# Patient Record
Sex: FEMALE | Race: WHITE | NOT HISPANIC OR LATINO | Employment: OTHER | URBAN - METROPOLITAN AREA
[De-identification: names, ages, dates, MRNs, and addresses within clinical notes are randomized per-mention and may not be internally consistent; named-entity substitution may affect disease eponyms.]

---

## 2017-07-11 ENCOUNTER — ALLSCRIPTS OFFICE VISIT (OUTPATIENT)
Dept: OTHER | Facility: OTHER | Age: 62
End: 2017-07-11

## 2018-01-10 NOTE — PROGRESS NOTES
Assessment    1  Acute left ankle pain (109 47) (M25 572)   2  Closed nondisplaced fracture of lateral malleolus of left fibula, initial encounter (824 2)   (S82 65XA)    Plan  Acute left ankle pain, Closed nondisplaced fracture of lateral malleolus of left fibula,  initial encounter    · [place], CAM Style; Status:Complete;   Done: 09OQD9505 12:11PM   · Follow-up Visit in 4 Weeks Evaluation and Treatment  Follow-up  Status: Hold For -  Scheduling  Requested for: 57KLK9419    Discussion/Summary    Patient is  62-year-old female presents today for evaluation of her left ankle pain consistent with a closed, nondisplaced fracture of the distal fibula  At this time, the patient will be placed in cam boot walker for ankle immobilization and will be made nonweightbearing  She may continue with Tylenol and anti-inflammatories as needed for her pain and swelling  She will return to the office follow-up in 4 weeks at which time follow-up x-rays will be obtained to assess for callus formation and healing  Possible side effects of new medications were reviewed with the patient/guardian today  The treatment plan was reviewed with the patient/guardian  The patient/guardian understands and agrees with the treatment plan   The patient was counseled regarding diagnostic results, instructions for management, risk factor reductions, prognosis, patient and family education, impressions, risks and benefits of treatment options, importance of compliance with treatment  Chief Complaint  left ankle pain      History of Present Illness  HPI: Patient is a  62-year-old female presents today for evaluation of her left ankle pain  She reports on March 20, 2016, demonstrates a left ankle while walking in her garden  She reports immediate onset of pain and swelling over the lateral aspect of the left ankle  Her pain is described as a throbbing, achy pain with radiation of symptoms proximally into her left calf area   She has been using Tylenol and naproxen which has provided no relief  Symptoms are relieved with rest and elevation  She denies any crepitus, warmth, numbness or tingling  Review of Systems    Constitutional: No fever, no chills, feels well, no tiredness, no recent weight gain or loss  Eyes: No complaints of eyesight problems, no red eyes  ENT: no loss of hearing, no nosebleeds, no sore throat  Cardiovascular: No complaints of chest pain, no palpitations, no leg claudication or lower extremity edema  Respiratory: no compliants of shortness of breath, no wheezing, no cough  Gastrointestinal: no complaints of abdominal pain, no constipation, no nausea or diarrhea, no vomiting, no bloody stools  Genitourinary: no complaints of dysuria, no incontinence  Musculoskeletal: joint swelling, limb pain and limb swelling, but as noted in HPI  Integumentary: no complaints of skin rash or lesion, no itching or dry skin, no skin wounds  Neurological: no complaints of headache, no confusion, no numbness or tingling, no dizziness  Endocrine: No complaints of muscle weakness, no feelings of weakness, no frequent urination, no excessive thirst    Psychiatric: No suicidal thoughts, no anxiety, no feelings of depression  ROS reviewed  Active Problems    1  Arthralgia of lumbar spine (724 2) (M54 5)   2  DDD (degenerative disc disease), lumbar (722 52) (M51 36)   3  Degenerative lumbar spinal stenosis (724 02) (M48 06)   4  Open Wound Of The Finger (883 0)   5  Spinal stenosis of lumbar region (724 02) (M48 06)    Past Medical History    The active problems and past medical history were reviewed and updated today  Surgical History    The surgical history was reviewed and updated today  Family History    The family history was reviewed and updated today  Social History  The social history was reviewed and updated today  The social history was reviewed and is unchanged  Current Meds   1   Gabapentin 300 MG Oral Capsule; TAKE 1 CAPSULE TWICE DAILY; Therapy: 87XHF9161 to (Evaluate:16Mar2016)  Requested for: 12WQH9327; Last   Rx:01Mar2016 Ordered   2  Metaxalone 800 MG Oral Tablet; TAKE 1 TABLET 3 TIMES DAILY; Therapy: 05UOF9077 to (Evaluate:11Mar2016)  Requested for: 92BOU9785; Last   Rx:01Mar2016 Ordered   3  Sertraline HCl - 25 MG Oral Tablet; Take one tablet every day; Therapy: 91Fgl8940 to  Requested for: 38PSB4528 Recorded    The medication list was reviewed and updated today  Allergies    1  No Known Drug Allergies    Physical Exam    Left Ankle: Appearance: swelling  Tenderness: ATFL, CFL, lateral malleolus  and PTFL, but not the syndesmosis  Palpatory findings include no crepitus and no warmth  ROM: Deferred  Motor: Deferred  Special Tests: Deferred  Constitutional - General appearance: Normal    Musculoskeletal - Gait and station: Normal  Digits and nails: Normal  Muscle strength/tone: Normal    Cardiovascular - Pulses: Normal  Examination of extremities for edema and/or varicosities: Normal    Skin - Skin and subcutaneous tissue: Normal    Neurologic - Sensation: Normal    Psychiatric - Orientation to person, place, and time: Normal  Mood and affect: Normal    Eyes   Conjunctiva and lids: Normal     Pupils and irises: Normal        Results/Data  I personally reviewed the films/images/results in the office today  My interpretation follows  X-ray Review Avulsion fracture of the distal fibula  Future Appointments    Date/Time Provider Specialty Site   03/31/2016 03:15 PM SAYDA Ring   Orthopedic Surgery [address]   Electronically signed by : Federico Howe DO; Mar 24 2016 12:14PM EST                       (Author)

## 2018-01-10 NOTE — PROGRESS NOTES
Assessment    1  DDD (degenerative disc disease), lumbar (722 52) (M51 36)   2  Degenerative lumbar spinal stenosis (724 02) (M48 06)     Lumbar spinal stenosis  Lumbar spondylolisthesis  Lumbar radiculopathy  Patient would like to proceed with surgical intervention  She feels her most recent fall, resulting in an ankle fracture was related to her spinal stenosis and weakness in her legs  Plan  Degenerative lumbar spinal stenosis    · Gabapentin 300 MG Oral Capsule; TAKE 1 CAPSULE TWICE DAILY   · Metaxalone 800 MG Oral Tablet; TAKE 1 TABLET 3 TIMES DAILY AS NEEDED FOR  MUSCLE SPASM   · Follow-up After MRI Evaluation and Treatment  Follow-up  Status: Hold For - Scheduling   Requested for: 27MEK6006   · Follow-up visit in 1 week Evaluation and Treatment  Follow-up  Status: Hold For -  Scheduling  Requested for: 55OEW5413   · * MRI LUMBAR SPINE WO CONTRAST; Status:Need Information - Financial  Authorization; Requested for:31Mar2016;      Patient will need an updated MRI of the lumbar spine  The planned procedure would include lumbar laminectomy, decompression, L2-S1, with instrumented posterolateral fusion, L2-S1, possible interbody fusion at L4-5 with allograft and autograft  She understands the risks include bleeding, infection, spinal fluid leak, hardware complications, persistent pain, possible need for reoperation, nonunion  Discussion/Summary   Degenerative lumbar spinal stenosis        Chief Complaint  Follow up lumbar spine stenosis      History of Present Illness  HPI: Patient presents for follow up regarding lumbar spine stenosis  SHe reports persistent and worsening symptoms including low back pain, bilateral leg pain and numbness with associated tingling  Her legsnsider definitve intervention  Review of Systems    Constitutional: No fever, no chills, feels well, no tiredness, no recent weight gain or loss  Eyes: No complaints of eyesight problems, no red eyes     ENT: no loss of hearing, no nosebleeds, no sore throat  Cardiovascular: No complaints of chest pain, no palpitations, no leg claudication or lower extremity edema  Respiratory: no compliants of shortness of breath, no wheezing, no cough  Gastrointestinal: no complaints of abdominal pain, no constipation, no nausea or diarrhea, no vomiting, no bloody stools  Genitourinary: no complaints of dysuria, no incontinence  Musculoskeletal: arthralgias, limb pain and myalgias, but as noted in HPI  Neurological: numbness and tingling, but as noted in HPI  Endocrine: as noted in HPI  feelings of weakness      Active Problems    1  Acute left ankle pain (719 47) (M25 572)   2  Arthralgia of lumbar spine (724 2) (M54 5)   3  Closed nondisplaced fracture of lateral malleolus of left fibula, initial encounter (824 2)   (S82 65XA)   4  DDD (degenerative disc disease), lumbar (722 52) (M51 36)   5  Degenerative lumbar spinal stenosis (724 02) (M48 06)   6  Open Wound Of The Finger (883 0)   7  Spinal stenosis of lumbar region (724 02) (M48 06)    Current Meds   1  Gabapentin 300 MG Oral Capsule; TAKE 1 CAPSULE TWICE DAILY; Therapy: 74VTA1554 to (Evaluate:16Mar2016)  Requested for: 90FTG6617; Last   Rx:01Mar2016 Ordered   2  Metaxalone 800 MG Oral Tablet; TAKE 1 TABLET 3 TIMES DAILY; Therapy: 23VDB5116 to (Evaluate:11Mar2016)  Requested for: 03OEG5292; Last   Rx:01Mar2016 Ordered   3  Sertraline HCl - 25 MG Oral Tablet; Take one tablet every day; Therapy: 30Vuv6031 to  Requested for: 06JCR3649 Recorded    Allergies    1  No Known Drug Allergies    Vitals   Recorded: 12TDZ9831 03:25PM   Heart Rate 87   Pulse Quality Normal   Systolic 853, RUE, Sitting   Diastolic 84, RUE, Sitting   Height 5 ft 5 in   Weight 237 lb    BMI Calculated 39 44   BSA Calculated 2 13     Physical Exam   Patient is obese  She ambulates with a cane  She is using a CAM boot on the left   She is groslly neuro stable L2-S1        Future Appointments    Date/Time Provider Specialty Site   04/25/2016 03:30 PM Natalia Mckenzie DO Sports Medicine [facility] ORTHOPA[facility]   04/07/2016 03:15 PM SAYDA Santo   Orthopedic Surgery [place] [address]     Signatures   Electronically signed by : SAYDA Naqvi ; Mar 31 2016 10:34PM EST                       (Author)

## 2018-01-10 NOTE — CONSULTS
Signatures   Electronically signed by : SAYDA Bains ; Mar  1 2016 12:46PM EST                       (Author)

## 2018-01-11 NOTE — PROGRESS NOTES
Assessment    1  Arthralgia of lumbar spine (724 2) (M54 5)   2  DDD (degenerative disc disease), lumbar (722 52) (M51 36)   3  Degenerative lumbar spinal stenosis (724 02) (M48 06)     Multilevel lumbar spinal stenosis  Spondylolisthesis, L4-5  Lumbar radiculopathy  Plan  Degenerative lumbar spinal stenosis    · Follow-up After Procedure Evaluation and Treatment  Follow-up  Status: Hold For -  Scheduling  Requested for: 01TUH1978   · Follow-up Visit in 4 Weeks Evaluation and Treatment  Follow-up  Status: Hold For -  Scheduling  Requested for: 45VMQ1634   · *1 - [facility]  Consult  Status: Hold For -  Scheduling  Requested for: 37RYQ3464  Care Summary provided  : Yes   · 1 Feliciano Pereyra MD, Radha Santacruz (Pain Management) Physician Referral  Consult  Status: Active   Requested for: 94IRJ7348  Care Summary provided  : Yes     Pain management for lumbar epidural steroid injection  Start physical therapy  Gabapentin  Skelaxin  Follow up in 4 weeks for reevaluation  If no, improvement, we'll consider surgical intervention in the way of lower lumbar laminectomy, decompression, and fusion  Discussion/Summary   Lumbar spinal stenosis and degenerative spondylolisthesis  Chief Complaint  Low back and leg pains      History of Present Illness  HPI: Patient presents for evaluation of chronic and progressively worsening low back and bilateral leg pain  She reports pain that radiates down to her knees  She has difficulty walking more than a block  5 years ago  She could walk several blocks  She denies any unique incontinence  She does report subjective weakness  Pain is localized low back, hips and thighs  She's had injections in the past, but nothing recently  Has not done any formal physical therapy  She struggles with obesity and is status post gastric bypass surgery    She does have a history of depression      Review of Systems    Constitutional: No fever, no chills, feels well, no tiredness, no recent weight gain or loss  Eyes: No complaints of eyesight problems, no red eyes  ENT: no loss of hearing, no nosebleeds, no sore throat  Cardiovascular: No complaints of chest pain, no palpitations, no leg claudication or lower extremity edema  Respiratory: no compliants of shortness of breath, no wheezing, no cough  Gastrointestinal: no complaints of abdominal pain, no constipation, no nausea or diarrhea, no vomiting, no bloody stools  Genitourinary: no complaints of dysuria, no incontinence  Musculoskeletal: arthralgias, limb pain and myalgias, but as noted in HPI  Integumentary: no complaints of skin rash or lesion, no itching or dry skin, no skin wounds  Neurological: no complaints of headache, no confusion, no numbness or tingling, no dizziness  Endocrine: as noted in HPI  feelings of weakness   Psychiatric: depression, but as noted in HPI  Active Problems    1  Open Wound Of The Finger (883 0)    Past Medical History   Obesity, depression, lumbar spinal stenosis  Surgical History   Gastric bypass surgery        Family History   Noncontributory        Social History   Employed, nonsmoker        Current Meds   1  Sertraline HCl - 25 MG Oral Tablet; Take one tablet every day; Therapy: 21Vtc5210 to  Requested for: 91WYT3638 Recorded    Allergies    1  No Known Drug Allergies     NKDA     Vitals  Signs [Data Includes: Current Encounter]    Height: 5 ft 5 in  Weight: 238 lb 8 oz  BMI Calculated: 39 69  BSA Calculated: 2 13    Physical Exam   Patient is obese  She does ambulate independently  Lumbar mobility is limited in all planes  She demonstrates minimal tenderness to palpation of the lumbosacral spine  She does have good strength in the L2-S1 distributions  bilaterally  5 out of 5  Negative straight leg raise bilaterally  Negative Pedro's test bilaterally  Reflexes are present, and symmetric at L4, and S1        Results/Data  I personally reviewed the films/images/results in the office today  My interpretation follows  MRI Review Outside facility 2013 MRI lumbar spine demonstrates multilevel, lumbar degenerative disc disease with areas of moderate to severe spinal stenosis from L2-S1  Most pronounced stenosis is severe in nature  At L4-5  Grade 1 spondylolisthesis, L4-5        Signatures   Electronically signed by : SAYDA Patterson ; Mar  1 2016 12:46PM EST                       (Author)

## 2018-01-13 VITALS
RESPIRATION RATE: 18 BRPM | HEIGHT: 63 IN | TEMPERATURE: 98.3 F | HEART RATE: 110 BPM | BODY MASS INDEX: 44.3 KG/M2 | DIASTOLIC BLOOD PRESSURE: 80 MMHG | SYSTOLIC BLOOD PRESSURE: 124 MMHG | WEIGHT: 250 LBS

## 2018-01-13 NOTE — PROGRESS NOTES
Assessment  Assessed    1  Closed nondisplaced fracture of lateral malleolus of left fibula with routine healing,   subsequent encounter (V54 19) (S82 65XD)   2  Acute left ankle pain (769 47) (A60 992)    Plan  Acute left ankle pain, Closed nondisplaced fracture of lateral malleolus of left fibula,  initial encounter    · * XR ANKLE 3+ VIEW LEFT; Status:Complete - Retrospective By Protocol Authorization;    Done: 65HXK1121  Degenerative lumbar spinal stenosis    · * MRI LUMBAR SPINE WO CONTRAST; Status:Active; Requested for:31Mar2016;     Discussion/Summary    Patient is 65-year-old female presents today for evaluation of her left ankle pain consistent with a closed, nondisplaced fracture of the distal fibula  X-ray imaging at this time demonstrates ongoing healing with appropriate callus formation  On physical exam she does continue with point tenderness over the lateral malleolus  The patient this time will be continue in her cam boot walker for an additional 3 weeks  She'll return to the office for follow-up in 3 weeks at which time follow-up x-rays will be obtained to evaluate for ongoing callus formation  The treatment plan was reviewed with the patient/guardian  The patient/guardian understands and agrees with the treatment plan   The patient was counseled regarding diagnostic results, instructions for management, risk factor reductions, prognosis, patient and family education, impressions, risks and benefits of treatment options, importance of compliance with treatment  Chief Complaint  Fracture care follow-up of left fibula      History of Present Illness  HPI: Patient is a 65-year-old female presents today for follow-up of her left lateral malleolus fracture  Today she reports greater than 50% improvement in her pain and swelling  She has been maintained in a cam boot walker for 2 weeks and is tolerating it well  She has return to work with limited duties and is tolerating it well   She denies any motor weakness or sensory deficits  Review of Systems    Constitutional: No fever, no chills, feels well, no tiredness, no recent weight gain or loss  Eyes: No complaints of eyesight problems, no red eyes  ENT: no loss of hearing, no nosebleeds, no sore throat  Cardiovascular: No complaints of chest pain, no palpitations, no leg claudication or lower extremity edema  Respiratory: no compliants of shortness of breath, no wheezing, no cough  Gastrointestinal: no complaints of abdominal pain, no constipation, no nausea or diarrhea, no vomiting, no bloody stools  Genitourinary: no complaints of dysuria, no incontinence  Musculoskeletal: limb pain, but as noted in HPI, no joint swelling and no limb swelling  Integumentary: no complaints of skin rash or lesion, no itching or dry skin, no skin wounds  Neurological: no complaints of headache, no confusion, no numbness or tingling, no dizziness  Endocrine: No complaints of muscle weakness, no feelings of weakness, no frequent urination, no excessive thirst    Psychiatric: No suicidal thoughts, no anxiety, no feelings of depression  ROS reviewed  Active Problems  Problems    1  Acute left ankle pain (719 47) (M25 572)   2  Arthralgia of lumbar spine (724 2) (M54 5)   3  Closed nondisplaced fracture of lateral malleolus of left fibula, initial encounter (824 2)   (S82 65XA)   4  DDD (degenerative disc disease), lumbar (722 52) (M51 36)   5  Degenerative lumbar spinal stenosis (724 02) (M48 06)   6  Open Wound Of The Finger (883 0)   7  Spinal stenosis of lumbar region (724 02) (M48 06)    Past Medical History    The active problems and past medical history were reviewed and updated today  Surgical History    The surgical history was reviewed and updated today  Family History    The family history was reviewed and updated today  Social History  The social history was reviewed and updated today   The social history was reviewed and is unchanged  Current Meds   1  Gabapentin 300 MG Oral Capsule; TAKE 1 CAPSULE TWICE DAILY; Therapy: 49MHC0382 to (Evaluate:16Mar2016)  Requested for: 68UUM9332; Last   Rx:01Mar2016 Ordered   2  Gabapentin 300 MG Oral Capsule; TAKE 1 CAPSULE TWICE DAILY; Therapy: 11RNW6349 to (Evaluate:15Apr2016)  Requested for: 86ROU3969; Last   Rx:31Mar2016 Ordered   3  Metaxalone 800 MG Oral Tablet; TAKE 1 TABLET 3 TIMES DAILY AS NEEDED FOR   MUSCLE SPASM; Therapy: 39QKN6591 to (Evaluate:17Apr2016)  Requested for: 78KQE5212; Last   Rx:31Mar2016 Ordered   4  Metaxalone 800 MG Oral Tablet; TAKE 1 TABLET 3 TIMES DAILY; Therapy: 42NLR2102 to (Evaluate:11Mar2016)  Requested for: 94AWU3553; Last   Rx:01Mar2016 Ordered   5  Sertraline HCl - 25 MG Oral Tablet; Take one tablet every day; Therapy: 64Taj0737 to  Requested for: 72RQB8026 Recorded    The medication list was reviewed and updated today  Allergies  Medication    1  No Known Drug Allergies    Physical Exam    Left Ankle: Appearance: swelling  Tenderness: ATFL, CFL, lateral malleolus  and PTFL, but not the syndesmosis  Palpatory findings include no crepitus and no warmth  ROM: Deferred  Motor: Deferred  Special Tests: Deferred  Constitutional - General appearance: Normal    Musculoskeletal - Gait and station: Normal  Digits and nails: Normal  Muscle strength/tone: Normal    Cardiovascular - Pulses: Normal  Examination of extremities for edema and/or varicosities: Normal    Skin - Skin and subcutaneous tissue: Normal    Neurologic - Sensation: Normal    Psychiatric - Orientation to person, place, and time: Normal  Mood and affect: Normal    Eyes   Conjunctiva and lids: Normal     Pupils and irises: Normal        Results/Data    Views: of the left ankle  Findings: the soft tissues were normal    Lateral Malleolus Fracture: The fracture is transverse  Comparison with prior films: ongoing healing        Future Appointments    Date/Time Provider Specialty Site   04/25/2016 03:30 PM Yo Arce DO Sports Medicine [facility] ORTHOPAEDICS   04/07/2016 03:15 PM SAYDA Knapp  Orthopedic Surgery [facility]     Signatures   Electronically signed by : Kamar Cruz DO;  Apr 5 2016 11:00AM EST                       (Author)

## 2018-01-15 NOTE — MISCELLANEOUS
Message  Return to work or school:   Leobardo Maynard is under my professional care  She was seen in my office on 4/5/16                  GILBERT Bay  Signatures   Electronically signed by : Gaurav De Los Santos DO;  Apr 6 2016  6:56PM EST                       (Author)

## 2018-01-18 NOTE — MISCELLANEOUS
Message  Return to work or school:   Neville Steven is under my professional care  She was seen in my office on 3/24/2016   She is able to return to work on  4/4/2016   Block Bending may work as tolerated and limit work duties that can exacerbate her symptoms  Dr Omega Willard  Signatures   Electronically signed by : Osei Mcgraw DO; Apr 6 2016  7:02PM EST                       (Author)    Electronically signed by : Osei Mcgraw DO;  Apr 6 2016  7:02PM EST                       (Author)

## 2018-02-06 ENCOUNTER — OFFICE VISIT (OUTPATIENT)
Dept: GASTROENTEROLOGY | Facility: CLINIC | Age: 63
End: 2018-02-06
Payer: COMMERCIAL

## 2018-02-06 VITALS
RESPIRATION RATE: 16 BRPM | BODY MASS INDEX: 41.32 KG/M2 | HEART RATE: 89 BPM | WEIGHT: 248 LBS | SYSTOLIC BLOOD PRESSURE: 132 MMHG | DIASTOLIC BLOOD PRESSURE: 90 MMHG | TEMPERATURE: 99.1 F | HEIGHT: 65 IN

## 2018-02-06 DIAGNOSIS — R19.7 DIARRHEA, UNSPECIFIED TYPE: ICD-10-CM

## 2018-02-06 DIAGNOSIS — R10.9 RIGHT SIDED ABDOMINAL PAIN: Primary | ICD-10-CM

## 2018-02-06 DIAGNOSIS — K21.9 GERD WITHOUT ESOPHAGITIS: ICD-10-CM

## 2018-02-06 PROCEDURE — 99204 OFFICE O/P NEW MOD 45 MIN: CPT | Performed by: INTERNAL MEDICINE

## 2018-02-06 RX ORDER — PANTOPRAZOLE SODIUM 40 MG/1
1 TABLET, DELAYED RELEASE ORAL 2 TIMES DAILY
Status: ON HOLD | COMMUNITY
End: 2018-07-09 | Stop reason: CLARIF

## 2018-02-06 RX ORDER — ACETAMINOPHEN 325 MG/1
325 TABLET ORAL AS NEEDED
COMMUNITY
End: 2018-06-30 | Stop reason: HOSPADM

## 2018-02-06 RX ORDER — SUCRALFATE ORAL 1 G/10ML
1 SUSPENSION ORAL 4 TIMES DAILY
Qty: 420 ML | Refills: 0 | Status: ON HOLD | OUTPATIENT
Start: 2018-02-06 | End: 2018-07-09 | Stop reason: CLARIF

## 2018-02-06 RX ORDER — TRAZODONE HYDROCHLORIDE 50 MG/1
50 TABLET ORAL
Status: ON HOLD | COMMUNITY
End: 2018-07-09 | Stop reason: CLARIF

## 2018-02-06 NOTE — PROGRESS NOTES
Consultation - [address] [facility]  Gary Alejandre 63 y o  female MRN: [id number]  Unit/Bed#:  Encounter: [id number]        Consults    ASSESSMENT/PLAN:   1  Right-sided abdominal pain:  Differential includes biliary colic versus referred back pain as patient has history of spinal injury versus adhesions versus anastomotic ulcer, has history of gastric bypass and multiple bowel resections and hernia repairs  -will obtain blood work including CBC, CMP, TSH, celiac serologies  -will obtain right upper quadrant ultrasound for further evaluation  -will plan for EGD to assess for anastomotic ulcer, patient has symptoms of reflux despite being on pantoprazole 40 mg, will start on Carafate 1 g q i d , 2 hours separate from other medications  2   GERD:  Has had symptoms for long time, was previously on omeprazole without symptomatic relief, has recently been switched over to pantoprazole, states EGD was 2-3 years ago at [facility]  Wall her symptoms of acid reflux have improved with pantoprazole 40 mg daily, they have now resolved, I suspect there may be a component of bile reflux as she is status post cholecystectomy  Will start on Carafate 1 g q i d , 2 hours separate from other medications  Avoid NSAIDs meanwhile  3   Patient was explained about the lifestyle and dietary modifications  Advised to avoid fatty foods, chocolates, caffeine, alcohol and any other triggering foods  Avoid eating for at least 3 hours before going to bed  4   Longstanding symptoms loose bowel movements:  Differential includes post cholecystectomy syndrome versus IBS versus less likely microscopic colitis as patient has undergone a colonoscopy 1 year ago in [facility]   -discussed low FODMAP diet  -will obtain results of the colonoscopy done last year   -she does have family history of colon cancer but in her cousins, no first-degree relatives    Will discuss the timing of repeat colonoscopy pending the results of prior  ______________________________________________________________________    Reason for Consult / Principal Problem: [unfilled]    HPI: Susannah Reynoso is a 63y o  year old female with history of hypertension, gastric bypass in 2005, acid reflux, spinal injury, colon polyps, who comes in for evaluation of longstanding symptoms of acid reflux and recent onset of right upper quadrant abdominal pain which radiates to the back  Patient is concerned that she may have liver problems  She endorses drinking 1 beer on a nightly basis  She denies knowledge of hepatitis  She is a , thinks that she has been tested for hepatitis and has been negative  She denies IV drug use  She was previously seen by gastroenterologist and treatment [address] [place] [address], underwent a colonoscopy 1 year ago and an EGD several years ago  She does endorse history of peptic ulcer disease and multiple surgeries for hernia repairs  She states that she also had partial bowel resection, I do not have the results of any of the abdominal surgeries  Her family history is pertinent for cousin with colon cancer at age of 71 and uncle with liver cancer at the age of 61  She endorses personal history of colon polyps, last colonoscopy was 1 year ago  Patient is a nonsmoker and drinks daily  Review of Systems: The remainder of the review of systems was negative except for the pertinent positives noted in HPI       Historical Information   Past Medical History:   Diagnosis Date    Back problem     Gastric bypass status for obesity     Hypertension      Past Surgical History:   Procedure Laterality Date    APPENDECTOMY      BARIATRIC SURGERY      CARPAL TUNNEL RELEASE      CHOLECYSTECTOMY      COLONOSCOPY W/ BIOPSIES AND POLYPECTOMY      FLEXIBLE BRONCHOSCOPY W/ UPPER ENDOSCOPY      HERNIA REPAIR      TONSILECTOMY, ADENOIDECTOMY, BILATERAL MYRINGOTOMY AND TUBES       Social History   History   Alcohol Use    3 0 oz/week    5 Cans of beer per week     History   Drug Use No     History   Smoking Status    Never Smoker   Smokeless Tobacco    Never Used     No family history on file  Meds/Allergies       (Not in a hospital admission)  No current facility-administered medications for this visit  No Known Allergies    Objective     Blood pressure 132/90, pulse 89, temperature 99 1 °F (37 3 °C), temperature source Tympanic, resp  rate 16, height 5' 5" (1 651 m), weight 112 kg (248 lb)  [unfilled]    PHYSICAL EXAM     GEN: well nourished, well developed, no acute distress  HEENT: anicteric, MMM, no cervical or supraclavicular lymphadenopathy  CV: RRR, no m/r/g  CHEST: CTA b/l, no WRR  ABD: +BS, soft, NT/ND, no hepatosplenomegaly  EXT: no c/c/e  SKIN: no rashes,  NEURO: aaox3    Lab Results:   No visits with results within 1 Day(s) from this visit  Latest known visit with results is:   No results found for any previous visit       Imaging Studies: I have personally reviewed pertinent films in PACS

## 2018-02-12 ENCOUNTER — HOSPITAL ENCOUNTER (OUTPATIENT)
Dept: RADIOLOGY | Facility: HOSPITAL | Age: 63
Discharge: HOME | End: 2018-02-12
Attending: INTERNAL MEDICINE
Payer: COMMERCIAL

## 2018-02-12 ENCOUNTER — TELEPHONE (OUTPATIENT)
Dept: GASTROENTEROLOGY | Facility: CLINIC | Age: 63
End: 2018-02-12

## 2018-02-12 DIAGNOSIS — R10.9 RIGHT SIDED ABDOMINAL PAIN: ICD-10-CM

## 2018-02-12 PROCEDURE — 76705 ECHO EXAM OF ABDOMEN: CPT

## 2018-02-12 NOTE — TELEPHONE ENCOUNTER
Called PT about her US to the RUQ  She wanted me to know that she was in the check in line to get one as we spoke 02/12/2018 @9670   RDBjr      By Amadeo Coronado MA

## 2018-02-14 NOTE — TELEPHONE ENCOUNTER
Please inform the patient that liver appears normal, she does not have a gallbladder and there is no dilation of the ducts that connect the gallbladder and liver

## 2018-02-15 ENCOUNTER — TELEPHONE (OUTPATIENT)
Dept: GASTROENTEROLOGY | Facility: CLINIC | Age: 63
End: 2018-02-15

## 2018-02-15 NOTE — TELEPHONE ENCOUNTER
Please inform the patient that liver appears normal, she does not have a gallbladder and there is no dilation of the ducts that connect the gallbladder and liver  02/15/18 1045 Pt was called and left message on her voice mail, to call our office  RDBjr    02/15/18 1100 PT returned call and related results as per Dr Patti Garcia of her Liver test  Pt scheduled a follow-up apt on March 06, 2018 at 1620       By Prieto Jose

## 2018-02-16 NOTE — PRE-PROCEDURE INSTRUCTIONS
Pre-Surgery Instructions:   Medication Instructions    acetaminophen (TYLENOL) 325 mg tablet Patient was instructed by Physician and understands   LISINOPRIL PO Patient was instructed by Physician and understands   pantoprazole (PROTONIX) 40 mg tablet Patient was instructed by Physician and understands   sucralfate (CARAFATE) 1 g/10 mL suspension Patient was instructed by Physician and understands   traZODone (DESYREL) 50 mg tablet Patient was instructed by Physician and understands

## 2018-02-18 ENCOUNTER — ANESTHESIA EVENT (OUTPATIENT)
Dept: GASTROENTEROLOGY | Facility: AMBULARY SURGERY CENTER | Age: 63
End: 2018-02-18
Payer: COMMERCIAL

## 2018-02-19 ENCOUNTER — ANESTHESIA (OUTPATIENT)
Dept: GASTROENTEROLOGY | Facility: AMBULARY SURGERY CENTER | Age: 63
End: 2018-02-19
Payer: COMMERCIAL

## 2018-02-19 ENCOUNTER — HOSPITAL ENCOUNTER (OUTPATIENT)
Facility: AMBULARY SURGERY CENTER | Age: 63
Setting detail: OUTPATIENT SURGERY
Discharge: HOME | End: 2018-02-19
Attending: INTERNAL MEDICINE | Admitting: INTERNAL MEDICINE
Payer: COMMERCIAL

## 2018-02-19 ENCOUNTER — HOSPITAL ENCOUNTER (OUTPATIENT)
Dept: RADIOLOGY | Facility: HOSPITAL | Age: 63
Discharge: HOME | End: 2018-02-19
Payer: COMMERCIAL

## 2018-02-19 VITALS
HEART RATE: 81 BPM | OXYGEN SATURATION: 96 % | SYSTOLIC BLOOD PRESSURE: 113 MMHG | TEMPERATURE: 98.9 F | RESPIRATION RATE: 18 BRPM | DIASTOLIC BLOOD PRESSURE: 70 MMHG

## 2018-02-19 DIAGNOSIS — R10.9 RIGHT SIDED ABDOMINAL PAIN: ICD-10-CM

## 2018-02-19 DIAGNOSIS — K21.9 GERD WITHOUT ESOPHAGITIS: ICD-10-CM

## 2018-02-19 DIAGNOSIS — K21.9 GERD WITHOUT ESOPHAGITIS: Primary | ICD-10-CM

## 2018-02-19 PROCEDURE — 43235 EGD DIAGNOSTIC BRUSH WASH: CPT | Performed by: INTERNAL MEDICINE

## 2018-02-19 PROCEDURE — 74240 X-RAY XM UPR GI TRC 1CNTRST: CPT

## 2018-02-19 RX ORDER — FENTANYL CITRATE 50 UG/ML
INJECTION, SOLUTION INTRAMUSCULAR; INTRAVENOUS AS NEEDED
Status: DISCONTINUED | OUTPATIENT
Start: 2018-02-19 | End: 2018-02-19 | Stop reason: SURG

## 2018-02-19 RX ORDER — SODIUM CHLORIDE 9 MG/ML
75 INJECTION, SOLUTION INTRAVENOUS CONTINUOUS
Status: DISCONTINUED | OUTPATIENT
Start: 2018-02-19 | End: 2018-02-19 | Stop reason: HOSPADM

## 2018-02-19 RX ORDER — PROPOFOL 10 MG/ML
INJECTION, EMULSION INTRAVENOUS AS NEEDED
Status: DISCONTINUED | OUTPATIENT
Start: 2018-02-19 | End: 2018-02-19 | Stop reason: SURG

## 2018-02-19 RX ADMIN — FENTANYL CITRATE 50 MCG: 50 INJECTION, SOLUTION INTRAMUSCULAR; INTRAVENOUS at 08:41

## 2018-02-19 RX ADMIN — PROPOFOL 60 MG: 10 INJECTION, EMULSION INTRAVENOUS at 08:49

## 2018-02-19 RX ADMIN — SODIUM CHLORIDE 75 ML/HR: 0.9 INJECTION, SOLUTION INTRAVENOUS at 08:28

## 2018-02-19 RX ADMIN — LIDOCAINE HYDROCHLORIDE 100 MG: 20 INJECTION, SOLUTION INTRAVENOUS at 08:41

## 2018-02-19 RX ADMIN — FENTANYL CITRATE 50 MCG: 50 INJECTION, SOLUTION INTRAMUSCULAR; INTRAVENOUS at 08:45

## 2018-02-19 RX ADMIN — PROPOFOL 50 MG: 10 INJECTION, EMULSION INTRAVENOUS at 08:46

## 2018-02-19 RX ADMIN — PROPOFOL 100 MG: 10 INJECTION, EMULSION INTRAVENOUS at 08:41

## 2018-02-19 NOTE — DISCHARGE INSTRUCTIONS
Discharge home  Soft diet, small frequent meals, do not eat late at night  Recommend checking an upper GI series  Follow up in the office  Suspect that her a postprandial fullness, reflux is due to her anatomy  Can consider EGD with dilation however do this significant scarring and tethering, would use a balloon dilator would need to discuss increased risk with the patient  Call with any further abdominal pain, bleeding, fevers

## 2018-02-19 NOTE — ANESTHESIA PREPROCEDURE EVALUATION
Review of Systems/Medical History  Patient summary reviewed  Chart reviewed  No history of anesthetic complications     Cardiovascular  Hypertension ,    Pulmonary       GI/Hepatic    GERD ,   Comment: S/p gastric bypass; s/p appy, s/p margot          Endo/Other    Obesity  morbid obesity   GYN       Hematology   Musculoskeletal       Neurology   Psychology       Comment: 5 cans of beer per weeks         Physical Exam    Airway    Mallampati score: II  TM Distance: >3 FB  Neck ROM: full     Dental       Cardiovascular  Rhythm: regular, Rate: normal,     Pulmonary  Breath sounds clear to auscultation,     Other Findings        Anesthesia Plan  ASA Score- 2     Anesthesia Type- IV sedation with anesthesia with ASA Monitors  Additional Monitors:   Airway Plan:         Plan Factors-    Induction- intravenous  Postoperative Plan-     Informed Consent- Anesthetic plan and risks discussed with patient  I personally reviewed this patient with the CRNA  Discussed and agreed on the Anesthesia Plan with the CRNA  Sarah Garcia

## 2018-02-19 NOTE — H&P
History and Physical - [facility]  Gary Alejandre 63 y o  female MRN: [id number]    HPI: Gary Alejandre is a 63y o  year old female who presents with [address]  Dysphagia, RUQ pain  Review of Systems    Historical Information   Past Medical History:   Diagnosis Date    Back problem     Gastric bypass status for obesity     GERD (gastroesophageal reflux disease)     Hypertension      Past Surgical History:   Procedure Laterality Date    APPENDECTOMY      BARIATRIC SURGERY      CARPAL TUNNEL RELEASE      CHOLECYSTECTOMY      COLONOSCOPY W/ BIOPSIES AND POLYPECTOMY      FLEXIBLE BRONCHOSCOPY W/ UPPER ENDOSCOPY      HERNIA REPAIR      TONSILECTOMY, ADENOIDECTOMY, BILATERAL MYRINGOTOMY AND TUBES       Social History   History   Alcohol Use    3 0 oz/week    5 Cans of beer per week     History   Drug Use No     History   Smoking Status    Never Smoker   Smokeless Tobacco    Never Used     Family History   Problem Relation Age of Onset    Diabetes Mother     Aortic aneurysm Father     Cancer Father      prostate    Heart disease Sister      open heart    Cancer Sister      breast    Diabetes Brother     No Known Problems Daughter     Asperger's syndrome Son     Diabetes Maternal Grandfather     No Known Problems Brother        Meds/Allergies     Prescriptions Prior to Admission   Medication    acetaminophen (TYLENOL) 325 mg tablet    LISINOPRIL PO    pantoprazole (PROTONIX) 40 mg tablet    sucralfate (CARAFATE) 1 g/10 mL suspension    traZODone (DESYREL) 50 mg tablet       No Known Allergies    Objective     Blood pressure 160/82, pulse 93, temperature 98 9 °F (37 2 °C), temperature source Tympanic, resp  rate 18, SpO2 96 %  PHYSICAL EXAM    Gen: NAD  CV: RRR  CHEST: Clear  ABD: soft, NT/ND  EXT: no edema  Neuro: AAO      ASSESSMENT/PLAN:  This is a 63y o  year old female here for GErd  Dysphagia, RUQ pain       PLAN:   Procedure: Fiorella Hines

## 2018-02-19 NOTE — OP NOTE
ESOPHAGOGASTRODUODENOSCOPY    PROCEDURE: EGD    INDICATIONS: Abdominal pain, Epigastric, Dysphagia and GERD    POST-OP DIAGNOSIS: See the impression below    SEDATION: Monitored anesthesia care, check anesthesia records    PHYSICAL EXAM:    Vitals:    02/19/18 0821   BP: 160/82   Pulse: 93   Resp: 18   Temp: 98 9 °F (37 2 °C)   SpO2: 96%    There is no height or weight on file to calculate BMI  General: NAD  Heart: S1 & S2 normal, RRR  Lungs: CTA, No rales or rhonchi  Abdomen: Soft, nontender, nondistended, good bowel sounds    CONSENT:  Informed consent was obtained for the procedure, including sedation after explaining the risks and benefits of the procedure  Risks including but not limited to bleeding, perforation, infection, aspiration were discussed in detail  Also explained about less than 100% sensitivity with the exam and other alternatives  PREPARATION:   EKG tracing, pulse oximetry, blood pressure were monitored throughout the procedure  Patient was identified by myself both verbally and by visual inspection of ID band  DESCRIPTION:   Patient was placed in the left lateral decubitus position and was sedated with the above medication  The gastroscope was introduced in to the oropharynx and the esophagus was intubated under direct visualization  Scope was passed down the esophagus up to 2nd part of the duodenum  A careful inspection was made as the gastroscope was withdrawn, including a retroflexed view of the stomach; findings and interventions are described below  FINDINGS:    #1  Esophagus and GEJ- esophagus jejunal anastomosis with visible staple line, anastomosis was approximately 13 mm the scope was advanced easily through this  There is significant tethering around the anastomosis, there was also proximal to the some scar tissue within the distal esophagus with what appeared to be an esophageal diverticulum in the distal esophagus  Dilation was not performed    #2   Stomach- surgically absent    #3  Duodenum- patient appears to have an esophagus jejunal anastomosis, there did not appear to be any residual stomach or at best very small gastric pouch  The anastomosis was widely patent, the jejunal mucosa  Normal, there is no ulceration or erythematous changes         IMPRESSIONS:      Esophageal jejunal anastomosis, 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, visible staple line and some proximal scar tissue and tethering within the distal esophagus with esophageal diverticulum  Healthy-appearing jejunum, did not appear to have a gastric pouch or possibly very small small gastric pouch    RECOMMENDATIONS:     Discharge home  Soft diet, small frequent meals, do not eat late at night  Recommend checking an upper GI series  Follow up in the office  Suspect that her a postprandial fullness, reflux is due to her anatomy  Can consider EGD with dilation however do this significant scarring and tethering, would use a balloon dilator would need to discuss increased risk with the patient  Call with any further abdominal pain, bleeding, fevers    COMPLICATIONS:  None; patient tolerated the procedure well            DISPOSITION: PACU           CONDITION: Stable

## 2018-02-21 LAB
ALBUMIN SERPL-MCNC: 4.2 G/DL (ref 3.6–4.8)
ALBUMIN/GLOB SERPL: 1.5 {RATIO} (ref 1.2–2.2)
ALP SERPL-CCNC: 90 IU/L (ref 39–117)
ALT SERPL-CCNC: 29 IU/L (ref 0–32)
AMBIG ABBREV DEFAULT: NORMAL
AST SERPL-CCNC: 31 IU/L (ref 0–40)
BASOPHILS # BLD AUTO: 0 X10E3/UL (ref 0–0.2)
BASOPHILS NFR BLD AUTO: 0 %
BILIRUB SERPL-MCNC: 0.5 MG/DL (ref 0–1.2)
BUN SERPL-MCNC: 13 MG/DL (ref 8–27)
BUN/CREAT SERPL: 17 (ref 12–28)
CALCIUM SERPL-MCNC: 9.1 MG/DL (ref 8.7–10.3)
CHLORIDE SERPL-SCNC: 104 MMOL/L (ref 96–106)
CO2 SERPL-SCNC: 22 MMOL/L (ref 18–29)
CREAT SERPL-MCNC: 0.78 MG/DL (ref 0.57–1)
ENDOMYSIUM IGA SER QL: NEGATIVE
EOSINOPHIL # BLD AUTO: 0.3 X10E3/UL (ref 0–0.4)
EOSINOPHIL NFR BLD AUTO: 3 %
ERYTHROCYTE [DISTWIDTH] IN BLOOD BY AUTOMATED COUNT: 13.2 % (ref 12.3–15.4)
GLOBULIN SER-MCNC: 2.8 G/DL (ref 1.5–4.5)
GLUCOSE SERPL-MCNC: 96 MG/DL (ref 65–99)
HAV IGM SERPL QL IA: NEGATIVE
HBV CORE IGM SERPL QL IA: NEGATIVE
HBV SURFACE AG SERPL QL IA: NEGATIVE
HCT VFR BLD AUTO: 40.7 % (ref 34–46.6)
HCV AB S/CO SERPL IA: <0.1 S/CO RATIO (ref 0–0.9)
HGB BLD-MCNC: 13.8 G/DL (ref 11.1–15.9)
IGA SERPL-MCNC: 222 MG/DL (ref 87–352)
IMM GRANULOCYTES # BLD: 0 X10E3/UL (ref 0–0.1)
IMM GRANULOCYTES NFR BLD: 0 %
LYMPHOCYTES # BLD AUTO: 2.8 X10E3/UL (ref 0.7–3.1)
LYMPHOCYTES NFR BLD AUTO: 26 %
MCH RBC QN AUTO: 32.5 PG (ref 26.6–33)
MCHC RBC AUTO-ENTMCNC: 33.9 G/DL (ref 31.5–35.7)
MCV RBC AUTO: 96 FL (ref 79–97)
MONOCYTES # BLD AUTO: 0.7 X10E3/UL (ref 0.1–0.9)
MONOCYTES NFR BLD AUTO: 7 %
NEUTROPHILS # BLD AUTO: 6.8 X10E3/UL (ref 1.4–7)
NEUTROPHILS NFR BLD AUTO: 64 %
PLATELET # BLD AUTO: 306 X10E3/UL (ref 150–379)
POTASSIUM SERPL-SCNC: 4.3 MMOL/L (ref 3.5–5.2)
PROT SERPL-MCNC: 7 G/DL (ref 6–8.5)
RBC # BLD AUTO: 4.25 X10E6/UL (ref 3.77–5.28)
SL AMB EGFR AFRICAN AMERICAN: 96
SL AMB EGFR NON AFRICAN AMERICAN: 83
SODIUM SERPL-SCNC: 142 MMOL/L (ref 134–144)
TSH SERPL DL<=0.005 MIU/L-ACNC: 1.19 UIU/ML (ref 0.45–4.5)
TTG IGA SER-ACNC: <2 U/ML (ref 0–3)
WBC # BLD AUTO: 10.7 X10E3/UL (ref 3.4–10.8)

## 2018-02-27 ENCOUNTER — OFFICE VISIT (OUTPATIENT)
Dept: GASTROENTEROLOGY | Facility: CLINIC | Age: 63
End: 2018-02-27
Payer: COMMERCIAL

## 2018-02-27 VITALS
DIASTOLIC BLOOD PRESSURE: 72 MMHG | BODY MASS INDEX: 41.65 KG/M2 | HEIGHT: 65 IN | HEART RATE: 84 BPM | TEMPERATURE: 99.3 F | SYSTOLIC BLOOD PRESSURE: 136 MMHG | WEIGHT: 250 LBS

## 2018-02-27 DIAGNOSIS — K58.0 IRRITABLE BOWEL SYNDROME WITH DIARRHEA: ICD-10-CM

## 2018-02-27 DIAGNOSIS — R10.9 RIGHT SIDED ABDOMINAL PAIN: Primary | ICD-10-CM

## 2018-02-27 DIAGNOSIS — R19.7 DIARRHEA, UNSPECIFIED TYPE: ICD-10-CM

## 2018-02-27 PROCEDURE — 99213 OFFICE O/P EST LOW 20 MIN: CPT | Performed by: INTERNAL MEDICINE

## 2018-02-27 NOTE — LETTER
February 27, 2018     Flaquita Arnold MD  [address] [place] [address]    Patient: Stas Judd   YOB: 1955   Date of Visit: 2/27/2018       Dear Dr Mike Wagner: Thank you for referring Stas Judd to me for evaluation  Below are my notes for this consultation  If you have questions, please do not hesitate to call me  I look forward to following your patient along with you           Sincerely,        Heena De MD        CC: No Recipients

## 2018-02-28 NOTE — PROGRESS NOTES
[address] [facility]  Lizzette Bean 63 y o  female MRN: [id number]            Assessment & Plan:    Pleasant 82-year-old female with multiple abdominal surgeries, which had resulted in from correction of her original gastric bypass, she has an esophagojejunostomy with a anastomotic stricture  She has had persistent right upper quadrant abdominal pain with diarrhea  1   Right upper quadrant abdominal pain and diarrhea:  Differential includes adhesive disease, I am actually more concerned that she may have a component of irritable bowel syndrome with diarrhea or small intestinal bacterial overgrowth  -we will check a CT scan of her abdomen pelvis given the persistent right upper quadrant abdominal pain  -will give her prescription for rifaximin to treat her for small intestinal bacterial overgrowth and IBS-diarrhea  -if her symptoms persist despite these treatments we will consider amitriptyline for neuropathic pain, she may have a component of postsurgical pain or neuropathic pain radiating from her spine  -she will follow-up in 4 to 6 weeks        _____________________________________________________________        CC: Follow-up of abdominal pain    HPI:  Lizzette Bean is a 63 y  o female who is here for follow-up of right upper quadrant abdominal pain  As you know this is a 82-year-old female with a history of gastric bypass which was complicated by which she describes as poor motility, she had an additional bypass surgery which ended up in and esophago jejunostomy, she recently had an upper endoscopy which demonstrated stricture at her esophago jejunal anastomosis  There was a small diverticulum noted  The patient had an upper GI series which demonstrated significant reflux but otherwise normal motility in the 1st portion of the small intestine      She continues to have persistent right upper quadrant abdominal pain with gas and bloating jaja mejia and she notes that after she gets worsening pain she will have loose watery bowel movement  The symptoms have been ongoing for many years since her corrective surgery  She has significant urgency, mom associated with right upper quadrant abdominal pain  She has recently been treated for urinary tract infection  Most her symptoms are worsened by eating the some of the pain is constant as well  She works as a   ROS:  The remainder of the ROS was negative except for the pertinent positives mentioned in HPI  Allergies: Patient has no known allergies  Medications:   Current Outpatient Prescriptions:     acetaminophen (TYLENOL) 325 mg tablet, Take 1 tablet by mouth as needed, Disp: , Rfl:     LISINOPRIL PO, Take by mouth every morning  , Disp: , Rfl:     pantoprazole (PROTONIX) 40 mg tablet, Take 1 tablet by mouth 2 (two) times a day  , Disp: , Rfl:     sucralfate (CARAFATE) 1 g/10 mL suspension, Take 10 mL (1 g total) by mouth 4 (four) times a day, Disp: 420 mL, Rfl: 0    traZODone (DESYREL) 50 mg tablet, Take 1 tablet by mouth daily at bedtime, Disp: , Rfl:     rifaximin (XIFAXAN) 550 mg tablet, Take 1 tablet (550 mg total) by mouth every 8 (eight) hours for 14 days, Disp: 52 tablet, Rfl: 0    Past Medical History:   Diagnosis Date    Back problem     Gastric bypass status for obesity     GERD (gastroesophageal reflux disease)     Hypertension        Past Surgical History:   Procedure Laterality Date    APPENDECTOMY      BARIATRIC SURGERY      CARPAL TUNNEL RELEASE      CHOLECYSTECTOMY      COLONOSCOPY W/ BIOPSIES AND POLYPECTOMY      FLEXIBLE BRONCHOSCOPY W/ UPPER ENDOSCOPY      HERNIA REPAIR      AK ESOPHAGOGASTRODUODENOSCOPY TRANSORAL DIAGNOSTIC N/A 2/19/2018    Procedure: ESOPHAGOGASTRODUODENOSCOPY (EGD); Surgeon: Belle Garcia MD;  Location: [facility] GI [facility];   Service: Gastroenterology    TONSILECTOMY, ADENOIDECTOMY, BILATERAL MYRINGOTOMY AND TUBES         Family History Problem Relation Age of Onset    Diabetes Mother     Aortic aneurysm Father     Cancer Father      prostate    Heart disease Sister      open heart    Cancer Sister      breast    Diabetes Brother     No Known Problems Daughter     Asperger's syndrome Son     Diabetes Maternal Grandfather     No Known Problems Brother         reports that she has never smoked  She has never used smokeless tobacco  She reports that she drinks about 3 0 oz of alcohol per week   She reports that she does not use drugs        Physical Exam:    /72 (BP Location: Left arm, Patient Position: Sitting, Cuff Size: Standard)   Pulse 84   Temp 99 3 °F (37 4 °C)   Ht 5' 5" (1 651 m)   Wt 113 kg (250 lb)   BMI 41 60 kg/m²     Gen: wn/wd, NAD  HEENT: anicteric, MMM, no cervical LAD  CVS: RRR, no m/r/g  CHEST: CTA b/l  ABD: +BS, soft, right upper quadrant tenderness, no rebound, no guarding, no appreciable masses  EXT: no c/c/e  NEURO: aaox3  SKIN: NO rashes

## 2018-03-05 ENCOUNTER — HOSPITAL ENCOUNTER (OUTPATIENT)
Dept: RADIOLOGY | Facility: HOSPITAL | Age: 63
Discharge: HOME | End: 2018-03-05
Attending: INTERNAL MEDICINE
Payer: COMMERCIAL

## 2018-03-05 DIAGNOSIS — R10.9 RIGHT SIDED ABDOMINAL PAIN: ICD-10-CM

## 2018-03-05 PROCEDURE — 74177 CT ABD & PELVIS W/CONTRAST: CPT

## 2018-03-05 RX ADMIN — IOHEXOL 100 ML: 350 INJECTION, SOLUTION INTRAVENOUS at 17:04

## 2018-03-13 ENCOUNTER — TELEPHONE (OUTPATIENT)
Dept: GASTROENTEROLOGY | Facility: CLINIC | Age: 63
End: 2018-03-13

## 2018-03-13 NOTE — TELEPHONE ENCOUNTER
Dr Sandy Perkins pt     Pt is returning a call for results  Please call her back when possible  Please leave detailed message if she does not

## 2018-03-13 NOTE — PROGRESS NOTES
Grayson [address] Dr results: - PT has a follow up appt in April with [facility]

## 2018-04-06 ENCOUNTER — TELEPHONE (OUTPATIENT)
Dept: PAIN MEDICINE | Facility: CLINIC | Age: 63
End: 2018-04-06

## 2018-04-06 NOTE — TELEPHONE ENCOUNTER
Intake started at [facility] office waiting on physician order to schedule consult with Dr Jose Cabello

## 2018-04-16 ENCOUNTER — APPOINTMENT (OUTPATIENT)
Dept: RADIOLOGY | Facility: CLINIC | Age: 63
End: 2018-04-16
Payer: COMMERCIAL

## 2018-04-16 ENCOUNTER — CONSULT (OUTPATIENT)
Dept: PAIN MEDICINE | Facility: CLINIC | Age: 63
End: 2018-04-16
Payer: COMMERCIAL

## 2018-04-16 VITALS
HEART RATE: 94 BPM | WEIGHT: 248.6 LBS | HEIGHT: 65 IN | SYSTOLIC BLOOD PRESSURE: 132 MMHG | DIASTOLIC BLOOD PRESSURE: 94 MMHG | RESPIRATION RATE: 18 BRPM | BODY MASS INDEX: 41.42 KG/M2 | TEMPERATURE: 98.9 F

## 2018-04-16 DIAGNOSIS — G89.4 CHRONIC PAIN SYNDROME: ICD-10-CM

## 2018-04-16 DIAGNOSIS — M47.816 LUMBAR SPONDYLOSIS: ICD-10-CM

## 2018-04-16 DIAGNOSIS — M54.50 CHRONIC BILATERAL LOW BACK PAIN WITHOUT SCIATICA: ICD-10-CM

## 2018-04-16 DIAGNOSIS — M43.16 SPONDYLOLISTHESIS OF LUMBAR REGION: ICD-10-CM

## 2018-04-16 DIAGNOSIS — M53.3 COCCYDYNIA: ICD-10-CM

## 2018-04-16 DIAGNOSIS — G89.29 CHRONIC BILATERAL LOW BACK PAIN WITHOUT SCIATICA: ICD-10-CM

## 2018-04-16 DIAGNOSIS — G89.4 CHRONIC PAIN SYNDROME: Primary | ICD-10-CM

## 2018-04-16 PROCEDURE — 99244 OFF/OP CNSLTJ NEW/EST MOD 40: CPT | Performed by: ANESTHESIOLOGY

## 2018-04-16 PROCEDURE — 72114 X-RAY EXAM L-S SPINE BENDING: CPT

## 2018-04-16 PROCEDURE — 72220 X-RAY EXAM SACRUM TAILBONE: CPT

## 2018-04-16 RX ORDER — DICLOFENAC SODIUM 75 MG/1
75 TABLET, DELAYED RELEASE ORAL 2 TIMES DAILY
Qty: 60 TABLET | Refills: 0 | Status: ON HOLD | OUTPATIENT
Start: 2018-04-16 | End: 2018-07-09 | Stop reason: CLARIF

## 2018-04-16 NOTE — PROGRESS NOTES
Assessment:  1  Chronic pain syndrome    2  Chronic bilateral low back pain without sciatica    3  Lumbar spondylosis    4  Spondylolisthesis of lumbar region    5  Coccydynia        Plan:  Orders Placed This Encounter   Procedures    XR sacrum and coccyx     Standing Status:   Future     Standing Expiration Date:   4/16/2022     Scheduling Instructions:      Bring along any outside films relating to this procedure  Order Specific Question:   Reason for Exam:     Answer:   coccydynia     Order Specific Question:   Is the patient pregnant? Answer:   Unknown    XR spine lumbar complete w bending minimum 6 views     Standing Status:   Future     Standing Expiration Date:   4/16/2022     Scheduling Instructions:      Bring along any outside films relating to this procedure  Order Specific Question:   Reason for Exam:     Answer:   low back pain     Order Specific Question:   Is the patient pregnant?      Answer:   Unknown    Ambulatory referral to Physical Therapy     Standing Status:   Future     Standing Expiration Date:   10/16/2018     Referral Priority:   Routine     Referral Type:   Physical Therapy     Referral Reason:   Specialty Services Required     Requested Specialty:   Physical Therapy     Number of Visits Requested:   1     Expiration Date:   4/16/2019    Ambulatory referral to Neurosurgery     Standing Status:   Future     Standing Expiration Date:   10/16/2018     Referral Priority:   Routine     Referral Type:   Consult - AMB     Referral Reason:   Specialty Services Required     Requested Specialty:   Neurosurgery     Number of Visits Requested:   1     Expiration Date:   4/16/2019       New Medications Ordered This Visit   Medications    diclofenac (VOLTAREN) 75 mg EC tablet     Sig: Take 1 tablet (75 mg total) by mouth 2 (two) times a day     Dispense:  60 tablet     Refill:  0     My impressions and treatment recommendations were discussed in detail with the patient, who verbalized understanding and had no further questions  The patient reports that her most exquisite pain at today's visit involves her coccyx  She states that she fell down and hurt her tailbone few weeks ago while shoveling snow  As such, I feel reasonable to obtain an x-ray of the sacrum and coccyx as well as an x-ray of the lumbar spine to evaluate for any pathology that may be contributing to her pain complaints  She may be a candidate for the coccygeal injection, but I will discuss this further with her once I obtain the results of the x-ray of the sacrum and coccyx as well as the x-ray of the lumbar spine  The patient also reports that meloxicam is giving her some clinical benefit, but not completely alleviating her pain  As such, I suggested discontinuing meloxicam and trialing diclofenac 75 milligrams every 12 hours as needed for pain  I did ask the patient to take this with food due to her gastric bypass surgery  Side effects were reviewed with the patient  The patient also reports that she has met with a spinal surgeon who suggested that she undergo a lumbar fusion surgery  The patient is interested in meeting with another surgeon to determine her candidacy for lumbar spine surgery  She would like a second opinion  As such, I have referred her to see Dr Aris Gonsales for a second opinion regarding lumbar spine surgery  I also feel reasonable to have the patient undergo a course of physical therapy 2-3 times per week for 4-6 weeks  Follow-up is planned in 4 weeks time or sooner as warranted  Discharge instructions were provided  I personally saw and examined the patient and I agree with the above discussed plan of care  History of Present Illness:    Nhung Petersen is a 63 y o  female who presents to [facility] for initial evaluation of the above stated pain complaints   The patient has a past medical and chronic pain history as outlined in the assessment section  She was referred by Dr Aponte Six  The patient reports a several year history of low back pain  She also admits to 3 weeks worth of tailbone pain that started after she slipped and fell while shoveling snow  She states that her pain is severe and 8 to 9/10 on the verbal numerical pain rating scale  Describes her pain is nearly constant in nature without any typical pattern  The patient does report that her pain is burning, shooting, numbness, sharp, dull/aching, pins and needles, and pressure like    She reports weakness in her upper and lower extremities  The patient states that lying down decreases pain while standing, bending, sitting, walking, exercise, coughing/sneezing, and bowel movements increases pain  The patient reports that she has not yet trialed physical therapy for her pain  She also reports that chiropractic manipulation did not provide her any pain relief  Heat/ice treatment provides her moderate pain relief  The patient does report that she has been seeing a spinal surgeon at [facility] [place]  She was recommended to undergo a lumbar fusion surgery, but would like a second opinion regarding this  She also can't is using gabapentin 300 milligrams 3 times daily  She is currently employed full-time  Review of Systems:    Review of Systems   Constitutional: Positive for unexpected weight change (weight gain)  Negative for fever  HENT: Negative for trouble swallowing  Eyes: Positive for visual disturbance  Respiratory: Positive for shortness of breath  Negative for wheezing  Cardiovascular: Negative for chest pain and palpitations  Gastrointestinal: Positive for diarrhea and nausea  Negative for constipation and vomiting  Endocrine: Negative for cold intolerance, heat intolerance and polydipsia  Genitourinary: Positive for difficulty urinating  Negative for frequency     Musculoskeletal: Positive for gait problem (difficulty walking/ decreased range of motion) and joint swelling (joint stiffness )  Negative for arthralgias and myalgias  Skin: Negative for rash  Neurological: Positive for dizziness and weakness (muscle)  Negative for seizures, syncope and headaches  Hematological: Does not bruise/bleed easily  Psychiatric/Behavioral: Negative for dysphoric mood  All other systems reviewed and are negative  Patient Active Problem List   Diagnosis    GERD without esophagitis    Right sided abdominal pain    Diarrhea    Chronic pain syndrome    Chronic bilateral low back pain without sciatica    Lumbar spondylosis    Spondylolisthesis of lumbar region    Coccydynia       Past Medical History:   Diagnosis Date    Back problem     Gastric bypass status for obesity     GERD (gastroesophageal reflux disease)     Hypertension        Past Surgical History:   Procedure Laterality Date    APPENDECTOMY      BARIATRIC SURGERY      CARPAL TUNNEL RELEASE      CHOLECYSTECTOMY      COLONOSCOPY W/ BIOPSIES AND POLYPECTOMY      FLEXIBLE BRONCHOSCOPY W/ UPPER ENDOSCOPY      HERNIA REPAIR      VA ESOPHAGOGASTRODUODENOSCOPY TRANSORAL DIAGNOSTIC N/A 2/19/2018    Procedure: ESOPHAGOGASTRODUODENOSCOPY (EGD); Surgeon: Flores Kaiser MD;  Location: [facility]; Service: Gastroenterology    TONSILECTOMY, ADENOIDECTOMY, BILATERAL MYRINGOTOMY AND TUBES         Family History   Problem Relation Age of Onset    Diabetes Mother     Aortic aneurysm Father     Cancer Father      prostate    Heart disease Sister      open heart    Cancer Sister      breast    Diabetes Brother     No Known Problems Daughter     Asperger's syndrome Son     Diabetes Maternal Grandfather     No Known Problems Brother        Social History     Occupational History    Not on file       Social History Main Topics    Smoking status: Never Smoker    Smokeless tobacco: Never Used    Alcohol use 3 0 oz/week     5 Cans of beer per week    Drug use: No    Sexual activity: Yes Current Outpatient Prescriptions:     acetaminophen (TYLENOL) 325 mg tablet, Take 1 tablet by mouth as needed, Disp: , Rfl:     LISINOPRIL PO, Take by mouth every morning  , Disp: , Rfl:     pantoprazole (PROTONIX) 40 mg tablet, Take 1 tablet by mouth 2 (two) times a day  , Disp: , Rfl:     sucralfate (CARAFATE) 1 g/10 mL suspension, Take 10 mL (1 g total) by mouth 4 (four) times a day, Disp: 420 mL, Rfl: 0    traZODone (DESYREL) 50 mg tablet, Take 1 tablet by mouth daily at bedtime, Disp: , Rfl:     diclofenac (VOLTAREN) 75 mg EC tablet, Take 1 tablet (75 mg total) by mouth 2 (two) times a day, Disp: 60 tablet, Rfl: 0    No Known Allergies    Physical Exam:    /94 (BP Location: Left arm, Patient Position: Sitting, Cuff Size: Standard)   Pulse 94   Temp 98 9 °F (37 2 °C) (Oral)   Resp 18   Ht 5' 5" (1 651 m)   Wt 113 kg (248 lb 9 6 oz)   BMI 41 37 kg/m²     Constitutional: obese  Eyes: anicteric  HEENT: grossly intact  Neck: supple, symmetric, trachea midline and no masses   Pulmonary:even and unlabored  Cardiovascular:No edema or pitting edema present  Skin:Normal without rashes or lesions and well hydrated  Psychiatric:Mood and affect appropriate  Neurologic:Cranial Nerves II-XII grossly intact  Musculoskeletal:normal     Lumbar Spine Exam    Appearance:  Normal lordosis  Palpation/Tenderness:  Exquisite tenderness noted over the coccygeal region  Sensory:  no sensory deficits noted  Range of Motion:  Flexion:   Moderately limited  with pain  Extension:  Moderately limited  with pain  Lateral Flexion - Left:  Moderately limited  with pain  Lateral Flexion - Right:  Moderately limited  with pain  Rotation - Left:  No limitation  without pain  Rotation - Right:  No limitation  without pain  Motor Strength:  Left hip flexion:  5/5  Left hip extension:  5/5  Right hip flexion:  5/5  Right hip extension:  5/5  Left knee flexion:  5/5  Left knee extension:  5/5  Right knee flexion:  5/5  Right knee extension:  5/5  Left foot dorsiflexion:  5/5  Left foot plantar flexion:  5/5  Right foot dorsiflexion:  5/5  Right foot plantar flexion:  5/5  Reflexes:  Left Patellar:  2+   Right Patellar:  2+   Left Achilles:  2+   Right Achilles:  2+   Special Tests:  Left Straight Leg Test:  negative  Right Straight Leg Test:  negative  Left Pedro's Maneuver:  negative  Right Pedro's Maneuver:  negative    Imaging  XR sacrum and coccyx    (Results Pending)   XR spine lumbar complete w bending minimum 6 views    (Results Pending)   4/5/16  MRI LUMBAR SPINE WITHOUT CONTRAST     INDICATION:  68-year-old female, chronic low back pain, bilateral leg weakness  COMPARISON:  9/14/2013 [place]  MRI     TECHNIQUE:  Sagittal T1, sagittal T2, sagittal inversion recovery, axial T1 and axial T2, coronal T2        IMAGE QUALITY:  Diagnostic     FINDINGS:     ALIGNMENT:    Mild smooth levoscoliosis, apex approximately L2-3, unchanged  Minimal chronic superior endplate compression deformities T11 and L1 which were acute on the prior study   No acute compression fracture  Grade 1 degenerative retrolisthesis L2-3, unchanged  Grade 1 degenerative anterolisthesis L4-5, stable     MARROW SIGNAL:  Persistent multilevel degenerative marrow     DISTAL CORD AND CONUS:  Normal size and signal within the distal cord and conus  The conus ends at the L1 level      PARASPINAL SOFT TISSUES:  Paraspinal soft tissues are unremarkable      SACRUM:  Normal signal within the sacrum   No evidence of insufficiency or stress fracture      LOWER THORACIC DISC SPACES:       Mild degenerative disc and facet disease, diminished small noncompressive central disc protrusion T12-L1     LUMBAR DISC SPACES:          L1-L2:  Mild degenerative disc and facet disease, no stenosis     L2-L3:  Mild degenerative disc disease, moderate degenerative facet hypertrophy, grade 1 retrolisthesis, mild bilateral foraminal stenosis, no overt neural element impingement, stable     L3-L4:  Mild degenerative disc disease, moderate degenerative facet hypertrophy,  mild bilateral foraminal stenosis, no overt neural element impingement      L4-L5:  Moderate degenerative disc disease, severe degenerative facet hypertrophy, grade 1 anterolisthesis, mild central canal and bilateral foraminal stenosis, no overt neural element impingement, stable      L5-S1: Mild degenerative disc disease, moderate degenerative facet hypertrophy, no stenosis     IMPRESSION:  Mild chronic superior endplate compression deformities T11 and L1 which were acute on the prior exam     Persistent multilevel degenerative spondylosis, mild levoscoliosis     Persisting grade 1 retrolisthesis, mild bilateral foraminal stenosis L2-3     Persistent mild bilateral foraminal stenosis L3-4     Persisting grade 1 anterolisthesis, mild central canal and bilateral foraminal stenosis L4-5        Workstation performed: [id number]      Imaging     MRI lumbar spine wo contrast (Order #[id number]) on 4/5/2016 - Imaging Information   Result History     MRI lumbar spine wo contrast (Order #[id number]) on 4/6/2016 - Order Result History Report         Orders Placed This Encounter   Procedures    XR sacrum and coccyx    XR spine lumbar complete w bending minimum 6 views    Ambulatory referral to Physical Therapy    Ambulatory referral to Neurosurgery

## 2018-04-18 ENCOUNTER — TELEPHONE (OUTPATIENT)
Dept: PAIN MEDICINE | Facility: CLINIC | Age: 63
End: 2018-04-18

## 2018-04-24 ENCOUNTER — TELEPHONE (OUTPATIENT)
Dept: PAIN MEDICINE | Facility: CLINIC | Age: 63
End: 2018-04-24

## 2018-04-24 PROBLEM — M53.3 COCCYGODYNIA: Status: ACTIVE | Noted: 2018-04-24

## 2018-04-24 NOTE — TELEPHONE ENCOUNTER
Scheduled pt for Coccygeal inj & ganglion impar block on 5/2/18 ([id number])     Went over pre preocedure instructions below:  Nothing to eat or drink 1 hr prior to procedure  Need to arrange transportation  Proper clothing for procedure  If ill or on antibiotics please call to reschedule

## 2018-04-24 NOTE — TELEPHONE ENCOUNTER
S/W pt  Advised pt of the same  Pt stated she would like to get the injection  Pt stated she started PT yesterday and will be going 2 times a week and her appt w/ Dr William Marvin is on May 8th  Advised her will get this message to AS and that the [facility]  will be calling her to schedule the injection  Pt verbalized understanding  AS-- Please forward the injection to the  so she can schedule pt then

## 2018-04-24 NOTE — TELEPHONE ENCOUNTER
----- Message from Zakiya Gilmore MD sent at 4/23/2018  9:18 AM EDT -----  Regarding: X-ray results  X-ray of the sacrum and coccyx shows some arthritic changes of the right sacroiliac joint  If the patient is amenable to undergoing a coccygeal injection, please let me know  I think that would give her some clinical benefit

## 2018-05-02 ENCOUNTER — HOSPITAL ENCOUNTER (OUTPATIENT)
Dept: RADIOLOGY | Facility: HOSPITAL | Age: 63
Setting detail: OUTPATIENT SURGERY
Discharge: HOME | End: 2018-05-02
Payer: COMMERCIAL

## 2018-05-02 ENCOUNTER — HOSPITAL ENCOUNTER (OUTPATIENT)
Facility: AMBULARY SURGERY CENTER | Age: 63
Setting detail: OUTPATIENT SURGERY
Discharge: HOME | End: 2018-05-02
Attending: ANESTHESIOLOGY | Admitting: ANESTHESIOLOGY
Payer: COMMERCIAL

## 2018-05-02 VITALS
SYSTOLIC BLOOD PRESSURE: 131 MMHG | OXYGEN SATURATION: 96 % | RESPIRATION RATE: 18 BRPM | TEMPERATURE: 99.6 F | HEART RATE: 97 BPM | DIASTOLIC BLOOD PRESSURE: 72 MMHG

## 2018-05-02 PROCEDURE — 64520 N BLOCK LUMBAR/THORACIC: CPT | Performed by: ANESTHESIOLOGY

## 2018-05-02 PROCEDURE — 77003 FLUOROGUIDE FOR SPINE INJECT: CPT | Performed by: ANESTHESIOLOGY

## 2018-05-02 PROCEDURE — 64450 NJX AA&/STRD OTHER PN/BRANCH: CPT | Performed by: ANESTHESIOLOGY

## 2018-05-02 PROCEDURE — 72220 X-RAY EXAM SACRUM TAILBONE: CPT

## 2018-05-02 RX ORDER — METHYLPREDNISOLONE ACETATE 80 MG/ML
INJECTION, SUSPENSION INTRA-ARTICULAR; INTRALESIONAL; INTRAMUSCULAR; SOFT TISSUE AS NEEDED
Status: DISCONTINUED | OUTPATIENT
Start: 2018-05-02 | End: 2018-05-02 | Stop reason: HOSPADM

## 2018-05-02 RX ORDER — ROPIVACAINE HYDROCHLORIDE 2 MG/ML
INJECTION, SOLUTION EPIDURAL; INFILTRATION; PERINEURAL AS NEEDED
Status: DISCONTINUED | OUTPATIENT
Start: 2018-05-02 | End: 2018-05-02 | Stop reason: HOSPADM

## 2018-05-02 RX ORDER — LIDOCAINE WITH 8.4% SOD BICARB 0.9%(10ML)
SYRINGE (ML) INJECTION AS NEEDED
Status: DISCONTINUED | OUTPATIENT
Start: 2018-05-02 | End: 2018-05-02 | Stop reason: HOSPADM

## 2018-05-02 NOTE — OP NOTE
ATTENDING PHYSICIAN:  Arely Cazares MD       PROCEDURE: Coccyx injection and ganglion impar block under fluoroscopic guidance with local anesthetic  PRE-PROCEDURE DIAGNOSIS:  Coccygodynia  POST-PROCEDURE DIAGNOSIS:  Coccygodynia  ANESTHESIA: Local      ESTIMATED BLOOD LOSS: None  COMPLICATIONS: None  LOCATION:  [address]  CONSENT: Today's procedure, its risks and benefits, were described in detail to the patient  Risks of the procedure include, but are not limited to, bleeding, infection, hematoma formation, abscess formation, tissue damage, reactions  to the medications, failure for the pain to improve, or potential worsening of the pain  The patient verbalized understanding and who wished to proceed with the procedure  Written informed consent was thereby obtained  DESCRIPTION OF THE PROCEDURE: After written informed consent was obtained, the patient was taken to the fluoroscopy suite and placed in the prone position  The landmarks were palpated to find the sacrococcygeal junction in the midline  and confirmed with fluoroscopy in the AP and lateral view  The skin was prepped and draped in the usual sterile fashion with antiseptic applied x 3  The skin and subcutaneous tissues at the needle entry site were infiltrated with 1% preservative-free  lidocaine mixed with sodium bicarbonate using a 25-gauge 1-1/2-inch needle  Subsequently, a 3-1/2-inch 22 gauge needle was advanced with the aid of fluoroscopy through the sacrococcygeal junction  After negative aspiration, contrast was injected and  shown to confirm the ganglion impar after adjustment of the needle, demonstrating the classic comma sign  This was also confirmed with fluoroscopic guidance in the AP and lateral views    At that time, an injection consisting of  5 ml of a solution consisting of 10 mL 0 2% Ropivacaine mixed with 1 mL of Depo-Medrol 80 mg/mL was injected slowly without complication  Subsequently, the 22-gauge spinal needle was then incrementally advanced under fluoroscopy until os was contacted at the coccyx  Proper placement was confirmed with fluoroscopy in the lateral view  A 5 mL of the above mentioned injectate was slowly injected at the level  of the coccyx and surrounding the coccyx in a fan-like distribution  All needles were removed with the tips intact  Hemostasis was maintained  The area was cleaned and a band-aid was applied as appropriate  The patient tolerated the procedure well, and no complications were noted  The patient was subsequently  discharged home with instructions to telephone the office for a pain relief report  I was present for all key and critical portions of this procedure      SIGNATURE: Chase Giang MD  DATE: May 2, 2018  TIME: 2:03 PM

## 2018-05-02 NOTE — H&P (VIEW-ONLY)
Assessment:  1  Chronic pain syndrome    2  Chronic bilateral low back pain without sciatica    3  Lumbar spondylosis    4  Spondylolisthesis of lumbar region    5  Coccydynia        Plan:  Orders Placed This Encounter   Procedures    XR sacrum and coccyx     Standing Status:   Future     Standing Expiration Date:   4/16/2022     Scheduling Instructions:      Bring along any outside films relating to this procedure  Order Specific Question:   Reason for Exam:     Answer:   coccydynia     Order Specific Question:   Is the patient pregnant? Answer:   Unknown    XR spine lumbar complete w bending minimum 6 views     Standing Status:   Future     Standing Expiration Date:   4/16/2022     Scheduling Instructions:      Bring along any outside films relating to this procedure  Order Specific Question:   Reason for Exam:     Answer:   low back pain     Order Specific Question:   Is the patient pregnant?      Answer:   Unknown    Ambulatory referral to Physical Therapy     Standing Status:   Future     Standing Expiration Date:   10/16/2018     Referral Priority:   Routine     Referral Type:   Physical Therapy     Referral Reason:   Specialty Services Required     Requested Specialty:   Physical Therapy     Number of Visits Requested:   1     Expiration Date:   4/16/2019    Ambulatory referral to Neurosurgery     Standing Status:   Future     Standing Expiration Date:   10/16/2018     Referral Priority:   Routine     Referral Type:   Consult - AMB     Referral Reason:   Specialty Services Required     Requested Specialty:   Neurosurgery     Number of Visits Requested:   1     Expiration Date:   4/16/2019       New Medications Ordered This Visit   Medications    diclofenac (VOLTAREN) 75 mg EC tablet     Sig: Take 1 tablet (75 mg total) by mouth 2 (two) times a day     Dispense:  60 tablet     Refill:  0     My impressions and treatment recommendations were discussed in detail with the patient, who verbalized understanding and had no further questions  The patient reports that her most exquisite pain at today's visit involves her coccyx  She states that she fell down and hurt her tailbone few weeks ago while shoveling snow  As such, I feel reasonable to obtain an x-ray of the sacrum and coccyx as well as an x-ray of the lumbar spine to evaluate for any pathology that may be contributing to her pain complaints  She may be a candidate for the coccygeal injection, but I will discuss this further with her once I obtain the results of the x-ray of the sacrum and coccyx as well as the x-ray of the lumbar spine  The patient also reports that meloxicam is giving her some clinical benefit, but not completely alleviating her pain  As such, I suggested discontinuing meloxicam and trialing diclofenac 75 milligrams every 12 hours as needed for pain  I did ask the patient to take this with food due to her gastric bypass surgery  Side effects were reviewed with the patient  The patient also reports that she has met with a spinal surgeon who suggested that she undergo a lumbar fusion surgery  The patient is interested in meeting with another surgeon to determine her candidacy for lumbar spine surgery  She would like a second opinion  As such, I have referred her to see Dr Claris Ormond for a second opinion regarding lumbar spine surgery  I also feel reasonable to have the patient undergo a course of physical therapy 2-3 times per week for 4-6 weeks  Follow-up is planned in 4 weeks time or sooner as warranted  Discharge instructions were provided  I personally saw and examined the patient and I agree with the above discussed plan of care  History of Present Illness:    Alix Galan is a 63 y o  female who presents to [facility] for initial evaluation of the above stated pain complaints   The patient has a past medical and chronic pain history as outlined in the assessment section  She was referred by Dr Valente Gamez  The patient reports a several year history of low back pain  She also admits to 3 weeks worth of tailbone pain that started after she slipped and fell while shoveling snow  She states that her pain is severe and 8 to 9/10 on the verbal numerical pain rating scale  Describes her pain is nearly constant in nature without any typical pattern  The patient does report that her pain is burning, shooting, numbness, sharp, dull/aching, pins and needles, and pressure like    She reports weakness in her upper and lower extremities  The patient states that lying down decreases pain while standing, bending, sitting, walking, exercise, coughing/sneezing, and bowel movements increases pain  The patient reports that she has not yet trialed physical therapy for her pain  She also reports that chiropractic manipulation did not provide her any pain relief  Heat/ice treatment provides her moderate pain relief  The patient does report that she has been seeing a spinal surgeon at [place] [address]  She was recommended to undergo a lumbar fusion surgery, but would like a second opinion regarding this  She also can't is using gabapentin 300 milligrams 3 times daily  She is currently employed full-time  Review of Systems:    Review of Systems   Constitutional: Positive for unexpected weight change (weight gain)  Negative for fever  HENT: Negative for trouble swallowing  Eyes: Positive for visual disturbance  Respiratory: Positive for shortness of breath  Negative for wheezing  Cardiovascular: Negative for chest pain and palpitations  Gastrointestinal: Positive for diarrhea and nausea  Negative for constipation and vomiting  Endocrine: Negative for cold intolerance, heat intolerance and polydipsia  Genitourinary: Positive for difficulty urinating  Negative for frequency     Musculoskeletal: Positive for gait problem (difficulty walking/ decreased range of motion) and joint swelling (joint stiffness )  Negative for arthralgias and myalgias  Skin: Negative for rash  Neurological: Positive for dizziness and weakness (muscle)  Negative for seizures, syncope and headaches  Hematological: Does not bruise/bleed easily  Psychiatric/Behavioral: Negative for dysphoric mood  All other systems reviewed and are negative  Patient Active Problem List   Diagnosis    GERD without esophagitis    Right sided abdominal pain    Diarrhea    Chronic pain syndrome    Chronic bilateral low back pain without sciatica    Lumbar spondylosis    Spondylolisthesis of lumbar region    Coccydynia       Past Medical History:   Diagnosis Date    Back problem     Gastric bypass status for obesity     GERD (gastroesophageal reflux disease)     Hypertension        Past Surgical History:   Procedure Laterality Date    APPENDECTOMY      BARIATRIC SURGERY      CARPAL TUNNEL RELEASE      CHOLECYSTECTOMY      COLONOSCOPY W/ BIOPSIES AND POLYPECTOMY      FLEXIBLE BRONCHOSCOPY W/ UPPER ENDOSCOPY      HERNIA REPAIR      TX ESOPHAGOGASTRODUODENOSCOPY TRANSORAL DIAGNOSTIC N/A 2/19/2018    Procedure: ESOPHAGOGASTRODUODENOSCOPY (EGD); Surgeon: Jennifer Herr MD;  Location[facility]; Service: Gastroenterology    TONSILECTOMY, ADENOIDECTOMY, BILATERAL MYRINGOTOMY AND TUBES         Family History   Problem Relation Age of Onset    Diabetes Mother     Aortic aneurysm Father     Cancer Father      prostate    Heart disease Sister      open heart    Cancer Sister      breast    Diabetes Brother     No Known Problems Daughter     Asperger's syndrome Son     Diabetes Maternal Grandfather     No Known Problems Brother        Social History     Occupational History    Not on file       Social History Main Topics    Smoking status: Never Smoker    Smokeless tobacco: Never Used    Alcohol use 3 0 oz/week     5 Cans of beer per week    Drug use: No    Sexual activity: Yes Current Outpatient Prescriptions:     acetaminophen (TYLENOL) 325 mg tablet, Take 1 tablet by mouth as needed, Disp: , Rfl:     LISINOPRIL PO, Take by mouth every morning  , Disp: , Rfl:     pantoprazole (PROTONIX) 40 mg tablet, Take 1 tablet by mouth 2 (two) times a day  , Disp: , Rfl:     sucralfate (CARAFATE) 1 g/10 mL suspension, Take 10 mL (1 g total) by mouth 4 (four) times a day, Disp: 420 mL, Rfl: 0    traZODone (DESYREL) 50 mg tablet, Take 1 tablet by mouth daily at bedtime, Disp: , Rfl:     diclofenac (VOLTAREN) 75 mg EC tablet, Take 1 tablet (75 mg total) by mouth 2 (two) times a day, Disp: 60 tablet, Rfl: 0    No Known Allergies    Physical Exam:    /94 (BP Location: Left arm, Patient Position: Sitting, Cuff Size: Standard)   Pulse 94   Temp 98 9 °F (37 2 °C) (Oral)   Resp 18   Ht 5' 5" (1 651 m)   Wt 113 kg (248 lb 9 6 oz)   BMI 41 37 kg/m²     Constitutional: obese  Eyes: anicteric  HEENT: grossly intact  Neck: supple, symmetric, trachea midline and no masses   Pulmonary:even and unlabored  Cardiovascular:No edema or pitting edema present  Skin:Normal without rashes or lesions and well hydrated  Psychiatric:Mood and affect appropriate  Neurologic:Cranial Nerves II-XII grossly intact  Musculoskeletal:normal     Lumbar Spine Exam    Appearance:  Normal lordosis  Palpation/Tenderness:  Exquisite tenderness noted over the coccygeal region  Sensory:  no sensory deficits noted  Range of Motion:  Flexion:   Moderately limited  with pain  Extension:  Moderately limited  with pain  Lateral Flexion - Left:  Moderately limited  with pain  Lateral Flexion - Right:  Moderately limited  with pain  Rotation - Left:  No limitation  without pain  Rotation - Right:  No limitation  without pain  Motor Strength:  Left hip flexion:  5/5  Left hip extension:  5/5  Right hip flexion:  5/5  Right hip extension:  5/5  Left knee flexion:  5/5  Left knee extension:  5/5  Right knee flexion:  5/5  Right knee extension:  5/5  Left foot dorsiflexion:  5/5  Left foot plantar flexion:  5/5  Right foot dorsiflexion:  5/5  Right foot plantar flexion:  5/5  Reflexes:  Left Patellar:  2+   Right Patellar:  2+   Left Achilles:  2+   Right Achilles:  2+   Special Tests:  Left Straight Leg Test:  negative  Right Straight Leg Test:  negative  Left Pedro's Maneuver:  negative  Right Pedro's Maneuver:  negative    Imaging  XR sacrum and coccyx    (Results Pending)   XR spine lumbar complete w bending minimum 6 views    (Results Pending)   4/5/16  MRI LUMBAR SPINE WITHOUT CONTRAST     INDICATION:  68-year-old female, chronic low back pain, bilateral leg weakness  COMPARISON:  9/14/2013 [facility]  MRI     TECHNIQUE:  Sagittal T1, sagittal T2, sagittal inversion recovery, axial T1 and axial T2, coronal T2        IMAGE QUALITY:  Diagnostic     FINDINGS:     ALIGNMENT:    Mild smooth levoscoliosis, apex approximately L2-3, unchanged  Minimal chronic superior endplate compression deformities T11 and L1 which were acute on the prior study   No acute compression fracture  Grade 1 degenerative retrolisthesis L2-3, unchanged  Grade 1 degenerative anterolisthesis L4-5, stable     MARROW SIGNAL:  Persistent multilevel degenerative marrow     DISTAL CORD AND CONUS:  Normal size and signal within the distal cord and conus  The conus ends at the L1 level      PARASPINAL SOFT TISSUES:  Paraspinal soft tissues are unremarkable      SACRUM:  Normal signal within the sacrum   No evidence of insufficiency or stress fracture      LOWER THORACIC DISC SPACES:       Mild degenerative disc and facet disease, diminished small noncompressive central disc protrusion T12-L1     LUMBAR DISC SPACES:          L1-L2:  Mild degenerative disc and facet disease, no stenosis     L2-L3:  Mild degenerative disc disease, moderate degenerative facet hypertrophy, grade 1 retrolisthesis, mild bilateral foraminal stenosis, no overt neural element impingement, stable     L3-L4:  Mild degenerative disc disease, moderate degenerative facet hypertrophy,  mild bilateral foraminal stenosis, no overt neural element impingement      L4-L5:  Moderate degenerative disc disease, severe degenerative facet hypertrophy, grade 1 anterolisthesis, mild central canal and bilateral foraminal stenosis, no overt neural element impingement, stable      L5-S1: Mild degenerative disc disease, moderate degenerative facet hypertrophy, no stenosis     IMPRESSION:  Mild chronic superior endplate compression deformities T11 and L1 which were acute on the prior exam     Persistent multilevel degenerative spondylosis, mild levoscoliosis     Persisting grade 1 retrolisthesis, mild bilateral foraminal stenosis L2-3     Persistent mild bilateral foraminal stenosis L3-4     Persisting grade 1 anterolisthesis, mild central canal and bilateral foraminal stenosis L4-5        Workstation performed: [id number]      Imaging     MRI lumbar spine wo contrast (Order #[id number]) on 4/5/2016 - Imaging Information   Result History     MRI lumbar spine wo contrast (Order #[id number]) on 4/6/2016 - Order Result History Report         Orders Placed This Encounter   Procedures    XR sacrum and coccyx    XR spine lumbar complete w bending minimum 6 views    Ambulatory referral to Physical Therapy    Ambulatory referral to Neurosurgery

## 2018-05-02 NOTE — DISCHARGE INSTRUCTIONS
1  Do not apply heat to any area that is numb  If you have discomfort or soreness at the injection site, you may apply ice today, 20 minutes on and 20 minutes off  Tomorrow you may use ice or warm, moist heat  Do not apply ice or heat directly to the skin  2  If you experience severe shortness of breath, go to the Emergency Room  3  You may have numbness for several hours from the local anesthetic  Please use caution and common sense, especially with weight-bearing activities  4  You may have an increase or change in the discomfort for 36-48 hours after your treatment  Apply ice and continue with any pain medicine you have been prescribed  5  Do not do anything strenuous today  You may shower, but no tub baths or hot tubs today  You may resume your normal activities tomorrow, but do not overdo it  Resume normal activities slowly when you are feeling better  6  If you experience redness, drainage or swelling at the injection site, or if you develop a fever above 100 degrees, please call [facility] at [phone] or go to the Emergency Room  7  Continue to take all routine medicines prescribed by your primary care physician unless otherwise instructed by our staff  Most blood thinners should be started again according to your regularly scheduled dosing  If you have any questions, please give our office a call  If you have a problem specifically related to your procedure, please call our office at [phone]  Problems not related to your procedure should be directed to your primary care physician

## 2018-05-08 ENCOUNTER — OFFICE VISIT (OUTPATIENT)
Dept: NEUROSURGERY | Facility: CLINIC | Age: 63
End: 2018-05-08
Payer: COMMERCIAL

## 2018-05-08 VITALS
DIASTOLIC BLOOD PRESSURE: 77 MMHG | RESPIRATION RATE: 20 BRPM | BODY MASS INDEX: 41.19 KG/M2 | HEIGHT: 65 IN | TEMPERATURE: 98.7 F | SYSTOLIC BLOOD PRESSURE: 147 MMHG | HEART RATE: 103 BPM | WEIGHT: 247.2 LBS

## 2018-05-08 DIAGNOSIS — M48.062 LUMBAR STENOSIS WITH NEUROGENIC CLAUDICATION: Primary | ICD-10-CM

## 2018-05-08 DIAGNOSIS — M43.16 SPONDYLOLISTHESIS OF LUMBAR REGION: ICD-10-CM

## 2018-05-08 PROCEDURE — 99243 OFF/OP CNSLTJ NEW/EST LOW 30: CPT | Performed by: NEUROLOGICAL SURGERY

## 2018-05-08 RX ORDER — GABAPENTIN 300 MG/1
600 CAPSULE ORAL 3 TIMES DAILY
Status: ON HOLD | COMMUNITY
End: 2018-07-09 | Stop reason: CLARIF

## 2018-05-08 RX ORDER — LISINOPRIL AND HYDROCHLOROTHIAZIDE 20; 12.5 MG/1; MG/1
1 TABLET ORAL DAILY
Status: ON HOLD | COMMUNITY
End: 2018-07-09 | Stop reason: CLARIF

## 2018-05-08 NOTE — PROGRESS NOTES
Office Note - Neurosurgery   Wil León 63 y o  female MRN: [id number]      Assessment:    Patient is gradually worsening  80-year-old woman with lumbar neurogenic claudication secondary to spondylolisthesis and stenosis at L4-5  She has tried a number of nonsurgical pain management strategies with limited improvement in her symptoms  I explained that the natural history for lumbar neurogenic claudication is progressive decline in gait  On occasion this can progress to functional paraplegia and incontinence  We discussed an L4-5 posterior decompression with instrumented fixation fusion and passed full transverse lumbar interbody fusion  The patient understands that her history of numerous abdominal procedures, obesity and malabsorption places her at higher risk of postoperative ileus and wound healing issues  The goal of surgery is to relieve pressure neural structures and hopefully improve radicular pain and symptoms of neurogenic claudication  Weakness, numbness and back pain are less likely to improve  The risks of surgery were described in detail  1  Risk of general anesthetic with possible cardiac and respiratory complication  Risk of infection and bleeding  2  Risk of neurological injury with new pain, weakness or numbness in the legs or difficulties with bowel and bladder function  Risk of CSF leak was described  3  Possible need for additional lumbar spine surgery in the future  Expected postoperative course, including activity restrictions, expected pain and postoperative medication were reviewed  Patient is previously seen an orthopedic surgeon who offered a lateral fusion/decompression  She is planning on following up with him to discuss her options further  I would be pleased to see her again should she have additional questions  Otherwise she will follow up on a p r n  basis      History, physical examination and diagnostic tests were reviewed and questions answered  Diagnosis, care plan and treatment options were discussed  The patient understand instructions and will follow up as directed  Plan:    Follow-up: prn    Problem List Items Addressed This Visit        Musculoskeletal and Integument    Spondylolisthesis of lumbar region       Other    Lumbar stenosis with neurogenic claudication - Primary          Subjective/Objective     Chief Complaint    Lower back and bilateral leg pain, worse on the left  HPI    29-year-old  with a 1-2 year history of progressive lower back and leg pain  This is forced her to stop riding horses and she is older motorcycle  She currently describes a dull pressure sensation across her lower back which increases with walking which she rates as 0 to 8/10 pain  She will developed pain radiating down the back of her leg and into her foot with numbness and tingling  She has similar symptoms on the right but not as severe  She rates her leg pain as 0 to 8/10  It seems to be worse with driving  She can walk for approximately 150 feet before she must lean forward or sit down to relieve her symptoms and then she can resume walking  She denies any urinary or fecal incontinence or changing perineal sensation  Of note, in 2006 she underwent gastric bypass but has had numerous abdominal incisions and reconstructions since  Physical therapy in yoga has been helpful  Epidural steroid injections provided some improvement  Recent coccygeal injection was not helpful  Gabapentin is quite helpful  Oxycodone is were helpful but she stop taking these secondary to concerns over addiction  She presents today with an MRI of the lumbar spine and to discuss her surgical options  ROS    Review of Systems   HENT: Negative  Eyes: Negative  Respiratory: Positive for shortness of breath (on exertion)  Cardiovascular: Negative  Gastrointestinal: Positive for constipation (IBS) and diarrhea (IBS)  Endocrine: Negative  Genitourinary: Negative  Leakage on exertion   Musculoskeletal: Positive for back pain (across lower back radiates into bilateral buttocks, hips, and legs)  Skin: Negative  Allergic/Immunologic: Negative  Neurological: Positive for dizziness, syncope, weakness (bilateral legs) and numbness (bilateral legs, back of bilateral knees, more left then right, and bilateral feet )  Negative for seizures and headaches  Hematological: Negative  Psychiatric/Behavioral: Positive for confusion and sleep disturbance (due to pain )  Family History    Family History   Problem Relation Age of Onset    Diabetes Mother     Aortic aneurysm Father     Cancer Father      prostate    Heart disease Sister      open heart    Cancer Sister      breast    Diabetes Brother     No Known Problems Daughter     Asperger's syndrome Son     Diabetes Maternal Grandfather     No Known Problems Brother        Social History    Social History     Social History    Marital status: /Civil Union     Spouse name: N/A    Number of children: N/A    Years of education: N/A     Occupational History    Not on file       Social History Main Topics    Smoking status: Never Smoker    Smokeless tobacco: Never Used    Alcohol use 3 0 oz/week     5 Cans of beer per week    Drug use: No    Sexual activity: Yes     Other Topics Concern    Not on file     Social History Narrative    No narrative on file       Past Medical History    Past Medical History:   Diagnosis Date    Back problem     Gastric bypass status for obesity     GERD (gastroesophageal reflux disease)     Hypertension        Surgical History    Past Surgical History:   Procedure Laterality Date    APPENDECTOMY      BARIATRIC SURGERY      CARPAL TUNNEL RELEASE      CHOLECYSTECTOMY      COLONOSCOPY W/ BIOPSIES AND POLYPECTOMY      FLEXIBLE BRONCHOSCOPY W/ UPPER ENDOSCOPY      HERNIA REPAIR      DE ARTHROCENTESIS ASPIR&/INJ SMALL JT/BURSA W/O US N/A 5/2/2018    Procedure: Coccygeal Injection & Ganglion Impar Block;  Surgeon: Deidre An MD;  Location[facility] MAIN OR;  Service: Pain Management     NH ESOPHAGOGASTRODUODENOSCOPY TRANSORAL DIAGNOSTIC N/A 2/19/2018    Procedure: ESOPHAGOGASTRODUODENOSCOPY (EGD); Surgeon: Tiff Kirkpatrick MD;  Location: [facility]; Service: Gastroenterology    TONSILECTOMY, ADENOIDECTOMY, BILATERAL MYRINGOTOMY AND TUBES         Medications      Current Outpatient Prescriptions:     acetaminophen (TYLENOL) 325 mg tablet, Take 1 tablet by mouth as needed, Disp: , Rfl:     diclofenac (VOLTAREN) 75 mg EC tablet, Take 1 tablet (75 mg total) by mouth 2 (two) times a day, Disp: 60 tablet, Rfl: 0    gabapentin (NEURONTIN) 300 mg capsule, Take 300 mg by mouth 3 (three) times a day, Disp: , Rfl:     lisinopril-hydrochlorothiazide (PRINZIDE,ZESTORETIC) 20-12 5 MG per tablet, Take by mouth daily, Disp: , Rfl:     pantoprazole (PROTONIX) 40 mg tablet, Take 1 tablet by mouth 2 (two) times a day  , Disp: , Rfl:     sucralfate (CARAFATE) 1 g/10 mL suspension, Take 10 mL (1 g total) by mouth 4 (four) times a day, Disp: 420 mL, Rfl: 0    traZODone (DESYREL) 50 mg tablet, Take 1 tablet by mouth daily at bedtime, Disp: , Rfl:     LISINOPRIL PO, Take by mouth every morning  , Disp: , Rfl:     Allergies    No Known Allergies    The following portions of the patient's history were reviewed and updated as appropriate: allergies, current medications, past family history, past medical history, past social history, past surgical history and problem list     Investigations    I personally reviewed the MRI results with the patient:    MRI of the lumbar spine without contrast dated August 24, 2017  Degenerative anterolisthesis of L4 and L5  This results in moderate central and severe bilateral lateral recess stenosis with left greater than right foraminal stenosis  Mild stenosis at L3-4    No other areas of significant neural compression  Intrathecal contents unremarkable  Physical Exam    Vitals:  Blood pressure 147/77, pulse 103, temperature 98 7 °F (37 1 °C), resp  rate 20, height 5' 5" (1 651 m), weight 112 kg (247 lb 3 2 oz)  ,Body mass index is 41 14 kg/m²  Physical Exam   Constitutional: She is oriented to person, place, and time  She appears well-nourished  Obese  Musculoskeletal:        Lumbar back: Normal    Neurological: She is oriented to person, place, and time  Gait normal    Skin: Skin is warm and dry  Psychiatric: She has a normal mood and affect  Her behavior is normal  Judgment and thought content normal      Neurologic Exam     Mental Status   Oriented to person, place, and time  Motor Exam   Muscle bulk: normal  Overall muscle tone: normal5/5 power in lower extremities  Sensory Exam   Diminished pinprick sensation over left anterior thigh  Otherwise normal light touch and pinprick sensation lower extremities  Gait, Coordination, and Reflexes     Gait  Gait: normal    Reflexes   Right ankle clonus: absent  Left ankle clonus: absentWalks with a mildly stooped forward posture

## 2018-05-08 NOTE — LETTER
May 8, 2018     Patito Manjarrez MD  [address]    Patient: Obed Madden   YOB: 1955   Date of Visit: 5/8/2018       Dear Dr Esther Cole:    Thank you for referring Obed Madden to me for evaluation  Below are my notes for this consultation  If you have questions, please do not hesitate to call me  I look forward to following your patient along with you  Sincerely,        Corry Gregory MD        CC: MD Corry Frankel MD  5/8/2018  2:09 PM  Sign at close encounter  Office Note - Neurosurgery   Obed Madden 63 y o  female MRN: [id number]      Assessment:    Patient is gradually worsening  72-year-old woman with lumbar neurogenic claudication secondary to spondylolisthesis and stenosis at L4-5  She has tried a number of nonsurgical pain management strategies with limited improvement in her symptoms  I explained that the natural history for lumbar neurogenic claudication is progressive decline in gait  On occasion this can progress to functional paraplegia and incontinence  We discussed an L4-5 posterior decompression with instrumented fixation fusion and passed full transverse lumbar interbody fusion  The patient understands that her history of numerous abdominal procedures, obesity and malabsorption places her at higher risk of postoperative ileus and wound healing issues  The goal of surgery is to relieve pressure neural structures and hopefully improve radicular pain and symptoms of neurogenic claudication  Weakness, numbness and back pain are less likely to improve  The risks of surgery were described in detail  1  Risk of general anesthetic with possible cardiac and respiratory complication  Risk of infection and bleeding  2  Risk of neurological injury with new pain, weakness or numbness in the legs or difficulties with bowel and bladder function  Risk of CSF leak was described    3  Possible need for additional lumbar spine surgery in the future  Expected postoperative course, including activity restrictions, expected pain and postoperative medication were reviewed  Patient is previously seen an orthopedic surgeon who offered a lateral fusion/decompression  She is planning on following up with him to discuss her options further  I would be pleased to see her again should she have additional questions  Otherwise she will follow up on a p r n  basis  History, physical examination and diagnostic tests were reviewed and questions answered  Diagnosis, care plan and treatment options were discussed  The patient understand instructions and will follow up as directed  Plan:    Follow-up: prn    Problem List Items Addressed This Visit        Musculoskeletal and Integument    Spondylolisthesis of lumbar region       Other    Lumbar stenosis with neurogenic claudication - Primary          Subjective/Objective     Chief Complaint    Lower back and bilateral leg pain, worse on the left  HPI    42-year-old  with a 1-2 year history of progressive lower back and leg pain  This is forced her to stop riding horses and she is older motorcycle  She currently describes a dull pressure sensation across her lower back which increases with walking which she rates as 0 to 8/10 pain  She will developed pain radiating down the back of her leg and into her foot with numbness and tingling  She has similar symptoms on the right but not as severe  She rates her leg pain as 0 to 8/10  It seems to be worse with driving  She can walk for approximately 150 feet before she must lean forward or sit down to relieve her symptoms and then she can resume walking  She denies any urinary or fecal incontinence or changing perineal sensation  Of note, in 2006 she underwent gastric bypass but has had numerous abdominal incisions and reconstructions since      Physical therapy in [facility] has been helpful  Epidural steroid injections provided some improvement  Recent coccygeal injection was not helpful  Gabapentin is quite helpful  Oxycodone is were helpful but she stop taking these secondary to concerns over addiction  She presents today with an MRI of the lumbar spine and to discuss her surgical options  ROS    Review of Systems   HENT: Negative  Eyes: Negative  Respiratory: Positive for shortness of breath (on exertion)  Cardiovascular: Negative  Gastrointestinal: Positive for constipation (IBS) and diarrhea (IBS)  Endocrine: Negative  Genitourinary: Negative  Leakage on exertion   Musculoskeletal: Positive for back pain (across lower back radiates into bilateral buttocks, hips, and legs)  Skin: Negative  Allergic/Immunologic: Negative  Neurological: Positive for dizziness, syncope, weakness (bilateral legs) and numbness (bilateral legs, back of bilateral knees, more left then right, and bilateral feet )  Negative for seizures and headaches  Hematological: Negative  Psychiatric/Behavioral: Positive for confusion and sleep disturbance (due to pain )  Family History    Family History   Problem Relation Age of Onset    Diabetes Mother     Aortic aneurysm Father     Cancer Father      prostate    Heart disease Sister      open heart    Cancer Sister      breast    Diabetes Brother     No Known Problems Daughter     Asperger's syndrome Son     Diabetes Maternal Grandfather     No Known Problems Brother        Social History    Social History     Social History    Marital status: /Civil Union     Spouse name: N/A    Number of children: N/A    Years of education: N/A     Occupational History    Not on file       Social History Main Topics    Smoking status: Never Smoker    Smokeless tobacco: Never Used    Alcohol use 3 0 oz/week     5 Cans of beer per week    Drug use: No    Sexual activity: Yes     Other Topics Concern    Not on file     Social History Narrative    No narrative on file       Past Medical History    Past Medical History:   Diagnosis Date    Back problem     Gastric bypass status for obesity     GERD (gastroesophageal reflux disease)     Hypertension        Surgical History    Past Surgical History:   Procedure Laterality Date    APPENDECTOMY      BARIATRIC SURGERY      CARPAL TUNNEL RELEASE      CHOLECYSTECTOMY      COLONOSCOPY W/ BIOPSIES AND POLYPECTOMY      FLEXIBLE BRONCHOSCOPY W/ UPPER ENDOSCOPY      HERNIA REPAIR      NE ARTHROCENTESIS ASPIR&/INJ SMALL JT/BURSA W/O US N/A 5/2/2018    Procedure: Coccygeal Injection & Ganglion Impar Block;  Surgeon: Katelynn Troncoso MD;  Location: [place] [address] MAIN OR;  Service: Pain Management     NE ESOPHAGOGASTRODUODENOSCOPY TRANSORAL DIAGNOSTIC N/A 2/19/2018    Procedure: ESOPHAGOGASTRODUODENOSCOPY (EGD); Surgeon: Paty Valadez MD;  [facility]: [facility] GI LAB;   Service: Gastroenterology    TONSILECTOMY, ADENOIDECTOMY, BILATERAL MYRINGOTOMY AND TUBES         Medications      Current Outpatient Prescriptions:     acetaminophen (TYLENOL) 325 mg tablet, Take 1 tablet by mouth as needed, Disp: , Rfl:     diclofenac (VOLTAREN) 75 mg EC tablet, Take 1 tablet (75 mg total) by mouth 2 (two) times a day, Disp: 60 tablet, Rfl: 0    gabapentin (NEURONTIN) 300 mg capsule, Take 300 mg by mouth 3 (three) times a day, Disp: , Rfl:     lisinopril-hydrochlorothiazide (PRINZIDE,ZESTORETIC) 20-12 5 MG per tablet, Take by mouth daily, Disp: , Rfl:     pantoprazole (PROTONIX) 40 mg tablet, Take 1 tablet by mouth 2 (two) times a day  , Disp: , Rfl:     sucralfate (CARAFATE) 1 g/10 mL suspension, Take 10 mL (1 g total) by mouth 4 (four) times a day, Disp: 420 mL, Rfl: 0    traZODone (DESYREL) 50 mg tablet, Take 1 tablet by mouth daily at bedtime, Disp: , Rfl:     LISINOPRIL PO, Take by mouth every morning  , Disp: , Rfl:     Allergies    No Known Allergies    The following portions of the patient's history were reviewed and updated as appropriate: allergies, current medications, past family history, past medical history, past social history, past surgical history and problem list     Investigations    I personally reviewed the MRI results with the patient:    MRI of the lumbar spine without contrast dated August 24, 2017  Degenerative anterolisthesis of L4 and L5  This results in moderate central and severe bilateral lateral recess stenosis with left greater than right foraminal stenosis  Mild stenosis at L3-4  No other areas of significant neural compression  Intrathecal contents unremarkable  Physical Exam    Vitals:  Blood pressure 147/77, pulse 103, temperature 98 7 °F (37 1 °C), resp  rate 20, height 5' 5" (1 651 m), weight 112 kg (247 lb 3 2 oz)  ,Body mass index is 41 14 kg/m²  Physical Exam   Constitutional: She is oriented to person, place, and time  She appears well-nourished  Obese  Musculoskeletal:        Lumbar back: Normal    Neurological: She is oriented to person, place, and time  Gait normal    Skin: Skin is warm and dry  Psychiatric: She has a normal mood and affect  Her behavior is normal  Judgment and thought content normal      Neurologic Exam     Mental Status   Oriented to person, place, and time  Motor Exam   Muscle bulk: normal  Overall muscle tone: normal5/5 power in lower extremities  Sensory Exam   Diminished pinprick sensation over left anterior thigh  Otherwise normal light touch and pinprick sensation lower extremities  Gait, Coordination, and Reflexes     Gait  Gait: normal    Reflexes   Right ankle clonus: absent  Left ankle clonus: absentWalks with a mildly stooped forward posture

## 2018-05-10 ENCOUNTER — TELEPHONE (OUTPATIENT)
Dept: PAIN MEDICINE | Facility: CLINIC | Age: 63
End: 2018-05-10

## 2018-05-10 NOTE — TELEPHONE ENCOUNTER
S/p Coccygeal Injection & Ganglion Impar Block on 5/2/18 by Dr Enriqueta Lane at [address] procedure #  Follow up on 5/15/18 @ 2:45pm w Enriqueta Lane  Spoke with pt and she states that her lumbar bothers her and and PT helps she receive 80% relief  Rates her pain 1/10   She doing ok  She says that aft PT or when doing exercises at home she feels pain up by her cervical area like theres a pinched nerve  The pain came out of no where it was like a surprise to her

## 2018-05-15 ENCOUNTER — OFFICE VISIT (OUTPATIENT)
Dept: PAIN MEDICINE | Facility: CLINIC | Age: 63
End: 2018-05-15
Payer: COMMERCIAL

## 2018-05-15 VITALS
SYSTOLIC BLOOD PRESSURE: 112 MMHG | HEIGHT: 65 IN | DIASTOLIC BLOOD PRESSURE: 66 MMHG | TEMPERATURE: 98.7 F | RESPIRATION RATE: 20 BRPM | WEIGHT: 246 LBS | BODY MASS INDEX: 40.98 KG/M2 | HEART RATE: 101 BPM

## 2018-05-15 DIAGNOSIS — G89.29 CHRONIC BILATERAL LOW BACK PAIN WITHOUT SCIATICA: ICD-10-CM

## 2018-05-15 DIAGNOSIS — M54.50 CHRONIC BILATERAL LOW BACK PAIN WITHOUT SCIATICA: ICD-10-CM

## 2018-05-15 DIAGNOSIS — G89.4 CHRONIC PAIN SYNDROME: Primary | ICD-10-CM

## 2018-05-15 DIAGNOSIS — M46.1 SACROILIITIS (HCC): ICD-10-CM

## 2018-05-15 PROCEDURE — 99214 OFFICE O/P EST MOD 30 MIN: CPT | Performed by: ANESTHESIOLOGY

## 2018-05-15 NOTE — LETTER
May 15, 2018     Edwin Hays MD  [address] [facility]    Patient: Alix Galan   YOB: 1955   Date of Visit: 5/15/2018       Dear Dr Charisse Weiner: Thank you for referring Alix Gaaln to me for evaluation  Below are my notes for this consultation  If you have questions, please do not hesitate to call me  I look forward to following your patient along with you  Sincerely,        Lauren Anderson MD        CC: No Recipients  Lauren Anderson MD  5/15/2018  4:55 PM  Sign at close encounter  Pain Medicine Follow-Up Note    Assessment:  1  Chronic pain syndrome    2  Chronic bilateral low back pain without sciatica    3  Sacroiliitis ([address] )        Plan:  My impressions and treatment recommendations were discussed in detail with the patient who verbalized understanding and had no further questions  The patient reports % relief following the coccygeal injection and ganglion impar block on May 2, 2018  She is very satisfied with her level relief in her tailbone region  She does state that her right sacroiliac joint is currently bothering her and would like something done regarding it  Given that the patient has signs and symptoms consistent with right sacroiliitis, discussed the rationale of undergoing a right sacroiliac joint injection since this be potentially therapeutic  The procedures, its risks, and benefits were explained in detail to the patient  Risks include but are not limited to bleeding, infection, hematoma formation, abscess formation, weakness, headache, failure the pain to improve, nerve irritation or damage, and potential worsening of the pain  The patient verbalized understanding and wished to proceed with the procedure  The patient has also been complaining of paresthesias in her thoracic spine as well as her lumbar spine region  As such, I did ask her to speak to the physical therapist about possible TENS unit treatment    I also felt a reasonable to prescribe her a compound cream to be applied to the affected area 2-4 times daily as needed for pain relief  Side effects were reviewed with the patient  Follow-up is planned in 4 weeks time or sooner as warranted  Discharge instructions were provided  I personally saw and examined the patient and I agree with the above discussed plan of care  History of Present Illness:    Rosemary Mcguire is a 63 y o  female who presents to [facility] for interval re-evaluation of the above stated pain complaints  The patient has a past medical and chronic pain history as outlined in the assessment section  She was last seen on May 2, 2018 at which time she underwent a coccygeal injection and ganglion impar block  The patient reports pain in her low back region at today's visit  She describes her pain as constant in nature and reports the quality of her pain as in burning and sharp    He she does report that her current pain score in her tailbone region is 2/10 on the verbal numerical pain rating scale, but 5/10 in the right sacroiliac joint region  She would like to undergo a right sacroiliac joint injection at today's visit  She has cough also complaining of paresthesias in her thoracic spine as well as her lumbar spine region  Other than as stated above, the patient denies any interval changes in medications, medical condition, mental condition, symptoms, or allergies since the last office visit  Review of Systems:    Review of Systems   Respiratory: Negative for shortness of breath  Cardiovascular: Negative for chest pain  Gastrointestinal: Negative for constipation, diarrhea, nausea and vomiting  Musculoskeletal: Positive for gait problem (difficulty walking/ decreased range of motion) and joint swelling (joint stiffness)  Negative for arthralgias and myalgias  Skin: Negative for rash     Neurological: Positive for weakness (muscle weakness)  Negative for dizziness and seizures  All other systems reviewed and are negative  Patient Active Problem List   Diagnosis    GERD without esophagitis    Right sided abdominal pain    Diarrhea    Chronic pain syndrome    Chronic bilateral low back pain without sciatica    Lumbar spondylosis    Spondylolisthesis of lumbar region    Coccydynia    Coccygodynia    Lumbar stenosis with neurogenic claudication    Sacroiliitis (HCC)       Past Medical History:   Diagnosis Date    Back problem     Gastric bypass status for obesity     GERD (gastroesophageal reflux disease)     Hypertension        Past Surgical History:   Procedure Laterality Date    APPENDECTOMY      BARIATRIC SURGERY      CARPAL TUNNEL RELEASE      CHOLECYSTECTOMY      COLONOSCOPY W/ BIOPSIES AND POLYPECTOMY      FLEXIBLE BRONCHOSCOPY W/ UPPER ENDOSCOPY      HERNIA REPAIR      TN ARTHROCENTESIS ASPIR&/INJ SMALL JT/BURSA W/O US N/A 5/2/2018    Procedure: Coccygeal Injection & Ganglion Impar Block;  Surgeon: Cecilia Friend MD;  Location: [facility] MAIN OR;  Service: Pain Management     TN ESOPHAGOGASTRODUODENOSCOPY TRANSORAL DIAGNOSTIC N/A 2/19/2018    Procedure: ESOPHAGOGASTRODUODENOSCOPY (EGD); Surgeon: Dick Chavira MD;  Location: [facility] GI LAB; Service: Gastroenterology    TONSILECTOMY, ADENOIDECTOMY, BILATERAL MYRINGOTOMY AND TUBES         Family History   Problem Relation Age of Onset    Diabetes Mother     Aortic aneurysm Father     Cancer Father      prostate    Heart disease Sister      open heart    Cancer Sister      breast    Diabetes Brother     No Known Problems Daughter     Asperger's syndrome Son     Diabetes Maternal Grandfather     No Known Problems Brother        Social History     Occupational History    Not on file  Social History Main Topics    Smoking status: Never Smoker    Smokeless tobacco: Never Used    Alcohol use 3 0 oz/week     5 Cans of beer per week    Drug use:  No  Sexual activity: Yes         Current Outpatient Prescriptions:     acetaminophen (TYLENOL) 325 mg tablet, Take 1 tablet by mouth as needed, Disp: , Rfl:     diclofenac (VOLTAREN) 75 mg EC tablet, Take 1 tablet (75 mg total) by mouth 2 (two) times a day, Disp: 60 tablet, Rfl: 0    gabapentin (NEURONTIN) 300 mg capsule, Take 300 mg by mouth 3 (three) times a day, Disp: , Rfl:     LISINOPRIL PO, Take by mouth every morning  , Disp: , Rfl:     lisinopril-hydrochlorothiazide (PRINZIDE,ZESTORETIC) 20-12 5 MG per tablet, Take by mouth daily, Disp: , Rfl:     pantoprazole (PROTONIX) 40 mg tablet, Take 1 tablet by mouth 2 (two) times a day  , Disp: , Rfl:     sucralfate (CARAFATE) 1 g/10 mL suspension, Take 10 mL (1 g total) by mouth 4 (four) times a day, Disp: 420 mL, Rfl: 0    traZODone (DESYREL) 50 mg tablet, Take 1 tablet by mouth daily at bedtime, Disp: , Rfl:     Allergies   Allergen Reactions    Other        Physical Exam:    /66 (BP Location: Right arm, Patient Position: Sitting, Cuff Size: Standard)   Pulse 101   Temp 98 7 °F (37 1 °C) (Oral)   Resp 20   Ht 5' 5" (1 651 m)   Wt 112 kg (246 lb)   BMI 40 94 kg/m²      Constitutional:normal, well developed, well nourished, alert, in no distress and non-toxic and no overt pain behavior  Eyes:anicteric  HEENT:grossly intact  Neck:supple, symmetric, trachea midline and no masses   Pulmonary:even and unlabored  Cardiovascular:No edema or pitting edema present  Skin:Normal without rashes or lesions and well hydrated  Psychiatric:Mood and affect appropriate  Neurologic:Cranial Nerves II-XII grossly intact  Musculoskeletal:normal, tenderness noted over the right sacroiliac joint  KAROLINA testing is positive on the right and negative on the left

## 2018-05-15 NOTE — PROGRESS NOTES
Pain Medicine Follow-Up Note    Assessment:  1  Chronic pain syndrome    2  Chronic bilateral low back pain without sciatica    3  Sacroiliitis ([address] )        Plan:  My impressions and treatment recommendations were discussed in detail with the patient who verbalized understanding and had no further questions  The patient reports % relief following the coccygeal injection and ganglion impar block on May 2, 2018  She is very satisfied with her level relief in her tailbone region  She does state that her right sacroiliac joint is currently bothering her and would like something done regarding it  Given that the patient has signs and symptoms consistent with right sacroiliitis, discussed the rationale of undergoing a right sacroiliac joint injection since this be potentially therapeutic  The procedures, its risks, and benefits were explained in detail to the patient  Risks include but are not limited to bleeding, infection, hematoma formation, abscess formation, weakness, headache, failure the pain to improve, nerve irritation or damage, and potential worsening of the pain  The patient verbalized understanding and wished to proceed with the procedure  The patient has also been complaining of paresthesias in her thoracic spine as well as her lumbar spine region  As such, I did ask her to speak to the physical therapist about possible TENS unit treatment  I also felt a reasonable to prescribe her a compound cream to be applied to the affected area 2-4 times daily as needed for pain relief  Side effects were reviewed with the patient  Follow-up is planned in 4 weeks time or sooner as warranted  Discharge instructions were provided  I personally saw and examined the patient and I agree with the above discussed plan of care      History of Present Illness:    Edwige Brown is a 63 y o  female who presents to [facility] for interval re-evaluation of the above stated pain complaints  The patient has a past medical and chronic pain history as outlined in the assessment section  She was last seen on May 2, 2018 at which time she underwent a coccygeal injection and ganglion impar block  The patient reports pain in her low back region at today's visit  She describes her pain as constant in nature and reports the quality of her pain as in burning and sharp    He she does report that her current pain score in her tailbone region is 2/10 on the verbal numerical pain rating scale, but 5/10 in the right sacroiliac joint region  She would like to undergo a right sacroiliac joint injection at today's visit  She has cough also complaining of paresthesias in her thoracic spine as well as her lumbar spine region  Other than as stated above, the patient denies any interval changes in medications, medical condition, mental condition, symptoms, or allergies since the last office visit  Review of Systems:    Review of Systems   Respiratory: Negative for shortness of breath  Cardiovascular: Negative for chest pain  Gastrointestinal: Negative for constipation, diarrhea, nausea and vomiting  Musculoskeletal: Positive for gait problem (difficulty walking/ decreased range of motion) and joint swelling (joint stiffness)  Negative for arthralgias and myalgias  Skin: Negative for rash  Neurological: Positive for weakness (muscle weakness)  Negative for dizziness and seizures  All other systems reviewed and are negative          Patient Active Problem List   Diagnosis    GERD without esophagitis    Right sided abdominal pain    Diarrhea    Chronic pain syndrome    Chronic bilateral low back pain without sciatica    Lumbar spondylosis    Spondylolisthesis of lumbar region    Coccydynia    Coccygodynia    Lumbar stenosis with neurogenic claudication    Sacroiliitis [facility])       Past Medical History:   Diagnosis Date    Back problem     Gastric bypass status for obesity     GERD (gastroesophageal reflux disease)     Hypertension        Past Surgical History:   Procedure Laterality Date    APPENDECTOMY      BARIATRIC SURGERY      CARPAL TUNNEL RELEASE      CHOLECYSTECTOMY      COLONOSCOPY W/ BIOPSIES AND POLYPECTOMY      FLEXIBLE BRONCHOSCOPY W/ UPPER ENDOSCOPY      HERNIA REPAIR      MD ARTHROCENTESIS ASPIR&/INJ SMALL JT/BURSA W/O US N/A 5/2/2018    Procedure: Coccygeal Injection & Ganglion Impar Block;  Surgeon: Deidre An MD;  Location[facility] MAIN OR;  Service: Pain Management     MD ESOPHAGOGASTRODUODENOSCOPY TRANSORAL DIAGNOSTIC N/A 2/19/2018    Procedure: ESOPHAGOGASTRODUODENOSCOPY (EGD); Surgeon: Tiff Kirkpatrick MD;  Location: [facility] GI [facility]; Service: Gastroenterology    TONSILECTOMY, ADENOIDECTOMY, BILATERAL MYRINGOTOMY AND TUBES         Family History   Problem Relation Age of Onset    Diabetes Mother     Aortic aneurysm Father     Cancer Father      prostate    Heart disease Sister      open heart    Cancer Sister      breast    Diabetes Brother     No Known Problems Daughter     Asperger's syndrome Son     Diabetes Maternal Grandfather     No Known Problems Brother        Social History     Occupational History    Not on file       Social History Main Topics    Smoking status: Never Smoker    Smokeless tobacco: Never Used    Alcohol use 3 0 oz/week     5 Cans of beer per week    Drug use: No    Sexual activity: Yes         Current Outpatient Prescriptions:     acetaminophen (TYLENOL) 325 mg tablet, Take 1 tablet by mouth as needed, Disp: , Rfl:     diclofenac (VOLTAREN) 75 mg EC tablet, Take 1 tablet (75 mg total) by mouth 2 (two) times a day, Disp: 60 tablet, Rfl: 0    gabapentin (NEURONTIN) 300 mg capsule, Take 300 mg by mouth 3 (three) times a day, Disp: , Rfl:     LISINOPRIL PO, Take by mouth every morning  , Disp: , Rfl:     lisinopril-hydrochlorothiazide (PRINZIDE,ZESTORETIC) 20-12 5 MG per tablet, Take by mouth daily, Disp: , Rfl:     pantoprazole (PROTONIX) 40 mg tablet, Take 1 tablet by mouth 2 (two) times a day  , Disp: , Rfl:     sucralfate (CARAFATE) 1 g/10 mL suspension, Take 10 mL (1 g total) by mouth 4 (four) times a day, Disp: 420 mL, Rfl: 0    traZODone (DESYREL) 50 mg tablet, Take 1 tablet by mouth daily at bedtime, Disp: , Rfl:     Allergies   Allergen Reactions    Other        Physical Exam:    /66 (BP Location: Right arm, Patient Position: Sitting, Cuff Size: Standard)   Pulse 101   Temp 98 7 °F (37 1 °C) (Oral)   Resp 20   Ht 5' 5" (1 651 m)   Wt 112 kg (246 lb)   BMI 40 94 kg/m²     Constitutional:normal, well developed, well nourished, alert, in no distress and non-toxic and no overt pain behavior  Eyes:anicteric  HEENT:grossly intact  Neck:supple, symmetric, trachea midline and no masses   Pulmonary:even and unlabored  Cardiovascular:No edema or pitting edema present  Skin:Normal without rashes or lesions and well hydrated  Psychiatric:Mood and affect appropriate  Neurologic:Cranial Nerves II-XII grossly intact  Musculoskeletal:normal, tenderness noted over the right sacroiliac joint  KAROLINA testing is positive on the right and negative on the left

## 2018-05-16 PROBLEM — M46.1 SACROILIITIS, NOT ELSEWHERE CLASSIFIED (HCC): Status: ACTIVE | Noted: 2018-05-16

## 2018-05-16 PROBLEM — M54.50 LOW BACK PAIN: Status: ACTIVE | Noted: 2018-05-16

## 2018-05-25 ENCOUNTER — HOSPITAL ENCOUNTER (OUTPATIENT)
Facility: AMBULARY SURGERY CENTER | Age: 63
Setting detail: OUTPATIENT SURGERY
Discharge: HOME | End: 2018-05-25
Attending: ANESTHESIOLOGY | Admitting: ANESTHESIOLOGY
Payer: COMMERCIAL

## 2018-05-25 ENCOUNTER — HOSPITAL ENCOUNTER (OUTPATIENT)
Dept: RADIOLOGY | Facility: HOSPITAL | Age: 63
Setting detail: OUTPATIENT SURGERY
Discharge: HOME | End: 2018-05-25
Payer: COMMERCIAL

## 2018-05-25 VITALS
HEART RATE: 74 BPM | TEMPERATURE: 98.6 F | DIASTOLIC BLOOD PRESSURE: 95 MMHG | OXYGEN SATURATION: 97 % | RESPIRATION RATE: 18 BRPM | SYSTOLIC BLOOD PRESSURE: 149 MMHG

## 2018-05-25 PROCEDURE — 27096 INJECT SACROILIAC JOINT: CPT | Performed by: ANESTHESIOLOGY

## 2018-05-25 PROCEDURE — 72200 X-RAY EXAM SI JOINTS: CPT

## 2018-05-25 RX ORDER — LIDOCAINE WITH 8.4% SOD BICARB 0.9%(10ML)
SYRINGE (ML) INJECTION AS NEEDED
Status: DISCONTINUED | OUTPATIENT
Start: 2018-05-25 | End: 2018-05-25 | Stop reason: HOSPADM

## 2018-05-25 RX ORDER — METHYLPREDNISOLONE ACETATE 80 MG/ML
INJECTION, SUSPENSION INTRA-ARTICULAR; INTRALESIONAL; INTRAMUSCULAR; SOFT TISSUE AS NEEDED
Status: DISCONTINUED | OUTPATIENT
Start: 2018-05-25 | End: 2018-05-25 | Stop reason: HOSPADM

## 2018-05-25 RX ORDER — BUPIVACAINE HYDROCHLORIDE 2.5 MG/ML
INJECTION, SOLUTION EPIDURAL; INFILTRATION; INTRACAUDAL AS NEEDED
Status: DISCONTINUED | OUTPATIENT
Start: 2018-05-25 | End: 2018-05-25 | Stop reason: HOSPADM

## 2018-05-25 NOTE — H&P (VIEW-ONLY)
Pain Medicine Follow-Up Note    Assessment:  1  Chronic pain syndrome    2  Chronic bilateral low back pain without sciatica    3  Sacroiliitis ([address] )        Plan:  My impressions and treatment recommendations were discussed in detail with the patient who verbalized understanding and had no further questions  The patient reports % relief following the coccygeal injection and ganglion impar block on May 2, 2018  She is very satisfied with her level relief in her tailbone region  She does state that her right sacroiliac joint is currently bothering her and would like something done regarding it  Given that the patient has signs and symptoms consistent with right sacroiliitis, discussed the rationale of undergoing a right sacroiliac joint injection since this be potentially therapeutic  The procedures, its risks, and benefits were explained in detail to the patient  Risks include but are not limited to bleeding, infection, hematoma formation, abscess formation, weakness, headache, failure the pain to improve, nerve irritation or damage, and potential worsening of the pain  The patient verbalized understanding and wished to proceed with the procedure  The patient has also been complaining of paresthesias in her thoracic spine as well as her lumbar spine region  As such, I did ask her to speak to the physical therapist about possible TENS unit treatment  I also felt a reasonable to prescribe her a compound cream to be applied to the affected area 2-4 times daily as needed for pain relief  Side effects were reviewed with the patient  Follow-up is planned in 4 weeks time or sooner as warranted  Discharge instructions were provided  I personally saw and examined the patient and I agree with the above discussed plan of care      History of Present Illness:    Nhung Petersen is a 63 y o  female who presents to [facility] for interval re-evaluation of the above stated pain complaints  The patient has a past medical and chronic pain history as outlined in the assessment section  She was last seen on May 2, 2018 at which time she underwent a coccygeal injection and ganglion impar block  The patient reports pain in her low back region at today's visit  She describes her pain as constant in nature and reports the quality of her pain as in burning and sharp    He she does report that her current pain score in her tailbone region is 2/10 on the verbal numerical pain rating scale, but 5/10 in the right sacroiliac joint region  She would like to undergo a right sacroiliac joint injection at today's visit  She has cough also complaining of paresthesias in her thoracic spine as well as her lumbar spine region  Other than as stated above, the patient denies any interval changes in medications, medical condition, mental condition, symptoms, or allergies since the last office visit  Review of Systems:    Review of Systems   Respiratory: Negative for shortness of breath  Cardiovascular: Negative for chest pain  Gastrointestinal: Negative for constipation, diarrhea, nausea and vomiting  Musculoskeletal: Positive for gait problem (difficulty walking/ decreased range of motion) and joint swelling (joint stiffness)  Negative for arthralgias and myalgias  Skin: Negative for rash  Neurological: Positive for weakness (muscle weakness)  Negative for dizziness and seizures  All other systems reviewed and are negative          Patient Active Problem List   Diagnosis    GERD without esophagitis    Right sided abdominal pain    Diarrhea    Chronic pain syndrome    Chronic bilateral low back pain without sciatica    Lumbar spondylosis    Spondylolisthesis of lumbar region    Coccydynia    Coccygodynia    Lumbar stenosis with neurogenic claudication    Sacroiliitis [facility])       Past Medical History:   Diagnosis Date    Back problem     Gastric bypass status for obesity     GERD (gastroesophageal reflux disease)     Hypertension        Past Surgical History:   Procedure Laterality Date    APPENDECTOMY      BARIATRIC SURGERY      CARPAL TUNNEL RELEASE      CHOLECYSTECTOMY      COLONOSCOPY W/ BIOPSIES AND POLYPECTOMY      FLEXIBLE BRONCHOSCOPY W/ UPPER ENDOSCOPY      HERNIA REPAIR      OK ARTHROCENTESIS ASPIR&/INJ SMALL JT/BURSA W/O US N/A 5/2/2018    Procedure: Coccygeal Injection & Ganglion Impar Block;  Surgeon: Antonietta Aguila MD;  Location: [facility] MAIN OR;  Service: Pain Management     OK ESOPHAGOGASTRODUODENOSCOPY TRANSORAL DIAGNOSTIC N/A 2/19/2018    Procedure: ESOPHAGOGASTRODUODENOSCOPY (EGD); Surgeon: Mikayla Munoz MD;  Location: [facility] GI LAB; Service: Gastroenterology    TONSILECTOMY, ADENOIDECTOMY, BILATERAL MYRINGOTOMY AND TUBES         Family History   Problem Relation Age of Onset    Diabetes Mother     Aortic aneurysm Father     Cancer Father      prostate    Heart disease Sister      open heart    Cancer Sister      breast    Diabetes Brother     No Known Problems Daughter     Asperger's syndrome Son     Diabetes Maternal Grandfather     No Known Problems Brother        Social History     Occupational History    Not on file       Social History Main Topics    Smoking status: Never Smoker    Smokeless tobacco: Never Used    Alcohol use 3 0 oz/week     5 Cans of beer per week    Drug use: No    Sexual activity: Yes         Current Outpatient Prescriptions:     acetaminophen (TYLENOL) 325 mg tablet, Take 1 tablet by mouth as needed, Disp: , Rfl:     diclofenac (VOLTAREN) 75 mg EC tablet, Take 1 tablet (75 mg total) by mouth 2 (two) times a day, Disp: 60 tablet, Rfl: 0    gabapentin (NEURONTIN) 300 mg capsule, Take 300 mg by mouth 3 (three) times a day, Disp: , Rfl:     LISINOPRIL PO, Take by mouth every morning  , Disp: , Rfl:     lisinopril-hydrochlorothiazide (PRINZIDE,ZESTORETIC) 20-12 5 MG per tablet, Take by mouth daily, Disp: , Rfl:     pantoprazole (PROTONIX) 40 mg tablet, Take 1 tablet by mouth 2 (two) times a day  , Disp: , Rfl:     sucralfate (CARAFATE) 1 g/10 mL suspension, Take 10 mL (1 g total) by mouth 4 (four) times a day, Disp: 420 mL, Rfl: 0    traZODone (DESYREL) 50 mg tablet, Take 1 tablet by mouth daily at bedtime, Disp: , Rfl:     Allergies   Allergen Reactions    Other        Physical Exam:    /66 (BP Location: Right arm, Patient Position: Sitting, Cuff Size: Standard)   Pulse 101   Temp 98 7 °F (37 1 °C) (Oral)   Resp 20   Ht 5' 5" (1 651 m)   Wt 112 kg (246 lb)   BMI 40 94 kg/m²     Constitutional:normal, well developed, well nourished, alert, in no distress and non-toxic and no overt pain behavior  Eyes:anicteric  HEENT:grossly intact  Neck:supple, symmetric, trachea midline and no masses   Pulmonary:even and unlabored  Cardiovascular:No edema or pitting edema present  Skin:Normal without rashes or lesions and well hydrated  Psychiatric:Mood and affect appropriate  Neurologic:Cranial Nerves II-XII grossly intact  Musculoskeletal:normal, tenderness noted over the right sacroiliac joint  KAROLINA testing is positive on the right and negative on the left

## 2018-05-25 NOTE — DISCHARGE INSTRUCTIONS
Steroid Joint Injection   WHAT YOU NEED TO KNOW:   A steroid joint injection is a procedure to inject steroid medicine into a joint  Steroid medicine decreases pain and inflammation  The injection may also contain an anesthetic (numbing medicine) to decrease pain  It may be done to treat conditions such as arthritis, gout, or carpal tunnel syndrome  The injections may be given in your knee, ankle, shoulder, elbow, wrist, ankle or sacroiliac joint  1  Do not apply heat to any area that is numb  If you have discomfort or soreness at the injection site, you may apply ice today, 20 minutes on and 20 minutes off  Tomorrow you may use ice or warm, moist heat  Do not apply ice or heat directly to the skin  2  You may have an increase or change in the discomfort for 36-48 hours after your treatment  Apply ice and continue with any pain medicine you have been prescribed  3  Do not do anything strenuous today  You may shower, but no tub baths or hot tubs today  You may resume your normal activities tomorrow, but do not overdo it  Resume normal activities slowly when you are feeling better  4  If you experience redness, drainage or swelling at the injection site, or if you develop a fever above 100 degrees, please call [facility] at [phone] or go to the Emergency Room  5  Continue to take all routine medicines prescribed by your primary care physician unless otherwise instructed by our staff  Most blood thinners should be started again according to your regularly scheduled dosing  If you have any questions, please give our office a call  If you have a problem specifically related to your procedure, please call our office at [phone]  Problems not related to your procedure should be directed to your primary care physician

## 2018-05-25 NOTE — OP NOTE
ATTENDING PHYSICIAN:  Mathew Thornton MD     PROCEDURE: Right sacroiliac joint injection with steroid and local anesthetic under fluoroscopic guidance  PREPROCEDURE DIAGNOSIS:  Sacroiliitis  POSTPROCEDURE DIAGNOSIS: Sacroiliitis  ANESTHESIA:  Local     ESTIMATED BLOOD LOSS:  Minimal     COMPLICATIONS:  None  LOCATION:  [address]  CONSENT:  Today's procedure, its potential benefits as well as its risks and potential side effects were reviewed  Discussed risks of the procedure including bleeding, infection, nerve irritation or damage, reactions to the medications, headache, failure of the pain to improve, and potential worsening of the pain were explained to the patient who verbalized understanding and who wished to proceed  Written informed consent was thereby obtained  DESCRIPTION OF THE PROCEDURE:  After written informed consent was obtained, the patient was taken to the fluoroscopy suite and placed in the prone position  Anatomical landmarks were identified by way of fluoroscopy in multiple views  The skin overlying the sacroiliac region was prepped using antiseptic and draped in the usual sterile fashion  Strict aseptic technique was utilized  The skin and subcutaneous tissues at the needle entry site were infiltrated with 5 mL of 1% preservative-free lidocaine using a 25-gauge 1-1/2-inch needle  A 22-gauge 3-1/2-inch needle was then incrementally advanced using multiple fluoroscopic views into the inferior posterior pole of the sacroiliac joint  Proper needle placement was confirmed with the aid of fluoroscopy in the AP and lateral views  After negative aspiration, contrast was injected which delineated the sacroiliac joint under fluoroscopy in the AP view  After negative aspiration was reconfirmed, a 3 mL mixture consisting of 2 mL of preservative-free 0 25% bupivacaine mixed with 1 mL of 80 mg/mL of Depo-Medrol was slowly injected      The patient tolerated the procedure well and all needles were removed with the tips intact  Hemostasis was maintained  There were no apparent paresthesias or complications  The skin was wiped clean and a Band-Aid was placed as appropriate  The patient was monitored for an appropriate period of time following the procedure and remained hemodynamically stable and neurovascularly intact following the procedure  The patient was ultimately discharged to home with supervision in good condition and instructed to call the office in a few days for an update or sooner as warranted  I was present and participated in all key and critical portions of this procedure      Albert Hanna MD  5/25/2018  9:16 AM

## 2018-05-29 ENCOUNTER — TELEPHONE (OUTPATIENT)
Dept: PAIN MEDICINE | Facility: CLINIC | Age: 63
End: 2018-05-29

## 2018-05-29 NOTE — TELEPHONE ENCOUNTER
Patient left message at  [facility] office stating how to proceed with her cervical pain  Please cb at    TY

## 2018-05-30 ENCOUNTER — TELEPHONE (OUTPATIENT)
Dept: PAIN MEDICINE | Facility: CLINIC | Age: 63
End: 2018-05-30

## 2018-05-30 NOTE — TELEPHONE ENCOUNTER
S/W patient, states that she need an additional  physical therapy script for her shoulder blade area  Pt states that Danica Rice the PT can be reached at [phone]  Patient states that her and [facility] discussed this the day of her procedure  Advised will make AS aware and will c/b with recommendations

## 2018-05-30 NOTE — TELEPHONE ENCOUNTER
S/W patient, advised previous recommendations from [facility]  Pt states, that she misunderstood AS and will talk with her physical therapists and give us a call back

## 2018-05-30 NOTE — TELEPHONE ENCOUNTER
Pt called for an additional script for [facility] for her shoulder blade area  Please call [phone] for [facility] and her name is Angus Heller

## 2018-05-30 NOTE — TELEPHONE ENCOUNTER
I never actually assessed any of her shoulder blade pain for me to give her a prescription for it  I had asked her to speak to her PT in regards to this since she reported that physical therapy for her back brought on the shoulder blade pain; therefore, I asked her to speak to the therapist as to what the cause could be

## 2018-05-31 ENCOUNTER — TELEPHONE (OUTPATIENT)
Dept: PAIN MEDICINE | Facility: CLINIC | Age: 63
End: 2018-05-31

## 2018-05-31 NOTE — TELEPHONE ENCOUNTER
Aware  Thanks  Schedule follow up appointment and I will address all of this with her when I see her

## 2018-05-31 NOTE — TELEPHONE ENCOUNTER
S/p Rt Sacroiliac Joint Injection on 5/25/18 by Dr Blanco Slider at [address] procedure # (none) follow up on 6/25/18 @ 9 am     Spoke with pt and she stated that she received 70% relief and rates her pain 6-7/10  Pt also mentioned that she is having cervical and lumbar pain and would like to see about getting a referral for PT, pt would also like to come in so that you can exam her  She wants to know if you can prescribe her a topical cream for the pain  Pt asked if she can come in on the 6th of June at 3:45pm told pt will check with  rep , told pt I would get back to her once I get all the info   Pt understands

## 2018-06-06 ENCOUNTER — OFFICE VISIT (OUTPATIENT)
Dept: PAIN MEDICINE | Facility: CLINIC | Age: 63
End: 2018-06-06
Payer: COMMERCIAL

## 2018-06-06 VITALS
RESPIRATION RATE: 18 BRPM | HEIGHT: 65 IN | WEIGHT: 244.4 LBS | HEART RATE: 90 BPM | DIASTOLIC BLOOD PRESSURE: 64 MMHG | BODY MASS INDEX: 40.72 KG/M2 | SYSTOLIC BLOOD PRESSURE: 100 MMHG

## 2018-06-06 DIAGNOSIS — M54.6 THORACIC SPINE PAIN: ICD-10-CM

## 2018-06-06 DIAGNOSIS — M79.18 MYOFASCIAL PAIN SYNDROME: ICD-10-CM

## 2018-06-06 DIAGNOSIS — M54.2 NECK PAIN: ICD-10-CM

## 2018-06-06 DIAGNOSIS — M43.16 SPONDYLOLISTHESIS OF LUMBAR REGION: ICD-10-CM

## 2018-06-06 DIAGNOSIS — M46.1 SACROILIITIS (HCC): ICD-10-CM

## 2018-06-06 DIAGNOSIS — G89.4 CHRONIC PAIN SYNDROME: Primary | ICD-10-CM

## 2018-06-06 DIAGNOSIS — M53.3 COCCYDYNIA: ICD-10-CM

## 2018-06-06 PROCEDURE — 99214 OFFICE O/P EST MOD 30 MIN: CPT | Performed by: ANESTHESIOLOGY

## 2018-06-06 NOTE — LETTER
June 6, 2018     Mark Vazquez MD  [address] [place] [address]    Patient: Jackson Stewart   YOB: 1955   Date of Visit: 6/6/2018       Dear Dr Ponce Levels: Thank you for referring Jackson Stewart to me for evaluation  Below are my notes for this consultation  If you have questions, please do not hesitate to call me  I look forward to following your patient along with you  Sincerely,        Kia Singletary MD        CC: No Recipients  Kia Singletary MD  6/6/2018  9:10 AM  Sign at close encounter  Pain Medicine Follow-Up Note    Assessment:  1  Chronic pain syndrome    2  Neck pain    3  Thoracic spine pain    4  Myofascial pain syndrome    5  Spondylolisthesis of lumbar region    6  Sacroiliitis ([address] )    7  Coccydynia        Plan:  Orders Placed This Encounter   Procedures    Ambulatory referral to Physical Therapy     Standing Status:   Future     Standing Expiration Date:   12/6/2018     Referral Priority:   Routine     Referral Type:   Physical Therapy     Referral Reason:   Specialty Services Required     Requested Specialty:   Physical Therapy     Number of Visits Requested:   1     Expiration Date:   6/6/2019     My impressions and treatment recommendations were discussed in detail with the patient who verbalized understanding and had no further questions  The patient appears to have developed a muscle spasm in her cervical paraspinal, upper trapezius, and lower trapezius musculature  She has distinct trigger points palpable on examination today  As such, I did offer the patient trigger point injections to this area since that could be potentially therapeutic  The procedures, its risks, and benefits were explained in detail to the patient  Risks include but are not limited to bleeding, infection, hematoma formation, abscess formation, weakness, headache, failure the pain to improve, nerve irritation or damage, and potential worsening of the pain   The patient verbalized understanding and wished to proceed with the procedure  I also felt it reasonable to prescribe the patient physical therapy 2-3 times per week for 4-6 weeks  The patient also reports excellent pain relief following the right sacroiliac joint injection on May 25, 2018  She is currently pain free on her right buttocks region  The patient also reports that she just recently received the compound cream and has not yet tried it out  I encouraged the patient to try out the cream to see if it works for her  Follow-up is planned in 6 weeks time or sooner as warranted  Discharge instructions were provided  I personally saw and examined the patient and I agree with the above discussed plan of care  History of Present Illness:    Nohemi Christiansen is a 63 y o  female who presents to [facility] for interval re-evaluation of the above stated pain complaints  The patient has a past medical and chronic pain history as outlined in the assessment section  She was last seen on May 25, 2018 at which time she underwent a right sacroiliac joint injection  At today's office visit, the patient's pain score is 7/10 on the verbal numerical pain rating scale  The patient states that her pain is primarily in her neck and upper back region  She describes her pain as intermittent in nature and reports the quality of her pain as sharp, throbbing, pressure-like, shooting, and pins and needles    She reports that when I move in certain ways, I get a crunching in my neck and a buzzing tickle in my shoulder blade "  She does report excellent pain relief following the right sacroiliac joint injection on May 25, 2018  She would like something done regarding her neck and upper back pain at today's visit  Other than as stated above, the patient denies any interval changes in medications, medical condition, mental condition, symptoms, or allergies since the last office visit  Review of Systems:    Review of Systems   Respiratory: Negative for shortness of breath  Cardiovascular: Negative for chest pain  Gastrointestinal: Negative for constipation, diarrhea, nausea and vomiting  Musculoskeletal: Positive for gait problem (decreased range of motion) and joint swelling (joint stiffness)  Negative for arthralgias and myalgias  Skin: Negative for rash  Neurological: Negative for dizziness, seizures and weakness  All other systems reviewed and are negative  Patient Active Problem List   Diagnosis    GERD without esophagitis    Right sided abdominal pain    Diarrhea    Chronic pain syndrome    Chronic bilateral low back pain without sciatica    Lumbar spondylosis    Spondylolisthesis of lumbar region    Coccydynia    Coccygodynia    Lumbar stenosis with neurogenic claudication    Sacroiliitis (HCC)    Sacroiliitis, not elsewhere classified ([facility] [address]    Low back pain    Myofascial pain syndrome    Thoracic spine pain    Neck pain       Past Medical History:   Diagnosis Date    Back problem     Gastric bypass status for obesity     GERD (gastroesophageal reflux disease)     Hypertension        Past Surgical History:   Procedure Laterality Date    APPENDECTOMY      BARIATRIC SURGERY      CARPAL TUNNEL RELEASE      CHOLECYSTECTOMY      COLONOSCOPY W/ BIOPSIES AND POLYPECTOMY      FLEXIBLE BRONCHOSCOPY W/ UPPER ENDOSCOPY      HERNIA REPAIR      MT ARTHROCENTESIS ASPIR&/INJ SMALL JT/BURSA W/O US N/A 5/2/2018    Procedure: Coccygeal Injection & Ganglion Impar Block;  Surgeon: Dilcia Jeffers MD;  Location: [place] [address] MAIN OR;  Service: Pain Management     MT ESOPHAGOGASTRODUODENOSCOPY TRANSORAL DIAGNOSTIC N/A 2/19/2018    Procedure: ESOPHAGOGASTRODUODENOSCOPY (EGD); Surgeon: Nicole Edwards MD;  Location: [facility] GI LAB;   Service: Gastroenterology   [facility] JOINT Right 5/25/2018    Procedure: Rt Sacroiliac Joint Injection;  Surgeon: Carolyn Luna Santo Jimenez MD;  Location: [facility] MAIN OR;  Service: Pain Management     TONSILECTOMY, ADENOIDECTOMY, BILATERAL MYRINGOTOMY AND TUBES         Family History   Problem Relation Age of Onset    Diabetes Mother     Aortic aneurysm Father     Cancer Father      prostate    Heart disease Sister      open heart    Cancer Sister      breast    Diabetes Brother     No Known Problems Daughter     Asperger's syndrome Son     Diabetes Maternal Grandfather     No Known Problems Brother        Social History     Occupational History    Not on file       Social History Main Topics    Smoking status: Never Smoker    Smokeless tobacco: Never Used    Alcohol use 3 0 oz/week     5 Cans of beer per week    Drug use: No    Sexual activity: Yes         Current Outpatient Prescriptions:     acetaminophen (TYLENOL) 325 mg tablet, Take 1 tablet by mouth as needed, Disp: , Rfl:     diclofenac (VOLTAREN) 75 mg EC tablet, Take 1 tablet (75 mg total) by mouth 2 (two) times a day, Disp: 60 tablet, Rfl: 0    gabapentin (NEURONTIN) 300 mg capsule, Take 300 mg by mouth 3 (three) times a day, Disp: , Rfl:     lisinopril-hydrochlorothiazide (PRINZIDE,ZESTORETIC) 20-12 5 MG per tablet, Take by mouth daily, Disp: , Rfl:     pantoprazole (PROTONIX) 40 mg tablet, Take 1 tablet by mouth 2 (two) times a day  , Disp: , Rfl:     sucralfate (CARAFATE) 1 g/10 mL suspension, Take 10 mL (1 g total) by mouth 4 (four) times a day, Disp: 420 mL, Rfl: 0    traZODone (DESYREL) 50 mg tablet, Take 1 tablet by mouth daily at bedtime, Disp: , Rfl:     Allergies   Allergen Reactions    Other        Physical Exam:    /64 (BP Location: Left arm, Patient Position: Sitting, Cuff Size: Standard)   Pulse 90   Resp 18   Ht 5' 5" (1 651 m)   Wt 111 kg (244 lb 6 4 oz)   BMI 40 67 kg/m²      Constitutional:obese  Eyes:anicteric  HEENT:grossly intact  Neck:supple, symmetric, trachea midline and no masses   Pulmonary:even and unlabored  Cardiovascular:No edema or pitting edema present  Skin:Normal without rashes or lesions and well hydrated  Psychiatric:Mood and affect appropriate  Neurologic:Cranial Nerves II-XII grossly intact  Musculoskeletal:normal, multiple trigger points palpable in the cervical paraspinal, upper trapezius, and lower trapezius musculature      Orders Placed This Encounter   Procedures    Ambulatory referral to Physical Therapy

## 2018-06-08 ENCOUNTER — TRANSCRIBE ORDERS (OUTPATIENT)
Dept: ADMINISTRATIVE | Facility: HOSPITAL | Age: 63
End: 2018-06-08

## 2018-06-08 ENCOUNTER — OFFICE VISIT (OUTPATIENT)
Dept: LAB | Facility: HOSPITAL | Age: 63
End: 2018-06-08
Payer: COMMERCIAL

## 2018-06-08 DIAGNOSIS — Z01.818 PREOP EXAMINATION: ICD-10-CM

## 2018-06-08 DIAGNOSIS — Z01.818 PREOP EXAMINATION: Primary | ICD-10-CM

## 2018-06-08 PROCEDURE — 93005 ELECTROCARDIOGRAM TRACING: CPT

## 2018-06-12 LAB
ATRIAL RATE: 73 BPM
P AXIS: 0 DEGREES
PR INTERVAL: 146 MS
QRS AXIS: -15 DEGREES
QRSD INTERVAL: 142 MS
QT INTERVAL: 418 MS
QTC INTERVAL: 460 MS
T WAVE AXIS: 10 DEGREES
VENTRICULAR RATE: 73 BPM

## 2018-06-12 PROCEDURE — 93010 ELECTROCARDIOGRAM REPORT: CPT | Performed by: INTERNAL MEDICINE

## 2018-06-13 ENCOUNTER — PROCEDURE VISIT (OUTPATIENT)
Dept: PAIN MEDICINE | Facility: CLINIC | Age: 63
End: 2018-06-13
Payer: COMMERCIAL

## 2018-06-13 VITALS
RESPIRATION RATE: 18 BRPM | BODY MASS INDEX: 40.35 KG/M2 | DIASTOLIC BLOOD PRESSURE: 80 MMHG | HEART RATE: 100 BPM | HEIGHT: 65 IN | SYSTOLIC BLOOD PRESSURE: 120 MMHG | WEIGHT: 242.2 LBS

## 2018-06-13 DIAGNOSIS — M54.2 NECK PAIN: ICD-10-CM

## 2018-06-13 DIAGNOSIS — G89.4 CHRONIC PAIN SYNDROME: Primary | ICD-10-CM

## 2018-06-13 DIAGNOSIS — M79.18 MYOFASCIAL PAIN SYNDROME: ICD-10-CM

## 2018-06-13 PROCEDURE — 20553 NJX 1/MLT TRIGGER POINTS 3/>: CPT

## 2018-06-13 RX ORDER — METHYLPREDNISOLONE ACETATE 40 MG/ML
40 INJECTION, SUSPENSION INTRA-ARTICULAR; INTRALESIONAL; INTRAMUSCULAR; SOFT TISSUE ONCE
Status: COMPLETED | OUTPATIENT
Start: 2018-06-13 | End: 2018-06-13

## 2018-06-13 RX ORDER — LIDOCAINE HYDROCHLORIDE 10 MG/ML
5 INJECTION, SOLUTION INFILTRATION; PERINEURAL ONCE
Status: COMPLETED | OUTPATIENT
Start: 2018-06-13 | End: 2018-06-13

## 2018-06-13 RX ADMIN — METHYLPREDNISOLONE ACETATE 40 MG: 40 INJECTION, SUSPENSION INTRA-ARTICULAR; INTRALESIONAL; INTRAMUSCULAR; SOFT TISSUE at 08:21

## 2018-06-13 RX ADMIN — LIDOCAINE HYDROCHLORIDE 5 ML: 10 INJECTION, SOLUTION INFILTRATION; PERINEURAL at 08:25

## 2018-06-13 NOTE — PROGRESS NOTES
Inj Trigger Point Single/Multiple     Date/Time 6/13/2018 7:21 AM     Performed by  Amarilis Swift by Sidney Albert       Consent: Verbal consent obtained  Written consent obtained  Risks and benefits: risks, benefits and alternatives were discussed  Consent given by: patient  Patient understanding: patient states understanding of the procedure being performed  Patient consent: the patient's understanding of the procedure matches consent given  Procedure consent: procedure consent matches procedure scheduled  Relevant documents: relevant documents present and verified  Test results: test results available and properly labeled  Site marked: the operative site was marked  Imaging studies: imaging studies available  Patient identity confirmed: verbally with patient  Time out: Immediately prior to procedure a "time out" was called to verify the correct patient, procedure, equipment, support staff and site/side marked as required  Preparation: Patient was prepped and draped in the usual sterile fashion  Site preparation: Betadine    Local anesthesia used: yes      Anesthesia: local infiltration     Anesthesia   Local anesthesia used: yes  Local Anesthetic: lidocaine 1% without epinephrine  Anesthetic total: 3 mL     Sedation   Patient sedated: no      Specimen: no    Culture: no   Procedure Details   Procedure Notes: ATTENDING PHYSICIAN:  Soraya Wright MD     PROCEDURE:  Trigger point injections x1 to the right cervical paraspinal, x2 to the right upper trapezius, and x1 to the right rhomboid musculature with local anesthetic and steroid  PRE-PROCEDURE DIAGNOSIS:  Myofascial pain syndrome  POST-PROCEDURE DIAGNOSIS:  Myofascial pain syndrome  ESTIMATED BLOOD LOSS:  Minimal     ANESTHESIA:  None  COMPLICATIONS:  None  CONSENT:  Today's procedure, its potential benefits as well as its risks and side effects were reviewed   Discussed risks of the procedure including bleeding, infection, reactions to the medications, failure the pain to improve, and potential worsening of the pain as well as pneumothorax were explained in detail to the patient, who verbalized understanding and wished to proceed  Written informed consent was thereby obtained  DESCRIPTION OF THE PROCEDURE:  After written informed consent was obtained, the patient was placed in the sitting position on the examination table  Anatomical landmarks were identified via palpation  The trigger points were identified and marked after palpation  The skin overlying these 4 marked trigger points was prepared using Betadine swabs x3 in the usual sterile fashion  Strict aseptic technique was utilized throughout the procedure  A 4 mL injectate consisting of 3 mL of 1% lidocaine mixed with 1 mL of Depo-Medrol 40 mg/mL was drawn up sterilely  Using a 25-gauge 1 25 inch needle, 1 mL of the above injectate was administered to each of the marked trigger points following negative aspiration  The patient tolerated the procedure well and all needles were removed with the tips intact  Hemostasis was maintained  There were no apparent complications  The skin was wiped clean and Band-Aids were placed as appropriate  The patient was monitored for an appropriate period of time following the procedure and remained hemodynamically stable and neurovascularly intact following the procedure as she was prior to the procedure  The patient was ultimately discharged to home with supervision in good condition  I was present for and participated in all key and critical portions of this procedure    Patient tolerance: Patient tolerated the procedure well with no immediate complications

## 2018-06-21 RX ORDER — TRAMADOL HYDROCHLORIDE 50 MG/1
50 TABLET ORAL EVERY 6 HOURS PRN
COMMUNITY
End: 2018-06-30 | Stop reason: HOSPADM

## 2018-06-21 NOTE — PRE-PROCEDURE INSTRUCTIONS
Pre-Surgery Instructions:   Medication Instructions    acetaminophen (TYLENOL) 325 mg tablet Instructed patient per Anesthesia Guidelines   diclofenac (VOLTAREN) 75 mg EC tablet Instructed patient per Anesthesia Guidelines   gabapentin (NEURONTIN) 300 mg capsule Instructed patient per Anesthesia Guidelines   lisinopril-hydrochlorothiazide (PRINZIDE,ZESTORETIC) 20-12 5 MG per tablet Instructed patient per Anesthesia Guidelines   pantoprazole (PROTONIX) 40 mg tablet Instructed patient per Anesthesia Guidelines   sucralfate (CARAFATE) 1 g/10 mL suspension Instructed patient per Anesthesia Guidelines   traMADol (ULTRAM) 50 mg tablet Instructed patient per Anesthesia Guidelines   traZODone (DESYREL) 50 mg tablet Instructed patient per Anesthesia Guidelines  Spoke to patient via telephone  Medications reviewed and patient was instructed she should take gabapentin and pantoprazole am of surgery with a sip of water  Patient was instructed to avoid NSAID, Aspirin, Vitamins, and supplements 7 days prior to surgery  [facility] pre-op instructions reviewed  Pre-op bathing reviewed with patient

## 2018-06-26 ENCOUNTER — ANESTHESIA EVENT (OUTPATIENT)
Dept: PERIOP | Facility: HOSPITAL | Age: 63
DRG: 460 | End: 2018-06-26
Payer: COMMERCIAL

## 2018-06-27 ENCOUNTER — APPOINTMENT (OUTPATIENT)
Dept: RADIOLOGY | Facility: HOSPITAL | Age: 63
DRG: 460 | End: 2018-06-27
Payer: COMMERCIAL

## 2018-06-27 ENCOUNTER — HOSPITAL ENCOUNTER (INPATIENT)
Facility: HOSPITAL | Age: 63
LOS: 3 days | DRG: 460 | End: 2018-06-30
Attending: ORTHOPAEDIC SURGERY | Admitting: ORTHOPAEDIC SURGERY
Payer: COMMERCIAL

## 2018-06-27 ENCOUNTER — ANESTHESIA (OUTPATIENT)
Dept: PERIOP | Facility: HOSPITAL | Age: 63
DRG: 460 | End: 2018-06-27
Payer: COMMERCIAL

## 2018-06-27 DIAGNOSIS — M48.062 SPINAL STENOSIS OF LUMBAR REGION WITH NEUROGENIC CLAUDICATION: ICD-10-CM

## 2018-06-27 LAB
ABO GROUP BLD: NORMAL
BLD GP AB SCN SERPL QL: NEGATIVE
GLUCOSE SERPL-MCNC: 129 MG/DL (ref 65–140)
RH BLD: NEGATIVE
SPECIMEN EXPIRATION DATE: NORMAL

## 2018-06-27 PROCEDURE — C1713 ANCHOR/SCREW BN/BN,TIS/BN: HCPCS | Performed by: ORTHOPAEDIC SURGERY

## 2018-06-27 PROCEDURE — 86901 BLOOD TYPING SEROLOGIC RH(D): CPT | Performed by: ORTHOPAEDIC SURGERY

## 2018-06-27 PROCEDURE — 01NB0ZZ RELEASE LUMBAR NERVE, OPEN APPROACH: ICD-10-PCS | Performed by: ORTHOPAEDIC SURGERY

## 2018-06-27 PROCEDURE — 72100 X-RAY EXAM L-S SPINE 2/3 VWS: CPT

## 2018-06-27 PROCEDURE — 82948 REAGENT STRIP/BLOOD GLUCOSE: CPT

## 2018-06-27 PROCEDURE — 86850 RBC ANTIBODY SCREEN: CPT | Performed by: ORTHOPAEDIC SURGERY

## 2018-06-27 PROCEDURE — 0SG00AJ FUSION OF LUMBAR VERTEBRAL JOINT WITH INTERBODY FUSION DEVICE, POSTERIOR APPROACH, ANTERIOR COLUMN, OPEN APPROACH: ICD-10-PCS | Performed by: ORTHOPAEDIC SURGERY

## 2018-06-27 PROCEDURE — 86900 BLOOD TYPING SEROLOGIC ABO: CPT | Performed by: ORTHOPAEDIC SURGERY

## 2018-06-27 PROCEDURE — 0SB20ZZ EXCISION OF LUMBAR VERTEBRAL DISC, OPEN APPROACH: ICD-10-PCS | Performed by: ORTHOPAEDIC SURGERY

## 2018-06-27 DEVICE — IMPLANTABLE DEVICE: Type: IMPLANTABLE DEVICE | Site: SPINE LUMBAR | Status: FUNCTIONAL

## 2018-06-27 DEVICE — IMPLANTABLE DEVICE: Type: IMPLANTABLE DEVICE | Status: FUNCTIONAL

## 2018-06-27 RX ORDER — ROCURONIUM BROMIDE 10 MG/ML
INJECTION, SOLUTION INTRAVENOUS AS NEEDED
Status: DISCONTINUED | OUTPATIENT
Start: 2018-06-27 | End: 2018-06-27 | Stop reason: SURG

## 2018-06-27 RX ORDER — PANTOPRAZOLE SODIUM 40 MG/1
40 TABLET, DELAYED RELEASE ORAL
Status: DISCONTINUED | OUTPATIENT
Start: 2018-06-27 | End: 2018-06-30 | Stop reason: HOSPADM

## 2018-06-27 RX ORDER — DEXAMETHASONE SODIUM PHOSPHATE 4 MG/ML
INJECTION, SOLUTION INTRA-ARTICULAR; INTRALESIONAL; INTRAMUSCULAR; INTRAVENOUS; SOFT TISSUE AS NEEDED
Status: DISCONTINUED | OUTPATIENT
Start: 2018-06-27 | End: 2018-06-27 | Stop reason: SURG

## 2018-06-27 RX ORDER — SUCRALFATE ORAL 1 G/10ML
1000 SUSPENSION ORAL
Status: DISCONTINUED | OUTPATIENT
Start: 2018-06-27 | End: 2018-06-30 | Stop reason: HOSPADM

## 2018-06-27 RX ORDER — MEPERIDINE HYDROCHLORIDE 50 MG/ML
12.5 INJECTION INTRAMUSCULAR; INTRAVENOUS; SUBCUTANEOUS ONCE AS NEEDED
Status: DISCONTINUED | OUTPATIENT
Start: 2018-06-27 | End: 2018-06-27 | Stop reason: HOSPADM

## 2018-06-27 RX ORDER — ONDANSETRON 2 MG/ML
4 INJECTION INTRAMUSCULAR; INTRAVENOUS EVERY 6 HOURS PRN
Status: DISCONTINUED | OUTPATIENT
Start: 2018-06-27 | End: 2018-06-30 | Stop reason: HOSPADM

## 2018-06-27 RX ORDER — MULTIVITAMIN
1 TABLET ORAL DAILY
Status: ON HOLD | COMMUNITY
End: 2018-07-09 | Stop reason: CLARIF

## 2018-06-27 RX ORDER — LIDOCAINE HYDROCHLORIDE 10 MG/ML
INJECTION, SOLUTION INFILTRATION; PERINEURAL AS NEEDED
Status: DISCONTINUED | OUTPATIENT
Start: 2018-06-27 | End: 2018-06-27 | Stop reason: SURG

## 2018-06-27 RX ORDER — TRAZODONE HYDROCHLORIDE 50 MG/1
50 TABLET ORAL
Status: DISCONTINUED | OUTPATIENT
Start: 2018-06-27 | End: 2018-06-30 | Stop reason: HOSPADM

## 2018-06-27 RX ORDER — OXYCODONE HYDROCHLORIDE AND ACETAMINOPHEN 5; 325 MG/1; MG/1
1 TABLET ORAL EVERY 4 HOURS PRN
Status: DISCONTINUED | OUTPATIENT
Start: 2018-06-27 | End: 2018-06-28

## 2018-06-27 RX ORDER — TRAMADOL HYDROCHLORIDE 50 MG/1
50 TABLET ORAL EVERY 6 HOURS PRN
Status: DISCONTINUED | OUTPATIENT
Start: 2018-06-27 | End: 2018-06-28

## 2018-06-27 RX ORDER — ORPHENADRINE CITRATE 30 MG/ML
60 INJECTION INTRAMUSCULAR; INTRAVENOUS ONCE
Status: COMPLETED | OUTPATIENT
Start: 2018-06-27 | End: 2018-06-27

## 2018-06-27 RX ORDER — BUPIVACAINE HYDROCHLORIDE AND EPINEPHRINE 5; 5 MG/ML; UG/ML
INJECTION, SOLUTION EPIDURAL; INTRACAUDAL; PERINEURAL AS NEEDED
Status: DISCONTINUED | OUTPATIENT
Start: 2018-06-27 | End: 2018-06-27 | Stop reason: HOSPADM

## 2018-06-27 RX ORDER — GLYCOPYRROLATE 0.2 MG/ML
INJECTION INTRAMUSCULAR; INTRAVENOUS AS NEEDED
Status: DISCONTINUED | OUTPATIENT
Start: 2018-06-27 | End: 2018-06-27 | Stop reason: SURG

## 2018-06-27 RX ORDER — METHOCARBAMOL 750 MG/1
750 TABLET, FILM COATED ORAL 4 TIMES DAILY PRN
Status: DISCONTINUED | OUTPATIENT
Start: 2018-06-27 | End: 2018-06-28

## 2018-06-27 RX ORDER — ONDANSETRON 2 MG/ML
INJECTION INTRAMUSCULAR; INTRAVENOUS AS NEEDED
Status: DISCONTINUED | OUTPATIENT
Start: 2018-06-27 | End: 2018-06-27 | Stop reason: SURG

## 2018-06-27 RX ORDER — SODIUM CHLORIDE 9 MG/ML
125 INJECTION, SOLUTION INTRAVENOUS CONTINUOUS
Status: DISCONTINUED | OUTPATIENT
Start: 2018-06-27 | End: 2018-06-27

## 2018-06-27 RX ORDER — ALBUTEROL SULFATE 90 UG/1
AEROSOL, METERED RESPIRATORY (INHALATION) AS NEEDED
Status: DISCONTINUED | OUTPATIENT
Start: 2018-06-27 | End: 2018-06-27 | Stop reason: SURG

## 2018-06-27 RX ORDER — SODIUM CHLORIDE 9 MG/ML
INJECTION, SOLUTION INTRAVENOUS AS NEEDED
Status: DISCONTINUED | OUTPATIENT
Start: 2018-06-27 | End: 2018-06-27 | Stop reason: HOSPADM

## 2018-06-27 RX ORDER — HYDROMORPHONE HYDROCHLORIDE 2 MG/ML
INJECTION, SOLUTION INTRAMUSCULAR; INTRAVENOUS; SUBCUTANEOUS AS NEEDED
Status: DISCONTINUED | OUTPATIENT
Start: 2018-06-27 | End: 2018-06-27 | Stop reason: SURG

## 2018-06-27 RX ORDER — LISINOPRIL 20 MG/1
20 TABLET ORAL DAILY
Status: DISCONTINUED | OUTPATIENT
Start: 2018-06-28 | End: 2018-06-30 | Stop reason: HOSPADM

## 2018-06-27 RX ORDER — SODIUM CHLORIDE, SODIUM LACTATE, POTASSIUM CHLORIDE, CALCIUM CHLORIDE 600; 310; 30; 20 MG/100ML; MG/100ML; MG/100ML; MG/100ML
INJECTION, SOLUTION INTRAVENOUS CONTINUOUS PRN
Status: DISCONTINUED | OUTPATIENT
Start: 2018-06-27 | End: 2018-06-27 | Stop reason: SURG

## 2018-06-27 RX ORDER — FENTANYL CITRATE/PF 50 MCG/ML
50 SYRINGE (ML) INJECTION
Status: COMPLETED | OUTPATIENT
Start: 2018-06-27 | End: 2018-06-27

## 2018-06-27 RX ORDER — SENNOSIDES 8.6 MG
1 TABLET ORAL DAILY
Status: DISCONTINUED | OUTPATIENT
Start: 2018-06-27 | End: 2018-06-30 | Stop reason: HOSPADM

## 2018-06-27 RX ORDER — GABAPENTIN 300 MG/1
600 CAPSULE ORAL 3 TIMES DAILY
Status: DISCONTINUED | OUTPATIENT
Start: 2018-06-27 | End: 2018-06-30 | Stop reason: HOSPADM

## 2018-06-27 RX ORDER — PROPOFOL 10 MG/ML
INJECTION, EMULSION INTRAVENOUS AS NEEDED
Status: DISCONTINUED | OUTPATIENT
Start: 2018-06-27 | End: 2018-06-27 | Stop reason: SURG

## 2018-06-27 RX ORDER — MIDAZOLAM HYDROCHLORIDE 1 MG/ML
INJECTION INTRAMUSCULAR; INTRAVENOUS AS NEEDED
Status: DISCONTINUED | OUTPATIENT
Start: 2018-06-27 | End: 2018-06-27 | Stop reason: SURG

## 2018-06-27 RX ORDER — OXYCODONE HYDROCHLORIDE AND ACETAMINOPHEN 5; 325 MG/1; MG/1
2 TABLET ORAL EVERY 4 HOURS PRN
Status: DISCONTINUED | OUTPATIENT
Start: 2018-06-27 | End: 2018-06-28

## 2018-06-27 RX ORDER — FENTANYL CITRATE 50 UG/ML
INJECTION, SOLUTION INTRAMUSCULAR; INTRAVENOUS AS NEEDED
Status: DISCONTINUED | OUTPATIENT
Start: 2018-06-27 | End: 2018-06-27 | Stop reason: SURG

## 2018-06-27 RX ORDER — SODIUM CHLORIDE 9 MG/ML
75 INJECTION, SOLUTION INTRAVENOUS CONTINUOUS
Status: DISCONTINUED | OUTPATIENT
Start: 2018-06-27 | End: 2018-06-30 | Stop reason: HOSPADM

## 2018-06-27 RX ORDER — HYDROCHLOROTHIAZIDE 12.5 MG/1
12.5 TABLET ORAL DAILY
Status: DISCONTINUED | OUTPATIENT
Start: 2018-06-28 | End: 2018-06-30 | Stop reason: HOSPADM

## 2018-06-27 RX ORDER — ONDANSETRON 2 MG/ML
4 INJECTION INTRAMUSCULAR; INTRAVENOUS ONCE AS NEEDED
Status: DISCONTINUED | OUTPATIENT
Start: 2018-06-27 | End: 2018-06-27 | Stop reason: HOSPADM

## 2018-06-27 RX ADMIN — ROCURONIUM BROMIDE 50 MG: 10 INJECTION INTRAVENOUS at 07:37

## 2018-06-27 RX ADMIN — FENTANYL CITRATE 50 MCG: 50 INJECTION, SOLUTION INTRAMUSCULAR; INTRAVENOUS at 12:56

## 2018-06-27 RX ADMIN — OXYCODONE HYDROCHLORIDE AND ACETAMINOPHEN 2 TABLET: 5; 325 TABLET ORAL at 14:51

## 2018-06-27 RX ADMIN — FENTANYL CITRATE 50 MCG: 50 INJECTION, SOLUTION INTRAMUSCULAR; INTRAVENOUS at 12:35

## 2018-06-27 RX ADMIN — FENTANYL CITRATE 100 MCG: 50 INJECTION, SOLUTION INTRAMUSCULAR; INTRAVENOUS at 07:37

## 2018-06-27 RX ADMIN — SUCRALFATE 1000 MG: 1 SUSPENSION ORAL at 21:01

## 2018-06-27 RX ADMIN — PANTOPRAZOLE SODIUM 40 MG: 40 TABLET, DELAYED RELEASE ORAL at 17:58

## 2018-06-27 RX ADMIN — FENTANYL CITRATE 50 MCG: 50 INJECTION, SOLUTION INTRAMUSCULAR; INTRAVENOUS at 12:51

## 2018-06-27 RX ADMIN — ALBUTEROL SULFATE 6 PUFF: 90 AEROSOL, METERED RESPIRATORY (INHALATION) at 08:07

## 2018-06-27 RX ADMIN — METHOCARBAMOL 750 MG: 750 TABLET ORAL at 14:50

## 2018-06-27 RX ADMIN — HYDROMORPHONE HYDROCHLORIDE 0.5 MG: 2 INJECTION, SOLUTION INTRAMUSCULAR; INTRAVENOUS; SUBCUTANEOUS at 11:35

## 2018-06-27 RX ADMIN — ONDANSETRON HYDROCHLORIDE 4 MG: 2 INJECTION, SOLUTION INTRAVENOUS at 11:17

## 2018-06-27 RX ADMIN — MIDAZOLAM 2 MG: 1 INJECTION INTRAMUSCULAR; INTRAVENOUS at 07:32

## 2018-06-27 RX ADMIN — NEOSTIGMINE METHYLSULFATE 4 MG: 1 INJECTION, SOLUTION INTRAMUSCULAR; INTRAVENOUS; SUBCUTANEOUS at 11:25

## 2018-06-27 RX ADMIN — FENTANYL CITRATE 50 MCG: 50 INJECTION, SOLUTION INTRAMUSCULAR; INTRAVENOUS at 12:21

## 2018-06-27 RX ADMIN — DEXAMETHASONE SODIUM PHOSPHATE 4 MG: 4 INJECTION, SOLUTION INTRAMUSCULAR; INTRAVENOUS at 08:26

## 2018-06-27 RX ADMIN — ORPHENADRINE CITRATE 60 MG: 30 INJECTION INTRAMUSCULAR; INTRAVENOUS at 12:11

## 2018-06-27 RX ADMIN — SODIUM CHLORIDE, SODIUM LACTATE, POTASSIUM CHLORIDE, AND CALCIUM CHLORIDE: .6; .31; .03; .02 INJECTION, SOLUTION INTRAVENOUS at 10:20

## 2018-06-27 RX ADMIN — SODIUM CHLORIDE 75 ML/HR: 0.9 INJECTION, SOLUTION INTRAVENOUS at 23:54

## 2018-06-27 RX ADMIN — PROPOFOL 200 MG: 10 INJECTION, EMULSION INTRAVENOUS at 07:37

## 2018-06-27 RX ADMIN — SODIUM CHLORIDE 125 ML/HR: 0.9 INJECTION, SOLUTION INTRAVENOUS at 06:43

## 2018-06-27 RX ADMIN — OXYCODONE HYDROCHLORIDE AND ACETAMINOPHEN 1 TABLET: 5; 325 TABLET ORAL at 21:01

## 2018-06-27 RX ADMIN — HYDROMORPHONE HYDROCHLORIDE 0.5 MG: 2 INJECTION, SOLUTION INTRAMUSCULAR; INTRAVENOUS; SUBCUTANEOUS at 09:32

## 2018-06-27 RX ADMIN — SODIUM CHLORIDE: 0.9 INJECTION, SOLUTION INTRAVENOUS at 07:32

## 2018-06-27 RX ADMIN — SODIUM CHLORIDE 75 ML/HR: 0.9 INJECTION, SOLUTION INTRAVENOUS at 13:18

## 2018-06-27 RX ADMIN — GABAPENTIN 600 MG: 300 CAPSULE ORAL at 21:01

## 2018-06-27 RX ADMIN — FENTANYL CITRATE 50 MCG: 50 INJECTION, SOLUTION INTRAMUSCULAR; INTRAVENOUS at 10:31

## 2018-06-27 RX ADMIN — HYDROMORPHONE HYDROCHLORIDE 0.5 MG: 1 INJECTION, SOLUTION INTRAMUSCULAR; INTRAVENOUS; SUBCUTANEOUS at 19:46

## 2018-06-27 RX ADMIN — HYDROMORPHONE HYDROCHLORIDE 0.5 MG: 1 INJECTION, SOLUTION INTRAMUSCULAR; INTRAVENOUS; SUBCUTANEOUS at 13:25

## 2018-06-27 RX ADMIN — HYDROMORPHONE HYDROCHLORIDE 0.5 MG: 1 INJECTION, SOLUTION INTRAMUSCULAR; INTRAVENOUS; SUBCUTANEOUS at 16:27

## 2018-06-27 RX ADMIN — GLYCOPYRROLATE 0.7 MG: 0.2 INJECTION, SOLUTION INTRAMUSCULAR; INTRAVENOUS at 11:25

## 2018-06-27 RX ADMIN — ROCURONIUM BROMIDE 10 MG: 10 INJECTION INTRAVENOUS at 09:36

## 2018-06-27 RX ADMIN — GABAPENTIN 600 MG: 300 CAPSULE ORAL at 17:11

## 2018-06-27 RX ADMIN — HYDROMORPHONE HYDROCHLORIDE 0.5 MG: 2 INJECTION, SOLUTION INTRAMUSCULAR; INTRAVENOUS; SUBCUTANEOUS at 11:32

## 2018-06-27 RX ADMIN — ROCURONIUM BROMIDE 10 MG: 10 INJECTION INTRAVENOUS at 10:18

## 2018-06-27 RX ADMIN — FENTANYL CITRATE 50 MCG: 50 INJECTION, SOLUTION INTRAMUSCULAR; INTRAVENOUS at 10:46

## 2018-06-27 RX ADMIN — OXYCODONE HYDROCHLORIDE AND ACETAMINOPHEN 1 TABLET: 5; 325 TABLET ORAL at 21:07

## 2018-06-27 RX ADMIN — CEFAZOLIN SODIUM 1000 MG: 1 SOLUTION INTRAVENOUS at 19:47

## 2018-06-27 RX ADMIN — FENTANYL CITRATE 50 MCG: 50 INJECTION, SOLUTION INTRAMUSCULAR; INTRAVENOUS at 13:05

## 2018-06-27 RX ADMIN — HYDROMORPHONE HYDROCHLORIDE 0.5 MG: 1 INJECTION, SOLUTION INTRAMUSCULAR; INTRAVENOUS; SUBCUTANEOUS at 13:20

## 2018-06-27 RX ADMIN — SENNOSIDES 8.6 MG: 8.6 TABLET, FILM COATED ORAL at 14:52

## 2018-06-27 RX ADMIN — LIDOCAINE HYDROCHLORIDE 100 MG: 10 INJECTION, SOLUTION INFILTRATION; PERINEURAL at 07:37

## 2018-06-27 RX ADMIN — HYDROMORPHONE HYDROCHLORIDE 0.5 MG: 2 INJECTION, SOLUTION INTRAMUSCULAR; INTRAVENOUS; SUBCUTANEOUS at 08:43

## 2018-06-27 RX ADMIN — FENTANYL CITRATE 50 MCG: 50 INJECTION, SOLUTION INTRAMUSCULAR; INTRAVENOUS at 13:13

## 2018-06-27 RX ADMIN — HYDROMORPHONE HYDROCHLORIDE 0.5 MG: 1 INJECTION, SOLUTION INTRAMUSCULAR; INTRAVENOUS; SUBCUTANEOUS at 13:40

## 2018-06-27 RX ADMIN — HYDROMORPHONE HYDROCHLORIDE 0.5 MG: 1 INJECTION, SOLUTION INTRAMUSCULAR; INTRAVENOUS; SUBCUTANEOUS at 13:33

## 2018-06-27 RX ADMIN — ONDANSETRON 4 MG: 2 INJECTION INTRAMUSCULAR; INTRAVENOUS at 19:46

## 2018-06-27 RX ADMIN — ROCURONIUM BROMIDE 20 MG: 10 INJECTION INTRAVENOUS at 08:41

## 2018-06-27 NOTE — ANESTHESIA PREPROCEDURE EVALUATION
Review of Systems/Medical History  Patient summary reviewed  Chart reviewed      Cardiovascular  Hypertension , DVT   Pulmonary  Asthma ,        GI/Hepatic    GERD ,  Hiatal hernia, Bariatric surgery,        Kidney stones,        Endo/Other    Obesity  morbid obesity   GYN  Negative gynecology ROS          Hematology  Negative hematology ROS      Musculoskeletal  Back pain ,   Arthritis     Neurology  Negative neurology ROS      Psychology   Negative psychology ROS              Physical Exam    Airway    Mallampati score: II  TM Distance: >3 FB  Neck ROM: full     Dental   Comment: Molar caps,     Cardiovascular  Rhythm: regular, Rate: normal, Cardiovascular exam normal    Pulmonary  Breath sounds clear to auscultation,     Other Findings        Anesthesia Plan  ASA Score- 2     Anesthesia Type- general with ASA Monitors  Additional Monitors:   Airway Plan: ETT  Comment: No ketorolac (s/p bariatric surgery)  Plan Factors-    Induction- intravenous  Postoperative Plan- Plan for postoperative opioid use  Informed Consent- Anesthetic plan and risks discussed with patient  I personally reviewed this patient with the CRNA  Discussed and agreed on the Anesthesia Plan with the CRNA  Jayant Andres

## 2018-06-27 NOTE — CASE MANAGEMENT
Initial Clinical Review    Age/Sex: 63 y o  female    Surgery Date:     6/27/2018    Procedure: Preop Diagnosis:  Spinal stenosis of lumbar region with neurogenic claudication L4-5 [M48 062]  Degenerative spondylolisthesis L4-5 []     Post-Op Diagnosis Codes:     * Spinal stenosis of lumbar region with neurogenic claudication L4-5 [M48 062]  Degenerative spondylolisthesis L4-5 []     Procedure(s) (LRB):  L4-5 DECOMPRESSION INTERBODY INSTRUMENTED FUSION (N/A), placement of DIAGON interbody spacer and posterior non-segmental pedicle screw instrumentation       Anesthesia:   General    Admission Orders: Date/Time/Statement: 6/27/18 @ 26     Orders Placed This Encounter   Procedures    Inpatient Admission     Standing Status:   Standing     Number of Occurrences:   1     Order Specific Question:   Admitting Physician     Answer:   William Mendez [[id number]]     Order Specific Question:   Level of Care     Answer:   Med Surg [16]     Order Specific Question:   Estimated length of stay     Answer:   One Midnight       Vital Signs: /74 (BP Location: Right arm)   Pulse 99   Temp 98 2 °F (36 8 °C) (Temporal)   Resp 16   Ht 5' 5" (1 651 m)   Wt 110 kg (242 lb)   SpO2 97%   BMI 40 27 kg/m²     Diet:        Diet Orders            Start     Ordered    06/27/18 1413  Diet Regular; Regular House  Diet effective now     Question Answer Comment   Diet Type Regular    Regular Regular House    RD to adjust diet per protocol?  Yes        06/27/18 1412          Mobility:   As ronnie    DVT Prophylaxis:    SCD'S    Pain Control:   Pain Medications             diclofenac (VOLTAREN) 75 mg EC tablet Take 1 tablet (75 mg total) by mouth 2 (two) times a day    gabapentin (NEURONTIN) 300 mg capsule Take 600 mg by mouth 3 (three) times a day      traMADol (ULTRAM) 50 mg tablet Take 50 mg by mouth every 6 (six) hours as needed for moderate pain    traZODone (DESYREL) 50 mg tablet Take 50 mg by mouth daily at bedtime as needed acetaminophen (TYLENOL) 325 mg tablet Take 325 mg by mouth as needed        neuro  Checks  Q 4 hrs    PT/OT  Cons  IM    Thank you,  [address] [place] [address] in the [facility] by Baljinder Hagan for 2017  Network Utilization Review Department  Phone: [phone]; Fax [phone]  ATTENTION: The Network Utilization Review Department is now centralized for our 7 Facilities  Make a note that we have a new phone and fax numbers for our Department  Please call with any questions or concerns to [phone] and carefully follow the prompts so that you are directed to the right person  All voicemails are confidential  Fax any determinations, approvals, denials, and requests for initial or continue stay review clinical to [phone]  Due to HIGH CALL volume, it would be easier if you could please send faxed requests to expedite your requests and in part, help us provide discharge notifications faster

## 2018-06-27 NOTE — ANESTHESIA POSTPROCEDURE EVALUATION
Post-Op Assessment Note      CV Status:  Stable    Mental Status:  Alert and awake    Hydration Status:  Euvolemic    PONV Controlled:  Controlled    Airway Patency:  Patent  Airway: intubated    Post Op Vitals Reviewed: Yes          Staff: Anesthesiologist           /68 (06/27/18 1445)    Temp 98 4 °F (36 9 °C) (06/27/18 1445)    Pulse 89 (06/27/18 1445)   Resp 16 (06/27/18 1445)    SpO2 97 % (06/27/18 1445)

## 2018-06-27 NOTE — CONSULTS
Consultation - Internal Medicine  Raisa Fuchs 63 y o  female MRN: [id number]  Unit/Bed#: E2 -01 Encounter: [id number]      Impression :-    L4-L5 decompression interbody instrumentation  Spinal stenosis lumbar spine with neurogenic claudication  Degenerative spondylolisthesis  Hiatal hernia/gastroesophageal reflux disease  History of bariatric surgery/ subsequent multiple surgeries  Morbid obesity with BMI of 40 27  Chronic intrinsic asthma currently stable  Hypertension  History of DVT provoked during 1 of her previous surgeries  Right bundle branch block     Recommendation: -    Continue postoperative care as recommended by orthopedic service of Dr Annia Peterson  Patient understands to use numeric pain scale for her pain level  I have reviewed patients medications as initiated on post operative orders by the primary team  Recommend  monitoring closely for any hypoxemia / respiratory insufficieny related to narcotics and to reduce doses and frequency of narcotics if necessary  Resume her trazodone 50 mg daily from tomorrow, sucralfate, Protonix, lisinopril with HCTZ and her Neurontin which she takes 3 times a day  I will be cautious regarding any postoperative hypotension  Utilized postoperative orthopedic hypotension protocol  Due to her underlying obesity she may be at high risk of having sleep apnea and she should get it checked once she is stabilized from current surgery  In the interim if she has any desaturations would recommend anesthesia colleagues to monitor her closely and may use temporary BiPAP  Maintain Intravenous Fluids for next 24 -48 hours, till patient able to reliably keep meals and meds in  Suggest  Zofran ODT 4 mg sublingually PRN for control of post operative nausea  Patient encouraged to  use Incentive spirometry q 15 minutes while awake to minimize risk of postoperative atelectasis and pneumonia  Patient verbalized to understand and fully comprehend      Recommend DVT prophylaxis with use of Venodyne compression boots  If any factor X A inhibitor,  low molecule weight heparin or Coumadin is planned by the primary team, we will be happy to dose that  drug based on patient's hemostasis  Patient does carry underlying history of asthma but currently clinically she seems to be stable  It is very important that she do incentive spirometry due to her risk of having atelectasis  If she has any wheezing or rhonchi then I will start her on bronchodilator therapy  Discontinue patient's Allison catheter within next 24-48 hours in order  to minimize risk of catheter associated UTI  Please check patient's hemoglobin and hematocrit within next 24 hours to ensure that there is no acute blood loss anemia which would need any blood transfusion  We will follow this pleasant patient with your service closely and recommend necessary changes based on  further hospital course and diagnostics  Thank you, Dr Annia Peterson , for your kind consultation  HISTORY OF PRESENT ILLNESS:    Reason for Consult:  Post operative management following elective lumbar surgery by Dr Annia Peterson    HPI: Raisa Fuchs is a 63 y o  female who has undergone elective back surgery earlier today  Patient had chronic back pain for more than 20 years  Her lower back pain has been radiating into her both legs more so for last 3-4 months  Her pain was coming with simple activities like twisting bending, riding horses walking and urine flexing and extending her niece  Pain was radiating into her bilateral lower extremity left side was more than right  She had numbness in her bilateral heels that sometimes and also on her small toe on the right side  She add to [facility] by because she was unable to [facility] right it  Patient underwent various conservative treatments which included physical therapy, chiropractic evaluation and treatment    She has been followed by pain management and epidural injections  She was advised by Dr Jeannie Cerda proceed with fusion of her lumbar spine  She was evaluated by Dr Maty Scott and underwent various imaging studies and was determined that she may benefit with surgical procedure  Patient has been well established with [facility] She has been followed by pain management on a regular basis, he was recently seen on 06/06/2018 and 06/13/2018  Prior to that she had right sacroiliac joint injection on May 25, 2018  Patient was unable to continue with the current symptoms that it was affecting her activities of daily living  Outpatient evaluation confirmed her severity of her condition and she wanted to proceed with surgery sooner than later  Patient's x-ray done at [facility] demonstrated grade 1 listhesis of L4 on L5 approximately 2-3 mm on flexion and reduced somewhat on extension  Degenerative spondylitic changes were noted in the distal thoracic spine  At present patient was lying in bed her daughter who is a  technician was in the room  She provided additional information  Patient indicates that she had previous bariatric surgery in [place] she had multiple complications and required 4-5 more surgeries thereafter  She had 1 episode of DVT during 1 of the surgeries but never had a unprovoked DVT or P E  She has never been diagnosed of hypercoagulable disorder  Patient is not keen on any anti coagulants at this time  Review of Systems   Constitutional:   No recent  appetite change, denies chills, diaphoresis, fatigue or fever  HEENT:  Negative for congestion, sore throat and tinnitus  No visual complaints  Respiratory:  Negative cough, chest tightness, shortness of breath and wheezing  She has underlying history of asthma but does not take any inhalers  Cardiovascular:  Negative for chest pain and palpitations  Gastrointestinal: Negative for abdominal distention or pain, constipation, diarrhea and nausea  Previous history of bypass surgery  Thereafter she had complications and required revisions  Endocrine: Negative for cold intolerance, heat intolerance, polydipsia and polyuria  Genitourinary:                Negative for  dysuria, flank pain  Tolerating Allison without difficulty  Skin:   Negative for color change, pallor and rash  Neurological:   Negative for dizziness, tremors, seizures, syncope, facial asymmetry, speech difficulty, weakness, light-headedness, numbness and headaches  Hematological:  Musculoskeletal:  Psychiatric:  No h/o spontaneous bruising/bleeding  Joint pains as above  Negative for agitation, behavioral problems, confusion, decreased concentration, dysphoric mood and sleep disturbance  A complete system-based review of systems as discussed with patient is otherwise negative      PAST MEDICAL HISTORY:  Past Medical History:   Diagnosis Date    Arthritis     Asthma     Back pain     DVT (deep venous thrombosis) ([facility] [address] [facility]     Gastric bypass status for obesity     GERD (gastroesophageal reflux disease)     Hiatal hernia     History of transfusion 2006    after gastric bypass surgery, no reactions    Hypertension     Irritable bowel syndrome     Kidney stone     Muscle weakness     bilateral legs    Numbness and tingling     bilateral feet    Pneumonia     Spinal stenosis     Tinnitus     occassionally    Wears glasses      Past Surgical History:   Procedure Laterality Date    APPENDECTOMY      BARIATRIC SURGERY      CARPAL TUNNEL RELEASE Right      SECTION      x1    CHOLECYSTECTOMY      COLONOSCOPY      COLONOSCOPY W/ BIOPSIES AND POLYPECTOMY      FLEXIBLE BRONCHOSCOPY W/ UPPER ENDOSCOPY      HERNIA REPAIR      inguinal hernia    AL ARTHROCENTESIS ASPIR&/INJ SMALL JT/BURSA W/O US N/A 2018    Procedure: Coccygeal Injection & Ganglion Impar Block;  Surgeon: Charisse Rolle MD;  Location: [place] [address] MAIN OR;  Service: Pain Management     AL ESOPHAGOGASTRODUODENOSCOPY TRANSORAL DIAGNOSTIC N/A 2/19/2018    Procedure: ESOPHAGOGASTRODUODENOSCOPY (EGD); Surgeon: Elena Mullins MD;  Location: [facility] GI LAB; Service: Gastroenterology   Chun Kolton JOINT Right 5/25/2018    Procedure: Rt Sacroiliac Joint Injection;  Surgeon: Reginaldo Sanchez MD;  Location: [facility] MAIN OR;  Service: Pain Management     TONSILECTOMY, ADENOIDECTOMY, BILATERAL MYRINGOTOMY AND TUBES         FAMILY HISTORY:   Diabetes Mother      Aortic aneurysm Father      Cancer Father         prostate    Heart disease Sister         open heart    Cancer Sister         breast    Diabetes Brother      No Known Problems Daughter      Asperger's syndrome Son      Diabetes Maternal Grandfather      No Known Problems Brother          SOCIAL HISTORY:  History   Alcohol Use    4 2 oz/week    5 Cans of beer, 2 Shots of liquor per week     History   Drug Use No     History   Smoking Status    Never Smoker   Smokeless Tobacco    Never Used       ALLERGIES:  No Known Allergies    MEDICATIONS:  All current active medications have been reviewed    Current Facility-Administered Medications   Medication Dose Route Frequency Provider Last Rate Last Dose    ceFAZolin (ANCEF) IVPB (premix) 1,000 mg  1,000 mg Intravenous Q8H Anna Sammi, PA-C        gabapentin (NEURONTIN) capsule 600 mg  600 mg Oral TID Anna Sammi, PA-C   600 mg at 06/27/18 1711    HYDROmorphone (DILAUDID) injection 0 5 mg  0 5 mg Intravenous Q3H PRN Anna Sammi, PA-C   0 5 mg at 06/27/18 1627    methocarbamol (ROBAXIN) tablet 750 mg  750 mg Oral 4x Daily PRN Anna Sammi, PA-C   750 mg at 06/27/18 1450    ondansetron (ZOFRAN) injection 4 mg  4 mg Intravenous Q6H PRN Anna Sammi, PA-C        oxyCODONE-acetaminophen (PERCOCET) 5-325 mg per tablet 1 tablet  1 tablet Oral Q4H PRN Anna Sammi, PA-C        oxyCODONE-acetaminophen (PERCOCET) 5-325 mg per tablet 2 tablet  2 tablet Oral Q4H PRN Anna Sammi, PA-C   2 tablet at 18 1451    senna (SENOKOT) tablet 8 6 mg  1 tablet Oral Daily Cecilai Livingston PA-C   8 6 mg at 18 1452    sodium chloride 0 9 % infusion  75 mL/hr Intravenous Continuous Cecilia Livingston PA-C 75 mL/hr at 18 1318 75 mL/hr at 18 1318    traMADol (ULTRAM) tablet 50 mg  50 mg Oral Q6H PRN Cecilia Livingston PA-C           Prescriptions Prior to Admission   Medication    Calcium Carb-Cholecalciferol (CALCIUM 500+D PO)    Coenzyme Q10 (CO Q 10 PO)    diclofenac (VOLTAREN) 75 mg EC tablet    gabapentin (NEURONTIN) 300 mg capsule    lisinopril-hydrochlorothiazide (PRINZIDE,ZESTORETIC) 20-12 5 MG per tablet    Multiple Vitamin (MULTIVITAMIN) tablet    mupirocin (BACTROBAN) 2 % ointment    pantoprazole (PROTONIX) 40 mg tablet    sucralfate (CARAFATE) 1 g/10 mL suspension    traMADol (ULTRAM) 50 mg tablet    traZODone (DESYREL) 50 mg tablet    acetaminophen (TYLENOL) 325 mg tablet           PHYSICAL EXAM:    Vitals:  HR:  [] 99  Resp:  [14-21] 16  BP: (121-146)/(58-74) 129/74  SpO2:  [92 %-97 %] 97 %  Temp (24hrs), Av 4 °F (36 9 °C), Min:97 5 °F (36 4 °C), Max:99 6 °F (37 6 °C)  Current: Temperature: 98 2 °F (36 8 °C)  Body mass index is 40 27 kg/m²  General Appearance:    Awake, alert, cooperative, no distress, appears of stated age  Head:    Normocephalic, without obvious abnormality, atraumatic   Eyes:    Pupils equal in size,conjunctiva/corneas clear, EOM's intact  Nose:   No evidence of epistaxis/ discharge from nares  Throat:   Lips, mucosa, and tongue normal    Neck:   Supple, symmetrical, trachea midline, no JVP  Back:     Bulky dressing noted padded, no discharge no drainage  Lungs:     Bilateral air entry is broncho-alveolar and equal        No crepitation or rales  No pleural rub  Heart:    Regular rate and rhythm, S1S2 normal, no murmur, rub  Abdomen:     Multiple surgical scar marks anteriorly well-healed      Soft, non-tender, no masses, no organomegaly Genitalia:   Indwelling Allison catheter  Clear urine +, No hematuria  Musculoskeletal:   Extremities appear normal, atraumatic, no cyanosis or edema  Vascular:   2+ in Posterior tibialis / dorsalis pedis  Skin:   Skin color, texture, turgor normal, no rashes or lesions   Neurologic:   Oriented/ Awake/ facial symmetry maintained  Speech is intact  Patient is tired to participate in detail evaluation but she was able to move her both hands and both feet and indicated good sensation  She stated that she did have some tingling prior to the surgery but that does not appear to be bothering at this time  LABS, IMAGING, & OTHER STUDIES:  Lab Results:  I have personally reviewed pertinent labs  Lab Results   Component Value Date    BUN 13 02/19/2018    CREATININE 0 78 02/19/2018     Lab Results   Component Value Date    HGB 13 8 02/19/2018     02/19/2018         Imaging Studies:   I have personally reviewed pertinent imaging study reports  Imaging:     Xr Spine Lumbar 2 Or 3 Views Injury    Result Date: 6/27/2018  Narrative: C-ARM - INDICATION: [id number]: Spinal stenosis, lumbar region with neurogenic claudication  Procedure guidance  COMPARISON:  None TECHNIQUE: FLUOROSCOPY TIME:   2 minutes 40 seconds 4 FLUOROSCOPIC IMAGES FINDINGS: Metallic instrument was noted to be overlying L4-5  Findings were communicated to the operating room  Fluoroscopic guidance provided for surgical procedure  Osseous and soft tissue detail limited by technique  Impression: Fluoroscopic guidance provided for surgical procedure  Please refer to the separate procedure notes for additional details   Workstation performed: [id number]       MRI LUMBAR SPINE WITHOUT CONTRAST     INDICATION:  77-year-old female, chronic low back pain, bilateral leg weakness  COMPARISON:  9/14/2013 [facility]  MRI     TECHNIQUE:  Sagittal T1, sagittal T2, sagittal inversion recovery, axial T1 and axial T2, coronal T2        IMAGE QUALITY: Diagnostic     FINDINGS:     ALIGNMENT:    Mild smooth levoscoliosis, apex approximately L2-3, unchanged  Minimal chronic superior endplate compression deformities T11 and L1 which were acute on the prior study   No acute compression fracture  Grade 1 degenerative retrolisthesis L2-3, unchanged  Grade 1 degenerative anterolisthesis L4-5, stable     MARROW SIGNAL:  Persistent multilevel degenerative marrow     DISTAL CORD AND CONUS:  Normal size and signal within the distal cord and conus  The conus ends at the L1 level      PARASPINAL SOFT TISSUES:  Paraspinal soft tissues are unremarkable      SACRUM:  Normal signal within the sacrum   No evidence of insufficiency or stress fracture      LOWER THORACIC DISC SPACES:       Mild degenerative disc and facet disease, diminished small noncompressive central disc protrusion T12-L1     LUMBAR DISC SPACES:          L1-L2:  Mild degenerative disc and facet disease, no stenosis     L2-L3:  Mild degenerative disc disease, moderate degenerative facet hypertrophy, grade 1 retrolisthesis, mild bilateral foraminal stenosis, no overt neural element impingement, stable     L3-L4:  Mild degenerative disc disease, moderate degenerative facet hypertrophy,  mild bilateral foraminal stenosis, no overt neural element impingement      L4-L5:  Moderate degenerative disc disease, severe degenerative facet hypertrophy, grade 1 anterolisthesis, mild central canal and bilateral foraminal stenosis, no overt neural element impingement, stable      L5-S1: Mild degenerative disc disease, moderate degenerative facet hypertrophy, no stenosis     IMPRESSION:  Mild chronic superior endplate compression deformities T11 and L1 which were acute on the prior exam     Persistent multilevel degenerative spondylosis, mild levoscoliosis     Persisting grade 1 retrolisthesis, mild bilateral foraminal stenosis L2-3     Persistent mild bilateral foraminal stenosis L3-4     Persisting grade 1 anterolisthesis, mild central canal and bilateral foraminal stenosis L4-5         EKG, Pathology, and Other Studies:   I have personally reviewed pertinent reports  Ref Range & Units 6/8/18 0641   Ventricular Rate BPM 73    Atrial Rate BPM 73    ND Interval ms 146    QRSD Interval ms 142    QT Interval ms 418    QTC Interval ms 460    P Axis degrees 0    QRS Axis degrees -15    T Wave Axis degrees 10    Narrative     Normal sinus rhythm  Right bundle branch block  No previous tracing available for comparison              Portions of the record may have been created with voice recognition software  Occasional wrong word or "sound a like" substitutions may have occurred due to the inherent limitations of voice recognition software  Read the chart carefully and recognize, using context, where substitutions have occurred

## 2018-06-27 NOTE — PROGRESS NOTES
Patient is resting in bed  Complains of back pain, medication given  Patient was eating and swallow a big piece of chicken and had 2 episodes of emesis  Zofran given per order  Allison is draining clear urine  No other signs of distress noted  Call bell is within reach, will continue to monitor

## 2018-06-28 PROCEDURE — G8988 SELF CARE GOAL STATUS: HCPCS

## 2018-06-28 PROCEDURE — G8978 MOBILITY CURRENT STATUS: HCPCS

## 2018-06-28 PROCEDURE — G8979 MOBILITY GOAL STATUS: HCPCS

## 2018-06-28 PROCEDURE — 97166 OT EVAL MOD COMPLEX 45 MIN: CPT

## 2018-06-28 PROCEDURE — 97163 PT EVAL HIGH COMPLEX 45 MIN: CPT

## 2018-06-28 PROCEDURE — G8987 SELF CARE CURRENT STATUS: HCPCS

## 2018-06-28 RX ORDER — METHOCARBAMOL 750 MG/1
1500 TABLET, FILM COATED ORAL EVERY 6 HOURS SCHEDULED
Status: DISCONTINUED | OUTPATIENT
Start: 2018-06-28 | End: 2018-06-30 | Stop reason: HOSPADM

## 2018-06-28 RX ORDER — OXYCODONE HYDROCHLORIDE 10 MG/1
10 TABLET ORAL EVERY 4 HOURS PRN
Status: DISCONTINUED | OUTPATIENT
Start: 2018-06-28 | End: 2018-06-30 | Stop reason: HOSPADM

## 2018-06-28 RX ORDER — ACETAMINOPHEN 325 MG/1
975 TABLET ORAL EVERY 6 HOURS SCHEDULED
Status: DISCONTINUED | OUTPATIENT
Start: 2018-06-28 | End: 2018-06-30 | Stop reason: HOSPADM

## 2018-06-28 RX ORDER — HYDROMORPHONE HCL 110MG/55ML
2 PATIENT CONTROLLED ANALGESIA SYRINGE INTRAVENOUS ONCE
Status: COMPLETED | OUTPATIENT
Start: 2018-06-28 | End: 2018-06-28

## 2018-06-28 RX ORDER — METHOCARBAMOL 750 MG/1
1500 TABLET, FILM COATED ORAL ONCE
Status: COMPLETED | OUTPATIENT
Start: 2018-06-28 | End: 2018-06-28

## 2018-06-28 RX ORDER — OXYCODONE HCL 10 MG/1
10 TABLET, FILM COATED, EXTENDED RELEASE ORAL EVERY 12 HOURS SCHEDULED
Status: DISCONTINUED | OUTPATIENT
Start: 2018-06-28 | End: 2018-06-30 | Stop reason: HOSPADM

## 2018-06-28 RX ADMIN — HYDROMORPHONE HYDROCHLORIDE 0.5 MG: 1 INJECTION, SOLUTION INTRAMUSCULAR; INTRAVENOUS; SUBCUTANEOUS at 06:01

## 2018-06-28 RX ADMIN — PANTOPRAZOLE SODIUM 40 MG: 40 TABLET, DELAYED RELEASE ORAL at 06:01

## 2018-06-28 RX ADMIN — OXYCODONE HYDROCHLORIDE 10 MG: 10 TABLET ORAL at 23:25

## 2018-06-28 RX ADMIN — HYDROMORPHONE HYDROCHLORIDE 1 MG: 1 INJECTION, SOLUTION INTRAMUSCULAR; INTRAVENOUS; SUBCUTANEOUS at 18:29

## 2018-06-28 RX ADMIN — CEFAZOLIN SODIUM 1000 MG: 1 SOLUTION INTRAVENOUS at 02:48

## 2018-06-28 RX ADMIN — OXYCODONE HYDROCHLORIDE 10 MG: 10 TABLET, FILM COATED, EXTENDED RELEASE ORAL at 20:16

## 2018-06-28 RX ADMIN — SUCRALFATE 1000 MG: 1 SUSPENSION ORAL at 16:13

## 2018-06-28 RX ADMIN — OXYCODONE HYDROCHLORIDE 10 MG: 10 TABLET, FILM COATED, EXTENDED RELEASE ORAL at 11:38

## 2018-06-28 RX ADMIN — OXYCODONE HYDROCHLORIDE AND ACETAMINOPHEN 2 TABLET: 5; 325 TABLET ORAL at 01:09

## 2018-06-28 RX ADMIN — SENNOSIDES 8.6 MG: 8.6 TABLET, FILM COATED ORAL at 08:14

## 2018-06-28 RX ADMIN — TRAZODONE HYDROCHLORIDE 50 MG: 50 TABLET ORAL at 23:25

## 2018-06-28 RX ADMIN — OXYCODONE HYDROCHLORIDE AND ACETAMINOPHEN 2 TABLET: 5; 325 TABLET ORAL at 08:14

## 2018-06-28 RX ADMIN — ACETAMINOPHEN 975 MG: 325 TABLET, FILM COATED ORAL at 18:29

## 2018-06-28 RX ADMIN — GABAPENTIN 600 MG: 300 CAPSULE ORAL at 08:14

## 2018-06-28 RX ADMIN — OXYCODONE HYDROCHLORIDE 10 MG: 10 TABLET ORAL at 16:13

## 2018-06-28 RX ADMIN — METHOCARBAMOL 1500 MG: 750 TABLET ORAL at 20:15

## 2018-06-28 RX ADMIN — PANTOPRAZOLE SODIUM 40 MG: 40 TABLET, DELAYED RELEASE ORAL at 16:13

## 2018-06-28 RX ADMIN — SUCRALFATE 1000 MG: 1 SUSPENSION ORAL at 23:25

## 2018-06-28 RX ADMIN — METHOCARBAMOL 1500 MG: 750 TABLET ORAL at 14:09

## 2018-06-28 RX ADMIN — METHOCARBAMOL 1500 MG: 750 TABLET ORAL at 23:26

## 2018-06-28 RX ADMIN — HYDROMORPHONE HYDROCHLORIDE 2 MG: 2 INJECTION INTRAMUSCULAR; INTRAVENOUS; SUBCUTANEOUS at 11:37

## 2018-06-28 RX ADMIN — METHOCARBAMOL 1500 MG: 750 TABLET ORAL at 08:13

## 2018-06-28 RX ADMIN — GABAPENTIN 600 MG: 300 CAPSULE ORAL at 20:16

## 2018-06-28 RX ADMIN — SUCRALFATE 1000 MG: 1 SUSPENSION ORAL at 06:01

## 2018-06-28 RX ADMIN — HYDROMORPHONE HYDROCHLORIDE 0.5 MG: 1 INJECTION, SOLUTION INTRAMUSCULAR; INTRAVENOUS; SUBCUTANEOUS at 02:53

## 2018-06-28 RX ADMIN — METHOCARBAMOL 750 MG: 750 TABLET ORAL at 01:09

## 2018-06-28 RX ADMIN — GABAPENTIN 600 MG: 300 CAPSULE ORAL at 16:13

## 2018-06-28 RX ADMIN — HYDROMORPHONE HYDROCHLORIDE 0.5 MG: 1 INJECTION, SOLUTION INTRAMUSCULAR; INTRAVENOUS; SUBCUTANEOUS at 08:14

## 2018-06-28 RX ADMIN — SUCRALFATE 1000 MG: 1 SUSPENSION ORAL at 11:38

## 2018-06-28 RX ADMIN — ACETAMINOPHEN 650 MG: 325 TABLET, FILM COATED ORAL at 23:26

## 2018-06-28 RX ADMIN — ACETAMINOPHEN 975 MG: 325 TABLET, FILM COATED ORAL at 11:37

## 2018-06-28 NOTE — PLAN OF CARE
Problem: PHYSICAL THERAPY ADULT  Goal: Performs mobility at highest level of function for planned discharge setting  See evaluation for individualized goals  Treatment/Interventions: Functional transfer training, LE strengthening/ROM, Elevations, Therapeutic exercise, Endurance training, Patient/family training, Equipment eval/education, Bed mobility, Gait training, Compensatory technique education, OT, Spoke to nursing (5-6x/wk)  Equipment Recommended:  [place])       See flowsheet documentation for full assessment, interventions and recommendations  Outcome: Progressing  Prognosis: Good  Problem List: Decreased strength, Decreased range of motion, Decreased endurance, Impaired balance, Decreased mobility, Decreased coordination, Obesity, Decreased skin integrity, Orthopedic restrictions, Pain (Surgical incision)  Assessment: Pt is a 63 y o female admitted to [address] on 6/27/18 for elective L4/5 decompression with fusion surgery 2" to primary diagnosis of lumbar stenosis with neurogenic claudication  PT consulted with orders reviewed as activity as tolerated, spinal protocol and no orders for spinal brace  She has had chronic greater than 20 years of low back pain and has tried outpatient PT and chiropractic care in the past  PTA patient was (I) with ADLs and IADLs, was +, and working full time as a   She lives in a multilevel home but has a main floor setup which she has been using for the past four years due to difficulty negotiating stairs  She lives with her son and daughter who she reports are gone all day as they both work and she will not be able to rely on them for assistance  The main floor of her home has 3 level seperated by one step and she has one small step to enter home   She had been using cane in and outside of home for mobility but notes that it did not seem to improve her gait as she continued to have falls noting 3 in the past 6 months 2" to poor foot clearance of R LE which she attributes to radiating pain experienced down L LE  Patient presents with pain, decreased functional mobility, ability to perform functional transfers, gait dysfunction, decreased balance, and coordination and would benefit from skilled therapy services to address these deficits to optimize her outcome and assist with attaining plof  Patient seated at end of evaluation in standard chair BP assessed at 121/58, call bell and phone within reach and no further questions for therapy at this time  Anticipate patient could need rehab following hospital stay secondary to her functional deficits present at evaluation and being home alone for majority of day  Discussed with patient who is agreeable to rehab stay but would also consider home with PT services dependent on her progression  Barriers to Discharge: Decreased caregiver support  Barriers to Discharge Comments: Home alone most of the day  Recommendation: Short-term skilled PT, Home PT (Depending on progression)     PT - OK to Discharge: No    See flowsheet documentation for full assessment

## 2018-06-28 NOTE — PHYSICAL THERAPY NOTE
PHYSICAL THERAPY EVALUATION    Time in: 8873  Time out: 1115  Total time: 30 minutes    PT EVALUATION    63 y o     [phone]    Spinal stenosis of lumbar region with neurogenic claudication [M48 [id number]]    Past Medical History:   Diagnosis Date    Arthritis     Asthma     Back pain     DVT (deep venous thrombosis) ([facility] [address] )     Gastric bypass status for obesity     GERD (gastroesophageal reflux disease)     Hiatal hernia     History of transfusion 2006    after gastric bypass surgery, no reactions    Hypertension     Irritable bowel syndrome     Kidney stone     Muscle weakness     bilateral legs    Numbness and tingling     bilateral feet    Pneumonia     Spinal stenosis     Tinnitus     occassionally    Wears glasses          Past Surgical History:   Procedure Laterality Date    APPENDECTOMY      BARIATRIC SURGERY      CARPAL TUNNEL RELEASE Right      SECTION      x1    CHOLECYSTECTOMY      COLONOSCOPY      COLONOSCOPY W/ BIOPSIES AND POLYPECTOMY      FLEXIBLE BRONCHOSCOPY W/ UPPER ENDOSCOPY      HERNIA REPAIR      inguinal hernia    MI ARTHROCENTESIS ASPIR&/INJ SMALL JT/BURSA W/O US N/A 2018    Procedure: Coccygeal Injection & Ganglion Impar Block;  Surgeon: Lauren Anderson MD;  Location: [facility] MAIN OR;  Service: Pain Management     MI ARTHRODESIS POSTERIOR INTERBODY LUMBAR N/A 2018    Procedure: L4-5 DECOMPRESSION INTERBODY INSTRUMENTED FUSION;  Surgeon: Hoda Pham MD;  Location: [facility] Main OR;  Service: Orthopedics    MI ESOPHAGOGASTRODUODENOSCOPY TRANSORAL DIAGNOSTIC N/A 2018    Procedure: ESOPHAGOGASTRODUODENOSCOPY (EGD); Surgeon: Regla Patterson MD;  Location: [facility] GI LAB;   Service: Gastroenterology   Esta Hams JOINT Right 2018    Procedure: Rt Sacroiliac Joint Injection;  Surgeon: Lauren Anderson MD;  Location: [facility] MAIN OR;  Service: Pain Management  TONSILECTOMY, ADENOIDECTOMY, BILATERAL MYRINGOTOMY AND TUBES        06/28/18 1115   Note Type   Note type Eval only   Pain Assessment   Pain Assessment 0-10   Pain Score 6   Pain Type Surgical pain;Acute pain   Pain Location Back   Pain Orientation Lower   Pain Radiating Towards Down R LE  (intermittently with weight bearing)   Home Living   Type of [address] [place] [address] Two level;1/2 bath on main level; Able to live on main level with bedroom/bathroom  (1 small step to enter home)   Bathroom Shower/Tub Walk-in shower   [facility]   Additional Comments Was using a cane for ambulating in and out of home but notes it did not seem to help  1 small step to enter home then 1 step to access different levels  Prior Function   Level of [place] Independent with ADLs and functional mobility   [facility]  (Reports that son and daughter are gone "all day")   ADL Assistance Independent   IADLs Independent   Falls in the last 6 months 1 to 4  (3 falls secondary to poor R foot clearance)   Vocational Full time employment   Comments Works as    Restrictions/Precautions   Other Precautions Spinal precautions; Fall Risk;Multiple lines   General   Additional Pertinent History Pt is a 63 y o female admitted to [address] on 6/27/18 for elective L4/5 decompression with fusion surgery 2" to primary diagnosis of lumbar stenosis with neurogenic claudication  PTA patient was (I) with ADLs and IADLs, was +, and working full time as a   She lives in a multilevel home but has a main floor setup which she has been using for the past four years due to difficulty negotiating stairs  The main floor of her home has 3 level seperated by one step and she has one small step to enter home   She had been using cane in and outside of home for mobility but notes that it did not seem to improve her gait as she continued to have falls noting 3 in the past 6 months 2" to poor foot clearance of R LE which she attributes to radiating pain experienced down L LE  Family/Caregiver Present No   Cognition   Overall Cognitive Status WFL   Arousal/Participation Alert   Orientation Level Oriented X4   Memory Within functional limits   Following Commands Follows multistep commands without difficulty   Comments Demonstrates understanding of spinal precautions   RUE Assessment   RUE Assessment WFL   LUE Assessment   LUE Assessment WFL   RLE Assessment   RLE Assessment X   Strength RLE   R Hip Flexion 3-/5   R Knee Extension 3-/5   R Ankle Dorsiflexion 4-/5   R Ankle Plantar Flexion 4-/5   LLE Assessment   LLE Assessment X   Strength LLE   L Hip Flexion 3-/5   L Knee Extension 3-/5   L Ankle Dorsiflexion 4-/5   L Ankle Plantar Flexion 4-/5   Coordination   Movements are Fluid and Coordinated 0   Coordination and Movement Description Poor gross motor coordination of b/l LE   Sensation WFL   Light Touch   RLE Light Touch Grossly intact   LLE Light Touch Grossly intact   Bed Mobility   Rolling L 2  Maximal assistance   Additional items Assist x 2; Increased time required; Bedrails;Verbal cues  (Instructed in log roll technique)   Supine to Sit 2  Maximal assistance   Additional items Assist x 2; Increased time required;LE management;Verbal cues   Additional Comments Good understanding of log roll technique purpose  Mild increase in pain with mobility  Transfers   Sit to Stand 3  Moderate assistance   Additional items Assist x 2; Increased time required;Verbal cues   Stand to Sit 3  Moderate assistance   Additional items Assist x 2; Increased time required;Verbal cues;Armrests   Additional Comments Verbal cueing for proper hand placement and sequencing  Ambulation/Elevation   Gait pattern Improper Weight shift;Decreased foot clearance; Step to; Inconsistent ana; Excessively slow  (Heavy UE support on RW)   Gait Assistance 4  Minimal assist   Additional items Assist x 1;Verbal cues; Tactile cues   Assistive Device Rolling walker   Distance Patient ambulated 1 x 20ft with RW at min A x 1 with complaints of radiating pain sensation down R LE  Stair Management Assistance Not tested   Balance   Static Sitting Fair   Dynamic Sitting Fair -   Static Standing Fair -   Dynamic Standing Poor +   Ambulatory Fair -   Endurance Deficit   Endurance Deficit Yes   Endurance Deficit Description Pain and fatigue   Activity Tolerance   Activity Tolerance Patient limited by fatigue;Patient limited by pain; Patient tolerated treatment well  (Mild complaints of dizziness when seated saw, BP: 115/63)   Nurse Amisha Gilliland RN ok to see patient  Assessment   Prognosis Good   Problem List Decreased strength;Decreased range of motion;Decreased endurance; Impaired balance;Decreased mobility; Decreased coordination;Obesity; Decreased skin integrity;Orthopedic restrictions;Pain  (Surgical incision)   Assessment Pt is a 63 y o female admitted to [facility] on 6/27/18 for elective L4/5 decompression with fusion surgery 2" to primary diagnosis of lumbar stenosis with neurogenic claudication  PT consulted with orders reviewed as activity as tolerated, spinal protocol and no orders noted for spinal brace  She has had chronic greater than 20 years of low back pain and has tried outpatient PT and chiropractic care in the past  PTA patient was (I) with ADLs and IADLs, was +, and working full time as a   She lives in a multilevel home but has a main floor setup which she has been using for the past four years due to difficulty negotiating stairs  She lives with her son and daughter who she reports are gone all day as they both work and she will not be able to rely on them for assistance  The main floor of her home has 3 level seperated by one step and she has one small step to enter home   She had been using cane in and outside of home for mobility but notes that it did not seem to improve her gait as she continued to have falls noting 3 in the past 6 months 2" to poor foot clearance of R LE which she attributes to radiating pain experienced down L LE  Patient presents with pain, decreased functional mobility, ability to perform functional transfers, gait dysfunction, decreased balance, and coordination and would benefit from skilled therapy services to address these deficits to optimize her outcome and assist with attaining plof  Patient seated at end of evaluation in standard chair BP assessed at 121/58, call bell and phone within reach and no further questions for therapy at this time  Anticipate patient could need rehab following hospital stay secondary to her functional deficits present at evaluation and being home alone for majority of day  Discussed with patient who is agreeable to rehab stay but would also consider home with PT services dependent on her progression  Barriers to Discharge Decreased caregiver support   Barriers to Discharge Comments Home alone most of the day   Goals   Patient Goals to get stronger   STG Expiration Date 07/08/18   Short Term Goal #1 10 days: Patient will demonstrate bed mobility at (mod I) level while maintaining spinal precautions so she is able to perform in her home safely without assistance  Patient will perform functional transfers at (mod I) level while maintaining spinal precautions so she is able to transfer in her home safely  Patient will ambulate 150ft with LRD at mod I level in order to be able to ambulate in her home safely  Patient will negotate curb step with LRD at (mod I) level so she is able to perform at home safely and without assistance  Treatment Day 0   Plan   Treatment/Interventions Functional transfer training;LE strengthening/ROM; Elevations; Therapeutic exercise; Endurance training;Patient/family training;Equipment eval/education; Bed mobility;Gait training; Compensatory technique education;OT;Spoke to nursing  (5-6x/wk)   PT Frequency (5-6x/wk)   Recommendation   Recommendation Short-term skilled PT; Home PT  (Depending on progression)   Equipment Recommended (Rolling Walker)   PT - OK to Discharge No   Additional Comments No to home until medically cleared and IPPT goals have been met  Yes to short term rehab     Modified Gratz Scale   Modified Gratz Scale 4   Barthel Index   Feeding 10   Bathing 0   Grooming Score 5   Dressing Score 5   Bladder Score 0   Bowels Score 10   Toilet Use Score 5   Transfers (Bed/Chair) Score 5   Mobility (Level Surface) Score 0   Stairs Score 0   Barthel Index Score 40     History: co - morbidities, fall risk, use of assistive device, assist for adl's, multiple lines  Exam: impairments in locomotion, musculoskeletal, balance,posture, joint integrity, skin integrity-surgical incision,   Clinical: unstable/unpredictable (fall risk/history, need for more restrictive AD, spinal precautions, current need for ngo catheter, Barthel 40)  Complexity:high      Callie Gilford, SPT          Patient Name: Obed Madden  NÉSTOR'S Date: 6/28/2018

## 2018-06-28 NOTE — PROGRESS NOTES
Progress Note - Internal Medicine   Mikayla Moran 63 y o  female MRN: [id number]  Unit/Bed#: E2 -01 Encounter: [id number]      Impression :    L4-L5 decompression interbody instrumentation  POD #1  Spinal stenosis lumbar spine with neurogenic claudication  Degenerative spondylolisthesis  Hiatal hernia/gastroesophageal reflux disease  History of bariatric surgery/ subsequent multiple surgeries  Morbid obesity with BMI of 40 27  Previous h/o ARPIT  ( " gave away her CPAP to someone who needed it ")  Chronic intrinsic asthma currently stable  Hypertension  History of DVT provoked during 1 of her previous surgeries  Right bundle branch block       Recommendation:    Continue postoperative care as recommended by orthopedic service  Be cautious regarding narcotic medications due to her risk for disposition with possibility of underlying untreated sleep apnea  Utilize nonsteroidal anti-inflammatory medication if she can tolerate  Aggressive incentive spirometry early mobilization as much she can tolerate  Bilateral Venodyne compression boots and full anticoagulation for DVT prophylaxis recommended based on her hemostasis on her surgical site  This will be deferred to the orthopedic service to weigh risk and benefit  Recommend checking her CBC and CMP tomorrow morning  Continue mechanical compression boots  Currently maintained on lisinopril 20 mg daily, reduce the dose if her blood pressure is less than 067 systolic  Continue gentle hydration  Patient is maintained on a Protonix sucralfate and Neurontin 600 mg t i d  which she has been taking previously  Discussed with patient's RN Karyn Ng  Subjective:     Patient seen and examined today  EMR and overnight events reviewed  Patient was sleeping easily aroused  Noted to have mild snoring during that episode  No apneic spells were noted   She denies any new complaints but stated that she was having severe pain and she got only 3 hr of sleep the whole night  Her pain was severe/eventually became moderate after pain medications  She denies any nausea vomiting  Denies any chest pain or palpitations  Appears to be somewhat tired  Denies any paresthesias or tingling  She indicates of having previous sleep apnea when she had bariatric surgery done  She was given a CPAP at that time but states that she had decided to give it a way to someone who needed more than her  Patient denies any leg swelling no chest pain no dysuria frequency  All other systems reviewed and are negative  OBJECTIVE:     Vitals:     HR:  [] 95  Resp:  [18-20] 18  BP: ()/(43-74) 106/66  SpO2:  [94 %-98 %] 96 %  Temp (24hrs), Av 4 °F (37 4 °C), Min:98 2 °F (36 8 °C), Max:101 5 °F (38 6 °C)  Current: Temperature: 98 7 °F (37 1 °C)    Intake/Output Summary (Last 24 hours) at 18 1730  Last data filed at 18 1446   Gross per 24 hour   Intake           1277 5 ml   Output             3251 ml   Net          -1973 5 ml     Body mass index is 40 27 kg/m²  Physical Exam    Patient was sleeping but easily aroused  Pallor JVP cyanosis negative  No peripheral edema  Bilateral Venodyne compression boots were noted  Lungs clear on deep inspiration her lung fields expands better  S1-S2 distant no murmurs or gallop  Abdomen distended from central obesity/multiple old scars from previous surgeries  No tenderness, bowel sounds present  Lower back clean dressing without any discharge or drainage  No evidence of any dehiscence  Lower extremities not swollen Homans sign is negative  Good hand grasp and plantar flexion  Gait could not be tested as she was sleepy  No skin rashes        Labs, Imaging, & Other studies:    All pertinent labs and imaging studies were personally reviewed    Current Meds:    Home Meds:  Prescriptions Prior to Admission   Medication    Calcium Carb-Cholecalciferol (CALCIUM 500+D PO)    Coenzyme Q10 (CO Q 10 PO)    diclofenac (VOLTAREN) 75 mg EC tablet    gabapentin (NEURONTIN) 300 mg capsule    lisinopril-hydrochlorothiazide (PRINZIDE,ZESTORETIC) 20-12 5 MG per tablet    Multiple Vitamin (MULTIVITAMIN) tablet    mupirocin (BACTROBAN) 2 % ointment    pantoprazole (PROTONIX) 40 mg tablet    sucralfate (CARAFATE) 1 g/10 mL suspension    traMADol (ULTRAM) 50 mg tablet    traZODone (DESYREL) 50 mg tablet    acetaminophen (TYLENOL) 325 mg tablet         VTE Pharmacologic Prophylaxis:  as per Primary Ortho Team   VTE Mechanical Prophylaxis: sequential compression device    Portions of the record may have been created with voice recognition software  Occasional wrong word or "sound a like" substitutions may have occurred due to the inherent limitations of voice recognition software  Read the chart carefully and recognize, using context, where substitutions have occurred

## 2018-06-28 NOTE — PLAN OF CARE
Problem: OCCUPATIONAL THERAPY ADULT  Goal: Performs self-care activities at highest level of function for planned discharge setting  See evaluation for individualized goals  Treatment Interventions: ADL retraining, Functional transfer training, Endurance training, Patient/family training, Equipment evaluation/education, Compensatory technique education          See flowsheet documentation for full assessment, interventions and recommendations  Limitation: Decreased ADL status, Decreased endurance, Decreased high-level ADLs  Prognosis: Good  Assessment: Pt is a 57y/o female admitted to the hospital for an elected s/p L4-5 TLIF(6/27) 2* hx spinal stenosis  PTA pt states independence with all aspects of her ADLs, transfers, ambulation--with SPC; +falls=3, +home alone, +  During initial eval, pt demonstrated deficits with her functional balance, functional mobility, ADL status, activity tolerance(currently fair=15-20mins), and transfers safety  Pt provide with  packet  Pt would benefit from continued OT tx for the above deficits; 3-5xwk/1-2wks        OT Discharge Recommendation: Short Term Rehab

## 2018-06-28 NOTE — PLAN OF CARE

## 2018-06-28 NOTE — PROGRESS NOTES
POST-OP SPINE PROGRESS NOTE    Patient Name: Jackson Stewart  Patient MRN:  [id number]  Date of Service:  06/28/18 8:16 AM  Service: Ortho Spine      Subjective      In a lot of pain  She is unable to get in a comfortable position  No complaints of leg pain, numbness or tingling  Pain is all incisional   She had 1 dose of 0 5 mg Dilaudid IV at 0 600  Has not had her Robaxin  Objective      Physical Exam  Temp:  [98 2 °F (36 8 °C)-101 5 °F (38 6 °C)] 101 5 °F (38 6 °C)  HR:  [] 104  Resp:  [14-21] 18  BP: (104-146)/(53-74) 104/53    Incision: Dressing clean, dry and intact    Sensation intact    Motor function intact 5/5 without deficits BLE    ION Output NA      Laboratory Studies  No results found for this or any previous visit (from the past 24 hour(s))      Assessment / Plan:     POD #1 s/p TLIF L4-5  Mobilize with PT/OT  Will change pain meds  DVT Prophylaxis SCDs  Discharge planning      Marbella Freeman MD

## 2018-06-28 NOTE — SOCIAL WORK
CM met with patient at bedside to address discharge needs  Patient reports living in a two story house with son and daughter; patient has main floor living with bedroom/bathroom located on main level  Patient is independent with ADLs and functional mobility  Patient reports using SPC PTA  Patient is employed as a   Patient drives; children are available at discharge to transport  At this time the recommendation is Home PT vs STR pending progress, patient made CM aware that she is interested in going to [facility] if rehab is needed; referral submitted  Patient is aware of [address]  No POA identified, patient declined information at this time  PCP identified as Dr Anil [place]  Help/support reported  Patient and  made aware of CM's name, number and role  No other needs at present  Cm to follow as needed

## 2018-06-28 NOTE — OCCUPATIONAL THERAPY NOTE
OccupationalTherapy Evaluation(time=4889-2548)     Patient Name: Jailene Apo  IYVSD'N Date: 2018  Problem List  Patient Active Problem List   Diagnosis    GERD without esophagitis    Right sided abdominal pain    Diarrhea    Chronic pain syndrome    Chronic bilateral low back pain without sciatica    Lumbar spondylosis    Spondylolisthesis of lumbar region    Coccydynia    Coccygodynia    Lumbar stenosis with neurogenic claudication    Sacroiliitis (HCC)    Sacroiliitis, not elsewhere classified ([email or web address] )    Low back pain    Myofascial pain syndrome    Thoracic spine pain    Neck pain     Past Medical History  Past Medical History:   Diagnosis Date    Arthritis     Asthma     Back pain     DVT (deep venous thrombosis) ([email or web address] )     Gastric bypass status for obesity     GERD (gastroesophageal reflux disease)     Hiatal hernia     History of transfusion 2006    after gastric bypass surgery, no reactions    Hypertension     Irritable bowel syndrome     Kidney stone     Muscle weakness     bilateral legs    Numbness and tingling     bilateral feet    Pneumonia     Spinal stenosis     Tinnitus     occassionally    Wears glasses      Past Surgical History  Past Surgical History:   Procedure Laterality Date    APPENDECTOMY      BARIATRIC SURGERY      CARPAL TUNNEL RELEASE Right      SECTION      x1    CHOLECYSTECTOMY      COLONOSCOPY      COLONOSCOPY W/ BIOPSIES AND POLYPECTOMY      FLEXIBLE BRONCHOSCOPY W/ UPPER ENDOSCOPY      HERNIA REPAIR      inguinal hernia    SC ARTHROCENTESIS ASPIR&/INJ SMALL JT/BURSA W/O US N/A 2018    Procedure: Coccygeal Injection & Ganglion Impar Block;  Surgeon: Simon Sutton MD;  Location: [facility] MAIN OR;  Service: Pain Management     SC ESOPHAGOGASTRODUODENOSCOPY TRANSORAL DIAGNOSTIC N/A 2018    Procedure: ESOPHAGOGASTRODUODENOSCOPY (EGD); Surgeon: Yahir Orozco MD;  Location: [facility] GI LAB;   Service: Gastroenterology    [facility] JOINT Right 5/25/2018    Procedure: Rt Sacroiliac Joint Injection;  Surgeon: Soraya Wright MD;  Location: [facility] MAIN OR;  Service: Pain Management     TONSILECTOMY, ADENOIDECTOMY, BILATERAL MYRINGOTOMY AND TUBES             06/28/18 1120   Note Type   Note type Eval only   Restrictions/Precautions   Other Precautions Spinal precautions;Pain; Fall Risk   Pain Assessment   Pain Assessment 0-10   Pain Score 6   Pain Type Acute pain;Surgical pain   Pain Location Back   Pain Orientation Lower   Home Living   Type of [address] [facility] Two level; Able to live on main level with bedroom/bathroom  (2 steps in the house)   [address] [facility]   Prior Function   Lives With Daughter; Son   ADL Assistance Independent   Falls in the last 6 months 1 to 4   Lifestyle   Autonomy PTA pt states independence with all aspects of her ADLs, transfers, ambulation--with SPC; +falls=3, +home alone, +   Reciprocal Relationships 2 children   Service to Others works as a    [employer] horses, motorcycles    Psychosocial   Psychosocial (WDL) WDL   Subjective   Subjective "I had a lot of pain earlier this morning "   ADL   Where Assessed Edge of bed   Eating Assistance 6  Modified independent   Grooming Assistance 5  Supervision/Setup   UB Bathing Assistance 4  Minimal Assistance   LB Bathing Assistance 3  Moderate Assistance    [address] 4  Minimal Assistance   LB Dressing Assistance 2  Maximal Assistance   Bed Mobility   Rolling L 2  Maximal assistance   Supine to Sit 2  Maximal assistance   Additional items Assist x 2; Increased time required;LE management;Verbal cues   Transfers   Sit to Stand 3  Moderate assistance   Additional items Assist x 2; Increased time required;Verbal cues   Stand to Sit 3  Moderate assistance   Additional items Assist x 2; Increased time required;Verbal cues   Functional Mobility   Functional Mobility 4  Minimal assistance   Additional Comments x2   Additional items Rolling walker   Balance   Static Sitting Good   Dynamic Sitting Fair +   Static Standing Fair -   Dynamic Standing Poor +   Activity Tolerance   Activity Tolerance Patient limited by fatigue;Patient limited by pain   Medical Staff Made Aware nsg, P T     RUE Assessment   RUE Assessment WFL   RUE Strength   RUE Overall Strength (3+/5 throughout--limited 2* back precautions)   LUE Assessment   LUE Assessment WFL   LUE Strength   LUE Overall Strength (3+/5 throughout--limited 2* back precautions)   Hand Function   Gross Motor Coordination Functional   Fine Motor Coordination Functional   Sensation   Light Touch No apparent deficits   Proprioception   Proprioception No apparent deficits   Vision-Basic Assessment   Current Vision Wears glasses all the time   Vision - Complex Assessment   Acuity Able to read clock/calendar on wall without difficulty   Perception   Inattention/Neglect Appears intact   Cognition   Overall Cognitive Status WFL   Arousal/Participation Alert   Attention Within functional limits   Orientation Level Oriented X4   Memory Within functional limits   Following Commands Follows all commands and directions without difficulty   Assessment   Limitation Decreased ADL status; Decreased endurance;Decreased high-level ADLs   Prognosis Good   Assessment Pt is a 59y/o female admitted to the hospital for an elected s/p L4-5 TLIF(6/27) 2* hx spinal stenosis  PTA pt states independence with all aspects of her ADLs, transfers, ambulation--with SPC; +falls=3, +home alone, +  During initial eval, pt demonstrated deficits with her functional balance, functional mobility, ADL status, activity tolerance(currently fair=15-20mins), and transfers safety  Pt provide with  packet  Pt would benefit from continued OT tx for the above deficits; 3-5xwk/1-2wks      Goals   Patient Goals "to be able to do things for myself "   STG Time Frame 3-5   Short Term Goal #1 Pt will independently demonstrate knowledge and application of proper body mechanics/back safety 100% of the time  Short Term Goal #2 Pt will demonstrate mod I with their bed mobility to facilitate EOB ADLs  Short Term Goal  Pt will demonstrate mod I with their sit-stand transfers to assist with completion of their LE dressing  LTG Time Frame (5-10days)   Long Term Goal #1 Pt will demonstrate proper walker/transfer safety 100% of the time  Long Term Goal #2 Pt will demonstrate g/g- balance with all functional activities  Long Term Goal Pt will demonstrate mod I with their UE and LE bathing/dresssing  Plan   Treatment Interventions ADL retraining;Functional transfer training; Endurance training;Patient/family training;Equipment evaluation/education; Compensatory technique education   Goal Expiration Date 07/09/18   Treatment Day 0   OT Frequency 3-5x/wk   Recommendation   OT Discharge Recommendation Short Term Rehab   Barthel Index   Feeding 10   Bathing 0   Grooming Score 0   Dressing Score 5   Bladder Score 0   Bowels Score 10   Toilet Use Score 5   Transfers (Bed/Chair) Score 5   Mobility (Level Surface) Score 0   Stairs Score 0   Barthel Index Score 35   Modified [place] Scale   Modified [place] Scale 4   Jena Mendez, OT

## 2018-06-29 ENCOUNTER — APPOINTMENT (INPATIENT)
Dept: RADIOLOGY | Facility: HOSPITAL | Age: 63
DRG: 460 | End: 2018-06-29
Payer: COMMERCIAL

## 2018-06-29 LAB
ALBUMIN SERPL BCP-MCNC: 2.4 G/DL (ref 3.5–5)
ALP SERPL-CCNC: 87 U/L (ref 46–116)
ALT SERPL W P-5'-P-CCNC: 36 U/L (ref 12–78)
ANION GAP SERPL CALCULATED.3IONS-SCNC: 5 MMOL/L (ref 4–13)
AST SERPL W P-5'-P-CCNC: 26 U/L (ref 5–45)
BACTERIA UR QL AUTO: ABNORMAL /HPF
BASOPHILS # BLD AUTO: 0.03 THOUSANDS/ΜL (ref 0–0.1)
BASOPHILS NFR BLD AUTO: 0 % (ref 0–1)
BILIRUB SERPL-MCNC: 0.48 MG/DL (ref 0.2–1)
BILIRUB UR QL STRIP: NEGATIVE
BUN SERPL-MCNC: 15 MG/DL (ref 5–25)
CALCIUM SERPL-MCNC: 8.1 MG/DL (ref 8.3–10.1)
CHLORIDE SERPL-SCNC: 103 MMOL/L (ref 100–108)
CLARITY UR: ABNORMAL
CO2 SERPL-SCNC: 26 MMOL/L (ref 21–32)
COLOR UR: YELLOW
CREAT SERPL-MCNC: 1.16 MG/DL (ref 0.6–1.3)
EOSINOPHIL # BLD AUTO: 0.04 THOUSAND/ΜL (ref 0–0.61)
EOSINOPHIL NFR BLD AUTO: 0 % (ref 0–6)
ERYTHROCYTE [DISTWIDTH] IN BLOOD BY AUTOMATED COUNT: 12.8 % (ref 11.6–15.1)
GFR SERPL CREATININE-BSD FRML MDRD: 51 ML/MIN/1.73SQ M
GLUCOSE SERPL-MCNC: 157 MG/DL (ref 65–140)
GLUCOSE UR STRIP-MCNC: ABNORMAL MG/DL
HCT VFR BLD AUTO: 33.2 % (ref 34.8–46.1)
HGB BLD-MCNC: 10.3 G/DL (ref 11.5–15.4)
HGB UR QL STRIP.AUTO: NEGATIVE
KETONES UR STRIP-MCNC: ABNORMAL MG/DL
LEUKOCYTE ESTERASE UR QL STRIP: NEGATIVE
LYMPHOCYTES # BLD AUTO: 1.2 THOUSANDS/ΜL (ref 0.6–4.47)
LYMPHOCYTES NFR BLD AUTO: 8 % (ref 14–44)
MCH RBC QN AUTO: 32.4 PG (ref 26.8–34.3)
MCHC RBC AUTO-ENTMCNC: 31 G/DL (ref 31.4–37.4)
MCV RBC AUTO: 104 FL (ref 82–98)
MONOCYTES # BLD AUTO: 1.46 THOUSAND/ΜL (ref 0.17–1.22)
MONOCYTES NFR BLD AUTO: 10 % (ref 4–12)
NEUTROPHILS # BLD AUTO: 11.88 THOUSANDS/ΜL (ref 1.85–7.62)
NEUTS SEG NFR BLD AUTO: 81 % (ref 43–75)
NITRITE UR QL STRIP: NEGATIVE
NON-SQ EPI CELLS URNS QL MICRO: ABNORMAL /HPF
NRBC BLD AUTO-RTO: 0 /100 WBCS
PH UR STRIP.AUTO: 5.5 [PH] (ref 4.5–8)
PLATELET # BLD AUTO: 179 THOUSANDS/UL (ref 149–390)
PMV BLD AUTO: 9.2 FL (ref 8.9–12.7)
POTASSIUM SERPL-SCNC: 3.8 MMOL/L (ref 3.5–5.3)
PROT SERPL-MCNC: 5.9 G/DL (ref 6.4–8.2)
PROT UR STRIP-MCNC: ABNORMAL MG/DL
RBC # BLD AUTO: 3.18 MILLION/UL (ref 3.81–5.12)
RBC #/AREA URNS AUTO: ABNORMAL /HPF
SODIUM SERPL-SCNC: 134 MMOL/L (ref 136–145)
SP GR UR STRIP.AUTO: 1.02 (ref 1–1.03)
UROBILINOGEN UR QL STRIP.AUTO: 0.2 E.U./DL
WBC # BLD AUTO: 14.61 THOUSAND/UL (ref 4.31–10.16)
WBC #/AREA URNS AUTO: ABNORMAL /HPF

## 2018-06-29 PROCEDURE — 80053 COMPREHEN METABOLIC PANEL: CPT | Performed by: INTERNAL MEDICINE

## 2018-06-29 PROCEDURE — 85025 COMPLETE CBC W/AUTO DIFF WBC: CPT | Performed by: INTERNAL MEDICINE

## 2018-06-29 PROCEDURE — 97530 THERAPEUTIC ACTIVITIES: CPT

## 2018-06-29 PROCEDURE — 97116 GAIT TRAINING THERAPY: CPT

## 2018-06-29 PROCEDURE — 97535 SELF CARE MNGMENT TRAINING: CPT

## 2018-06-29 PROCEDURE — 81001 URINALYSIS AUTO W/SCOPE: CPT | Performed by: INTERNAL MEDICINE

## 2018-06-29 PROCEDURE — 71045 X-RAY EXAM CHEST 1 VIEW: CPT

## 2018-06-29 RX ORDER — OXYCODONE HYDROCHLORIDE 10 MG/1
10 TABLET ORAL EVERY 4 HOURS PRN
Qty: 60 TABLET | Refills: 0 | Status: ON HOLD | OUTPATIENT
Start: 2018-06-29 | End: 2018-07-09 | Stop reason: CLARIF

## 2018-06-29 RX ORDER — OXYCODONE HCL 10 MG/1
10 TABLET, FILM COATED, EXTENDED RELEASE ORAL EVERY 12 HOURS SCHEDULED
Qty: 14 TABLET | Refills: 0 | Status: SHIPPED | OUTPATIENT
Start: 2018-06-29 | End: 2018-07-09 | Stop reason: HOSPADM

## 2018-06-29 RX ORDER — METHOCARBAMOL 750 MG/1
750 TABLET, FILM COATED ORAL EVERY 6 HOURS SCHEDULED
Qty: 45 TABLET | Refills: 0 | Status: ON HOLD | OUTPATIENT
Start: 2018-06-29 | End: 2018-07-09 | Stop reason: CLARIF

## 2018-06-29 RX ADMIN — OXYCODONE HYDROCHLORIDE 10 MG: 10 TABLET ORAL at 16:59

## 2018-06-29 RX ADMIN — SENNOSIDES 8.6 MG: 8.6 TABLET, FILM COATED ORAL at 08:13

## 2018-06-29 RX ADMIN — HYDROMORPHONE HYDROCHLORIDE 1 MG: 1 INJECTION, SOLUTION INTRAMUSCULAR; INTRAVENOUS; SUBCUTANEOUS at 09:38

## 2018-06-29 RX ADMIN — GABAPENTIN 600 MG: 300 CAPSULE ORAL at 16:59

## 2018-06-29 RX ADMIN — METHOCARBAMOL 1500 MG: 750 TABLET ORAL at 23:15

## 2018-06-29 RX ADMIN — GABAPENTIN 600 MG: 300 CAPSULE ORAL at 08:13

## 2018-06-29 RX ADMIN — OXYCODONE HYDROCHLORIDE 10 MG: 10 TABLET ORAL at 21:15

## 2018-06-29 RX ADMIN — PANTOPRAZOLE SODIUM 40 MG: 40 TABLET, DELAYED RELEASE ORAL at 16:59

## 2018-06-29 RX ADMIN — GABAPENTIN 600 MG: 300 CAPSULE ORAL at 21:09

## 2018-06-29 RX ADMIN — HYDROMORPHONE HYDROCHLORIDE 1 MG: 1 INJECTION, SOLUTION INTRAMUSCULAR; INTRAVENOUS; SUBCUTANEOUS at 04:24

## 2018-06-29 RX ADMIN — SUCRALFATE 1000 MG: 1 SUSPENSION ORAL at 16:59

## 2018-06-29 RX ADMIN — SUCRALFATE 1000 MG: 1 SUSPENSION ORAL at 21:09

## 2018-06-29 RX ADMIN — OXYCODONE HYDROCHLORIDE 10 MG: 10 TABLET, FILM COATED, EXTENDED RELEASE ORAL at 08:14

## 2018-06-29 RX ADMIN — SUCRALFATE 1000 MG: 1 SUSPENSION ORAL at 06:08

## 2018-06-29 RX ADMIN — ACETAMINOPHEN 975 MG: 325 TABLET, FILM COATED ORAL at 23:14

## 2018-06-29 RX ADMIN — METHOCARBAMOL 1500 MG: 750 TABLET ORAL at 18:55

## 2018-06-29 RX ADMIN — METHOCARBAMOL 1500 MG: 750 TABLET ORAL at 12:35

## 2018-06-29 RX ADMIN — OXYCODONE HYDROCHLORIDE 10 MG: 10 TABLET, FILM COATED, EXTENDED RELEASE ORAL at 21:09

## 2018-06-29 RX ADMIN — ACETAMINOPHEN 650 MG: 325 TABLET, FILM COATED ORAL at 06:09

## 2018-06-29 RX ADMIN — SUCRALFATE 1000 MG: 1 SUSPENSION ORAL at 12:35

## 2018-06-29 RX ADMIN — OXYCODONE HYDROCHLORIDE 10 MG: 10 TABLET ORAL at 04:05

## 2018-06-29 RX ADMIN — ACETAMINOPHEN 975 MG: 325 TABLET, FILM COATED ORAL at 12:35

## 2018-06-29 RX ADMIN — PANTOPRAZOLE SODIUM 40 MG: 40 TABLET, DELAYED RELEASE ORAL at 06:08

## 2018-06-29 RX ADMIN — ACETAMINOPHEN 975 MG: 325 TABLET, FILM COATED ORAL at 18:55

## 2018-06-29 RX ADMIN — METHOCARBAMOL 1500 MG: 750 TABLET ORAL at 06:08

## 2018-06-29 RX ADMIN — OXYCODONE HYDROCHLORIDE 10 MG: 10 TABLET ORAL at 12:45

## 2018-06-29 NOTE — PLAN OF CARE
DISCHARGE PLANNING     Discharge to home or other facility with appropriate resources Progressing        Knowledge Deficit     Patient/family/caregiver demonstrates understanding of disease process, treatment plan, medications, and discharge instructions Progressing        PAIN - ADULT     Verbalizes/displays adequate comfort level or baseline comfort level Progressing        Potential for Falls     Patient will remain free of falls Progressing        SAFETY ADULT     Maintain or return to baseline ADL function Progressing     Maintain or return mobility status to optimal level Progressing          INFECTION - ADULT     Absence or prevention of progression during hospitalization Not Progressing        Patient white count 14 and HR in low 100s  Dr Moises Linares notified and orders received

## 2018-06-29 NOTE — PROGRESS NOTES
PHYSICAL MEDICINE AND REHABILITATION   PREADMISSION ASSESSMENT     Projected [facility] Diagnoses:  Impairment of mobility, safety and Activities of Daily Living (ADLs) due to Orthopedic Disorders:  08 9  Other Orthopedic     Etiology: Lumbar Spinal Stenosis with Neurogenic Claudication   Date of Onset:6/27/18   Date of surgery: 6/27/18: L4-5 DECOMPRESSION INTERBODY INSTRUMENTED FUSION (N/A),         placement of DIAGON interbody spacer and posterior           non-segmental pedicle screw instrumentation  PATIENT INFORMATION  Name: Wil León Phone #: [phone] (home)   Address: [facility] [address]  YOB: 1955 Age: 63 y o  SS#   Marital Status: /Civil Union  Ethnicity:   Employment Status: currently employed  Extended Emergency Contact Information  Primary Emergency Contact: [address] [facility] Josef Richard Phone: [phone]  Relation: Daughter  Advance Directive: Not on file     INSURANCE/COVERAGE:     Primary Payor: BLUE CROSS / Plan: [employer] PLAN 280 / Product Type: Blue Fee for Service /   Secondary Payer: Private Pay    Payer Contact: Ling Payer Contact:   Contact Phone: [phone]  Contact Fax: [phone] Contact Phone:   Authorization #: [id number]  Coverage Dates:6/30-7/6  LCD: 7/6 with update due that day  Medicare Days:  Medical Record #: [id number]    REFERRAL SOURCE:   Referring provider: Anthony Hawthorne MD  Referring facility: [address] [place] [address]  Room: [facility] /E2 -*  PCP: Maycol May MD PCP phone number: [phone]    MEDICAL INFORMATION  HPI: Patient is a 63year old female, with PMH of ARPIT, GERD, HTN, RBBB and lumbar stenosis with neurogenic claudication present x-ray completed as an out patient  Patient failed conservative measures and was with pain that affected her quality of life   She presented to [facility] on 18 for scheduled L4-L5 decompression interbody instrumentation  Surgery was completed without complication  Post operatively patient was with increased pain that was managed with PO pain medications as well as IV dilaudid with improvement  Patient was also febrile intermittently with elevated leukocytes  Chest x-ray was completed on  that was negative  UA was also negative  Elevated WBCs and temperatures felt to be reactive  She was with hyponatremia and increased PO fluids was recommended  She was evaluated by PT and OT who are recommending inpatient rehab as well as attending physician  Past Medical History:   Past Surgical History:    Allergies:     Past Medical History:   Diagnosis Date    Arthritis     Asthma     Back pain     DVT [facility]deep venous thrombosis) ([facility]     Gastric bypass status for obesity     GERD (gastroesophageal reflux disease)     Hiatal hernia     History of transfusion     after gastric bypass surgery, no reactions    Hypertension     Irritable bowel syndrome     Kidney stone     Muscle weakness     bilateral legs    Numbness and tingling     bilateral feet    Pneumonia     Spinal stenosis     Tinnitus     occassionally    Wears glasses     Past Surgical History:   Procedure Laterality Date    APPENDECTOMY      BARIATRIC SURGERY      CARPAL TUNNEL RELEASE Right      SECTION      x1    CHOLECYSTECTOMY      COLONOSCOPY      COLONOSCOPY W/ BIOPSIES AND POLYPECTOMY      FLEXIBLE BRONCHOSCOPY W/ UPPER ENDOSCOPY      HERNIA REPAIR      inguinal hernia    LA ARTHROCENTESIS ASPIR&/INJ SMALL JT/BURSA W/O US N/A 2018    Procedure: Coccygeal Injection & Ganglion Impar Block;  Surgeon: Anastasia Nayak MD;  Location[facility] MAIN OR;  Service: Pain Management     LA ARTHRODESIS POSTERIOR INTERBODY LUMBAR N/A 2018    Procedure: L4-5 DECOMPRESSION INTERBODY INSTRUMENTED FUSION;  Surgeon: Cinthia Dave MD;  Location: [facility] Main OR; Service: Orthopedics    ID ESOPHAGOGASTRODUODENOSCOPY TRANSORAL DIAGNOSTIC N/A 2/19/2018    Procedure: ESOPHAGOGASTRODUODENOSCOPY (EGD); Surgeon: Rocío Davidson MD;  Location: [facility] GI LAB; Service: Gastroenterology   Derral Shirts JOINT Right 5/25/2018    Procedure: Rt Sacroiliac Joint Injection;  Surgeon: Soraya Wright MD;  Location[facility] MA[facility];  Service: Pain Management     TONSILECTOMY, ADENOIDECTOMY, BILATERAL MYRINGOTOMY AND TUBES       No Known Allergies      Comorbidities: GERD, history of ARPIT, Morbid Obesity, HTN, Asthma, History of DVT, Right [place] Block, Leukocytosis, and Hyponatremia  CURRENT VITAL SIGNS:   Temp:  [97 2 °F (36 2 °C)-98 4 °F (36 9 °C)] 98 4 °F (36 9 °C)  HR:  [] 82  Resp:  [18] 18  BP: (100-122)/(57-78) 108/64   Intake/Output Summary (Last 24 hours) at 06/30/18 0848  Last data filed at 06/29/18 1845   Gross per 24 hour   Intake             1200 ml   Output             1150 ml   Net               50 ml        LABORATORY RESULTS:      Lab Results   Component Value Date    HGB 10 9 (L) 06/30/2018    HCT 34 3 (L) 06/30/2018    WBC 13 31 (H) 06/30/2018     Lab Results   Component Value Date    BUN 13 06/30/2018    BUN 13 02/19/2018     06/30/2018    K 3 4 (L) 06/30/2018     06/30/2018    GLUCOSE 118 06/30/2018    CREATININE 1 01 06/30/2018     No results found for: PROTIME, INR     DIAGNOSTIC STUDIES:  Xr Sacroiliac Joints < 3 Views    Result Date: 5/26/2018  Impression: Fluoroscopic guidance provided for a pain management procedure  Please refer to the separate procedure notes for additional details   Workstation performed: [id number]       PRECAUTIONS/SPECIAL NEEDS:  Edema Management, Safety Concerns, Pain Management, Spinal Precautions, and Fall Precautions    MEDICATIONS:     Current Facility-Administered Medications:     acetaminophen (TYLENOL) tablet 975 mg, 975 mg, Oral, Q6H [address], Elida Rajput MD, 975 mg at 06/30/18 0525   gabapentin (NEURONTIN) capsule 600 mg, 600 mg, Oral, TID, Eliel Sorensen PA-C, 600 mg at 06/29/18 2109    lisinopril (ZESTRIL) tablet 20 mg, 20 mg, Oral, Daily **AND** hydrochlorothiazide (HYDRODIURIL) tablet 12 5 mg, 12 5 mg, Oral, Daily, Jazmín Aquino MD    HYDROmorphone (DILAUDID) injection 1 mg, 1 mg, Intravenous, Q1H PRN, Rich Vega MD, 1 mg at 06/29/18 0318    methocarbamol (ROBAXIN) tablet 1,500 mg, 1,500 mg, Oral, Q6H [facility], Rich Vega MD, 1,500 mg at 06/30/18 0513    ondansetron (ZOFRAN) injection 4 mg, 4 mg, Intravenous, Q6H PRN, Eliel Sorensen PA-C, 4 mg at 06/27/18 1946    oxyCODONE (OxyCONTIN) 12 hr tablet 10 mg, 10 mg, Oral, Q12H [facility], Rich Vega MD, 10 mg at 06/29/18 2109    oxyCODONE (ROXICODONE) immediate release tablet 10 mg, 10 mg, Oral, Q4H PRN, Rich Vega MD, 10 mg at 06/30/18 0600    pantoprazole (PROTONIX) EC tablet 40 mg, 40 mg, Oral, BID AC, Jazmín Aquino MD, 40 mg at 06/30/18 0600    senna (SENOKOT) tablet 8 6 mg, 1 tablet, Oral, Daily, Eliel Sorensen PA-C, 8 6 mg at 06/29/18 0813    sodium chloride 0 9 % infusion, 75 mL/hr, Intravenous, Continuous, Eliel Sorensen PA-C, Last Rate: 75 mL/hr at 06/28/18 0318, 75 mL/hr at 06/28/18 0318    sucralfate (CARAFATE) oral suspension 1,000 mg, 1,000 mg, Oral, 4x Daily (AC & HS), Jazmín Aquino MD, 1,000 mg at 06/30/18 0600    traZODone (DESYREL) tablet 50 mg, 50 mg, Oral, HS PRN, Jazmín Aquino MD, 50 mg at 06/28/18 5199    SKIN INTEGRITY:   Back incision with sutures, steri strips and special tape C/D/I     PRIOR LEVEL OF FUNCTION:  She lives in a(n) single family home  Patria Isbell is  and lives with their family  Self Care: Independent, Indoor Mobility: Independent, Stairs (in/outdoor): Independent and Cognition: Independent    HOME ENVIRONMENT:  The living area: can live on one level  There are 2 steps to enter the home      The patient will not have 24 hour supervision/physical assistance available upon discharge  PREVIOUS DME:  Equipment in home (previous DME): Single Point Cane    FUNCTIONAL STATUS:  Physical Therapy Occupational Therapy Speech Therapy   As per PT:          06/29/18 0838   Pain Assessment   Pain Assessment 0-10   Pain Score 6   Pain Type Surgical pain   Pain Location Back   Pain Orientation Lower   Precautions   Spinal Precautions Patient able to recall 3/3 spinal precautions when prompted  Restrictions/Precautions   Weight Bearing Precautions Per Order No   Other Precautions Spinal precautions;Pain; Fall Risk   General   Chart Reviewed Yes   Additional Pertinent History Patient supine in bed upon initial encounter requesting to use restroom  Per nursing her BP had been running low  BP assessed when seated eob at 110/77  Patient denies dizziness throughout treatment  Response to Previous Treatment Patient with no complaints from previous session  Family/Caregiver Present No   Cognition   Overall Cognitive Status WFL   Arousal/Participation Alert; Cooperative   Attention Within functional limits   Orientation Level Oriented X4   Memory Within functional limits   Following Commands Follows multistep commands without difficulty   Comments Demonstrates good understanding of spinal precautions   Subjective   Subjective Pt agreeable to therapy this morning  Bed Mobility   Rolling L 3  Moderate assistance   Additional items Assist x 2;Bedrails; Increased time required;LE management  (Log roll technique)   Supine to Sit 2  Maximal assistance   Additional items Assist x 2;Bedrails; Increased time required;LE management   Additional Comments Increased pain with mobility limiting her participation   Transfers   Sit to Stand 4  Minimal assistance   Additional items Assist x 1; Armrests; Increased time required   Stand to Sit 4  Minimal assistance   Additional items Assist x 1; Armrests; Increased time required;Verbal cues   Toilet transfer 4  Minimal assistance   Additional items Assist x 2; Increased time required;Commode   Additional Comments Verbal cueing for hand placement and postural control to maintain spinal precautions  Ambulation/Elevation   Gait pattern Step to;Short stride; Inconsistent ana; Excessively slow; Improper Weight shift;Decreased foot clearance  (Progressed to step through today)   Gait Assistance 4  Minimal assist  ((S) level ambulating in elaine way, min A in room)   Additional items Assist x 1;Verbal cues; Tactile cues   Assistive Device Rolling walker   Distance Patient ambulated 1 x 15ft in room at min A to commode with gait deviations above and step to pattern, then 1 x 160ft with RW with 2-3x standing rest breaks  at (S) level in hallway progressing to step through gait pattern  Stair Management Assistance Not tested   Balance   Static Sitting Good   Dynamic Sitting Fair -  (Guarded with use UE)   Static Standing Fair -   Dynamic Standing Poor +   Ambulatory Fair -   Endurance Deficit   Endurance Deficit Yes   Endurance Deficit Description Pain and fatigue   Activity Tolerance   Activity Tolerance Patient limited by fatigue;Patient limited by pain  (Improved pain symptoms with ambulation  )   Nurse Galilea Landin RN ok to see   Assessment   Prognosis Good   Problem List Decreased strength;Decreased range of motion;Decreased endurance; Impaired balance;Decreased mobility; Decreased coordination;Decreased skin integrity;Pain;Orthopedic restrictions  (Surgical incision, spinal precautions)   Assessment Patient tolerating treatment fairly well today with improved pain symptoms with gait and able to progress to step through gait pattern  Pain appears to be centralizing as she reports that she no longer feels it radiating down her LE  Patient able to recall her spinal precautions when prompted and is able to maintain them with physical assistance and verbal/tactile cueing   Educated patient on strategies to use when transferring and ambulating to maintain her spinal precautions  Patient required min A for stability when ambulating in tight spaces in her room but was able to progress to step through gait pattern and (S) level when ambulating in hallway  Patient continues to require considerable assistance for bed mobility and functional transfers  Due to these continued deficits therapy is recommending rehab upon d/c so she is able to optimize her outcome and to assist with restoring her to plof so she is safe at home where she is alone most of the day  As per DE LUNA:         06/29/18 0844   Restrictions/Precautions   Weight Bearing Precautions Per Order No   Other Precautions Fall Risk;Pain;Spinal precautions   Pain Assessment   Pain Assessment 0-10   Pain Score 6   Pain Type Surgical pain   Pain Location Back   Pain Orientation Lower   ADL   Where Assessed Standing at sink   Grooming Assistance 4  Minimal Assistance   Grooming Deficit Steadying;Setup;Verbal cueing;Supervision/safety; Increased time to complete;Wash/dry hands; Wash/dry face   Grooming Comments cues to maintain safe positioning inside RW perimeter  LB Bathing Assistance 3  Moderate Assistance   LB Bathing Deficit Setup;Steadying;Verbal cueing;Supervision/safety; Increased time to complete; Buttocks; Perineal area   UB Dressing Assistance 4  Minimal Assistance   UB Dressing Deficit Setup   LB Dressing Assistance 3  Moderate Assistance   LB Dressing Deficit Setup;Steadying; Requires assistive device for steadying;Verbal cueing;Supervision/safety; Increased time to complete; Don/doff R sock; Don/doff L sock; Thread LLE into underwear; Thread RLE into underwear;Pull up over hips   LB Dressing Comments Pt uses personal reach with functional reach  Toileting Assistance  4  Minimal Assistance   Toileting Deficit Setup;Steadying;Verbal cueing;Supervison/safety; Increased time to complete;Clothing management down;Perineal hygiene;Clothing management up   Toileting Comments cues for safety required with functional tasks  Functional Standing Tolerance   Time 5 mins   Activity dynamic stand balance activity  Comments No LOB noted with activities instance  Bed Mobility   Supine to Sit 3  Moderate assistance   Additional items Assist x 2;Bedrails; Increased time required;Verbal cues;LE management   Transfers   Sit to Stand 4  Minimal assistance   Additional items Assist x 1; Armrests; Bedrails; Increased time required;Verbal cues   Stand to Sit 4  Minimal assistance   Additional items Assist x 1; Armrests; Increased time required;Verbal cues   Stand pivot 4  Minimal assistance   Additional items Assist x 1; Increased time required;Verbal cues;Armrests   Toilet transfer 4  Minimal assistance   Additional items Assist x 1; Increased time required;Verbal cues;Raised toilet seat   Additional Comments Improvement noted with use of bedside commode over toilet  Functional Mobility   Functional Mobility 4  Minimal assistance   Additional Comments x1   Additional items Rolling walker   Toilet Transfers   Toilet Transfer From Bed   Toilet Transfer Type To and from   Toilet Transfer to Standard bedside commode   Toilet Transfer Technique Stand pivot; Ambulating   Toilet Transfers Verbal cues; Minimal assistance   Toilet Transfers Comments cues for safe hand placement and footing required  Cognition   Overall Cognitive Status WFL   Arousal/Participation Alert; Cooperative   Attention Within functional limits   Orientation Level Oriented X4   Memory Within functional limits   Following Commands Follows multistep commands with increased time or repetition   Comments cues to achieve carry over of appropriate techs required  Additional Activities   Additional Activities (reviewed current plan of care  )   Additional Activities Comments Pt reports having good understanding of back safety and log roll tech  Pt will benefit from further review to encoruage optimal independence      Activity Tolerance   Activity Tolerance Patient limited by fatigue;Patient limited by pain   Medical Staff Made Aware Reported all findings to nursing staff  Plan   Treatment Interventions ADL retraining;Functional transfer training; Endurance training;Cognitive reorientation   Goal Expiration Date 07/09/18   Treatment Day 1   OT Frequency 3-5x/wk   Recommendation   OT Discharge Recommendation Short Term Rehab   Barthel Index   Feeding 10   Bathing 0   Grooming Score 0   Dressing Score 5   Bladder Score 0   Bowels Score 10   Toilet Use Score 5   Transfers (Bed/Chair) Score 5   Mobility (Level Surface) Score 0   Stairs Score 0   Barthel Index Score 35   Modified Belcher Scale   Modified Trent Scale 4      Pt was seen for skilled OT with focus on completion of self care tasks, functional mobility, review of fall prevention and review of current plan of care  Pt required moderate A to complete supine to sit EOB  Min A overall for sit to stand at RW to complete self toileting  Increased A required for clothing management  Educated Pt on need to maintain back safety with functional reach  No LOB noted with functional tasks instance  Educated Pt on use of AE for LE dressing if needed  Pt is familiar with usage from previous use at home  See above levels of A required for all functional tasks  Pt may benefit from further rehab with focus on achieving optimal performance levels with all functional tasks  Continue to recommend Inpatient rehab   N/A     CURRENT GAP IN FUNCTION     Prior to Admission:     Functional Status: Patient was independent with mobility/ambulation, transfers, ADL's, IADL's  Estimated length of stay: 7 to 10 days    Anticipated Post-Discharge Disposition/Treatment  Disposition: Return to previous home/apartment    Outpatient Services: Physical Therapy (PT) and Occupational Therapy (OT)    BARRIERS TO DISCHARGE  Weakness, Pain, Leukocytosis (elevated white blood count), Balance Difficulty, Fatigue, Home Accessibility, Caregiver Accessibility, Financial Resources, Equipment Needs and Resource Availability    INTERVENTIONS FOR DISCHARGE  Adaptive equipment, Patient/Family/Caregiver Education, Bariatric Equipment, Financial Assistance, Arrange DME needs, Medication Changes as per MD and Therapy exercises    REQUIRED THERAPY:  Patient will require PT and OT 90 minutes each per day, five days per week to achieve rehab goals  REQUIRED FUNCTIONAL AND MEDICAL MANAGEMENT FOR INPATIENT REHABILITATION:  Pain Management: Overall pain is moderately controlled, Deep Vein Thrombosis (DVT) Prophylaxis:  SCD's while in bed, nursing management, internal medicine, PM&R, PT/OT intervention, and any additional consults as needed  RECOMMENDED LEVEL OF CARE:  [facility] to [facility]  on 6/[facility]/18 for scheduled L4-L5 decompression interbody instrumentation due to lumbar stenosis with neurogenic claudication confirmed on out patient x-ray   Surgery was completed without complication  Patient was also febrile intermittently with elevated leukocytes  Chest x-ray was completed on 6/29 that was negative  Elevated temps and WBCs felt to be reactive Prior to admission patient was modified independent with transfers and ambulation with single point cane and independent with ADLs and IADLs including driving and working full time  At this time she is min assist with transfers and ambulation with rolling walker as well as min for upper body ADLs and mod for lower body ADLs  At this time nursing management is being recommended for education on medication changes, internal medicine to monitor and manage medical conditions, PM&R to maximize function as well as provide medical oversight, and inpatient rehab to maximize self care and mobility upon discharge to home at a Mod I level with the support of her family

## 2018-06-29 NOTE — PLAN OF CARE
Problem: PHYSICAL THERAPY ADULT  Goal: Performs mobility at highest level of function for planned discharge setting  See evaluation for individualized goals  Treatment/Interventions: Functional transfer training, LE strengthening/ROM, Elevations, Therapeutic exercise, Endurance training, Patient/family training, Equipment eval/education, Bed mobility, Gait training, Compensatory technique education, OT, Spoke to nursing (5-6x/wk)  Equipment Recommended:  Issac Kasper)       See flowsheet documentation for full assessment, interventions and recommendations  Outcome: Progressing  Prognosis: Good  Problem List: Decreased strength, Decreased range of motion, Decreased endurance, Impaired balance, Decreased mobility, Decreased coordination, Decreased skin integrity, Pain, Orthopedic restrictions (Surgical incision, spinal precautions)  Assessment: Patient tolerating treatment fairly well today with improved pain symptoms with gait and able to progress to step through gait pattern  Pain appears to be centralizing as she reports that she no longer feels it radiating down her LE  Patient able to recall her spinal precautions when prompted and is able to maintain them with physical assistance and verbal/tactile cueing  Educated patient on strategies to use when transferring and ambulating to maintain her spinal precautions  Patient required min A for stability when ambulating in tight spaces in her room but was able to progress to step through gait pattern and (S) level when ambulating in hallway  Patient continues to require considerable assistance for bed mobility and functional transfers  Due to these continued deficits therapy is recommending rehab upon d/c so she is able to optimize her outcome and to assist with restoring her to plof so she is safe at home where she is alone most of the day    Barriers to Discharge: Decreased caregiver support  Barriers to Discharge Comments: Home alone most of the day  Recommendation: Short-term skilled PT     PT - OK to Discharge: Yes    See flowsheet documentation for full assessment

## 2018-06-29 NOTE — PHYSICAL THERAPY NOTE
PHYSICAL THERAPY NOTE    Time in: 0812  Time out: 0838  Total time: 26 minutes       06/29/18 0838   Pain Assessment   Pain Assessment 0-10   Pain Score 6   Pain Type Surgical pain   Pain Location Back   Pain Orientation Lower   Precautions   Spinal Precautions Patient able to recall 3/3 spinal precautions when prompted  Restrictions/Precautions   Weight Bearing Precautions Per Order No   Other Precautions Spinal precautions;Pain; Fall Risk   General   Chart Reviewed Yes   Additional Pertinent History Patient supine in bed upon initial encounter requesting to use restroom  Per nursing her BP had been running low  BP assessed when seated eob at 110/77  Patient denies dizziness throughout treatment  Response to Previous Treatment Patient with no complaints from previous session  Family/Caregiver Present No   Cognition   Overall Cognitive Status WFL   Arousal/Participation Alert; Cooperative   Attention Within functional limits   Orientation Level Oriented X4   Memory Within functional limits   Following Commands Follows multistep commands without difficulty   Comments Demonstrates good understanding of spinal precautions   Subjective   Subjective Pt agreeable to therapy this morning  Bed Mobility   Rolling L 3  Moderate assistance   Additional items Assist x 2;Bedrails; Increased time required;LE management  (Log roll technique)   Supine to Sit 2  Maximal assistance   Additional items Assist x 2;Bedrails; Increased time required;LE management   Additional Comments Increased pain with mobility limiting her participation   Transfers   Sit to Stand 4  Minimal assistance   Additional items Assist x 1; Armrests; Increased time required   Stand to Sit 4  Minimal assistance   Additional items Assist x 1; Armrests; Increased time required;Verbal cues   Toilet transfer 4  Minimal assistance   Additional items Assist x 2; Increased time required;Commode   Additional Comments Verbal cueing for hand placement and postural control to maintain spinal precautions  Ambulation/Elevation   Gait pattern Step to;Short stride; Inconsistent ana; Excessively slow; Improper Weight shift;Decreased foot clearance  (Progressed to step through today)   Gait Assistance 4  Minimal assist  ((S) level ambulating in elaine way, min A in room)   Additional items Assist x 1;Verbal cues; Tactile cues   Assistive Device Rolling walker   Distance Patient ambulated 1 x 15ft in room at min A to commode with gait deviations above and step to pattern, then 1 x 160ft with RW with 2-3x standing rest breaks  at (S) level in hallway progressing to step through gait pattern  Stair Management Assistance Not tested   Balance   Static Sitting Good   Dynamic Sitting Fair -  (Guarded with use UE)   Static Standing Fair -   Dynamic Standing Poor +   Ambulatory Fair -   Endurance Deficit   Endurance Deficit Yes   Endurance Deficit Description Pain and fatigue   Activity Tolerance   Activity Tolerance Patient limited by fatigue;Patient limited by pain  (Improved pain symptoms with ambulation  )   Nurse Galilea Holliday RN ok to see   Assessment   Prognosis Good   Problem List Decreased strength;Decreased range of motion;Decreased endurance; Impaired balance;Decreased mobility; Decreased coordination;Decreased skin integrity;Pain;Orthopedic restrictions  (Surgical incision, spinal precautions)   Assessment Patient tolerating treatment fairly well today with improved pain symptoms with gait and able to progress to step through gait pattern  Pain appears to be centralizing as she reports that she no longer feels it radiating down her LE  Patient able to recall her spinal precautions when prompted and is able to maintain them with physical assistance and verbal/tactile cueing  Educated patient on strategies to use when transferring and ambulating to maintain her spinal precautions   Patient required min A for stability when ambulating in tight spaces in her room but was able to progress to step through gait pattern and (S) level when ambulating in hallway  Patient continues to require considerable assistance for bed mobility and functional transfers  Due to these continued deficits therapy is recommending rehab upon d/c so she is able to optimize her outcome and to assist with restoring her to plof so she is safe at home where she is alone most of the day  Barriers to Discharge Decreased caregiver support   Barriers to Discharge Comments Home alone most of the day   Goals   Patient Goals to get stronger   STG Expiration Date 07/08/18   Treatment Day 1   Plan   Treatment/Interventions Functional transfer training;LE strengthening/ROM; Elevations; Therapeutic exercise; Endurance training;Patient/family training;Equipment eval/education; Bed mobility;Gait training; Compensatory technique education;Spoke to nursing;OT  Jeanne Cornell OT)   Progress Progressing toward goals   PT Frequency (5-6x/wk)   Recommendation   Recommendation Short-term skilled PT   Equipment Recommended (RW)   PT - OK to Discharge Yes   Additional Comments Yes to rehab when medically cleared, no to home until IPPT goals have been met       Louisa Nayak, SPT      Patient Name: Carlos Luna  [id number] Date: 6/29/2018

## 2018-06-29 NOTE — PROGRESS NOTES
POST-OP SPINE PROGRESS NOTE    Patient Name: Mikayla Moran  Patient MRN:  [id number]  Date of Service:  06/29/18 7:29 AM  Service: ortho spine      Subjective      Pt seen this AM, doing better  Pain much better controlled with change to meds  Pre op radicular leg pain has resolved  Main complaint is that of lower back pain  Slow progress with PT  Reports some difficulties with getting in/out of bed on own  Tolerating diet     Findings:doing well postop  preop pain resolved  postop pain well controlled with meds    Objective      Physical Exam  Temp:  [97 °F (36 1 °C)-98 7 °F (37 1 °C)] 97 9 °F (36 6 °C)  HR:  [77-99] 77  Resp:  [18-20] 20  BP: ()/(43-66) 101/64    Incision: Dressing clean, dry and intact    Sensation grossly intact    Motor function intact 5/5 without deficits BLE    ION Output NA      Laboratory Studies  Recent Results (from the past 24 hour(s))   CBC and differential    Collection Time: 06/29/18  4:46 AM   Result Value Ref Range    WBC 14 61 (H) 4 31 - 10 16 Thousand/uL    RBC 3 18 (L) 3 81 - 5 12 Million/uL    Hemoglobin 10 3 (L) 11 5 - 15 4 g/dL    Hematocrit 33 2 (L) 34 8 - 46 1 %     (H) 82 - 98 fL    MCH 32 4 26 8 - 34 3 pg    MCHC 31 0 (L) 31 4 - 37 4 g/dL    RDW 12 8 11 6 - 15 1 %    MPV 9 2 8 9 - 12 7 fL    Platelets 909 731 - 310 Thousands/uL    nRBC 0 /100 WBCs    Neutrophils Relative 81 (H) 43 - 75 %    Lymphocytes Relative 8 (L) 14 - 44 %    Monocytes Relative 10 4 - 12 %    Eosinophils Relative 0 0 - 6 %    Basophils Relative 0 0 - 1 %    Neutrophils Absolute 11 88 (H) 1 85 - 7 62 Thousands/µL    Lymphocytes Absolute 1 20 0 60 - 4 47 Thousands/µL    Monocytes Absolute 1 46 (H) 0 17 - 1 22 Thousand/µL    Eosinophils Absolute 0 04 0 00 - 0 61 Thousand/µL    Basophils Absolute 0 03 0 00 - 0 10 Thousands/µL   Comprehensive metabolic panel    Collection Time: 06/29/18  4:46 AM   Result Value Ref Range    Sodium 134 (L) 136 - 145 mmol/L    Potassium 3 8 3 5 - 5 3 mmol/L Chloride 103 100 - 108 mmol/L    CO2 26 21 - 32 mmol/L    Anion Gap 5 4 - 13 mmol/L    BUN 15 5 - 25 mg/dL    Creatinine 1 16 0 60 - 1 30 mg/dL    Glucose 157 (H) 65 - 140 mg/dL    Calcium 8 1 (L) 8 3 - 10 1 mg/dL    AST 26 5 - 45 U/L    ALT 36 12 - 78 U/L    Alkaline Phosphatase 87 46 - 116 U/L    Total Protein 5 9 (L) 6 4 - 8 2 g/dL    Albumin 2 4 (L) 3 5 - 5 0 g/dL    Total Bilirubin 0 48 0 20 - 1 00 mg/dL    eGFR 51 ml/min/1 73sq m       Assessment / Plan:     POD #2 s/p TLIF L4-5 with posterior stabilization  Continue to Mobilize with PT/OT -   Activity as tolerated   DVT Prophylaxis SCDs  Discharge planning - ok to d/c to rehab if bed available     F/u outpatient in 2-3 weeks as scheduled         Clarice Burton PA-C

## 2018-06-29 NOTE — DISCHARGE INSTRUCTIONS
No lifting >10 lbs  No repetitive bending/twisting  No driving while taking narcotics  No tub bath  Shower ok  Leave steri strips 7-10 days or until they fall off

## 2018-06-29 NOTE — SOCIAL WORK
Patient accepted at [facility] for Saturday 6/30/18  Per [facility] liaison, facility has obtained authorization  CM informed patient of same, CM and patient discussed transportation to [facility]; patient informed CM that both her children work tomorrow and will not be able to get a ride  CM arranged a [address] transport with [facility] from [place] [id number], [employer] made patient aware of cost associated with transport, patient is agreeable  No other needs at present  CM following as needed

## 2018-06-29 NOTE — OCCUPATIONAL THERAPY NOTE
Occupational Therapy Treatment Note:       06/29/18 0844   Restrictions/Precautions   Weight Bearing Precautions Per Order No   Other Precautions Fall Risk;Pain;Spinal precautions   Pain Assessment   Pain Assessment 0-10   Pain Score 6   Pain Type Surgical pain   Pain Location Back   Pain Orientation Lower   ADL   Where Assessed Standing at sink   Grooming Assistance 4  Minimal Assistance   Grooming Deficit Steadying;Setup;Verbal cueing;Supervision/safety; Increased time to complete;Wash/dry hands; Wash/dry face   Grooming Comments cues to maintain safe positioning inside RW perimeter  LB Bathing Assistance 3  Moderate Assistance   LB Bathing Deficit Setup;Steadying;Verbal cueing;Supervision/safety; Increased time to complete; Buttocks; Perineal area   UB Dressing Assistance 4  Minimal Assistance   UB Dressing Deficit Setup   LB Dressing Assistance 3  Moderate Assistance   LB Dressing Deficit Setup;Steadying; Requires assistive device for steadying;Verbal cueing;Supervision/safety; Increased time to complete; Don/doff R sock; Don/doff L sock; Thread LLE into underwear; Thread RLE into underwear;Pull up over hips   LB Dressing Comments Pt uses personal reach with functional reach  Toileting Assistance  4  Minimal Assistance   Toileting Deficit Setup;Steadying;Verbal cueing;Supervison/safety; Increased time to complete;Clothing management down;Perineal hygiene;Clothing management up   Toileting Comments cues for safety required with functional tasks  Functional Standing Tolerance   Time 5 mins   Activity dynamic stand balance activity  Comments No LOB noted with activities instance  Bed Mobility   Supine to Sit 3  Moderate assistance   Additional items Assist x 2;Bedrails; Increased time required;Verbal cues;LE management   Transfers   Sit to Stand 4  Minimal assistance   Additional items Assist x 1; Armrests; Bedrails; Increased time required;Verbal cues   Stand to Sit 4  Minimal assistance   Additional items Assist x 1;Armrests; Increased time required;Verbal cues   Stand pivot 4  Minimal assistance   Additional items Assist x 1; Increased time required;Verbal cues;Armrests   Toilet transfer 4  Minimal assistance   Additional items Assist x 1; Increased time required;Verbal cues;Raised toilet seat   Additional Comments Improvement noted with use of bedside commode over toilet  Functional Mobility   Functional Mobility 4  Minimal assistance   Additional Comments x1   Additional items Rolling walker   Toilet Transfers   Toilet Transfer From Bed   Toilet Transfer Type To and from   Toilet Transfer to Standard bedside commode   Toilet Transfer Technique Stand pivot; Ambulating   Toilet Transfers Verbal cues; Minimal assistance   Toilet Transfers Comments cues for safe hand placement and footing required  Cognition   Overall Cognitive Status WFL   Arousal/Participation Alert; Cooperative   Attention Within functional limits   Orientation Level Oriented X4   Memory Within functional limits   Following Commands Follows multistep commands with increased time or repetition   Comments cues to achieve carry over of appropriate techs required  Additional Activities   Additional Activities (reviewed current plan of care  )   Additional Activities Comments Pt reports having good understanding of back safety and log roll tech  Pt will benefit from further review to encoruage optimal independence  Activity Tolerance   Activity Tolerance Patient limited by fatigue;Patient limited by pain   Medical Staff Made Aware Reported all findings to nursing staff  Plan   Treatment Interventions ADL retraining;Functional transfer training; Endurance training;Cognitive reorientation   Goal Expiration Date 07/09/18   Treatment Day 1   OT Frequency 3-5x/wk   Recommendation   OT Discharge Recommendation Short Term Rehab   Barthel Index   Feeding 10   Bathing 0   Grooming Score 0   Dressing Score 5   Bladder Score 0   Bowels Score 10   Toilet Use Score 5 Transfers (Bed/Chair) Score 5   Mobility (Level Surface) Score 0   Stairs Score 0   Barthel Index Score 35   Modified [place] Scale   Modified [place] Scale 4     Pt was seen for skilled OT with focus on completion of self care tasks, functional mobility, review of fall prevention and review of current plan of care  Pt required moderate A to complete supine to sit EOB  Min A overall for sit to stand at RW to complete self toileting  Increased A required for clothing management  Educated Pt on need to maintain back safety with functional reach  No LOB noted with functional tasks instance  Educated Pt on use of AE for LE dressing if needed  Pt is familiar with usage from previous use at home  See above levels of A required for all functional tasks  Pt may benefit from further rehab with focus on achieving optimal performance levels with all functional tasks   Continue to recommend Inpatient rehab

## 2018-06-30 ENCOUNTER — HOSPITAL ENCOUNTER (INPATIENT)
Facility: HOSPITAL | Age: 63
LOS: 9 days | Discharge: HOME | DRG: 560 | End: 2018-07-09
Attending: PHYSICAL MEDICINE & REHABILITATION | Admitting: PHYSICAL MEDICINE & REHABILITATION
Payer: COMMERCIAL

## 2018-06-30 VITALS
OXYGEN SATURATION: 94 % | DIASTOLIC BLOOD PRESSURE: 64 MMHG | BODY MASS INDEX: 40.32 KG/M2 | SYSTOLIC BLOOD PRESSURE: 108 MMHG | WEIGHT: 242 LBS | HEIGHT: 65 IN | HEART RATE: 82 BPM | RESPIRATION RATE: 18 BRPM | TEMPERATURE: 98.4 F

## 2018-06-30 DIAGNOSIS — E87.6 HYPOKALEMIA: ICD-10-CM

## 2018-06-30 DIAGNOSIS — I10 HTN (HYPERTENSION): ICD-10-CM

## 2018-06-30 DIAGNOSIS — D64.9 ANEMIA: ICD-10-CM

## 2018-06-30 DIAGNOSIS — K21.9 GERD WITHOUT ESOPHAGITIS: Primary | ICD-10-CM

## 2018-06-30 DIAGNOSIS — M48.062 LUMBAR STENOSIS WITH NEUROGENIC CLAUDICATION: ICD-10-CM

## 2018-06-30 DIAGNOSIS — K58.9 IBS (IRRITABLE BOWEL SYNDROME): ICD-10-CM

## 2018-06-30 PROBLEM — R50.9 FEVER: Status: ACTIVE | Noted: 2018-06-30

## 2018-06-30 PROBLEM — Z51.89 ENCOUNTER FOR REHABILITATION: Status: ACTIVE | Noted: 2018-06-30

## 2018-06-30 PROBLEM — D72.829 LEUKOCYTOSIS: Status: ACTIVE | Noted: 2018-06-30

## 2018-06-30 LAB
ALBUMIN SERPL BCP-MCNC: 2.7 G/DL (ref 3.5–5)
ALP SERPL-CCNC: 91 U/L (ref 46–116)
ALT SERPL W P-5'-P-CCNC: 29 U/L (ref 12–78)
ANION GAP SERPL CALCULATED.3IONS-SCNC: 10 MMOL/L (ref 4–13)
AST SERPL W P-5'-P-CCNC: 18 U/L (ref 5–45)
BASOPHILS # BLD AUTO: 0.03 THOUSANDS/ΜL (ref 0–0.1)
BASOPHILS NFR BLD AUTO: 0 % (ref 0–1)
BILIRUB SERPL-MCNC: 0.67 MG/DL (ref 0.2–1)
BUN SERPL-MCNC: 13 MG/DL (ref 5–25)
CALCIUM SERPL-MCNC: 8.6 MG/DL (ref 8.3–10.1)
CHLORIDE SERPL-SCNC: 103 MMOL/L (ref 100–108)
CO2 SERPL-SCNC: 27 MMOL/L (ref 21–32)
CREAT SERPL-MCNC: 1.01 MG/DL (ref 0.6–1.3)
EOSINOPHIL # BLD AUTO: 0.21 THOUSAND/ΜL (ref 0–0.61)
EOSINOPHIL NFR BLD AUTO: 2 % (ref 0–6)
ERYTHROCYTE [DISTWIDTH] IN BLOOD BY AUTOMATED COUNT: 12.8 % (ref 11.6–15.1)
EST. AVERAGE GLUCOSE BLD GHB EST-MCNC: 105 MG/DL
GFR SERPL CREATININE-BSD FRML MDRD: 61 ML/MIN/1.73SQ M
GLUCOSE SERPL-MCNC: 118 MG/DL (ref 65–140)
GLUCOSE SERPL-MCNC: 128 MG/DL (ref 65–140)
HBA1C MFR BLD: 5.3 % (ref 4.2–6.3)
HCT VFR BLD AUTO: 34.3 % (ref 34.8–46.1)
HGB BLD-MCNC: 10.9 G/DL (ref 11.5–15.4)
LYMPHOCYTES # BLD AUTO: 1.65 THOUSANDS/ΜL (ref 0.6–4.47)
LYMPHOCYTES NFR BLD AUTO: 12 % (ref 14–44)
MCH RBC QN AUTO: 32.4 PG (ref 26.8–34.3)
MCHC RBC AUTO-ENTMCNC: 31.8 G/DL (ref 31.4–37.4)
MCV RBC AUTO: 102 FL (ref 82–98)
MONOCYTES # BLD AUTO: 1.11 THOUSAND/ΜL (ref 0.17–1.22)
MONOCYTES NFR BLD AUTO: 8 % (ref 4–12)
NEUTROPHILS # BLD AUTO: 10.31 THOUSANDS/ΜL (ref 1.85–7.62)
NEUTS SEG NFR BLD AUTO: 78 % (ref 43–75)
NRBC BLD AUTO-RTO: 0 /100 WBCS
PLATELET # BLD AUTO: 211 THOUSANDS/UL (ref 149–390)
PMV BLD AUTO: 9.3 FL (ref 8.9–12.7)
POTASSIUM SERPL-SCNC: 3.4 MMOL/L (ref 3.5–5.3)
PROT SERPL-MCNC: 6.6 G/DL (ref 6.4–8.2)
RBC # BLD AUTO: 3.36 MILLION/UL (ref 3.81–5.12)
SODIUM SERPL-SCNC: 140 MMOL/L (ref 136–145)
WBC # BLD AUTO: 13.31 THOUSAND/UL (ref 4.31–10.16)

## 2018-06-30 PROCEDURE — 99223 1ST HOSP IP/OBS HIGH 75: CPT | Performed by: PHYSICAL MEDICINE & REHABILITATION

## 2018-06-30 PROCEDURE — 97116 GAIT TRAINING THERAPY: CPT

## 2018-06-30 PROCEDURE — 82948 REAGENT STRIP/BLOOD GLUCOSE: CPT

## 2018-06-30 PROCEDURE — 87040 BLOOD CULTURE FOR BACTERIA: CPT | Performed by: PHYSICAL MEDICINE & REHABILITATION

## 2018-06-30 PROCEDURE — 97110 THERAPEUTIC EXERCISES: CPT

## 2018-06-30 PROCEDURE — 80053 COMPREHEN METABOLIC PANEL: CPT | Performed by: INTERNAL MEDICINE

## 2018-06-30 PROCEDURE — 83036 HEMOGLOBIN GLYCOSYLATED A1C: CPT | Performed by: INTERNAL MEDICINE

## 2018-06-30 PROCEDURE — 85025 COMPLETE CBC W/AUTO DIFF WBC: CPT | Performed by: INTERNAL MEDICINE

## 2018-06-30 RX ORDER — SUCRALFATE ORAL 1 G/10ML
1000 SUSPENSION ORAL
Status: DISCONTINUED | OUTPATIENT
Start: 2018-06-30 | End: 2018-07-09 | Stop reason: HOSPADM

## 2018-06-30 RX ORDER — ACETAMINOPHEN 325 MG/1
650 TABLET ORAL EVERY 6 HOURS PRN
Status: DISCONTINUED | OUTPATIENT
Start: 2018-06-30 | End: 2018-07-09

## 2018-06-30 RX ORDER — ACETAMINOPHEN 325 MG/1
650 TABLET ORAL EVERY 6 HOURS SCHEDULED
Status: DISCONTINUED | OUTPATIENT
Start: 2018-06-30 | End: 2018-06-30

## 2018-06-30 RX ORDER — POTASSIUM CHLORIDE 20 MEQ/1
40 TABLET, EXTENDED RELEASE ORAL ONCE
Status: COMPLETED | OUTPATIENT
Start: 2018-06-30 | End: 2018-06-30

## 2018-06-30 RX ORDER — ACETAMINOPHEN 325 MG/1
650 TABLET ORAL EVERY 6 HOURS PRN
Status: DISCONTINUED | OUTPATIENT
Start: 2018-06-30 | End: 2018-06-30

## 2018-06-30 RX ORDER — LISINOPRIL 20 MG/1
20 TABLET ORAL DAILY
Status: DISCONTINUED | OUTPATIENT
Start: 2018-07-01 | End: 2018-07-02

## 2018-06-30 RX ORDER — SENNOSIDES 8.6 MG
1 TABLET ORAL DAILY
Status: DISCONTINUED | OUTPATIENT
Start: 2018-07-01 | End: 2018-07-09

## 2018-06-30 RX ORDER — HYDROCHLOROTHIAZIDE 12.5 MG/1
12.5 TABLET ORAL DAILY
Status: DISCONTINUED | OUTPATIENT
Start: 2018-07-01 | End: 2018-07-02

## 2018-06-30 RX ORDER — TRAZODONE HYDROCHLORIDE 50 MG/1
50 TABLET ORAL
Status: DISCONTINUED | OUTPATIENT
Start: 2018-06-30 | End: 2018-07-09

## 2018-06-30 RX ORDER — LIDOCAINE 50 MG/G
2 PATCH TOPICAL DAILY
Status: DISCONTINUED | OUTPATIENT
Start: 2018-06-30 | End: 2018-07-09

## 2018-06-30 RX ORDER — HEPARIN SODIUM 5000 [USP'U]/ML
5000 INJECTION, SOLUTION INTRAVENOUS; SUBCUTANEOUS EVERY 8 HOURS SCHEDULED
Status: DISCONTINUED | OUTPATIENT
Start: 2018-06-30 | End: 2018-06-30

## 2018-06-30 RX ORDER — OXYCODONE HYDROCHLORIDE 5 MG/1
5 TABLET ORAL EVERY 4 HOURS PRN
Status: DISCONTINUED | OUTPATIENT
Start: 2018-06-30 | End: 2018-07-09 | Stop reason: HOSPADM

## 2018-06-30 RX ORDER — ACETAMINOPHEN 325 MG/1
325 TABLET ORAL EVERY 6 HOURS PRN
Status: DISCONTINUED | OUTPATIENT
Start: 2018-06-30 | End: 2018-06-30

## 2018-06-30 RX ORDER — GABAPENTIN 300 MG/1
600 CAPSULE ORAL 3 TIMES DAILY
Status: DISCONTINUED | OUTPATIENT
Start: 2018-06-30 | End: 2018-07-02

## 2018-06-30 RX ORDER — ONDANSETRON 4 MG/1
4 TABLET, ORALLY DISINTEGRATING ORAL EVERY 6 HOURS PRN
Status: DISCONTINUED | OUTPATIENT
Start: 2018-06-30 | End: 2018-07-09

## 2018-06-30 RX ORDER — OXYCODONE HYDROCHLORIDE 10 MG/1
10 TABLET ORAL EVERY 4 HOURS PRN
Status: DISCONTINUED | OUTPATIENT
Start: 2018-06-30 | End: 2018-06-30

## 2018-06-30 RX ORDER — OXYCODONE HYDROCHLORIDE 10 MG/1
10 TABLET ORAL EVERY 4 HOURS PRN
Status: DISCONTINUED | OUTPATIENT
Start: 2018-06-30 | End: 2018-07-09 | Stop reason: HOSPADM

## 2018-06-30 RX ORDER — PANTOPRAZOLE SODIUM 40 MG/1
40 TABLET, DELAYED RELEASE ORAL
Status: DISCONTINUED | OUTPATIENT
Start: 2018-06-30 | End: 2018-07-09 | Stop reason: HOSPADM

## 2018-06-30 RX ORDER — METHOCARBAMOL 750 MG/1
750 TABLET, FILM COATED ORAL EVERY 6 HOURS PRN
Status: DISCONTINUED | OUTPATIENT
Start: 2018-06-30 | End: 2018-07-09

## 2018-06-30 RX ORDER — OXYCODONE HCL 10 MG/1
10 TABLET, FILM COATED, EXTENDED RELEASE ORAL EVERY 12 HOURS SCHEDULED
Status: DISCONTINUED | OUTPATIENT
Start: 2018-06-30 | End: 2018-06-30

## 2018-06-30 RX ORDER — OXYCODONE HCL 10 MG/1
10 TABLET, FILM COATED, EXTENDED RELEASE ORAL EVERY 12 HOURS SCHEDULED
Status: COMPLETED | OUTPATIENT
Start: 2018-06-30 | End: 2018-07-04

## 2018-06-30 RX ADMIN — GABAPENTIN 600 MG: 300 CAPSULE ORAL at 09:54

## 2018-06-30 RX ADMIN — SUCRALFATE 1000 MG: 1 SUSPENSION ORAL at 15:30

## 2018-06-30 RX ADMIN — ACETAMINOPHEN 650 MG: 325 TABLET ORAL at 16:28

## 2018-06-30 RX ADMIN — METHOCARBAMOL 1500 MG: 750 TABLET ORAL at 05:13

## 2018-06-30 RX ADMIN — OXYCODONE HYDROCHLORIDE 10 MG: 10 TABLET, FILM COATED, EXTENDED RELEASE ORAL at 09:54

## 2018-06-30 RX ADMIN — OXYCODONE HYDROCHLORIDE 10 MG: 10 TABLET, FILM COATED, EXTENDED RELEASE ORAL at 21:45

## 2018-06-30 RX ADMIN — LIDOCAINE 2 PATCH: 50 PATCH CUTANEOUS at 16:30

## 2018-06-30 RX ADMIN — ACETAMINOPHEN 975 MG: 325 TABLET, FILM COATED ORAL at 05:13

## 2018-06-30 RX ADMIN — POTASSIUM CHLORIDE 40 MEQ: 1500 TABLET, EXTENDED RELEASE ORAL at 15:29

## 2018-06-30 RX ADMIN — METHOCARBAMOL 1500 MG: 750 TABLET ORAL at 12:29

## 2018-06-30 RX ADMIN — OXYCODONE HYDROCHLORIDE 10 MG: 10 TABLET ORAL at 06:00

## 2018-06-30 RX ADMIN — SUCRALFATE 1000 MG: 1 SUSPENSION ORAL at 06:00

## 2018-06-30 RX ADMIN — OXYCODONE HYDROCHLORIDE 10 MG: 10 TABLET ORAL at 15:29

## 2018-06-30 RX ADMIN — GABAPENTIN 600 MG: 300 CAPSULE ORAL at 15:30

## 2018-06-30 RX ADMIN — SENNOSIDES 8.6 MG: 8.6 TABLET, FILM COATED ORAL at 09:54

## 2018-06-30 RX ADMIN — PANTOPRAZOLE SODIUM 40 MG: 40 TABLET, DELAYED RELEASE ORAL at 06:00

## 2018-06-30 RX ADMIN — PANTOPRAZOLE SODIUM 40 MG: 40 TABLET, DELAYED RELEASE ORAL at 15:30

## 2018-06-30 RX ADMIN — OXYCODONE HYDROCHLORIDE 10 MG: 10 TABLET ORAL at 20:01

## 2018-06-30 RX ADMIN — SUCRALFATE 1000 MG: 1 SUSPENSION ORAL at 12:29

## 2018-06-30 RX ADMIN — SUCRALFATE 1000 MG: 1 SUSPENSION ORAL at 21:45

## 2018-06-30 RX ADMIN — GABAPENTIN 600 MG: 300 CAPSULE ORAL at 21:45

## 2018-06-30 NOTE — ASSESSMENT & PLAN NOTE
-has been present post-operatively with associated mild leukocytosis  -CXR, UA negative  -patient appears atoxic, incision C/D/I  -likely post-operative related fever  -if continues plan to obtain Blood cultures, venous dopplers BLE

## 2018-06-30 NOTE — DISCHARGE SUMMARY
DISCHARGE SUMMARY  ? Patient Name: Constance Rossi  Patient MRN: [id number]  Admitting Provider: Sweetie Vega MD  Discharging Provider: Sweetie Vega MD  Primary Care Physician at Discharge: Mady Snyder [phone]   Admission Date: 6/27/2018   Discharge Date: 6/30/18  Admission Diagnosis   Spinal stenosis of lumbar region with neurogenic claudication [M48 [id number]]  Discharge Diagnoses  [facility] Course  PAtient tolerated procedure without difficulty  Patient progressed following surgery  Patient discharged to rehab  Medications  See after visit summary for reconciled discharge medications provided to patient and family  Allergies  No Known Allergies  Outpatient Follow-Up  Future Appointments  Date Time Provider Irma Loza   7/11/2018 7:00 AM Katiana Lloyd MD [facility]     Discharge Disposition  Condition: Good  Discharged to:  Rehab  Operative Procedures Performed  Procedure(s):  L4-5 DECOMPRESSION INTERBODY INSTRUMENTED FUSION          ?   Joi Doshi PA-C

## 2018-06-30 NOTE — PHYSICAL THERAPY NOTE
Physical Therapy Progress Note     06/30/18 1045   Pain Assessment   Pain Assessment 0-10   Pain Score 5   Pain Type Surgical pain   Pain Location Back   Pain Orientation Lower   Pain Frequency Constant/continuous   Pain Onset Ongoing  (pt reports pain floats between 4-8)   Clinical Progression Not changed   Hospital Pain Intervention(s) Ambulation/increased activity; Rest;Repositioned; Emotional support   Response to Interventions Tolerated   Precautions   Spinal Precautions Pt unable to recall spinal precautions   Restrictions/Precautions   Weight Bearing Precautions Per Order No   Other Precautions Fall Risk;Pain;Spinal precautions   General   Chart Reviewed Yes   Additional Pertinent History previous abdominal surgeries    Response to Previous Treatment Patient with no complaints from previous session  Family/Caregiver Present No   Cognition   Overall Cognitive Status WFL   Arousal/Participation Alert; Responsive; Cooperative   Attention Within functional limits   Orientation Level Oriented X4   Memory Within functional limits   Following Commands Follows all commands and directions without difficulty   Comments Unable to recall spinal precautions   Subjective   Subjective Pt willing to participate in therapy  Bed Mobility   Sit to Supine 3  Moderate assistance   Additional items Assist x 2; Increased time required;Verbal cues;LE management   Additional Comments Verbal cues on proper performance of log roll    Transfers   Sit to Stand 4  Minimal assistance   Additional items Armrests; Increased time required;Verbal cues; Assist x 2;Assist x 1  (Assist of 2 at first, progressing to assist of 1)   Stand to Sit 4  Minimal assistance   Additional items Assist x 1; Increased time required;Verbal cues   Toilet transfer 4  Minimal assistance   Additional items Assist x 1; Armrests; Increased time required;Verbal cues; Commode   Additional Comments PT performed handwashing and draying at sink with standby A for safety  Ambulation/Elevation   Gait pattern Inconsistent ana; Short stride; Excessively slow; Improper Weight shift;Decreased foot clearance   Gait Assistance 4  Minimal assist   Additional items Assist x 1;Verbal cues   Assistive Device Rolling walker   Distance 10' x1, 104' x1, 85' x1   Balance   Static Sitting Good   Dynamic Sitting Fair   Static Standing Fair   Dynamic Standing Fair -   Ambulatory Poor +   Endurance Deficit   Endurance Deficit Yes   Endurance Deficit Description pain and fatigue   Activity Tolerance   Activity Tolerance Patient tolerated treatment well;Patient limited by fatigue;Patient limited by pain   Exercises   Quad Sets Sitting;10 reps;AROM; Bilateral   Glute Sets Sitting;10 reps;AROM; Bilateral   Knee AROM Short Arc Quad Sitting;10 reps;AROM; Bilateral   Ankle Pumps Sitting;10 reps;AROM; Bilateral   Assessment   Prognosis Good   Problem List Decreased strength;Decreased range of motion;Decreased endurance; Impaired balance;Decreased mobility; Decreased safety awareness; Obesity;Orthopedic restrictions;Pain   Assessment Pt found seated in bedside chair prior to arrival and was willing to participate in therapy  She c/o pain in low back and reports she is having difficulty sleeping throughout the night  Pt was unable to recall spinal precautions this session and was re-educated on abiding by precautions when performing bed mobility, transfers, functional mobility, and ADL's  Pt demonstrates improved ability to perform transfers and bed mobility, of which she required Min A x2-1 and Mod A x2, respectively  Pt was  able to progress with ambulation distances  10' x1 , 104' x1 and 80' x1 with min A x1  Pt required 1 standing rest break secondary to pain and fatigue  Pt demonstrated ability to perform HEP seated in bedside chair and stated she routinely performs HEP when lying in bed  Pt was educated on proper techniques and safety with car transfers to prepare for d/c   Pt was repositioned in supine at end of session with SCD's and call bell in reach  Pt also required education on performance of log roll for technique, safety, and maintenance of spinal precautions  Pt was able to perform log roll with good carry over, but visible pain was noted during bed mobility  Pt will continue to benefit from short term PT due to decreased strength, ambulation, functional mobility, transfer performance, decreased awareness of spinal precautions, and decreased caregiver support at home and increased risk for falls  Barriers to Discharge Decreased caregiver support   Barriers to Discharge Comments Home alone most of the day   Goals   Patient Goals to go home   STG Expiration Date 07/08/18   Treatment Day 2   Plan   Treatment/Interventions Functional transfer training;LE strengthening/ROM; Therapeutic exercise; Endurance training;Patient/family training;Equipment eval/education; Bed mobility;Gait training   Progress Progressing toward goals   PT Frequency (5-6x/wk)   Recommendation   Recommendation Short-term skilled PT   Equipment Recommended Walker   PT - OK to Discharge Yes  (to rehab)   Additional Comments to rehab     Cindy Belts, PTA Student

## 2018-06-30 NOTE — ASSESSMENT & PLAN NOTE
-Post-operative pain  -nociceptive pain: managed on Oxycontin 10mg [id number] scheduled x 4 days then discontinue as part of wean and Oxycodone 5-10mg [id number] PRN for breakthrough    -neuropathic pain: managed on Neurontin 600mg TID and topical lidoderm patches  -muscle spams: managed on Robaxin

## 2018-06-30 NOTE — ASSESSMENT & PLAN NOTE
-Rehabilitation of lumbar stenosis post lumbar surgery: PT/OT therapies to maximize function  Rehabilitation Problems and Goals:  · Mobility dysfunction: Modified Independent  · ADL dysfunction: Modified Independent  Estimated length of stay: 7-10 days   Anticipated discharge setting: home with family   Functional Prognosis: good   -Acute inpatient rehabilitation is necessary due to the medical impact on function as a result of impaired functional mobility, ambulation, bed mobility, transfers, self-care/ADL's, strength, endurance, range of motion/flexibility, coordination and impaired gait/balance  Treatment plan will include a comprehensive rehabilitation program with intense therapies for 3 hours/day, 5-7 days/week

## 2018-06-30 NOTE — NURSING NOTE
Paperwork and prescription given to daughter to transport to [facility], report called in to [facility] at the [facility]; faxed AVS

## 2018-06-30 NOTE — SOCIAL WORK
CM informed by RN that pt's family prefers to transport pt to the [facility] and will transport pt around 1 PM   Merna from [facility] aware   CM canceled transport via [id number]

## 2018-06-30 NOTE — ASSESSMENT & PLAN NOTE
-WBC 13 8, will follow, trending down  -likely post-operative fever  -CXR/UA negative ordered during acute

## 2018-06-30 NOTE — PROGRESS NOTES
Progress Note - Internal Medicine   Ana Rosa Flores 63 y o  female MRN: [id number]  Unit/Bed#: E2 -01 Encounter: [id number]      Impression :    L4-L5 decompression interbody instrumentation  POD # 2  Spinal stenosis lumbar spine with neurogenic claudication  Degenerative spondylolisthesis  Hiatal hernia/gastroesophageal reflux disease  History of bariatric surgery/ subsequent multiple surgeries  Morbid obesity with BMI of 40 27  Previous h/o [facility]  ( " gave away her CPAP to someone who needed it ")  Chronic intrinsic asthma currently stable  Hypertension  History of DVT provoked during 1 of her previous surgeries  Right bundle branch block  Mild hyponatremia due to poor intake, sodium 134  Leukocytosis, mild reactive  Hyperglycemia/glucosuria, hemoglobin A1c 5 3%  Mild anemia, 10 3    Recommendation:    Patient noted to have glucosuria and hyperglycemia, recommend checking hemoglobin A1c and Accu-Chek, patient encouraged to use incentive spirometry and based on her low-grade fever she is at high risk of having atelectasis  Checked her chest x-ray and reviewed with her  Increase fluid intake based on her mild hyponatremia due to poor intake  Subjective:     Patient seen and examined today  EMR and overnight events reviewed  Patient resting comfortably denies any new complaints  Her pain is much better today  She is able to turn and move in her bed  Denies any paresthesias or tingling  Patient participated with therapy team and is hoping to be discharged possibly tomorrow to acute short-term rehabilitation  She states that she has been accepted to go to [place] [facility]  Review of Systems     Constitutional: Denies appetite change  No chills, diaphoresis, fatigue or fever  HEENT: No congestion, sore throat  No visual complaints  Respiratory: No cough, chest tightness, shortness of breath and wheezing  Cardiovascular: No chest pain and palpitations   No Syncope or grey out    GI: Negative for abdominal distention, pain, constipation, diarrhea or nausea  Endocrine: Negative for cold or heat intolerance, polydipsia and polyuria  Genitourinary: Negative for decreased urine volume, dysuria, flank pain and urgency  Skin: Negative for rash  Neurological: Negative for dizziness, weakness, numbness and headaches  Hematological: Negative for spontaneous bruising or bleeding  Psychiatric: Negative for confusion, dysphoric mood, hallucinations  All other systems reviewed and are negative  OBJECTIVE:     Vitals:     HR:  [] 96  Resp:  [18-20] 18  BP: ()/(55-78) 122/78  SpO2:  [93 %-98 %] 93 %  Temp (24hrs), Av 1 °F (36 7 °C), Min:97 2 °F (36 2 °C), Max:99 6 °F (37 6 °C)  Current: Temperature: 97 8 °F (36 6 °C)    Intake/Output Summary (Last 24 hours) at 18 2338  Last data filed at 18 1845   Gross per 24 hour   Intake             1200 ml   Output             1600 ml   Net             -400 ml     Body mass index is 40 27 kg/m²  Physical Exam      General Appearance:  Awake,alert, cooperative  Not in distress  Pallor / Icterus/ Cyanosis negative  Oropharynx no thrush  Tongue protrudes in midline  Head:  Normocephalic, atraumatic  No titubations  Eyes:  No eye redness or discharge bilaterally  Neck: Supple, no raised JVP  Back:   Symmetric, no kypho-scoliosis  Surgical site has clean dressing  No discharge or drainage  Lungs:   B/L Clear to auscultation, no wheezing or rhonchi  Normal broncho-alveolar air entry  No pleural rubs  Heart:   Regular S1S2, A2P2 normal, no murmur or gallop  Abdomen:   Soft, abdominal distention secondary to central obesity, non-tender,no rebound, bowel sounds+  Musculoskeletal: Extremities no cyanosis or edema  Psych: Normal Affect / No hallucinations or delusions  Neurologic:        Skin:  Endocrine:  Vascular: Awake and alert  Mentation intact  Speech is intact   Facial symmetry is maintained  Muscle strength is 5/5 in all muscle groups  Light touch and temperature sensation is maintained  No rashes /purpura  No open wounds  No thyromegaly / no lid lag  Homans sign is negative  B/L Calf are supple and non tender  Labs, Imaging, & Other studies:    All pertinent labs and imaging studies were personally reviewed    Results from last 7 days  Lab Units 06/29/18  0446   WBC Thousand/uL 14 61*   HEMOGLOBIN g/dL 10 3*   PLATELETS Thousands/uL 179       Results from last 7 days  Lab Units 06/29/18  0446   SODIUM mmol/L 134*   POTASSIUM mmol/L 3 8   CHLORIDE mmol/L 103   CO2 mmol/L 26   ANION GAP mmol/L 5   BUN mg/dL 15   CREATININE mg/dL 1 16   EGFR ml/min/1 73sq m 51   GLUCOSE RANDOM mg/dL 157*   CALCIUM mg/dL 8 1*   AST U/L 26   ALT U/L 36   ALK PHOS U/L 87   TOTAL PROTEIN g/dL 5 9*   BILIRUBIN TOTAL mg/dL 0 48     CHEST XR     INDICATION:   fever, dyspnea      COMPARISON:  None     EXAM PERFORMED/VIEWS:  XR CHEST PORTABLE     FINDINGS:     Cardiomediastinal silhouette appears unremarkable    Left midlung zone scarring noted  Lungs otherwise clear  Osseous structures appear within normal limits for patient age      IMPRESSION:  No acute cardiopulmonary disease        Current Meds:  Current Facility-Administered Medications   Medication Dose Route Frequency Provider Last Rate Last Dose    acetaminophen (TYLENOL) tablet 975 mg  975 mg Oral Q6H [address] Elida Rajput MD   975 mg at 06/29/18 2314    gabapentin (NEURONTIN) capsule 600 mg  600 mg Oral TID Evelina Rice PA-C   600 mg at 06/29/18 2109    lisinopril (ZESTRIL) tablet 20 mg  20 mg Oral Daily Vladimir Villar MD        And    hydrochlorothiazide (HYDRODIURIL) tablet 12 5 mg  12 5 mg Oral Daily Vladimir Villar MD        HYDROmorphone (DILAUDID) injection 1 mg  1 mg Intravenous Q1H PRN Elida Rajput MD   1 mg at 06/29/18 4008    methocarbamol (ROBAXIN) tablet 1,500 mg  1,500 mg Oral Q6H [address] Elida Rajput MD   1,500 mg at 06/29/18 2315    ondansetron (ZOFRAN) injection 4 mg  4 mg Intravenous Q6H PRN Michaela Brito PA-C   4 mg at 06/27/18 1946    oxyCODONE (OxyCONTIN) 12 hr tablet 10 mg  10 mg Oral Q12H [facility] Jennifer Mistry MD   10 mg at 06/29/18 2109    oxyCODONE (ROXICODONE) immediate release tablet 10 mg  10 mg Oral Q4H PRN Jennifer Mistry MD   10 mg at 06/29/18 2115    pantoprazole (PROTONIX) EC tablet 40 mg  40 mg Oral BID AC Sergo Jeronimo MD   40 mg at 06/29/18 1659    senna (SENOKOT) tablet 8 6 mg  1 tablet Oral Daily Michaela Brito PA-C   8 6 mg at 06/29/18 0813    sodium chloride 0 9 % infusion  75 mL/hr Intravenous Continuous Michaela Brito PA-C 75 mL/hr at 06/28/18 0318 75 mL/hr at 06/28/18 0318    sucralfate (CARAFATE) oral suspension 1,000 mg  1,000 mg Oral 4x Daily (AC & HS) Sergo Jeronimo MD   1,000 mg at 06/29/18 2109    traZODone (DESYREL) tablet 50 mg  50 mg Oral HS PRN Sergo Jeronimo MD   50 mg at 06/28/18 2325     Home Meds:  Prescriptions Prior to Admission   Medication    Calcium Carb-Cholecalciferol (CALCIUM 500+D PO)    Coenzyme Q10 (CO Q 10 PO)    diclofenac (VOLTAREN) 75 mg EC tablet    gabapentin (NEURONTIN) 300 mg capsule    lisinopril-hydrochlorothiazide (PRINZIDE,ZESTORETIC) 20-12 5 MG per tablet    Multiple Vitamin (MULTIVITAMIN) tablet    mupirocin (BACTROBAN) 2 % ointment    pantoprazole (PROTONIX) 40 mg tablet    sucralfate (CARAFATE) 1 g/10 mL suspension    traMADol (ULTRAM) 50 mg tablet    traZODone (DESYREL) 50 mg tablet    acetaminophen (TYLENOL) 325 mg tablet         VTE Pharmacologic Prophylaxis:  as per Primary Ortho Team   VTE Mechanical Prophylaxis: sequential compression device    Portions of the record may have been created with voice recognition software  Occasional wrong word or "sound a like" substitutions may have occurred due to the inherent limitations of voice recognition software   Read the chart carefully and recognize, using context, where substitutions have occurred

## 2018-06-30 NOTE — PLAN OF CARE
Problem: PHYSICAL THERAPY ADULT  Goal: Performs mobility at highest level of function for planned discharge setting  See evaluation for individualized goals  Treatment/Interventions: Functional transfer training, LE strengthening/ROM, Elevations, Therapeutic exercise, Endurance training, Patient/family training, Equipment eval/education, Bed mobility, Gait training, Compensatory technique education, OT, Spoke to nursing (5-6x/wk)  Equipment Recommended:  Estella Parker)       See flowsheet documentation for full assessment, interventions and recommendations  Outcome: Progressing  Prognosis: Good  Problem List: Decreased strength, Decreased range of motion, Decreased endurance, Impaired balance, Decreased mobility, Decreased safety awareness, Obesity, Orthopedic restrictions, Pain  Assessment: Pt found seated in bedside chair prior to arrival and was willing to participate in therapy  She c/o pain in low back and reports she is having difficulty sleeping throughout the night  Pt was unable to recall spinal precautions this session and was re-educated on abiding by precautions when performing bed mobility, transfers, functional mobility, and ADL's  Pt demonstrates improved ability to perform transfers and bed mobility, of which she required Min A x2-1 and Mod A x2, respectively  Pt was  able to progress with ambulation distances  10' x1 , 104' x1 and 80' x1 with min A x1  Pt required 1 standing rest break secondary to pain and fatigue  Pt demonstrated ability to perform HEP seated in bedside chair and stated she routinely performs HEP when lying in bed  Pt was educated on proper techniques and safety with car transfers to prepare for d/c  Pt was repositioned in supine at end of session with SCD's and call bell in reach  Pt also required education on performance of log roll for technique, safety, and maintenance of spinal precautions   Pt was able to perform log roll with good carry over, but visible pain was noted during bed mobility  Pt will continue to benefit from short term PT due to decreased strength, ambulation, functional mobility, transfer performance, decreased awareness of spinal precautions, and decreased caregiver support at home and increased risk for falls  Barriers to Discharge: Decreased caregiver support  Barriers to Discharge Comments: Home alone most of the day  Recommendation: Short-term skilled PT     PT - OK to Discharge: Yes (to rehab)    See flowsheet documentation for full assessment

## 2018-06-30 NOTE — PLAN OF CARE
DISCHARGE PLANNING     Discharge to home or other facility with appropriate resources Adequate for Discharge        INFECTION - ADULT     Absence or prevention of progression during hospitalization Adequate for Discharge                      PAIN - ADULT     Verbalizes/displays adequate comfort level or baseline comfort level Adequate for Discharge        Potential for Falls     Patient will remain free of falls Adequate for Discharge        SAFETY ADULT     Maintain or return to baseline ADL function Adequate for Discharge     Maintain or return mobility status to optimal level Adequate for Discharge        Pt sleeping at this time

## 2018-06-30 NOTE — ASSESSMENT & PLAN NOTE
-s/p L4/5 L4-L5 decompression and fusion with placement of interbody spacer by Dr Teri Livingston ([facility]) 6/27/18  -patient with subjective improvement in pain symptoms in lower extremities  -discussed with Joe Yun PA-C - patient does NOT need an LSO (this was an error in his note)  -DVT ppx discussion:  also discussed with Joe Yun PA-C - SCDs only for DVT ppx per Dr Teri Livingston  However, upon further chart review and discussion with patient, patient has had previous DVT after one of her gastric bypass revision surgeries in 2006 (clot was located in her leg)    We have discussed risks/benefits and patient would prefer to start chemical prophylaxis, we have discussed Lovenox 40mg SQ daily injection with her consent   -Lumbar precautions   -Continue PT and OT

## 2018-06-30 NOTE — PROGRESS NOTES
POST-OP SPINE PROGRESS NOTE    Patient Name: Nelsy Perea  Patient MRN:  [id number]  Date of Service:  06/30/18 7:18 AM  Service: Spine      Subjective      Patient comfortable in bed    Findings:doing well postop  preop pain resolved  postop pain well controlled with meds    Objective      Physical Exam  Temp:  [97 2 °F (36 2 °C)-99 6 °F (37 6 °C)] 97 8 °F (36 6 °C)  HR:  [] 96  Resp:  [18] 18  BP: ()/(55-78) 122/78    Incision: clean, dry and intact, no erythema and no significant drainage    Sensation grossly intact    Motor function intact 5/5 without deficits BLE          Laboratory Studies  Recent Results (from the past 24 hour(s))   UA w Reflex to Microscopic w Reflex to Culture    Collection Time: 06/29/18  2:34 PM   Result Value Ref Range    Color, UA Yellow     Clarity, UA Slightly Cloudy     Specific [place], UA 1 025 1 003 - 1 030    pH, UA 5 5 4 5 - 8 0    Leukocytes, UA Negative Negative    Nitrite, UA Negative Negative    Protein, UA Trace (A) Negative mg/dl    Glucose,  (1/10%) (A) Negative mg/dl    Ketones, UA Trace (A) Negative mg/dl    Urobilinogen, UA 0 2 0 2, 1 0 E U /dl E U /dl    Bilirubin, UA Negative Negative    Blood, UA Negative >=2000 (4+), Trace-Intact, Negative   Urine Microscopic    Collection Time: 06/29/18  2:34 PM   Result Value Ref Range    RBC, UA 0-1 (A) None Seen, 0-5 /hpf    WBC, UA 1-2 (A) None Seen, 0-5, 5-55, 5-65 /hpf    Epithelial Cells Occasional None Seen, Occasional /hpf    Bacteria, UA Occasional None Seen, Occasional /hpf   CBC and differential    Collection Time: 06/30/18  5:53 AM   Result Value Ref Range    WBC 13 31 (H) 4 31 - 10 16 Thousand/uL    RBC 3 36 (L) 3 81 - 5 12 Million/uL    Hemoglobin 10 9 (L) 11 5 - 15 4 g/dL    Hematocrit 34 3 (L) 34 8 - 46 1 %     (H) 82 - 98 fL    MCH 32 4 26 8 - 34 3 pg    MCHC 31 8 31 4 - 37 4 g/dL    RDW 12 8 11 6 - 15 1 %    MPV 9 3 8 9 - 12 7 fL    Platelets 437 339 - 572 Thousands/uL    nRBC 0 /100 WBCs    Neutrophils Relative 78 (H) 43 - 75 %    Lymphocytes Relative 12 (L) 14 - 44 %    Monocytes Relative 8 4 - 12 %    Eosinophils Relative 2 0 - 6 %    Basophils Relative 0 0 - 1 %    Neutrophils Absolute 10 31 (H) 1 85 - 7 62 Thousands/µL    Lymphocytes Absolute 1 65 0 60 - 4 47 Thousands/µL    Monocytes Absolute 1 11 0 17 - 1 22 Thousand/µL    Eosinophils Absolute 0 21 0 00 - 0 61 Thousand/µL    Basophils Absolute 0 03 0 00 - 0 10 Thousands/µL   Comprehensive metabolic panel    Collection Time: 06/30/18  5:53 AM   Result Value Ref Range    Sodium 140 136 - 145 mmol/L    Potassium 3 4 (L) 3 5 - 5 3 mmol/L    Chloride 103 100 - 108 mmol/L    CO2 27 21 - 32 mmol/L    Anion Gap 10 4 - 13 mmol/L    BUN 13 5 - 25 mg/dL    Creatinine 1 01 0 60 - 1 30 mg/dL    Glucose 118 65 - 140 mg/dL    Calcium 8 6 8 3 - 10 1 mg/dL    AST 18 5 - 45 U/L    ALT 29 12 - 78 U/L    Alkaline Phosphatase 91 46 - 116 U/L    Total Protein 6 6 6 4 - 8 2 g/dL    Albumin 2 7 (L) 3 5 - 5 0 g/dL    Total Bilirubin 0 67 0 20 - 1 00 mg/dL    eGFR 61 ml/min/1 73sq m       Assessment / Plan:     POD #3 s/p TLIF L 4-5  Mobilize with PT/OT  Activity as tolerated in LSO brace    Discharge planning  Patient to rehab today      Jimmie Vidal PA-C

## 2018-07-01 LAB
ANION GAP SERPL CALCULATED.3IONS-SCNC: 7 MMOL/L (ref 4–13)
BASOPHILS # BLD AUTO: 0.03 THOUSANDS/ΜL (ref 0–0.1)
BASOPHILS NFR BLD AUTO: 0 % (ref 0–1)
BUN SERPL-MCNC: 11 MG/DL (ref 5–25)
CALCIUM SERPL-MCNC: 8.3 MG/DL (ref 8.3–10.1)
CHLORIDE SERPL-SCNC: 106 MMOL/L (ref 100–108)
CO2 SERPL-SCNC: 27 MMOL/L (ref 21–32)
CREAT SERPL-MCNC: 0.83 MG/DL (ref 0.6–1.3)
EOSINOPHIL # BLD AUTO: 0.21 THOUSAND/ΜL (ref 0–0.61)
EOSINOPHIL NFR BLD AUTO: 2 % (ref 0–6)
ERYTHROCYTE [DISTWIDTH] IN BLOOD BY AUTOMATED COUNT: 12.3 % (ref 11.6–15.1)
GFR SERPL CREATININE-BSD FRML MDRD: 77 ML/MIN/1.73SQ M
GLUCOSE SERPL-MCNC: 113 MG/DL (ref 65–140)
HCT VFR BLD AUTO: 30 % (ref 34.8–46.1)
HGB BLD-MCNC: 9.6 G/DL (ref 11.5–15.4)
IMM GRANULOCYTES # BLD AUTO: 0.04 THOUSAND/UL (ref 0–0.2)
IMM GRANULOCYTES NFR BLD AUTO: 0 % (ref 0–2)
LYMPHOCYTES # BLD AUTO: 1.81 THOUSANDS/ΜL (ref 0.6–4.47)
LYMPHOCYTES NFR BLD AUTO: 18 % (ref 14–44)
MCH RBC QN AUTO: 32.9 PG (ref 26.8–34.3)
MCHC RBC AUTO-ENTMCNC: 32 G/DL (ref 31.4–37.4)
MCV RBC AUTO: 103 FL (ref 82–98)
MONOCYTES # BLD AUTO: 0.84 THOUSAND/ΜL (ref 0.17–1.22)
MONOCYTES NFR BLD AUTO: 8 % (ref 4–12)
NEUTROPHILS # BLD AUTO: 7.2 THOUSANDS/ΜL (ref 1.85–7.62)
NEUTS SEG NFR BLD AUTO: 72 % (ref 43–75)
NRBC BLD AUTO-RTO: 0 /100 WBCS
PLATELET # BLD AUTO: 262 THOUSANDS/UL (ref 149–390)
PMV BLD AUTO: 8.7 FL (ref 8.9–12.7)
POTASSIUM SERPL-SCNC: 3.2 MMOL/L (ref 3.5–5.3)
RBC # BLD AUTO: 2.92 MILLION/UL (ref 3.81–5.12)
SODIUM SERPL-SCNC: 140 MMOL/L (ref 136–145)
WBC # BLD AUTO: 10.13 THOUSAND/UL (ref 4.31–10.16)

## 2018-07-01 PROCEDURE — 97116 GAIT TRAINING THERAPY: CPT

## 2018-07-01 PROCEDURE — 97166 OT EVAL MOD COMPLEX 45 MIN: CPT

## 2018-07-01 PROCEDURE — 80048 BASIC METABOLIC PNL TOTAL CA: CPT | Performed by: PHYSICAL MEDICINE & REHABILITATION

## 2018-07-01 PROCEDURE — 97162 PT EVAL MOD COMPLEX 30 MIN: CPT

## 2018-07-01 PROCEDURE — 97535 SELF CARE MNGMENT TRAINING: CPT

## 2018-07-01 PROCEDURE — 97530 THERAPEUTIC ACTIVITIES: CPT

## 2018-07-01 PROCEDURE — 85025 COMPLETE CBC W/AUTO DIFF WBC: CPT | Performed by: PHYSICAL MEDICINE & REHABILITATION

## 2018-07-01 RX ORDER — SIMETHICONE 80 MG
80 TABLET,CHEWABLE ORAL EVERY 6 HOURS PRN
Status: DISCONTINUED | OUTPATIENT
Start: 2018-07-01 | End: 2018-07-09

## 2018-07-01 RX ORDER — POTASSIUM CHLORIDE 20 MEQ/1
40 TABLET, EXTENDED RELEASE ORAL ONCE
Status: COMPLETED | OUTPATIENT
Start: 2018-07-01 | End: 2018-07-01

## 2018-07-01 RX ADMIN — SUCRALFATE 1000 MG: 1 SUSPENSION ORAL at 17:00

## 2018-07-01 RX ADMIN — GABAPENTIN 600 MG: 300 CAPSULE ORAL at 21:16

## 2018-07-01 RX ADMIN — SUCRALFATE 1000 MG: 1 SUSPENSION ORAL at 10:45

## 2018-07-01 RX ADMIN — METHOCARBAMOL 750 MG: 750 TABLET, FILM COATED ORAL at 14:28

## 2018-07-01 RX ADMIN — SENNOSIDES 8.6 MG: 8.6 TABLET, FILM COATED ORAL at 08:23

## 2018-07-01 RX ADMIN — ACETAMINOPHEN 650 MG: 325 TABLET ORAL at 05:23

## 2018-07-01 RX ADMIN — SIMETHICONE CHEW TAB 80 MG 80 MG: 80 TABLET ORAL at 19:10

## 2018-07-01 RX ADMIN — ACETAMINOPHEN 650 MG: 325 TABLET ORAL at 14:28

## 2018-07-01 RX ADMIN — HYDROCHLOROTHIAZIDE 12.5 MG: 12.5 TABLET ORAL at 08:31

## 2018-07-01 RX ADMIN — OXYCODONE HYDROCHLORIDE 10 MG: 10 TABLET ORAL at 10:46

## 2018-07-01 RX ADMIN — PANTOPRAZOLE SODIUM 40 MG: 40 TABLET, DELAYED RELEASE ORAL at 17:00

## 2018-07-01 RX ADMIN — GABAPENTIN 600 MG: 300 CAPSULE ORAL at 17:00

## 2018-07-01 RX ADMIN — TRAZODONE HYDROCHLORIDE 50 MG: 50 TABLET ORAL at 22:14

## 2018-07-01 RX ADMIN — ENOXAPARIN SODIUM 40 MG: 100 INJECTION SUBCUTANEOUS at 08:23

## 2018-07-01 RX ADMIN — OXYCODONE HYDROCHLORIDE 5 MG: 5 TABLET ORAL at 19:35

## 2018-07-01 RX ADMIN — LIDOCAINE 2 PATCH: 50 PATCH CUTANEOUS at 17:39

## 2018-07-01 RX ADMIN — PANTOPRAZOLE SODIUM 40 MG: 40 TABLET, DELAYED RELEASE ORAL at 08:23

## 2018-07-01 RX ADMIN — OXYCODONE HYDROCHLORIDE 10 MG: 10 TABLET, FILM COATED, EXTENDED RELEASE ORAL at 08:22

## 2018-07-01 RX ADMIN — OXYCODONE HYDROCHLORIDE 10 MG: 10 TABLET, FILM COATED, EXTENDED RELEASE ORAL at 21:15

## 2018-07-01 RX ADMIN — LISINOPRIL 20 MG: 20 TABLET ORAL at 08:31

## 2018-07-01 RX ADMIN — SUCRALFATE 1000 MG: 1 SUSPENSION ORAL at 21:16

## 2018-07-01 RX ADMIN — OXYCODONE HYDROCHLORIDE 10 MG: 10 TABLET ORAL at 01:24

## 2018-07-01 RX ADMIN — POTASSIUM CHLORIDE 40 MEQ: 1500 TABLET, EXTENDED RELEASE ORAL at 10:45

## 2018-07-01 RX ADMIN — METHOCARBAMOL 750 MG: 750 TABLET, FILM COATED ORAL at 01:24

## 2018-07-01 RX ADMIN — GABAPENTIN 600 MG: 300 CAPSULE ORAL at 08:22

## 2018-07-01 NOTE — PROGRESS NOTES
Pt c/o abdominal tenderness mostly to the RUQ  She also stated that she is bloated and passing gas  Pt denied nausea or vomiting  Pt stated she had two bowel movements today,however, she feels as if whenever she attempts to go to the bathroom she becomes sweaty which makes her doesn't want to eat  Called and spoke with Dr Tiffanie Flores regarding pt's concerns  He stated that he will address it on rounding since it doesn't appear emergent  Will continue to monitor pt

## 2018-07-01 NOTE — PROGRESS NOTES
OT LTG       07/01/18 0830   Rehab Team Goals   ADL Team Goal Patient will be independent with ADLs with least restrictive device upon completion of rehab program   Cognitive Team Goal Patient will return to premorbid level of cognitive activity upon completion of rehab program   Rehab Team Interventions   OT Interventions Self Care;Home Management; Therapeutic Exercise;Community Reintegration; Energy Conservation;Patient/Family Education   Eating Goal   QI: Eating Goal 06  Independent - Patient completes the activity by him/herself with no assistance from a helper  FIM Eating Goal Independant   Status Target goal - one week; Target goal - two weeks   Grooming Goal   QI: Oral Hygiene Goal 06  Independent - Patient completes the activity by him/herself with no assistance from a helper  FIM Grooming Goal Moderate [place]   Status Target goal - one week; Target goal - two weeks   Bathing Goal   QI: Shower/bathe self Goal 06  Independent - Patient completes the activity by him/herself with no assistance from a helper  FIM Bathing Goal Moderate [place]   Status Target goal - two weeks; Target goal - one week   Upper Body Dressing Goal   QI: Upper body dressing Goal 06  Independent - Patient completes the activity by him/herself with no assistance from a helper  FIM Upper Body Dressing Goal Moderate [place]   Status Target goal - one week; Target goal - two weeks   Lower Body Dressing Goal   QI: Lower body dressing Goal 06  Independent - Patient completes the activity by him/herself with no assistance from a helper  QI: Putting on/taking off footwear Goal 06  Independent - Patient completes the activity by him/herself with no assistance from a helper  FIM Lower Body Dressing Goal Moderate [place]   Status Target goal - one week; Target goal - two weeks   Toileting Goal   QI: Toileting hygiene Goal 06  Independent - Patient completes the activity by him/herself with no assistance from a helper  FIM Toileting Goal Moderate [place]   Status Target goal - one week; Target goal - two weeks   Toileting Transfer Goal   QI: Toilet transfer Goal 06  Independent - Patient completes the activity by him/herself with no assistance from a helper  FIM Toilet Transfer Goal Moderate [place]   Status Target goal - one week; Target goal - two weeks   Tub/Shower Transfer Goal   FIM Tub/ Shower Transfer Goal Modified [place]   Status Target goal - one week; Target goal - two weeks   Comprehension Goal   Comprehension Assist Level Moderate [place]   Status Target goal - one week; Target goal - two weeks   Expression Goal   Expression Assist Level Independant   Status Target goal - one week; Target goal - two weeks   Social Interaction Goal   Social Interaction Assist Level Independant   Status Target goal - one week; Target goal - two weeks   Problem Solving Goal   Problem Solving Assist Level Moderate [place]   Status Target goal - one week; Target goal - two weeks   Memory Goal   Memory Assist Level Moderate [place]   Status Target goal - one week; Target goal - two weeks   Object Retrieval Goal   QI: Picking up object Goal 06  Independent - Patient completes the activity by him/herself with no assistance from a helper  Assistive Device  Reacher   Home Management Goal   Goal Pt  will be able to manage 75% or more of daily pet care for 4 dogs modified I    Status Target - one week; Target - two weeks

## 2018-07-01 NOTE — PLAN OF CARE
Problem: Potential for Falls  Goal: Patient will remain free of falls  INTERVENTIONS:  - Assess patient frequently for physical needs  -  Identify cognitive and physical deficits and behaviors that affect risk of falls  -  [place] fall precautions as indicated by assessment   - Educate patient/family on patient safety including physical limitations  - Instruct patient to call for assistance with activity based on assessment  - Modify environment to reduce risk of injury  - Consider OT/PT consult to assist with strengthening/mobility   Outcome: Progressing      Problem: Nutrition/Hydration-ADULT  Goal: Nutrient/Hydration intake appropriate for improving, restoring or maintaining nutritional needs  Monitor and assess patient's nutrition/hydration status for malnutrition (ex- brittle hair, bruises, dry skin, pale skin and conjunctiva, muscle wasting, smooth red tongue, and disorientation)  Collaborate with interdisciplinary team and initiate plan and interventions as ordered  Monitor patient's weight and dietary intake as ordered or per policy  Utilize nutrition screening tool and intervene per policy  Determine patient's food preferences and provide high-protein, high-caloric foods as appropriate       INTERVENTIONS:  - Monitor oral intake, urinary output, labs, and treatment plans  - Assess nutrition and hydration status and recommend course of action  - Evaluate amount of meals eaten  - Assist patient with eating if necessary   - Allow adequate time for meals  - Recommend/ encourage appropriate diets, oral nutritional supplements, and vitamin/mineral supplements  - Order, calculate, and assess calorie counts as needed  - Recommend, monitor, and adjust tube feedings and TPN/PPN based on assessed needs  - Assess need for intravenous fluids  - Provide specific nutrition/hydration education as appropriate  - Include patient/family/caregiver in decisions related to nutrition   Outcome: Progressing      Problem: PAIN - ADULT  Goal: Verbalizes/displays adequate comfort level or baseline comfort level  Interventions:  - Encourage patient to monitor pain and request assistance  - Assess pain using appropriate pain scale  - Administer analgesics based on type and severity of pain and evaluate response  - Implement non-pharmacological measures as appropriate and evaluate response  - Consider cultural and social influences on pain and pain management  - Notify physician/advanced practitioner if interventions unsuccessful or patient reports new pain   Outcome: Progressing      Problem: INFECTION - ADULT  Goal: Absence or prevention of progression during hospitalization  INTERVENTIONS:  - Assess and monitor for signs and symptoms of infection  - Monitor lab/diagnostic results  - Monitor all insertion sites, i e  indwelling lines, tubes, and drains  - Monitor endotracheal (as able) and nasal secretions for changes in amount and color  - Home appropriate cooling/warming therapies per order  - Administer medications as ordered  - Instruct and encourage patient and family to use good hand hygiene technique  - Identify and instruct in appropriate isolation precautions for identified infection/condition   Outcome: Progressing    Goal: Absence of fever/infection during neutropenic period  INTERVENTIONS:  - Monitor WBC  - Implement neutropenic guidelines   Outcome: Progressing      Problem: SAFETY ADULT  Goal: Maintain or return to baseline ADL function  INTERVENTIONS:  -  Assess patient's ability to carry out ADLs; assess patient's baseline for ADL function and identify physical deficits which impact ability to perform ADLs (bathing, care of mouth/teeth, toileting, grooming, dressing, etc )  - Assess/evaluate cause of self-care deficits   - Assess range of motion  - Assess patient's mobility; develop plan if impaired  - Assess patient's need for assistive devices and provide as appropriate  - Encourage maximum independence but intervene and supervise when necessary  ¯ Involve family in performance of ADLs  ¯ Assess for home care needs following discharge   ¯ Request OT consult to assist with ADL evaluation and planning for discharge  ¯ Provide patient education as appropriate   Outcome: Progressing    Goal: Maintain or return mobility status to optimal level  INTERVENTIONS:  - Assess patient's baseline mobility status (ambulation, transfers, stairs, etc )    - Identify cognitive and physical deficits and behaviors that affect mobility  - Identify mobility aids required to assist with transfers and/or ambulation (gait belt, sit-to-stand, lift, walker, cane, etc )  - [place] fall precautions as indicated by assessment  - Record patient progress and toleration of activity level on Mobility SBAR; progress patient to next Phase/Stage  - Instruct patient to call for assistance with activity based on assessment  - Request Rehabilitation consult to assist with strengthening/weightbearing, etc    Outcome: Progressing      Problem: DISCHARGE PLANNING  Goal: Discharge to home or other facility with appropriate resources  INTERVENTIONS:  - Identify barriers to discharge w/patient and caregiver  - Arrange for needed discharge resources and transportation as appropriate  - Identify discharge learning needs (meds, wound care, etc )  - Arrange for interpretive services to assist at discharge as needed  - Refer to Case Management Department for coordinating discharge planning if the patient needs post-hospital services based on physician/advanced practitioner order or complex needs related to functional status, cognitive ability, or social support system   Outcome: Progressing

## 2018-07-01 NOTE — PROGRESS NOTES
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)   Prior IADL Participation   Money Management Identify Money;Estimate Costs;Estimate Change;Combine Bills;Manage Checkbook   Meal Preparation Full Participation   Laundry Full Participation   Home Cleaning Full Participation   Prior Level of Function   Self-Care 3  Independent - Patient completed the activities by him/herself, with or without an assistive device, with no assistance from a helper  Indoor-Mobility (Ambulation) 3  Independent - Patient completed the activities by him/herself, with or without an assistive device, with no assistance from a helper  Stairs 3  Independent - Patient completed the activities by him/herself, with or without an assistive device, with no assistance from a helper  Functional Cognition 3  Independent - Patient completed the activities by him/herself, with or without an assistive device, with no assistance from a helper  Prior Device Used (none)   Psychosocial   Psychosocial (WDL) WDL   Pain Assessment   Pain Assessment 0-10   Pain Score 8   Pain Type Surgical pain   Pain Location Back   Pain Descriptors Aching   Pain Frequency Constant/continuous   QI: Eating   Assistance Needed Independent   Eating CARE Score 6   Eating Assessment   Eating (FIM) 7 - Patient completely independent   QI: Oral Hygiene   Assistance Needed Set-up / clean-up   Oral Hygiene CARE Score 5   Grooming   Able To Initiate Tasks; Acquire Items;Brush/Clean Teeth;Wash/Dry Hands   Grooming (FIM) 5 - Patient requires supervision/monitoring   QI: Shower/Bathe Self Assistance Needed Physical assistance   Assistance Provided by [place] 50%-74%   Shower/Bathe Self CARE Score 2   Bathing   Assessed Bath Style Shower   Anticipated D/C Bath Style Shower   Able to [place] Flaco No   Able to [employer] Temperature No   Able to Wash/Rinse/Dry (body part) Left Arm;Right Arm;L Upper Leg;R Upper Leg;Chest;Abdomen;Perineal Area   Limitations Noted in Balance; Endurance   Positioning Seated;Standing   Findings  pt  required A to bathe buttock 2* decreased balance/endurance, decreased ROM BUE to adhere to back precautions  will require toilet aide   Bathing (FIM) 3 - Patient completes 5/10  6/10 or 7/10 parts   QI: Upper Body Dressing   Assistance Needed Physical assistance   Assistance Provided by [place] 25%-49%   Upper Body Dressing CARE Score 3   QI: Lower Body Dressing   Assistance Needed Physical assistance   Assistance Provided by [place] 75% or more   Lower Body Dressing CARE Score 2   QI: Putting On/Taking Off Footwear   Assistance Needed Physical assistance   Assistance Provided by [place] Total assistance   Putting On/Taking Off Footwear CARE Score 1   QI: [address] Provided by [place] Total assistance   Picking Up Object CARE Score 1   Dressing/Undressing [place]; Undergarment;Socks; Shoes   Limitations Noted In Balance; Endurance   Positioning Supported Sit;Standing   Findings Pt  limited by decreased strength/endurance and increased sx pain   UB Dressing (FIM) 4 - Patient completes 75% of all tasks   LB Dressing (FIM) 2 - Patient completes 25-49% of all tasks   QI: [address] Needed Physical assistance   Assistance Provided by [place] Total assistance   Toileting Hygiene CARE Score 1   Toileting   Able to [address] down no, up no     Limitations Noted In Balance;ROM;Safety   Findings pt  able to self initate management of clothing but required A   Toileting (FIM) 1 - Patient completes less than 25% of all tasks   Transfer Bed/Chair/Wheelchair   Limitations Noted In Balance; Endurance   Adaptive Equipment Roller Walker   Stand Pivot Minimal Assist   Sit to Stand Moderate Assist   Stand to Sit Minimal   Bed, Chair, Wheelchair Transfer (FIM) 3 - [place] needs to lift, boost or assist to stand OR sit   QI: Toilet Transfer   Assistance Needed Physical assistance   Assistance Provided by [place] 50%-74%   Toilet Transfer CARE Score 2   Toilet Transfer   Transfer Technique Standard   Limitations Noted In Balance; Endurance   Toilet Transfer (FIM) 3 - [place] needs to lift, boost or assist to stand OR sit   Tub/Shower Transfer   Limitations Noted In Balance; Endurance   Assessed Shower   Shower Transfer (FIM) 2 - [place] needs to lift or boost to rise AND assist to sit   Comprehension   QI: Comprehension 4  Undestands: Clear comprehension without cues or repetitions   Comprehension (FIM) 6 - Understands complex/abstract but requires hearing aid for auditory comprehension   Expression   QI: Expression 4  Express complex messages without difficulty and with speech that is clear and easy to [place]   Expression (FIM) 7 - Expresses complex/abstract ideas in a reasonable time w/o devices or helper  Social Interaction   Social Interaction (FIM) 7 - Interacts appropriately without assistive device, medication or helper   Problem Solving   Problem solving (FIM) 6 - Solves complex problems BUT requires extra time   Memory   Memory (FIM) 6 - Recognizes with extra time   RUE Assessment   RUE Assessment WFL  (3/5 grossly assessed 2* back precautions)   LUE Assessment   LUE Assessment WFL  (3/5 grossly assessed 2* back precautions)   Coordination   Movements are Fluid and Coordinated 1   Cognition   Overall Cognitive Status WFL   Arousal/Participation Alert; Cooperative   Attention Within functional limits   Orientation Level Oriented X4   Memory Within functional limits   Following Commands Follows all commands and directions without difficulty   Objective Measure   OT Findings Pt  did repor dizziness while ambulating to shower  once seated subside with rest  Pt  /92  recommend f/u orthostatics next tx session  Pt  has h/o gastic bypass and mutliple adominal sxs  Pt  reports scar tissue build up  Pt  would benefit from isometric abdominal strengthening  Pt  reports she recently started Paleo diet  nsg notified to consult nutritional services to A with adhering to diet  Discharge Information   Vocational Plan Return to work   Patient's Discharge Plan home with famly support   Patient's Rehab Expectations increase independence with mobility and daily routine activities  Impressions 63 y o  Female admitted with lumbar spinal stenosis with neurogneic cluadication  Pt  Underwent elective L4-L5 decompression and fusion with placement interbody spacer  Pt  Does not require back brace, but does have spianl precautions  Pt  Was I PTA  Pt  Was working 2 part times jobs approx  84 hrs/wk  Pt  Mother recently passed away  Pt  Son is currently moving out  Pt  dtr lives with her, but is not home up to 15hrs/day  Pt  Has 4 dogs varying in size and age  Pt  Will need to be mod I for safe d/c home  Pt  Has had 3-4 falls within the last 6 months  Pt  Reports her sensation has been better to BLE since sx  Pt  Will mostly likely need toileting aide  To initiate education use of LHAE to adhere to back precautions  Pt  Pain is a limiting factor with independence with ADL tasks   ELOS 7-10 days mod I    OT Therapy Minutes   OT Time In 0830   OT Time Out 1000   OT Total Time (minutes) 90   OT Mode of treatment - Individual (minutes) 90   OT Mode of treatment - Concurrent (minutes) 0   OT Mode of treatment - Group (minutes) 0   OT Mode of treatment - Co-treat (minutes) 0   OT Mode of Teatment - Total time(minutes) 90 minutes

## 2018-07-01 NOTE — CONSULTS
Consultation - Hank Simpson 63 y o  female MRN: [id number]    Unit/Bed#: -47 Encounter: [id number]        History of Present Illness     HPI: Hank Simpson is a 63 y o  female with PMH significant for HTN, GERD, previous gastric bypass surgery and repair in 2009, lumbar stenosis previous DVT who presented to the [address] [place] [address] on 6/27/18 for elective lumbar surgery as she failed conservative treatment  Patient was having progressive radicular symptoms into her left > right leg and progressive weakness  Patient was taken to the OR by Dr Shadi Lloyd ([facility]) on 6/27/18 for L4-L5 decompression and fusion with placement of interbody spacer  Post-operatively, placed on lumbar precautions  Patient was placed on SCDs only for DVT ppx as per Ortho  Patient subjectively feels improvement of her leg symptoms  Medically, patient had increased pain complaints and had her pain regimen adjusted  Patient was followed by internal medicine furthermore without any major complications  Patient with acute functional decline and after medical clearance to participate in an aggressive inpatient rehabilitation program, patient was [facility] to [facility] on 6/30/18  ROS:    Constitutional: Negative  HENT: Negative  Respiratory: Negative  Cardiovascular: Negative  Gastrointestinal: Negative  Musculoskeletal: Negative  Neurological: Negative  Psychiatric/Behavioral: Negative          Historical Information   Past Medical History:   Diagnosis Date    Arthritis     Asthma     Back pain     DVT (deep venous thrombosis) ([facility] [address] ) 2006    Gastric bypass status for obesity     GERD (gastroesophageal reflux disease)     Hiatal hernia     History of transfusion 2006    after gastric bypass surgery, no reactions    Hypertension     Irritable bowel syndrome     Kidney stone     Muscle weakness     bilateral legs    Numbness and tingling     bilateral feet  Pneumonia     Spinal stenosis     Tinnitus     occassionally    Wears glasses      Past Surgical History:   Procedure Laterality Date    APPENDECTOMY      BARIATRIC SURGERY      CARPAL TUNNEL RELEASE Right      SECTION      x1    CHOLECYSTECTOMY      COLONOSCOPY      COLONOSCOPY W/ BIOPSIES AND POLYPECTOMY      FLEXIBLE BRONCHOSCOPY W/ UPPER ENDOSCOPY      HERNIA REPAIR      inguinal hernia    [place] ARTHROCENTESIS ASPIR&/INJ SMALL JT/BURSA W/O US N/A 2018    Procedure: Coccygeal Injection & Ganglion Impar Block;  Surgeon: Tr Perez MD;  Location: [place] [address] MAIN OR;  Service: Pain Management     [place] ARTHRODESIS POSTERIOR INTERBODY LUMBAR N/A 2018    Procedure: L4-5 DECOMPRESSION INTERBODY INSTRUMENTED FUSION;  Surgeon: Tom Cabot, MD;  Location: [place] Main OR;  Service: Orthopedics    AL ESOPHAGOGASTRODUODENOSCOPY TRANSORAL DIAGNOSTIC N/A 2018    Procedure: ESOPHAGOGASTRODUODENOSCOPY (EGD); Surgeon: Felix Cabrera MD;  Location: [facility] GI LAB;   Service: Gastroenterology    AL [address] Right 2018    Procedure: Rt Sacroiliac Joint Injection;  Surgeon: Tr Perez MD;  Location: [place] [address] MAIN OR;  Service: Pain Management     TONSILECTOMY, ADENOIDECTOMY, BILATERAL MYRINGOTOMY AND TUBES       Social History   History   Alcohol Use    4 2 oz/week    5 Cans of beer, 2 Shots of liquor per week     History   Drug Use No     History   Smoking Status    Never Smoker   Smokeless Tobacco    Never Used     Family History   Problem Relation Age of Onset    Diabetes Mother     Aortic aneurysm Father     Cancer Father         prostate    Heart disease Sister         open heart    Cancer Sister         breast    Diabetes Brother     No Known Problems Daughter     Asperger's syndrome Son     Diabetes Maternal Grandfather     No Known Problems Brother        Meds/Allergies   current meds:  Current Facility-Administered Medications   Medication Dose Route Frequency    acetaminophen (TYLENOL) tablet 650 mg  650 mg Oral Q6H PRN    enoxaparin (LOVENOX) subcutaneous injection 40 mg  40 mg Subcutaneous Q24H KYLE    gabapentin (NEURONTIN) capsule 600 mg  600 mg Oral TID    lisinopril (ZESTRIL) tablet 20 mg  20 mg Oral Daily    And    hydrochlorothiazide (HYDRODIURIL) tablet 12 5 mg  12 5 mg Oral Daily    lidocaine (LIDODERM) 5 % patch 2 patch  2 patch Transdermal Daily    methocarbamol (ROBAXIN) tablet 750 mg  750 mg Oral Q6H PRN    ondansetron (ZOFRAN-ODT) dispersible tablet 4 mg  4 mg Oral Q6H PRN    oxyCODONE (OxyCONTIN) 12 hr tablet 10 mg  10 mg Oral Q12H KYLE    oxyCODONE (ROXICODONE) immediate release tablet 10 mg  10 mg Oral Q4H PRN    oxyCODONE (ROXICODONE) IR tablet 5 mg  5 mg Oral Q4H PRN    pantoprazole (PROTONIX) EC tablet 40 mg  40 mg Oral BID AC    senna (SENOKOT) tablet 8 6 mg  1 tablet Oral Daily    sucralfate (CARAFATE) oral suspension 1,000 mg  1,000 mg Oral 4x Daily (AC & HS)    traZODone (DESYREL) tablet 50 mg  50 mg Oral HS PRN       PTA meds:   Prescriptions Prior to Admission   Medication    Calcium Carb-Cholecalciferol (CALCIUM 500+D PO)    Coenzyme Q10 (CO Q 10 PO)    diclofenac (VOLTAREN) 75 mg EC tablet    gabapentin (NEURONTIN) 300 mg capsule    lisinopril-hydrochlorothiazide (PRINZIDE,ZESTORETIC) 20-12 5 MG per tablet    methocarbamol (ROBAXIN) 750 mg tablet    Multiple Vitamin (MULTIVITAMIN) tablet    mupirocin (BACTROBAN) 2 % ointment    oxyCODONE (OxyCONTIN) 10 mg 12 hr tablet    oxyCODONE (ROXICODONE) 10 MG TABS    pantoprazole (PROTONIX) 40 mg tablet    sucralfate (CARAFATE) 1 g/10 mL suspension    traZODone (DESYREL) 50 mg tablet     No Known Allergies    Objective   Vitals: Blood pressure 122/70, pulse 85, temperature 98 6 °F (37 °C), temperature source Oral, resp  rate 19, height 5' 5" (1 651 m), weight 110 kg (242 lb), SpO2 96 %  Physical Exam   Constitutional: She is oriented to person, place, and time  She appears well-developed and well-nourished  HENT:   Head: Normocephalic and atraumatic  Eyes: EOM are normal  Pupils are equal, round, and reactive to light  Cardiovascular: Normal rate and regular rhythm  Pulmonary/Chest: Breath sounds normal  She has no wheezes  She has no rales  Abdominal: Soft  Bowel sounds are normal  She exhibits no distension  There is no tenderness  Musculoskeletal:   Limited lumbar ROM due to pain   Neurological: She is alert and oriented to person, place, and time  Psychiatric: normal mood and effect    Lab Results:   Results from last 7 days  Lab Units 07/01/18  0549 06/30/18  0553   WBC Thousand/uL 10 13 13 31*   HEMOGLOBIN g/dL 9 6* 10 9*   HEMATOCRIT % 30 0* 34 3*   PLATELETS Thousands/uL 262 211       Results from last 7 days  Lab Units 07/01/18  0549 06/30/18  0553   SODIUM mmol/L 140 140   POTASSIUM mmol/L 3 2* 3 4*   CHLORIDE mmol/L 106 103   CO2 mmol/L 27 27   BUN mg/dL 11 13   CREATININE mg/dL 0 83 1 01   GLUCOSE RANDOM mg/dL 113 118   CALCIUM mg/dL 8 3 8 6       Results from last 7 days  Lab Units 06/30/18  0553   HEMOGLOBIN A1C % 5 3           Glucose (mg/dL)   Date Value   07/01/2018 113   06/30/2018 118   06/29/2018 157 (H)       Labs reviewed    Imaging: reviewed  EKG, Pathology, and Other Studies: I have personally reviewed pertinent reports  VTE Prophylaxis: Enoxaparin (Lovenox)    Code Status: Level 1 - Full Code   Advance Directive and Living Will:      Power of :    POLST:      Assessment/Plan     lumbar stenosis post lumbar surgery: PT/OT therapies to maximize function    Estimated length of stay: 7-10 days        Low back pain -Post-operative pain  -nociceptive pain: managed on Oxycontin 10mg [id number] scheduled x 4 days then discontinue as part of wean and Oxycodone 5-10mg [id number] PRN for breakthrough    -neuropathic pain: managed on Neurontin 600mg TID and topical lidoderm patches  -muscle spams: managed on Robaxin         Fever  Assessment & Plan    -has been present post-operatively with associated mild leukocytosis  -CXR, UA negative  -patient appears atoxic, incision C/D/I  -likely post-operative related fever  -if continues plan to obtain Blood cultures, venous dopplers BLE        Leukocytosis  Assessment & Plan    -WBC 13 8, will follow, trending down  -likely post-operative fever  -CXR/UA negative ordered during acute         HTN (hypertension)  Assessment & Plan  -managed on lisinopril/HCTZ       GERD without esophagitis  -managed on Protonix 40mg BID and Carafa        Hypokalemia- K-dur 40meq now          Counseling / Coordination of Care  Total floor / unit time spent today 45 minutes  Greater than 50% of total time was spent with the patient and / or family counseling and / or coordination of care        Eduardo Batista

## 2018-07-01 NOTE — PROGRESS NOTES
PT INITIAL EVALUATION      07/01/18 1230   Patient Data   Rehab Impairment Other Orthopaedic   Etiologic Diagnosis Lumbar stenosis with neurogenic claudication s/p L4-5 decompression and fusion   Date of Onset 06/27/18   Support System   Able to provide 24 hour supervision No   Home Setup   Type of Home Single Level   Method of Entry Stairs   Number of Stairs 2  (Two single steps to negotiate)   First Floor Bathroom Shower; Full   Home Modifications Necessary? 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)   Transportation    Prior Level of Function   Self-Care 3  Independent - Patient completed the activities by him/herself, with or without an assistive device, with no assistance from a helper  Indoor-Mobility (Ambulation) 3  Independent - Patient completed the activities by him/herself, with or without an assistive device, with no assistance from a helper  Stairs 3  Independent - Patient completed the activities by him/herself, with or without an assistive device, with no assistance from a helper  Functional Cognition 3  Independent - Patient completed the activities by him/herself, with or without an assistive device, with no assistance from a helper  Prior Device Used (None reported)   Psychosocial   Psychosocial (WDL) WDL   Restrictions/Precautions   Precautions Fall Risk;Spinal precautions;Pain   Pain Assessment   Pain Assessment 0-10   Pain Score 6   Pain Type Surgical pain   Pain Location Back   Pain Orientation Bilateral;Posterior; Lower   Pain Descriptors Aching;Spasm   Pain Frequency Constant/continuous   Hospital Pain Intervention(s) Cold applied;Medication (See MAR); Repositioned   QI: Roll Left and Right   Assistance Needed Physical assistance;Verbal cues   Assistance Provided by [place] 50%-74%   Comment Review of log roll technique   Roll Left and Right CARE Score 2   Bed Mobility   Able to Roll Left to Right;Right to Left   Adaptive Equipment Side Rails   QI: Sit to Lying   Assistance Needed Physical assistance   Assistance Provided by [place] 50%-74%   Sit to Lying CARE Score 2   QI: Lying to Sitting on Side of Bed   Assistance Needed Physical assistance   Assistance Provided by [place] 50%-74%   Lying to Sitting on Side of Bed CARE Score 2   QI: Sit to Stand   Assistance Needed Physical assistance   Assistance Provided by [place] 50%-74%   Sit to Stand CARE Score 2   Transfer Bed/Chair/Wheelchair   Limitations Noted In Balance;Pain Management;LE Strength;UE Strength;Confidence   Adaptive Equipment Roller Walker   Sit to Stand Moderate Assist   Stand to Sit Minimal   Supine to Sit Moderate Assist   Sit to Supine Moderate Assist   Bed, Chair, Wheelchair Transfer (FIM) 2 - Patient completes 25 - 49% of all tasks   QI: Walk 10 Feet   Assistance Needed Supervision; Adaptive equipment   Assistance Provided by [place] No physical assistance   Walk 10 Feet CARE Score 4   QI: Walk 50 Feet with Two Turns   Assistance Needed Supervision; Adaptive equipment   Assistance Provided by [place] No physical assistance   Walk 50 Feet with Two Turns CARE Score 4   QI: Walk 150 Feet   Assistance Needed Supervision; Adaptive equipment   Assistance Provided by [place] No physical assistance   Walk 150 Feet CARE Score 4   QI: Walking 10 Feet on Uneven Surfaces   Assistance Needed Supervision; Adaptive equipment   Assistance Provided by [place] No physical assistance   Walking 10 Feet on Uneven Surfaces CARE Score 4   Ambulation   Does the patient walk? 2   Yes   Primary Discharge Mode of Locomotion Walk   Walk Assist Level Supervision;Close Supervision   Gait Pattern Inconsistant Meghna;Decreased foot clearance;L foot drag;R foot drag;Trendelenburg   Assist Device Roller Walker   Distance Walked (feet) 150 ft   Limitations Noted In Balance; Heel Strike; Endurance;Speed;Strength;Swing   Walking (FIM) 5 - Patient requires supervision/monitoring AND distance 150 feet or more, no rest   Wheelchair mobility   QI: Does the patient use a wheelchair? 0  No   QI: 1 Step (Curb)   Assistance Needed Physical assistance; Adaptive equipment   Assistance Provided by [place] Less than 25%   Comment Curb step with RW   1 Step (Curb) CARE Score 3   QI: 4 Steps   Assistance Needed Verbal cues; Supervision   Assistance Provided by [place] No physical assistance   Comment Non reciprocal negotiation of 4 stairs with bilateral HR   4 Steps CARE Score 4   QI: 12 Steps   Reason if not Attempted Safety concerns   12 Steps CARE Score 88   Stairs   Type  Steps   # of Steps 6  (3 x 2)   Weight Bearing Precautions Fall Risk   Assist Devices Bilateral Rail   Findings Non reciprocal pattern with B HR   Stairs (FIM) 2 - Patient goes up and down 4 - 11 stairs regardless of assist/device/setup   Strength RLE   R Hip Flexion 3-/5   R Knee Extension 4-/5   R Ankle Dorsiflexion 5/5   R Ankle Plantar Flexion 5/5   Strength LLE   L Hip Flexion 3-/5   L Knee Extension 4-/5   L Ankle Dorsiflexion 4/5   L Ankle Plantar Flexion 5/5   RUE Assessment   RUE Assessment WFL   LUE Assessment   LUE Assessment WFL   Coordination   Movements are Fluid and Coordinated 1   Sensation   Light Touch No apparent deficits   Sharp/Dull No apparent deficits   Propioception No apparent deficits   Additional Comments localization brisk and accurate bilaterally   Cognition   Overall Cognitive Status WFL   Arousal/Participation Alert; Cooperative   Attention Within functional limits   Orientation Level Oriented X4   Memory Within functional limits   Following Commands Follows all commands and directions without difficulty   Discharge Information   Vocational Plan Return to work   Barriers to Return to [employer]; Other  (Significant positional changes required)   Patient's Discharge Plan Home with family support   Patient's Rehab Expectations Modified independence to return home with assist from daughter   Impressions Pt is a 64 yo female s/p L4-5 decompression and fusion with interbody spacer performed on 6/27/18  Pt with independent recall of spinal precautions  PMH significant for HTN, GERD, and gastric bypass surgery with complication and residual abdominal adhesions resulting in intolerance to true supine positioning, sleeping on a wedge in her home  She presents with lower extremity strength deficits, left > right, neural tension with passive motion left LE > right, impaired static and dynamic balance, deterioration in endurance, and gait deviations placing her at an increased risk for falls, most notably diminished foot clearance and shortened step length  Pt resides in single level home with two single steps to negotiate, no HRs, living with her daughter, 4 dogs, and 3 cats  Independent in all aspects of functional mobility and self care PTA, SPC only DME available  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, requiring significant repetitive position changes  Social history significant for several falls reportedly secondary to poor foot clearance  Pt demonstrates good rehab potential to meet established goals with estimated LOS of 7-10 days  Treatment today consisted of patient education on bed mobility techniques for better pain control, gait training with RW emphasizing normalized pattern and reduction in falls risk, and negotiation of elevations with RW and HR assistance  Pt will benefit from skilled PT intervention to address above impairements and maximize modified independence and safety with all fx mobility     PT Therapy Minutes   PT Time In 1230   PT Time Out 1400   PT Total Time (minutes) 90   PT Mode of treatment - Individual (minutes) 90   PT Mode of treatment - Concurrent (minutes) 0 PT Mode of treatment - Group (minutes) 0   PT Mode of treatment - Co-treat (minutes) 0   PT Mode of Teatment - Total time(minutes) 90 minutes

## 2018-07-02 LAB
ANION GAP SERPL CALCULATED.3IONS-SCNC: 7 MMOL/L (ref 4–13)
BASOPHILS # BLD AUTO: 0.04 THOUSANDS/ΜL (ref 0–0.1)
BASOPHILS NFR BLD AUTO: 1 % (ref 0–1)
BUN SERPL-MCNC: 13 MG/DL (ref 5–25)
CALCIUM SERPL-MCNC: 8 MG/DL (ref 8.3–10.1)
CHLORIDE SERPL-SCNC: 108 MMOL/L (ref 100–108)
CO2 SERPL-SCNC: 26 MMOL/L (ref 21–32)
CREAT SERPL-MCNC: 0.77 MG/DL (ref 0.6–1.3)
EOSINOPHIL # BLD AUTO: 0.19 THOUSAND/ΜL (ref 0–0.61)
EOSINOPHIL NFR BLD AUTO: 2 % (ref 0–6)
ERYTHROCYTE [DISTWIDTH] IN BLOOD BY AUTOMATED COUNT: 12.6 % (ref 11.6–15.1)
GFR SERPL CREATININE-BSD FRML MDRD: 84 ML/MIN/1.73SQ M
GLUCOSE SERPL-MCNC: 116 MG/DL (ref 65–140)
HCT VFR BLD AUTO: 30.1 % (ref 34.8–46.1)
HGB BLD-MCNC: 9.4 G/DL (ref 11.5–15.4)
IMM GRANULOCYTES # BLD AUTO: 0.04 THOUSAND/UL (ref 0–0.2)
IMM GRANULOCYTES NFR BLD AUTO: 1 % (ref 0–2)
LYMPHOCYTES # BLD AUTO: 1.28 THOUSANDS/ΜL (ref 0.6–4.47)
LYMPHOCYTES NFR BLD AUTO: 15 % (ref 14–44)
MCH RBC QN AUTO: 31.9 PG (ref 26.8–34.3)
MCHC RBC AUTO-ENTMCNC: 31.2 G/DL (ref 31.4–37.4)
MCV RBC AUTO: 102 FL (ref 82–98)
MONOCYTES # BLD AUTO: 0.78 THOUSAND/ΜL (ref 0.17–1.22)
MONOCYTES NFR BLD AUTO: 9 % (ref 4–12)
NEUTROPHILS # BLD AUTO: 6.24 THOUSANDS/ΜL (ref 1.85–7.62)
NEUTS SEG NFR BLD AUTO: 72 % (ref 43–75)
NRBC BLD AUTO-RTO: 0 /100 WBCS
PLATELET # BLD AUTO: 300 THOUSANDS/UL (ref 149–390)
PMV BLD AUTO: 8.7 FL (ref 8.9–12.7)
POTASSIUM SERPL-SCNC: 3.1 MMOL/L (ref 3.5–5.3)
RBC # BLD AUTO: 2.95 MILLION/UL (ref 3.81–5.12)
SODIUM SERPL-SCNC: 141 MMOL/L (ref 136–145)
WBC # BLD AUTO: 8.57 THOUSAND/UL (ref 4.31–10.16)

## 2018-07-02 PROCEDURE — 97530 THERAPEUTIC ACTIVITIES: CPT | Performed by: STUDENT IN AN ORGANIZED HEALTH CARE EDUCATION/TRAINING PROGRAM

## 2018-07-02 PROCEDURE — 97110 THERAPEUTIC EXERCISES: CPT | Performed by: PHYSICAL THERAPIST

## 2018-07-02 PROCEDURE — 80048 BASIC METABOLIC PNL TOTAL CA: CPT | Performed by: NURSE PRACTITIONER

## 2018-07-02 PROCEDURE — 99232 SBSQ HOSP IP/OBS MODERATE 35: CPT | Performed by: PHYSICAL MEDICINE & REHABILITATION

## 2018-07-02 PROCEDURE — 85025 COMPLETE CBC W/AUTO DIFF WBC: CPT | Performed by: NURSE PRACTITIONER

## 2018-07-02 PROCEDURE — 97530 THERAPEUTIC ACTIVITIES: CPT | Performed by: PHYSICAL THERAPIST

## 2018-07-02 RX ORDER — POTASSIUM CHLORIDE 20 MEQ/1
40 TABLET, EXTENDED RELEASE ORAL 2 TIMES DAILY
Status: COMPLETED | OUTPATIENT
Start: 2018-07-02 | End: 2018-07-02

## 2018-07-02 RX ORDER — LISINOPRIL 20 MG/1
20 TABLET ORAL DAILY
Status: DISCONTINUED | OUTPATIENT
Start: 2018-07-03 | End: 2018-07-09 | Stop reason: HOSPADM

## 2018-07-02 RX ORDER — GABAPENTIN 300 MG/1
300 CAPSULE ORAL 3 TIMES DAILY
Status: DISCONTINUED | OUTPATIENT
Start: 2018-07-02 | End: 2018-07-09 | Stop reason: HOSPADM

## 2018-07-02 RX ADMIN — GABAPENTIN 600 MG: 300 CAPSULE ORAL at 09:36

## 2018-07-02 RX ADMIN — PANTOPRAZOLE SODIUM 40 MG: 40 TABLET, DELAYED RELEASE ORAL at 05:39

## 2018-07-02 RX ADMIN — POTASSIUM CHLORIDE 40 MEQ: 1500 TABLET, EXTENDED RELEASE ORAL at 18:14

## 2018-07-02 RX ADMIN — SIMETHICONE CHEW TAB 80 MG 80 MG: 80 TABLET ORAL at 22:47

## 2018-07-02 RX ADMIN — SUCRALFATE 1000 MG: 1 SUSPENSION ORAL at 21:35

## 2018-07-02 RX ADMIN — OXYCODONE HYDROCHLORIDE 10 MG: 10 TABLET, FILM COATED, EXTENDED RELEASE ORAL at 09:38

## 2018-07-02 RX ADMIN — TRAZODONE HYDROCHLORIDE 50 MG: 50 TABLET ORAL at 21:35

## 2018-07-02 RX ADMIN — LISINOPRIL 20 MG: 20 TABLET ORAL at 09:36

## 2018-07-02 RX ADMIN — SUCRALFATE 1000 MG: 1 SUSPENSION ORAL at 15:18

## 2018-07-02 RX ADMIN — GABAPENTIN 300 MG: 300 CAPSULE ORAL at 15:18

## 2018-07-02 RX ADMIN — OXYCODONE HYDROCHLORIDE 10 MG: 10 TABLET, FILM COATED, EXTENDED RELEASE ORAL at 21:35

## 2018-07-02 RX ADMIN — HYDROCHLOROTHIAZIDE 12.5 MG: 12.5 TABLET ORAL at 09:36

## 2018-07-02 RX ADMIN — PANTOPRAZOLE SODIUM 40 MG: 40 TABLET, DELAYED RELEASE ORAL at 15:18

## 2018-07-02 RX ADMIN — OXYCODONE HYDROCHLORIDE 10 MG: 10 TABLET ORAL at 03:49

## 2018-07-02 RX ADMIN — METHOCARBAMOL 750 MG: 750 TABLET, FILM COATED ORAL at 09:38

## 2018-07-02 RX ADMIN — GABAPENTIN 300 MG: 300 CAPSULE ORAL at 21:35

## 2018-07-02 RX ADMIN — POTASSIUM CHLORIDE 40 MEQ: 1500 TABLET, EXTENDED RELEASE ORAL at 11:32

## 2018-07-02 RX ADMIN — OXYCODONE HYDROCHLORIDE 10 MG: 10 TABLET ORAL at 15:18

## 2018-07-02 RX ADMIN — SUCRALFATE 1000 MG: 1 SUSPENSION ORAL at 11:32

## 2018-07-02 RX ADMIN — SUCRALFATE 1000 MG: 1 SUSPENSION ORAL at 05:39

## 2018-07-02 RX ADMIN — OXYCODONE HYDROCHLORIDE 10 MG: 10 TABLET ORAL at 09:48

## 2018-07-02 RX ADMIN — LIDOCAINE 2 PATCH: 50 PATCH CUTANEOUS at 18:15

## 2018-07-02 RX ADMIN — ENOXAPARIN SODIUM 40 MG: 100 INJECTION SUBCUTANEOUS at 09:38

## 2018-07-02 RX ADMIN — METHOCARBAMOL 750 MG: 750 TABLET, FILM COATED ORAL at 03:57

## 2018-07-02 NOTE — PROGRESS NOTES
Progress Note - Gwendolyn Seay 63 y o  female MRN: [id number]    Unit/Bed#: [facility] 395-51 Encounter: [id number]            Subjective:   D/W patient strategies for effective use and timing of pain medication to control pain more effectively     Objective:     ROS  Gen: denies recent wt loss   Psych: denies mood change        Physical Exam:     Gen:        NAD   Neck:   trachea midline  Lungs:  respirations unlabored   Heart:    + S1 and S2   Abdomen:    Soft, non-tender  Extremities: no significant LE edema (soft tissue/adipose)   Psych: mood/affect appropriate  Neurologic: CN grossly intact, lt touch grossly intact, finger to nose without gross dysmetria b/l  5/5 UE B/L, 4/5 RLE, 3 to 4-/5 LLE (LE weakness may be pain limited)     Functional :  Mobility: sup  Tx: min-mod   ADLs: mod-max         Current Facility-Administered Medications:  acetaminophen 650 mg Oral Q6H PRN Barsaumyaa Life, MD   enoxaparin 40 mg Subcutaneous Q24H [address] Valdemarnettdane Life, MD   gabapentin 600 mg Oral TID Valdemarnetta Life, MD   lidocaine 2 patch Transdermal Daily Daniel Valladares, MD   [START ON 7/3/2018] lisinopril 20 mg Oral Daily JACINTO Barragan   methocarbamol 750 mg Oral Q6H PRN Barnetta Life, MD   ondansetron 4 mg Oral Q6H PRN Barnetta Life, MD   oxyCODONE 10 mg Oral Q12H [address] Barnetta Life, MD   oxyCODONE 10 mg Oral Q4H PRN Barnetta Life, MD   oxyCODONE 5 mg Oral Q4H PRN Barnetta Life, MD   pantoprazole 40 mg Oral BID AC Barnetta Life, MD   potassium chloride 40 mEq Oral BID [facility]xy [facility]   senna 1 tablet Oral Daily Barnetta Life, MD   simethicone 80 mg Oral Q6H PRN Barnetta Life, MD   sucralfate 1,000 mg Oral 4x Daily (AC & HS) Barnetta Life, MD   traZODone 50 mg Oral HS PRN Barnetta Life, MD         acetaminophen    methocarbamol    ondansetron    oxyCODONE    oxyCODONE    simethicone    traZODone      Assessment:  Patient is 64 yo female with hx of lumbar stenosis and having failed conservative measures underwent L4-5 decompression fusion surgery by Dr Bri Beaver on 6/27    Plan:    Rehabilitation: cont PT/OT for ambulatory/ADL dysfxn    Lumbar stenosis: s/p L4-5 decompression fusion surgery by Dr Bri Beaver on 6/27; lumbar spine precautions, per Dr Jose Grullon H&P she spoke with Rosalind Guzman PA-C and confirmed patient does not need LSO brace    Chronic pain: follows with Dr Netta Walsh and on home neurontin 300 TID, and diclofenac (NSAIDs on hold due to recent lumbar spine surgery)    GERD: at home on protonix 40 mg BID and carafate 1 g QID    HTN: at home on lisinopril-HCTZ 20-12 5 mg qd, currently on lisinopril 20 mg qd due to hypokalemia    ARPIT/insomnia: at home takes trazadone 50 mg HS prn; d/w patient that sedatives are not recommended for patients with ARPIT however pt refusing to give up the trazadone as per patient does not feel she has it any longer after bariatric surgery and subsequent wt loss, however has not had sleep study to confirm this and patient states she gave away her CPAP machine but will consider stopping trazadone and getting a new sleep study to confirm resolution or get a new CPAP machine if present    Abdominal pain/bloating/diarrhea 2/2 IBS vs small intestinal bacterial overgrowth: s/p rifaximin course which helped diarrhea component, follows with Dr Ruth Spear    Depression: patient was on sertraline 25 mg qd in the past but has not been on this medication in years per patient and mood has been stable without it     Post-op fevers: UA/Chest XR unimpressive for infxn, LE dopplers pending, blood cx NGTD    Hypokalemia: HCTZ stopped and repletion ordered by IM    Anemia: ABLA, Hg currently stable at 9 4    h/o DVT: provoked after bariatric surgery in 2006, was on SCDs only per surgical team in acute care however after Dr Jael Owusu weighed risks/benefits of pharmacologic DVT ppx and d/w patient given h/o provoked DVT after surgery Dr Jael Owusu and the patient have elected to have patient on pharmacologic DVT ppx in addition to SCDs    Incidental findings on CT A/P of 3/5/18    1) diverticulosis w/o diverticulitis: OP FU with PCP     2) small hiatal hernia: on PPI and carafate for GERD, OP FU with PCP    3) small fat containing supraumbilical hernia: OP FU with PCP with surgical referral at PCP's discretion     Other incidental findings:    4) wide QRS/RBBB: OP FU with PCP with further testing and/or cards referral at PCP's discretion

## 2018-07-02 NOTE — SOCIAL WORK
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

## 2018-07-02 NOTE — PROGRESS NOTES
07/02/18 1230   Pain Assessment   Pain Assessment 0-10   Pain Score 6   Restrictions/Precautions   Precautions Fall Risk;Pain;Spinal precautions   Weight Bearing Restrictions No   QI: Sit to Stand   Assistance Needed Supervision   Assistance Provided by [place] No physical assistance   Sit to Stand CARE Score 4   QI: Chair/Bed-to-Chair Transfer   Assistance Needed Supervision   Assistance Provided by [place] No physical assistance   Chair/Bed-to-Chair Transfer CARE Score 4   Transfer Bed/Chair/Wheelchair   Limitations Noted In Balance; Endurance   Adaptive Equipment Roller Walker   Sit to TXU Inge   Stand to W  R  Melonie, Chair, Wheelchair Transfer (FIM) 5 - Patient requires supervision/monitoring   Functional Standing Tolerance   Time 5 min 30 x2   Activity static standing with RW   Cognition   Overall Cognitive Status WFL   Arousal/Participation Alert   Attention Within functional limits   Orientation Level Oriented X4   Memory Within functional limits   Following Commands Follows all commands and directions without difficulty   Activity Tolerance   Activity Tolerance Patient tolerated treatment well   Assessment   Treatment Assessment Pt participates in OT session with focus on LB dressing, activity tolerance, standing tolerance, and transfers to increase I for d/c  Pt supervision sit to stand with RW and supervision functional mobility with RW with chair follow  Pt engaged in LB dressing activity with LHAE while seated in w/c  Pt engaged in bean bag pickup activity with LHAE while seated in w/c  Pt engaged in static standing activity with RW for pegboard activity for 2 different patterns and stood for 5 min 30 sec duration each time  Pt requires rest breaks t/o activity 2* decreased endurance  Pt will continue to benefit from activity tolerance, adls, and transfers  Pt will benefit from bed mobility activities next session     Prognosis Good   Problem List Decreased strength;Decreased range of motion;Decreased endurance; Impaired balance;Decreased mobility;Orthopedic restrictions;Pain   Plan   Treatment/Interventions Functional transfer training;LE strengthening/ROM; Endurance training   Progress Progressing toward goals   OT Therapy Minutes   OT Time In 1230   OT Time Out 1400   OT Total Time (minutes) 90   OT Mode of treatment - Individual (minutes) 90   OT Mode of treatment - Concurrent (minutes) 0   OT Mode of treatment - Group (minutes) 0   OT Mode of treatment - Co-treat (minutes) 0   OT Mode of Teatment - Total time(minutes) 90 minutes   Therapy Time missed   Time missed?  No

## 2018-07-02 NOTE — PROGRESS NOTES
Internal Medicine Progress Note  Patient: Ana Rosa Flores  Age/sex: 63 y o  female  Medical Record #: [id number]      ASSESSMENT/PLAN:  Ana Rosa Flores is seen and examined and management for following issues:       Lumbar stenosis; L4-5 decompression/fusion 6/27/18 Nayana Cochran): monitor incision; continue LSO brace         Fever:  Seems to have resolved currently but had been present post-operatively with associated mild leukocytosis; CXR, UA were negative; BCs are NG so far; incision C/D/I; likely post-operative related fever     Leukocytosis:  resolved     HTN :  Stable; on lisinoprilHCT 20/12 5 but stopping HCTZ since makes K+ level too low     GERD without esophagitis: continue Protonix 40mg BID and Carafate     Hypokalemia: given K-dur 40meq on 6/30 and 7/1; today, K+is 3 1 = will give 40 meq x 2 and stop the HCTZ since makes K+ too low    Abdominal discomfort with hx gastrectomy/numerous surgeries: had bloating/discomfort RUQ yesterday during day = now has resolved; she has no stomach 2/2 gastric bypass and six total surgeries for complications  Says has many abdominal adhesions which create issues of pain  Saw Dr Kush Kim in the office 2/27/18 for RUQ pain =  CT scan abdomen/pelvis (no acute changes); started rifaximin to treat her for small intestinal bacterial overgrowth and IBS-diarrhea; he said if her symptoms persist would  consider amitriptyline for neuropathic pain  Her EGD in 2/2018 showed small diverticulum at the level of the esophagojejunostomy anastomosis staple line    Subjective: Patient seen and examined   RUQ pain is better today    ROS:   GI: denies abdominal pain, change bowel habits or reflux symptoms  Neuro: No new neurologic changes  Respiratory: No Cough, SOB  Cardiovascular: No CP, palpitations     Scheduled Meds:    Current Facility-Administered Medications:  acetaminophen 650 mg Oral Q6H PRN Mike Lewis MD   enoxaparin 40 mg Subcutaneous Q24H Lizzie Pinon MD gabapentin 600 mg Oral TID Keeley Diaz MD   lisinopril 20 mg Oral Daily Keeley Diaz MD   And       hydrochlorothiazide 12 5 mg Oral Daily Keeley Diaz MD   lidocaine 2 patch Transdermal Daily Keeley Diaz MD   methocarbamol 750 mg Oral Q6H PRN Keeley Diaz MD   ondansetron 4 mg Oral Q6H PRN Keeley Diaz MD   oxyCODONE 10 mg Oral Q12H [facility] Keeley Diaz MD   oxyCODONE 10 mg Oral Q4H PRN Keeley Diaz MD   oxyCODONE 5 mg Oral Q4H PRN Keeley Diaz MD   pantoprazole 40 mg Oral BID AC Keeley Diaz MD   senna 1 tablet Oral Daily Keeley Diaz MD   simethicone 80 mg Oral Q6H PRN Keeley Diaz MD   sucralfate 1,000 mg Oral 4x Daily (AC & HS) Keeley Diaz MD   traZODone 50 mg Oral HS PRN Keeley Diaz MD       Labs:       Results from last 7 days  Lab Units 07/02/18  0549 07/01/18  0549   WBC Thousand/uL 8 57 10 13   HEMOGLOBIN g/dL 9 4* 9 6*   HEMATOCRIT % 30 1* 30 0*   PLATELETS Thousands/uL 300 262       Results from last 7 days  Lab Units 07/02/18  0549 07/01/18  0549   SODIUM mmol/L 141 140   POTASSIUM mmol/L 3 1* 3 2*   CHLORIDE mmol/L 108 106   CO2 mmol/L 26 27   BUN mg/dL 13 11   CREATININE mg/dL 0 77 0 83   GLUCOSE RANDOM mg/dL 116 113   CALCIUM mg/dL 8 0* 8 3       Results from last 7 days  Lab Units 06/30/18  0553   HEMOGLOBIN A1C % 5 3            Glucose (mg/dL)   Date Value   07/02/2018 116   07/01/2018 113   06/30/2018 118   06/29/2018 157 (H)       Labs reviewed    Physical Examination:  Vitals:   Vitals:    07/01/18 1842 07/01/18 2018 07/02/18 0535 07/02/18 0935   BP:  134/83 127/65 157/95   BP Location:  Left arm Right arm Left arm   Pulse:  87 84 95   Resp:  16 16    Temp: 98 6 °F (37 °C) 99 1 °F (37 3 °C) 98 8 °F (37 1 °C)    TempSrc:  Oral Oral    SpO2:  96% 94%    Weight:       Height:           GEN: NAD  HEENT: NC/AT  RESP: BBS w/o crackles/wheeze/rhonci; resp unlabored  CV: +S1 S2, regular rate, no rubs/murmurs  ABD: soft, NT, ND, normal BS   EXT: no edema  Neuro: AAO      [x ] Total time spent: 30 Mins and greater than 50% of this time was spent counseling/coordinating care  ** Please Note: Dragon 360 Dictation voice to text software may have been used in the creation of this document   **

## 2018-07-02 NOTE — PROGRESS NOTES
07/02/18 1000   Pain Assessment   Pain Assessment 0-10   Pain Score 8   Pain Type Neuropathic pain   Pain Location Back; Hip   Pain Orientation Bilateral   Pain Descriptors Shooting  (comes on at random times; decrease within a few sec)   Pain Frequency Intermittent   Hospital Pain Intervention(s) Rest;Ambulation/increased activity   Response to Interventions tolerated treatment w/ appropriate rest breaks   Restrictions/Precautions   Precautions Fall Risk;Pain;Spinal precautions   Weight Bearing Restrictions No   ROM Restrictions (spinal precautions)   Cognition   Overall Cognitive Status WFL   Arousal/Participation Alert; Cooperative   Following Commands Follows all commands and directions without difficulty   Comments able to recall spinal precautions   Subjective   Subjective Pt stated she took her pain meds prior to treatment    QI: Roll Left and Right   Assistance Needed Supervision;Verbal cues   Assistance Provided by Maria Stein No physical assistance   Comment review of log roll technique   Roll Left and Right CARE Score 4   QI: Sit to Lying   Assistance Needed Physical assistance   Assistance Provided by Maria Stein 50%-74%   Comment required assist with B/L LEs    Sit to Lying CARE Score 2   QI: Lying to Sitting on Side of Bed   Assistance Needed Incidental touching;Verbal cues   Assistance Provided by Maria Stein Less than 25%   Lying to Sitting on Side of Bed CARE Score 3   QI: Sit to [address] Provided by Maria Stein No physical assistance   Comment some cues for hand placement   Sit to Stand CARE Score 4   Transfer Bed/Chair/Wheelchair   Limitations Noted In Balance;Pain Management;LE Strength   Adaptive Equipment Walker   Sit to Stand Supervision   Stand to Sit Supervision   Supine to Sit Contact Guard   Sit to Supine Moderate Assist   Bed, Chair, Wheelchair Transfer (FIM) 3 - Maria Stein lifts two limbs during transfer   QI: [address] [facility] Provided by [place] No physical assistance   Walk 10 Feet CARE Score 4   QI: Walk 50 Feet with Two [address] Provided by [place] No physical assistance   Walk 50 Feet with Two Turns CARE Score 4   Ambulation   Does the patient walk? 2  Yes   Primary Discharge Mode of Locomotion Walk   Walk Assist Level Supervision   Gait Pattern Trendelenburg; Improper weight shift; Slow Meghna; Inconsistant Meghna   Assist Device Roller Walker   Distance Walked (feet) 80 ft   Limitations Noted In Balance;Strength;Speed; Endurance; Heel Strike   Findings limited due to increase in pain requriing rest break   Walking (FIM) 2 - Patient ambulates between 50 - 149 feet regardless of assist/device/set up   Wheelchair mobility   QI: Does the patient use a wheelchair? 0  No   QI: 4 Steps   Assistance Needed Incidental touching;Verbal cues   Assistance Provided by [place] Less than 25%   Comment non reciprocal pattern; use of B/L hand rails   4 Steps CARE Score 3   Stairs   Type Stairs   # of Steps 4   Assist Devices Bilateral Rail   Findings non reciprocal   Stairs (FIM) 2 - Patient goes up and down 4 - 11 stairs regardless of assist/device/setup   QI: Toilet Transfer   Assistance Needed Supervision   Assistance Provided by [place] No physical assistance   Toilet Transfer CARE Score 4   Toilet Transfer   Surface Assessed Standard Toilet   Limitations Noted In Balance;LE Strength; Safety   Adaptive Equipment Grab Bar   Toilet Transfer (FIM) 5 - Patient requires supervision/monitoring   Therapeutic Interventions   Strengthening B/L LAQ; B/L SAQ 5#; hooklying abduction w yellow band; hooklying adduction; attempted bridging with increased pain; standing hamstring curl;   Flexibility unable to tolerate B/L hamstring stretch due to increased pain; B/L gastroc stretch tolerable   Equipment Use   Parallel Bars B/L side step; attempted mini squats-- increased pain; attempted standing hip abduction    Assessment Treatment Assessment Pt participated in skilled PT with focus on LE strengthening and functional mobility including ambulation and transfers  Pt was able to recall her precautions however needed some reminders t/o session amanda with twisting  Pt was limited with ambulation due to increase in pain requiring rest breaks as needed  Attempted mat exercises for general LE strengthening to assist with ambulation, transfers, and bed mobility, however some exercises caused a shooting pain that subsided with rest and position change  Pt will continue to benefit from skilled PT with focus on improving LE strength, working on transfers especially with bed mobility while maintaining spinal precautions  Will focus on curb step next session  Problem List Decreased strength;Decreased range of motion;Decreased endurance; Impaired balance;Decreased mobility;Orthopedic restrictions;Pain;Obesity   Barriers to Discharge Inaccessible home environment;Decreased caregiver support   PT Barriers   Functional Limitation Car transfers; [facility] negotiation   Plan   Treatment/Interventions Gait training;Bed mobility; Equipment eval/education;Patient/family training; Endurance training; Therapeutic exercise;Elevations;LE strengthening/ROM; Functional transfer training   Progress Progressing toward goals   Recommendation   Recommendation Outpatient PT   Equipment Recommended Walker   PT Therapy Minutes   PT Time In 1000   PT Time Out 1130   PT Total Time (minutes) 90   PT Mode of treatment - Individual (minutes) 90   PT Mode of treatment - Concurrent (minutes) 0   PT Mode of treatment - Group (minutes) 0   PT Mode of treatment - Co-treat (minutes) 0   PT Mode of Teatment - Total time(minutes) 90 minutes   Therapy Time missed   Time missed?  No

## 2018-07-02 NOTE — TREATMENT PLAN
Individualized Plan of [place] 63 y o  female MRN: [id number]  Unit/Bed#: -95 Encounter: [id number]     PATIENT INFORMATION  ADMISSION DATE: 6/30/2018  2:02 PM JACOB CATEGORY: lumbar stenosis (NTSCI)    ADMISSION DIAGNOSIS: Lumbar stenosis [M48 061]  EXPECTED LOS: 1-2 wks     MEDICAL/FUNCTIONAL PROGNOSIS  Based on my assessment of the patient's medical conditions and current functional status, the prognosis for attaining medical and functional goals or the IRF stay is:  Fair     Medical Goals: pain control     ANTICIPATED DISCHARGE DISPOSITION AND SERVICES  COMMUNITY SETTING: home       ANTICIPATED FOLLOW-UP SERVICE:   Outpatient Therapy Services: PT/OT    DISCIPLINE SPECIFIC PLANS:  Required Disciplines & Services: PT/OT    REQUIRED THERAPY:  Therapy Hours per Day Days per Week Total Days   Physical Therapy 1 5 5 10   Occupational Therapy 1 5 5 10   Speech/Language Therapy 0 0 0   NOTE: Additional therapy time(s) may be added as appropriate to meet patient needs and to achieve functional goals          ANTICIPATED FUNCTIONAL OUTCOMES:  ADL: Patient will be independent with ADLs with least restrictive device upon completion of rehab program   Bladder/Bowel: Patient will return to premorbid level for bladder/bowel management upon completion of rehab program   Transfers: Patient will be independent with transfers with least restrictive device upon completion of rehab program   Locomotion: Patient will be independent with locomotion with least restrictive device upon completion of rehab program   Cognitive: Patient will return to premorbid level of cognitive activity upon completion of rehab program     DISCHARGE PLANNING NEEDS  Equipment needs: TBD       REHAB ANTICIPATED PARTICIPATION RESTRICTIONS:  None

## 2018-07-03 LAB
ANION GAP SERPL CALCULATED.3IONS-SCNC: 9 MMOL/L (ref 4–13)
BUN SERPL-MCNC: 12 MG/DL (ref 5–25)
CALCIUM SERPL-MCNC: 8.5 MG/DL (ref 8.3–10.1)
CHLORIDE SERPL-SCNC: 105 MMOL/L (ref 100–108)
CO2 SERPL-SCNC: 27 MMOL/L (ref 21–32)
CREAT SERPL-MCNC: 0.84 MG/DL (ref 0.6–1.3)
GFR SERPL CREATININE-BSD FRML MDRD: 76 ML/MIN/1.73SQ M
GLUCOSE P FAST SERPL-MCNC: 102 MG/DL (ref 65–99)
GLUCOSE SERPL-MCNC: 102 MG/DL (ref 65–140)
POTASSIUM SERPL-SCNC: 3.4 MMOL/L (ref 3.5–5.3)
SODIUM SERPL-SCNC: 141 MMOL/L (ref 136–145)

## 2018-07-03 PROCEDURE — 97530 THERAPEUTIC ACTIVITIES: CPT | Performed by: STUDENT IN AN ORGANIZED HEALTH CARE EDUCATION/TRAINING PROGRAM

## 2018-07-03 PROCEDURE — 97530 THERAPEUTIC ACTIVITIES: CPT

## 2018-07-03 PROCEDURE — 97110 THERAPEUTIC EXERCISES: CPT

## 2018-07-03 PROCEDURE — 80048 BASIC METABOLIC PNL TOTAL CA: CPT | Performed by: NURSE PRACTITIONER

## 2018-07-03 PROCEDURE — 97535 SELF CARE MNGMENT TRAINING: CPT | Performed by: STUDENT IN AN ORGANIZED HEALTH CARE EDUCATION/TRAINING PROGRAM

## 2018-07-03 PROCEDURE — 97116 GAIT TRAINING THERAPY: CPT

## 2018-07-03 PROCEDURE — 99231 SBSQ HOSP IP/OBS SF/LOW 25: CPT | Performed by: PHYSICAL MEDICINE & REHABILITATION

## 2018-07-03 RX ORDER — POTASSIUM CHLORIDE 20 MEQ/1
40 TABLET, EXTENDED RELEASE ORAL 2 TIMES DAILY
Status: COMPLETED | OUTPATIENT
Start: 2018-07-03 | End: 2018-07-03

## 2018-07-03 RX ORDER — DICYCLOMINE HYDROCHLORIDE 10 MG/1
10 CAPSULE ORAL
Status: DISCONTINUED | OUTPATIENT
Start: 2018-07-03 | End: 2018-07-09 | Stop reason: HOSPADM

## 2018-07-03 RX ADMIN — METHOCARBAMOL 750 MG: 750 TABLET, FILM COATED ORAL at 07:41

## 2018-07-03 RX ADMIN — PANTOPRAZOLE SODIUM 40 MG: 40 TABLET, DELAYED RELEASE ORAL at 05:53

## 2018-07-03 RX ADMIN — PANTOPRAZOLE SODIUM 40 MG: 40 TABLET, DELAYED RELEASE ORAL at 15:28

## 2018-07-03 RX ADMIN — DICYCLOMINE HYDROCHLORIDE 10 MG: 10 CAPSULE ORAL at 13:51

## 2018-07-03 RX ADMIN — TRAZODONE HYDROCHLORIDE 50 MG: 50 TABLET ORAL at 23:47

## 2018-07-03 RX ADMIN — GABAPENTIN 300 MG: 300 CAPSULE ORAL at 21:19

## 2018-07-03 RX ADMIN — SUCRALFATE 1000 MG: 1 SUSPENSION ORAL at 15:28

## 2018-07-03 RX ADMIN — METHOCARBAMOL 750 MG: 750 TABLET, FILM COATED ORAL at 13:51

## 2018-07-03 RX ADMIN — POTASSIUM CHLORIDE 40 MEQ: 1500 TABLET, EXTENDED RELEASE ORAL at 17:15

## 2018-07-03 RX ADMIN — SUCRALFATE 1000 MG: 1 SUSPENSION ORAL at 05:53

## 2018-07-03 RX ADMIN — LIDOCAINE 2 PATCH: 50 PATCH CUTANEOUS at 17:15

## 2018-07-03 RX ADMIN — LISINOPRIL 20 MG: 20 TABLET ORAL at 13:51

## 2018-07-03 RX ADMIN — SIMETHICONE CHEW TAB 80 MG 80 MG: 80 TABLET ORAL at 07:42

## 2018-07-03 RX ADMIN — SUCRALFATE 1000 MG: 1 SUSPENSION ORAL at 21:20

## 2018-07-03 RX ADMIN — ENOXAPARIN SODIUM 40 MG: 100 INJECTION SUBCUTANEOUS at 07:42

## 2018-07-03 RX ADMIN — OXYCODONE HYDROCHLORIDE 10 MG: 10 TABLET ORAL at 12:32

## 2018-07-03 RX ADMIN — GABAPENTIN 300 MG: 300 CAPSULE ORAL at 15:28

## 2018-07-03 RX ADMIN — SUCRALFATE 1000 MG: 1 SUSPENSION ORAL at 11:06

## 2018-07-03 RX ADMIN — OXYCODONE HYDROCHLORIDE 10 MG: 10 TABLET ORAL at 07:41

## 2018-07-03 RX ADMIN — DICYCLOMINE HYDROCHLORIDE 10 MG: 10 CAPSULE ORAL at 21:19

## 2018-07-03 RX ADMIN — GABAPENTIN 300 MG: 300 CAPSULE ORAL at 07:43

## 2018-07-03 RX ADMIN — SIMETHICONE CHEW TAB 80 MG 80 MG: 80 TABLET ORAL at 18:56

## 2018-07-03 RX ADMIN — SENNOSIDES 8.6 MG: 8.6 TABLET, FILM COATED ORAL at 07:42

## 2018-07-03 RX ADMIN — OXYCODONE HYDROCHLORIDE 10 MG: 10 TABLET, FILM COATED, EXTENDED RELEASE ORAL at 21:19

## 2018-07-03 RX ADMIN — OXYCODONE HYDROCHLORIDE 10 MG: 10 TABLET, FILM COATED, EXTENDED RELEASE ORAL at 07:43

## 2018-07-03 RX ADMIN — POTASSIUM CHLORIDE 40 MEQ: 1500 TABLET, EXTENDED RELEASE ORAL at 11:06

## 2018-07-03 RX ADMIN — OXYCODONE HYDROCHLORIDE 10 MG: 10 TABLET ORAL at 16:33

## 2018-07-03 NOTE — PROGRESS NOTES
Pt c/o pain at this time  Oxycodone 10mg given  Will continue to monitor patient's pain throughout therapy

## 2018-07-03 NOTE — PROGRESS NOTES
07/03/18 1430   Pain Assessment   Pain Assessment 0-10   Pain Score Worst Possible Pain   [place] FACES Pain Rating 6   Pain Type Acute pain;Surgical pain   Pain Location Back   Pain Descriptors Shooting; Sharp   Pain Frequency Intermittent   Pain Onset Ongoing   Patient's Stated Pain Goal No pain   Hospital Pain Intervention(s) Rest   Response to Interventions tolerated tx session   Restrictions/Precautions   Precautions Fall Risk;Spinal precautions;Pain   Weight Bearing Restrictions No   ROM Restrictions Yes  (spinal precautions)   Cognition   Overall Cognitive Status WFL   Arousal/Participation Alert; Cooperative   Attention Within functional limits   Subjective   Subjective pt reported 8/10 pain most of the time on the back which becomes 10/10 at times usually when ambulating  no reported increase in pain while using nu step  QI: Sit to Stand   Assistance Needed Incidental touching   Comment CG-CS   Sit to Stand CARE Score 4   QI: Chair/Bed-to-Chair Transfer   Assistance Needed Incidental touching   Comment CG-CS with RW   Chair/Bed-to-Chair Transfer CARE Score 4   Transfer Bed/Chair/Wheelchair   Limitations Noted In LE Strength;UE Strength;Confidence;Pain Management; Endurance   Adaptive Equipment Roller Walker   Stand Pivot Contact Guard;Supervision   Sit to [employer]   Stand to [employer], Chair, Wheelchair Transfer (FIM) 4 - Patient requires steadying assist or light touching   QI: Walk 10 Feet   Assistance Needed Supervision   Walk 10 Feet CARE Score 4   QI: Walk 50 Feet with Two Turns   Assistance Needed Supervision   Walk 50 Feet with Two Turns CARE Score 4   QI: Walk 150 Feet   Assistance Needed Supervision   Walk 150 Feet CARE Score 4   Ambulation   Does the patient walk? 2  Yes   Primary Discharge Mode of Locomotion Walk   Walk Assist Level Close Supervision   Gait Pattern Slow Meghna; Improper weight shift   Assist Device [employer] Walked (feet) 150 ft  (x2 )   Limitations Noted In Strength;Speed; Endurance   Findings PT did CF for safety    Walking (FIM) 5 - Patient requires supervision/monitoring AND distance 150 feet or more, no rest   QI: Wheel 50 Feet with Two Turns   Reason if not Attempted Activity not applicable   Wheel 50 Feet with Two Turns CARE Score 9   QI: Wheel 150 Feet   Reason if not Attempted Activity not applicable   Wheel 797 Feet CARE Score 9   Wheelchair mobility   QI: Does the patient use a wheelchair? 0  No   Wheelchair (FIM) 0 - Activity does not occur   QI: Toilet Transfer   Assistance Needed Supervision   Toilet Transfer CARE Score 4   Toilet Transfer   Surface Assessed Standard Toilet   Limitations Noted In Endurance;UE Strength;LE Strength   Adaptive Equipment Grab Bar   Findings relies on grab bar    Toilet Transfer (FIM) 5 - Patient requires supervision/monitoring   Equipment Use   NuStep L1 x 15 mins at seat distance 8 using bilat UE/LE   Assessment   Treatment Assessment Pt participated in chair/toilet transfer training, gait training and strengthening exercises  Pt able to complete chair/toilet transfers at -CG level however required cueing not to let go of the walker then used furnitures while moving around her room  also required cueing to keep walker infront prior to sitting down  although pt was steady with no LOB noted during those instances where she let go of the walker  Noted sudden onset of sharp back pain during amb only which stops pt in her tracks but then still able to resume activity after pain subsided   PT will continue to work on strengthening, improving safety and indep with mobilities especially with bed transfers   Family/Caregiver Present no   Barriers to Discharge Inaccessible home environment;Decreased caregiver support   PT Barriers   Physical Impairment Decreased strength;Decreased endurance;Decreased mobility;Pain;Orthopedic restrictions   Functional Limitation Car transfers; [facility] negotiation   Plan   Treatment/Interventions Functional transfer training;LE strengthening/ROM; Therapeutic exercise; Endurance training;Gait training;Spoke to nursing;OT   Progress Progressing toward goals   Recommendation   Recommendation Outpatient PT   Equipment Recommended Walker   PT Equipment ordered walker   Date ordered 07/03/18  (OT to submit form to CM )   PT - OK to Discharge No   PT Therapy Minutes   PT Time In 1430   PT Time Out 1500   PT Total Time (minutes) 30   PT Mode of treatment - Individual (minutes) 30   PT Mode of treatment - Concurrent (minutes) 0   PT Mode of treatment - Group (minutes) 0   PT Mode of treatment - Co-treat (minutes) 0   PT Mode of Teatment - Total time(minutes) 30 minutes   Therapy Time missed   Time missed?  No

## 2018-07-03 NOTE — PROGRESS NOTES
Progress Note - Jonathon Cifuentes 63 y o  female MRN: [id number]    Unit/Bed#: -01 Encounter: [id number]            Subjective:   Extensive d/w patient regarding GI history     Objective:     ROS  Gen: denies recent wt loss   Psych: denies mood change    Vitals:    07/03/18 0600   BP: 141/72   Pulse: 82   Resp: 20   Temp: 98 7 °F (37 1 °C)   SpO2: 97%         Physical Exam:     Gen:        NAD   Neck:   trachea midline  Lungs:  respirations unlabored   Heart:    + S1 and S2   Abdomen:    Soft, non-tender  Extremities: no significant LE edema (soft tissue/adipose)   Psych: mood/affect appropriate  Neurologic: CN grossly intact, lt touch grossly intact, finger to nose without gross dysmetria b/l  5/5 UE B/L, 4/5 RLE, 3 to 4-/5 LLE (LE weakness may be pain limited)     Functional :  Mobility: sup  Tx: min-mod   ADLs: mod-max         Current Facility-Administered Medications:  acetaminophen 650 mg Oral Q6H PRN Dawna Haney MD   enoxaparin 40 mg Subcutaneous Q24H [address] Dawna Haney MD   gabapentin 300 mg Oral TID Nora Alexander MD   lidocaine 2 patch Transdermal Daily Dawna Haney MD   lisinopril 20 mg Oral Daily JACINTO Barragan   methocarbamol 750 mg Oral Q6H PRN Dawna Haney MD   ondansetron 4 mg Oral Q6H PRN Dawna Haney MD   oxyCODONE 10 mg Oral Q12H [address] Dawna Haney MD   oxyCODONE 10 mg Oral Q4H PRN Dawna Haney MD   oxyCODONE 5 mg Oral Q4H PRN Dawna Haney MD   pantoprazole 40 mg Oral BID AC Dawna Haney MD   potassium chloride 40 mEq Oral BID JACINTO Kolb   senna 1 tablet Oral Daily Dawna Haney MD   simethicone 80 mg Oral Q6H PRN Dawna Haney MD   sucralfate 1,000 mg Oral 4x Daily (AC & HS) Dawna Haney MD   traZODone 50 mg Oral HS PRN Dawna Haney MD         acetaminophen    methocarbamol    ondansetron    oxyCODONE    oxyCODONE    simethicone    traZODone      Assessment:  Patient is 64 yo female with hx of lumbar stenosis and having failed conservative measures underwent L4-5 decompression fusion surgery by Dr Sierra Johnson on 6/27    Plan:    Rehabilitation: cont PT/OT for ambulatory/ADL dysfxn    Lumbar stenosis: s/p L4-5 decompression fusion surgery by Dr Sierra Johnson on 6/27; lumbar spine precautions, per Dr Ralph Edwards H&P she spoke with Terry Limon PA-C and confirmed patient does not need LSO brace    Chronic pain: follows with Dr Fanta Gold and on home neurontin 300 TID, and diclofenac (NSAIDs on hold due to recent lumbar spine surgery)    GERD: at home on protonix 40 mg BID and carafate 1 g QID    HTN: at home on lisinopril-HCTZ 20-12 5 mg qd, currently on lisinopril 20 mg qd only due to hypokalemia    ARPIT/insomnia: at home takes trazadone 50 mg HS prn; d/w patient that sedatives are not recommended for patients with ARPIT however pt refusing to give up the trazadone as per patient does not feel she has it any longer after bariatric surgery and subsequent wt loss, however has not had sleep study to confirm this and patient states she gave away her CPAP machine but will consider stopping trazadone and getting a new sleep study to confirm resolution or get a new CPAP machine if present    Abdominal pain/bloating/diarrhea/N/V: began in 7865 after complications of gastric bypass surgery, patient never had resolution of N/V/diarrhea/abd pain & bloating, had subsequent abdominal revision surgeries at [facility] in 2006 which did not provide relief and essentially learned to live with her symptoms; per patient has had worsening of symptoms 5 years ago and has been following with GI since without relief, recently switched GI doctors to Dr Candy Colmenares who suspects IBS vs small intestinal bacterial overgrowth as the etiology and prescribed course of rifaximin which helped diarrhea component but not other symptoms; OP FU with Dr Candy Colmenares    Depression: patient was on sertraline 25 mg qd in the past but has not been on this medication in years per patient and mood has been stable without it     Post-op fevers: UA/Chest XR unimpressive for infxn, blood cx NGTD, afebrile since 6/30     Hypokalemia: HCTZ stopped and repletion ordered and improved to 3 4 (LLN 3 5), further repletion ordered by IM     Anemia: ABLA, Hg currently stable at 9 4    h/o DVT: provoked after bariatric surgery in 2006, was on SCDs only per surgical team in acute care however after Dr Shanta Juárez weighed risks/benefits of pharmacologic DVT ppx and d/w patient given h/o provoked DVT after surgery Dr Shanta Juárez and the patient have elected to have patient on pharmacologic DVT ppx in addition to SCDs    Incidental findings on CT A/P of 3/5/18    1) diverticulosis w/o diverticulitis: OP FU with PCP     2) small hiatal hernia: on PPI and carafate for GERD, OP FU with PCP    3) small fat containing supraumbilical hernia: OP FU with PCP with surgical referral at PCP's discretion     Other incidental findings:    4) wide QRS/RBBB: OP FU with PCP with further testing and/or cards referral at PCP's discretion

## 2018-07-03 NOTE — PROGRESS NOTES
Internal Medicine Progress Note  Patient: Ana Rosa Flores  Age/sex: 63 y o  female  Medical Record #: [id number]      ASSESSMENT/PLAN:  Ana Rosa Flores is seen and examined and management for following issues:       Lumbar stenosis; L4-5 decompression/fusion 6/27/18 Nayana Cochran): monitor incision; continue LSO brace         Fever:  Seems to have resolved currently but had been present post-operatively with associated mild leukocytosis; CXR, UA were negative; BCs are NG so far; incision C/D/I; likely post-operative related fever     Leukocytosis:  resolved     HTN :  Stable; on lisinopril HCT 20/12 5 but stopping HCTZ since makes K+ level too low even despite replacement     GERD without esophagitis: continue Protonix 40mg BID and Carafate     Hypokalemia: given K-dur 40meq on 6/30 and 7/1; yesterday, K+was 3 1 = gave 40 meq x 2 and stopped the HCTZ since making K+ too low  Today K+ is 3 4 so will give 40 meq x 2 again and repeat BMP Thursday  Abdominal discomfort with hx gastrectomy/numerous surgeries: had bloating/discomfortRUQ Sunday during day =  resolved; hx gastric bypass with eventual gastrectomy/esophagojejunostomy and six total surgeries for complications  Says has many abdominal adhesions which create issues of pain  Saw Dr Kush Kim in the office 2/27/18 for RUQ pain =  CT scan abdomen/pelvis (no acute changes); started rifaximin to treat her for small intestinal bacterial overgrowth and IBS-diarrhea; he said if her symptoms persist would consider amitriptyline for neuropathic pain  Her EGD in 2/2018 showed small diverticulum at the level of the esophagojejunostomy anastomosis staple line  Today, having more RUQ burning and feels distended = wants to see GI ---> d/w primary service and they will handle this issue  Subjective: Patient seen and examined       ROS:   GI: denies abdominal pain, change bowel habits or reflux symptoms  Neuro: No new neurologic changes  Respiratory: No Cough, SOB  Cardiovascular: No CP, palpitations     Scheduled Meds:    Current Facility-Administered Medications:  acetaminophen 650 mg Oral Q6H PRN Calin Ovalle MD   enoxaparin 40 mg Subcutaneous Q24H [facility] Calin Ovalle MD   gabapentin 300 mg Oral TID Tina Galvan MD   lidocaine 2 patch Transdermal Daily Calin Ovalle MD   lisinopril 20 mg Oral Daily JACINTO Narayanan   methocarbamol 750 mg Oral Q6H PRN Calin Ovalle MD   ondansetron 4 mg Oral Q6H PRN Calin Ovalle MD   oxyCODONE 10 mg Oral Q12H [facility] Calin Ovalle MD   oxyCODONE 10 mg Oral Q4H PRN Calin Ovalle MD   oxyCODONE 5 mg Oral Q4H PRN Calin Ovalle MD   pantoprazole 40 mg Oral BID AC Calin Ovalle MD   senna 1 tablet Oral Daily Calin Ovalle MD   simethicone 80 mg Oral Q6H PRN Calin Ovalle MD   sucralfate 1,000 mg Oral 4x Daily (AC & HS) Calin Ovalle MD   traZODone 50 mg Oral HS PRN Calin Ovalle MD       Labs:       Results from last 7 days  Lab Units 07/02/18  0549 07/01/18  0549   WBC Thousand/uL 8 57 10 13   HEMOGLOBIN g/dL 9 4* 9 6*   HEMATOCRIT % 30 1* 30 0*   PLATELETS Thousands/uL 300 262       Results from last 7 days  Lab Units 07/03/18  0715 07/02/18  0549   SODIUM mmol/L 141 141   POTASSIUM mmol/L 3 4* 3 1*   CHLORIDE mmol/L 105 108   CO2 mmol/L 27 26   BUN mg/dL 12 13   CREATININE mg/dL 0 84 0 77   GLUCOSE RANDOM mg/dL 102 116   CALCIUM mg/dL 8 5 8 0*       Results from last 7 days  Lab Units 06/30/18  0553   HEMOGLOBIN A1C % 5 3              Glucose (mg/dL)   Date Value   07/03/2018 102   07/02/2018 116   07/01/2018 113   06/30/2018 118       Labs reviewed    Physical Examination:  Vitals:   Vitals:    07/02/18 0535 07/02/18 0935 07/02/18 2039 07/03/18 0600   BP: 127/65 157/95 130/58 141/72   BP Location: Right arm Left arm Left arm    Pulse: 84 95 94 82   Resp: 16  18 20   Temp: 98 8 °F (37 1 °C)  98 6 °F (37 °C) 98 7 °F (37 1 °C)   TempSrc: Oral  Oral Oral   SpO2: 94%  96% 97%   Weight:       Height:           GEN: NAD  HEENT: NC/AT  RESP: BBS w/o crackles/wheeze/rhonci; resp unlabored  CV: +S1 S2, regular rate, no rubs/murmurs  ABD: soft, NT, ND, normal BS   EXT: no edema  Neuro: AAO      [x ] Total time spent: 30 Mins and greater than 50% of this time was spent counseling/coordinating care  ** Please Note: Dragon 360 Dictation voice to text software may have been used in the creation of this document   **

## 2018-07-03 NOTE — PLAN OF CARE
Problem: Potential for Falls  Goal: Patient will remain free of falls  INTERVENTIONS:  - Assess patient frequently for physical needs  -  Identify cognitive and physical deficits and behaviors that affect risk of falls  -  [place] fall precautions as indicated by assessment   - Educate patient/family on patient safety including physical limitations  - Instruct patient to call for assistance with activity based on assessment  - Modify environment to reduce risk of injury  - Consider OT/PT consult to assist with strengthening/mobility   Outcome: Progressing      Problem: Nutrition/Hydration-ADULT  Goal: Nutrient/Hydration intake appropriate for improving, restoring or maintaining nutritional needs  Monitor and assess patient's nutrition/hydration status for malnutrition (ex- brittle hair, bruises, dry skin, pale skin and conjunctiva, muscle wasting, smooth red tongue, and disorientation)  Collaborate with interdisciplinary team and initiate plan and interventions as ordered  Monitor patient's weight and dietary intake as ordered or per policy  Utilize nutrition screening tool and intervene per policy  Determine patient's food preferences and provide high-protein, high-caloric foods as appropriate       INTERVENTIONS:  - Monitor oral intake, urinary output, labs, and treatment plans  - Assess nutrition and hydration status and recommend course of action  - Evaluate amount of meals eaten  - Assist patient with eating if necessary   - Allow adequate time for meals  - Recommend/ encourage appropriate diets, oral nutritional supplements, and vitamin/mineral supplements  - Order, calculate, and assess calorie counts as needed  - Recommend, monitor, and adjust tube feedings and TPN/PPN based on assessed needs  - Assess need for intravenous fluids  - Provide specific nutrition/hydration education as appropriate  - Include patient/family/caregiver in decisions related to nutrition   Outcome: Progressing      Problem: PAIN - ADULT  Goal: Verbalizes/displays adequate comfort level or baseline comfort level  Interventions:  - Encourage patient to monitor pain and request assistance  - Assess pain using appropriate pain scale  - Administer analgesics based on type and severity of pain and evaluate response  - Implement non-pharmacological measures as appropriate and evaluate response  - Consider cultural and social influences on pain and pain management  - Notify physician/advanced practitioner if interventions unsuccessful or patient reports new pain   Outcome: Progressing      Problem: INFECTION - ADULT  Goal: Absence or prevention of progression during hospitalization  INTERVENTIONS:  - Assess and monitor for signs and symptoms of infection  - Monitor lab/diagnostic results  - Monitor all insertion sites, i e  indwelling lines, tubes, and drains  - Monitor endotracheal (as able) and nasal secretions for changes in amount and color  - [place] appropriate cooling/warming therapies per order  - Administer medications as ordered  - Instruct and encourage patient and family to use good hand hygiene technique  - Identify and instruct in appropriate isolation precautions for identified infection/condition   Outcome: Progressing    Goal: Absence of fever/infection during neutropenic period  INTERVENTIONS:  - Monitor WBC  - Implement neutropenic guidelines   Outcome: Progressing      Problem: SAFETY ADULT  Goal: Maintain or return to baseline ADL function  INTERVENTIONS:  -  Assess patient's ability to carry out ADLs; assess patient's baseline for ADL function and identify physical deficits which impact ability to perform ADLs (bathing, care of mouth/teeth, toileting, grooming, dressing, etc )  - Assess/evaluate cause of self-care deficits   - Assess range of motion  - Assess patient's mobility; develop plan if impaired  - Assess patient's need for assistive devices and provide as appropriate  - Encourage maximum independence but intervene and supervise when necessary  ¯ Involve family in performance of ADLs  ¯ Assess for home care needs following discharge   ¯ Request OT consult to assist with ADL evaluation and planning for discharge  ¯ Provide patient education as appropriate   Outcome: Progressing    Goal: Maintain or return mobility status to optimal level  INTERVENTIONS:  - Assess patient's baseline mobility status (ambulation, transfers, stairs, etc )    - Identify cognitive and physical deficits and behaviors that affect mobility  - Identify mobility aids required to assist with transfers and/or ambulation (gait belt, sit-to-stand, lift, walker, cane, etc )  - [place] fall precautions as indicated by assessment  - Record patient progress and toleration of activity level on Mobility SBAR; progress patient to next Phase/Stage  - Instruct patient to call for assistance with activity based on assessment  - Request Rehabilitation consult to assist with strengthening/weightbearing, etc    Outcome: Progressing      Problem: DISCHARGE PLANNING  Goal: Discharge to home or other facility with appropriate resources  INTERVENTIONS:  - Identify barriers to discharge w/patient and caregiver  - Arrange for needed discharge resources and transportation as appropriate  - Identify discharge learning needs (meds, wound care, etc )  - Arrange for interpretive services to assist at discharge as needed  - Refer to Case Management Department for coordinating discharge planning if the patient needs post-hospital services based on physician/advanced practitioner order or complex needs related to functional status, cognitive ability, or social support system   Outcome: Progressing

## 2018-07-03 NOTE — PROGRESS NOTES
07/03/18 1001   Pain Assessment   Pain Assessment 0-10   Pain Score 8   Restrictions/Precautions   Precautions Spinal precautions;Pain; Fall Risk   Weight Bearing Restrictions No   ROM Restrictions Yes  (spinal precautions)   QI: Oral Hygiene   Assistance Needed Set-up / [address] [place] Provided by [place] No physical assistance   Oral Hygiene CARE Score 5   Grooming   Able To Initiate Tasks;Comb/Brush Hair;Brush/Clean Teeth   Grooming (FIM) 5 - [place] sets up supplies or applies device   QI: Shower/Bathe Self   Assistance Needed Physical assistance   Assistance Provided by [place] Less than 25%   Shower/Bathe Self CARE Score 3   Bathing   Assessed Bath Style Shower   Anticipated D/C Bath Style Shower   Able to [place] Flaco No   Able to [employer] Temperature Yes   Able to Wash/Rinse/Dry (body part) Left Arm;Right Arm;L Upper Leg;R Upper Leg;L Lower Leg/Foot;R Lower Leg/Foot; Abdomen; Chest;Perineal Area   Limitations Noted in Balance; Endurance;ROM;Safety;Strength   Positioning Seated;Standing   Bathing (FIM) 4 - Patient completes 8/10 or 9/10 parts   Tub/Shower Transfer   Limitations Noted In Balance; Endurance; Safety   Assessed Shower   Shower Transfer (FIM) 4 - Patient requires steadying assist or light touching   QI: Upper Body Dressing   Assistance Needed Set-up / [address] [place] Provided by [place] No physical assistance   Upper Body Dressing CARE Score 5   QI: Lower Body Dressing   Assistance Needed Physical assistance   Assistance Provided by [place] Less than 25%   Comment LHAE   Lower Body Dressing CARE Score 3   Dressing/Undressing Clothing   Remove UB Clothes Other  (dress)   Remove LB Clothes Shoes; Undergarment  (slip on sandals)   Don UB Clothes Other  (dress)   Don LB Clothes Undergarment; Shoes   Limitations Noted In Balance; Endurance;ROM   Adaptive Equipment Reacher   Positioning Supported Sit;Standing   UB Dressing (FIM) 5 - [place] sets up supplies or applies device   LB Dressing (FIM) 4 - Patient completes 75% of all tasks   QI: Sit to Stand   Assistance Needed Incidental touching   Assistance Provided by [place] No physical assistance   Sit to Stand CARE Score 4   QI: Chair/Bed-to-Chair Transfer   Assistance Needed Incidental touching   Assistance Provided by [place] No physical assistance   Chair/Bed-to-Chair Transfer CARE Score 4   Transfer Bed/Chair/Wheelchair   Limitations Noted In Balance; Endurance   Adaptive Equipment Roller Walker   Stand Pivot Minimal Assist   Sit to Stand Minimal   Stand to Sit Supervision   Supine to Sit Moderate Assist   Sit to Supine Moderate Assist   Bed, Chair, Wheelchair Transfer (FIM) 3 - [place] needs to lift, boost or assist to stand OR sit   QI: [address] Needed Incidental touching   Assistance Provided by [place] No physical assistance   Comment bladder management   Toileting Hygiene CARE Score 4   Toileting   Able to [address] down yes, up yes  Manage Hygiene Bladder   Limitations Noted In Balance;ROM;Safety   Adaptive Equipment Grab Bar   Toileting (FIM) 4 - Patient requires steadying assist or light touching   QI: Toilet Transfer   Assistance Needed Incidental touching   Assistance Provided by [place] No physical assistance   Toilet Transfer CARE Score 4   Toilet Transfer   Surface Assessed Standard Toilet   Transfer Technique Stand Pivot   Limitations Noted In Balance; Endurance   Adaptive Equipment Grab Bar   Toilet Transfer (FIM) 4 - Patient requires steadying assist or light touching   Cognition   Overall Cognitive Status WFL   Arousal/Participation Alert   Attention Within functional limits   Orientation Level Oriented X4   Memory Within functional limits   Following Commands Follows all commands and directions without difficulty   Activity Tolerance   Activity Tolerance Patient tolerated treatment well   Assessment   Treatment Assessment Pt participates in OT session with focus on bathing, dressing, grooming, transfers, toileting, and activity tolerance to increase I for d/c  Pt CGA functional mobility from w/c into hallway with RW support  Pt min A bathing with shower along with grab bar and LHAE  Pt requires A to wash buttocks 2* decreased ROM and spinal precautions  Pt setup UB dressing to don dress  Pt min A LB dressing to don underwear and slip on sandals  Pt CGA toileting for bladder management and requires steadying support during perineal hygiene and pulling up underwear  Pt mod A bed mobility sit to supine and supine to sit with log rolling technique  Pt will continue to benefit from activity tolerance, transfers, and standing tolerance  Prognosis Good   Problem List Decreased strength;Decreased range of motion;Decreased endurance; Impaired balance;Decreased mobility;Obesity; Decreased skin integrity;Orthopedic restrictions;Pain   Plan   Treatment/Interventions ADL retraining;Functional transfer training; Endurance training;Bed mobility   Progress Progressing toward goals   OT Therapy Minutes   OT Time In 1000   OT Time Out 1130   OT Total Time (minutes) 90   OT Mode of treatment - Individual (minutes) 90   OT Mode of treatment - Concurrent (minutes) 0   OT Mode of treatment - Group (minutes) 0   OT Mode of treatment - Co-treat (minutes) 0   OT Mode of Teatment - Total time(minutes) 90 minutes   Therapy Time missed   Time missed?  No

## 2018-07-04 PROCEDURE — 97110 THERAPEUTIC EXERCISES: CPT

## 2018-07-04 PROCEDURE — 97530 THERAPEUTIC ACTIVITIES: CPT

## 2018-07-04 PROCEDURE — 97112 NEUROMUSCULAR REEDUCATION: CPT

## 2018-07-04 RX ADMIN — SUCRALFATE 1000 MG: 1 SUSPENSION ORAL at 17:18

## 2018-07-04 RX ADMIN — OXYCODONE HYDROCHLORIDE 10 MG: 10 TABLET ORAL at 22:21

## 2018-07-04 RX ADMIN — OXYCODONE HYDROCHLORIDE 10 MG: 10 TABLET ORAL at 04:22

## 2018-07-04 RX ADMIN — GABAPENTIN 300 MG: 300 CAPSULE ORAL at 17:18

## 2018-07-04 RX ADMIN — PANTOPRAZOLE SODIUM 40 MG: 40 TABLET, DELAYED RELEASE ORAL at 17:17

## 2018-07-04 RX ADMIN — SENNOSIDES 8.6 MG: 8.6 TABLET, FILM COATED ORAL at 08:09

## 2018-07-04 RX ADMIN — METHOCARBAMOL 750 MG: 750 TABLET, FILM COATED ORAL at 04:22

## 2018-07-04 RX ADMIN — METHOCARBAMOL 750 MG: 750 TABLET, FILM COATED ORAL at 13:44

## 2018-07-04 RX ADMIN — SUCRALFATE 1000 MG: 1 SUSPENSION ORAL at 22:17

## 2018-07-04 RX ADMIN — SIMETHICONE CHEW TAB 80 MG 80 MG: 80 TABLET ORAL at 17:17

## 2018-07-04 RX ADMIN — OXYCODONE HYDROCHLORIDE 10 MG: 10 TABLET ORAL at 17:25

## 2018-07-04 RX ADMIN — OXYCODONE HYDROCHLORIDE 10 MG: 10 TABLET, FILM COATED, EXTENDED RELEASE ORAL at 08:09

## 2018-07-04 RX ADMIN — GABAPENTIN 300 MG: 300 CAPSULE ORAL at 22:17

## 2018-07-04 RX ADMIN — TRAZODONE HYDROCHLORIDE 50 MG: 50 TABLET ORAL at 22:21

## 2018-07-04 RX ADMIN — GABAPENTIN 300 MG: 300 CAPSULE ORAL at 08:09

## 2018-07-04 RX ADMIN — DICYCLOMINE HYDROCHLORIDE 10 MG: 10 CAPSULE ORAL at 10:58

## 2018-07-04 RX ADMIN — ENOXAPARIN SODIUM 40 MG: 100 INJECTION SUBCUTANEOUS at 08:10

## 2018-07-04 RX ADMIN — SIMETHICONE CHEW TAB 80 MG 80 MG: 80 TABLET ORAL at 04:22

## 2018-07-04 RX ADMIN — DICYCLOMINE HYDROCHLORIDE 10 MG: 10 CAPSULE ORAL at 17:18

## 2018-07-04 RX ADMIN — SUCRALFATE 1000 MG: 1 SUSPENSION ORAL at 10:58

## 2018-07-04 RX ADMIN — LIDOCAINE 2 PATCH: 50 PATCH CUTANEOUS at 17:18

## 2018-07-04 RX ADMIN — LISINOPRIL 20 MG: 20 TABLET ORAL at 08:09

## 2018-07-04 RX ADMIN — DICYCLOMINE HYDROCHLORIDE 10 MG: 10 CAPSULE ORAL at 22:17

## 2018-07-04 RX ADMIN — OXYCODONE HYDROCHLORIDE 10 MG: 10 TABLET ORAL at 13:44

## 2018-07-04 RX ADMIN — DICYCLOMINE HYDROCHLORIDE 10 MG: 10 CAPSULE ORAL at 07:36

## 2018-07-04 RX ADMIN — SUCRALFATE 1000 MG: 1 SUSPENSION ORAL at 07:35

## 2018-07-04 RX ADMIN — PANTOPRAZOLE SODIUM 40 MG: 40 TABLET, DELAYED RELEASE ORAL at 07:35

## 2018-07-04 NOTE — PROGRESS NOTES
OT     07/04/18 1230   Pain Assessment   Pain Assessment 0-10   Pain Score 7   Pain Type Acute pain   Pain Location Back   Pain Orientation Right   Pain Descriptors Aching;Cramping   Pain Frequency Intermittent   Pain Onset Ongoing   Clinical Progression Gradually improving   Effect of Pain on Daily Activities impacts pt ambulation  Restrictions/Precautions   Precautions Fall Risk;Spinal precautions;Pain   ROM Restrictions Yes  (spinal precautions)   QI: Sit to Stand   Assistance Needed Supervision   Assistance Provided by [place] No physical assistance   Comment RW   Sit to Stand CARE Score 4   QI: Chair/Bed-to-Chair Transfer   Assistance Needed Supervision   Assistance Provided by [place] No physical assistance   Comment RW   Chair/Bed-to-Chair Transfer CARE Score 4   Transfer Bed/Chair/Wheelchair   Limitations Noted In Balance; Endurance;Pain Management   Adaptive Equipment Roller Walker   Stand Pivot Supervision   Sit to Stand Supervision   Stand to Sit Supervision   Findings Pt S w/ RW and requires rest break 2* spasm/pain in R low back shooting into RLE  Bed, Chair, Wheelchair Transfer (FIM) 5 - Patient requires supervision/monitoring   Therapeutic Excerise-Strength   UE Strength Yes   Right Upper Extremity- Strength   R Position Seated   Equipment Theraband   R Weight/Reps/Sets light resistance theraband for 3x10 lateral chest pull and diagonal pull  RUE Strength Comment Pt seated edge of low mat w/ feet placed on 2 inch step to maintain postural alignment for b/l UE strengthening to maximize pt indep w/ ADLs and all fxnl transfers  Left Upper Extremity-Strength   LUE Strength Comment See RUE for detail  Cognition   Overall Cognitive Status WFL   Comments Pt recalls spinal precautions and demo G carryover to maintain      Assessment   Treatment Assessment Pt participated in skilled OT Tx session w/ foucs on endurance, UE strengthening, education on spinal precautions/body mechanics, and fxnl transfers  Pt ambulated CS w/ RW and w/c follow pt room <> OT gym  Pt required a brief seated rest break on the way back from OT gym to pt room 2* to spasm/pain in R low back  Pt reports pain subsided and pt able to cont to room without further difficulty  Pt reports bed at home is as low as mat and that pt has difficulty rising from that low surface  OT provided edu on proper body mechanics/positioning to A w/ ease of transfer from low mat  Pt receptive and demo inc ease of transfer after initial edu and demonstration  Pt demo G insight into deficits and highly motivated t/o session  Pt son present during session and supportive  Cont OT POC w/ focus on endurance, dynamic standing balance, UE strengthening, AE training, and maintaining spinal precautions during fxnl tasks to maximize pt indep w/ ADLs/IADLs and all fxnl transfers  Prognosis Good   Problem List Decreased strength;Decreased range of motion;Decreased endurance; Impaired balance;Decreased mobility;Pain;Orthopedic restrictions   Barriers to Discharge Inaccessible home environment;Decreased caregiver support   Plan   Treatment/Interventions ADL retraining;Functional transfer training;LE strengthening/ROM; Therapeutic exercise; Endurance training;Patient/family training;Equipment eval/education; Bed mobility; Compensatory technique education   Progress Progressing toward goals   Recommendation   OT Discharge Recommendation Outpatient OT   OT Therapy Minutes   OT Time In 1230   OT Time Out 1310   OT Total Time (minutes) 40   OT Mode of treatment - Individual (minutes) 40   OT Mode of treatment - Concurrent (minutes) 0   OT Mode of treatment - Group (minutes) 0   OT Mode of treatment - Co-treat (minutes) 0   OT Mode of Teatment - Total time(minutes) 40 minutes   Therapy Time missed   Time missed?  No

## 2018-07-04 NOTE — PCC OCCUPATIONAL THERAPY
Pt admitted s/p L4-5 decompression  Pt req overall CGA/CS for xfers with use of RW  Pt lives home with family however they are out of the house for most of the day  Pt cares for her 4 dogs of varying sizes and ages and will be req to be Mod I for all xfers and ADL fxn  Pt is progressing towards LTG however continues to benefit from skilled OT to address deficits in balance, endurance, act tolerance, and overall strength to inc indep with ADLs/IADLs and xfers

## 2018-07-04 NOTE — PROGRESS NOTES
07/04/18 1400   Pain Assessment   Pain Assessment 0-10   Pain Score 5   Pain Type Acute pain   Pain Location Back   Pain Orientation Lower   Pain Descriptors Shooting   Restrictions/Precautions   Precautions Fall Risk;Spinal precautions;Pain   Cognition   Overall Cognitive Status WFL   Arousal/Participation Alert; Cooperative   Attention Within functional limits   Orientation Level Oriented X4   Memory Within functional limits   Following Commands Follows all commands and directions without difficulty   Subjective   Subjective reports she is doing well and wants to see the fireworks   QI: Roll Left and Right   Assistance Needed Supervision   Comment rails   Roll Left and Right CARE Score 4   QI: Sit to Lying   Assistance Needed Supervision   Sit to Lying CARE Score 4   QI: Lying to Sitting on Side of Bed   Assistance Needed Supervision   Lying to Sitting on Side of Bed CARE Score 4   QI: Sit to Stand   Assistance Needed Supervision   Sit to Stand CARE Score 4   Bed Mobility   Adaptive Equipment Side Rails   Findings S   QI: Chair/Bed-to-Chair Transfer   Assistance Needed Supervision   Chair/Bed-to-Chair Transfer CARE Score 4   Transfer Bed/Chair/Wheelchair   Limitations Noted In UE Strength;LE Strength;Pain Management   Adaptive Equipment Roller Walker   Stand Pivot Supervision   Sit to Stand Supervision   Stand to Sit Supervision   Supine to Sit Supervision   Sit to Supine Supervision   Car Transfer Supervision   Bed, Chair, Wheelchair Transfer (FIM) 5 - Patient requires supervision/monitoring   QI: Car Transfer   Assistance Needed Supervision   Car Transfer CARE Score 4   QI: Walk 10 Feet   Assistance Needed Supervision   Walk 10 Feet CARE Score 4   QI: Walk 50 Feet with Two Turns   Assistance Needed Supervision   Walk 50 Feet with Two Turns CARE Score 4   QI: Walk 150 Feet   Assistance Needed Supervision   Walk 150 Feet CARE Score 4   Ambulation   Does the patient walk? 2   Yes   Primary Discharge Mode of Locomotion Walk   Walk Assist Level Supervision   Gait Pattern Inconsistant Meghna; Slow Meghna;Decreased foot clearance;L foot drag;Narrow DAWN;Step through; Improper weight shift   Assist Device Roller Vesta Polk Walked (feet) 350 ft  (x2)   Limitations Noted In Speed;Strength;Swing;Heel Strike   Walking (FIM) 5 - Patient requires supervision/monitoring AND distance 150 feet or more, no rest   Wheelchair mobility   QI: Does the patient use a wheelchair? 0   No   QI: 1 Step (Curb)   Assistance Needed Supervision   1 Step (Curb) CARE Score 4   QI: 4 Steps   Assistance Needed Supervision   4 Steps CARE Score 4   QI: 12 Steps   Assistance Needed Supervision   12 Steps CARE Score 4   Stairs   Type Stairs;Curb;Ramp   # of Steps 12   Weight Bearing Precautions Fall Risk;Cervical   Assist Devices Roller Walker;Bilateral Rail   Stairs (FIM) 5 - Patient requires supervision/monitoring AND goes up and down full flight (12- 14 stairs)   QI: Picking Up Object   Assistance Needed Supervision   Comment reacher   Picking Up Object CARE Score 4   QI: Toilet Transfer   Assistance Needed Supervision   Toilet Transfer CARE Score 4   Toilet Transfer   Surface Assessed Standard Toilet   Toilet Transfer (FIM) 5 - Patient requires supervision/monitoring   Therapeutic Interventions   Strengthening LAQ, standing marches 2 5# CW 15x3   Flexibility hamstring and gastroc 60"x2   Assessment   Treatment Assessment session focused on functional mobility and activity and strengthening BLE; pt is able to go up and down 12 stair with non-reciprocal pattern but was able to demonstrate reciprocal pattern for a few, uses BHR but has no HR at home; S for curb and ramp with good balance and proper sequencing; pt has good tolerance for standing but ocassionally has muscle spasms that she needs to stop and stand for 30 seconds; needs assistance with problem solving for bed mobility and she uses rails at thsi time with no rails at home; continue POC as per PT   Barriers to Discharge Inaccessible home environment;Decreased caregiver support   PT Barriers   Physical Impairment Decreased strength;Decreased mobility;Pain;Orthopedic restrictions;Decreased range of motion   Functional Limitation Transfers   Plan   Treatment/Interventions ADL retraining;LE strengthening/ROM; Elevations; Therapeutic exercise; Endurance training;Patient/family training;Bed mobility;Gait training   Progress Progressing toward goals   Recommendation   Recommendation Outpatient PT; Home with family support   Equipment Recommended Walker   PT Therapy Minutes   PT Time In 1400   PT Time Out 1500   PT Total Time (minutes) 60   PT Mode of treatment - Individual (minutes) 60   PT Mode of treatment - Concurrent (minutes) 0   PT Mode of treatment - Group (minutes) 0   PT Mode of treatment - Co-treat (minutes) 0   PT Mode of Teatment - Total time(minutes) 60 minutes   Therapy Time missed   Time missed?  No

## 2018-07-04 NOTE — PROGRESS NOTES
07/04/18 1100   Pain Assessment   Pain Assessment 0-10   Pain Score 6   Pain Type Acute pain   Pain Location Back   Pain Orientation Lower   Pain Descriptors Shooting   Restrictions/Precautions   Precautions Fall Risk;Spinal precautions;Pain   Cognition   Overall Cognitive Status WFL   Arousal/Participation Alert; Cooperative   Attention Within functional limits   Orientation Level Oriented X4   Memory Within functional limits   Following Commands Follows all commands and directions without difficulty   Subjective   Subjective reports "lightning bolts of pain"   QI: Sit to Stand   Assistance Needed Supervision   Sit to Stand CARE Score 4   QI: Chair/Bed-to-Chair Transfer   Assistance Needed Supervision   Chair/Bed-to-Chair Transfer CARE Score 4   Transfer Bed/Chair/Wheelchair   Limitations Noted In UE Strength;LE Strength;Pain Management   Adaptive Equipment Roller Walker   Stand Pivot Supervision   Sit to Stand Supervision   Stand to W  R  Melon[place], Chair, Wheelchair Transfer (FIM) 5 - Patient requires supervision/monitoring   QI: Walk 10 Feet   Assistance Needed Supervision   Walk 10 Feet CARE Score 4   QI: Walk 50 Feet with Two Turns   Assistance Needed Supervision   Walk 50 Feet with Two Turns CARE Score 4   QI: Walk 150 Feet   Assistance Needed Supervision   Walk 150 Feet CARE Score 4   Ambulation   Does the patient walk? 2  Yes   Primary Discharge Mode of Locomotion Walk   Walk Assist Level Supervision   Gait Pattern Inconsistant Meghna; Slow Meghna; Step through   Assist Device Roller Vesta Polk Walked (feet) 350 ft  (x2)   Limitations Noted In Speed;Strength;Swing   Walking (FIM) 5 - Patient requires supervision/monitoring AND distance 150 feet or more, no rest   Assessment   Treatment Assessment pt has good safety awareness for xfers and use of RW; pt has good endurance for amb and does not need rest breaks; occasionally stops for pain in her low back that is described as "lightning bolts"; pt ambs with good posture and equal steps; continue POC as per PT   Problem List Decreased strength;Decreased range of motion;Decreased endurance;Decreased mobility;Orthopedic restrictions;Pain   Barriers to Discharge Inaccessible home environment;Decreased caregiver support   PT Barriers   Functional Limitation Car transfers; Ramp negotiation;Stair negotiation; Walking   Plan   Treatment/Interventions ADL retraining;LE strengthening/ROM; Elevations; Therapeutic exercise; Endurance training;Patient/family training;Bed mobility;Gait training   Progress Progressing toward goals   Recommendation   Recommendation Outpatient PT; Home with family support   Equipment Recommended Walker   PT Therapy Minutes   PT Time In 1100   PT Time Out 1130   PT Total Time (minutes) 30   PT Mode of treatment - Individual (minutes) 30   PT Mode of treatment - Concurrent (minutes) 0   PT Mode of treatment - Group (minutes) 0   PT Mode of treatment - Co-treat (minutes) 0   PT Mode of Teatment - Total time(minutes) 30 minutes   Therapy Time missed   Time missed?  No

## 2018-07-04 NOTE — PCC PHYSICAL THERAPY
7/4/2018 JM, PTA  Pt is on ARC post spinal surgery on L4-L5; pt has decreased strength in BLE and has pain that is described as, "lightning bolts"; pt has good balance, safety awareness, and endurance; pt manages her RW well and uses a reacher in her room to grab things that are not in reach; she has a positive attitude and she is motivated to get better to go home; pt has difficulty with bed mobility (rolling, scoot up/down) uses rails and needs assistance with problem solving on how to move to reduce pain and follow precautions; continue POC as per PT

## 2018-07-04 NOTE — CONSULTS
Consultation - Neuropsychology    Tomas Mack 63 y o  female MRN: [id number]  Unit/Bed#: -88 Encounter: [id number]    Assessment/Plan     Assessment:  Pt denied history of major depression, anxiety or other mental health concerns  Psychosocial stressors include: recent spine surgery and related deficits; past bariatric surgery that had complications and pt has had current and ongoing gastrointestinal issues from this; maintaining full time employment while having medical issues; pt  is terminally ill and resides in a nursing facility; pt reported her adult son is diagnosed with autism (Aspergers disorder) and she helps care for him (he lives with pt); death of pt mother seven months ago  Despite these numerous stressors; pt has been coping with their medical issues and is adjusting to rehab needs  Pt reported having "a positive attitude" and has been resilient throughout these issues; she utilizes prayer and social interactions to help reduce stress  Family and social support includes: one son, one daughter who live with pt (ages 22 and 24); numerous friends  Pt reported motivation to participate in rehab program and work on rehab goals  Dx: F43 23 Adjustment disorder with depressed and anxious mood  Code: [id number]    Plan:   Pt will be afforded rehab psychology services while at [facility] to help pt cope with illness  History of Present Illness   Physician Requesting Consult: Laxmi Nevarez MD  Reason for Consult / Principal Problem: coping, adjustment  Patient is a 63 y o  female   Primary complaints include: anxiety, chronic pain, concern about health problems, difficulty sleeping, fearfulness, feeling depressed, stressed at work, tearfulness and trauma recollections  Psychosocial Stressors: health  Inpatient consult to Neuropsychology  Consult performed by: Andreina Nurse ordered by: Todd Koo          Psychiatric Review Of Systems:  sleep: yes, prescribed Trazodone  appetite changes: no  weight changes: no  energy/anergy: yes  interest/pleasure/anhedonia: no  somatic symptoms: yes  anxiety/panic: yes  huma: no  guilty/hopeless: no  self injurious behavior/risky behavior: no    Historical Information   Past Psychiatric History:   None    Substance Abuse History:  Use of Alcohol: moderate how often 5 cans of beer, 2 shots of liquor per week  and 0 out of 4 on CAGE    Smoking history: none noted  Family Psychiatric History:   Psychiatric Illness son Illness: autism (Asperger's disorder)    Social History  Education: college graduate  Marital history:   Living arrangement, social support: The patient lives in home with daughter, age 24 and son, age 22  Occupational History: full time ; part time     Functioning Relationships: good support system, strained with spouse or significant others, gets along well with co-workers and good relationship with children  Other Pertinent History: pt  is terminally ill and lives in a nursing facility; pt helps care for her adult son who is diagnosed with autism  Traumatic History:   Abuse: none noted  Other Traumatic Events: other traumatic events: 's illness; death of mother; ongoing medical issues       Past Medical History:   Diagnosis Date    Arthritis     Asthma     Back pain     DVT [facility]deep venous thrombosis) ([facility] [address] ) 2006    Gastric bypass status for obesity     GERD (gastroesophageal reflux disease)     Hiatal hernia     History of transfusion 2006    after gastric bypass surgery, no reactions    Hypertension     Irritable bowel syndrome     Kidney stone     Muscle weakness     bilateral legs    Numbness and tingling     bilateral feet    Pneumonia     Spinal stenosis     Tinnitus     occassionally    Wears glasses        Medical Review Of Systems:  Review of Systems    Meds/Allergies   current meds:   Current Facility-Administered Medications Medication Dose Route Frequency    acetaminophen (TYLENOL) tablet 650 mg  650 mg Oral Q6H PRN    dicyclomine (BENTYL) capsule 10 mg  10 mg Oral 4x Daily (AC & HS)    enoxaparin (LOVENOX) subcutaneous injection 40 mg  40 mg Subcutaneous Q24H KYLE    gabapentin (NEURONTIN) capsule 300 mg  300 mg Oral TID    lidocaine (LIDODERM) 5 % patch 2 patch  2 patch Transdermal Daily    lisinopril (ZESTRIL) tablet 20 mg  20 mg Oral Daily    methocarbamol (ROBAXIN) tablet 750 mg  750 mg Oral Q6H PRN    ondansetron (ZOFRAN-ODT) dispersible tablet 4 mg  4 mg Oral Q6H PRN    oxyCODONE (ROXICODONE) immediate release tablet 10 mg  10 mg Oral Q4H PRN    oxyCODONE (ROXICODONE) IR tablet 5 mg  5 mg Oral Q4H PRN    pantoprazole (PROTONIX) EC tablet 40 mg  40 mg Oral BID AC    senna (SENOKOT) tablet 8 6 mg  1 tablet Oral Daily    simethicone (MYLICON) chewable tablet 80 mg  80 mg Oral Q6H PRN    sucralfate (CARAFATE) oral suspension 1,000 mg  1,000 mg Oral 4x Daily (AC & HS)    traZODone (DESYREL) tablet 50 mg  50 mg Oral HS PRN     No Known Allergies    Objective   Vital signs in last 24 hours:  Temp:  [98 3 °F (36 8 °C)-99 7 °F (37 6 °C)] 98 4 °F (36 9 °C)  HR:  [89-99] 89  Resp:  [18-20] 18  BP: (144-145)/(65-78) 144/65      Intake/Output Summary (Last 24 hours) at 07/04/18 1021  Last data filed at 07/04/18 0900   Gross per 24 hour   Intake              240 ml   Output                0 ml   Net              240 ml       Mental Status Evaluation:  Appearance:  age appropriate, overweight and tattooed   Behavior:  normal   Speech:  normal pitch, normal volume and tangential   Mood:  euthymic   Affect:  mood-congruent   Language:  WNL   Thought Process:  flight of ideas, logical and tangential   Thought Content:  normal   Perceptual Disturbances: None   Risk Potential: none   Sensorium:  person, place, time/date and situation   Cognition:  grossly intact   Consciousness:  alert and awake    Attention: attention span and concentration were age appropriate   Intellect: within normal limits   Fund of Knowledge: vocabulary: WNL   Insight:  age appropriate   Judgment: age appropriate   Muscle Strength and Tone: see PT eval   Gait/Station: see PT eval   Motor Activity: no abnormal movements

## 2018-07-05 LAB
ANION GAP SERPL CALCULATED.3IONS-SCNC: 6 MMOL/L (ref 4–13)
BACTERIA BLD CULT: NORMAL
BACTERIA BLD CULT: NORMAL
BASOPHILS # BLD AUTO: 0.05 THOUSANDS/ΜL (ref 0–0.1)
BASOPHILS NFR BLD AUTO: 1 % (ref 0–1)
BUN SERPL-MCNC: 16 MG/DL (ref 5–25)
CALCIUM SERPL-MCNC: 8.5 MG/DL (ref 8.3–10.1)
CHLORIDE SERPL-SCNC: 107 MMOL/L (ref 100–108)
CO2 SERPL-SCNC: 25 MMOL/L (ref 21–32)
CREAT SERPL-MCNC: 0.76 MG/DL (ref 0.6–1.3)
EOSINOPHIL # BLD AUTO: 0.26 THOUSAND/ΜL (ref 0–0.61)
EOSINOPHIL NFR BLD AUTO: 3 % (ref 0–6)
ERYTHROCYTE [DISTWIDTH] IN BLOOD BY AUTOMATED COUNT: 12.5 % (ref 11.6–15.1)
GFR SERPL CREATININE-BSD FRML MDRD: 86 ML/MIN/1.73SQ M
GLUCOSE SERPL-MCNC: 101 MG/DL (ref 65–140)
HCT VFR BLD AUTO: 31 % (ref 34.8–46.1)
HGB BLD-MCNC: 9.6 G/DL (ref 11.5–15.4)
IMM GRANULOCYTES # BLD AUTO: 0.07 THOUSAND/UL (ref 0–0.2)
IMM GRANULOCYTES NFR BLD AUTO: 1 % (ref 0–2)
LYMPHOCYTES # BLD AUTO: 1.51 THOUSANDS/ΜL (ref 0.6–4.47)
LYMPHOCYTES NFR BLD AUTO: 16 % (ref 14–44)
MCH RBC QN AUTO: 32 PG (ref 26.8–34.3)
MCHC RBC AUTO-ENTMCNC: 31 G/DL (ref 31.4–37.4)
MCV RBC AUTO: 103 FL (ref 82–98)
MONOCYTES # BLD AUTO: 0.7 THOUSAND/ΜL (ref 0.17–1.22)
MONOCYTES NFR BLD AUTO: 7 % (ref 4–12)
NEUTROPHILS # BLD AUTO: 6.87 THOUSANDS/ΜL (ref 1.85–7.62)
NEUTS SEG NFR BLD AUTO: 72 % (ref 43–75)
NRBC BLD AUTO-RTO: 0 /100 WBCS
PLATELET # BLD AUTO: 368 THOUSANDS/UL (ref 149–390)
PMV BLD AUTO: 9.2 FL (ref 8.9–12.7)
POTASSIUM SERPL-SCNC: 3.5 MMOL/L (ref 3.5–5.3)
RBC # BLD AUTO: 3 MILLION/UL (ref 3.81–5.12)
SODIUM SERPL-SCNC: 138 MMOL/L (ref 136–145)
WBC # BLD AUTO: 9.46 THOUSAND/UL (ref 4.31–10.16)

## 2018-07-05 PROCEDURE — 97530 THERAPEUTIC ACTIVITIES: CPT

## 2018-07-05 PROCEDURE — 80048 BASIC METABOLIC PNL TOTAL CA: CPT | Performed by: NURSE PRACTITIONER

## 2018-07-05 PROCEDURE — 97116 GAIT TRAINING THERAPY: CPT

## 2018-07-05 PROCEDURE — 99233 SBSQ HOSP IP/OBS HIGH 50: CPT | Performed by: PHYSICAL MEDICINE & REHABILITATION

## 2018-07-05 PROCEDURE — 85025 COMPLETE CBC W/AUTO DIFF WBC: CPT | Performed by: NURSE PRACTITIONER

## 2018-07-05 PROCEDURE — 97110 THERAPEUTIC EXERCISES: CPT

## 2018-07-05 RX ADMIN — GABAPENTIN 300 MG: 300 CAPSULE ORAL at 22:08

## 2018-07-05 RX ADMIN — SUCRALFATE 1000 MG: 1 SUSPENSION ORAL at 06:26

## 2018-07-05 RX ADMIN — SUCRALFATE 1000 MG: 1 SUSPENSION ORAL at 16:57

## 2018-07-05 RX ADMIN — SIMETHICONE CHEW TAB 80 MG 80 MG: 80 TABLET ORAL at 03:46

## 2018-07-05 RX ADMIN — SUCRALFATE 1000 MG: 1 SUSPENSION ORAL at 22:09

## 2018-07-05 RX ADMIN — OXYCODONE HYDROCHLORIDE 10 MG: 10 TABLET ORAL at 17:09

## 2018-07-05 RX ADMIN — PANTOPRAZOLE SODIUM 40 MG: 40 TABLET, DELAYED RELEASE ORAL at 06:26

## 2018-07-05 RX ADMIN — OXYCODONE HYDROCHLORIDE 10 MG: 10 TABLET ORAL at 12:20

## 2018-07-05 RX ADMIN — LISINOPRIL 20 MG: 20 TABLET ORAL at 08:06

## 2018-07-05 RX ADMIN — OXYCODONE HYDROCHLORIDE 10 MG: 10 TABLET ORAL at 03:46

## 2018-07-05 RX ADMIN — OXYCODONE HYDROCHLORIDE 10 MG: 10 TABLET ORAL at 22:08

## 2018-07-05 RX ADMIN — SENNOSIDES 8.6 MG: 8.6 TABLET, FILM COATED ORAL at 08:07

## 2018-07-05 RX ADMIN — DICYCLOMINE HYDROCHLORIDE 10 MG: 10 CAPSULE ORAL at 06:26

## 2018-07-05 RX ADMIN — GABAPENTIN 300 MG: 300 CAPSULE ORAL at 08:06

## 2018-07-05 RX ADMIN — TRAZODONE HYDROCHLORIDE 50 MG: 50 TABLET ORAL at 22:21

## 2018-07-05 RX ADMIN — LIDOCAINE 2 PATCH: 50 PATCH CUTANEOUS at 16:57

## 2018-07-05 RX ADMIN — OXYCODONE HYDROCHLORIDE 10 MG: 10 TABLET ORAL at 08:00

## 2018-07-05 RX ADMIN — DICYCLOMINE HYDROCHLORIDE 10 MG: 10 CAPSULE ORAL at 22:08

## 2018-07-05 RX ADMIN — GABAPENTIN 300 MG: 300 CAPSULE ORAL at 16:57

## 2018-07-05 RX ADMIN — DICYCLOMINE HYDROCHLORIDE 10 MG: 10 CAPSULE ORAL at 10:57

## 2018-07-05 RX ADMIN — DICYCLOMINE HYDROCHLORIDE 10 MG: 10 CAPSULE ORAL at 16:57

## 2018-07-05 RX ADMIN — ENOXAPARIN SODIUM 40 MG: 100 INJECTION SUBCUTANEOUS at 08:06

## 2018-07-05 RX ADMIN — SIMETHICONE CHEW TAB 80 MG 80 MG: 80 TABLET ORAL at 14:15

## 2018-07-05 RX ADMIN — SUCRALFATE 1000 MG: 1 SUSPENSION ORAL at 11:02

## 2018-07-05 RX ADMIN — PANTOPRAZOLE SODIUM 40 MG: 40 TABLET, DELAYED RELEASE ORAL at 16:57

## 2018-07-05 NOTE — TEAM CONFERENCE
Acute RehabilitationTeam Conference Note  Date: 7/5/2018   Time: 10:48 AM       Patient Name:  Cory Wiley Record Number: [id number]   YOB: 1955  Sex: Female          Room/Bed:  [facility]-01  Payor Info:  Payor: BLUE CROSS / Plan: BC  [employer] BC PLAN 280 / Product Type: Blue Fee for Service /      Admitting Diagnosis: Lumbar stenosis [M48 061]   Admit Date/Time:  6/30/2018  2:02 PM  Admission Comments: No comment available     Primary Diagnosis:  Lumbar stenosis with neurogenic claudication  Principal Problem: Lumbar stenosis with neurogenic claudication    Patient Active Problem List    Diagnosis Date Noted    Fever 06/30/2018    Leukocytosis 06/30/2018    HTN (hypertension) 06/30/2018    Encounter for rehabilitation 06/30/2018    Hypokalemia 06/30/2018    Myofascial pain syndrome 06/06/2018    Thoracic spine pain 06/06/2018    Neck pain 06/06/2018    Sacroili[facility], not elsewhere classified ([facility] ) 05/16/2018    Low back pain 05/16/2018    Sacroiliitis ([facility] ) 05/15/2018    Lumbar stenosis with neurogenic claudication 05/08/2018    Coccygodynia 04/24/2018    Chronic pain syndrome 04/16/2018    Chronic bilateral low back pain without sciatica 04/16/2018    Lumbar spondylosis 04/16/2018    Spondylolisthesis of lumbar region 04/16/2018    Coccydynia 04/16/2018    GERD without esophagitis 02/06/2018    Right sided abdominal pain 02/06/2018    Diarrhea 02/06/2018       Physical Therapy:    Weight Bearing Status: Full Weight Bearing  Transfers: Supervision  Bed Mobility: Supervision  Amulation Distance (ft): 350 feet  Ambulation: Supervision  Assistive Device for Ambulation: Roller Walker  Number of Stairs: 12  Assistive Device for Stairs: Bilateral [employer]  Stair Assistance: Supervision  Ramp: Supervision  Assistive Device for Ramp: Roller Walker  Discharge Recommendations: Home with:  [address] with[de-identified] Family Support, Home Physical Therapy    7/4/2018 JM, PTA  Pt is on ARC post spinal surgery on L4-L5; pt has decreased strength in BLE and has pain that is described as, "lightning bolts"; pt has good balance, safety awareness, and endurance; pt manages her RW well and uses a reacher in her room to grab things that are not in reach; she has a positive attitude and she is motivated to get better to go home; pt has difficulty with bed mobility (rolling, scoot up/down) uses rails and needs assistance with problem solving on how to move to reduce pain and follow precautions; continue POC as per PT    Occupational Therapy:  Eating: Independent  Grooming: Supervision  Bathing: Contact Guard  Bathing: Contact Guard  Upper Body Dressing: Supervision  Lower Body Dressing: Minimal Assistance  Toileting: Minimal Assistance  Tub/Shower Transfer: Contact Guard  Toilet Transfer: Contact Guard  Cognition: Within Defined Limits  Orientation: Person, Place, Time, Situation  Discharge Recommendations: Home with:  [address] with[de-identified] Family Support       Pt admitted s/p L4-5 decompression  Pt req overall CGA/CS for xfers with use of RW  Pt lives home with family however they are out of the house for most of the day  Pt cares for her 4 dogs of varying sizes and ages and will be req to be Mod I for all xfers and ADL fxn  Pt is progressing towards LTG however continues to benefit from skilled OT to address deficits in balance, endurance, act tolerance, and overall strength to inc indep with ADLs/IADLs and xfers  Speech Therapy:           No notes on file    Nursing Notes:  Appetite: Good  Diet Type: Cardiac                      Diet Patient/Family Education Complete: Yes    Type of Wound (LDA):  Incision           Incision 06/27/18 Back Other (Comment) (Active)   Incision Description MALENA 7/5/2018 12:00 AM   Radha-wound Assessment MALENA 7/5/2018 12:00 AM   Closure Adhesive closure strips 7/2/2018  8:00 AM   Drainage Amount None 7/4/2018 12:00 PM   Dressing ABD 7/2/2018  8:00 AM   Dressing Changed New dressing applied 7/2/2018  8:00 AM   Patient Tolerance Tolerated well 7/2/2018  8:00 AM   Dressing Status Clean;Dry; Intact 7/5/2018 12:00 AM           Type of Wound Patient/Family Education: Yes  Bladder: 5 - Supervision        Bowel: 6 - Modified Edwards     Bowel Patient/Family Education: Yes  Pain Location: Back  Pain Orientation: Bilateral, Lower  Pain Score: 8                       Hospital Pain Intervention(s): Medication (See MAR), Rest, Distraction  Pain Patient/Family Education: Yes  Medication Management/Safety  Injectable: Lovenox  Safe Administration: Yes  Medication Patient/Family Education Complete: Yes    Pt admitted with  L4-L5 decompression and fusion with placement of interbody spacer, Incision  with steristrips and ABD dsg intact, HTN managed with Lisinopril, pain management with Lidoderm patch, oxycodone, and Tylenol  GERD managed with Protonix and Carafate, takes Trazodone at night for insomnia, pt has hx of gastric bypass surgery, N/V, bloating  Managed with Zofran and Mylicon PRN  This week we will continue to monitor vital signs and lab results  Pt will continue to work on increase balance and strength, independence with ADLS, maintain skin integrity, and safety with transfers to prevent falls  Case Management:     Discharge Planning  Living Arrangements: Children  Support Systems: Children  Assistance Needed: PT/OT  Type of Current Residence: Private residence  Current Home Care Services: No  Pt is participating with therapy and expected to return home w/family support  Pt has been educated on potential recommendations for contd care and safety as well as therapy services  Is the patient actively participating in therapies? yes  List any modifications to the treatment plan:     Barriers Interventions   pain Pain meds, repositioning   strength Therapy exercises                 Is the patient making expected progress toward goals?  yes  List any update or changes to goals:     Medical Goals: Patient will be medically stable for discharge to [facility] upon completion of rehab program and Patient will be able to manage medical conditions and comorbid conditions with medications and follow up upon completion of rehab program    Weekly Team Goals:   Rehab Team Goals  ADL Team Goal: Patient will be independent with ADLs with least restrictive device upon completion of rehab program  Bowel/Bladder Team Goal: Patient will return to premorbid level for bladder/bowel management upon completion of rehab program  Transfer Team Goal: Patient will be independent with transfers with least restrictive device upon completion of rehab program  Locomotion Team Goal: Patient will be independent with locomotion with least restrictive device upon completion of rehab program  Cognitive Team Goal: Patient will return to premorbid level of cognitive activity upon completion of rehab program    Discussion: in attendance to review pts progress is rn pt ot cm and physician  Pt is participatig and pain is better managed  Pt is guzman with adls, and today supervision for ambulation with a roller walker  Pts transfers are supervision  Pt needs to be mod I with all functional mobility and I adl's  Clinical update due tomorrow  Recommendations are for contd [facility] for rn pt and ot services      Anticipated Discharge Date:  7/9/18

## 2018-07-05 NOTE — CONSULTS
Consultation/Progress Note - Ricardo Dozier 63 y o  female MRN: [id number]    Unit/Bed#: -01 Encounter: [id number]      Assessment/Plan     Assessment:  Pt presented in euthymic mood, full range affect  Discussed pt coping skills and resiliency level; reviewed self-care and ways to balance being a caregiver with taking care of pt needs  Provided supportive counseling  Pt was engaged and cooperative; she is insightful to her needs; however, has difficulties at times utilizing self-care  Pt is motivated in rehab program and is learning to care for herself more effectively  Dx: F43 23 Adjustment disorder with depressed and anxious mood  Code: [id number]      Plan:  Continue psychology services while pt is at [facility]       Consults    Review of Systems    Historical Information   Past Medical History:   Diagnosis Date    Arthritis     Asthma     Back pain     DVT (deep [facility] thrombosis) ([facility] [address] )     Gastric bypass status for obesity     GERD (gastroesophageal reflux disease)     Hiatal hernia     History of transfusion 2006    after gastric bypass surgery, no reactions    Hypertension     Irritable bowel syndrome     Kidney stone     Muscle weakness     bilateral legs    Numbness and tingling     bilateral feet    Pneumonia     Spinal stenosis     Tinnitus     occassionally    Wears glasses      Past Surgical History:   Procedure Laterality Date    APPENDECTOMY      BARIATRIC SURGERY      CARPAL TUNNEL RELEASE Right      SECTION      x1    CHOLECYSTECTOMY      COLONOSCOPY      COLONOSCOPY W/ BIOPSIES AND POLYPECTOMY      FLEXIBLE BRONCHOSCOPY W/ UPPER ENDOSCOPY      HERNIA REPAIR      inguinal hernia    RI ARTHROCENTESIS ASPIR&/INJ SMALL JT/BURSA W/O US N/A 2018    Procedure: Coccygeal Injection & Ganglion Impar Block;  Surgeon: Anmol Rendon MD;  Location[facility] MAIN OR;  Service: Pain Management     RI ARTHRODESIS POSTERIOR INTERBODY LUMBAR N/A 2018 Procedure: L4-5 DECOMPRESSION INTERBODY INSTRUMENTED FUSION;  Surgeon: Demetris Shaver MD;  Location: [facility] Main OR;  Service: Orthopedics    WY ESOPHAGOGASTRODUODENOSCOPY TRANSORAL DIAGNOSTIC N/A 2/19/2018    Procedure: ESOPHAGOGASTRODUODENOSCOPY (EGD); Surgeon: José Miguel Chapman MD;  Location: [facility] GI LAB;   Service: Gastroenterology   Raisa Adithya JOINT Right 5/25/2018    Procedure: Rt Sacroiliac Joint Injection;  Surgeon: Clayton Us MD;  Location[facility] MAIN OR;  Service: Pain Management     TONSILECTOMY, ADENOIDECTOMY, BILATERAL MYRINGOTOMY AND TUBES       Social History   History   Alcohol Use    4 2 oz/week    5 Cans of beer, 2 Shots of liquor per week     History   Drug Use No     History   Smoking Status    Never Smoker   Smokeless Tobacco    Never Used   Meds/Allergies   current meds:   Current Facility-Administered Medications   Medication Dose Route Frequency    acetaminophen (TYLENOL) tablet 650 mg  650 mg Oral Q6H PRN    dicyclomine (BENTYL) capsule 10 mg  10 mg Oral 4x Daily (AC & HS)    enoxaparin (LOVENOX) subcutaneous injection 40 mg  40 mg Subcutaneous Q24H [facility]    gabapentin (NEURONTIN) capsule 300 mg  300 mg Oral TID    lidocaine (LIDODERM) 5 % patch 2 patch  2 patch Transdermal Daily    lisinopril (ZESTRIL) tablet 20 mg  20 mg Oral Daily    methocarbamol (ROBAXIN) tablet 750 mg  750 mg Oral Q6H PRN    ondansetron (ZOFRAN-ODT) dispersible tablet 4 mg  4 mg Oral Q6H PRN    oxyCODONE (ROXICODONE) immediate release tablet 10 mg  10 mg Oral Q4H PRN    oxyCODONE (ROXICODONE) IR tablet 5 mg  5 mg Oral Q4H PRN    pantoprazole (PROTONIX) EC tablet 40 mg  40 mg Oral BID AC    senna (SENOKOT) tablet 8 6 mg  1 tablet Oral Daily    simethicone (MYLICON) chewable tablet 80 mg  80 mg Oral Q6H PRN    sucralfate (CARAFATE) oral suspension 1,000 mg  1,000 mg Oral 4x Daily (AC & HS)    traZODone (DESYREL) tablet 50 mg  50 mg Oral HS PRN     No Known Allergies    Objective Vitals: Blood pressure 120/67, pulse 91, temperature 98 4 °F (36 9 °C), temperature source Oral, resp  rate 20, height 5' 5" (1 651 m), weight 110 kg (242 lb), SpO2 97 %      Intake/Output Summary (Last 24 hours) at 07/05/18 1331  Last data filed at 07/05/18 1200   Gross per 24 hour   Intake              300 ml   Output                0 ml   Net              300 ml     Invasive Devices          No matching active lines, drains, or airways          Physical Exam

## 2018-07-05 NOTE — PROGRESS NOTES
07/05/18 0830   Pain Assessment   Pain Assessment 0-10   Pain Score 7   Pain Type Acute pain   Pain Location Back; Hip   Pain Orientation Bilateral   Pain Descriptors Aching; Shooting  (mostly aching pain with occasional shooting pain)   Pain Frequency Constant/continuous   Pain Onset Ongoing   Clinical Progression Gradually improving   Hospital Pain Intervention(s) Repositioned;Distraction; Ambulation/increased activity   Response to Interventions pt tolerated session well   Restrictions/Precautions   Precautions Fall Risk;Pain;Spinal precautions   Weight Bearing Restrictions No   ROM Restrictions Yes  (spinal precautions)   Cognition   Overall Cognitive Status WFL   Arousal/Participation Alert; Cooperative   Subjective   Subjective pt reports her pain is getting better everyday and shooting pains happen less often   QI: Roll Left and Right   Assistance Needed Supervision   Roll Left and Right CARE Score 4   QI: Sit to Lying   Assistance Needed Supervision   Sit to Lying CARE Score 4   QI: Lying to Sitting on Side of Bed   Assistance Needed Supervision   Lying to Sitting on Side of Bed CARE Score 4   QI: Sit to Stand   Assistance Needed Supervision   Sit to Stand CARE Score 4   Bed Mobility   Able to Roll Left to Right;Right to Left;Scoot Up   Findings S   QI: Chair/Bed-to-Chair Transfer   Assistance Needed Supervision   Chair/Bed-to-Chair Transfer CARE Score 4   Transfer Bed/Chair/Wheelchair   Limitations Noted In Balance;Pain Management;UE Strength;LE Strength; Sequencing   Adaptive Equipment Malva Kaufman; Other  (rollator )   Stand Pivot Supervision   Sit to Stand Supervision   Stand to Sit Supervision   Supine to Sit Supervision   Sit to Supine Supervision   Findings pt is able to perform all bed mobility and transfers with S with rollator, distant S with RW   Bed, Chair, Wheelchair Transfer (FIM) 5 - Patient requires supervision/monitoring   QI: Walk 10 Feet   Assistance Needed Set-up / clean-up   Walk 10 Feet CARE Score 5   QI: Walk 50 Feet with Two Turns   Assistance Needed Set-up / clean-up   Walk 50 Feet with Two Turns CARE Score 5   QI: Walk 150 Feet   Assistance Needed Supervision   Walk 150 Feet CARE Score 4   Ambulation   Does the patient walk? 2  Yes   Primary Discharge Mode of Locomotion Walk   Walk Assist Level Distant Supervision;Supervision   Gait Pattern Slow Meghna;Decreased foot clearance;Narrow DAWN; Improper weight shift   Assist Device Jarvam; Other  (rollator)   Distance Walked (feet) 150 ft  (250, 300)   Limitations Noted In Balance; Endurance; Heel Strike;Posture;Strength;Speed   Findings trialed pt with rollator, was able to ambulate 300ft with rollator and S, was distant S for 50ft with rollator   Walking (FIM) 5 - Patient requires supervision/monitoring AND distance 150 feet or more, no rest   Wheelchair mobility   QI: Does the patient use a wheelchair? 0  No   QI: Toilet Transfer   Assistance Needed Supervision   Toilet Transfer CARE Score 4   Toilet Transfer   Surface Assessed Standard Toilet   Limitations Noted In [address]   Findings S with rollator   Toilet Transfer (FIM) 5 - Patient requires supervision/monitoring   Therapeutic Interventions   Strengthening supine TE: SAQ #2 3x10, bridging x10, modified bridging x10, adduction squeeze with yellow ball x30, abduction with green band x30, SLR 2x10  (cue pt on abdominal bracing during exercises)   Flexibility hamstring/gastroc stretch x2min    Equipment Use   NuStep L 4 x10min  (pt rates 11 on MAHSA )   Assessment   Treatment Assessment PT session focused on bed mobility, ambulation, strengthening and endurance  Pt made improvements in mobility in this session, was S level for all transfers and ambulation  Pt was distant S for 50ft with rollator walker  Pt requires cues for proper rollator management, though good carry over is noted   Pt requires more time with transitional movements due to pain, though pt reports pain is improving  PT will continue to focus on balance, transfers, strengthening, and ambulation  Will attempt to progress patient to distant supervision tomorrow if consistent carry over with rollator is noted  Family/Caregiver Present no   Barriers to Discharge Inaccessible home environment;Decreased caregiver support   PT Barriers   Physical Impairment Decreased strength;Decreased mobility;Pain;Orthopedic restrictions;Decreased range of motion   Functional Limitation Transfers; Walking;Stair negotiation   Plan   Treatment/Interventions Functional transfer training;LE strengthening/ROM; Therapeutic exercise; Endurance training;Bed mobility;Gait training   Progress Progressing toward goals   Recommendation   Recommendation Home with family support;Home PT   Equipment Recommended Walker   PT - OK to Discharge No   PT Therapy Minutes   PT Time In 0830   PT Time Out 1000   PT Total Time (minutes) 90   PT Mode of treatment - Individual (minutes) 60   PT Mode of treatment - Concurrent (minutes) 30   PT Mode of treatment - Group (minutes) 0   PT Mode of treatment - Co-treat (minutes) 0   PT Mode of Teatment - Total time(minutes) 90 minutes   Therapy Time missed   Time missed?  No

## 2018-07-05 NOTE — PLAN OF CARE
Problem: Potential for Falls  Goal: Patient will remain free of falls  INTERVENTIONS:  - Assess patient frequently for physical needs  -  Identify cognitive and physical deficits and behaviors that affect risk of falls  -  [place] fall precautions as indicated by assessment   - Educate patient/family on patient safety including physical limitations  - Instruct patient to call for assistance with activity based on assessment  - Modify environment to reduce risk of injury  - Consider OT/PT consult to assist with strengthening/mobility   Outcome: Progressing      Problem: Nutrition/Hydration-ADULT  Goal: Nutrient/Hydration intake appropriate for improving, restoring or maintaining nutritional needs  Monitor and assess patient's nutrition/hydration status for malnutrition (ex- brittle hair, bruises, dry skin, pale skin and conjunctiva, muscle wasting, smooth red tongue, and disorientation)  Collaborate with interdisciplinary team and initiate plan and interventions as ordered  Monitor patient's weight and dietary intake as ordered or per policy  Utilize nutrition screening tool and intervene per policy  Determine patient's food preferences and provide high-protein, high-caloric foods as appropriate       INTERVENTIONS:  - Monitor oral intake, urinary output, labs, and treatment plans  - Assess nutrition and hydration status and recommend course of action  - Evaluate amount of meals eaten  - Assist patient with eating if necessary   - Allow adequate time for meals  - Recommend/ encourage appropriate diets, oral nutritional supplements, and vitamin/mineral supplements  - Order, calculate, and assess calorie counts as needed  - Recommend, monitor, and adjust tube feedings and TPN/PPN based on assessed needs  - Assess need for intravenous fluids  - Provide specific nutrition/hydration education as appropriate  - Include patient/family/caregiver in decisions related to nutrition   Outcome: Progressing      Problem: PAIN - ADULT  Goal: Verbalizes/displays adequate comfort level or baseline comfort level  Interventions:  - Encourage patient to monitor pain and request assistance  - Assess pain using appropriate pain scale  - Administer analgesics based on type and severity of pain and evaluate response  - Implement non-pharmacological measures as appropriate and evaluate response  - Consider cultural and social influences on pain and pain management  - Notify physician/advanced practitioner if interventions unsuccessful or patient reports new pain   Outcome: Progressing      Problem: INFECTION - ADULT  Goal: Absence or prevention of progression during hospitalization  INTERVENTIONS:  - Assess and monitor for signs and symptoms of infection  - Monitor lab/diagnostic results  - Monitor all insertion sites, i e  indwelling lines, tubes, and drains  - Monitor endotracheal (as able) and nasal secretions for changes in amount and color  - [place] appropriate cooling/warming therapies per order  - Administer medications as ordered  - Instruct and encourage patient and family to use good hand hygiene technique  - Identify and instruct in appropriate isolation precautions for identified infection/condition   Outcome: Progressing    Goal: Absence of fever/infection during neutropenic period  INTERVENTIONS:  - Monitor WBC  - Implement neutropenic guidelines   Outcome: Progressing      Problem: SAFETY ADULT  Goal: Maintain or return to baseline ADL function  INTERVENTIONS:  -  Assess patient's ability to carry out ADLs; assess patient's baseline for ADL function and identify physical deficits which impact ability to perform ADLs (bathing, care of mouth/teeth, toileting, grooming, dressing, etc )  - Assess/evaluate cause of self-care deficits   - Assess range of motion  - Assess patient's mobility; develop plan if impaired  - Assess patient's need for assistive devices and provide as appropriate  - Encourage maximum independence but intervene and supervise when necessary  ¯ Involve family in performance of ADLs  ¯ Assess for home care needs following discharge   ¯ Request OT consult to assist with ADL evaluation and planning for discharge  ¯ Provide patient education as appropriate   Outcome: Progressing    Goal: Maintain or return mobility status to optimal level  INTERVENTIONS:  - Assess patient's baseline mobility status (ambulation, transfers, stairs, etc )    - Identify cognitive and physical deficits and behaviors that affect mobility  - Identify mobility aids required to assist with transfers and/or ambulation (gait belt, sit-to-stand, lift, walker, cane, etc )  - [place] fall precautions as indicated by assessment  - Record patient progress and toleration of activity level on Mobility SBAR; progress patient to next Phase/Stage  - Instruct patient to call for assistance with activity based on assessment  - Request Rehabilitation consult to assist with strengthening/weightbearing, etc    Outcome: Progressing      Problem: DISCHARGE PLANNING  Goal: Discharge to home or other facility with appropriate resources  INTERVENTIONS:  - Identify barriers to discharge w/patient and caregiver  - Arrange for needed discharge resources and transportation as appropriate  - Identify discharge learning needs (meds, wound care, etc )  - Arrange for interpretive services to assist at discharge as needed  - Refer to Case Management Department for coordinating discharge planning if the patient needs post-hospital services based on physician/advanced practitioner order or complex needs related to functional status, cognitive ability, or social support system   Outcome: Progressing

## 2018-07-05 NOTE — PROGRESS NOTES
07/05/18 1100   Pain Assessment   Pain Assessment 0-10   Pain Score 4   Pain Type Acute pain   Pain Location Back   Restrictions/Precautions   Precautions Fall Risk;Pain;Spinal precautions   Weight Bearing Restrictions No   ROM Restrictions Yes   QI: Sit to [address] Provided by [place] No physical assistance   Sit to Stand CARE Score 4   QI: Chair/Bed-to-Chair Transfer   Assistance Needed Supervision   Assistance Provided by [place] No physical assistance   Comment RW   Chair/Bed-to-Chair Transfer CARE Score 4   Transfer Bed/Chair/Wheelchair   Limitations Noted In Balance; Endurance;Pain Management   Adaptive Equipment Roller [employer], Chair, Wheelchair Transfer (FIM) 5 - Patient requires supervision/monitoring   Cognition   Overall Cognitive Status WFL   Arousal/Participation Alert; Cooperative   Attention Within functional limits   Orientation Level Oriented X4   Memory Within functional limits   Following Commands Follows all commands and directions without difficulty   Activity Tolerance   Activity Tolerance Patient tolerated treatment well   Assessment   Treatment Assessment Pt participated in skilled OT services with focus on functional transfers and DME needs  Pt seated in w/c upon arrival, agreeable to session at this time  Recommending BSC, shower seat (to assess for most appropriate seating system in PM session), and hip kit  Pt demos G insight into deficits and demos compliance with spinal precautions  Pt receptive to all education and recommendations provided  Pt will continue to benefit from skilled OT services with focus on IADL tasks  Prognosis Good   Problem List Decreased strength;Decreased range of motion;Decreased endurance; Impaired balance;Decreased mobility;Pain;Orthopedic restrictions   Plan   Treatment/Interventions ADL retraining;Functional transfer training; Endurance training;Equipment eval/education; Compensatory technique education   Progress Progressing toward goals   Recommendation   OT Equipment ordered [facility]   Date ordered 07/05/18   OT Therapy Minutes   OT Time In 1100   OT Time Out 1130   OT Total Time (minutes) 30   OT Mode of treatment - Individual (minutes) 30   OT Mode of treatment - Concurrent (minutes) 0   OT Mode of treatment - Group (minutes) 0   OT Mode of treatment - Co-treat (minutes) 0   OT Mode of Teatment - Total time(minutes) 30 minutes   Therapy Time missed   Time missed?  No

## 2018-07-05 NOTE — PROGRESS NOTES
Progress Note - Gerardo Castillo 63 y o  female MRN: [id number]    Unit/Bed#: -01 Encounter: [id number]            Subjective:   D/W patient potential dc date and patient agreeable     Objective:     ROS  Gen: denies recent wt loss   Psych: denies mood change    Vitals:    07/05/18 0700   BP: 118/56   Pulse: 76   Resp: 20   Temp: 98 1 °F (36 7 °C)   SpO2: 96%         Physical Exam:     Gen:        NAD   Neck:   trachea midline  Lungs:  respirations unlabored   Heart:    + S1 and S2   Abdomen:    Soft, non-tender  Extremities: no significant LE edema (soft tissue/adipose)   Psych: mood/affect appropriate  Neurologic: CN grossly intact, lt touch grossly intact, finger to nose without gross dysmetria b/l  5/5 UE B/L, 4/5 RLE, 3 to 4-/5 LLE (LE weakness may be pain limited)     Functional :  Mobility: sup  Tx: sup  ADLs: min-CG         Current Facility-Administered Medications:  acetaminophen 650 mg Oral Q6H PRN Eri Mccormack MD   dicyclomine 10 mg Oral 4x Daily (AC & HS) Medina Morris MD   enoxaparin 40 mg Subcutaneous Q24H [facility] Eri Mccormack MD   gabapentin 300 mg Oral TID Medina Morris MD   lidocaine 2 patch Transdermal Daily Eri Mccormack MD   lisinopril 20 mg Oral Daily YOVANA BarraganNP   methocarbamol 750 mg Oral Q6H PRN Eri Mccormack MD   ondansetron 4 mg Oral Q6H PRN Eri Mccormack MD   oxyCODONE 10 mg Oral Q4H PRN Eri Mccormack MD   oxyCODONE 5 mg Oral Q4H PRN Eri Mccormack MD   pantoprazole 40 mg Oral BID AC Eri Mccormack MD   senna 1 tablet Oral Daily Eri Mccormack MD   simethicone 80 mg Oral Q6H PRN Eri Mccormack MD   sucralfate 1,000 mg Oral 4x Daily (AC & HS) Eri Mccormack MD   traZODone 50 mg Oral HS PRN Eri Mccormack MD         acetaminophen    methocarbamol    ondansetron    oxyCODONE    oxyCODONE    simethicone    traZODone      Assessment:  Patient is 62 yo female with hx of lumbar stenosis and having failed conservative measures underwent L4-5 decompression fusion surgery by Dr Natalya Duron on 6/27    Plan:    Rehabilitation: cont PT/OT for ambulatory/ADL dysfxn    Lumbar stenosis: s/p L4-5 decompression fusion surgery by Dr Natalya Duron on 6/27; lumbar spine precautions, per Dr Reynaldo Baird H&P she spoke with Zachary Spatz PA-C and confirmed patient does not need LSO brace    Chronic pain: follows with Dr Enriqueta Lane and on home neurontin 300 TID, and diclofenac (NSAIDs on hold due to recent lumbar spine surgery)    GERD: at home on protonix 40 mg BID and carafate 1 g QID    HTN: at home on lisinopril-HCTZ 20-12 5 mg qd, currently on lisinopril 20 mg qd only due to hypokalemia    ARPIT/insomnia: at home takes trazadone 50 mg HS prn; d/w patient that sedatives are not recommended for patients with ARPIT however pt refusing to give up the trazadone as per patient does not feel she has it any longer after bariatric surgery and subsequent wt loss, however has not had sleep study to confirm this and patient states she gave away her CPAP machine but will consider stopping trazadone and getting a new sleep study to confirm resolution or get a new CPAP machine if present    Abdominal pain/bloating/diarrhea/N/V: began in 5808 after complications of gastric bypass surgery, patient never had resolution of N/V/diarrhea/abd pain & bloating, had subsequent abdominal revision surgeries [facility] [place] in 2006 which did not provide relief and essentially learned to live with her symptoms; per patient has had worsening of symptoms 5 years ago and has been following with GI since without relief, recently switched GI doctors to Dr Silvia Sosa who suspects IBS vs small intestinal bacterial overgrowth as the etiology and prescribed course of rifaximin which helped diarrhea component but not other symptoms; OP FU with Dr Silvia Sosa    Depression: patient was on sertraline 25 mg qd in the past but has not been on this medication in years per patient and mood has been stable without it     Post-op fevers: UA/Chest XR unimpressive for infxn, blood cx NGTD, afebrile since 6/30     Hypokalemia: HCTZ stopped by IM, currently WNL     Anemia: ABLA, Hg currently stable at 9 6    h/o DVT: provoked after bariatric surgery in 2006, was on SCDs only per surgical team in acute care however after Dr Jacquelene Schwab weighed risks/benefits of pharmacologic DVT ppx and d/w patient given h/o provoked DVT after surgery Dr Jacquelene Schwab and the patient have elected to have patient on pharmacologic DVT ppx in addition to SCDs    Dispo: 7/9    Incidental findings on CT A/P of 3/5/18    1) diverticulosis w/o diverticulitis: OP FU with PCP     2) small hiatal hernia: on PPI and carafate for GERD, OP FU with PCP    3) small fat containing supraumbilical hernia: OP FU with PCP with surgical referral at PCP's discretion     Other incidental findings:    4) wide QRS/RBBB: OP FU with PCP with further testing and/or cards referral at PCP's discretion     This patient was discussed by the Interdisciplinary Team in weekly case conference today  The care of the patient was extensively discussed with all care providers and an appropriate rehabilitation plan was formulated unique for this patient  Barriers were identified preventing progression of therapy and appropriate interventions were discussed with each discipline  Please see the team note for input from all disciplines regarding barriers, intervention, and discharge planning  [ x ] Total time spent: 45 Mins, and greater than 50% of this time was spent counseling/coordinating care

## 2018-07-05 NOTE — PCC NURSING
Pt admitted with  L4-L5 decompression and fusion with placement of interbody spacer, Incision  with steristrips and ABD dsg intact, HTN managed with Lisinopril, pain management with Lidoderm patch, oxycodone, and Tylenol  GERD managed with Protonix and Carafate, takes Trazodone at night for insomnia, pt has hx of gastric bypass surgery, N/V, bloating  Managed with Zofran and Mylicon PRN  This week we will continue to monitor vital signs and lab results  Pt will continue to work on increase balance and strength, independence with ADLS, maintain skin integrity, and safety with transfers to prevent falls

## 2018-07-05 NOTE — SOCIAL WORK
Met w /pt and reviewed team update and dc for Monday 7/9 as long as insurance approves contd stay  Clinical update is tomorrow  Pt in agreement  Reviewed recommendations for contd [facility] for rn pt and ot services and pt is in agreement  Pt is interested in finding a place where she can secure fresh vegetables as she relies heavily on the food bank and that is all boxed or canned items  Cm to investigate options  Referral made to community a for contd rn pt and ot services   Following for contd stay review due tomorrow and any additional dc needs

## 2018-07-05 NOTE — PROGRESS NOTES
Internal Medicine Progress Note  Patient: Tiffany Bello  Age/sex: 63 y o  female  Medical Record #: [id number]      ASSESSMENT/PLAN:  Tiffany Bello is seen and examined and management for following issues:       Lumbar stenosis; L4-5 decompression/fusion 6/27/18 Marjorie Alicae): monitor incision; continue LSO brace         Fever:  Seems to have resolved currently but had been present post-operatively with associated mild leukocytosis; CXR, UA were negative; BCs are NG so far; incision C/D/I; likely post-operative related fever     Leukocytosis:  resolved     HTN :  Stable; was on lisinopril HCT 20/12 5 but stopped HCTZ since was makes K+ level too low even despite replacement     GERD without esophagitis: continue Protonix 40mg BID and Carafate     Hypokalemia: resolved after replacement  Abdominal discomfort with hx gastrectomy/numerous surgeries: had bloating/discomfortRUQ Sunday during day =  resolved; hx gastric bypass with eventual gastrectomy/esophagojejunostomy and six total surgeries for complications  Says has many abdominal adhesions which create issues of pain  Saw Dr Candy Colmenares in the office 2/27/18 for RUQ pain =  CT scan abdomen/pelvis (no acute changes); started rifaximin to treat her for small intestinal bacterial overgrowth and IBS-diarrhea; he said if her symptoms persist would consider amitriptyline for neuropathic pain  Her EGD in 2/2018 showed small diverticulum at the level of the esophagojejunostomy anastomosis staple line  Subjective: Patient seen and examined   RUQ pain better and "nowhere near what it was" (on 7/3/18)    ROS:   GI: denies abdominal pain, change bowel habits or reflux symptoms  Neuro: No new neurologic changes  Respiratory: No Cough, SOB  Cardiovascular: No CP, palpitations     Scheduled Meds:    Current Facility-Administered Medications:  acetaminophen 650 mg Oral Q6H PRN Carlos Preciado MD   dicyclomine 10 mg Oral 4x Daily (AC & HS) Drea Hedrick MD enoxaparin 40 mg Subcutaneous Q24H [facility] Carlos Preciado MD   gabapentin 300 mg Oral TID Drea Hedrick MD   lidocaine 2 patch Transdermal Daily Carlos Preciado MD   lisinopril 20 mg Oral Daily JACINTO He   methocarbamol 750 mg Oral Q6H PRN Carlos Preciado MD   ondansetron 4 mg Oral Q6H PRN Carlos Preciado MD   oxyCODONE 10 mg Oral Q4H PRN Carlos Preciado MD   oxyCODONE 5 mg Oral Q4H PRN Carlos Preciado MD   pantoprazole 40 mg Oral BID AC Carlos Preciado MD   senna 1 tablet Oral Daily Carlos Preciado MD   simethicone 80 mg Oral Q6H PRN Carlos Preciado MD   sucralfate 1,000 mg Oral 4x Daily (AC & HS) Carlos Preciado MD   traZODone 50 mg Oral HS PRN Carlos Preciado MD       Labs:       Results from last 7 days  Lab Units 07/05/18  0532 07/02/18  0549   WBC Thousand/uL 9 46 8 57   HEMOGLOBIN g/dL 9 6* 9 4*   HEMATOCRIT % 31 0* 30 1*   PLATELETS Thousands/uL 368 300       Results from last 7 days  Lab Units 07/05/18  0532 07/03/18  0715   SODIUM mmol/L 138 141   POTASSIUM mmol/L 3 5 3 4*   CHLORIDE mmol/L 107 105   CO2 mmol/L 25 27   BUN mg/dL 16 12   CREATININE mg/dL 0 76 0 84   GLUCOSE RANDOM mg/dL 101 102   CALCIUM mg/dL 8 5 8 5       Results from last 7 days  Lab Units 06/30/18  0553   HEMOGLOBIN A1C % 5 3              Glucose (mg/dL)   Date Value   07/05/2018 101   07/03/2018 102   07/02/2018 116   07/01/2018 113       Labs reviewed    Physical Examination:  Vitals:   Vitals:    07/04/18 0535 07/04/18 1335 07/04/18 2024 07/05/18 0700   BP: 144/65 133/63 137/72 118/56   BP Location: Left arm Right arm Left arm    Pulse: 89 86 100 76   Resp: 18 18 18 20   Temp: 98 4 °F (36 9 °C) 98 5 °F (36 9 °C) 98 3 °F (36 8 °C) 98 1 °F (36 7 °C)   TempSrc: Oral Oral Oral Oral   SpO2: 95% 98% 97% 96%   Weight:       Height:           GEN: NAD  HEENT: NC/AT  RESP: BBS w/o crackles/wheeze/rhonci; resp unlabored  CV: +S1 S2, regular rate, no rubs/murmurs  ABD: soft, NT, ND, normal BS   EXT: no edema  Neuro: Lillian Priest ] Total time spent: 30 Mins and greater than 50% of this time was spent counseling/coordinating care  ** Please Note: Dragon 360 Dictation voice to text software may have been used in the creation of this document   **

## 2018-07-05 NOTE — PROGRESS NOTES
07/05/18 1400   Pain Assessment   Pain Assessment 0-10   Pain Score 4   Pain Type Acute pain   Pain Location Back   Restrictions/Precautions   Precautions Fall Risk;Pain;Spinal precautions   Weight Bearing Restrictions No   ROM Restrictions Yes   QI: Sit to [address] Provided by [place] No physical assistance   Sit to Stand CARE Score 4   QI: Chair/Bed-to-Chair Transfer   Assistance Needed Supervision   Assistance Provided by [place] No physical assistance   Chair/Bed-to-Chair Transfer CARE Score 4   Transfer Bed/Chair/Wheelchair   Limitations Noted In Balance; Endurance;Pain Management   Adaptive Equipment Rollator   Bed, Chair, Wheelchair Transfer (FIM) 5 - Patient requires supervision/monitoring   Meal Prep   Meal Prep Level (rollator)   Meal Prep Level of Assistance Close supervision;Minimal verbal cues   Meal Preparation Pt engaged in simple meal prep task of making oatmeal in the microwave  Pt is able to follow directions appropriately and sequence steps accurately  Pt lebron HERRERA safety as she asks for pot holders to handle hot dish  Education provided on safe item transport/retreival, see above for details She also demos compliance with spinal precautions and G insight into deficits  She tolerates standing in kitchen for meal prep task for ~10 minutes with G tolerance noted  Kitchen Mobility   Kitchen-Mobility Level (rollator)   Kitchen Activity Retrieve items;Transport items   Kitchen Mobility Comments Education provided on rollator safety with kitchen mobility tasks  Education also provided on compensatory techniques/AE in order to maintain spinal precautions during item retrieval tasks  Pt [place] HERRERA carryover of spinal precautions throughout session  Pt utilizes rollator safely throughout kitchen mobility task  She locks brakes appropriately and initiates rest breaks as needed  Cognition   Overall Cognitive Status WFL   Arousal/Participation Alert; Cooperative Attention Within functional limits   Orientation Level Oriented X4   Memory Within functional limits   Following Commands Follows all commands and directions without difficulty   Activity Tolerance   Activity Tolerance Patient tolerated treatment well   Assessment   Treatment Assessment Pt participated in skilled OT services with focus on kitchen mobility, hot meal prep, and shower transfer  Pt concerned that shower chair will not fit in her small shower stall  Trialed DRY shower stall transfer (simulated to her size with use of shower chair with back with pt reporting that she thinks it will work after trialing  Pt provided with handout with recommended shower chair with dimensions highlighted for pt to have her family double check for appropriate fit  Pt completes dry shower transfer at overall Supervision level with min VC's  Refer to above for meal prep task  Reviewed LHAE with pt to purchase hip kit on her own  Pt will continue to benefit from skilled OT services with focus on higher level IADL tasks, standing balance, endurance, and d/c planning  Prognosis Good   Problem List Decreased strength;Decreased range of motion;Decreased endurance; Impaired balance;Decreased mobility;Pain;Orthopedic restrictions   Plan   Treatment/Interventions ADL retraining;Functional transfer training; Therapeutic exercise; Endurance training;Equipment eval/education;Patient/family training; Compensatory technique education   Progress Progressing toward goals   OT Therapy Minutes   OT Time In 1400   OT Time Out 1500   OT Total Time (minutes) 60   OT Mode of treatment - Individual (minutes) 60   OT Mode of treatment - Concurrent (minutes) 0   OT Mode of treatment - Group (minutes) 0   OT Mode of treatment - Co-treat (minutes) 0   OT Mode of Teatment - Total time(minutes) 60 minutes   Therapy Time missed   Time missed?  No

## 2018-07-06 PROCEDURE — 97110 THERAPEUTIC EXERCISES: CPT

## 2018-07-06 PROCEDURE — 97530 THERAPEUTIC ACTIVITIES: CPT

## 2018-07-06 PROCEDURE — 97535 SELF CARE MNGMENT TRAINING: CPT

## 2018-07-06 PROCEDURE — 99231 SBSQ HOSP IP/OBS SF/LOW 25: CPT | Performed by: PHYSICAL MEDICINE & REHABILITATION

## 2018-07-06 RX ADMIN — DICYCLOMINE HYDROCHLORIDE 10 MG: 10 CAPSULE ORAL at 16:41

## 2018-07-06 RX ADMIN — GABAPENTIN 300 MG: 300 CAPSULE ORAL at 21:24

## 2018-07-06 RX ADMIN — OXYCODONE HYDROCHLORIDE 10 MG: 10 TABLET ORAL at 13:48

## 2018-07-06 RX ADMIN — OXYCODONE HYDROCHLORIDE 10 MG: 10 TABLET ORAL at 09:40

## 2018-07-06 RX ADMIN — PANTOPRAZOLE SODIUM 40 MG: 40 TABLET, DELAYED RELEASE ORAL at 16:41

## 2018-07-06 RX ADMIN — SENNOSIDES 8.6 MG: 8.6 TABLET, FILM COATED ORAL at 09:15

## 2018-07-06 RX ADMIN — DICYCLOMINE HYDROCHLORIDE 10 MG: 10 CAPSULE ORAL at 06:00

## 2018-07-06 RX ADMIN — DICYCLOMINE HYDROCHLORIDE 10 MG: 10 CAPSULE ORAL at 12:59

## 2018-07-06 RX ADMIN — SUCRALFATE 1000 MG: 1 SUSPENSION ORAL at 16:41

## 2018-07-06 RX ADMIN — OXYCODONE HYDROCHLORIDE 10 MG: 10 TABLET ORAL at 01:49

## 2018-07-06 RX ADMIN — LIDOCAINE 2 PATCH: 50 PATCH CUTANEOUS at 16:41

## 2018-07-06 RX ADMIN — ENOXAPARIN SODIUM 40 MG: 100 INJECTION SUBCUTANEOUS at 09:14

## 2018-07-06 RX ADMIN — SUCRALFATE 1000 MG: 1 SUSPENSION ORAL at 21:24

## 2018-07-06 RX ADMIN — GABAPENTIN 300 MG: 300 CAPSULE ORAL at 09:15

## 2018-07-06 RX ADMIN — OXYCODONE HYDROCHLORIDE 10 MG: 10 TABLET ORAL at 17:52

## 2018-07-06 RX ADMIN — GABAPENTIN 300 MG: 300 CAPSULE ORAL at 16:41

## 2018-07-06 RX ADMIN — SIMETHICONE CHEW TAB 80 MG 80 MG: 80 TABLET ORAL at 21:24

## 2018-07-06 RX ADMIN — OXYCODONE HYDROCHLORIDE 10 MG: 10 TABLET ORAL at 05:34

## 2018-07-06 RX ADMIN — PANTOPRAZOLE SODIUM 40 MG: 40 TABLET, DELAYED RELEASE ORAL at 05:59

## 2018-07-06 RX ADMIN — SUCRALFATE 1000 MG: 1 SUSPENSION ORAL at 12:59

## 2018-07-06 RX ADMIN — DICYCLOMINE HYDROCHLORIDE 10 MG: 10 CAPSULE ORAL at 21:24

## 2018-07-06 RX ADMIN — LISINOPRIL 20 MG: 20 TABLET ORAL at 09:17

## 2018-07-06 RX ADMIN — SUCRALFATE 1000 MG: 1 SUSPENSION ORAL at 05:59

## 2018-07-06 NOTE — PROGRESS NOTES
07/06/18 0930   Pain Assessment   Pain Assessment 0-10   Pain Score 6   Pain Type Acute pain   Pain Location Back   Pain Orientation Lower;Mid   Hospital Pain Intervention(s) Distraction; Emotional support; Shower/Bath   Response to Interventions Pt reports decreased pain post session to 4/10  Restrictions/Precautions   Precautions Fall Risk;Spinal precautions   Weight Bearing Restrictions No   ROM Restrictions Yes   QI: Oral Hygiene   Assistance Needed Set-up / [facility] Provided by [place] No physical assistance   Oral Hygiene CARE Score 5   Grooming   Able To Initiate Tasks; Acquire Items;Comb/Brush Hair;Wash/Dry Face;Brush/Clean Teeth;Wash/Dry Hands   Findings In stance at sink  Grooming (FIM) 5 - Patient requires supervision/monitoring   QI: Shower/Bathe Self   Assistance Needed Supervision; Adaptive equipment   Assistance Provided by [place] No physical assistance   Shower/Bathe Self CARE Score 4   Bathing   Assessed Bath Style Shower   Anticipated D/C Bath Style Shower   Able to [place] Flaco Yes   Able to [employer] Temperature Yes   Able to Wash/Rinse/Dry (body part) Left Arm;Right Arm;L Upper Leg;R Upper Leg;L Lower Leg/Foot;R Lower Leg/Foot;Chest;Abdomen;Perineal Area; Buttocks   Limitations Noted in ROM; Strength   Positioning Seated;Standing   Adaptive Equipment Hand Held Shower; Tub Bench; Shower Bars   Findings  Pt requires use of  reacher to bathe BLEs to simulate LH sponge  Bathing (FIM) 5 - Patient requires supervision/monitoring but completes 10/10 parts   Tub/Shower Transfer   Limitations Noted In ROM   Adaptive Equipment Grab Bars;Transfer Bench   Assessed Shower   Shower Transfer (FIM) 5 - Patient requires supervision/monitoring   QI: Upper Body Dressing   Assistance Needed Supervision   Assistance Provided by [place] No physical assistance   Upper Body Dressing CARE Score 4   QI: Lower Body Dressing   Assistance Needed Supervision; Adaptive equipment   Assistance Provided by [place] No physical assistance   Comment LHAE   Lower Body Dressing CARE Score 4   QI: Putting On/Taking Off [facility] [address] [facility] Provided by [place] No physical assistance   Putting On/Taking Off Footwear CARE Score 4   Dressing/Undressing Clothing   Able to  Obtain Clothing;Store removed clothing   Remove UB Clothes (gown)   Remove LB Clothes Shoes   Don UB Clothes (pullover dress)   Don LB Clothes Undergarment; Shoes   Limitations Noted In ROM   Adaptive Equipment Reacher   Positioning Supported Sit;Standing   Findings Pt lebron HERRERA carryover [facility] training, safety techniques, and ECTs  Compliant with spinal precautions throughout session  UB Dressing (FIM) 5 - Patient requires supervision/monitoring   LB Dressing (FIM) 5 - Patient requires supervision/monitoring   QI: Sit to Stand   Assistance Needed Supervision   Assistance Provided by [place] No physical assistance   Sit to Stand CARE Score 4   QI: Chair/Bed-to-Chair Transfer   Assistance Needed Supervision; Adaptive equipment   Assistance Provided by [place] No physical assistance   Chair/Bed-to-Chair Transfer CARE Score 4   Transfer Bed/Chair/Wheelchair   Adaptive Equipment Rollator   Bed, Chair, Wheelchair Transfer (FIM) 5 - Patient requires supervision/monitoring   QI: [address] Provided by [place] No physical assistance   Comment DS   Toileting Hygiene CARE Score 4   Toileting   Able to [address] down yes, up yes  Manage Hygiene Bladder   Limitations Noted In ROM   Adaptive Equipment Grab Bar   Findings DS   Toileting (FIM) 5 - Patient requires supervision/monitoring   QI: Toilet Transfer   Assistance Needed Supervision; Adaptive equipment   Assistance Provided by [place] No physical assistance   Comment DS   Toilet Transfer CARE Score 4   Toilet Transfer   Surface Assessed Standard Toilet   Transfer Technique Standard   Limitations Noted In [facility][1] Bar   Findings DS   Toilet Transfer (FIM) 5 - Patient requires supervision/monitoring   Exercise Tools   UE Ergometer Pt completes Scifit on level 1 for 5 minutes prograde and 5 minutes retrograde to increase overall endurance and UB strength for increased independence with ADL tasks  Cognition   Overall Cognitive Status WFL   Arousal/Participation Alert; Cooperative   Attention Within functional limits   Orientation Level Oriented X4   Memory Within functional limits   Following Commands Follows all commands and directions without difficulty   Activity Tolerance   Activity Tolerance Patient tolerated treatment well   Assessment   Treatment Assessment Pt participated in skilled OT services with focus on functional transfers, ADL retraining, and endurance  Pt completing toileting upon arrival, completes at an overall DS level  Spoke with supervising OTR and PT who are in agreement with progressing pt to DS level with all staff notified  Pt verbalizes understanding of ringing before she goes and then again when she returns  Also is insightful that she will continue to ring call bell PRN for A if pain is limiting function  Pt completes entire ADL session at an overall Supervision level  Pt demos G carryover of LHAE training and is compliant with spinal precautions throughout session  Pt also demos carryover of ECTs  Pt will continue to benefit from skilled OT services with focus on higher level IADL tasks in prep for d/c  Prognosis Good   Problem List Decreased range of motion;Decreased endurance; Impaired balance;Decreased mobility;Orthopedic restrictions   Plan   Treatment/Interventions ADL retraining;Functional transfer training; Therapeutic exercise; Endurance training;Equipment eval/education; Compensatory technique education   Progress Progressing toward goals   OT Therapy Minutes   OT Time In 0930   OT Time Out 1100   OT Total Time (minutes) 90   OT Mode of treatment - Individual (minutes) 90   OT Mode of treatment - Concurrent (minutes) 0   OT Mode of treatment - Group (minutes) 0   OT Mode of treatment - Co-treat (minutes) 0   OT Mode of Teatment - Total time(minutes) 90 minutes   Therapy Time missed   Time missed?  No

## 2018-07-06 NOTE — PROGRESS NOTES
Progress Note - Jonathon Cifuentes 63 y o  female MRN: [id number]    Unit/Bed#: [facility] 558-15 Encounter: [id number]            Subjective:   Patient without complaint currently, feels chronic abdominal pain has improved      Objective:     ROS  Gen: denies recent wt loss   Psych: denies mood change    T: 98 8  HR: 79  BP: 124/70  RR: 16 (not 20)   POx: 98%      Physical Exam:     Gen:        NAD   Neck:   trachea midline  Lungs:  respirations unlabored   Heart:    + S1 and S2   Abdomen:    Soft, non-tender  Extremities: no significant LE edema (soft tissue/adipose)   Psych: mood/affect appropriate  Neurologic: CN grossly intact, lt touch grossly intact, finger to nose without gross dysmetria b/l  5/5 UE B/L, 4/5 RLE, 3 to 4-/5 LLE (LE weakness may be pain limited)     Functional :  Mobility: sup  Tx: sup  ADLs: min-CG         Current Facility-Administered Medications:  acetaminophen 650 mg Oral Q6H PRN Dawna Haney MD   dicyclomine 10 mg Oral 4x Daily (AC & HS) Nora Alexander MD   enoxaparin 40 mg Subcutaneous Q24H [address] Dawna Haney MD   gabapentin 300 mg Oral TID Nora Alexander MD   lidocaine 2 patch Transdermal Daily Dawna Haney MD   lisinopril 20 mg Oral Daily Sherri Souza, YOVANANP   methocarbamol 750 mg Oral Q6H PRN Dawna Haney MD   ondansetron 4 mg Oral Q6H PRN Dawna Haney MD   oxyCODONE 10 mg Oral Q4H PRN Dawna Haney MD   oxyCODONE 5 mg Oral Q4H PRN Dawna Haney MD   pantoprazole 40 mg Oral BID AC Dawna Haney MD   senna 1 tablet Oral Daily Dawna Haney MD   simethicone 80 mg Oral Q6H PRN Dawna Haney MD   sucralfate 1,000 mg Oral 4x Daily (AC & HS) Dawna Haney MD   traZODone 50 mg Oral HS PRN Dawna Haney MD         acetaminophen    methocarbamol    ondansetron    oxyCODONE    oxyCODONE    simethicone    traZODone      Assessment:  Patient is 64 yo female with hx of lumbar stenosis and having failed conservative measures underwent L4-5 decompression fusion surgery by   Radha on 6/27    Plan:    Rehabilitation: cont PT/OT for ambulatory/ADL dysfxn    Lumbar stenosis: s/p L4-5 decompression fusion surgery by Dr Charity Lee on 6/27; lumbar spine precautions, per Dr Nick Wan H&P she spoke with Vandana Ruvalcaba PA-C and confirmed patient does not need LSO brace    Chronic pain: follows with Dr Dewey Coleman and on home neurontin 300 TID, and diclofenac (NSAIDs on hold due to recent lumbar spine surgery)    GERD: at home on protonix 40 mg BID and carafate 1 g QID    HTN: at home on lisinopril-HCTZ 20-12 5 mg qd, currently on lisinopril 20 mg qd only due to hypokalemia    ARPIT/insomnia: at home takes trazadone 50 mg HS prn; d/w patient that sedatives are not recommended for patients with ARPIT however pt refusing to give up the trazadone as per patient does not feel she has it any longer after bariatric surgery and subsequent wt loss, however has not had sleep study to confirm this and patient states she gave away her CPAP machine but will consider stopping trazadone and getting a new sleep study to confirm resolution or get a new CPAP machine if present    Chronic abdominal pain/bloating/diarrhea/N/V: began in 1895 after complications of gastric bypass surgery, patient never had resolution of N/V/diarrhea/abd pain & bloating, had subsequent abdominal revision surgeries at [facility] in 2006 which did not provide relief and essentially learned to live with her symptoms; per patient has had worsening of symptoms 5 years ago and has been following with GI since without relief, recently switched GI doctors to Dr Kush Kim who suspects IBS vs small intestinal bacterial overgrowth as the etiology and prescribed course of rifaximin which helped diarrhea component but not other symptoms; OP FU with Dr Kush Kim    Depression: patient was on sertraline 25 mg qd in the past but has not been on this medication in years per patient and mood has been stable without it     Post-op fevers: UA/Chest XR unimpressive for infxn, blood cx x 2 of 6/30 finalized as no growth, afebrile      Hypokalemia: HCTZ stopped by IM, currently WNL     Anemia: ABLA, Hg currently stable at 9 6    h/o DVT: provoked after bariatric surgery in 2006, was on SCDs only per surgical team in acute care however after Dr Breezy Russ weighed risks/benefits of pharmacologic DVT ppx and d/w patient given h/o provoked DVT after surgery Dr Breezy Russ and the patient have elected to have patient on pharmacologic DVT ppx in addition to SCDs    Dispo: 7/9    Incidental findings on CT A/P of 3/5/18    1) diverticulosis w/o diverticulitis: OP FU with PCP     2) small hiatal hernia: on PPI and carafate for GERD, OP FU with PCP    3) small fat containing supraumbilical hernia: OP FU with PCP with surgical referral at PCP's discretion     Other incidental findings:    4) wide QRS/RBBB: OP FU with PCP with further testing and/or cards referral at PCP's discretion

## 2018-07-06 NOTE — PROGRESS NOTES
Internal Medicine Progress Note  Patient: Rosa Chu  Age/sex: 63 y o  female  Medical Record #: [id number]      ASSESSMENT/PLAN:  Rosa Chu is seen and examined and management for following issues:       Lumbar stenosis; L4-5 decompression/fusion 6/27/18 Lakeisha Herrera): monitor incision; continue LSO brace         Fever:  Seems to have resolved currently but had been present post-operatively with associated mild leukocytosis; CXR, UA were negative; BCs were negative; incision C/D/I; likely post-operative related fever     Leukocytosis:  resolved     HTN :  Stable; was on lisinopril HCT 20/12 5 but stopped HCTZ since was makes K+ level too low even despite replacement     GERD without esophagitis: continue Protonix 40mg BID and Carafate     Hypokalemia: resolved after replacement  Abdominal discomfort with hx gastrectomy/numerous surgeries: had bloating/discomfortRUQ Sunday during day =  resolved; hx gastric bypass with eventual gastrectomy/esophagojejunostomy and six total surgeries for complications  Says has many abdominal adhesions which create issues of pain  Saw Dr Reyna Cruz in the office 2/27/18 for RUQ pain =  CT scan abdomen/pelvis (no acute changes); started rifaximin to treat her for small intestinal bacterial overgrowth and IBS-diarrhea; he said if her symptoms persist would consider amitriptyline for neuropathic pain  Her EGD in 2/2018 showed small diverticulum at the level of the esophagojejunostomy anastomosis staple line  Primary service started prn Bentyl      Subjective: Patient seen and examined   Offers no complaints except some right lumbar back spasm    ROS:   GI: denies abdominal pain, change bowel habits or reflux symptoms  Neuro: No new neurologic changes  Respiratory: No Cough, SOB  Cardiovascular: No CP, palpitations     Scheduled Meds:    Current Facility-Administered Medications:  acetaminophen 650 mg Oral Q6H PRN Juliet Louis MD   dicyclomine 10 mg Oral 4x Daily (AC & HS) Lm Paulson MD   enoxaparin 40 mg Subcutaneous Q24H [address] Di MD Umesh   gabapentin 300 mg Oral TID Lm Paulson MD   lidocaine 2 patch Transdermal Daily Di MD Umesh   lisinopril 20 mg Oral Daily JACINTO Arthur   methocarbamol 750 mg Oral Q6H PRN Di MD Umesh   ondansetron 4 mg Oral Q6H PRN Di MD Umesh   oxyCODONE 10 mg Oral Q4H PRN Di MD Umesh   oxyCODONE 5 mg Oral Q4H PRN Di MD Umesh   pantoprazole 40 mg Oral BID AC Di MD Umesh   senna 1 tablet Oral Daily Di MD Umesh   simethicone 80 mg Oral Q6H PRN Di MD Umesh   sucralfate 1,000 mg Oral 4x Daily (AC & HS) Di MD Umesh   traZODone 50 mg Oral HS PRN Di MD Umesh       Labs:       Results from last 7 days  Lab Units 07/05/18  0532 07/02/18  0549   WBC Thousand/uL 9 46 8 57   HEMOGLOBIN g/dL 9 6* 9 4*   HEMATOCRIT % 31 0* 30 1*   PLATELETS Thousands/uL 368 300       Results from last 7 days  Lab Units 07/05/18  0532 07/03/18  0715   SODIUM mmol/L 138 141   POTASSIUM mmol/L 3 5 3 4*   CHLORIDE mmol/L 107 105   CO2 mmol/L 25 27   BUN mg/dL 16 12   CREATININE mg/dL 0 76 0 84   GLUCOSE RANDOM mg/dL 101 102   CALCIUM mg/dL 8 5 8 5       Results from last 7 days  Lab Units 06/30/18  0553   HEMOGLOBIN A1C % 5 3              Glucose (mg/dL)   Date Value   07/05/2018 101   07/03/2018 102   07/02/2018 116   07/01/2018 113       Labs reviewed    Physical Examination:  Vitals:   Vitals:    07/05/18 1310 07/05/18 2028 07/06/18 0524 07/06/18 0917   BP: 120/67 148/95 127/59 124/70   BP Location: Right arm Right arm Right arm    Pulse: 91 94 79    Resp: 20 20 20    Temp: 98 4 °F (36 9 °C) 99 6 °F (37 6 °C) 98 8 °F (37 1 °C)    TempSrc: Oral Oral Oral    SpO2: 97% 98% 98%    Weight:       Height:           GEN: NAD  HEENT: NC/AT  RESP: BBS w/o crackles/wheeze/rhonci; resp unlabored  CV: +S1 S2, regular rate, no rubs/murmurs  ABD: soft, NT, ND, normal BS   EXT: no edema  Neuro: Blank Shaver ] Total time spent: 30 Mins and greater than 50% of this time was spent counseling/coordinating care  ** Please Note: Dragon 360 Dictation voice to text software may have been used in the creation of this document   **

## 2018-07-06 NOTE — PROGRESS NOTES
07/06/18 1100   Pain Assessment   Pain Score 8   Pain Type Acute pain;Surgical pain   Pain Location Back   Pain Orientation Lower   Hospital Pain Intervention(s) Ambulation/increased activity; Distraction  (pt  medicated)   Restrictions/Precautions   Precautions Fall Risk;Spinal precautions   General   Change In Medical/Functional Status Pt  progressed to DS   Cognition   Arousal/Participation Alert; Cooperative   Attention Within functional limits   Memory Within functional limits   Following Commands Follows all commands and directions without difficulty   Subjective   Subjective No new complaints reported  Readt to participate in therapy   QI: Sit to Stand   Assistance Needed Set-up / clean-up   Sit to Stand CARE Score 5   QI: Chair/Bed-to-Chair Transfer   Assistance Needed Set-up / clean-up   Chair/Bed-to-Chair Transfer CARE Score 5   Transfer Bed/Chair/Wheelchair   Adaptive Equipment Rollator   Findings DS   Bed, Chair, Wheelchair Transfer (FIM) 5 - Patient requires supervision/monitoring   QI: Car Transfer   Assistance Needed Supervision   Car Transfer CARE Score 4   QI: Walk 10 Feet   Assistance Needed Set-up / clean-up   Walk 10 Feet CARE Score 5   QI: Walk 50 Feet with Two Turns   Assistance Needed Supervision   Walk 50 Feet with Two Turns CARE Score 4   QI: Walk 150 Feet   Assistance Needed Supervision   Walk 150 Feet CARE Score 4   QI: Walking 10 Feet on Uneven Surfaces   Assistance Needed Supervision   Comment ramp   Walking 10 Feet on Uneven Surfaces CARE Score 4   Ambulation   Does the patient walk? 2   Yes   Primary Discharge Mode of Locomotion Walk   Walk Assist Level Supervision;Distant Supervision   Gait Pattern Inconsistant Meghna   Assist Device Rollator   Distance Walked (feet) 350 ft  (X1, 150 back to room )   Limitations Noted In Endurance   Walking (FIM) 5 - Patient requires supervision/monitoring AND distance 150 feet or more, no rest   Wheelchair mobility   QI: Does the patient use a wheelchair? 0  No   QI: 1 Step (Curb)   Assistance Needed Physical assistance   Assistance Provided by [place] 25%-49%   1 Step (Curb) CARE Score 3   Stairs   Type Curb   # of Steps 1   Weight Bearing Precautions Back   Assist Devices (rollator)   Findings Trailed rollator onc urb but needed min A to lift rollator to and from curb    Stairs (FIM) (curb)   Therapeutic Interventions   Flexibility B hamstring and gastrco stretching    Assessment   Treatment Assessment Pt  cont to demonstrate safety in functional mobility and is progressed to DS using rollator walker  OT in agreement and nurse made aware  Tx focused this session with increasing activity tolerance in ambulation and curb step management  Pt  trialed to use rollator on curb but had difficulty due to wt  of rollator needing min A   Suggested to pt  to use RW to manage curb   pt  reported that she has 1 GOMEZ through the door and 1 GOMEZ to go up to her kitchen  Pt  was concern about discomfort by mid/ low back "bag of sand feeling" by the para spinal muscles and was able to communicate with nurse practitioner REMBERTO  Pt  will also ask Dr Cheng Chan later about this complaint  Cont with PT POC for now in the PM     Problem List Decreased range of motion;Decreased endurance; Impaired balance;Decreased mobility;Orthopedic restrictions;Pain   Barriers to Discharge Inaccessible home environment;Decreased caregiver support   PT Barriers   Physical Impairment Decreased range of motion;Decreased endurance; Impaired balance;Decreased mobility;Orthopedic restrictions;Pain   Functional Limitation Stair negotiation;Standing;Transfers; Walking   Plan   Treatment/Interventions Functional transfer training;LE strengthening/ROM; Elevations; Therapeutic exercise; Endurance training;Patient/family training;Equipment eval/education; Bed mobility;Gait training   Recommendation   Recommendation Home PT;Outpatient PT; Home with family support   Equipment Recommended Walker  (rollator)   PT Therapy Minutes   PT Time In 1100   PT Time Out 1130   PT Total Time (minutes) 30   PT Mode of treatment - Individual (minutes) 30   PT Mode of treatment - Concurrent (minutes) 0   PT Mode of treatment - Group (minutes) 0   PT Mode of treatment - Co-treat (minutes) 0   PT Mode of Teatment - Total time(minutes) 30 minutes   Therapy Time missed   Time missed?  No

## 2018-07-06 NOTE — SOCIAL WORK
Clinical update faxed to tara vora at [facility], [place], [facility], post acute form completed for review  Requesting 2 additional days, awaiting determination  Received call from dorothy at [facility], covering for tara  Approved additional 2 days with lcd 7/8 and planned dc 7/9  In preparation for dc cm received order from therapy for a roller walker and commode  Order placed with [facility] to be delivered to pts room prior to dc

## 2018-07-06 NOTE — PLAN OF CARE
Problem: Potential for Falls  Goal: Patient will remain free of falls  INTERVENTIONS:  - Assess patient frequently for physical needs  -  Identify cognitive and physical deficits and behaviors that affect risk of falls  -  [place] fall precautions as indicated by assessment   - Educate patient/family on patient safety including physical limitations  - Instruct patient to call for assistance with activity based on assessment  - Modify environment to reduce risk of injury  - Consider OT/PT consult to assist with strengthening/mobility   Outcome: Progressing      Problem: Nutrition/Hydration-ADULT  Goal: Nutrient/Hydration intake appropriate for improving, restoring or maintaining nutritional needs  Monitor and assess patient's nutrition/hydration status for malnutrition (ex- brittle hair, bruises, dry skin, pale skin and conjunctiva, muscle wasting, smooth red tongue, and disorientation)  Collaborate with interdisciplinary team and initiate plan and interventions as ordered  Monitor patient's weight and dietary intake as ordered or per policy  Utilize nutrition screening tool and intervene per policy  Determine patient's food preferences and provide high-protein, high-caloric foods as appropriate       INTERVENTIONS:  - Monitor oral intake, urinary output, labs, and treatment plans  - Assess nutrition and hydration status and recommend course of action  - Evaluate amount of meals eaten  - Assist patient with eating if necessary   - Allow adequate time for meals  - Recommend/ encourage appropriate diets, oral nutritional supplements, and vitamin/mineral supplements  - Order, calculate, and assess calorie counts as needed  - Recommend, monitor, and adjust tube feedings and TPN/PPN based on assessed needs  - Assess need for intravenous fluids  - Provide specific nutrition/hydration education as appropriate  - Include patient/family/caregiver in decisions related to nutrition   Outcome: Progressing      Problem: PAIN - ADULT  Goal: Verbalizes/displays adequate comfort level or baseline comfort level  Interventions:  - Encourage patient to monitor pain and request assistance  - Assess pain using appropriate pain scale  - Administer analgesics based on type and severity of pain and evaluate response  - Implement non-pharmacological measures as appropriate and evaluate response  - Consider cultural and social influences on pain and pain management  - Notify physician/advanced practitioner if interventions unsuccessful or patient reports new pain   Outcome: Progressing      Problem: INFECTION - ADULT  Goal: Absence or prevention of progression during hospitalization  INTERVENTIONS:  - Assess and monitor for signs and symptoms of infection  - Monitor lab/diagnostic results  - Monitor all insertion sites, i e  indwelling lines, tubes, and drains  - Monitor endotracheal (as able) and nasal secretions for changes in amount and color  - [place] appropriate cooling/warming therapies per order  - Administer medications as ordered  - Instruct and encourage patient and family to use good hand hygiene technique  - Identify and instruct in appropriate isolation precautions for identified infection/condition   Outcome: Progressing    Goal: Absence of fever/infection during neutropenic period  INTERVENTIONS:  - Monitor WBC  - Implement neutropenic guidelines   Outcome: Completed Date Met: 07/06/18      Problem: SAFETY ADULT  Goal: Maintain or return to baseline ADL function  INTERVENTIONS:  -  Assess patient's ability to carry out ADLs; assess patient's baseline for ADL function and identify physical deficits which impact ability to perform ADLs (bathing, care of mouth/teeth, toileting, grooming, dressing, etc )  - Assess/evaluate cause of self-care deficits   - Assess range of motion  - Assess patient's mobility; develop plan if impaired  - Assess patient's need for assistive devices and provide as appropriate  - Encourage maximum independence but intervene and supervise when necessary  ¯ Involve family in performance of ADLs  ¯ Assess for home care needs following discharge   ¯ Request OT consult to assist with ADL evaluation and planning for discharge  ¯ Provide patient education as appropriate   Outcome: Progressing    Goal: Maintain or return mobility status to optimal level  INTERVENTIONS:  - Assess patient's baseline mobility status (ambulation, transfers, stairs, etc )    - Identify cognitive and physical deficits and behaviors that affect mobility  - Identify mobility aids required to assist with transfers and/or ambulation (gait belt, sit-to-stand, lift, walker, cane, etc )  - [place] fall precautions as indicated by assessment  - Record patient progress and toleration of activity level on Mobility SBAR; progress patient to next Phase/Stage  - Instruct patient to call for assistance with activity based on assessment  - Request Rehabilitation consult to assist with strengthening/weightbearing, etc    Outcome: Progressing      Problem: DISCHARGE PLANNING  Goal: Discharge to home or other facility with appropriate resources  INTERVENTIONS:  - Identify barriers to discharge w/patient and caregiver  - Arrange for needed discharge resources and transportation as appropriate  - Identify discharge learning needs (meds, wound care, etc )  - Arrange for interpretive services to assist at discharge as needed  - Refer to Case Management Department for coordinating discharge planning if the patient needs post-hospital services based on physician/advanced practitioner order or complex needs related to functional status, cognitive ability, or social support system   Outcome: Progressing

## 2018-07-06 NOTE — PROGRESS NOTES
07/06/18 1430   Pain Assessment   Pain Score 8   Pain Type Acute pain   Pain Location Back   Pain Orientation Bilateral;Lower   Hospital Pain Intervention(s) Ambulation/increased activity; Distraction  (medicated)   Restrictions/Precautions   Precautions Fall Risk;Spinal precautions   Cognition   Arousal/Participation Alert; Cooperative   Attention Within functional limits   Memory Within functional limits   Following Commands Follows all commands and directions without difficulty   Subjective   Subjective Pt  wanted to go to bathroom prior to session  No new complaints reported    QI: Roll Left and Right   Assistance Needed Set-up / clean-up   Roll Left and Right CARE Score 5   QI: Sit to [address] / clean-up   Sit to Lying CARE Score 5   QI: Lying to Sitting on Side of Bed   Assistance Needed Set-up / clean-up   Lying to Sitting on Side of Bed CARE Score 5   QI: Sit to Stand   Assistance Needed Set-up / clean-up   Sit to Stand CARE Score 5   QI: Chair/Bed-to-Chair Transfer   Assistance Needed Set-up / clean-up   Chair/Bed-to-Chair Transfer CARE Score 5   Transfer Bed/Chair/Wheelchair   Adaptive Equipment Rollator   Findings DS   Bed, Chair, Wheelchair Transfer (FIM) 5 - Patient requires supervision/monitoring   QI: Walk 10 Feet   Assistance Needed Set-up / clean-up   Walk 10 Feet CARE Score 5   QI: Walk 50 Feet with Two Turns   Assistance Needed Supervision   Walk 50 Feet with Two Turns CARE Score 4   QI: Walk 150 Feet   Assistance Needed Supervision   Walk 150 Feet CARE Score 4   Ambulation   Does the patient walk? 2   Yes   Primary Discharge Mode of Locomotion Walk   Walk Assist Level Supervision;Distant Supervision   Gait Pattern Inconsistant Meghna   Assist Device Rollator   Distance Walked (feet) 300 ft   Limitations Noted In Endurance;Strength   Walking (FIM) 5 - Patient requires supervision/monitoring AND distance 150 feet or more, no rest   Wheelchair mobility   QI: Does the patient use a wheelchair? 0  No   QI: 4 Steps   Assistance Needed Supervision   4 Steps CARE Score 4   QI: 12 Steps   Assistance Needed Supervision   12 Steps CARE Score 4   Stairs   Type Stairs   # of Steps 12  ( )   Weight Bearing Precautions Fall Risk   Assist Devices Bilateral Rail   Findings S   Stairs (FIM) 5 - Patient requires supervision/monitoring AND goes up and down full flight (12- 14 stairs)   QI: Toilet Transfer   Assistance Needed Set-up / clean-up   Toilet Transfer CARE Score 5   Toilet Transfer   Surface Assessed Standard Toilet   Toilet Transfer (FIM) 5 - Patient requires supervision/monitoring   Therapeutic Interventions   Strengthening Supine SAQ qith 2 5# wts, heelslides hip + knee flex, hip abd and add   Equipment Use   NuStep Level 4 X 12 mins    Assessment   Treatment Assessment Tx focused on increasing B LE strength through above activities  Pt  tolerated session well  Was asking about core strengthening that she can do without breaking her spinal precautions  Pt  instructed with pelvic tilting and to incorporate pelvic tilting with LE exercises  Pt  able to demonstrate understanding  Pt  able to manage 12 steps using B HR  Pt  stated that she would like to keep practiving steps because she has a basement  She does not have to go there often though  Pt  able to perform bed mobility at mat DS through log rolling without any verbal cues  Pt's leg rests were on prior to session but pt  able to manage leg rest prior to getting up  Educated pt  to ask for assistance to take off leg rest if she's not able to and to keep leg rest off as much as she can  Pt  stated that she likes to have legrets on while she has the aqua gareth pad on but would ask somebody to take it off later  Cont with PT POC   Problem List Decreased range of motion;Decreased endurance; Impaired balance;Decreased mobility;Orthopedic restrictions;Pain   Barriers to Discharge Inaccessible home environment;Decreased caregiver support PT Barriers   Physical Impairment Decreased range of motion;Decreased endurance; Impaired balance;Decreased mobility;Orthopedic restrictions;Pain   Functional Limitation Stair negotiation;Standing;Transfers; Walking   Plan   Treatment/Interventions Functional transfer training;LE strengthening/ROM; Elevations; Therapeutic exercise; Endurance training;Patient/family training;Equipment eval/education; Bed mobility;Gait training   Recommendation   Recommendation Home PT;Outpatient PT; Home with family support   Equipment Recommended Walker  (rollator)   PT Therapy Minutes   PT Time In 1430   PT Time Out 1530   PT Total Time (minutes) 60   PT Mode of treatment - Individual (minutes) 60   PT Mode of treatment - Concurrent (minutes) 0   PT Mode of treatment - Group (minutes) 0   PT Mode of treatment - Co-treat (minutes) 0   PT Mode of Teatment - Total time(minutes) 60 minutes   Therapy Time missed   Time missed?  No

## 2018-07-07 PROCEDURE — 97110 THERAPEUTIC EXERCISES: CPT | Performed by: PHYSICAL THERAPIST

## 2018-07-07 PROCEDURE — 97530 THERAPEUTIC ACTIVITIES: CPT

## 2018-07-07 PROCEDURE — 97530 THERAPEUTIC ACTIVITIES: CPT | Performed by: PHYSICAL THERAPIST

## 2018-07-07 RX ADMIN — DICYCLOMINE HYDROCHLORIDE 10 MG: 10 CAPSULE ORAL at 11:52

## 2018-07-07 RX ADMIN — SENNOSIDES 8.6 MG: 8.6 TABLET, FILM COATED ORAL at 08:41

## 2018-07-07 RX ADMIN — OXYCODONE HYDROCHLORIDE 10 MG: 10 TABLET ORAL at 05:28

## 2018-07-07 RX ADMIN — SUCRALFATE 1000 MG: 1 SUSPENSION ORAL at 16:56

## 2018-07-07 RX ADMIN — SUCRALFATE 1000 MG: 1 SUSPENSION ORAL at 11:52

## 2018-07-07 RX ADMIN — GABAPENTIN 300 MG: 300 CAPSULE ORAL at 16:56

## 2018-07-07 RX ADMIN — LISINOPRIL 20 MG: 20 TABLET ORAL at 08:41

## 2018-07-07 RX ADMIN — DICYCLOMINE HYDROCHLORIDE 10 MG: 10 CAPSULE ORAL at 16:56

## 2018-07-07 RX ADMIN — TRAZODONE HYDROCHLORIDE 50 MG: 50 TABLET ORAL at 23:38

## 2018-07-07 RX ADMIN — OXYCODONE HYDROCHLORIDE 10 MG: 10 TABLET ORAL at 09:49

## 2018-07-07 RX ADMIN — PANTOPRAZOLE SODIUM 40 MG: 40 TABLET, DELAYED RELEASE ORAL at 16:56

## 2018-07-07 RX ADMIN — GABAPENTIN 300 MG: 300 CAPSULE ORAL at 08:41

## 2018-07-07 RX ADMIN — TRAZODONE HYDROCHLORIDE 50 MG: 50 TABLET ORAL at 00:13

## 2018-07-07 RX ADMIN — DICYCLOMINE HYDROCHLORIDE 10 MG: 10 CAPSULE ORAL at 06:08

## 2018-07-07 RX ADMIN — METHOCARBAMOL 750 MG: 750 TABLET, FILM COATED ORAL at 08:41

## 2018-07-07 RX ADMIN — OXYCODONE HYDROCHLORIDE 10 MG: 10 TABLET ORAL at 19:33

## 2018-07-07 RX ADMIN — GABAPENTIN 300 MG: 300 CAPSULE ORAL at 21:56

## 2018-07-07 RX ADMIN — SUCRALFATE 1000 MG: 1 SUSPENSION ORAL at 21:56

## 2018-07-07 RX ADMIN — ACETAMINOPHEN 650 MG: 325 TABLET ORAL at 08:41

## 2018-07-07 RX ADMIN — LIDOCAINE 2 PATCH: 50 PATCH CUTANEOUS at 16:56

## 2018-07-07 RX ADMIN — SIMETHICONE CHEW TAB 80 MG 80 MG: 80 TABLET ORAL at 05:28

## 2018-07-07 RX ADMIN — DICYCLOMINE HYDROCHLORIDE 10 MG: 10 CAPSULE ORAL at 21:56

## 2018-07-07 RX ADMIN — OXYCODONE HYDROCHLORIDE 10 MG: 10 TABLET ORAL at 14:55

## 2018-07-07 RX ADMIN — ENOXAPARIN SODIUM 40 MG: 100 INJECTION SUBCUTANEOUS at 08:41

## 2018-07-07 RX ADMIN — PANTOPRAZOLE SODIUM 40 MG: 40 TABLET, DELAYED RELEASE ORAL at 06:08

## 2018-07-07 RX ADMIN — SIMETHICONE CHEW TAB 80 MG 80 MG: 80 TABLET ORAL at 21:56

## 2018-07-07 RX ADMIN — METHOCARBAMOL 750 MG: 750 TABLET, FILM COATED ORAL at 15:54

## 2018-07-07 RX ADMIN — SUCRALFATE 1000 MG: 1 SUSPENSION ORAL at 06:09

## 2018-07-07 NOTE — PROGRESS NOTES
07/07/18 1230   Pain Assessment   Pain Assessment No/denies pain   Pain Score No Pain   Restrictions/Precautions   Precautions Fall Risk;Spinal precautions   General   Change In Medical/Functional Status mod I with rollator   Cognition   Arousal/Participation Alert; Cooperative   Subjective   Subjective Pt reports the more she walks the better she feels    QI: Roll Left and Right   [address] Provided by [place] No physical assistance   Roll Left and Right CARE Score 6   QI: Sit to [address] Provided by [place] No physical assistance   Sit to Lying CARE Score 6   QI: Lying to Sitting on Side of Bed   Assistance Needed Independent   Assistance Provided by [place] No physical assistance   Lying to Sitting on Side of Bed CARE Score 6   QI: Sit to [address] Provided by [place] No physical assistance   Sit to Stand CARE Score 6   QI: Chair/Bed-to-Chair Transfer   Assistance Needed Independent; Adaptive equipment   Assistance Provided by [place] No physical assistance   Chair/Bed-to-Chair Transfer CARE Score 6   Transfer Bed/Chair/Wheelchair   Limitations Noted In LE Strength   Adaptive Equipment Rollator   Bed, Chair, Wheelchair Transfer (FIM) 6 - Patient requires assistive device/extra time/safety concerns but completes independently   QI: Car Transfer   Assistance Needed Independent; Adaptive equipment   Assistance Provided by [place] No physical assistance   Car Transfer CARE Score 6   QI: Walk 10 Feet   Assistance Needed Independent; Adaptive equipment   Assistance Provided by [place] No physical assistance   Walk 10 Feet CARE Score 6   QI: Walk 50 Feet with Two [address]; Adaptive equipment   Assistance Provided by [place] No physical assistance   Walk 50 Feet with Two Turns CARE Score 6   QI: Walk 150 Feet   Assistance Needed Independent; Adaptive equipment   Assistance Provided by [place] No physical assistance   Walk 150 Feet CARE Score 6   QI: Walking 10 Feet on Uneven Surfaces   Assistance Needed Independent; Adaptive equipment   Assistance Provided by [place] No physical assistance   Walking 10 Feet on Uneven Surfaces CARE Score 6   Ambulation   Does the patient walk? 2  Yes   Primary Discharge Mode of Locomotion Walk   Walk Assist Level Modified Independent   Gait Pattern Inconsistant Meghna   Assist Device Rollator   Distance Walked (feet) 999 ft   Limitations Noted In Strength;Speed   Walking (FIM) 6 - Patient requires assistive device/extra time/safety concerns but completes independently AND distance 150 feet or more, no rest   QI: Wheel 50 Feet with Two Turns   Reason if not Attempted Activity not applicable   Wheel 50 Feet with Two Turns CARE Score 9   QI: Wheel 150 Feet   Reason if not Attempted Activity not applicable   Wheel 735 Feet CARE Score 9   Wheelchair mobility   QI: Does the patient use a wheelchair? 0  No   QI: 1 Step (Curb)   Assistance Needed Supervision; Adaptive equipment   Assistance Provided by [place] No physical assistance   1 Step (Curb) CARE Score 4   Stairs   Type Curb   Findings up/down curb with rollator; vc's to lock brakes; able to perform S level    Therapeutic Interventions   Flexibility prone quad stretch B/L ; pronelying x 5 min for hip flexor stretch    Assessment   Treatment Assessment Pt participated in skilled PT session with focus on community mobility and progression to mod I  Pt ambulated outdoors on uneven surfaces, up/down ramps, up/down curbs, and up/down inclines with use of rollator  No LOB or buckling noted; pt did not require any rest breaks  Pt reports feeling "tightness" in her hips and back but it feels good to walk  Also practiced navigating through gift shop with use of rollator  Pt reported pronelying felt good and relieved pressure in her low back  Pt will benefit from continued curb and stair training tomorrow   Instructed pt to ambulate with rollator in hallways later tonight as part of her exercise program  Pt is on track for D/C home Monday  Problem List Decreased strength;Orthopedic restrictions;Pain   Plan   Treatment/Interventions Functional transfer training;LE strengthening/ROM; Elevations; Therapeutic exercise; Endurance training;Patient/family training;Equipment eval/education; Bed mobility;Gait training   Progress Improving as expected   Recommendation   Recommendation Home PT; Home with family support   Equipment Recommended (rollator)   PT Therapy Minutes   PT Time In 1230   PT Time Out 1400   PT Total Time (minutes) 90   PT Mode of treatment - Individual (minutes) 0   PT Mode of treatment - Concurrent (minutes) 90   PT Mode of treatment - Group (minutes) 0   PT Mode of treatment - Co-treat (minutes) 0   PT Mode of Teatment - Total time(minutes) 90 minutes   Therapy Time missed   Time missed?  No

## 2018-07-07 NOTE — PROGRESS NOTES
Internal Medicine Progress Note  Patient: Tiffany Bello  Age/sex: 63 y o  female  Medical Record #: [id number]      ASSESSMENT/PLAN:  Tiffany Bello is seen and examined and management for following issues:       Lumbar stenosis; L4-5 decompression/fusion 6/27/18 Marjorie Alicea): monitor incision; continue LSO brace         Fever:  Seems to have resolved currently but had been present post-operatively with associated mild leukocytosis; CXR, UA were negative; BCs were negative; incision C/D/I; likely post-operative related fever     Leukocytosis:  resolved     HTN :  Stable; was on lisinopril HCT 20/12 5 but stopped HCTZ since was makes K+ level too low even despite replacement     GERD without esophagitis: continue Protonix 40mg BID and Carafate     Hypokalemia: resolved after replacement  Abdominal discomfort with hx gastrectomy/numerous surgeries: had bloating/discomfortRUQ Sunday during day =  resolved; hx gastric bypass with eventual gastrectomy/esophagojejunostomy and six total surgeries for complications  Says has many abdominal adhesions which create issues of pain  Saw Dr Candy Colmenares in the office 2/27/18 for RUQ pain =  CT scan abdomen/pelvis (no acute changes); started rifaximin to treat her for small intestinal bacterial overgrowth and IBS-diarrhea; he said if her symptoms persist would consider amitriptyline for neuropathic pain  Her EGD in 2/2018 showed small diverticulum at the level of the esophagojejunostomy anastomosis staple line  Primary service started jacksonn Leticia  She vomited lunch today = will watch      Subjective: Patient seen and examined   Vomited lunch today = she thinks food got stuck and when this happens at home she has to vomit    ROS:   GI: denies abdominal pain, change bowel habits or reflux symptoms  Neuro: No new neurologic changes  Respiratory: No Cough, SOB  Cardiovascular: No CP, palpitations     Scheduled Meds:    Current Facility-Administered Medications:  acetaminophen 650 mg Oral Q6H PRN Meryle Leigh, MD   dicyclomine 10 mg Oral 4x Daily (AC & HS) Flip Mcguire MD   enoxaparin 40 mg Subcutaneous Q24H [facility] Meryle Leigh, MD   gabapentin 300 mg Oral TID Flip Mcguire MD   lidocaine 2 patch Transdermal Daily Meryle Leigh, MD   lisinopril 20 mg Oral Daily JACINTO Aguayo   methocarbamol 750 mg Oral Q6H PRN Meryle Leigh, MD   ondansetron 4 mg Oral Q6H PRN Meryle Leigh, MD   oxyCODONE 10 mg Oral Q4H PRN Meryle Leigh, MD   oxyCODONE 5 mg Oral Q4H PRN Meryle Leigh, MD   pantoprazole 40 mg Oral BID AC Meryle Leigh, MD   senna 1 tablet Oral Daily Meryle Leigh, MD   simethicone 80 mg Oral Q6H PRN Meryle Leigh, MD   sucralfate 1,000 mg Oral 4x Daily (AC & HS) Meryle Leigh, MD   traZODone 50 mg Oral HS PRN Meryle Leigh, MD       Labs:       Results from last 7 days  Lab Units 07/05/18  0532 07/02/18  0549   WBC Thousand/uL 9 46 8 57   HEMOGLOBIN g/dL 9 6* 9 4*   HEMATOCRIT % 31 0* 30 1*   PLATELETS Thousands/uL 368 300       Results from last 7 days  Lab Units 07/05/18  0532 07/03/18  0715   SODIUM mmol/L 138 141   POTASSIUM mmol/L 3 5 3 4*   CHLORIDE mmol/L 107 105   CO2 mmol/L 25 27   BUN mg/dL 16 12   CREATININE mg/dL 0 76 0 84   GLUCOSE RANDOM mg/dL 101 102   CALCIUM mg/dL 8 5 8 5                  Glucose (mg/dL)   Date Value   07/05/2018 101   07/03/2018 102   07/02/2018 116   07/01/2018 113       Labs reviewed    Physical Examination:  Vitals:   Vitals:    07/06/18 1349 07/06/18 2032 07/07/18 0504 07/07/18 0840   BP: 107/58 140/79 126/57 106/64   BP Location: Right arm Right arm Left arm Left arm   Pulse: 89 92 85 96   Resp: 20 21 21    Temp: 98 2 °F (36 8 °C) 98 3 °F (36 8 °C) 98 4 °F (36 9 °C)    TempSrc: Oral Oral Oral    SpO2: 97% 96% 98%    Weight:       Height:           GEN: NAD  HEENT: NC/AT  RESP: BBS w/o crackles/wheeze/rhonci; resp unlabored  CV: +S1 S2, regular rate, no rubs/murmurs  ABD: soft, NT, ND, normal BS   EXT: no edema  Neuro: AAO; LEs 4+/5      [x ] Total time spent: 30 Mins and greater than 50% of this time was spent counseling/coordinating care  ** Please Note: Dragon 360 Dictation voice to text software may have been used in the creation of this document   **

## 2018-07-07 NOTE — PROGRESS NOTES
[facility]  OT Daily Treatment Note           07/07/18 0930   Pain Assessment   Pain Assessment 0-10   Pain Score 8   Pain Location Back   Pain Orientation Lower;Mid   Restrictions/Precautions   ROM Restrictions (spinal precautions)   QI: [address] Provided by [place] No physical assistance   Comment reacher   Picking Up Object CARE Score 4   QI: Sit to Stand   Assistance Needed Supervision   Assistance Provided by [place] No physical assistance   Sit to Stand CARE Score 4   QI: Chair/Bed-to-Chair Transfer   Assistance Needed Supervision   Assistance Provided by [place] No physical assistance   Chair/Bed-to-Chair Transfer CARE Score 4   Transfer Bed/Chair/Wheelchair   Limitations Noted In Balance; Endurance   Adaptive Equipment Rollator   Sit to Stand Supervision  (distant)   Stand to Sit Supervision  (distant)   Bed, Chair, Wheelchair Transfer (FIM) 5 - Patient requires supervision/monitoring  (distant)   Meal Prep   Meal Prep Level Other (Comment)  (rollator)   Meal Prep Level of Assistance Distant supervision   Meal Preparation Patient participated in light meal prep activity with supervision  Kitchen Mobility   Kitchen Activity Retrieve items;Transport items   Kitchen Mobility Comments She is able to complete item transport on counter tops and with use of rollator  She is supervision to retrieve items from cabinets and refrigerator  She demonstrates good carryover of spinal precautions and limitations  She is able to problem solve through tasks to make easier for her  Functional Standing Tolerance   Activity Patient participated in item retrieval from floor with [facility] reach with supervision demosntrating good safety  Comments Patient tolerated static standing for 12 mins x1, 7 mins x1  Patient demonstrated good balance with static and dynamic tasks within spinal precautions      Activity Tolerance   Activity Tolerance Patient tolerated treatment well   Assessment   Treatment Assessment Patient is pleasant and cooperative throughout  She is very talkative requiring cues to advance to next activity throughout session  She is distant supervision with transfers and mobility with use of rollator  She demonstrates good safety with rollator with locking brakes and positioning while compeleting functional tasks  She demonstrates good overall understanding of spinal precautions and applying them to functional tasks  During session patient spoke with Lan Daly in regards to AE and DME needs for discharge, will continue to follow up  Prognosis Good   Problem List Decreased strength;Decreased range of motion;Decreased endurance;Decreased mobility   Plan   Treatment/Interventions ADL retraining;Functional transfer training; Therapeutic exercise; Endurance training   Progress Improving as expected   OT Therapy Minutes   OT Time In 0930   OT Time Out 1115   OT Total Time (minutes) 105   OT Mode of treatment - Individual (minutes) 95   OT Mode of treatment - Concurrent (minutes) 10   OT Mode of treatment - Group (minutes) 0   OT Mode of treatment - Co-treat (minutes) 0   OT Mode of Teatment - Total time(minutes) 105 minutes   Therapy Time missed   Time missed?  No

## 2018-07-08 PROCEDURE — 97110 THERAPEUTIC EXERCISES: CPT

## 2018-07-08 PROCEDURE — 97116 GAIT TRAINING THERAPY: CPT

## 2018-07-08 PROCEDURE — 97535 SELF CARE MNGMENT TRAINING: CPT

## 2018-07-08 PROCEDURE — 97530 THERAPEUTIC ACTIVITIES: CPT

## 2018-07-08 RX ADMIN — SUCRALFATE 1000 MG: 1 SUSPENSION ORAL at 06:48

## 2018-07-08 RX ADMIN — GABAPENTIN 300 MG: 300 CAPSULE ORAL at 17:50

## 2018-07-08 RX ADMIN — GABAPENTIN 300 MG: 300 CAPSULE ORAL at 22:36

## 2018-07-08 RX ADMIN — OXYCODONE HYDROCHLORIDE 10 MG: 10 TABLET ORAL at 20:44

## 2018-07-08 RX ADMIN — OXYCODONE HYDROCHLORIDE 5 MG: 5 TABLET ORAL at 02:16

## 2018-07-08 RX ADMIN — OXYCODONE HYDROCHLORIDE 10 MG: 10 TABLET ORAL at 15:44

## 2018-07-08 RX ADMIN — METHOCARBAMOL 750 MG: 750 TABLET, FILM COATED ORAL at 15:44

## 2018-07-08 RX ADMIN — DICYCLOMINE HYDROCHLORIDE 10 MG: 10 CAPSULE ORAL at 22:36

## 2018-07-08 RX ADMIN — PANTOPRAZOLE SODIUM 40 MG: 40 TABLET, DELAYED RELEASE ORAL at 17:51

## 2018-07-08 RX ADMIN — SUCRALFATE 1000 MG: 1 SUSPENSION ORAL at 12:08

## 2018-07-08 RX ADMIN — METHOCARBAMOL 750 MG: 750 TABLET, FILM COATED ORAL at 22:36

## 2018-07-08 RX ADMIN — LIDOCAINE 2 PATCH: 50 PATCH CUTANEOUS at 17:51

## 2018-07-08 RX ADMIN — GABAPENTIN 300 MG: 300 CAPSULE ORAL at 08:45

## 2018-07-08 RX ADMIN — METHOCARBAMOL 750 MG: 750 TABLET, FILM COATED ORAL at 08:44

## 2018-07-08 RX ADMIN — LISINOPRIL 20 MG: 20 TABLET ORAL at 08:44

## 2018-07-08 RX ADMIN — DICYCLOMINE HYDROCHLORIDE 10 MG: 10 CAPSULE ORAL at 17:51

## 2018-07-08 RX ADMIN — METHOCARBAMOL 750 MG: 750 TABLET, FILM COATED ORAL at 02:15

## 2018-07-08 RX ADMIN — SUCRALFATE 1000 MG: 1 SUSPENSION ORAL at 22:36

## 2018-07-08 RX ADMIN — OXYCODONE HYDROCHLORIDE 10 MG: 10 TABLET ORAL at 06:48

## 2018-07-08 RX ADMIN — SENNOSIDES 8.6 MG: 8.6 TABLET, FILM COATED ORAL at 08:45

## 2018-07-08 RX ADMIN — DICYCLOMINE HYDROCHLORIDE 10 MG: 10 CAPSULE ORAL at 12:08

## 2018-07-08 RX ADMIN — OXYCODONE HYDROCHLORIDE 10 MG: 10 TABLET ORAL at 12:08

## 2018-07-08 RX ADMIN — DICYCLOMINE HYDROCHLORIDE 10 MG: 10 CAPSULE ORAL at 06:48

## 2018-07-08 RX ADMIN — SUCRALFATE 1000 MG: 1 SUSPENSION ORAL at 17:50

## 2018-07-08 RX ADMIN — ENOXAPARIN SODIUM 40 MG: 100 INJECTION SUBCUTANEOUS at 08:44

## 2018-07-08 RX ADMIN — PANTOPRAZOLE SODIUM 40 MG: 40 TABLET, DELAYED RELEASE ORAL at 06:48

## 2018-07-08 NOTE — PROGRESS NOTES
07/08/18 1500   Pain Assessment   Pain Assessment 0-10   Pain Score 6   Pain Location Back   Pain Orientation Mid;Lower   Restrictions/Precautions   Precautions Spinal precautions   Subjective   Subjective pt ready for therapy; increase pain towards end of session   QI: Roll Left and Right   Assistance Needed Independent   Roll Left and Right CARE Score 6   QI: Sit to Lying   Assistance Needed Independent   Sit to Lying CARE Score 6   QI: Lying to Sitting on Side of Bed   Assistance Needed Independent   Lying to Sitting on Side of Bed CARE Score 6   QI: Sit to Stand   Assistance Needed Independent   Sit to Stand CARE Score 6   Bed Mobility   Findings ind   QI: Chair/Bed-to-Chair Transfer   Assistance Needed Independent; Adaptive equipment   Comment rollator   Chair/Bed-to-Chair Transfer CARE Score 6   Transfer Bed/Chair/Wheelchair   Limitations Noted In LE Strength; Endurance   All Transfer Modified Independent   Bed, Chair, Wheelchair Transfer (FIM) 6 - Patient requires assistive device/extra time/safety concerns but completes independently   QI: Car Transfer   Assistance Needed Independent   Car Transfer CARE Score 6   QI: [address]; Adaptive equipment   Walk 10 Feet CARE Score 6   QI: Walk 50 Feet with Two [address]; Adaptive equipment   Walk 50 Feet with Two Turns CARE Score 6   QI: Walk 150 Feet   Assistance Needed Independent; Adaptive equipment   Walk 150 Feet CARE Score 6   QI: Walking 10 Feet on Uneven Surfaces   Assistance Needed Independent; Adaptive equipment   Walking 10 Feet on Uneven Surfaces CARE Score 6   Ambulation   Does the patient walk? 2   Yes   Primary Discharge Mode of Locomotion Walk   Walk Assist Level Modified Independent   Gait Pattern Inconsistant Meghna   Assist Device Rollator   Distance Walked (feet) 999 ft  (outside amb)   Limitations Noted In Endurance;Strength   Findings practiced outside on varying inclines, ramp, and sidewalk  reviewed with pt how to lock and sit on rollator when fatigued for a rest break  pt with good understanding and demonstrating safely  Walking (FIM) 7 - No helper, safely, timely and completes all tasks independently AND distance 150 feet or more, no rest   QI: Wheel 50 Feet with Two Turns   Reason if not Attempted Activity not applicable   Wheel 50 Feet with Two Turns CARE Score 9   QI: Wheel 150 Feet   Reason if not Attempted Activity not applicable   Wheel 315 Feet CARE Score 9   Wheelchair mobility   QI: Does the patient use a wheelchair? 0  No   Wheelchair (FIM) 0 - Activity does not occur   QI: 1 Step (Curb)   Assistance Needed Adaptive equipment   Comment rollator   1 Step (Curb) CARE Score 6   QI: 4 Steps   Assistance Needed Supervision; Adaptive equipment   Comment on  steps   4 Steps CARE Score 4   QI: 12 Steps   Assistance Needed Supervision; Adaptive equipment   Comment on  steps   12 Steps CARE Score 4   Stairs   Type Stairs;Curb;Ramp   # of Steps 12   Weight Bearing Precautions Fall Risk   Assist Devices Bilateral Rail   Findings see above for curb/ ramp details with rollator  pt initiall declined FF but able to perform 12 on  steps with no concerns or decrease safety   Stairs (FIM) 5 - Patient requires supervision/monitoring AND goes up and down full flight (12- 14 stairs)   QI: Picking Up Object   Reason if not Attempted Activity not applicable   Picking Up Object CARE Score 9   QI: Toilet Transfer   Assistance Needed Independent   Toilet Transfer CARE Score 6   Toilet Transfer   Toilet Transfer (FIM) 6 - Patient requires assistive device/extra time/safety concerns but completes independently   Therapeutic Interventions   Strengthening reviewed exs to go home with   sidestepping 20'x2  standing hip flex and abd x20 B/L    Assessment   Treatment Assessment pt ready to go home demonstrates good safety overall with rollator    reviewed with pt exs to perform at home to improve hip stabilizers and strengthening of core  pt able to safely manage rollator on all surfaces  pt concern at home is with her dogs, but has family help   pt to cont focus on strength and endurance to progress with functional mobility and Ind  Problem List Decreased strength;Decreased endurance; Impaired balance;Decreased mobility;Orthopedic restrictions   PT Barriers   Physical Impairment Decreased strength;Decreased endurance; Impaired balance;Decreased mobility;Orthopedic restrictions   Functional Limitation Stair negotiation; Walking   Plan   Treatment/Interventions Functional transfer training;LE strengthening/ROM; Gait training   Progress Progressing toward goals   Recommendation   Recommendation Home PT;Outpatient PT; Home with family support   PT Therapy Minutes   PT Time In 1500   PT Time Out 1540   PT Total Time (minutes) 40   PT Mode of treatment - Individual (minutes) 40   PT Mode of treatment - Concurrent (minutes) 0   PT Mode of treatment - Group (minutes) 0   PT Mode of treatment - Co-treat (minutes) 0   PT Mode of Teatment - Total time(minutes) 40 minutes   Therapy Time missed   Time missed?  No

## 2018-07-08 NOTE — PROGRESS NOTES
07/08/18 0830   Pain Assessment   Pain Assessment 0-10   Pain Score 4   Restrictions/Precautions   Precautions Fall Risk;Spinal precautions   QI: Eating   Assistance Needed Independent   Eating CARE Score 6   Eating Assessment   Eating (FIM) 7 - Patient completely independent   QI: Oral Hygiene   Assistance Needed Independent   Oral Hygiene CARE Score 6   Grooming   Able To Initiate Tasks; Acquire Items;Comb/Brush Hair;Wash/Dry Hands;Brush/Clean Teeth;Wash/Dry Face   Findings Pt completed in stance at sink   Grooming (FIM) 7 - No helper, safely, timely and completes all tasks independently   QI: Shower/Bathe [address]; Adaptive equipment   Shower/Bathe Self CARE Score 6   Bathing   Assessed Bath Style Shower   Anticipated D/C Bath Style Shower   Able to [place] Flaco Yes   Able to [employer] Temperature Yes   Able to Wash/Rinse/Dry (body part) Left Arm;Right Arm;L Upper Leg;R Upper Leg;L Lower Leg/Foot;Chest;R Lower Leg/Foot; Abdomen;Buttocks; Perineal Area   Positioning Seated;Standing   Adaptive Equipment Longhand Sponge;Longhand Reacher; Shower Bars   Findings  Pt utilizing [address] [employer] reacher to assist with bathing tasks 2* spinal precautions   Bathing (FIM) 6 - Patient requires assistive device/extra time/safety concerns but completes independently   Tub/Shower Transfer   Limitations Noted In ROM   Adaptive Equipment Grab Bars;Seat with Back   Assessed Shower   Shower Transfer (FIM) 6 - Patient requires assistive device/extra time/safety concerns but completes independently   QI: Upper Body Dressing   Assistance Needed Independent   Upper Body Dressing CARE Score 6   QI: [address]; Adaptive equipment   Lower Body Dressing CARE Score 6   QI: Putting On/Taking Off [address]; Adaptive equipment   Putting On/Taking Off Footwear CARE Score 6   QI: Picking Up Object   Assistance Needed Independent; Adaptive equipment   Picking Up Object CARE Score 6   Dressing/Undressing Clothing   Able to  Obtain Clothing;Store removed clothing   Remove UB Clothes Pullover Shirt   Remove LB Clothes Pants; Undergarment   Don UB Clothes Pullover Shirt;Bra;Button [place]; Undergarment;Socks; Shoes   Limitations Noted In ROM   Adaptive Equipment Reacher;Sock Aide   Positioning Supported Sit;Standing   Findings Pt completed all item retrieval and transport with use of rollator and LH reacher  Pt completed all tasks at indep/Mod I level with G carryover of use   UB Dressing (FIM) 7 - No helper, safely, timely and completes all tasks independently   LB Dressing (FIM) 6 - Patient requires assistive device/extra time/safety concerns but completes independently   QI: Roll Left and Right   Assistance Needed Independent   Roll Left and Right CARE Score 6   QI: Sit to [address] to Lying CARE Score 6   QI: Lying to Sitting on Side of Bed   Assistance Needed Independent   Lying to Sitting on Side of Bed CARE Score 6   QI: Sit to [address]; Adaptive equipment   Sit to Stand CARE Score 6   QI: Chair/Bed-to-Chair Transfer   Assistance Needed Independent; Adaptive equipment   Chair/Bed-to-Chair Transfer CARE Score 6   Transfer Bed/Chair/Wheelchair   Adaptive Equipment Rollator   Bed, Chair, Wheelchair Transfer (FIM) 6 - Patient requires assistive device/extra time/safety concerns but completes independently   QI: [address]  Score 6   Toileting   Able to [address] down yes, up yes     Manage Hygiene Bladder   Limitations Noted In ROM   Toileting (FIM) 7 - No helper, safely, timely and completes all tasks independently   QI: Toilet Transfer   Assistance Needed Independent   Toilet Transfer CARE Score 6   Toilet Transfer   Surface Assessed Standard Toilet   Transfer Technique Standard   Limitations Noted In [address] [facility] [address]   Findings use of rollator and grab bar assist   Toilet Transfer (FIM) 6 - Patient requires assistive device/extra time/safety concerns but completes independently   Cognition   Overall Cognitive Status WFL   Assessment   Treatment Assessment Pt participated in skilled OT tx with focus on ADL fxn, fxnl xfers, DME education/recommendations, and carryover of LHAE  pt overall Mod I /indep for ADL fxn with use of LHAE, shower seat, rollator, and grab bars  Pt provided with written handouts to purchase LHAE and shower seat indep  Pt demo G fxnl progress in OT and able to acheive all LTGs set  Prognosis Excellent   Barriers to Discharge None   Plan   Progress Improving as expected   Recommendation   OT Discharge Recommendation Home with family support   OT Therapy Minutes   OT Time In 0830   OT Time Out 0930   OT Total Time (minutes) 60   OT Mode of treatment - Individual (minutes) 60   OT Mode of treatment - Concurrent (minutes) 0   OT Mode of treatment - Group (minutes) 0   OT Mode of treatment - Co-treat (minutes) 0   OT Mode of Teatment - Total time(minutes) 60 minutes   Therapy Time missed   Time missed?  No

## 2018-07-09 VITALS
BODY MASS INDEX: 40.32 KG/M2 | DIASTOLIC BLOOD PRESSURE: 59 MMHG | HEART RATE: 81 BPM | HEIGHT: 65 IN | WEIGHT: 242 LBS | TEMPERATURE: 98.4 F | RESPIRATION RATE: 18 BRPM | SYSTOLIC BLOOD PRESSURE: 109 MMHG | OXYGEN SATURATION: 96 %

## 2018-07-09 LAB
ANION GAP SERPL CALCULATED.3IONS-SCNC: 5 MMOL/L (ref 4–13)
BASOPHILS # BLD AUTO: 0.06 THOUSANDS/ΜL (ref 0–0.1)
BASOPHILS NFR BLD AUTO: 1 % (ref 0–1)
BUN SERPL-MCNC: 16 MG/DL (ref 5–25)
CALCIUM SERPL-MCNC: 8.7 MG/DL (ref 8.3–10.1)
CHLORIDE SERPL-SCNC: 108 MMOL/L (ref 100–108)
CO2 SERPL-SCNC: 29 MMOL/L (ref 21–32)
CREAT SERPL-MCNC: 0.81 MG/DL (ref 0.6–1.3)
EOSINOPHIL # BLD AUTO: 0.36 THOUSAND/ΜL (ref 0–0.61)
EOSINOPHIL NFR BLD AUTO: 3 % (ref 0–6)
ERYTHROCYTE [DISTWIDTH] IN BLOOD BY AUTOMATED COUNT: 12.5 % (ref 11.6–15.1)
GFR SERPL CREATININE-BSD FRML MDRD: 79 ML/MIN/1.73SQ M
GLUCOSE SERPL-MCNC: 89 MG/DL (ref 65–140)
HCT VFR BLD AUTO: 31.2 % (ref 34.8–46.1)
HGB BLD-MCNC: 9.5 G/DL (ref 11.5–15.4)
IMM GRANULOCYTES # BLD AUTO: 0.07 THOUSAND/UL (ref 0–0.2)
IMM GRANULOCYTES NFR BLD AUTO: 1 % (ref 0–2)
LYMPHOCYTES # BLD AUTO: 2.33 THOUSANDS/ΜL (ref 0.6–4.47)
LYMPHOCYTES NFR BLD AUTO: 22 % (ref 14–44)
MCH RBC QN AUTO: 31.3 PG (ref 26.8–34.3)
MCHC RBC AUTO-ENTMCNC: 30.4 G/DL (ref 31.4–37.4)
MCV RBC AUTO: 103 FL (ref 82–98)
MONOCYTES # BLD AUTO: 0.69 THOUSAND/ΜL (ref 0.17–1.22)
MONOCYTES NFR BLD AUTO: 7 % (ref 4–12)
NEUTROPHILS # BLD AUTO: 7.16 THOUSANDS/ΜL (ref 1.85–7.62)
NEUTS SEG NFR BLD AUTO: 66 % (ref 43–75)
NRBC BLD AUTO-RTO: 0 /100 WBCS
PLATELET # BLD AUTO: 538 THOUSANDS/UL (ref 149–390)
PMV BLD AUTO: 8.5 FL (ref 8.9–12.7)
POTASSIUM SERPL-SCNC: 4.2 MMOL/L (ref 3.5–5.3)
RBC # BLD AUTO: 3.04 MILLION/UL (ref 3.81–5.12)
SODIUM SERPL-SCNC: 142 MMOL/L (ref 136–145)
WBC # BLD AUTO: 10.67 THOUSAND/UL (ref 4.31–10.16)

## 2018-07-09 PROCEDURE — 97530 THERAPEUTIC ACTIVITIES: CPT

## 2018-07-09 PROCEDURE — 99239 HOSP IP/OBS DSCHRG MGMT >30: CPT | Performed by: PHYSICAL MEDICINE & REHABILITATION

## 2018-07-09 PROCEDURE — 97110 THERAPEUTIC EXERCISES: CPT

## 2018-07-09 PROCEDURE — 97116 GAIT TRAINING THERAPY: CPT

## 2018-07-09 PROCEDURE — 80048 BASIC METABOLIC PNL TOTAL CA: CPT | Performed by: NURSE PRACTITIONER

## 2018-07-09 PROCEDURE — 85025 COMPLETE CBC W/AUTO DIFF WBC: CPT | Performed by: NURSE PRACTITIONER

## 2018-07-09 RX ORDER — OXYCODONE HYDROCHLORIDE 5 MG/1
5 TABLET ORAL EVERY 6 HOURS PRN
Qty: 20 TABLET | Refills: 0 | Status: SHIPPED | OUTPATIENT
Start: 2018-07-09 | End: 2018-07-10

## 2018-07-09 RX ORDER — SUCRALFATE 1 G/1
1 TABLET ORAL 4 TIMES DAILY
COMMUNITY
End: 2018-12-20

## 2018-07-09 RX ORDER — DICYCLOMINE HYDROCHLORIDE 10 MG/1
10 CAPSULE ORAL
Qty: 30 CAPSULE | Refills: 0 | Status: SHIPPED | OUTPATIENT
Start: 2018-07-09 | End: 2018-07-10

## 2018-07-09 RX ORDER — PANTOPRAZOLE SODIUM 40 MG/1
40 TABLET, DELAYED RELEASE ORAL 2 TIMES DAILY
COMMUNITY
End: 2021-05-25

## 2018-07-09 RX ORDER — GABAPENTIN 300 MG/1
300 CAPSULE ORAL 3 TIMES DAILY
COMMUNITY
End: 2018-12-20

## 2018-07-09 RX ORDER — LISINOPRIL 20 MG/1
20 TABLET ORAL DAILY
Qty: 30 TABLET | Refills: 0 | Status: SHIPPED | OUTPATIENT
Start: 2018-07-10 | End: 2018-07-10

## 2018-07-09 RX ADMIN — PANTOPRAZOLE SODIUM 40 MG: 40 TABLET, DELAYED RELEASE ORAL at 16:39

## 2018-07-09 RX ADMIN — ENOXAPARIN SODIUM 40 MG: 100 INJECTION SUBCUTANEOUS at 08:22

## 2018-07-09 RX ADMIN — PANTOPRAZOLE SODIUM 40 MG: 40 TABLET, DELAYED RELEASE ORAL at 06:24

## 2018-07-09 RX ADMIN — SUCRALFATE 1000 MG: 1 SUSPENSION ORAL at 06:24

## 2018-07-09 RX ADMIN — GABAPENTIN 300 MG: 300 CAPSULE ORAL at 08:22

## 2018-07-09 RX ADMIN — DICYCLOMINE HYDROCHLORIDE 10 MG: 10 CAPSULE ORAL at 16:39

## 2018-07-09 RX ADMIN — GABAPENTIN 300 MG: 300 CAPSULE ORAL at 16:39

## 2018-07-09 RX ADMIN — SIMETHICONE CHEW TAB 80 MG 80 MG: 80 TABLET ORAL at 06:24

## 2018-07-09 RX ADMIN — DICYCLOMINE HYDROCHLORIDE 10 MG: 10 CAPSULE ORAL at 06:24

## 2018-07-09 RX ADMIN — DICYCLOMINE HYDROCHLORIDE 10 MG: 10 CAPSULE ORAL at 11:08

## 2018-07-09 RX ADMIN — OXYCODONE HYDROCHLORIDE 10 MG: 10 TABLET ORAL at 16:43

## 2018-07-09 RX ADMIN — LISINOPRIL 20 MG: 20 TABLET ORAL at 08:22

## 2018-07-09 RX ADMIN — OXYCODONE HYDROCHLORIDE 10 MG: 10 TABLET ORAL at 06:24

## 2018-07-09 RX ADMIN — SUCRALFATE 1000 MG: 1 SUSPENSION ORAL at 16:39

## 2018-07-09 RX ADMIN — SENNOSIDES 8.6 MG: 8.6 TABLET, FILM COATED ORAL at 08:22

## 2018-07-09 RX ADMIN — OXYCODONE HYDROCHLORIDE 10 MG: 10 TABLET ORAL at 11:11

## 2018-07-09 RX ADMIN — SUCRALFATE 1000 MG: 1 SUSPENSION ORAL at 11:08

## 2018-07-09 NOTE — NURSING NOTE
D/C instructions discussed with pt, all questions answered  D/C instructions and script for labwork handed to pt  Rolling walker and commode delivered to room  All belongings packed and sent with pt  Pt d/c to home at mod I level with RW

## 2018-07-09 NOTE — PHYSICAL THERAPY NOTE
[facility] PT Discharge Summary  Pt made great progress in PT  At d/c pt demonstrated mod indep with transfers and amb task using a rollator walker  However due to ongoing back pain with spinal precautions, dec strength and dec endurance continues to require S for curb negotiation using a walker and ascending/descending 12 steps with HR  Pt education and training completed with good understanding verbalized and carry over of safe practices during functional mobilities  DME for RW was placed through CM while pt to borrow friend's rollator walker  Pt to continue rehab needs with home PT services initially then progress to outpatient PT services

## 2018-07-09 NOTE — DISCHARGE INSTRUCTIONS
Should you develop fevers, chills, sweats, rigors, or any drainage from your surgical site please contact your family doctor or surgeon immediately or go to the ER immediately as these are indicators of possible infection     Please have your blood work drawn the results will be sent to your doctors      Please note you are restricted from driving/operating a motorized vehicle/operating heavy machinery/etc until you are cleared by your surgeon    Please see your doctors listed in the follow up providers section of your discharge paperwork, and take the discharge paperwork with you to your appointments    Please note changes may have been made to your medications please refer to your discharge paperwork for your current medications and take this list with you to all your doctors appointments for your doctors to review    Please do not resume a home medication unless the medication reconciliation sheet indicates to do so, please do not assume that a medication that you were given a prescription for is the same as a medication you have at home based on both medications having the same name as dosages and frequency may have changed    Please do not combine any muscle relaxants (including but not limited to zanaflex, flexaril, soma, robaxin, etc) pain medications (including but not limited to tramadol, vicodin, norco, percocet, oxycodone, oxycontin, morphine, hydropmorphone, oxymorphone, fentanyl, hydrocodone, etc)  or sedatives/sleep aids/anti-anxiety medications (including but not limited to [place], trazodone, valium, xanax, klonipin, ativan, lorazepam, restoril, temazepam etc) that you may have been prescribed prior to admission with the pain medication you are being prescribed upon discharge from the rehab unit  Please do not drive or operate heavy machinery while using these medications  Do not drink alcohol while using these medications         It is advisable to limit the use of pain medications (including but not limited to tramadol, vicodin, norco, percocet, oxycodone, oxycontin, morphine, hydropmorphone, oxymorphone, fentanyl, hydrocodone, etc) and avoid sedatives/sleep aids/anti-anxiety medications (including but not limited to [place], trazodone, valium, xanax, klonipin, ativan, lorazepam, restoril, temazepam etc), muscle relaxants (including but not limited to zanaflex, flexaril, soma, robaxin, etc) as they may become habit forming  Please do not drive or operate heavy machinery while using these medications  Do not drink alcohol while using these medications    You will be given a limited supply of pain medications on discharge; should you require additional refills/medications, your PCP and/or surgeon may prescribe at his/her discretion         Please check your blood pressure prior to taking your blood pressure medications and 1 hour after, please contact your family doctor immediately for a blood pressure below 100/60 and do not take your blood pressure medications until speaking with them, please contact your family doctor immediately for blood pressure greater than 160/100      Please note the following incidental findings were found during your recent hospitalization please discuss them with your doctors so that they may arrange any tests/referrals as they deem necessary:     1) diverticulosis: Please follow up with your family doctor or Dr Ac Palomares for further testing/treatment if any as they deem necessary      2) small hiatal hernia: Please follow up with your family doctor or Dr Ac Palomares for further testing/treatment if any as they deem necessary     3) small fat containing supraumbilical hernia: Please follow up with your family doctor for further testing/treatment if any and/or specialist referral if any as they deem necessary      4) wide QRS & Right Bundle Branch Block: Please follow up with your family doctor for further testing/treatment if any and/or specialist referral if any as they deem necessary       5) elevated white blood cell count: please have your blood work drawn to monitor your white blood cell count the results will be sent to your family doctor    6) anemia: please have your blood work drawn to monitor your hemoglobin level the results will be sent to your family doctor    7) elevated platelet count: please have your blood work drawn to monitor your platelet count the results will be sent to your family doctor      Please avoid NSAID (including but not limited to advil, aleve, diclofenac, motrin, naproxen, ibuprofen, mobic, meloxicam, etc) medications as NSAID medications may delay bone healing      Please avoid NSAID (including but not limited to advil, aleve, diclofenac, motrin, naproxen, ibuprofen, mobic, meloxicam, etc) medications, anti platelet medications (including but not limited to plavix, aspirin and aspirin containing products), and any prescription blood thinners as these medications can increase your risk of bleeding after recent surgery; you may be cleared to use these medications in the future but do not do so unless advised to do so by your doctors      Please follow your spine precautions as instructed until cleared by your spine surgeon    no pushing/pulling/lifting greater than 5 lbs until cleared by your spine surgeon      Please note a summary of your hospital stay with relevant information for your doctors has been sent to them, please confirm with your doctors at your follow up visits that they have received this summary and have them contact [address] [place] [address] if they have not received them along with any other medical records they may require

## 2018-07-09 NOTE — PHYSICAL THERAPY NOTE
Pt made great progress in PT  At d/c pt demonstrated mod indep with transfers and amb task using a rollator walker  However due to ongoing back pain with spinal precautions, dec strength and dec endurance continues to require S for curb negotiation using a walker and ascending/descending 12 steps at best with HR  Pt education and training completed with good understanding verbalized and carry over of safe practices during functional mobilities  DME for RW was placed through CM while pt to borrow friend's rollator walker  Pt to continue rehab needs with home PT services initially then progress to outpatient PT services

## 2018-07-09 NOTE — DISCHARGE SUMMARY
Discharge Summary - PMR       Admission Date:    6/30/18  Discharge Date:   7/9/18  Diagnosis:   Lumbar stenosis    Comorbidities:   See below for medical details     Hospital Course:    The patient had an unremarkable rehab course with significant functional gains made, please see below for medical details:    Patient is 62 yo female with hx of lumbar stenosis and having failed conservative measures underwent L4-5 decompression fusion surgery by Dr Danay Us on 6/27          Lumbar stenosis: s/p L4-5 decompression fusion surgery by Dr Danay Us on 6/27; lumbar spine precautions, per Dr Leo Gambino H&P she spoke with Satish Herrera PA-C and confirmed patient does not need LSO brace     Chronic pain: follows with Dr Regis Hansen and on home neurontin 300 TID, and diclofenac (NSAIDs on hold due to recent lumbar spine surgery)     GERD: at home on protonix 40 mg BID and carafate 1 g QID     HTN: at home on lisinopril-HCTZ 20-12 5 mg qd, changed to lisinopril 20 mg qd only due to hypokalemia by IM, per IM recs patient to be dc'd on lisinopril 20 mg qd only     ARPIT/insomnia: at home takes trazadone 50 mg HS prn; d/w patient that sedatives are not recommended for patients with ARPIT however pt refusing to give up the trazadone as per patient does not feel she has ARPIT any longer after bariatric surgery and subsequent wt loss, however has not had sleep study to confirm this and patient states she gave away her CPAP machine but will consider stopping trazadone and getting a new sleep study to confirm resolution or get a new CPAP machine if present     Chronic abdominal pain/bloating/diarrhea/N/V: began in 0162 after complications of gastric bypass surgery, patient never had resolution of N/V/diarrhea/abd pain & bloating, had subsequent abdominal revision surgeries at [facility] in 2006 which did not provide relief and essentially learned to live with her symptoms; per patient has had worsening of symptoms 5 years ago and has been following with GI since without relief, recently switched GI doctors to Dr Ruth Spear who suspects IBS vs small intestinal bacterial overgrowth as the etiology and completed course of rifaximin PTA which helped diarrhea component but not other symptoms; OP FU with Dr Ruth Spear     Depression: patient was on sertraline 25 mg qd in the past but has not been on this medication in years per patient and mood has been stable without it      Post-op fevers: resolved, UA/Chest XR unimpressive for infxn, blood cx x 2 of 6/30 finalized as no growth, surgical site C/D/I, afebrile     Leukocytosis: mild at 10 67 (ULN 10 16), has been ranging from mildly elevated to high normal since surgery, likely reactive (UA/Chest XR unimpressive for infxn, surgical site C/D/I, blood cx X 2 of 6/30 finalized as no growth), scrip upon dc for CBC w/diff with results to PCP     Thrombocytosis: currently 538, likely reactive, scrip upon dc for CBC w/diff with results to PCP     Hypokalemia: HCTZ stopped by IM and has resolved, per IM recs not to be resumed upon dc     Anemia: ABLA, Hg currently stable at 9 5, scrip upon dc for CBC w/diff with results to PCP     h/o DVT: provoked after bariatric surgery in 2006, was on SCDs only per surgical team in acute care however after Dr Jael Owusu weighed risks/benefits of pharmacologic DVT ppx and d/w patient given h/o provoked DVT after surgery Dr Jael Owusu and the patient have elected to have patient on pharmacologic DVT ppx in addition to SCDs     Dispo: 7/9     Incidental findings on CT A/P of 3/5/18     1) diverticulosis w/o diverticulitis: OP FU with PCP or Dr Ruth Spear      2) small hiatal hernia: on PPI and carafate for GERD, OP FU with PCP or Dr Ruth Spear     3) small fat containing supraumbilical hernia: OP FU with PCP with surgical referral at PCP's discretion      Other incidental findings:     4) wide QRS/RBBB: OP FU with PCP with further testing and/or cards referral at PCP's discretion      Note: This patient record was searched in the [place] PDMP using  and the following names Sarah Corrigan; Irma Solland; Sarah Corrigan; Aris Brown, and [id number] [address] going back 7 years and no record was located             Functional Status Upon Discharge: Mod I amb/tx/ADLs    Condition at Discharge: stable    Discharge instructions/Information to patient and family:   See after visit summary for information provided to patient and family  Provisions for Follow-Up Care:  See after visit summary for information related to follow-up care and any pertinent home health orders  Disposition: home     Planned Readmission: no        Discharge Medications:  See after visit summary for reconciled discharge medications provided to patient and family  * Greater than 30 min was spent today in preparation for patient discharge including discussion with patient, patient's nurse, therapy staff, and case management

## 2018-07-09 NOTE — INCIDENTAL FINDINGS
1) diverticulosis: Please follow up with your family doctor or Dr Melissa Cazares for further testing/treatment if any as they deem necessary      2) small hiatal hernia: Please follow up with your family doctor or Dr Melissa Cazares for further testing/treatment if any as they deem necessary     3) small fat containing supraumbilical hernia: Please follow up with your family doctor for further testing/treatment if any and/or specialist referral if any as they deem necessary      4) wide QRS & Right Bundle Branch Block: Please follow up with your family doctor for further testing/treatment if any and/or specialist referral if any as they deem necessary       5) elevated white blood cell count: please have your blood work drawn to monitor your white blood cell count the results will be sent to your family doctor    6) anemia: please have your blood work drawn to monitor your hemoglobin level the results will be sent to your family doctor    7) elevated platelet count: please have your blood work drawn to monitor your platelet count the results will be sent to your family doctor

## 2018-07-09 NOTE — PROGRESS NOTES
Physical Therapy Progress Note   07/09/18 0915   Pain Assessment   Pain Assessment No/denies pain   Pain Score No Pain   General   Change In Medical/Functional Status MI with RW/Rollator   Cognition   Overall Cognitive Status [facility]   Arousal/Participation Alert; Cooperative   Attention Within functional limits   Orientation Level Oriented X4   Memory Within functional limits   Following Commands Follows all commands and directions without difficulty   Subjective   Subjective denies pain; no c/o   QI: Roll Left and Right   Assistance Needed Independent   Assistance Provided by [place] No physical assistance   Roll Left and Right CARE Score 6   QI: Sit to [address] Provided by [place] No physical assistance   Sit to Lying CARE Score 6   QI: Lying to Sitting on Side of Bed   Assistance Needed Independent   Assistance Provided by [place] No physical assistance   Lying to Sitting on Side of Bed CARE Score 6   QI: Sit to [address] Provided by [place] No physical assistance   Sit to Stand CARE Score 6   Bed Mobility   Able to Roll Left to Right;Right to Left;Scoot Up   Findings MI   QI: Chair/Bed-to-Chair Transfer   Assistance Needed Independent   Assistance Provided by [place] No physical assistance   Chair/Bed-to-Chair Transfer CARE Score 6   Transfer Bed/Chair/Wheelchair   Limitations Noted In LE Strength   Adaptive Equipment Rollator   Stand Pivot Modified Independent   Sit to Stand Modified Independent   Stand to Sit Modified Independent   Supine to Sit Modified Independent   Sit to Supine Modified Independent   Car Transfer Modified Independent   All Transfer Modified Independent   Findings MI with RW/Rollator   Bed, Chair, Wheelchair Transfer (FIM) 6 - Patient requires assistive device/extra time/safety concerns but completes independently   QI: [address] [facility] Provided by [place] No physical assistance Car Transfer CARE Score 6   QI: [address] Provided by [place] No physical assistance   Walk 10 Feet CARE Score 6   QI: Walk 50 Feet with Two [address] Provided by [place] No physical assistance   Walk 50 Feet with Two Turns CARE Score 6   QI: Walk 150 [address] Provided by [place] No physical assistance   Walk 150 Feet CARE Score 6   QI: Walking 10 Feet on [address] Provided by [place] No physical assistance   Walking 10 Feet on Uneven Surfaces CARE Score 6   Ambulation   Does the patient walk? 2  Yes   Primary Discharge Mode of Locomotion Walk   Walk Assist Level Modified Independent   Gait Pattern Inconsistant Meghna   Assist Device Rollator   Distance Walked (feet) 999 ft  (community distances;)   Limitations Noted In Endurance;Speed;Swing;Strength   Findings MI    Walking (FIM) 6 - Patient requires assistive device/extra time/safety concerns but completes independently AND distance 150 feet or more, no rest   QI: Wheel 50 Feet with Two Turns   Reason if not Attempted Activity not applicable   Wheel 50 Feet with Two Turns CARE Score 9   QI: Wheel 150 Feet   Reason if not Attempted Activity not applicable   Wheel 614 Feet CARE Score 9   Wheelchair mobility   QI: Does the patient use a wheelchair? 0   No   Wheelchair (FIM) 0 - Activity does not occur   QI: 1 Step (Curb)   [address] Provided by [place] No physical assistance   Comment x4 with B HR   1 Step (Curb) CARE Score 4   QI: 4 Steps   Assistance Needed Supervision   Assistance Provided by [place] No physical assistance   Comment x4 with B HR   4 Steps CARE Score 4   QI: 12 Steps   Reason if not Attempted Safety concerns   12 Steps CARE Score 88   Stairs   Type Stairs;Curb;Ramp   # of Steps 4   Weight Bearing Precautions Fall Risk   Assist Devices Bilateral Rail   Findings S for curb/ramp with rollator; S for stairs x4 with B HR   Stairs (FIM) 5 - HOUSEHOLD EXCEPTION:  Negotiates at least 4 to 11 steps with or w/o a device, but completely independent   QI: [address] [facility] Provided by [place] No physical assistance   Picking Up Object CARE Score 6   QI: Toilet Transfer   Assistance Needed Independent   Assistance Provided by [place] No physical assistance   Toilet Transfer CARE Score 6   Toilet Transfer   Surface Assessed Standard Toilet   Limitations Noted In Balance;LE Strength   Adaptive Equipment Grab Bar   Findings MI   Toilet Transfer (FIM) 6 - Patient requires assistive device/extra time/safety concerns but completes independently   Therapeutic Interventions   Strengthening standing TE (hip flex/ext/abd/add, HS curls, mini squats, calf raises) reps until fatigued   Flexibility manual stretch B HS and gastroc;    Equipment Use   NuStep level 3, 15 minutes   Assessment   Treatment Assessment Pt MI with rollator; able to xfer to/from all surfaces with rollator, including bed/chair/toilet/car; ambulates community distances on level tile, uneven pavement, inclines, curbs, etc; stairs x4 with B HR to match GOMEZ with S; instructed in standing TE (as above) and NuStep x15 minutes, level 3; finished session seated in bedside recliner, all needs in reach; recommend cont PT POC:    Problem List Decreased strength;Decreased endurance; Impaired balance;Decreased mobility;Orthopedic restrictions   PT Barriers   Physical Impairment Decreased strength;Decreased endurance; Impaired balance;Decreased mobility;Orthopedic restrictions   Functional Limitation Stair negotiation; Walking   Plan   Treatment/Interventions ADL retraining;Functional transfer training;LE strengthening/ROM; Elevations; Therapeutic exercise; Endurance training;Cognitive reorientation;Patient/family training;Equipment eval/education; Bed mobility;Gait training Progress Progressing toward goals   Recommendation   Recommendation Outpatient PT; Home with family support   PT Therapy Minutes   PT Time In 0915   PT Time Out 1030   PT Total Time (minutes) 75   PT Mode of treatment - Individual (minutes) 75   PT Mode of treatment - Concurrent (minutes) 0   PT Mode of treatment - Group (minutes) 0   PT Mode of treatment - Co-treat (minutes) 0   PT Mode of Teatment - Total time(minutes) 75 minutes   Therapy Time missed   Time missed?  No     Brigette Noble, PTA

## 2018-07-09 NOTE — PROGRESS NOTES
Internal Medicine Progress Note  Patient: Hank Simpson  Age/sex: 63 y o  female  Medical Record #: [id number]      ASSESSMENT/PLAN:  Hank Simpson is seen and examined and management for following issues:       Lumbar stenosis; L4-5 decompression/fusion 6/27/18 John Ask): monitor incision; continue LSO brace         Fever:  Seems to have resolved currently but had been present post-operatively with associated mild leukocytosis; CXR, UA were negative; BCs were negative; incision C/D/I; likely post-operative related fever     Leukocytosis:  resolved     HTN :  Stable; was on lisinopril HCT 20/12 5 but stopped HCTZ since was making K+ level too low even despite replacement; BP is OK off of HCTZ so far so will not restart for home     GERD without esophagitis: continue Protonix 40mg BID and Carafate     Hypokalemia: resolved after replacement  Abdominal discomfort with hx gastrectomy/numerous surgeries: had bloating/discomfortRUQ Sunday during day =  resolved; hx gastric bypass with eventual gastrectomy/esophagojejunostomy and six total surgeries for complications  Says has many abdominal adhesions which create issues of pain  Saw Dr Maricarmen Montoya in the office 2/27/18 for RUQ pain =  CT scan abdomen/pelvis (no acute changes); started rifaximin to treat her for small intestinal bacterial overgrowth and IBS-diarrhea; he said if her symptoms persist would consider amitriptyline for neuropathic pain  Her EGD in 2/2018 showed small diverticulum at the level of the esophagojejunostomy anastomosis staple line  Primary service started QID Bentyl  She vomited lunch saturday but was ok yesterday; c/o bloating yesterday      Subjective: Patient seen and examined   Offers no complaints today except abd bloating    ROS:   GI: denies abdominal pain, change bowel habits or reflux symptoms  Neuro: No new neurologic changes  Respiratory: No Cough, SOB  Cardiovascular: No CP, palpitations     Scheduled Meds:    Current Facility-Administered Medications:  acetaminophen 650 mg Oral Q6H PRN Hayes Wilkerson MD   dicyclomine 10 mg Oral 4x Daily (AC & HS) Emmanuel Martínez MD   enoxaparin 40 mg Subcutaneous Q24H [facility] Hayes Wilkerson MD   gabapentin 300 mg Oral TID Emmanuel Martínez MD   lidocaine 2 patch Transdermal Daily Hayes Wilkerson MD   lisinopril 20 mg Oral Daily JACINTO Zuniga   methocarbamol 750 mg Oral Q6H PRN Hayes Wilkerson MD   ondansetron 4 mg Oral Q6H PRN Hayes Wilkerson MD   oxyCODONE 10 mg Oral Q4H PRN Hayes Wilkerson MD   oxyCODONE 5 mg Oral Q4H PRN Hayes Wilkerson MD   pantoprazole 40 mg Oral BID AC Hayes Wilkerson MD   senna 1 tablet Oral Daily Hayes Wilkerson MD   simethicone 80 mg Oral Q6H PRN Hayes Wilkerson MD   sucralfate 1,000 mg Oral 4x Daily (AC & HS) Hayes Wilkerson MD   traZODone 50 mg Oral HS PRN Hayes Wilkerson MD       Labs:       Results from last 7 days  Lab Units 07/09/18  0511 07/05/18  0532   WBC Thousand/uL 10 67* 9 46   HEMOGLOBIN g/dL 9 5* 9 6*   HEMATOCRIT % 31 2* 31 0*   PLATELETS Thousands/uL 538* 368       Results from last 7 days  Lab Units 07/09/18  0511 07/05/18  0532   SODIUM mmol/L 142 138   POTASSIUM mmol/L 4 2 3 5   CHLORIDE mmol/L 108 107   CO2 mmol/L 29 25   BUN mg/dL 16 16   CREATININE mg/dL 0 81 0 76   GLUCOSE RANDOM mg/dL 89 101   CALCIUM mg/dL 8 7 8 5                  Glucose (mg/dL)   Date Value   07/09/2018 89   07/05/2018 101   07/03/2018 102   07/02/2018 116       Labs reviewed    Physical Examination:  Vitals:   Vitals:    07/08/18 1330 07/08/18 2125 07/09/18 0504 07/09/18 0822   BP: 139/78 112/71 100/56 125/65   BP Location: Right arm Right arm Right arm    Pulse: 89 90 75    Resp: 18 18 18    Temp: 98 7 °F (37 1 °C) 98 8 °F (37 1 °C) 98 1 °F (36 7 °C)    TempSrc: Oral Oral Oral    SpO2: 96% 96% 97%    Weight:       Height:           GEN: NAD  HEENT: NC/AT  RESP: BBS w/o crackles/wheeze/rhonci; resp unlabored  CV: +S1 S2, regular rate, no rubs/murmurs  ABD: soft, NT, ND, normal BS   EXT: no edema  Neuro: AAO; LEs 4+/5      [x ] Total time spent: 30 Mins and greater than 50% of this time was spent counseling/coordinating care  ** Please Note: Dragon 360 Dictation voice to text software may have been used in the creation of this document   **

## 2018-07-09 NOTE — PROGRESS NOTES
Progress Note - Patria Isbell 63 y o  female MRN: [id number]    Unit/Bed#: -01 Encounter: [id number]            Subjective:   Patient without complaint currently, looking forward to NV today     Objective:     ROS  Gen: denies recent wt loss   Psych: denies mood change      T: 98 1  HR: 75  BP: 125/65  RR: 16 (not 18)   POx: 97%      Physical Exam:     Gen:        NAD   Neck:   trachea midline  Lungs:  respirations unlabored   Heart:    + S1 and S2   Abdomen:    Soft, non-tender  Extremities: no significant LE edema (soft tissue/adipose)   Psych: mood/affect appropriate  Neurologic: CN grossly intact, lt touch grossly intact, finger to nose without gross dysmetria b/l  5/5 UE B/L, 4/5 RLE, 3 to 4-/5 LLE (LE weakness may be pain limited)     Functional :  Mobility: mod I   Tx: mod I   ADLs: mod I          Current Facility-Administered Medications:  acetaminophen 650 mg Oral Q6H PRN Meryle Leigh, MD   dicyclomine 10 mg Oral 4x Daily (AC & HS) Flip Mcguire MD   enoxaparin 40 mg Subcutaneous Q24H [facility] Meryle Leigh, MD   gabapentin 300 mg Oral TID Flip Mcguire MD   lidocaine 2 patch Transdermal Daily Meryle Leigh, MD   lisinopril 20 mg Oral Daily JACINTO Barragan   methocarbamol 750 mg Oral Q6H PRN Meryle Leigh, MD   ondansetron 4 mg Oral Q6H PRN Meryle Leigh, MD   oxyCODONE 10 mg Oral Q4H PRN Meryle Leigh, MD   oxyCODONE 5 mg Oral Q4H PRN Meryle Leigh, MD   pantoprazole 40 mg Oral BID AC Meryle Leigh, MD   senna 1 tablet Oral Daily Meryle Leigh, MD   simethicone 80 mg Oral Q6H PRN Meryle Leigh, MD   sucralfate 1,000 mg Oral 4x Daily (AC & HS) Meryle Leigh, MD   traZODone 50 mg Oral HS PRN Meryle Leigh, MD         acetaminophen    methocarbamol    ondansetron    oxyCODONE    oxyCODONE    simethicone    traZODone      Assessment:  Patient is 64 yo female with hx of lumbar stenosis and having failed conservative measures underwent L4-5 decompression fusion surgery by   Radha on 6/27    Plan:    Rehabilitation: cont PT/OT for ambulatory/ADL dysfxn    Lumbar stenosis: s/p L4-5 decompression fusion surgery by Dr Sierra Johnson on 6/27; lumbar spine precautions, per Dr Ralph Edwards H&P she spoke with Terry Limon PA-C and confirmed patient does not need LSO brace    Chronic pain: follows with Dr Fanta Gold and on home neurontin 300 TID, and diclofenac (NSAIDs on hold due to recent lumbar spine surgery)    GERD: at home on protonix 40 mg BID and carafate 1 g QID    HTN: at home on lisinopril-HCTZ 20-12 5 mg qd, changed to lisinopril 20 mg qd only due to hypokalemia by IM, per IM recs patient to be dc'd on lisinopril 20 mg qd only    ARPIT/insomnia: at home takes trazadone 50 mg HS prn; d/w patient that sedatives are not recommended for patients with ARPIT however pt refusing to give up the trazadone as per patient does not feel she has ARPIT any longer after bariatric surgery and subsequent wt loss, however has not had sleep study to confirm this and patient states she gave away her CPAP machine but will consider stopping trazadone and getting a new sleep study to confirm resolution or get a new CPAP machine if present    Chronic abdominal pain/bloating/diarrhea/N/V: began in 8557 after complications of gastric bypass surgery, patient never had resolution of N/V/diarrhea/abd pain & bloating, had subsequent abdominal revision surgeries at [address] in 2006 which did not provide relief and essentially learned to live with her symptoms; per patient has had worsening of symptoms 5 years ago and has been following with GI since without relief, recently switched GI doctors to Dr Candy Colmenares who suspects IBS vs small intestinal bacterial overgrowth as the etiology and completed course of rifaximin PTA which helped diarrhea component but not other symptoms; OP FU with Dr Candy Colmenares    Depression: patient was on sertraline 25 mg qd in the past but has not been on this medication in years per patient and mood has been stable without it     Post-op fevers: resolved, UA/Chest XR unimpressive for infxn, blood cx x 2 of  finalized as no growth, surgical site C/D/I, afebrile    Leukocytosis: mild at 10 67 (ULN 10 16), has been ranging from mildly elevated to high normal since surgery, likely reactive (UA/Chest XR unimpressive for infxn, surgical site C/D/I, blood cx X 2 of  finalized as no growth), scrip upon dc for CBC w/diff with results to PCP    Thrombocytosis: currently 538, likely reactive, scrip upon dc for CBC w/diff with results to PCP    Hypokalemia: HCTZ stopped by IM and has resolved, per IM recs not to be resumed upon dc    Anemia: ABLA, Hg currently stable at 9 5, scrip upon dc for CBC w/diff with results to PCP    h/o DVT: provoked after bariatric surgery in , was on SCDs only per surgical team in acute care however after Dr Jacquelene Schwab weighed risks/benefits of pharmacologic DVT ppx and d/w patient given h/o provoked DVT after surgery Dr Jacquelene Schwab and the patient have elected to have patient on pharmacologic DVT ppx in addition to SCDs    Dispo:     Incidental findings on CT A/P of 3/5/18    1) diverticulosis w/o diverticulitis: OP FU with PCP or Dr Reyes Leiva     2) small hiatal hernia: on PPI and carafate for GERD, OP FU with PCP or Dr Reyes Leiva    3) small fat containing supraumbilical hernia: OP FU with PCP with surgical referral at PCP's discretion     Other incidental findings:    4) wide QRS/RBBB: OP FU with PCP with further testing and/or cards referral at PCP's discretion     Note: [facility]  and the following names Cele Walsh; Teodora Sol; Cele Walsh; Des Palma, and [id number] [address] [place] going back 7 years and no record was located

## 2018-07-10 ENCOUNTER — HOSPITAL ENCOUNTER (EMERGENCY)
Facility: HOSPITAL | Age: 63
Discharge: HOME | End: 2018-07-10
Attending: EMERGENCY MEDICINE
Payer: COMMERCIAL

## 2018-07-10 VITALS
RESPIRATION RATE: 20 BRPM | OXYGEN SATURATION: 97 % | SYSTOLIC BLOOD PRESSURE: 139 MMHG | HEART RATE: 93 BPM | WEIGHT: 238 LBS | TEMPERATURE: 99.9 F | BODY MASS INDEX: 39.61 KG/M2 | DIASTOLIC BLOOD PRESSURE: 78 MMHG

## 2018-07-10 DIAGNOSIS — Z76.0 MEDICATION REFILL: Primary | ICD-10-CM

## 2018-07-10 PROCEDURE — 99283 EMERGENCY DEPT VISIT LOW MDM: CPT

## 2018-07-10 RX ORDER — ONDANSETRON 4 MG/1
4 TABLET, ORALLY DISINTEGRATING ORAL ONCE
Status: COMPLETED | OUTPATIENT
Start: 2018-07-10 | End: 2018-07-10

## 2018-07-10 RX ORDER — ONDANSETRON 4 MG/1
4 TABLET, ORALLY DISINTEGRATING ORAL EVERY 6 HOURS PRN
Qty: 20 TABLET | Refills: 0 | Status: SHIPPED | OUTPATIENT
Start: 2018-07-10 | End: 2018-08-21

## 2018-07-10 RX ORDER — LISINOPRIL 20 MG/1
20 TABLET ORAL DAILY
Qty: 30 TABLET | Refills: 0 | Status: SHIPPED | OUTPATIENT
Start: 2018-07-10 | End: 2020-11-06

## 2018-07-10 RX ORDER — OXYCODONE HYDROCHLORIDE 5 MG/1
5 TABLET ORAL EVERY 6 HOURS PRN
Qty: 20 TABLET | Refills: 0 | Status: SHIPPED | OUTPATIENT
Start: 2018-07-10 | End: 2018-08-21

## 2018-07-10 RX ORDER — DICYCLOMINE HYDROCHLORIDE 10 MG/1
10 CAPSULE ORAL
Qty: 30 CAPSULE | Refills: 0 | Status: SHIPPED | OUTPATIENT
Start: 2018-07-10 | End: 2018-07-26 | Stop reason: SDUPTHER

## 2018-07-10 RX ORDER — OXYCODONE HYDROCHLORIDE 5 MG/1
5 TABLET ORAL ONCE
Status: COMPLETED | OUTPATIENT
Start: 2018-07-10 | End: 2018-07-10

## 2018-07-10 RX ORDER — OXYCODONE HYDROCHLORIDE 5 MG/1
5 TABLET ORAL EVERY 4 HOURS PRN
Qty: 6 TABLET | Refills: 0 | Status: SHIPPED | OUTPATIENT
Start: 2018-07-10 | End: 2018-07-11

## 2018-07-10 RX ADMIN — OXYCODONE HYDROCHLORIDE 5 MG: 5 TABLET ORAL at 14:57

## 2018-07-10 RX ADMIN — ONDANSETRON 4 MG: 4 TABLET, ORALLY DISINTEGRATING ORAL at 14:57

## 2018-07-10 NOTE — ED PROVIDER NOTES
History  Chief Complaint   Patient presents with    Medication Problem     States she was discharged from [facility] yesterday and scripts were to be sent electronically to pharmacy  Patient here for scripts for meds  Having severe back pain   Back fusion done 1027     29-year-old female presenting today requesting a prescription for her oxycodone  Relays that she recently had lumbar surgery and was seen at [facility] for [facility]rology and for physical therapy  Was discharged yesterday however the prescription that they sent electronically did not make it over to her pharmacy located at [address] in [address]  She then at that point called her physician Dr Dione Fisher who wrote out a prescription for her and is waiting for her at the [facility] office however she does not have a ride to that location and continues with pain ranking 8/10  Generally she states that she is feeling much better after the surgery  Has had low-grade temperatures and was recently admitted for fevers and now are controlled  Has not had any recurrence of these  Denies fevers , nausea, vomiting shortness of breath, wheezing, calf pain or swelling  Prior to Admission Medications   Prescriptions Last Dose Informant Patient Reported?  Taking?   dicyclomine (BENTYL) 10 mg capsule 7/10/2018 at 1200  No Yes   Sig: Take 1 capsule (10 mg total) by mouth 4 (four) times a day (before meals and at bedtime)   gabapentin (NEURONTIN) 300 mg capsule 7/9/2018 at Unknown time  Yes Yes   Sig: Take 300 mg by mouth 3 (three) times a day Resume at next scheduled dose upon discharge from the hosptial   lisinopril (ZESTRIL) 20 mg tablet 7/9/2018 at Unknown time  No Yes   Sig: Take 1 tablet (20 mg total) by mouth daily   oxyCODONE (ROXICODONE) 5 mg immediate release tablet 7/9/2018 at Unknown time  No Yes   Sig: Take 1 tablet (5 mg total) by mouth every 6 (six) hours as needed for severe pain Max Daily Amount: 20 mg   pantoprazole (PROTONIX) 40 mg tablet 7/10/2018 at 1200  Yes Yes   Sig: Take 40 mg by mouth 2 (two) times a day Resume with evening dose on    sucralfate (CARAFATE) 1 g tablet 7/10/2018 at 1200  Yes Yes   Sig: Take 1 g by mouth 4 (four) times a day Resume at next scheduled dose upon discharge from the hospital      Facility-Administered Medications: None       Past Medical History:   Diagnosis Date    Arthritis     Asthma     Back pain     DVT (deep venous thrombosis) ([address] )     Gastric bypass status for obesity     GERD (gastroesophageal reflux disease)     Hiatal hernia     History of transfusion 2006    after gastric bypass surgery, no reactions    Hypertension     Irritable bowel syndrome     Kidney stone     Muscle weakness     bilateral legs    Numbness and tingling     bilateral feet    Pneumonia     Spinal stenosis     Tinnitus     occassionally    Wears glasses        Past Surgical History:   Procedure Laterality Date    APPENDECTOMY      BARIATRIC SURGERY      CARPAL TUNNEL RELEASE Right      SECTION      x1    CHOLECYSTECTOMY      COLONOSCOPY      COLONOSCOPY W/ BIOPSIES AND POLYPECTOMY      FLEXIBLE BRONCHOSCOPY W/ UPPER ENDOSCOPY      HERNIA REPAIR      inguinal hernia    LA ARTHROCENTESIS ASPIR&/INJ SMALL JT/BURSA W/O US N/A 2018    Procedure: Coccygeal Injection & Ganglion Impar Block;  Surgeon: Jolanta Jasmine MD;  Location: [facility] MAIN OR;  Service: Pain Management     LA ARTHRODESIS POSTERIOR INTERBODY LUMBAR N/A 2018    Procedure: L4-5 DECOMPRESSION INTERBODY INSTRUMENTED FUSION;  Surgeon: Reginaldo Pinedo MD;  Location[facility];  Service: Orthopedics    LA ESOPHAGOGASTRODUODENOSCOPY TRANSORAL DIAGNOSTIC N/A 2018    Procedure: ESOPHAGOGASTRODUODENOSCOPY (EGD); Surgeon: Priyanka Roldan MD;  Location: [facility] GI LAB;   Service: Gastroenterology   Laith Arturo JOINT Right 2018    Procedure: Rt Sacroiliac Joint Injection;  Surgeon: Jolanta Jasmine MD;  Location: [facility] [address] MAIN OR;  Service: Pain Management     TONSILECTOMY, ADENOIDECTOMY, BILATERAL MYRINGOTOMY AND TUBES         Family History   Problem Relation Age of Onset    Diabetes Mother     Aortic aneurysm Father     Cancer Father         prostate    Heart disease Sister         open heart    Cancer Sister         breast    Diabetes Brother     No Known Problems Daughter     Asperger's syndrome Son     Diabetes Maternal Grandfather     No Known Problems Brother      I have reviewed and agree with the history as documented  Social History   Substance Use Topics    Smoking status: Never Smoker    Smokeless tobacco: Never Used    Alcohol use 4 2 oz/week     5 Cans of beer, 2 Shots of liquor per week        Review of Systems   Constitutional: Negative  Negative for chills, fever and unexpected weight change  HENT: Negative  Eyes: Negative  Respiratory: Negative  Negative for cough, chest tightness, shortness of breath and wheezing  Cardiovascular: Negative  Negative for chest pain and palpitations  Gastrointestinal: Negative  Negative for abdominal pain, constipation, diarrhea, nausea and vomiting  Genitourinary: Negative  Negative for difficulty urinating, dysuria, flank pain, frequency, hematuria and urgency  Denies numbness, tingling in the groin  Musculoskeletal: Positive for back pain  Negative for arthralgias, gait problem, joint swelling, myalgias, neck pain and neck stiffness  Skin: Negative  Negative for color change  Neurological: Negative  Negative for dizziness, tremors, weakness, light-headedness, numbness and headaches  Denies numbness  Denies shooting pain down arms/ legs  All other systems reviewed and are negative  Physical Exam  Physical Exam   Constitutional: She is oriented to person, place, and time  She appears well-developed and well-nourished  HENT:   Head: Normocephalic and atraumatic     Eyes: Conjunctivae are normal  Cardiovascular: Normal rate  Pulmonary/Chest: Effort normal    S PO2 is 97% indicating adequate oxygenation  Musculoskeletal:        Arms:  Neurological: She is alert and oriented to person, place, and time  Skin: Skin is warm and dry  Capillary refill takes less than 2 seconds  Nursing note and vitals reviewed  Vital Signs  ED Triage Vitals [07/10/18 1330]   Temperature Pulse Respirations Blood Pressure SpO2   100 1 °F (37 8 °C) 93 20 139/78 97 %      Temp Source Heart Rate Source Patient Position - Orthostatic VS BP Location FiO2 (%)   Tympanic Monitor Sitting Left arm --      Pain Score       Worst Possible Pain           Vitals:    07/10/18 1330   BP: 139/78   Pulse: 93   Patient Position - Orthostatic VS: Sitting       Visual Acuity      ED Medications  Medications   ondansetron (ZOFRAN-ODT) dispersible tablet 4 mg (4 mg Oral Given 7/10/18 1457)   oxyCODONE (ROXICODONE) IR tablet 5 mg (5 mg Oral Given 7/10/18 1457)       Diagnostic Studies  Results Reviewed     None                 No orders to display              Procedures  Procedures       Phone Contacts  ED Phone Contact    ED Course                               MDM  Number of Diagnoses or Management Options  Medication refill:   Diagnosis management comments: Spoke with Dr Rebeka Addison regarding patient's inability to get to [facility] to  the oxycodone prescription  Confirmed that patient has not picked this up as of yet  Here with daughter who is able to get the script tomorrow  I called [facility] who once again confirmed that no e-script for oxycodone was sent over  I researched [place] on [place]  aware without any results  Will give patient very short course  She is informed to follow up with her PCP for any repeat prescriptions as needed  Patient verbalizes understanding and agrees with the above assessment plan         Amount and/or Complexity of Data Reviewed  Review and summarize past medical records: yes  Independent visualization of images, tracings, or specimens: yes      CritCare Time    Disposition  Final diagnoses:   Medication refill     Time reflects when diagnosis was documented in both MDM as applicable and the Disposition within this note     Time User Action Codes Description Comment    7/10/2018  3:01 PM Suzie Abraham Add [Z76 0] Medication refill       ED Disposition     ED Disposition Condition Comment    Discharge  Jack Garcia discharge to home/self care  Condition at discharge: Good        Follow-up Information     Follow up With Specialties Details Why Contact Info Additional [address] Emergency Department Emergency Medicine Go to If symptoms worsen [address] [facility] [place] [facility]  [phone] [facility], [place], [place], Pod Marie Welch MD Family Medicine Schedule an appointment as soon as possible for a visit As needed [address] [place] [address] Rd  [phone]             Discharge Medication List as of 7/10/2018  3:03 PM      START taking these medications    Details   ondansetron (ZOFRAN-ODT) 4 mg disintegrating tablet Take 1 tablet (4 mg total) by mouth every 6 (six) hours as needed for nausea or vomiting, Starting Tue 7/10/2018, Print      !! oxyCODONE (ROXICODONE) 5 mg immediate release tablet Take 1 tablet (5 mg total) by mouth every 4 (four) hours as needed for moderate pain for up to 1 day Max Daily Amount: 30 mg, Starting Tue 7/10/2018, Until Wed 7/11/2018, Print       !! - Potential duplicate medications found  Please discuss with provider        CONTINUE these medications which have NOT CHANGED    Details   gabapentin (NEURONTIN) 300 mg capsule Take 300 mg by mouth 3 (three) times a day Resume at next scheduled dose upon discharge from the hosptial, Historical Med      pantoprazole (PROTONIX) 40 mg tablet Take 40 mg by mouth 2 (two) times a day Resume with evening dose on 7/9, Historical Med sucralfate (CARAFATE) 1 g tablet Take 1 g by mouth 4 (four) times a day Resume at next scheduled dose upon discharge from the hospital, Historical Med      dicyclomine (BENTYL) 10 mg capsule Take 1 capsule (10 mg total) by mouth 4 (four) times a day (before meals and at bedtime), Starting Tue 7/10/2018, Print      lisinopril (ZESTRIL) 20 mg tablet Take 1 tablet (20 mg total) by mouth daily, Starting Tue 7/10/2018, Print      !! oxyCODONE (ROXICODONE) 5 mg immediate release tablet Take 1 tablet (5 mg total) by mouth every 6 (six) hours as needed for severe pain Max Daily Amount: 20 mg, Starting Tue 7/10/2018, Print       !! - Potential duplicate medications found  Please discuss with provider  No discharge procedures on file      ED Provider  Electronically Signed by           Fabian Rg PA-C  07/10/18 4717

## 2018-07-10 NOTE — CASE MANAGEMENT
Team dc summary - pt made good progress and returned home w/family and contd hhc services through [facility] for rn pt and ot  Pt received a roller walker and commode through [facility] present for dc instructions and aware of pts functional ability

## 2018-07-10 NOTE — PROGRESS NOTES
Scrips for lisinopril, bentyl, and oxycodone IR sent electronically to [facility] [place], [place] on day of dc however contacted by patient the following day that pharmacy stated they did not have scrips, contacted pharmacy and they confirmed this and attempted to resend scrips however once again pharmacy stating they did not receive them (unclear why as per pharmacist they do except e-scrips from [facility]) therefore paper scrips printed out and provided to patient

## 2018-07-11 ENCOUNTER — OFFICE VISIT (OUTPATIENT)
Dept: PAIN MEDICINE | Facility: CLINIC | Age: 63
End: 2018-07-11
Payer: COMMERCIAL

## 2018-07-11 VITALS
RESPIRATION RATE: 20 BRPM | DIASTOLIC BLOOD PRESSURE: 80 MMHG | HEIGHT: 65 IN | HEART RATE: 102 BPM | TEMPERATURE: 98 F | SYSTOLIC BLOOD PRESSURE: 130 MMHG | BODY MASS INDEX: 40.62 KG/M2 | WEIGHT: 243.8 LBS

## 2018-07-11 DIAGNOSIS — G89.29 CHRONIC BILATERAL LOW BACK PAIN WITHOUT SCIATICA: ICD-10-CM

## 2018-07-11 DIAGNOSIS — Z98.1 S/P LUMBAR FUSION: ICD-10-CM

## 2018-07-11 DIAGNOSIS — M54.50 CHRONIC BILATERAL LOW BACK PAIN WITHOUT SCIATICA: ICD-10-CM

## 2018-07-11 DIAGNOSIS — M54.2 NECK PAIN: ICD-10-CM

## 2018-07-11 DIAGNOSIS — G89.4 CHRONIC PAIN SYNDROME: Primary | ICD-10-CM

## 2018-07-11 PROCEDURE — 99213 OFFICE O/P EST LOW 20 MIN: CPT | Performed by: ANESTHESIOLOGY

## 2018-07-11 RX ORDER — TRAZODONE HYDROCHLORIDE 50 MG/1
50 TABLET ORAL
COMMUNITY

## 2018-07-11 NOTE — LETTER
July 11, 2018     Vane Merida MD  [address]    Patient: Carlos Luna   YOB: 1955   Date of Visit: 7/11/2018       Dear Dr Luisito Marquez:    Thank you for referring Carlos Luna to me for evaluation  Below are my notes for this consultation  If you have questions, please do not hesitate to call me  I look forward to following your patient along with you  Sincerely,        Mathew Thornton MD        CC: No Recipients  Mathew Thornton MD  7/11/2018  7:47 AM  Sign at close encounter  Pain Medicine Follow-Up Note    Assessment:  1  Chronic pain syndrome    2  Chronic bilateral low back pain without sciatica    3  S/P lumbar fusion    4  Neck pain        Plan:  New Medications Ordered This Visit   Medications    traZODone (DESYREL) 50 mg tablet     Sig: Take 50 mg by mouth daily at bedtime     My impressions and treatment recommendations were discussed in detail with the patient who verbalized understanding and had no further questions  The patient reports that she had a lumbar fusion surgery in June 27, 2018  She is currently recovering from that and was admitted to a rehabilitation facility for short period after the surgery  I suggested that she continue her home physical therapy and that I what I had on physical therapy for her neck and right shoulder while she continues to work on her rehabilitation at home  The patient verbalized understanding  The patient is reporting some incisional site pain, so suggested speaking to her surgeon about possibly using lidocaine cream or a lidocaine patch on that area  The patient stated that she would get in contact with her surgeon later on today  Follow-up is planned in 6 weeks time or sooner as warranted  Discharge instructions were provided  I personally saw and examined the patient and I agree with the above discussed plan of care      History of Present Illness:    Carlos Luna is a 63 y o  female who presents to [facility] for interval re-evaluation of the above stated pain complaints  The patient has a past medical and chronic pain history as outlined in the assessment section  She was last seen on June 6, 2018  At today's office visit, the patient's pain score is 7/10 on the verbal numerical pain rating scale  She is complaining primarily of low back pain and neck pain as well as right shoulder pain  She reports undergoing a lumbar fusion surgery on June 27, 2018 patient states that her pain is burning, and shooting    She states that she has a "strange tingle in the right shoulder blade and crackles in my neck    She is interested in trying to continue physical therapy for her neck and right shoulder region  She also is undergoing home physical therapy for her low back  Other than as stated above, the patient denies any interval changes in medications, medical condition, mental condition, symptoms, or allergies since the last office visit  Review of Systems:    Review of Systems   Respiratory: Negative for shortness of breath  Cardiovascular: Negative for chest pain  Gastrointestinal: Positive for nausea  Negative for constipation, diarrhea and vomiting  Musculoskeletal: Positive for gait problem (decreased range of motion)  Negative for arthralgias, joint swelling and myalgias  Skin: Negative for rash  Neurological: Negative for dizziness, seizures and weakness  All other systems reviewed and are negative          Patient Active Problem List   Diagnosis    GERD without esophagitis    Right sided abdominal pain    Diarrhea    Chronic pain syndrome    Chronic bilateral low back pain without sciatica    Lumbar spondylosis    Spondylolisthesis of lumbar region    Coccydynia    Coccygodynia    Lumbar stenosis with neurogenic claudication    Sacroiliitis (HCC)    Sacroiliitis, not elsewhere classified ([facility] [address] )    Low back pain  Myofascial pain syndrome    Thoracic spine pain    Neck pain    Fever    Leukocytosis    HTN (hypertension)    Encounter for rehabilitation    Hypokalemia    S/P lumbar fusion       Past Medical History:   Diagnosis Date    Arthritis     Asthma     Back pain     DVT (deep venous thrombosis) (HCC)     Gastric bypass status for obesity     GERD (gastroesophageal reflux disease)     Hiatal hernia     History of transfusion 2006    after gastric bypass surgery, no reactions    Hypertension     Irritable bowel syndrome     Kidney stone     Muscle weakness     bilateral legs    Numbness and tingling     bilateral feet    Pneumonia     Spinal stenosis     Tinnitus     occassionally    Wears glasses        Past Surgical History:   Procedure Laterality Date    APPENDECTOMY      BARIATRIC SURGERY      CARPAL TUNNEL RELEASE Right      SECTION      x1    CHOLECYSTECTOMY      COLONOSCOPY      COLONOSCOPY W/ BIOPSIES AND POLYPECTOMY      FLEXIBLE BRONCHOSCOPY W/ UPPER ENDOSCOPY      HERNIA REPAIR      inguinal hernia    MD ARTHROCENTESIS ASPIR&/INJ SMALL JT/BURSA W/O US N/A 2018    Procedure: Coccygeal Injection & Ganglion Impar Block;  Surgeon: Juan Bruce MD;  Location: [facility] MAIN OR;  Service: Pain Management     MD ARTHRODESIS POSTERIOR INTERBODY LUMBAR N/A 2018    Procedure: L4-5 DECOMPRESSION INTERBODY INSTRUMENTED FUSION;  Surgeon: Rich Vega MD;  Location: [facility];  Service: Orthopedics    MD ESOPHAGOGASTRODUODENOSCOPY TRANSORAL DIAGNOSTIC N/A 2018    Procedure: ESOPHAGOGASTRODUODENOSCOPY (EGD); Surgeon: London Bullock MD;  Location: [facility] GI LAB;   Service: Gastroenterology   Rosa Beat JOINT Right 2018    Procedure: Rt Sacroiliac Joint Injection;  Surgeon: Juan Bruce MD;  Location: [facility] MAIN OR;  Service: Pain Management     TONSILECTOMY, ADENOIDECTOMY, BILATERAL MYRINGOTOMY AND TUBES         Family History Problem Relation Age of Onset    Diabetes Mother     Aortic aneurysm Father     Cancer Father         prostate    Heart disease Sister         open heart    Cancer Sister         breast    Diabetes Brother     No Known Problems Daughter     Asperger's syndrome Son     Diabetes Maternal Grandfather     No Known Problems Brother        Social History     Occupational History    Not on file       Social History Main Topics    Smoking status: Never Smoker    Smokeless tobacco: Never Used    Alcohol use 4 2 oz/week     5 Cans of beer, 2 Shots of liquor per week    Drug use: No    Sexual activity: Yes         Current Outpatient Prescriptions:     dicyclomine (BENTYL) 10 mg capsule, Take 1 capsule (10 mg total) by mouth 4 (four) times a day (before meals and at bedtime), Disp: 30 capsule, Rfl: 0    gabapentin (NEURONTIN) 300 mg capsule, Take 300 mg by mouth 3 (three) times a day Resume at next scheduled dose upon discharge from the hosptial, Disp: , Rfl:     lisinopril (ZESTRIL) 20 mg tablet, Take 1 tablet (20 mg total) by mouth daily, Disp: 30 tablet, Rfl: 0    ondansetron (ZOFRAN-ODT) 4 mg disintegrating tablet, Take 1 tablet (4 mg total) by mouth every 6 (six) hours as needed for nausea or vomiting, Disp: 20 tablet, Rfl: 0    oxyCODONE (ROXICODONE) 5 mg immediate release tablet, Take 1 tablet (5 mg total) by mouth every 6 (six) hours as needed for severe pain Max Daily Amount: 20 mg, Disp: 20 tablet, Rfl: 0    oxyCODONE (ROXICODONE) 5 mg immediate release tablet, Take 1 tablet (5 mg total) by mouth every 4 (four) hours as needed for moderate pain for up to 1 day Max Daily Amount: 30 mg, Disp: 6 tablet, Rfl: 0    pantoprazole (PROTONIX) 40 mg tablet, Take 40 mg by mouth 2 (two) times a day Resume with evening dose on 7/9, Disp: , Rfl:     sucralfate (CARAFATE) 1 g tablet, Take 1 g by mouth 4 (four) times a day Resume at next scheduled dose upon discharge from the hospital, Disp: , Rfl:    traZODone (DESYREL) 50 mg tablet, Take 50 mg by mouth daily at bedtime, Disp: , Rfl:   No current facility-administered medications for this visit       No Known Allergies    Physical Exam:    /80 (BP Location: Left arm, Patient Position: Sitting, Cuff Size: Standard)   Pulse 102   Temp 98 °F (36 7 °C) (Oral)   Resp 20   Ht 5' 5" (1 651 m)   Wt 111 kg (243 lb 12 8 oz)   BMI 40 57 kg/m²      Constitutional:obese  Eyes:anicteric  HEENT:grossly intact  Neck:supple, symmetric, trachea midline and no masses   Pulmonary:even and unlabored  Cardiovascular:No edema or pitting edema present  Skin:Normal without rashes or lesions and well hydrated  Psychiatric:Mood and affect appropriate  Neurologic:Cranial Nerves II-XII grossly intact  Musculoskeletal:ambulates with a wheeled walker

## 2018-07-11 NOTE — PROGRESS NOTES
Pain Medicine Follow-Up Note    Assessment:  1  Chronic pain syndrome    2  Chronic bilateral low back pain without sciatica    3  S/P lumbar fusion    4  Neck pain        Plan:  New Medications Ordered This Visit   Medications    traZODone (DESYREL) 50 mg tablet     Sig: Take 50 mg by mouth daily at bedtime     My impressions and treatment recommendations were discussed in detail with the patient who verbalized understanding and had no further questions  The patient reports that she had a lumbar fusion surgery in June 27, 2018  She is currently recovering from that and was admitted to a rehabilitation facility for short period after the surgery  I suggested that she continue her home physical therapy and that I what I had on physical therapy for her neck and right shoulder while she continues to work on her rehabilitation at home  The patient verbalized understanding  The patient is reporting some incisional site pain, so suggested speaking to her surgeon about possibly using lidocaine cream or a lidocaine patch on that area  The patient stated that she would get in contact with her surgeon later on today  Follow-up is planned in 6 weeks time or sooner as warranted  Discharge instructions were provided  I personally saw and examined the patient and I agree with the above discussed plan of care  History of Present Illness:    Misti Bales is a 63 y o  female who presents to [facility] for interval re-evaluation of the above stated pain complaints  The patient has a past medical and chronic pain history as outlined in the assessment section  She was last seen on June 6, 2018  At today's office visit, the patient's pain score is 7/10 on the verbal numerical pain rating scale  She is complaining primarily of low back pain and neck pain as well as right shoulder pain   She reports undergoing a lumbar fusion surgery on June 27, 2018 patient states that her pain is burning, and shooting    She states that she has a "strange tingle in the right shoulder blade and crackles in my neck    She is interested in trying to continue physical therapy for her neck and right shoulder region  She also is undergoing home physical therapy for her low back  Other than as stated above, the patient denies any interval changes in medications, medical condition, mental condition, symptoms, or allergies since the last office visit  Review of Systems:    Review of Systems   Respiratory: Negative for shortness of breath  Cardiovascular: Negative for chest pain  Gastrointestinal: Positive for nausea  Negative for constipation, diarrhea and vomiting  Musculoskeletal: Positive for gait problem (decreased range of motion)  Negative for arthralgias, joint swelling and myalgias  Skin: Negative for rash  Neurological: Negative for dizziness, seizures and weakness  All other systems reviewed and are negative          Patient Active Problem List   Diagnosis    GERD without esophagitis    Right sided abdominal pain    Diarrhea    Chronic pain syndrome    Chronic bilateral low back pain without sciatica    Lumbar spondylosis    Spondylolisthesis of lumbar region    Coccydynia    Coccygodynia    Lumbar stenosis with neurogenic claudication    Sacroiliitis (HCC)    Sacroiliitis, not elsewhere classified ([facility] [address]    Low back pain    Myofascial pain syndrome    Thoracic spine pain    Neck pain    Fever    Leukocytosis    HTN (hypertension)    Encounter for rehabilitation    Hypokalemia    S/P lumbar fusion       Past Medical History:   Diagnosis Date    Arthritis     Asthma     Back pain     DVT (deep venous thrombosis) ([facility] [address] ) 2006    Gastric bypass status for obesity     GERD (gastroesophageal reflux disease)     Hiatal hernia     History of transfusion 2006    after gastric bypass surgery, no reactions    Hypertension     Irritable bowel syndrome     Kidney stone     Muscle weakness     bilateral legs    Numbness and tingling     bilateral feet    Pneumonia     Spinal stenosis     Tinnitus     occassionally    Wears glasses        Past Surgical History:   Procedure Laterality Date    APPENDECTOMY      BARIATRIC SURGERY      CARPAL TUNNEL RELEASE Right      SECTION      x1    CHOLECYSTECTOMY      COLONOSCOPY      COLONOSCOPY W/ BIOPSIES AND POLYPECTOMY      FLEXIBLE BRONCHOSCOPY W/ UPPER ENDOSCOPY      HERNIA REPAIR      inguinal hernia    TN ARTHROCENTESIS ASPIR&/INJ SMALL JT/BURSA W/O US N/A 2018    Procedure: Coccygeal Injection & Ganglion Impar Block;  Surgeon: Clayton Us MD;  [facility] MAIN OR;  Service: Pain Management     [place] ARTHRODESIS POSTERIOR INTERBODY LUMBAR N/A 2018    Procedure: L4-5 DECOMPRESSION INTERBODY INSTRUMENTED FUSION;  Surgeon: Demetris Shaver MD;  Location: [facility] Main OR;  Service: Orthopedics    TN ESOPHAGOGASTRODUODENOSCOPY TRANSORAL DIAGNOSTIC N/A 2018    Procedure: ESOPHAGOGASTRODUODENOSCOPY (EGD); Surgeon: José Miguel Chapman MD;  [place] [facility] [place] [facility]; Service: Gastroenterology   Raisa Haji JOINT Right 2018    Procedure: Rt Sacroiliac Joint Injection;  Surgeon: Clayton Us MD;  [place] [facility] [place];  Service: Pain Management     TONSILECTOMY, ADENOIDECTOMY, BILATERAL MYRINGOTOMY AND TUBES         Family History   Problem Relation Age of Onset    Diabetes Mother     Aortic aneurysm Father     Cancer Father         prostate    Heart disease Sister         open heart    Cancer Sister         breast    Diabetes Brother     No Known Problems Daughter     Asperger's syndrome Son     Diabetes Maternal Grandfather     No Known Problems Brother        Social History     Occupational History    Not on file       Social History Main Topics    Smoking status: Never Smoker    Smokeless tobacco: Never Used    Alcohol use 4 2 oz/week     5 Cans of beer, 2 Shots of liquor per week    Drug use: No    Sexual activity: Yes         Current Outpatient Prescriptions:     dicyclomine (BENTYL) 10 mg capsule, Take 1 capsule (10 mg total) by mouth 4 (four) times a day (before meals and at bedtime), Disp: 30 capsule, Rfl: 0    gabapentin (NEURONTIN) 300 mg capsule, Take 300 mg by mouth 3 (three) times a day Resume at next scheduled dose upon discharge from the hosptial, Disp: , Rfl:     lisinopril (ZESTRIL) 20 mg tablet, Take 1 tablet (20 mg total) by mouth daily, Disp: 30 tablet, Rfl: 0    ondansetron (ZOFRAN-ODT) 4 mg disintegrating tablet, Take 1 tablet (4 mg total) by mouth every 6 (six) hours as needed for nausea or vomiting, Disp: 20 tablet, Rfl: 0    oxyCODONE (ROXICODONE) 5 mg immediate release tablet, Take 1 tablet (5 mg total) by mouth every 6 (six) hours as needed for severe pain Max Daily Amount: 20 mg, Disp: 20 tablet, Rfl: 0    oxyCODONE (ROXICODONE) 5 mg immediate release tablet, Take 1 tablet (5 mg total) by mouth every 4 (four) hours as needed for moderate pain for up to 1 day Max Daily Amount: 30 mg, Disp: 6 tablet, Rfl: 0    pantoprazole (PROTONIX) 40 mg tablet, Take 40 mg by mouth 2 (two) times a day Resume with evening dose on 7/9, Disp: , Rfl:     sucralfate (CARAFATE) 1 g tablet, Take 1 g by mouth 4 (four) times a day Resume at next scheduled dose upon discharge from the hospital, Disp: , Rfl:     traZODone (DESYREL) 50 mg tablet, Take 50 mg by mouth daily at bedtime, Disp: , Rfl:   No current facility-administered medications for this visit       No Known Allergies    Physical Exam:    /80 (BP Location: Left arm, Patient Position: Sitting, Cuff Size: Standard)   Pulse 102   Temp 98 °F (36 7 °C) (Oral)   Resp 20   Ht 5' 5" (1 651 m)   Wt 111 kg (243 lb 12 8 oz)   BMI 40 57 kg/m²     Constitutional:obese  Eyes:anicteric  HEENT:grossly intact  Neck:supple, symmetric, trachea midline and no masses   Pulmonary:even and unlabored  Cardiovascular:No edema or pitting edema present  Skin:Normal without rashes or lesions and well hydrated  Psychiatric:Mood and affect appropriate  Neurologic:Cranial Nerves II-XII grossly intact  Musculoskeletal:ambulates with a wheeled walker

## 2018-07-12 NOTE — OCCUPATIONAL THERAPY NOTE
Occupational Therapy Discharge Summary     Pt made good progress in Occupational Therapy  Pt met all goals and progressed to independence/MOD I for ADL's  Pt purchased recommended DME, (RW, BSC) to increase safety and independence w/ ADL's  Pt has demonstrated good safety throughout sessions  Pt does not require any further OT services at this time  Pt is safe to D/C home w/ family support and assistance as needed

## 2018-07-18 ENCOUNTER — TELEPHONE (OUTPATIENT)
Dept: GASTROENTEROLOGY | Facility: AMBULARY SURGERY CENTER | Age: 63
End: 2018-07-18

## 2018-07-18 NOTE — TELEPHONE ENCOUNTER
Agree with plan Noah Rose  She is status post cholecystectomy  She has had similar episodes of right upper quadrant pain and diarrhea in the past   Likely has underlying bacterial overgrowth or IBS  If she has fevers she should proceed to the emergency room given her recent surgery  Otherwise we can see her in the office for her abdominal pain and diarrhea which has been chronic  She may benefit from rifaximin

## 2018-07-18 NOTE — TELEPHONE ENCOUNTER
----- Message from Ines Rubio sent at 7/18/2018  9:28 AM EDT -----  Regarding: PT CALL  Contact: [phone]  PLEASE CALL PT RE: ABD PAIN  AS PER OUR TELEPHONE CONVERSATION ABOUT IT  THANK YOU!

## 2018-07-18 NOTE — TELEPHONE ENCOUNTER
Patient is s/p lumbar fusion and was recently discharged from rehab  She said she's had an intermittent fever since surgery ( which delayed her discharge) and is reporting increased bloating, "diarrhea-semi formed bm after meals" and RUQ pain, "10" at times more severe since the surgery  She has hx of gastric bypass and esophagojejunostomy anastomotic stricture and chronic RUQ pain with diarrhea  Last CT was 3/5 no acute abnormality  Upper GI series showed reflux to level of proximal esophagus 2/19, US looked OK 2/12, EGD 2/19 you documented can consider EGD with dilation due to scar tissue and tethering  She is on protonix 40 mg daily, carafate, bentyl 4x/day and senekot  Oxycodone completed  I recommended she take smaller more frequent meals  and increase protonix to twice daily  We scheduled office visit for 8/1 but due to her complex hx,  I recommended she go to the ED if she develops fevers and severe pain in the meantime  Let me know if you want to order other testing or wait for OV  Thank you

## 2018-07-19 NOTE — TELEPHONE ENCOUNTER
I called patient, reached vm  Left message that Dr Reyes Leiva is aware of her symptoms  Recommends she follow through with the plan we discussed previous and to go to the ED if symptoms worsen  Otherwise, please keep office visit as scheduled

## 2018-07-23 ENCOUNTER — APPOINTMENT (EMERGENCY)
Dept: RADIOLOGY | Facility: HOSPITAL | Age: 63
End: 2018-07-23
Payer: COMMERCIAL

## 2018-07-23 ENCOUNTER — HOSPITAL ENCOUNTER (EMERGENCY)
Facility: HOSPITAL | Age: 63
Discharge: HOME | End: 2018-07-24
Attending: EMERGENCY MEDICINE
Payer: COMMERCIAL

## 2018-07-23 DIAGNOSIS — R63.0 LOSS OF APPETITE: ICD-10-CM

## 2018-07-23 DIAGNOSIS — R10.9 ABDOMINAL PAIN: Primary | ICD-10-CM

## 2018-07-23 DIAGNOSIS — R14.0 ABDOMINAL BLOATING: ICD-10-CM

## 2018-07-23 LAB
ALBUMIN SERPL BCP-MCNC: 3.2 G/DL (ref 3.5–5)
ALP SERPL-CCNC: 76 U/L (ref 46–116)
ALT SERPL W P-5'-P-CCNC: 16 U/L (ref 12–78)
ANION GAP SERPL CALCULATED.3IONS-SCNC: 9 MMOL/L (ref 4–13)
APTT PPP: 24 SECONDS (ref 24–36)
AST SERPL W P-5'-P-CCNC: 22 U/L (ref 5–45)
BASOPHILS # BLD AUTO: 0.05 THOUSANDS/ΜL (ref 0–0.1)
BASOPHILS NFR BLD AUTO: 1 % (ref 0–1)
BILIRUB SERPL-MCNC: 0.4 MG/DL (ref 0.2–1)
BUN SERPL-MCNC: 12 MG/DL (ref 5–25)
CALCIUM SERPL-MCNC: 8.8 MG/DL (ref 8.3–10.1)
CHLORIDE SERPL-SCNC: 105 MMOL/L (ref 100–108)
CO2 SERPL-SCNC: 24 MMOL/L (ref 21–32)
CREAT SERPL-MCNC: 0.89 MG/DL (ref 0.6–1.3)
EOSINOPHIL # BLD AUTO: 0.46 THOUSAND/ΜL (ref 0–0.61)
EOSINOPHIL NFR BLD AUTO: 4 % (ref 0–6)
ERYTHROCYTE [DISTWIDTH] IN BLOOD BY AUTOMATED COUNT: 13.2 % (ref 11.6–15.1)
GFR SERPL CREATININE-BSD FRML MDRD: 71 ML/MIN/1.73SQ M
GLUCOSE SERPL-MCNC: 95 MG/DL (ref 65–140)
HCT VFR BLD AUTO: 34.6 % (ref 34.8–46.1)
HGB BLD-MCNC: 10.9 G/DL (ref 11.5–15.4)
IMM GRANULOCYTES # BLD AUTO: 0.02 THOUSAND/UL (ref 0–0.2)
IMM GRANULOCYTES NFR BLD AUTO: 0 % (ref 0–2)
INR PPP: 0.95 (ref 0.86–1.16)
LIPASE SERPL-CCNC: 154 U/L (ref 73–393)
LYMPHOCYTES # BLD AUTO: 2.87 THOUSANDS/ΜL (ref 0.6–4.47)
LYMPHOCYTES NFR BLD AUTO: 27 % (ref 14–44)
MCH RBC QN AUTO: 31.1 PG (ref 26.8–34.3)
MCHC RBC AUTO-ENTMCNC: 31.5 G/DL (ref 31.4–37.4)
MCV RBC AUTO: 99 FL (ref 82–98)
MONOCYTES # BLD AUTO: 0.59 THOUSAND/ΜL (ref 0.17–1.22)
MONOCYTES NFR BLD AUTO: 6 % (ref 4–12)
NEUTROPHILS # BLD AUTO: 6.64 THOUSANDS/ΜL (ref 1.85–7.62)
NEUTS SEG NFR BLD AUTO: 62 % (ref 43–75)
NRBC BLD AUTO-RTO: 0 /100 WBCS
PLATELET # BLD AUTO: 328 THOUSANDS/UL (ref 149–390)
PMV BLD AUTO: 8.7 FL (ref 8.9–12.7)
POTASSIUM SERPL-SCNC: 4.4 MMOL/L (ref 3.5–5.3)
PROT SERPL-MCNC: 7.1 G/DL (ref 6.4–8.2)
PROTHROMBIN TIME: 10 SECONDS (ref 9.4–11.7)
RBC # BLD AUTO: 3.5 MILLION/UL (ref 3.81–5.12)
SODIUM SERPL-SCNC: 138 MMOL/L (ref 136–145)
WBC # BLD AUTO: 10.63 THOUSAND/UL (ref 4.31–10.16)

## 2018-07-23 PROCEDURE — 85730 THROMBOPLASTIN TIME PARTIAL: CPT | Performed by: EMERGENCY MEDICINE

## 2018-07-23 PROCEDURE — 96361 HYDRATE IV INFUSION ADD-ON: CPT

## 2018-07-23 PROCEDURE — 85610 PROTHROMBIN TIME: CPT | Performed by: EMERGENCY MEDICINE

## 2018-07-23 PROCEDURE — 74177 CT ABD & PELVIS W/CONTRAST: CPT

## 2018-07-23 PROCEDURE — 80053 COMPREHEN METABOLIC PANEL: CPT | Performed by: EMERGENCY MEDICINE

## 2018-07-23 PROCEDURE — 85025 COMPLETE CBC W/AUTO DIFF WBC: CPT | Performed by: EMERGENCY MEDICINE

## 2018-07-23 PROCEDURE — 36415 COLL VENOUS BLD VENIPUNCTURE: CPT

## 2018-07-23 PROCEDURE — 96360 HYDRATION IV INFUSION INIT: CPT

## 2018-07-23 PROCEDURE — 83690 ASSAY OF LIPASE: CPT | Performed by: EMERGENCY MEDICINE

## 2018-07-23 RX ADMIN — SODIUM CHLORIDE 1000 ML: 0.9 INJECTION, SOLUTION INTRAVENOUS at 21:14

## 2018-07-23 RX ADMIN — IOHEXOL 50 ML: 240 INJECTION, SOLUTION INTRATHECAL; INTRAVASCULAR; INTRAVENOUS; ORAL at 22:19

## 2018-07-24 VITALS
OXYGEN SATURATION: 95 % | DIASTOLIC BLOOD PRESSURE: 88 MMHG | WEIGHT: 244.71 LBS | SYSTOLIC BLOOD PRESSURE: 167 MMHG | RESPIRATION RATE: 18 BRPM | HEART RATE: 76 BPM | TEMPERATURE: 98.9 F | BODY MASS INDEX: 40.72 KG/M2

## 2018-07-24 PROCEDURE — 99284 EMERGENCY DEPT VISIT MOD MDM: CPT

## 2018-07-24 PROCEDURE — 96361 HYDRATE IV INFUSION ADD-ON: CPT

## 2018-07-24 RX ADMIN — IOHEXOL 100 ML: 350 INJECTION, SOLUTION INTRAVENOUS at 00:01

## 2018-07-24 NOTE — ED PROVIDER NOTES
History  Chief Complaint   Patient presents with    Abdominal Pain     pt presents to the ed with RUQ pain and cramping for several weeks      Patient needs a 74-year-old female who presents with complaint of several weeks of increased upper abdominal cramping like pain as associated with which she thinks is bloating decreased appetite  Patient has had multiple abdominal surgeries in the past she recently had back surgery which is recovering nicely from, she has been in rehab for the back has been having some abdominal issues since then  Patient denies any fevers or chills, no aggravating or alleviating factors  Patient states she is mostly concerned if he has an obstruction from adhesions or a new hernia from her multiple surgeries  Patient is also concerned about her liver and kidney functions because or her that is were all her things get detoxified from  Patient is not asking for any pain meds here in the emergency room  Prior to Admission Medications   Prescriptions Last Dose Informant Patient Reported?  Taking?   dicyclomine (BENTYL) 10 mg capsule   No No   Sig: Take 1 capsule (10 mg total) by mouth 4 (four) times a day (before meals and at bedtime)   gabapentin (NEURONTIN) 300 mg capsule   Yes No   Sig: Take 300 mg by mouth 3 (three) times a day Resume at next scheduled dose upon discharge from the hosptial   lisinopril (ZESTRIL) 20 mg tablet   No No   Sig: Take 1 tablet (20 mg total) by mouth daily   ondansetron (ZOFRAN-ODT) 4 mg disintegrating tablet   No No   Sig: Take 1 tablet (4 mg total) by mouth every 6 (six) hours as needed for nausea or vomiting   oxyCODONE (ROXICODONE) 5 mg immediate release tablet   No No   Sig: Take 1 tablet (5 mg total) by mouth every 6 (six) hours as needed for severe pain Max Daily Amount: 20 mg   pantoprazole (PROTONIX) 40 mg tablet   Yes No   Sig: Take 40 mg by mouth 2 (two) times a day Resume with evening dose on 7/9   sucralfate (CARAFATE) 1 g tablet   Yes No   Sig: Take 1 g by mouth 4 (four) times a day Resume at next scheduled dose upon discharge from the hospital   traZODone (DESYREL) 50 mg tablet   Yes No   Sig: Take 50 mg by mouth daily at bedtime      Facility-Administered Medications: None       Past Medical History:   Diagnosis Date    Arthritis     Asthma     Back pain     DVT (deep [facility] thrombosis) ([facility] [address]     Gastric bypass status for obesity     GERD (gastroesophageal reflux disease)     Hiatal hernia     History of transfusion 2006    after gastric bypass surgery, no reactions    Hypertension     Irritable bowel syndrome     Kidney stone     Muscle weakness     bilateral legs    Numbness and tingling     bilateral feet    Pneumonia     Spinal stenosis     Tinnitus     occassionally    Wears glasses        Past Surgical History:   Procedure Laterality Date    APPENDECTOMY      BARIATRIC SURGERY      CARPAL TUNNEL RELEASE Right      SECTION      x1    CHOLECYSTECTOMY      COLONOSCOPY      COLONOSCOPY W/ BIOPSIES AND POLYPECTOMY      FLEXIBLE BRONCHOSCOPY W/ UPPER ENDOSCOPY      HERNIA REPAIR      inguinal hernia    [place] ARTHROCENTESIS ASPIR&/INJ SMALL JT/BURSA W/O US N/A 2018    Procedure: Coccygeal Injection & Ganglion Impar Block;  Surgeon: Jameson Anand MD;  Location: [facility] MAIN OR;  Service: Pain Management     [place] ARTHRODESIS POSTERIOR INTERBODY LUMBAR N/A 2018    Procedure: L4-5 DECOMPRESSION INTERBODY INSTRUMENTED FUSION;  Surgeon: Viry Phoenix MD;  Location: [facility] Main OR;  Service: Orthopedics    LA ESOPHAGOGASTRODUODENOSCOPY TRANSORAL DIAGNOSTIC N/A 2018    Procedure: ESOPHAGOGASTRODUODENOSCOPY (EGD); Surgeon: Daisha Lockett MD;  Location: [facility] GI LAB;   Service: Gastroenterology   Alvie Edu JOINT Right 2018    Procedure: Rt Sacroiliac Joint Injection;  Surgeon: Jameson Anand MD;  Location: [facility] MAIN OR;  Service: Pain Management     TONSILECTOMY, ADENOIDECTOMY, BILATERAL MYRINGOTOMY AND TUBES         Family History   Problem Relation Age of Onset    Diabetes Mother     Aortic aneurysm Father     Cancer Father         prostate    Heart disease Sister         open heart    Cancer Sister         breast    Diabetes Brother     No Known Problems Daughter     Asperger's syndrome Son     Diabetes Maternal Grandfather     No Known Problems Brother      I have reviewed and agree with the history as documented  Social History   Substance Use Topics    Smoking status: Never Smoker    Smokeless tobacco: Never Used    Alcohol use 4 2 oz/week     5 Cans of beer, 2 Shots of liquor per week        Review of Systems   Constitutional: Negative for chills and fever  HENT: Negative for facial swelling and trouble swallowing  Respiratory: Negative for chest tightness and shortness of breath  Cardiovascular: Negative for chest pain and leg swelling  Gastrointestinal: Positive for abdominal distention and nausea  Negative for vomiting  Genitourinary: Negative for dysuria and flank pain  Musculoskeletal: Negative for back pain and neck pain  Skin: Negative  Neurological: Negative for weakness and numbness  Hematological: Negative  Psychiatric/Behavioral: Negative  Physical Exam  Physical Exam   Constitutional: She is oriented to person, place, and time  She appears well-nourished  No distress  HENT:   Head: Atraumatic  Neck: Normal range of motion  Cardiovascular: Normal rate and regular rhythm  Pulmonary/Chest: Effort normal and breath sounds normal    Abdominal: Soft  Normal appearance and bowel sounds are normal  There is tenderness in the right upper quadrant, epigastric area and left upper quadrant  There is no rigidity, no rebound, no guarding and no CVA tenderness  Musculoskeletal: Normal range of motion  Neurological: She is alert and oriented to person, place, and time  Skin: Skin is warm and dry     Psychiatric: She has a normal mood and affect  Nursing note and vitals reviewed  Vital Signs  ED Triage Vitals   Temperature Pulse Respirations Blood Pressure SpO2   07/23/18 2044 07/23/18 2044 07/23/18 2044 07/23/18 2044 07/23/18 2044   98 9 °F (37 2 °C) 77 18 150/100 96 %      Temp Source Heart Rate Source Patient Position - Orthostatic VS BP Location FiO2 (%)   07/23/18 2044 07/23/18 2044 07/23/18 2319 07/23/18 2319 --   Tympanic Monitor Lying Left arm       Pain Score       07/23/18 2319       7           Vitals:    07/23/18 2044 07/23/18 2319 07/23/18 2330 07/24/18 0102   BP: 150/100 167/78 167/78 167/88   Pulse: 77 78 78 76   Patient Position - Orthostatic VS:  Lying  Lying       Visual Acuity      ED Medications  Medications   sodium chloride 0 9 % bolus 1,000 mL (0 mL Intravenous Stopped 7/24/18 0100)   iohexol (OMNIPAQUE) 240 MG/ML solution 50 mL (50 mL Oral Given 7/23/18 2219)   iohexol (OMNIPAQUE) 350 MG/ML injection (MULTI-DOSE) 100 mL (100 mL Intravenous Given 7/24/18 0001)       Diagnostic Studies  Results Reviewed     Procedure Component Value Units Date/Time    Comprehensive metabolic panel [[id number]]  (Abnormal) Collected:  07/23/18 2111    Lab Status:  Final result Specimen:  Blood from Arm, Left Updated:  07/23/18 2149     Sodium 138 mmol/L      Potassium 4 4 mmol/L      Chloride 105 mmol/L      CO2 24 mmol/L      Anion Gap 9 mmol/L      BUN 12 mg/dL      Creatinine 0 89 mg/dL      Glucose 95 mg/dL      Calcium 8 8 mg/dL      AST 22 U/L      ALT 16 U/L      Alkaline Phosphatase 76 U/L      Total Protein 7 1 g/dL      Albumin 3 2 (L) g/dL      Total Bilirubin 0 40 mg/dL      eGFR 71 ml/min/1 73sq m     Narrative:         National Kidney Disease Education Program recommendations are as follows:  GFR calculation is accurate only with a steady state creatinine  Chronic Kidney disease less than 60 ml/min/1 73 sq  meters  Kidney failure less than 15 ml/min/1 73 sq  meters      Lipase [[id number]]  (Normal) Collected: 07/23/18 2111    Lab Status:  Final result Specimen:  Blood from Arm, Left Updated:  07/23/18 2149     Lipase 154 u/L     Protime-INR [[id number]]  (Normal) Collected:  07/23/18 2111    Lab Status:  Final result Specimen:  Blood from Arm, Left Updated:  07/23/18 2136     Protime 10 0 seconds      INR 0 95    APTT [[id number]]  (Normal) Collected:  07/23/18 2111    Lab Status:  Final result Specimen:  Blood from Arm, Left Updated:  07/23/18 2136     PTT 24 seconds     CBC and differential [[id number]]  (Abnormal) Collected:  07/23/18 2111    Lab Status:  Final result Specimen:  Blood from Arm, Left Updated:  07/23/18 2119     WBC 10 63 (H) Thousand/uL      RBC 3 50 (L) Million/uL      Hemoglobin 10 9 (L) g/dL      Hematocrit 34 6 (L) %      MCV 99 (H) fL      MCH 31 1 pg      MCHC 31 5 g/dL      RDW 13 2 %      MPV 8 7 (L) fL      Platelets 564 Thousands/uL      nRBC 0 /100 WBCs      Neutrophils Relative 62 %      Immat GRANS % 0 %      Lymphocytes Relative 27 %      Monocytes Relative 6 %      Eosinophils Relative 4 %      Basophils Relative 1 %      Neutrophils Absolute 6 64 Thousands/µL      Immature Grans Absolute 0 02 Thousand/uL      Lymphocytes Absolute 2 87 Thousands/µL      Monocytes Absolute 0 59 Thousand/µL      Eosinophils Absolute 0 46 Thousand/µL      Basophils Absolute 0 05 Thousands/µL                  CT abdomen pelvis with contrast   Final Result by Mazin Kinsey MD (07/24 0011)    IMPRESSION:        No acute inflammatory process identified within the abdomen or pelvis  Workstation performed: [id number]                    Procedures  Procedures       Phone Contacts  ED Phone Contact    ED Course                               MDM  Number of Diagnoses or Management Options  Diagnosis management comments: Patient's lab work and CT scan showed no acute abnormalities    I informed the patient about the findings and that she would definitely need to follow up with her GI doctor for further evaluation of this abdominal issues  Patient states understanding is in agreement the assessment plan  Patient was also encouraged that if any symptoms worsen or she is unable to eat because of vomiting or worsening the pain she return to the emergency room  Amount and/or Complexity of Data Reviewed  Clinical lab tests: ordered and reviewed  Tests in the radiology section of CPT®: ordered      CritCare Time    Disposition  Final diagnoses:   Abdominal pain   Abdominal bloating   Loss of appetite     Time reflects when diagnosis was documented in both MDM as applicable and the Disposition within this note     Time User Action Codes Description Comment    7/24/2018 12:50 AM Thomasena Polo Add [R10 9] Abdominal pain     7/24/2018 12:50 AM Thomasena Polo Add [R14 0] Abdominal bloating     7/24/2018 12:51 AM Thomasena Polo Add [R63 0] Loss of appetite       ED Disposition     ED Disposition Condition Comment    Discharge  Fariba GUZMAN Jose discharge to home/self care      Condition at discharge: Stable        Follow-up Information     Follow up With Specialties Details Why Dinorah Kaiser MD Gastroenterology Schedule an appointment as soon as possible for a visit in 1 day  [address] [place] [address]  [phone]            Discharge Medication List as of 7/24/2018 12:51 AM      CONTINUE these medications which have NOT CHANGED    Details   dicyclomine (BENTYL) 10 mg capsule Take 1 capsule (10 mg total) by mouth 4 (four) times a day (before meals and at bedtime), Starting Tue 7/10/2018, Print      gabapentin (NEURONTIN) 300 mg capsule Take 300 mg by mouth 3 (three) times a day Resume at next scheduled dose upon discharge from the hosptial, Historical Med      lisinopril (ZESTRIL) 20 mg tablet Take 1 tablet (20 mg total) by mouth daily, Starting Tue 7/10/2018, Print      ondansetron (ZOFRAN-ODT) 4 mg disintegrating tablet Take 1 tablet (4 mg total) by mouth every 6 (six) hours as needed for nausea or vomiting, Starting Tue 7/10/2018, Print      oxyCODONE (ROXICODONE) 5 mg immediate release tablet Take 1 tablet (5 mg total) by mouth every 6 (six) hours as needed for severe pain Max Daily Amount: 20 mg, Starting Tue 7/10/2018, Print      pantoprazole (PROTONIX) 40 mg tablet Take 40 mg by mouth 2 (two) times a day Resume with evening dose on 7/9, Historical Med      sucralfate (CARAFATE) 1 g tablet Take 1 g by mouth 4 (four) times a day Resume at next scheduled dose upon discharge from the hospital, Historical Med      traZODone (DESYREL) 50 mg tablet Take 50 mg by mouth daily at bedtime, Historical Med           No discharge procedures on file      ED Provider  Electronically Signed by           Jalil Ovalle MD  07/24/18 8193       Jalil Ovalle MD  07/24/18 5501

## 2018-07-24 NOTE — DISCHARGE INSTRUCTIONS
Abdominal Pain   WHAT YOU NEED TO KNOW:   Abdominal pain can be dull, achy, or sharp  You may have pain in one area of your abdomen, or in your entire abdomen  Your pain may be caused by a condition such as constipation, food sensitivity or poisoning, infection, or a blockage  Abdominal pain can also be from a hernia, appendicitis, or an ulcer  Liver, gallbladder, or kidney conditions can also cause abdominal pain  The cause of your abdominal pain may be unknown  DISCHARGE INSTRUCTIONS:   Return to the emergency department if:   · You have new chest pain or shortness of breath  · You have pulsing pain in your upper abdomen or lower back that suddenly becomes constant  · Your pain is in the right lower abdominal area and worsens with movement  · You have a fever over 100 4°F (38°C) or shaking chills  · You are vomiting and cannot keep food or liquids down  · Your pain does not improve or gets worse over the next 8 to 12 hours  · You see blood in your vomit or bowel movements, or they look black and tarry  · Your skin or the whites of your eyes turn yellow  · You are a woman and have a large amount of vaginal bleeding that is not your monthly period  Contact your healthcare provider if:   · You have pain in your lower back  · You are a man and have pain in your testicles  · You have pain when you urinate  · You have questions or concerns about your condition or care  Follow up with your healthcare provider within 24 hours or as directed:  Write down your questions so you remember to ask them during your visits  Medicines:   · Medicines  may be given to calm your stomach and prevent vomiting or to decrease pain  Ask how to take pain medicine safely  · Take your medicine as directed  Contact your healthcare provider if you think your medicine is not helping or if you have side effects  Tell him of her if you are allergic to any medicine   Keep a list of the medicines, vitamins, and herbs you take  Include the amounts, and when and why you take them  Bring the list or the pill bottles to follow-up visits  Carry your medicine list with you in case of an emergency  © 2017 [address] [employer] Information is for End User's use only and may not be sold, redistributed or otherwise used for commercial purposes  All illustrations and images included in CareNotes® are the copyrighted property of A D A M [employer] Inc  or Baljinder Hagan  The above information is an  only  It is not intended as medical advice for individual conditions or treatments  Talk to your doctor, nurse or pharmacist before following any medical regimen to see if it is safe and effective for you

## 2018-07-26 DIAGNOSIS — K58.9 IRRITABLE BOWEL SYNDROME, UNSPECIFIED TYPE: ICD-10-CM

## 2018-07-26 RX ORDER — DICYCLOMINE HYDROCHLORIDE 10 MG/1
10 CAPSULE ORAL
Qty: 120 CAPSULE | Refills: 0 | Status: SHIPPED | OUTPATIENT
Start: 2018-07-26 | End: 2019-05-28

## 2018-08-01 ENCOUNTER — OFFICE VISIT (OUTPATIENT)
Dept: GASTROENTEROLOGY | Facility: CLINIC | Age: 63
End: 2018-08-01
Payer: COMMERCIAL

## 2018-08-01 VITALS
TEMPERATURE: 98.8 F | HEART RATE: 90 BPM | DIASTOLIC BLOOD PRESSURE: 82 MMHG | BODY MASS INDEX: 38.99 KG/M2 | WEIGHT: 234 LBS | SYSTOLIC BLOOD PRESSURE: 118 MMHG | HEIGHT: 65 IN

## 2018-08-01 DIAGNOSIS — Z86.010 HISTORY OF COLON POLYPS: ICD-10-CM

## 2018-08-01 DIAGNOSIS — R19.7 DIARRHEA, UNSPECIFIED TYPE: Primary | ICD-10-CM

## 2018-08-01 DIAGNOSIS — K63.89 SMALL INTESTINAL BACTERIAL OVERGROWTH: ICD-10-CM

## 2018-08-01 DIAGNOSIS — R19.8 IRREGULAR BOWEL HABITS: ICD-10-CM

## 2018-08-01 DIAGNOSIS — R10.11 RIGHT UPPER QUADRANT ABDOMINAL PAIN: ICD-10-CM

## 2018-08-01 PROBLEM — Z86.0100 HISTORY OF COLON POLYPS: Status: ACTIVE | Noted: 2018-08-01

## 2018-08-01 PROBLEM — K63.8219 SMALL INTESTINAL BACTERIAL OVERGROWTH: Status: ACTIVE | Noted: 2018-08-01

## 2018-08-01 PROCEDURE — 99214 OFFICE O/P EST MOD 30 MIN: CPT | Performed by: INTERNAL MEDICINE

## 2018-08-01 RX ORDER — OXYCODONE HYDROCHLORIDE 5 MG/1
CAPSULE ORAL EVERY 12 HOURS
COMMUNITY
End: 2018-08-21

## 2018-08-01 RX ORDER — GABAPENTIN 300 MG/1
300 CAPSULE ORAL 3 TIMES DAILY
COMMUNITY
End: 2018-08-21

## 2018-08-01 RX ORDER — METHOCARBAMOL 750 MG/1
TABLET, FILM COATED ORAL EVERY 8 HOURS
COMMUNITY
End: 2018-12-20

## 2018-08-01 RX ORDER — DICYCLOMINE HYDROCHLORIDE 10 MG/1
CAPSULE ORAL
COMMUNITY
End: 2018-08-21

## 2018-08-01 RX ORDER — LISINOPRIL 20 MG/1
TABLET ORAL
COMMUNITY
End: 2018-08-21

## 2018-08-01 NOTE — PROGRESS NOTES
[facility]  Ivis Ventura 63 y o  female MRN: [id number]            Assessment & Plan:    Christal 63-year-old female with multiple abdominal surgeries, had a total gastrectomy, has an esophagojejunostomy, with chronic right upper quadrant pain and loose stools  1   Chronic right upper quadrant abdominal pain and loose stools:  Differential is broad, suspect underlying adhesive disease, small intestinal bacterial overgrowth is also in the differential   She had an upper endoscopy to her hospital stay  GI pathology in the colon is also possibility though her recent CT scan was negative  -continue with probiotics  -we will prescribe her rifaximin for small intestinal bacterial overgrowth  -possibly can consider amitriptyline for chronic pain however she is already on Neurontin, if not improved after rifaximin will start her on amitriptyline  -we will proceed with colonoscopy to exclude underlying inflammatory bowel disease and malignancy given her personal family history  -I discussed with her the risks of the procedures including bleeding, surgery, perforation, missed polyp detection rate        _____________________________________________________________        CC:   loose stools Abdominal pain    HPI:  Ivis Ventura is a 63 y  o female who is here for abdominal pain and loose stools  As you know this is a pleasant 63-year-old female, known to us from recent hospital stay, she presented with significant right upper quadrant abdominal pain, has an extensive history of multiple abdominal surgeries ultimately had a complete gastric occurring and has an esophagojejunostomy  Since her surgery she has had chronic right upper quadrant abdominal pain, intermittent loose stools  She recently had back surgery and postoperatively had worsening right upper quadrant abdominal pain for which she had called our office    About a week ago she presented to the emergency room where she had normal laboratory studies and a normal CT scan  Her weight has been stable  She continues have intermittent loose stools, denies any melena or rectal bleeding  She has a fair appetite, occasionally due to the concern for pain has decreased appetite  She has been trying to take Ensure and protein supplementations  She has tried lactose enzyme supplements  She also notes that her stools are long, unformed loose at times  She has a history of colon polyps in the past and her last colonoscopy was many years ago  Recently started taking probiotics notably VSL 3  While at rehab that placed on dicyclomine as well  ROS:  The remainder of the ROS was negative except for the pertinent positives mentioned in HPI  Allergies: Patient has no known allergies      Medications:   Current Outpatient Prescriptions:     dicyclomine (BENTYL) 10 mg capsule, Take 1 capsule (10 mg total) by mouth 4 (four) times a day (before meals and at bedtime), Disp: 120 capsule, Rfl: 0    gabapentin (NEURONTIN) 300 mg capsule, Take 300 mg by mouth 3 (three) times a day Resume at next scheduled dose upon discharge from the hosptial, Disp: , Rfl:     lisinopril (ZESTRIL) 20 mg tablet, Take 1 tablet (20 mg total) by mouth daily, Disp: 30 tablet, Rfl: 0    methocarbamol (ROBAXIN) 750 mg tablet, every 8 (eight) hours, Disp: , Rfl:     ondansetron (ZOFRAN-ODT) 4 mg disintegrating tablet, Take 1 tablet (4 mg total) by mouth every 6 (six) hours as needed for nausea or vomiting, Disp: 20 tablet, Rfl: 0    oxyCODONE (ROXICODONE) 5 mg immediate release tablet, Take 1 tablet (5 mg total) by mouth every 6 (six) hours as needed for severe pain Max Daily Amount: 20 mg, Disp: 20 tablet, Rfl: 0    pantoprazole (PROTONIX) 40 mg tablet, Take 40 mg by mouth 2 (two) times a day Resume with evening dose on 7/9, Disp: , Rfl:     sucralfate (CARAFATE) 1 g tablet, Take 1 g by mouth 4 (four) times a day Resume at next scheduled dose upon discharge from the hospital, Disp: , Rfl:     traZODone (DESYREL) 50 mg tablet, Take 50 mg by mouth daily at bedtime, Disp: , Rfl:     dicyclomine (BENTYL) 10 mg capsule, dicyclomine 10 mg capsule, Disp: , Rfl:     gabapentin (NEURONTIN) 300 mg capsule, 3 (three) times a day, Disp: , Rfl:     lisinopril (ZESTRIL) 20 mg tablet, lisinopril 20 mg tablet, Disp: , Rfl:     Na Sulfate-K Sulfate-Mg Sulf (SUPREP BOWEL PREP KIT) 17 5-3 13-1 6 GM/180ML SOLN, Take 1 Bottle by mouth once for 1 dose, Disp: 1 Bottle, Rfl: 0    oxyCODONE (OXY-IR) 5 MG capsule, Every 12 hours, Disp: , Rfl:     rifaximin (XIFAXAN) 550 mg tablet, Take 1 tablet (550 mg total) by mouth every 8 (eight) hours for 14 days, Disp: 42 tablet, Rfl: 0    Past Medical History:   Diagnosis Date    Arthritis     Asthma     Back pain     [facility]     Gastric bypass status for obesity     GERD (gastroesophageal reflux disease)     Hiatal hernia     History of transfusion 2006    after gastric bypass surgery, no reactions    Hypertension     Irritable bowel syndrome     Kidney stone     Muscle weakness     bilateral legs    Numbness and tingling     bilateral feet    Pneumonia     Spinal stenosis     Tinnitus     occassionally    Wears glasses        Past Surgical History:   Procedure Laterality Date    APPENDECTOMY      BARIATRIC SURGERY      CARPAL TUNNEL RELEASE Right      SECTION      x1    CHOLECYSTECTOMY      COLONOSCOPY      COLONOSCOPY W/ BIOPSIES AND POLYPECTOMY      FLEXIBLE BRONCHOSCOPY W/ UPPER ENDOSCOPY      HERNIA REPAIR      inguinal hernia    [place] ARTHROCENTESIS ASPIR&/INJ SMALL JT/BURSA W/O US N/A 2018    Procedure: Coccygeal Injection & Ganglion Impar Block;  Surgeon: Patito Manjarrez MD;  Location: [facility] MAIN OR;  Service: Pain Management     [place] ARTHRODESIS POSTERIOR INTERBODY LUMBAR N/A 2018    Procedure: L4-5 DECOMPRESSION INTERBODY INSTRUMENTED FUSION;  Surgeon: Geoffery Nissen, MD;  Location: [facility] Main OR;  Service: Orthopedics    [place] ESOPHAGOGASTRODUODENOSCOPY TRANSORAL DIAGNOSTIC N/A 2/19/2018    Procedure: ESOPHAGOGASTRODUODENOSCOPY (EGD); Surgeon: Alexei Pop MD;  Location: [facility] GI LAB; Service: Gastroenterology   Stephanie Renner JOINT Right 5/25/2018    Procedure: Rt Sacroiliac Joint Injection;  Surgeon: Mathew Thornton MD;  Location: [facility] MAIN OR;  Service: Pain Management     TONSILECTOMY, ADENOIDECTOMY, BILATERAL MYRINGOTOMY AND TUBES         Family History   Problem Relation Age of Onset    Diabetes Mother     Aortic aneurysm Father     Cancer Father         prostate    Heart disease Sister         open heart    Cancer Sister         breast    Diabetes Brother     No Known Problems Daughter     Asperger's syndrome Son     Diabetes Maternal Grandfather     No Known Problems Brother         reports that she has never smoked  She has never used smokeless tobacco  She reports that she drinks about 4 2 oz of alcohol per week   She reports that she does not use drugs        Physical Exam:    /82 (BP Location: Right arm, Patient Position: Sitting, Cuff Size: Standard)   Pulse 90   Temp 98 8 °F (37 1 °C) (Tympanic)   Ht 5' 5" (1 651 m)   Wt 106 kg (234 lb)   BMI 38 94 kg/m²     Gen: wn/wd, NAD, overweight, ambulating with a cane  HEENT: anicteric, MMM, no cervical LAD  CVS: RRR, no m/r/g  CHEST: CTA b/l  ABD: +BS, soft, right upper quadrant tenderness, no rebound, no guarding, no masses, no hepatosplenomegaly  EXT: no c/c/e  NEURO: aaox3  SKIN: NO rashes, several tattoos

## 2018-08-01 NOTE — LETTER
August 1, 2018     Lisset Thorne MD  [facility] [address]    Patient: Macie Black   YOB: 1955   Date of Visit: 8/1/2018       Dear Dr Jg Marcum:    Thank you for referring Macie Black to me for evaluation  Below are my notes for this consultation  If you have questions, please do not hesitate to call me  I look forward to following your patient along with you  Sincerely,        Flores Kaiser MD        CC: No Recipients  Flores Kaiser MD  8/1/2018 12:38 PM  Sign at close encounter  [address] [facility]  Macie Black 63 y o  female MRN: [id number]            Assessment & Plan:    Pleasant 21-year-old female with multiple abdominal surgeries, had a total gastrectomy, has an esophagojejunostomy, with chronic right upper quadrant pain and loose stools  1   Chronic right upper quadrant abdominal pain and loose stools:  Differential is broad, suspect underlying adhesive disease, small intestinal bacterial overgrowth is also in the differential   She had an upper endoscopy to her hospital stay  GI pathology in the colon is also possibility though her recent CT scan was negative  -continue with probiotics  -we will prescribe her rifaximin for small intestinal bacterial overgrowth  -possibly can consider amitriptyline for chronic pain however she is already on Neurontin, if not improved after rifaximin will start her on amitriptyline  -we will proceed with colonoscopy to exclude underlying inflammatory bowel disease and malignancy given her personal family history  -I discussed with her the risks of the procedures including bleeding, surgery, perforation, missed polyp detection rate        _____________________________________________________________        CC:   loose stools Abdominal pain    HPI:  Macie Black is a 63 y  o female who is here for abdominal pain and loose stools    As you know this is a pleasant 21-year-old female, known to us from recent hospital stay, she presented with significant right upper quadrant abdominal pain, has an extensive history of multiple abdominal surgeries ultimately had a complete gastric occurring and has an esophagojejunostomy  Since her surgery she has had chronic right upper quadrant abdominal pain, intermittent loose stools  She recently had back surgery and postoperatively had worsening right upper quadrant abdominal pain for which she had called our office  About a week ago she presented to the emergency room where she had normal laboratory studies and a normal CT scan  Her weight has been stable  She continues have intermittent loose stools, denies any melena or rectal bleeding  She has a fair appetite, occasionally due to the concern for pain has decreased appetite  She has been trying to take Ensure and protein supplementations  She has tried lactose enzyme supplements  She also notes that her stools are long, unformed loose at times  She has a history of colon polyps in the past and her last colonoscopy was many years ago  Recently started taking probiotics notably VSL 3  While at rehab that placed on dicyclomine as well  ROS:  The remainder of the ROS was negative except for the pertinent positives mentioned in HPI  Allergies: Patient has no known allergies      Medications:   Current Outpatient Prescriptions:     dicyclomine (BENTYL) 10 mg capsule, Take 1 capsule (10 mg total) by mouth 4 (four) times a day (before meals and at bedtime), Disp: 120 capsule, Rfl: 0    gabapentin (NEURONTIN) 300 mg capsule, Take 300 mg by mouth 3 (three) times a day Resume at next scheduled dose upon discharge from the hosptial, Disp: , Rfl:     lisinopril (ZESTRIL) 20 mg tablet, Take 1 tablet (20 mg total) by mouth daily, Disp: 30 tablet, Rfl: 0    methocarbamol (ROBAXIN) 750 mg tablet, every 8 (eight) hours, Disp: , Rfl:     ondansetron (ZOFRAN-ODT) 4 mg disintegrating tablet, Take 1 tablet (4 mg total) by mouth every 6 (six) hours as needed for nausea or vomiting, Disp: 20 tablet, Rfl: 0    oxyCODONE (ROXICODONE) 5 mg immediate release tablet, Take 1 tablet (5 mg total) by mouth every 6 (six) hours as needed for severe pain Max Daily Amount: 20 mg, Disp: 20 tablet, Rfl: 0    pantoprazole (PROTONIX) 40 mg tablet, Take 40 mg by mouth 2 (two) times a day Resume with evening dose on 7/9, Disp: , Rfl:     sucralfate (CARAFATE) 1 g tablet, Take 1 g by mouth 4 (four) times a day Resume at next scheduled dose upon discharge from the hospital, Disp: , Rfl:     traZODone (DESYREL) 50 mg tablet, Take 50 mg by mouth daily at bedtime, Disp: , Rfl:     dicyclomine (BENTYL) 10 mg capsule, dicyclomine 10 mg capsule, Disp: , Rfl:     gabapentin (NEURONTIN) 300 mg capsule, 3 (three) times a day, Disp: , Rfl:     lisinopril (ZESTRIL) 20 mg tablet, lisinopril 20 mg tablet, Disp: , Rfl:     Na Sulfate-K Sulfate-Mg Sulf (SUPREP BOWEL PREP KIT) 17 5-3 13-1 6 GM/180ML SOLN, Take 1 Bottle by mouth once for 1 dose, Disp: 1 Bottle, Rfl: 0    oxyCODONE (OXY-IR) 5 MG capsule, Every 12 hours, Disp: , Rfl:     rifaximin (XIFAXAN) 550 mg tablet, Take 1 tablet (550 mg total) by mouth every 8 (eight) hours for 14 days, Disp: 42 tablet, Rfl: 0    Past Medical History:   Diagnosis Date    Arthritis     Asthma     Back pain     [facility]    Gastric bypass status for obesity     GERD (gastroesophageal reflux disease)     Hiatal hernia     History of transfusion 2006    after gastric bypass surgery, no reactions    Hypertension     Irritable bowel syndrome     Kidney stone     Muscle weakness     bilateral legs    Numbness and tingling     bilateral feet    Pneumonia     Spinal stenosis     Tinnitus     occassionally    Wears glasses        Past Surgical History:   Procedure Laterality Date    APPENDECTOMY      BARIATRIC SURGERY      CARPAL TUNNEL RELEASE Right      SECTION      x1    CHOLECYSTECTOMY      COLONOSCOPY      COLONOSCOPY W/ BIOPSIES AND POLYPECTOMY      FLEXIBLE BRONCHOSCOPY W/ UPPER ENDOSCOPY      HERNIA REPAIR      inguinal hernia    [place] ARTHROCENTESIS ASPIR&/INJ SMALL JT/BURSA W/O US N/A 2018    Procedure: Coccygeal Injection & Ganglion Impar Block;  Surgeon: Charisse Rolle MD;  Location: [place] [id number] MAIN OR;  Service: Pain Management     [place] ARTHRODESIS POSTERIOR INTERBODY LUMBAR N/A 2018    Procedure: L4-5 DECOMPRESSION INTERBODY INSTRUMENTED FUSION;  Surgeon: Meghan Shrestha MD;  Location: [facility];  Service: Orthopedics    [place] ESOPHAGOGASTRODUODENOSCOPY TRANSORAL DIAGNOSTIC N/A 2018    Procedure: ESOPHAGOGASTRODUODENOSCOPY (EGD); Surgeon: Greg Mukherjee MD;  Location: [facility] GI LAB; Service: Gastroenterology   Barby Yeison JOINT Right 2018    Procedure: Rt Sacroiliac Joint Injection;  Surgeon: Charisse Rolle MD;  Location: [facility] MAIN OR;  Service: Pain Management     TONSILECTOMY, ADENOIDECTOMY, BILATERAL MYRINGOTOMY AND TUBES         Family History   Problem Relation Age of Onset    Diabetes Mother     Aortic aneurysm Father     Cancer Father         prostate    Heart disease Sister         open heart    Cancer Sister         breast    Diabetes Brother     No Known Problems Daughter     Asperger's syndrome Son     Diabetes Maternal Grandfather     No Known Problems Brother         reports that she has never smoked  She has never used smokeless tobacco  She reports that she drinks about 4 2 oz of alcohol per week   She reports that she does not use drugs        Physical Exam:    /82 (BP Location: Right arm, Patient Position: Sitting, Cuff Size: Standard)   Pulse 90   Temp 98 8 °F (37 1 °C) (Tympanic)   Ht 5' 5" (1 651 m)   Wt 106 kg (234 lb)   BMI 38 94 kg/m²      Gen: wn/wd, NAD, overweight, ambulating with a cane  HEENT: anicteric, MMM, no cervical LAD  CVS: RRR, no m/r/g  CHEST: CTA b/l  ABD: +BS, soft, right upper quadrant tenderness, no rebound, no guarding, no masses, no hepatosplenomegaly  EXT: no c/c/e  NEURO: aaox3  SKIN: NO rashes, several tattoos

## 2018-08-21 NOTE — PRE-PROCEDURE INSTRUCTIONS
Pre-Surgery Instructions:   Medication Instructions    dicyclomine (BENTYL) 10 mg capsule Patient was instructed by Physician and understands   gabapentin (NEURONTIN) 300 mg capsule Patient was instructed by Physician and understands   lisinopril (ZESTRIL) 20 mg tablet Instructed patient per Anesthesia Guidelines   methocarbamol (ROBAXIN) 750 mg tablet Patient was instructed by Physician and understands   pantoprazole (PROTONIX) 40 mg tablet Patient was instructed by Physician and understands   sucralfate (CARAFATE) 1 g tablet Patient was instructed by Physician and understands   traZODone (DESYREL) 50 mg tablet Patient was instructed by Physician and understands

## 2018-08-23 ENCOUNTER — ANESTHESIA EVENT (OUTPATIENT)
Dept: GASTROENTEROLOGY | Facility: AMBULARY SURGERY CENTER | Age: 63
End: 2018-08-23
Payer: COMMERCIAL

## 2018-08-23 NOTE — ANESTHESIA PREPROCEDURE EVALUATION
S/P lumbar fusion   Irregular bowel habits   History of colon polyps   Small intestinal bacterial overgrowth           Review of Systems/Medical History  Patient summary reviewed  Chart reviewed      Cardiovascular  Hypertension , DVT   Pulmonary  Pneumonia, Asthma ,        GI/Hepatic    GERD ,  Hiatal hernia, Bowel prep  Comment: Gastric bypass status for obesity     Kidney stones,        Endo/Other    Obesity    GYN       Hematology   Musculoskeletal  Back pain ,   Arthritis     Neurology   Psychology     Chronic pain,            Physical Exam    Airway    Mallampati score: II  TM Distance: >3 FB  Neck ROM: full     Dental       Cardiovascular  Rhythm: regular, Rate: normal,     Pulmonary  Breath sounds clear to auscultation,     Other Findings        Anesthesia Plan  ASA Score- 3     Anesthesia Type- IV sedation with anesthesia with ASA Monitors  Additional Monitors:   Airway Plan:         Plan Factors-    Induction- intravenous  Postoperative Plan-     Informed Consent- Anesthetic plan and risks discussed with patient

## 2018-08-24 ENCOUNTER — HOSPITAL ENCOUNTER (OUTPATIENT)
Facility: AMBULARY SURGERY CENTER | Age: 63
Setting detail: OUTPATIENT SURGERY
Discharge: HOME | End: 2018-08-24
Attending: INTERNAL MEDICINE | Admitting: INTERNAL MEDICINE
Payer: COMMERCIAL

## 2018-08-24 ENCOUNTER — ANESTHESIA (OUTPATIENT)
Dept: GASTROENTEROLOGY | Facility: AMBULARY SURGERY CENTER | Age: 63
End: 2018-08-24
Payer: COMMERCIAL

## 2018-08-24 VITALS
HEART RATE: 75 BPM | OXYGEN SATURATION: 99 % | TEMPERATURE: 98.1 F | DIASTOLIC BLOOD PRESSURE: 60 MMHG | RESPIRATION RATE: 18 BRPM | SYSTOLIC BLOOD PRESSURE: 119 MMHG

## 2018-08-24 PROCEDURE — 45378 DIAGNOSTIC COLONOSCOPY: CPT | Performed by: INTERNAL MEDICINE

## 2018-08-24 RX ORDER — FENTANYL CITRATE/PF 50 MCG/ML
25 SYRINGE (ML) INJECTION
Status: ACTIVE | OUTPATIENT
Start: 2018-08-24 | End: 2018-08-24

## 2018-08-24 RX ORDER — SODIUM CHLORIDE, SODIUM LACTATE, POTASSIUM CHLORIDE, CALCIUM CHLORIDE 600; 310; 30; 20 MG/100ML; MG/100ML; MG/100ML; MG/100ML
75 INJECTION, SOLUTION INTRAVENOUS CONTINUOUS
Status: DISCONTINUED | OUTPATIENT
Start: 2018-08-24 | End: 2018-08-24 | Stop reason: HOSPADM

## 2018-08-24 RX ORDER — PROPOFOL 10 MG/ML
INJECTION, EMULSION INTRAVENOUS AS NEEDED
Status: DISCONTINUED | OUTPATIENT
Start: 2018-08-24 | End: 2018-08-24 | Stop reason: SURG

## 2018-08-24 RX ORDER — PROPOFOL 10 MG/ML
INJECTION, EMULSION INTRAVENOUS CONTINUOUS PRN
Status: DISCONTINUED | OUTPATIENT
Start: 2018-08-24 | End: 2018-08-24 | Stop reason: SURG

## 2018-08-24 RX ORDER — ONDANSETRON 2 MG/ML
4 INJECTION INTRAMUSCULAR; INTRAVENOUS ONCE AS NEEDED
Status: DISCONTINUED | OUTPATIENT
Start: 2018-08-24 | End: 2018-08-24 | Stop reason: HOSPADM

## 2018-08-24 RX ADMIN — PROPOFOL 100 MG: 10 INJECTION, EMULSION INTRAVENOUS at 09:19

## 2018-08-24 RX ADMIN — PROPOFOL 100 MCG/KG/MIN: 10 INJECTION, EMULSION INTRAVENOUS at 09:20

## 2018-08-24 RX ADMIN — PROPOFOL 30 MG: 10 INJECTION, EMULSION INTRAVENOUS at 09:25

## 2018-08-24 RX ADMIN — SODIUM CHLORIDE, SODIUM LACTATE, POTASSIUM CHLORIDE, AND CALCIUM CHLORIDE 75 ML/HR: .6; .31; .03; .02 INJECTION, SOLUTION INTRAVENOUS at 08:18

## 2018-08-24 NOTE — H&P
History and Physical - [facility]  Jackson Stewart 66 y o  female MRN: [id number]    HPI: Jackson Stewart is a 66y o  year old female who presents with hx of RUQ pain and change in bowel movements  Review of Systems    Historical Information   Past Medical History:   Diagnosis Date    Arthritis     Asthma     Back pain     D[facility]) ([facility] [address] [facility]     Gastric bypass status for obesity     GERD (gastroesophageal reflux disease)     Hiatal hernia     History of transfusion 2006    after gastric bypass surgery, no reactions    Hypertension     Irritable bowel syndrome     Kidney stone     Muscle weakness     bilateral legs    Numbness and tingling     bilateral feet    Pneumonia     Spinal stenosis     Tinnitus     occassionally    Wears glasses      Past Surgical History:   Procedure Laterality Date    APPENDECTOMY      BARIATRIC SURGERY      CARPAL TUNNEL RELEASE Right      SECTION      x1    CHOLECYSTECTOMY      COLONOSCOPY      COLONOSCOPY W/ BIOPSIES AND POLYPECTOMY      FLEXIBLE BRONCHOSCOPY W/ UPPER ENDOSCOPY      HERNIA REPAIR      inguinal hernia    [place] ARTHROCENTESIS ASPIR&/INJ SMALL JT/BURSA W/O US N/A 2018    Procedure: Coccygeal Injection & Ganglion Impar Block;  Surgeon: Kia Singletary MD;  Location: [facility] MAIN OR;  Service: Pain Management     [place] ARTHRODESIS POSTERIOR INTERBODY LUMBAR N/A 2018    Procedure: L4-5 DECOMPRESSION INTERBODY INSTRUMENTED FUSION;  Surgeon: Gil Regan MD;  Location: [facility];  Service: Orthopedics    ND ESOPHAGOGASTRODUODENOSCOPY TRANSORAL DIAGNOSTIC N/A 2018    Procedure: ESOPHAGOGASTRODUODENOSCOPY (EGD); Surgeon: Ottoniel Rico MD;  Location: [facility] GI LAB;   Service: Gastroenterology   Jamse Fusi JOINT Right 2018    Procedure: Rt Sacroiliac Joint Injection;  Surgeon: Kia Singletary MD;  Location: [facility] MAIN OR;  Service: Pain Management     TONSILECTOMY, ADENOIDECTOMY, BILATERAL MYRINGOTOMY AND TUBES       Social History   History   Alcohol Use    4 2 oz/week    5 Cans of beer, 2 Shots of liquor per week     History   Drug Use No     History   Smoking Status    Never Smoker   Smokeless Tobacco    Never Used     Family History   Problem Relation Age of Onset    Diabetes Mother     Aortic aneurysm Father     Cancer Father         prostate    Heart disease Sister         open heart    Cancer Sister         breast    Diabetes Brother     No Known Problems Daughter     Asperger's syndrome Son     Diabetes Maternal Grandfather     No Known Problems Brother        Meds/Allergies     Prescriptions Prior to Admission   Medication    dicyclomine (BENTYL) 10 mg capsule    gabapentin (NEURONTIN) 300 mg capsule    lisinopril (ZESTRIL) 20 mg tablet    methocarbamol (ROBAXIN) 750 mg tablet    pantoprazole (PROTONIX) 40 mg tablet    sucralfate (CARAFATE) 1 g tablet    traZODone (DESYREL) 50 mg tablet    Na Sulfate-K Sulfate-Mg Sulf (SUPREP BOWEL PREP KIT) 17 5-3 13-1 6 GM/180ML SOLN       No Known Allergies    Objective     There were no vitals taken for this visit  PHYSICAL EXAM    Gen: NAD  CV: RRR  CHEST: Clear  ABD: soft, NT/ND  EXT: no edema  Neuro: AAO      ASSESSMENT/PLAN:  This is a 66y o  year old female here for hx of RUQ pain and change in bowel movements       PLAN:   Procedure: colonoscopy

## 2018-08-24 NOTE — DISCHARGE INSTRUCTIONS
Discharge home  Resume regular the Nissen continue current medications  Repeat colonoscopy at a future date within 6 months with 2 day bowel prep

## 2018-08-24 NOTE — OP NOTE
Colonoscopy Procedure Note    Procedure: Colonoscopy    Sedation: Monitored anesthesia care, check anesthesia records      ASA Class: 3    INDICATIONS:  Right upper quadrant abdominal pain change in bowel movements    POST-OP DIAGNOSIS: See the impression below    Procedure Details     Prior colonoscopy: More than 10 years ago  Informed consent was obtained for the procedure, including sedation  Risks of perforation, hemorrhage, adverse drug reaction and aspiration were discussed  The patient was placed in the left lateral decubitus position  Based on the pre-procedure assessment, including review of the patient's medical history, medications, allergies, and review of systems, she had been deemed to be an appropriate candidate for conscious sedation; she was therefore sedated with the medications listed below  The patient was monitored continuously with telemetry, pulse oximetry, blood pressure monitoring, and direct observations  A rectal examination was performed  The colonoscope was inserted into the rectum and advanced under direct vision to the transverse colon  The quality of the colonic preparation was inadequate  A careful inspection was made as the colonoscope was withdrawn, including a retroflexed view of the rectum; findings and interventions are described below  Findings:    Poor prep throughout the colon with significant amount of solid stool debris, unable to advance scope beyond the transverse colon due to poor prep  Procedure was aborted due to poor prep  Complications: None; patient tolerated the procedure well  Impression:    Scope advanced to transverse colon, procedure aborted due to poor prep    Recommendations:  Repeat colonoscopy in 6 months or sooner if clinically indicated  Repeat colonoscopy is being recommended at an interval of less than 10 years, this is because of an inadequate bowel preparation      Discharge home  Resume regular the Nissen continue current medications  Repeat colonoscopy at a future date within 6 months with 2 day bowel prep

## 2018-09-04 ENCOUNTER — TELEPHONE (OUTPATIENT)
Dept: GASTROENTEROLOGY | Facility: CLINIC | Age: 63
End: 2018-09-04

## 2018-09-04 NOTE — TELEPHONE ENCOUNTER
----- Message from Radha Marroquin sent at 8/24/2018  9:39 AM EDT -----  Please schedule      ----- Message -----  From: Alfonso Martinez MD  Sent: 8/24/2018   9:32 AM  To: [facility] [place] [facility]    Pt needs repeat colonoscpy with 2 day bowel prep pelase

## 2018-12-20 ENCOUNTER — HOSPITAL ENCOUNTER (EMERGENCY)
Facility: HOSPITAL | Age: 63
Discharge: HOME | End: 2018-12-20
Attending: EMERGENCY MEDICINE | Admitting: EMERGENCY MEDICINE
Payer: OTHER MISCELLANEOUS

## 2018-12-20 ENCOUNTER — APPOINTMENT (EMERGENCY)
Dept: RADIOLOGY | Facility: HOSPITAL | Age: 63
End: 2018-12-20
Payer: OTHER MISCELLANEOUS

## 2018-12-20 VITALS
RESPIRATION RATE: 18 BRPM | WEIGHT: 227 LBS | BODY MASS INDEX: 37.77 KG/M2 | TEMPERATURE: 98.7 F | OXYGEN SATURATION: 98 % | DIASTOLIC BLOOD PRESSURE: 70 MMHG | SYSTOLIC BLOOD PRESSURE: 155 MMHG | HEART RATE: 104 BPM

## 2018-12-20 DIAGNOSIS — M54.9 BACK PAIN: Primary | ICD-10-CM

## 2018-12-20 PROCEDURE — 72100 X-RAY EXAM L-S SPINE 2/3 VWS: CPT

## 2018-12-20 PROCEDURE — 99283 EMERGENCY DEPT VISIT LOW MDM: CPT

## 2018-12-20 RX ORDER — CHLORAL HYDRATE 500 MG
1000 CAPSULE ORAL DAILY
COMMUNITY
End: 2019-02-06 | Stop reason: ALTCHOICE

## 2018-12-20 RX ORDER — UREA 10 %
1 LOTION (ML) TOPICAL DAILY
COMMUNITY

## 2018-12-20 RX ORDER — MULTIVITAMIN WITH IRON
50 TABLET ORAL DAILY
COMMUNITY

## 2018-12-20 RX ORDER — OMEGA-3S/DHA/EPA/FISH OIL/D3 300MG-1000
400 CAPSULE ORAL DAILY
COMMUNITY

## 2018-12-20 RX ORDER — NAPROXEN 500 MG/1
500 TABLET ORAL 2 TIMES DAILY WITH MEALS
Qty: 14 TABLET | Refills: 0 | Status: SHIPPED | OUTPATIENT
Start: 2018-12-20 | End: 2020-12-04 | Stop reason: SDUPTHER

## 2018-12-20 NOTE — ED PROVIDER NOTES
History  Chief Complaint   Patient presents with   Jelly Diosahil Fall     States she was walking in from outside into entryway at school and her shoes were wet and she slipped and fell ' like a split '   Occured 2:40 pm  Pain right lower back and " a zing " between my shoulder blades  Denies head injury     66year old female hx HTN presents with low back pain x 1 day  She had a mechanical fall, she slipped and fell on the wet pavement outside of school today  She fell on her tailbone and outstretched hands  She states she had a fusion surgery of her lumbar spine within this past year and wanted to make sure there were no changes to her back  She denies any saddle anesthesia or urinary/bowel incontinence  She denies any numbness or tingling  She denies any fever, chills, chest pain, shortness of breath, difficulty breathing, headache, dizziness, lightheadedness  Prior to Admission Medications   Prescriptions Last Dose Informant Patient Reported? Taking?    Omega-3 Fatty Acids (FISH OIL) 1,000 mg 12/20/2018 at Unknown time Self Yes Yes   Sig: Take 1,000 mg by mouth daily   calcium carbonate (OS-BEAU) 1250 (500 Ca) MG chewable tablet 12/20/2018 at Unknown time Self Yes Yes   Sig: Chew 1 tablet daily   cholecalciferol (VITAMIN D3) 400 units tablet 12/20/2018 at Unknown time Self Yes Yes   Sig: Take 400 Units by mouth daily   dicyclomine (BENTYL) 10 mg capsule More than a month at Unknown time  No No   Sig: Take 1 capsule (10 mg total) by mouth 4 (four) times a day (before meals and at bedtime)   lisinopril (ZESTRIL) 20 mg tablet 12/20/2018 at Unknown time  No Yes   Sig: Take 1 tablet (20 mg total) by mouth daily   pantoprazole (PROTONIX) 40 mg tablet 12/19/2018 at Unknown time  Yes Yes   Sig: Take 40 mg by mouth 2 (two) times a day Resume with evening dose on 7/9   traZODone (DESYREL) 50 mg tablet 12/19/2018 at Unknown time  Yes Yes   Sig: Take 50 mg by mouth daily at bedtime as needed     vitamin B-12 (CYANOCOBALAMIN) 50 MCG tablet 2018 at Unknown time Self Yes Yes   Sig: Take 50 mcg by mouth daily      Facility-Administered Medications: None       Past Medical History:   Diagnosis Date    Arthritis     Asthma     Back pain     DVT (deep venous thrombosis) ([facility] [place] [address] )     Gastric bypass status for obesity     GERD (gastroesophageal reflux disease)     Hiatal hernia     History of transfusion 2006    after gastric bypass surgery, no reactions    Hypertension     Irritable bowel syndrome     Kidney stone     Muscle weakness     bilateral legs    Numbness and tingling     bilateral feet    Pneumonia     Spinal stenosis     Tinnitus     occassionally    Wears glasses        Past Surgical History:   Procedure Laterality Date    APPENDECTOMY      BARIATRIC SURGERY      CARPAL TUNNEL RELEASE Right      SECTION      x1    CHOLECYSTECTOMY      COLONOSCOPY      COLONOSCOPY W/ BIOPSIES AND POLYPECTOMY      FLEXIBLE BRONCHOSCOPY W/ UPPER ENDOSCOPY      HERNIA REPAIR      inguinal hernia    KS ARTHROCENTESIS ASPIR&/INJ SMALL JT/BURSA W/O US N/A 2018    Procedure: Coccygeal Injection & Ganglion Impar Block;  Surgeon: Delmy Medina MD;  Location: [facility] MAIN OR;  Service: Pain Management     KS ARTHRODESIS POSTERIOR INTERBODY LUMBAR N/A 2018    Procedure: L4-5 DECOMPRESSION INTERBODY INSTRUMENTED FUSION;  Surgeon: Daisha Singletary MD;  Location: [facility] Main OR;  Service: Orthopedics    KS COLONOSCOPY FLX DX W/COLLJ SPEC WHEN PFRMD N/A 2018    Procedure: COLONOSCOPY;  Surgeon: Glenroy Boo MD;  Location: [facility] [place] [facility] LAB; Service: Gastroenterology    KS ESOPHAGOGASTRODUODENOSCOPY TRANSORAL DIAGNOSTIC N/A 2018    Procedure: ESOPHAGOGASTRODUODENOSCOPY (EGD); Surgeon: Glenroy Boo MD;  Location: [facility] GI LAB;   Service: Gastroenterology   Scot Galeazzi JOINT Right 2018    Procedure: Rt Sacroiliac Joint Injection;  Surgeon: Delmy Medina MD;  Location: [facility] MAIN OR; Service: Pain Management     TONSILECTOMY, ADENOIDECTOMY, BILATERAL MYRINGOTOMY AND TUBES         Family History   Problem Relation Age of Onset    Diabetes Mother     Aortic aneurysm Father     Cancer Father         prostate    Heart disease Sister         open heart    Cancer Sister         breast    Diabetes Brother     No Known Problems Daughter     Asperger's syndrome Son     Diabetes Maternal Grandfather     No Known Problems Brother      I have reviewed and agree with the history as documented  Social History   Substance Use Topics    Smoking status: Never Smoker    Smokeless tobacco: Never Used    Alcohol use 4 2 oz/week     5 Cans of beer, 2 Shots of liquor per week        Review of Systems   Constitutional: Negative for chills and fever  HENT: Negative for sneezing and sore throat  Eyes: Negative for photophobia and visual disturbance  Respiratory: Negative for cough and shortness of breath  Cardiovascular: Negative for chest pain, palpitations and leg swelling  Gastrointestinal: Negative for abdominal pain, constipation, diarrhea, nausea and vomiting  Musculoskeletal: Positive for back pain and myalgias  Negative for arthralgias, gait problem and joint swelling  Skin: Negative for color change, pallor, rash and wound  Neurological: Negative for dizziness, syncope, weakness, light-headedness, numbness and headaches  All other systems reviewed and are negative  Physical Exam  Physical Exam   Constitutional: She appears well-developed and well-nourished  No distress  HENT:   Head: Normocephalic and atraumatic  Nose: Nose normal    Eyes: EOM are normal    Neck: Normal range of motion  Neck supple  Cardiovascular: Normal rate, regular rhythm, normal heart sounds and intact distal pulses  Exam reveals no gallop and no friction rub  No murmur heard  Pulmonary/Chest: Effort normal and breath sounds normal  No respiratory distress  She has no wheezes   She has no rales  Sp02 is 98% indicating adequate oxygenation on room air   Musculoskeletal:        Back:    Lymphadenopathy:     She has no cervical adenopathy  Skin: Skin is warm and dry  Capillary refill takes less than 2 seconds  No rash noted  She is not diaphoretic  No erythema  No pallor  Nursing note and vitals reviewed  Vital Signs  ED Triage Vitals [12/20/18 1638]   Temperature Pulse Respirations Blood Pressure SpO2   98 7 °F (37 1 °C) 104 18 155/70 98 %      Temp Source Heart Rate Source Patient Position - Orthostatic VS BP Location FiO2 (%)   Oral Monitor Sitting Left arm --      Pain Score       6           Vitals:    12/20/18 1638   BP: 155/70   Pulse: 104   Patient Position - Orthostatic VS: Sitting       Visual Acuity      ED Medications  Medications - No data to display    Diagnostic Studies  Results Reviewed     None                 XR spine lumbar 2 or 3 views injury    (Results Pending)              Procedures  Procedures       Phone Contacts  ED Phone Contact    ED Course                               MDM  Number of Diagnoses or Management Options  Diagnosis management comments: xrays no obvious osseous deformity, appears similar to previous xrays post back surgery - final results pending  Can take naproxen as needed for pain  Gave patient proper education regarding diagnosis  Answered all questions  Return to ED for any worsening of symptoms otherwise follow up with primary care physician for re-evaluation  Discussed plan with patient who verbalized understanding and agreed to plan         Amount and/or Complexity of Data Reviewed  Tests in the radiology section of CPT®: reviewed and ordered  Discussion of test results with the performing providers: yes  Review and summarize past medical records: yes  Discuss the patient with other providers: yes  Independent visualization of images, tracings, or specimens: yes      CritCare Time    Disposition  Final diagnoses:   Back pain     Time reflects when diagnosis was documented in both MDM as applicable and the Disposition within this note     Time User Action Codes Description Comment    12/20/2018  5:56 PM Diane Wallace Add [M54 9] Back pain       ED Disposition     ED Disposition Condition Comment    Discharge  Fariba GUZMAN Serenademond discharge to home/self care  Condition at discharge: Good        Follow-up Information     Follow up With Specialties Details Why [address], [place] Family Medicine Schedule an appointment as soon as possible for a visit in 3 days If symptoms worsen [place] [id number]  [phone] [address] [facility] Emergency Department Emergency Medicine Go to As needed [address] [facility] [address] [facility] ED, [place], [place], [address]          Patient's Medications   Discharge Prescriptions    NAPROXEN (NAPROSYN) 500 MG TABLET    Take 1 tablet (500 mg total) by mouth 2 (two) times a day with meals for 7 days       Start Date: 12/20/2018End Date: 12/27/2018       Order Dose: 500 mg       Quantity: 14 tablet    Refills: 0     No discharge procedures on file      ED Provider  Electronically Signed by           Samir Kendrick PA-C  12/20/18 7803

## 2018-12-20 NOTE — DISCHARGE INSTRUCTIONS
Acute Low Back Pain   WHAT YOU NEED TO KNOW:   Acute low back pain is sudden discomfort in your lower back area that lasts for up to 6 weeks  The discomfort makes it difficult to tolerate activity  DISCHARGE INSTRUCTIONS:   Return to the emergency department if:   · You have severe pain  · You have sudden stiffness and heaviness on both buttocks down to both legs  · You have numbness or weakness in one leg, or pain in both legs  · You have numbness in your genital area or across your lower back  · You cannot control your urine or bowel movements  Contact your healthcare provider if:   · You have a fever  · You have pain at night or when you rest     · Your pain does not get better with treatment  · You have pain that worsens when you cough or sneeze  · You suddenly feel something pop or snap in your back  · You have questions or concerns about your condition or care  Medicines: The following medicines may be ordered by your healthcare provider:  · Acetaminophen  decreases pain  It is available without a doctor's order  Ask how much to take and how often to take it  Follow directions  Acetaminophen can cause liver damage if not taken correctly  · NSAIDs  help decrease swelling and pain  This medicine is available with or without a doctor's order  NSAIDs can cause stomach bleeding or kidney problems in certain people  If you take blood thinner medicine, always ask your healthcare provider if NSAIDs are safe for you  Always read the medicine label and follow directions  · Prescription pain medicine  may be given  Ask your healthcare provider how to take this medicine safely  · Muscle relaxers  decrease pain by relaxing the muscles in your lower spine  · Take your medicine as directed  Contact your healthcare provider if you think your medicine is not helping or if you have side effects  Tell him of her if you are allergic to any medicine   Keep a list of the medicines, vitamins, and herbs you take  Include the amounts, and when and why you take them  Bring the list or the pill bottles to follow-up visits  Carry your medicine list with you in case of an emergency  Self-care:   · Stay active  as much as you can without causing more pain  Bed rest could make your back pain worse  Start with some light exercises such as walking  Avoid heavy lifting until your pain is gone  Ask for more information about the activities or exercises that are right for you  · Ice  helps decrease swelling, pain, and muscle spams  Put crushed ice in a plastic bag  Cover it with a towel  Place it on your lower back for 20 to 30 minutes every 2 hours  Do this for about 2 to 3 days after your pain starts, or as directed  · Heat  helps decrease pain and muscle spasms  Start to use heat after treatment with ice has stopped  Use a small towel dampened with warm water or a heating pad, or sit in a warm bath  Apply heat on the area for 20 to 30 minutes every 2 hours for as many days as directed  Alternate heat and ice  Prevent acute low back pain:   · Use proper body mechanics  ¨ Bend at the hips and knees when you  objects  Do not bend from the waist  Use your leg muscles as you lift the load  Do not use your back  Keep the object close to your chest as you lift it  Try not to twist or lift anything above your waist     ¨ Change your position often when you stand for long periods of time  Rest one foot on a small box or footrest, and then switch to the other foot often  ¨ Try not to sit for long periods of time  When you do, sit in a straight-backed chair with your feet flat on the floor  Never reach, pull, or push while you are sitting  · Do exercises that strengthen your back muscles  Warm up before you exercise  Ask your healthcare provider the best exercises for you  · Maintain a healthy weight  Ask your healthcare provider how much you should weigh   Ask him to help you create a weight loss plan if you are overweight  Follow up with your healthcare provider as directed:  Return for a follow-up visit if you still have pain after 1 to 3 weeks of treatment  You may need to visit an orthopedist if your back pain lasts more than 12 weeks  Write down your questions so you remember to ask them during your visits  © 2017 [employer]  Information is for End User's use only and may not be sold, redistributed or otherwise used for commercial purposes  All illustrations and images included in CareNotes® are the copyrighted property of [employer]  or Baljinder Hagan  The above information is an  only  It is not intended as medical advice for individual conditions or treatments  Talk to your doctor, nurse or pharmacist before following any medical regimen to see if it is safe and effective for you

## 2018-12-26 ENCOUNTER — OFFICE VISIT (OUTPATIENT)
Dept: FAMILY MEDICINE CLINIC | Facility: CLINIC | Age: 63
End: 2018-12-26
Payer: OTHER MISCELLANEOUS

## 2018-12-26 VITALS
HEIGHT: 65 IN | RESPIRATION RATE: 16 BRPM | WEIGHT: 226 LBS | SYSTOLIC BLOOD PRESSURE: 142 MMHG | TEMPERATURE: 98 F | DIASTOLIC BLOOD PRESSURE: 90 MMHG | BODY MASS INDEX: 37.65 KG/M2 | HEART RATE: 102 BPM

## 2018-12-26 DIAGNOSIS — M54.50 RIGHT-SIDED LOW BACK PAIN WITHOUT SCIATICA, UNSPECIFIED CHRONICITY: Primary | ICD-10-CM

## 2018-12-26 PROBLEM — Z78.0 POSTMENOPAUSAL STATE: Status: ACTIVE | Noted: 2018-05-21

## 2018-12-26 PROBLEM — A69.20 LYME DISEASE: Status: ACTIVE | Noted: 2017-07-11

## 2018-12-26 PROBLEM — M48.061 SPINAL STENOSIS OF LUMBAR REGION WITHOUT NEUROGENIC CLAUDICATION: Status: ACTIVE | Noted: 2018-05-08

## 2018-12-26 PROCEDURE — 99213 OFFICE O/P EST LOW 20 MIN: CPT | Performed by: NURSE PRACTITIONER

## 2018-12-26 NOTE — PROGRESS NOTES
Assessment/Plan:  No work limitations at this time and will follow up for any worsening  Supportive care discussed and advised  Follow up for no improvement and worsening of conditions  Patient advised and educated when to see immediate medical care  Apply moist heat and take naprosyn as needed  Diagnoses and all orders for this visit:    Right-sided low back pain without sciatica, unspecified chronicity    BMI 37 0-37 9, adult            Patient Instructions:  Supportive care discussed and advised  Follow up for no improvement and worsening of conditions  Patient advised and educated when to see immediate medical care  Apply moist heat and take naprosyn as needed  Return if symptoms worsen or fail to improve  Subjective:      Patient ID: Marvin An is a 66 y o  female  Chief Complaint   Patient presents with    Workers Comp Back Pain     12/20 at work slipped and fell on lower back drhlpn        HPI  Patient is here for workers comp follow up  Patient stated that slipped at work on 12/20 around 2:35 pm and stated that her back never touched the floor as she outstretched the hands and supported herself  Patient was seen in ER that day and imaging done  Had h/o spine surgery  Naprosyn prescribed and stated that she is totally pain free  Denies any bowel and bladder incontinence, and movement restrictions  Sensations intact  Stated that not taking naprosyn anymore  The following portions of the patient's history were reviewed and updated as appropriate: allergies, current medications, past family history, past medical history, past social history, past surgical history and problem list       Review of Systems   Constitutional: Negative  Respiratory: Negative  Cardiovascular: Negative  Gastrointestinal: Negative  Genitourinary: Negative  Musculoskeletal: Positive for back pain (resolved)  Skin: Negative  Neurological: Negative  Psychiatric/Behavioral: Negative  Objective:    History   Smoking Status    Never Smoker   Smokeless Tobacco    Never Used       Allergies: No Known Allergies    Vitals:  /90   Pulse 102   Temp 98 °F (36 7 °C)   Resp 16   Ht 5' 5" (1 651 m)   Wt 103 kg (226 lb)   BMI 37 61 kg/m²     Current Outpatient Prescriptions   Medication Sig Dispense Refill    calcium carbonate (OS-BEAU) 1250 (500 Ca) MG chewable tablet Chew 1 tablet daily      cholecalciferol (VITAMIN D3) 400 units tablet Take 400 Units by mouth daily      lisinopril (ZESTRIL) 20 mg tablet Take 1 tablet (20 mg total) by mouth daily 30 tablet 0    naproxen (NAPROSYN) 500 mg tablet Take 1 tablet (500 mg total) by mouth 2 (two) times a day with meals for 7 days 14 tablet 0    Omega-3 Fatty Acids (FISH OIL) 1,000 mg Take 1,000 mg by mouth daily      pantoprazole (PROTONIX) 40 mg tablet Take 40 mg by mouth 2 (two) times a day Resume with evening dose on 7/9      traZODone (DESYREL) 50 mg tablet Take 50 mg by mouth daily at bedtime as needed        vitamin B-12 (CYANOCOBALAMIN) 50 MCG tablet Take 50 mcg by mouth daily      dicyclomine (BENTYL) 10 mg capsule Take 1 capsule (10 mg total) by mouth 4 (four) times a day (before meals and at bedtime) (Patient not taking: Reported on 12/26/2018 ) 120 capsule 0     No current facility-administered medications for this visit  Physical Exam   Constitutional: She is oriented to person, place, and time  She appears well-developed and well-nourished  Cardiovascular: Normal rate, regular rhythm and normal heart sounds  Pulmonary/Chest: Effort normal and breath sounds normal    Musculoskeletal: Normal range of motion  She exhibits no edema, tenderness or deformity  Neurological: She is alert and oriented to person, place, and time  She has normal strength  No sensory deficit  Coordination normal  GCS eye subscore is 4  GCS verbal subscore is 5  GCS motor subscore is 6     Skin: Skin is warm and dry  No rash noted  Psychiatric: She has a normal mood and affect   Her behavior is normal  Judgment and thought content normal                    JACINTO Gillette

## 2018-12-26 NOTE — PATIENT INSTRUCTIONS
Supportive care discussed and advised  Follow up for no improvement and worsening of conditions  Patient advised and educated when to see immediate medical care  Apply moist heat and take naprosyn as needed

## 2018-12-26 NOTE — LETTER
December 26, 2018     Patient: Vic Pineda   YOB: 1955   Date of Visit: 12/26/2018       To Whom it May Concern:    Vic Pineda is under my professional care  She was seen in my office on 12/26/2018  She may return to work on 12/26/2018  If you have any questions or concerns, please don't hesitate to call           Sincerely,          JACINTO Funez        CC: No Recipients

## 2019-01-07 ENCOUNTER — TELEPHONE (OUTPATIENT)
Dept: FAMILY MEDICINE CLINIC | Facility: CLINIC | Age: 64
End: 2019-01-07

## 2019-01-07 NOTE — TELEPHONE ENCOUNTER
Hina called from w/c [employer] they received notes but it didn't say work status, and discharge date   Please call her back at [phone] ext [phone]

## 2019-01-07 NOTE — LETTER
January 8, 2019     Patient: Manjeet Cuevas   YOB: 1955   Date of Visit: 1/7/2019       To Whom It May Concern:    Manjeet Cuevas was seen in office related to work injury on 12/26/2018 and was released to work the same day without any limitations and was advised to follow up for any worsening  If you have any questions or concerns, please don't hesitate to call           Sincerely,        JACINTO Reed    CC: No Recipients

## 2019-01-08 NOTE — TELEPHONE ENCOUNTER
Jona Stone with [facility], stated she needs a letter  from you that pt  was seen here  on 12/26 and   Can return to work without any limitations   FAX letter to [facility] at [phone]  Thank you  J Carlos Gomez MA

## 2019-01-08 NOTE — TELEPHONE ENCOUNTER
I never recived any paper work on the patient but please call the workers comp at the phone number below and inform that she was seen in our office on 12/26 and was released same day to work without any limitations   Thanks

## 2019-01-25 ENCOUNTER — TELEPHONE (OUTPATIENT)
Dept: GASTROENTEROLOGY | Facility: AMBULARY SURGERY CENTER | Age: 64
End: 2019-01-25

## 2019-02-06 ENCOUNTER — OFFICE VISIT (OUTPATIENT)
Dept: CARDIOLOGY CLINIC | Facility: CLINIC | Age: 64
End: 2019-02-06
Payer: COMMERCIAL

## 2019-02-06 VITALS
OXYGEN SATURATION: 97 % | DIASTOLIC BLOOD PRESSURE: 72 MMHG | HEART RATE: 98 BPM | HEIGHT: 63 IN | BODY MASS INDEX: 39.34 KG/M2 | SYSTOLIC BLOOD PRESSURE: 120 MMHG | WEIGHT: 222 LBS

## 2019-02-06 DIAGNOSIS — R41.3 MEMORY LOSS: Primary | ICD-10-CM

## 2019-02-06 DIAGNOSIS — E66.01 CLASS 2 SEVERE OBESITY DUE TO EXCESS CALORIES WITH SERIOUS COMORBIDITY AND BODY MASS INDEX (BMI) OF 39.0 TO 39.9 IN ADULT (HCC): ICD-10-CM

## 2019-02-06 DIAGNOSIS — I10 ESSENTIAL HYPERTENSION: ICD-10-CM

## 2019-02-06 DIAGNOSIS — R53.83 FATIGUE, UNSPECIFIED TYPE: Primary | ICD-10-CM

## 2019-02-06 DIAGNOSIS — R06.00 DYSPNEA ON EXERTION: ICD-10-CM

## 2019-02-06 PROBLEM — E66.812 CLASS 2 SEVERE OBESITY DUE TO EXCESS CALORIES WITH SERIOUS COMORBIDITY AND BODY MASS INDEX (BMI) OF 39.0 TO 39.9 IN ADULT (HCC): Status: ACTIVE | Noted: 2019-02-06

## 2019-02-06 PROCEDURE — 99244 OFF/OP CNSLTJ NEW/EST MOD 40: CPT | Performed by: INTERNAL MEDICINE

## 2019-02-06 PROCEDURE — 93000 ELECTROCARDIOGRAM COMPLETE: CPT | Performed by: INTERNAL MEDICINE

## 2019-02-06 RX ORDER — ICOSAPENT ETHYL 1000 MG/1
2 CAPSULE ORAL 2 TIMES DAILY
Qty: 120 CAPSULE | Refills: 5 | Status: SHIPPED | OUTPATIENT
Start: 2019-02-06 | End: 2019-08-22 | Stop reason: SDUPTHER

## 2019-02-06 RX ORDER — DIPHENOXYLATE HYDROCHLORIDE AND ATROPINE SULFATE 2.5; .025 MG/1; MG/1
1 TABLET ORAL DAILY
COMMUNITY

## 2019-02-06 NOTE — PROGRESS NOTES
Cardiology Consultation     María Genao  [id number]  1955  [facility]  [facility] [address] [address]    Consult for: fatigue  Appreciate consult by: Roxanna Camilo MD      HPI: María Genao is a 66y o  year old male who is here for evaluation of fatigue and weakness of legs  For the past year, she has been under a large amount of stress and was drinking a large amount of alcohol  She has chronic back pain and had spinal surgery last June  She has a history of hypertension and is on lisinopril  She feels fatigued all the time  Has insomnia and uses trazadone to help her sleep  Sleeps at 11:20-12 and wakes up at 4-5 and has difficulty falling back asleep  Had sleep study which was normal   She works 2 jobs  Feels weakness in both legs as she goes up stairs  She feels shortness of breath as she goes up stairs  Denies any chest pain  Denies any LE edema, orthopnea or PND  Past Medical History:   Diagnosis Date    Arthritis     Asthma     Back pain     DVT (deep venous thrombosis) ([facility] [address] ) 2006    Gastric bypass status for obesity     GERD (gastroesophageal reflux disease)     Hiatal hernia     History of transfusion 2006    after gastric bypass surgery, no reactions    Hypertension     Irritable bowel syndrome     Kidney stone     Muscle weakness     bilateral legs    Numbness and tingling     bilateral feet    Pneumonia     Spinal stenosis     Tinnitus     occassionally    Wears glasses      Social History     Social History    Marital status: /Civil Union     Spouse name: N/A    Number of children: N/A    Years of education: N/A     Occupational History    Not on file       Social History Main Topics    Smoking status: Never Smoker    Smokeless tobacco: Never Used    Alcohol use 4 2 oz/week     5 Cans of beer, 2 Shots of liquor per week    Drug use: No    Sexual activity: Yes     Other Topics Concern    Not on file     Social History Narrative    No narrative on file      Family History   Problem Relation Age of Onset    Diabetes Mother     Aortic aneurysm Father     Cancer Father         prostate    Heart disease Sister         open heart    Cancer Sister         breast    Diabetes Brother     No Known Problems Daughter     Asperger's syndrome Son     Diabetes Maternal Grandfather     No Known Problems Brother      Past Surgical History:   Procedure Laterality Date    APPENDECTOMY      BARIATRIC SURGERY      CARPAL TUNNEL RELEASE Right      SECTION      x1    CHOLECYSTECTOMY      COLONOSCOPY      COLONOSCOPY W/ BIOPSIES AND POLYPECTOMY      FLEXIBLE BRONCHOSCOPY W/ UPPER ENDOSCOPY      HERNIA REPAIR      inguinal hernia    WI ARTHROCENTESIS ASPIR&/INJ SMALL JT/BURSA W/O US N/A 2018    Procedure: Coccygeal Injection & Ganglion Impar Block;  Surgeon: Nick Colbert MD;  Location: [place] [address] MAIN OR;  Service: Pain Management     WI ARTHRODESIS POSTERIOR INTERBODY LUMBAR N/A 2018    Procedure: L4-5 DECOMPRESSION INTERBODY INSTRUMENTED FUSION;  Surgeon: Berkley Guzman MD;  [place] Main OR;  Service: Orthopedics    WI COLONOSCOPY FLX DX W/COLLJ SPEC WHEN PFRMD N/A 2018    Procedure: COLONOSCOPY;  Surgeon: Ranjit Parks MD;  Location: [place] [address] GI LAB; Service: Gastroenterology    WI ESOPHAGOGASTRODUODENOSCOPY TRANSORAL DIAGNOSTIC N/A 2018    Procedure: ESOPHAGOGASTRODUODENOSCOPY (EGD); Surgeon: Ranjit Parks MD;  Location: [facility] GI LAB;   Service: Gastroenterology   Barry Christiano JOINT Right 2018    Procedure: Rt Sacroiliac Joint Injection;  Surgeon: Nick Colbert MD;  Location: [facility] MAIN OR;  Service: Pain Management     TONSILECTOMY, ADENOIDECTOMY, BILATERAL MYRINGOTOMY AND TUBES         Current Outpatient Prescriptions:     calcium carbonate (OS-BEAU) 1250 (500 Ca) MG chewable tablet, Chew 1 tablet daily, Disp: , Rfl:     cholecalciferol (VITAMIN D3) 400 units tablet, Take 400 Units by mouth daily, Disp: , Rfl:     lisinopril (ZESTRIL) 20 mg tablet, Take 1 tablet (20 mg total) by mouth daily, Disp: 30 tablet, Rfl: 0    multivitamin (THERAGRAN) TABS, Take 1 tablet by mouth daily, Disp: , Rfl:     Omega-3 Fatty Acids (FISH OIL) 1,000 mg, Take 1,000 mg by mouth daily, Disp: , Rfl:     pantoprazole (PROTONIX) 40 mg tablet, Take 40 mg by mouth 2 (two) times a day Resume with evening dose on 7/9, Disp: , Rfl:     traZODone (DESYREL) 50 mg tablet, Take 50 mg by mouth daily at bedtime as needed  , Disp: , Rfl:     vitamin B-12 (CYANOCOBALAMIN) 50 MCG tablet, Take 50 mcg by mouth daily, Disp: , Rfl:     dicyclomine (BENTYL) 10 mg capsule, Take 1 capsule (10 mg total) by mouth 4 (four) times a day (before meals and at bedtime) (Patient not taking: Reported on 12/26/2018 ), Disp: 120 capsule, Rfl: 0    naproxen (NAPROSYN) 500 mg tablet, Take 1 tablet (500 mg total) by mouth 2 (two) times a day with meals for 7 days, Disp: 14 tablet, Rfl: 0  No Known Allergies      Review of Systems:  Review of Systems   Constitutional: Positive for fatigue  Negative for chills and fever  HENT: Negative for congestion, nosebleeds and postnasal drip  Respiratory: Positive for shortness of breath  Negative for cough and chest tightness  Cardiovascular: Negative for chest pain, palpitations and leg swelling  Gastrointestinal: Negative for abdominal distention, abdominal pain, diarrhea, nausea and vomiting  Endocrine: Negative for polydipsia, polyphagia and polyuria  Musculoskeletal: Positive for arthralgias, back pain, gait problem and myalgias  Skin: Negative for color change, pallor and rash  Allergic/Immunologic: Negative for environmental allergies, food allergies and immunocompromised state  Neurological: Positive for weakness  Negative for dizziness, seizures, syncope and light-headedness  Hematological: Negative for adenopathy  Does not bruise/bleed easily  Psychiatric/Behavioral: Negative for dysphoric mood  The patient is not nervous/anxious  Physical Exam:  Vitals:    02/06/19 1621   BP: 120/72   BP Location: Right arm   Patient Position: Sitting   Cuff Size: Large   Pulse: 98   SpO2: 97%   Weight: 101 kg (222 lb)   Height: 5' 3" (1 6 m)     Physical Exam   Constitutional: She is oriented to person, place, and time  She appears well-developed  No distress  HENT:   Head: Normocephalic and atraumatic  Eyes: Pupils are equal, round, and reactive to light  Conjunctivae and EOM are normal    Neck: Neck supple  No JVD present  No thyromegaly present  Cardiovascular: Normal rate, regular rhythm and normal heart sounds  Exam reveals no gallop and no friction rub  No murmur heard  Pulmonary/Chest: Effort normal and breath sounds normal    Abdominal: Soft  She exhibits no distension  There is no tenderness  Musculoskeletal: She exhibits no edema  Neurological: She is alert and oriented to person, place, and time  No cranial nerve deficit  Skin: Skin is warm and dry  No rash noted  She is not diaphoretic  No erythema  Psychiatric: She has a normal mood and affect  Her behavior is normal  Judgment and thought content normal        Labs:  No visits with results within 2 Month(s) from this visit     Latest known visit with results is:   Admission on 07/23/2018, Discharged on 07/24/2018   Component Date Value    WBC 07/23/2018 10 63*    RBC 07/23/2018 3 50*    Hemoglobin 07/23/2018 10 9*    Hematocrit 07/23/2018 34 6*    MCV 07/23/2018 99*    MCH 07/23/2018 31 1     MCHC 07/23/2018 31 5     RDW 07/23/2018 13 2     MPV 07/23/2018 8 7*    Platelets 84/08/3052 328     nRBC 07/23/2018 0     Neutrophils Relative 07/23/2018 62     Immat GRANS % 07/23/2018 0     Lymphocytes Relative 07/23/2018 27     Monocytes Relative 07/23/2018 6     Eosinophils Relative 07/23/2018 4     Basophils Relative 07/23/2018 1     Neutrophils Absolute 07/23/2018 6 64     Immature Grans Absolute 07/23/2018 0 02     Lymphocytes Absolute 07/23/2018 2 87     Monocytes Absolute 07/23/2018 0 59     Eosinophils Absolute 07/23/2018 0 46     Basophils Absolute 07/23/2018 0 05     Sodium 07/23/2018 138     Potassium 07/23/2018 4 4     Chloride 07/23/2018 105     CO2 07/23/2018 24     ANION GAP 07/23/2018 9     BUN 07/23/2018 12     Creatinine 07/23/2018 0 89     Glucose 07/23/2018 95     Calcium 07/23/2018 8 8     AST 07/23/2018 22     ALT 07/23/2018 16     Alkaline Phosphatase 07/23/2018 76     Total Protein 07/23/2018 7 1     Albumin 07/23/2018 3 2*    Total Bilirubin 07/23/2018 0 40     eGFR 07/23/2018 71     Protime 07/23/2018 10 0     INR 07/23/2018 0 95     PTT 07/23/2018 24     Lipase 07/23/2018 154      Imaging: No results found  EKG (independently reviewed): NSR with RBBB and LAFB - unchanged from June 2018    Discussion/Summary:  1  Fatigue, unspecified type  - symptoms may be related to poor sleep hygiene and long work hours  Likely debilitated from chronic back pain  - Will obtain stress test for further evaluation    - Will also obtain 2D echocardiogram to rule out structural heart disease     - Had recent blood work - will obtain a copy  She had anemia in July 2  Essential hypertension  - BP well controlled on lisinopril  3  Obesity/dyslipidemia  - Discussed weight loss  - Switch fish oil to Vascepa to use 2 grams bid

## 2019-02-06 NOTE — LETTER
February 6, 2019     Tita Anaya MD  [facility] [address]    Patient: Dahiana Martínez   YOB: 1955   Date of Visit: 2/6/2019       Dear Dr Berta Sim:    Thank you for referring Dahiana Martínez to me for evaluation  Below are my notes for this consultation  If you have questions, please do not hesitate to call me  I look forward to following your patient along with you  Sincerely,        Wei Alcantara DO        CC: No Recipients  Vicenta Reyes DO  2/6/2019  5:14 PM  Sign at close encounter               Cardiology Consultation     Dahiana Martínez  [id number]  1955  [facility] [address]  [facility] [place] [address] [address]    Consult for: fatigue  Appreciate consult by: Mik Maravilla MD      HPI: Dahiana Martínez is a 66y o  year old male who is here for evaluation of fatigue and weakness of legs  For the past year, she has been under a large amount of stress and was drinking a large amount of alcohol  She has chronic back pain and had spinal surgery last June  She has a history of hypertension and is on lisinopril  She feels fatigued all the time  Has insomnia and uses trazadone to help her sleep  Sleeps at 11:20-12 and wakes up at 4-5 and has difficulty falling back asleep  Had sleep study which was normal   She works 2 jobs  Feels weakness in both legs as she goes up stairs  She feels shortness of breath as she goes up stairs  Denies any chest pain  Denies any LE edema, orthopnea or PND        Past Medical History:   Diagnosis Date    Arthritis     Asthma     Back pain     DVT (deep venous thrombosis) ([facility] [address] ) 2006    Gastric bypass status for obesity     GERD (gastroesophageal reflux disease)     Hiatal hernia     History of transfusion 2006    after gastric bypass surgery, no reactions    Hypertension     Irritable bowel syndrome     Kidney stone     Muscle weakness     bilateral legs    Numbness and tingling     bilateral feet    Pneumonia     Spinal stenosis     Tinnitus     occassionally    Wears glasses      Social History     Social History    Marital status: /Civil Union     Spouse name: N/A    Number of children: N/A    Years of education: N/A     Occupational History    Not on file  Social History Main Topics    Smoking status: Never Smoker    Smokeless tobacco: Never Used    Alcohol use 4 2 oz/week     5 Cans of beer, 2 Shots of liquor per week    Drug use: No    Sexual activity: Yes     Other Topics Concern    Not on file     Social History Narrative    No narrative on file      Family History   Problem Relation Age of Onset    Diabetes Mother     Aortic aneurysm Father     Cancer Father         prostate    Heart disease Sister         open heart    Cancer Sister         breast    Diabetes Brother     No Known Problems Daughter     Asperger's syndrome Son     Diabetes Maternal Grandfather     No Known Problems Brother      Past Surgical History:   Procedure Laterality Date    APPENDECTOMY      BARIATRIC SURGERY      CARPAL TUNNEL RELEASE Right      SECTION      x1    CHOLECYSTECTOMY      COLONOSCOPY      COLONOSCOPY W/ BIOPSIES AND POLYPECTOMY      FLEXIBLE BRONCHOSCOPY W/ UPPER ENDOSCOPY      HERNIA REPAIR      inguinal hernia    ME ARTHROCENTESIS ASPIR&/INJ SMALL JT/BURSA W/O US N/A 2018    Procedure: Coccygeal Injection & Ganglion Impar Block;  Surgeon: Meera Davis MD;  [facility] MAIN OR;  Service: Pain Management     ME ARTHRODESIS POSTERIOR INTERBODY LUMBAR N/A 2018    Procedure: L4-5 DECOMPRESSION INTERBODY INSTRUMENTED FUSION;  Surgeon: Terry Nye MD;  Location: [facility] Main [facility];  Service: Orthopedics    ME COLONOSCOPY FLX DX W/COLLJ SPEC WHEN PFRMD N/A 2018    Procedure: COLONOSCOPY;  Surgeon: Vincenzo Moon MD;  Location: [facility] GI LAB;   Service: Gastroenterology    ME ESOPHAGOGASTRODUODENOSCOPY TRANSORAL DIAGNOSTIC N/A 2/19/2018    Procedure: ESOPHAGOGASTRODUODENOSCOPY (EGD); Surgeon: Campos Knight MD;  Location: [facility] GI LAB; Service: Gastroenterology   Tyler Lowell JOINT Right 5/25/2018    Procedure: Rt Sacroiliac Joint Injection;  Surgeon: Yolanda Park MD;  Location: [facility] MAIN OR;  Service: Pain Management     TONSILECTOMY, ADENOIDECTOMY, BILATERAL MYRINGOTOMY AND TUBES         Current Outpatient Prescriptions:     calcium carbonate (OS-BEAU) 1250 (500 Ca) MG chewable tablet, Chew 1 tablet daily, Disp: , Rfl:     cholecalciferol (VITAMIN D3) 400 units tablet, Take 400 Units by mouth daily, Disp: , Rfl:     lisinopril (ZESTRIL) 20 mg tablet, Take 1 tablet (20 mg total) by mouth daily, Disp: 30 tablet, Rfl: 0    multivitamin (THERAGRAN) TABS, Take 1 tablet by mouth daily, Disp: , Rfl:     Omega-3 Fatty Acids (FISH OIL) 1,000 mg, Take 1,000 mg by mouth daily, Disp: , Rfl:     pantoprazole (PROTONIX) 40 mg tablet, Take 40 mg by mouth 2 (two) times a day Resume with evening dose on 7/9, Disp: , Rfl:     traZODone (DESYREL) 50 mg tablet, Take 50 mg by mouth daily at bedtime as needed  , Disp: , Rfl:     vitamin B-12 (CYANOCOBALAMIN) 50 MCG tablet, Take 50 mcg by mouth daily, Disp: , Rfl:     dicyclomine (BENTYL) 10 mg capsule, Take 1 capsule (10 mg total) by mouth 4 (four) times a day (before meals and at bedtime) (Patient not taking: Reported on 12/26/2018 ), Disp: 120 capsule, Rfl: 0    naproxen (NAPROSYN) 500 mg tablet, Take 1 tablet (500 mg total) by mouth 2 (two) times a day with meals for 7 days, Disp: 14 tablet, Rfl: 0  No Known Allergies      Review of Systems:  Review of Systems   Constitutional: Positive for fatigue  Negative for chills and fever  HENT: Negative for congestion, nosebleeds and postnasal drip  Respiratory: Positive for shortness of breath  Negative for cough and chest tightness      Cardiovascular: Negative for chest pain, palpitations and leg swelling  Gastrointestinal: Negative for abdominal distention, abdominal pain, diarrhea, nausea and vomiting  Endocrine: Negative for polydipsia, polyphagia and polyuria  Musculoskeletal: Positive for arthralgias, back pain, gait problem and myalgias  Skin: Negative for color change, pallor and rash  Allergic/Immunologic: Negative for environmental allergies, food allergies and immunocompromised state  Neurological: Positive for weakness  Negative for dizziness, seizures, syncope and light-headedness  Hematological: Negative for adenopathy  Does not bruise/bleed easily  Psychiatric/Behavioral: Negative for dysphoric mood  The patient is not nervous/anxious  Physical Exam:  Vitals:    02/06/19 1621   BP: 120/72   BP Location: Right arm   Patient Position: Sitting   Cuff Size: Large   Pulse: 98   SpO2: 97%   Weight: 101 kg (222 lb)   Height: 5' 3" (1 6 m)     Physical Exam   Constitutional: She is oriented to person, place, and time  She appears well-developed  No distress  HENT:   Head: Normocephalic and atraumatic  Eyes: Pupils are equal, round, and reactive to light  Conjunctivae and EOM are normal    Neck: Neck supple  No JVD present  No thyromegaly present  Cardiovascular: Normal rate, regular rhythm and normal heart sounds  Exam reveals no gallop and no friction rub  No murmur heard  Pulmonary/Chest: Effort normal and breath sounds normal    Abdominal: Soft  She exhibits no distension  There is no tenderness  Musculoskeletal: She exhibits no edema  Neurological: She is alert and oriented to person, place, and time  No cranial nerve deficit  Skin: Skin is warm and dry  No rash noted  She is not diaphoretic  No erythema  Psychiatric: She has a normal mood and affect  Her behavior is normal  Judgment and thought content normal        Labs:  No visits with results within 2 Month(s) from this visit     Latest known visit with results is:   Admission on 07/23/2018, Discharged on 07/24/2018   Component Date Value    WBC 07/23/2018 10 63*    RBC 07/23/2018 3 50*    Hemoglobin 07/23/2018 10 9*    Hematocrit 07/23/2018 34 6*    MCV 07/23/2018 99*    MCH 07/23/2018 31 1     MCHC 07/23/2018 31 5     RDW 07/23/2018 13 2     MPV 07/23/2018 8 7*    Platelets 73/27/6863 328     nRBC 07/23/2018 0     Neutrophils Relative 07/23/2018 62     Immat GRANS % 07/23/2018 0     Lymphocytes Relative 07/23/2018 27     Monocytes Relative 07/23/2018 6     Eosinophils Relative 07/23/2018 4     Basophils Relative 07/23/2018 1     Neutrophils Absolute 07/23/2018 6 64     Immature Grans Absolute 07/23/2018 0 02     Lymphocytes Absolute 07/23/2018 2 87     Monocytes Absolute 07/23/2018 0 59     Eosinophils Absolute 07/23/2018 0 46     Basophils Absolute 07/23/2018 0 05     Sodium 07/23/2018 138     Potassium 07/23/2018 4 4     Chloride 07/23/2018 105     CO2 07/23/2018 24     ANION GAP 07/23/2018 9     BUN 07/23/2018 12     Creatinine 07/23/2018 0 89     Glucose 07/23/2018 95     Calcium 07/23/2018 8 8     AST 07/23/2018 22     ALT 07/23/2018 16     Alkaline Phosphatase 07/23/2018 76     Total Protein 07/23/2018 7 1     Albumin 07/23/2018 3 2*    Total Bilirubin 07/23/2018 0 40     eGFR 07/23/2018 71     Protime 07/23/2018 10 0     INR 07/23/2018 0 95     PTT 07/23/2018 24     Lipase 07/23/2018 154      Imaging: No results found  EKG (independently reviewed): NSR with RBBB and LAFB - unchanged from June 2018    Discussion/Summary:  1  Fatigue, unspecified type  - symptoms may be related to poor sleep hygiene and long work hours  Likely debilitated from chronic back pain  - Will obtain stress test for further evaluation    - Will also obtain 2D echocardiogram to rule out structural heart disease     - Had recent blood work - will obtain a copy  She had anemia in July 2  Essential hypertension  - BP well controlled on lisinopril       3  Obesity/dyslipidemia  - Discussed weight loss  - Switch fish oil to Vascepa to use 2 grams bid

## 2019-02-07 ENCOUNTER — TELEPHONE (OUTPATIENT)
Dept: CARDIOLOGY CLINIC | Facility: CLINIC | Age: 64
End: 2019-02-07

## 2019-02-07 NOTE — TELEPHONE ENCOUNTER
Message # [phone]         02/06/2019 07:17p   [TG]  TO:  [facility] - [facility]  CALLER:  Ruslan Albright TELE #:  [phone] Ext    HOSP NAME:  ----------------------------------------------------------------------  Dr: Jazmine Ratliff  Pt Name: Yuliet Prabhakar?: N  Msg: She had her new patient appointment with the Dr today but he only asked about experiencing chest pain which she does not but she has left arm both hands swelling and neckpain left side mostly

## 2019-02-07 NOTE — TELEPHONE ENCOUNTER
Patient called back to report arm pain and neck pain, as well as cholesterol numbers    Lipase - 19    Total Choelsterol - 136  Triglycerides - 147  HDL - 59   VLDL - 29  LDL - 48  LDL/HDL ratio - 0 8

## 2019-02-20 ENCOUNTER — HOSPITAL ENCOUNTER (OUTPATIENT)
Dept: RADIOLOGY | Facility: HOSPITAL | Age: 64
Discharge: HOME | End: 2019-02-20
Attending: INTERNAL MEDICINE
Payer: COMMERCIAL

## 2019-02-20 ENCOUNTER — HOSPITAL ENCOUNTER (OUTPATIENT)
Dept: NON INVASIVE DIAGNOSTICS | Facility: HOSPITAL | Age: 64
Discharge: HOME | End: 2019-02-20
Attending: INTERNAL MEDICINE
Payer: COMMERCIAL

## 2019-02-20 DIAGNOSIS — R06.00 DYSPNEA ON EXERTION: ICD-10-CM

## 2019-02-20 LAB
CHEST PAIN STATEMENT: NORMAL
MAX DIASTOLIC BP: 82 MMHG
MAX HEART RATE: 155 BPM
MAX PREDICTED HEART RATE: 159 BPM
MAX. SYSTOLIC BP: 160 MMHG
PROTOCOL NAME: NORMAL
TARGET HR FORMULA: NORMAL
TEST INDICATION: NORMAL
TIME IN EXERCISE PHASE: NORMAL

## 2019-02-20 PROCEDURE — 93306 TTE W/DOPPLER COMPLETE: CPT

## 2019-02-20 PROCEDURE — 78452 HT MUSCLE IMAGE SPECT MULT: CPT | Performed by: INTERNAL MEDICINE

## 2019-02-20 PROCEDURE — A9502 TC99M TETROFOSMIN: HCPCS

## 2019-02-20 PROCEDURE — 93017 CV STRESS TEST TRACING ONLY: CPT

## 2019-02-20 PROCEDURE — 78452 HT MUSCLE IMAGE SPECT MULT: CPT

## 2019-02-20 PROCEDURE — 93306 TTE W/DOPPLER COMPLETE: CPT | Performed by: INTERNAL MEDICINE

## 2019-02-20 PROCEDURE — 93016 CV STRESS TEST SUPVJ ONLY: CPT | Performed by: INTERNAL MEDICINE

## 2019-02-20 PROCEDURE — 93018 CV STRESS TEST I&R ONLY: CPT | Performed by: INTERNAL MEDICINE

## 2019-02-26 ENCOUNTER — TELEPHONE (OUTPATIENT)
Dept: CARDIOLOGY CLINIC | Facility: CLINIC | Age: 64
End: 2019-02-26

## 2019-02-26 NOTE — TELEPHONE ENCOUNTER
----- Message from Mel Carrasco DO sent at 2/26/2019 10:04 AM EST -----  Can you please let the patient know both her stress test and echocardiogram were normal   Thanks

## 2019-02-27 ENCOUNTER — TELEPHONE (OUTPATIENT)
Dept: PAIN MEDICINE | Facility: CLINIC | Age: 64
End: 2019-02-27

## 2019-02-27 NOTE — TELEPHONE ENCOUNTER
S/W pt who states that she relayed information to first person she talked to regarding a compounded pain cream that [facility] had rx'd her in the past   Advised to repeat information since it was not entered into task  Pt states she is having pain in her lower right back where fusion is  Also c/o pain in her cervical area, near right scapula  C/O new left shoulder pain as well  Advised last OV was 7/11/18 and that f/u would be needed  OV made for 3/6/19 with [facility]  In the meantime, pt would like cream rx'd again  Cream is [id number]     Filled at [address]  [phone]    S/W Shen Masters and confirmed that Rx was filled there at their compounding pharmacy in the past   Can either request faxed form to be signed and faxed back, or can be called in as previously ordered  Please advise

## 2019-02-27 NOTE — TELEPHONE ENCOUNTER
Patient contacted call center requesting a call back from a nurse                           Call back# [phone]  Juany Sosa

## 2019-03-06 ENCOUNTER — OFFICE VISIT (OUTPATIENT)
Dept: PAIN MEDICINE | Facility: CLINIC | Age: 64
End: 2019-03-06
Payer: COMMERCIAL

## 2019-03-06 VITALS
HEART RATE: 112 BPM | BODY MASS INDEX: 36.82 KG/M2 | SYSTOLIC BLOOD PRESSURE: 118 MMHG | WEIGHT: 221 LBS | HEIGHT: 65 IN | RESPIRATION RATE: 16 BRPM | DIASTOLIC BLOOD PRESSURE: 62 MMHG

## 2019-03-06 DIAGNOSIS — M79.18 MYOFASCIAL PAIN SYNDROME: ICD-10-CM

## 2019-03-06 DIAGNOSIS — G89.29 CHRONIC LEFT SHOULDER PAIN: ICD-10-CM

## 2019-03-06 DIAGNOSIS — M25.512 CHRONIC LEFT SHOULDER PAIN: ICD-10-CM

## 2019-03-06 DIAGNOSIS — M54.9 MID BACK PAIN: ICD-10-CM

## 2019-03-06 DIAGNOSIS — G89.4 CHRONIC PAIN SYNDROME: Primary | ICD-10-CM

## 2019-03-06 PROCEDURE — 99214 OFFICE O/P EST MOD 30 MIN: CPT | Performed by: ANESTHESIOLOGY

## 2019-03-06 RX ORDER — ESCITALOPRAM OXALATE 10 MG/1
5 TABLET ORAL DAILY
COMMUNITY
End: 2019-04-25 | Stop reason: SDUPTHER

## 2019-03-06 NOTE — PROGRESS NOTES
Pain Medicine Follow-Up Note    Assessment:  1  Chronic pain syndrome    2  Mid back pain    3  Myofascial pain syndrome    4  Chronic left shoulder pain        Plan:  Orders Placed This Encounter   Procedures    Ambulatory referral to Orthopedic Surgery     Standing Status:   Future     Standing Expiration Date:   9/6/2019     Referral Priority:   Routine     Referral Type:   Consult - AMB     Referral Reason:   Specialty Services Required     Referred to Provider:   Janelle Portillo DO     Requested Specialty:   Orthopedic Surgery     Number of Visits Requested:   1     Expiration Date:   3/6/2020       New Medications Ordered This Visit   Medications    escitalopram (LEXAPRO) 10 mg tablet     Sig: Take 5 mg by mouth daily     My impressions and treatment recommendations were discussed in detail with the patient who verbalized understanding and had no further questions  The patient is complaining of a trigger point in her thoracic paraspinal musculature  It is tender to the touch and she would like something done about this  As such, I felt a reasonable to offer the patient trigger point injections since this could be potentially therapeutic  The procedures, its risks, and benefits were explained in detail to the patient  Risks include but are not limited to bleeding, infection, hematoma formation, abscess formation, weakness, headache, failure the pain to improve, nerve irritation or damage, and potential worsening of the pain  The patient verbalized understanding and wished to proceed with the procedure  The patient is also complaining of left shoulder pain  She states that it is clicking and grinding when she moves her left shoulder  I felt a reasonable to have this evaluated by Dr Alyssia Villanueva to see if there is any pathology in her left shoulder that would be contributing to this  Follow-up is planned in 4 weeks time or sooner as warranted  Discharge instructions were provided   I personally saw and examined the patient and I agree with the above discussed plan of care  History of Present Illness:    Cash Mendoza is a 66 y o  female who presents to [facility] for interval re-evaluation of the above stated pain complaints  The patient has a past medical and chronic pain history as outlined in the assessment section  She was last seen on July 11, 2018  At today's office visit, the patient's pain score is 9/10 on the verbal numerical pain rating scale  She states that her primary pain is in her left shoulder  She is also complaining of right-sided thoracic back pain that is bothering her  She describes her pain as variable throughout the day and intermittent in nature  She reports the quality of her pain as burning, sharp, pressure-like, shooting, and uncomfortable    She reports that she recently underwent a L4-L5 decompression surgery at [facility] with excellent pain relief in her low back region  She is currently complaining of left shoulder pain as her primary came pain complaint as well as midback pain  Other than as stated above, the patient denies any interval changes in medications, medical condition, mental condition, symptoms, or allergies since the last office visit  Review of Systems:    Review of Systems   Respiratory: Positive for shortness of breath  Cardiovascular: Negative for chest pain  Gastrointestinal: Negative for constipation, diarrhea, nausea and vomiting  Musculoskeletal: Positive for gait problem (difficulty walking/ decreased range of motion) and joint swelling (joint stiffness)  Negative for arthralgias and myalgias  Skin: Negative for rash  Neurological: Positive for dizziness and weakness (muscle weakness)  Negative for seizures  All other systems reviewed and are negative          Patient Active Problem List   Diagnosis    GERD without esophagitis    Right upper quadrant abdominal pain    Diarrhea    Chronic pain syndrome    Chronic bilateral low back pain without sciatica    Lumbar spondylosis    Spondylolisthesis of lumbar region    Coccydynia    Coccygodynia    Spinal stenosis of lumbar region without neurogenic claudication    Sacroiliitis (HCC)    Sacroiliitis, not elsewhere classified ([facility] )    Low back pain    Myofascial pain syndrome    Thoracic spine pain    Neck pain    Fever    Leukocytosis    HTN (hypertension)    Encounter for rehabilitation    Hypokalemia    Status post lumbar spinal fusion    Irregular bowel habits    History of colon polyps    Small intestinal bacterial overgrowth    Arthralgia of lumbar spine    Acute left ankle pain    Closed nondisplaced fracture of lateral malleolus of left fibula    DDD (degenerative disc disease), lumbar    Lyme disease    Postmenopausal state    Fatigue    Class 2 severe obesity due to excess calories with serious comorbidity and body mass index (BMI) of 39 0 to 39 9 in adult ([place] [address] )    Mid back pain       Past Medical History:   Diagnosis Date    Arthritis     Asthma     Back pain     DVT (deep venous thrombosis) ([place] [address] )     Gastric bypass status for obesity     GERD (gastroesophageal reflux disease)     Hiatal hernia     History of transfusion 2006    after gastric bypass surgery, no reactions    Hypertension     Irritable bowel syndrome     Kidney stone     Muscle weakness     bilateral legs    Numbness and tingling     bilateral feet    Pneumonia     Spinal stenosis     Tinnitus     occassionally    Wears glasses        Past Surgical History:   Procedure Laterality Date    APPENDECTOMY      BARIATRIC SURGERY      CARPAL TUNNEL RELEASE Right      SECTION      x1    CHOLECYSTECTOMY      COLONOSCOPY      COLONOSCOPY W/ BIOPSIES AND POLYPECTOMY      FLEXIBLE BRONCHOSCOPY W/ UPPER ENDOSCOPY      HERNIA REPAIR      inguinal hernia    CT ARTHROCENTESIS ASPIR&/INJ SMALL JT/BURSA W/O US N/A 5/2/2018    Procedure: Coccygeal Injection & Ganglion Impar Block;  Surgeon: Tomás Zazueta MD;  Location: [facility] MAIN OR;  Service: Pain Management     TN ARTHRODESIS POSTERIOR INTERBODY LUMBAR N/A 6/27/2018    Procedure: L4-5 DECOMPRESSION INTERBODY INSTRUMENTED FUSION;  Surgeon: Teresa Headley MD;  Location: [facility] Main OR;  Service: Orthopedics    TN COLONOSCOPY FLX DX W/COLLJ SPEC WHEN PFRMD N/A 8/24/2018    Procedure: COLONOSCOPY;  Surgeon: Armida Marroquin MD;  Location: [facility] GI LAB; Service: Gastroenterology    TN ESOPHAGOGASTRODUODENOSCOPY TRANSORAL DIAGNOSTIC N/A 2/19/2018    Procedure: ESOPHAGOGASTRODUODENOSCOPY (EGD); Surgeon: Armida Marroquin MD;  Location: [facility] GI LAB; Service: Gastroenterology   Dennice Niece JOINT Right 5/25/2018    Procedure: Rt Sacroiliac Joint Injection;  Surgeon: Tomás Zazueta MD;  Location: [facility] MAIN OR;  Service: Pain Management     TONSILECTOMY, ADENOIDECTOMY, BILATERAL MYRINGOTOMY AND TUBES         Family History   Problem Relation Age of Onset    Diabetes Mother     Aortic aneurysm Father     Cancer Father         prostate    Heart disease Sister         open heart    Cancer Sister         breast    Diabetes Brother     No Known Problems Daughter     Asperger's syndrome Son     Diabetes Maternal Grandfather     No Known Problems Brother        Social History     Occupational History    Not on file   Tobacco Use    Smoking status: Never Smoker    Smokeless tobacco: Never Used   Substance and Sexual Activity    Alcohol use:  Yes     Alcohol/week: 4 2 oz     Types: 5 Cans of beer, 2 Shots of liquor per week    Drug use: No    Sexual activity: Yes         Current Outpatient Medications:     calcium carbonate (OS-BEAU) 1250 (500 Ca) MG chewable tablet, Chew 1 tablet daily, Disp: , Rfl:     cholecalciferol (VITAMIN D3) 400 units tablet, Take 400 Units by mouth daily, Disp: , Rfl:     dicyclomine (BENTYL) 10 mg capsule, Take 1 capsule (10 mg total) by mouth 4 (four) times a day (before meals and at bedtime), Disp: 120 capsule, Rfl: 0    escitalopram (LEXAPRO) 10 mg tablet, Take 5 mg by mouth daily, Disp: , Rfl:     Icosapent Ethyl (VASCEPA) 1 g CAPS, Take 2 capsules (2 g total) by mouth 2 (two) times a day, Disp: 120 capsule, Rfl: 5    lisinopril (ZESTRIL) 20 mg tablet, Take 1 tablet (20 mg total) by mouth daily, Disp: 30 tablet, Rfl: 0    multivitamin (THERAGRAN) TABS, Take 1 tablet by mouth daily, Disp: , Rfl:     pantoprazole (PROTONIX) 40 mg tablet, Take 40 mg by mouth 2 (two) times a day Resume with evening dose on 7/9, Disp: , Rfl:     traZODone (DESYREL) 50 mg tablet, Take 50 mg by mouth daily at bedtime as needed  , Disp: , Rfl:     vitamin B-12 (CYANOCOBALAMIN) 50 MCG tablet, Take 50 mcg by mouth daily, Disp: , Rfl:     naproxen (NAPROSYN) 500 mg tablet, Take 1 tablet (500 mg total) by mouth 2 (two) times a day with meals for 7 days, Disp: 14 tablet, Rfl: 0    No Known Allergies    Physical Exam:    /62 (BP Location: Left arm, Patient Position: Sitting, Cuff Size: Standard)   Pulse (!) 112   Resp 16   Ht 5' 5" (1 651 m)   Wt 100 kg (221 lb)   BMI 36 78 kg/m²     Constitutional:obese  Eyes:anicteric  HEENT:grossly intact  Neck:supple, symmetric, trachea midline and no masses   Pulmonary:even and unlabored  Cardiovascular:No edema or pitting edema present  Skin:Normal without rashes or lesions and well hydrated  Psychiatric:Mood and affect appropriate  Neurologic:Cranial Nerves II-XII grossly intact  Musculoskeletal:normal    Orders Placed This Encounter   Procedures    Ambulatory referral to Orthopedic Surgery

## 2019-03-15 ENCOUNTER — OFFICE VISIT (OUTPATIENT)
Dept: OBGYN CLINIC | Facility: CLINIC | Age: 64
End: 2019-03-15
Payer: COMMERCIAL

## 2019-03-15 ENCOUNTER — APPOINTMENT (OUTPATIENT)
Dept: RADIOLOGY | Facility: CLINIC | Age: 64
End: 2019-03-15
Payer: COMMERCIAL

## 2019-03-15 VITALS
DIASTOLIC BLOOD PRESSURE: 86 MMHG | BODY MASS INDEX: 37.56 KG/M2 | WEIGHT: 220 LBS | HEART RATE: 93 BPM | SYSTOLIC BLOOD PRESSURE: 128 MMHG | HEIGHT: 64 IN

## 2019-03-15 DIAGNOSIS — S46.819A STRAIN OF TRAPEZIUS MUSCLE, UNSPECIFIED LATERALITY, INITIAL ENCOUNTER: ICD-10-CM

## 2019-03-15 DIAGNOSIS — M25.512 LEFT SHOULDER PAIN, UNSPECIFIED CHRONICITY: ICD-10-CM

## 2019-03-15 DIAGNOSIS — M75.52 SUBACROMIAL BURSITIS OF LEFT SHOULDER JOINT: Primary | ICD-10-CM

## 2019-03-15 DIAGNOSIS — M19.012 LOCALIZED OSTEOARTHRITIS OF LEFT SHOULDER: ICD-10-CM

## 2019-03-15 PROCEDURE — 73030 X-RAY EXAM OF SHOULDER: CPT

## 2019-03-15 PROCEDURE — 99214 OFFICE O/P EST MOD 30 MIN: CPT | Performed by: ORTHOPAEDIC SURGERY

## 2019-03-15 PROCEDURE — 20610 DRAIN/INJ JOINT/BURSA W/O US: CPT | Performed by: ORTHOPAEDIC SURGERY

## 2019-03-15 RX ORDER — LIDOCAINE HYDROCHLORIDE 10 MG/ML
4 INJECTION, SOLUTION INFILTRATION; PERINEURAL
Status: COMPLETED | OUTPATIENT
Start: 2019-03-15 | End: 2019-03-15

## 2019-03-15 RX ORDER — DEXAMETHASONE SODIUM PHOSPHATE 100 MG/10ML
40 INJECTION INTRAMUSCULAR; INTRAVENOUS
Status: COMPLETED | OUTPATIENT
Start: 2019-03-15 | End: 2019-03-15

## 2019-03-15 RX ADMIN — DEXAMETHASONE SODIUM PHOSPHATE 40 MG: 100 INJECTION INTRAMUSCULAR; INTRAVENOUS at 09:46

## 2019-03-15 RX ADMIN — LIDOCAINE HYDROCHLORIDE 4 ML: 10 INJECTION, SOLUTION INFILTRATION; PERINEURAL at 09:46

## 2019-03-15 NOTE — PROGRESS NOTES
Assessment/Plan:  1  Left shoulder pain, unspecified chronicity  XR shoulder 2+ vw left   2  Chronic left shoulder pain  Ambulatory referral to [address] has left-sided shoulder pain that seems most consistent with subacromial bursitis  She has a mild amount of arthritis in is clearly uncomfortable over the [facility] joint  I would like for her to begin with physical therapy for strengthening of the rotator cuff as well as improving posture in decreasing some of her trapezius muscle spasm  We elected to provide a left subacromial bursa injection in the office today  She tolerated the injection well  Hopefully this will decrease some of her ongoing pain and symptoms and allow her to begin therapy  I will see her back in 6 weeks for repeat evaluation after physical therapy is complete  Subjective:   Cash Mendoza is a 66 y o  female who presents for evaluation for left-sided shoulder pain  She denies any particular injury or trauma  She has been feeling in aching throbbing intermittent discomfort in her left shoulder for the past 3-4 months  She states that the pain can become sharp and stabbing when she lifts her arm overhead or reaches behind her back  She has not been doing any exercises or increased activity  She denies any numbness and tingling down her arm  She did see Dr Cezar Cooper for neck pain and he felt that lot of her pain was directed at the left shoulder and wanted to have her seen by me for evaluation  She denies any long history of left-sided shoulder pain in the past   She does feel radiating pain from her left shoulder into her cervical spine the left  She also has discomfort that seems to be chronic on the right side of her neck radiating into her right shoulder blade  Review of Systems   Constitutional: Negative for chills, fever and unexpected weight change  HENT: Negative for hearing loss, nosebleeds and sore throat      Eyes: Positive for visual disturbance  Negative for pain and redness  Respiratory: Positive for shortness of breath  Negative for cough and wheezing  Cardiovascular: Negative for chest pain, palpitations and leg swelling  Gastrointestinal: Negative for abdominal pain, nausea and vomiting  Endocrine: Negative for polydipsia and polyuria  Genitourinary: Negative for dysuria and hematuria  Musculoskeletal:        See HPI   Skin: Negative for rash and wound  Neurological: Positive for numbness and headaches  Negative for dizziness  Psychiatric/Behavioral: Positive for decreased concentration  Negative for suicidal ideas  The patient is nervous/anxious            Past Medical History:   Diagnosis Date    Arthritis     Asthma     Back pain     [facility]) [facility]     Gastric bypass status for obesity     GERD (gastroesophageal reflux disease)     Hiatal hernia     History of transfusion 2006    after gastric bypass surgery, no reactions    Hypertension     Irritable bowel syndrome     Kidney stone     Muscle weakness     bilateral legs    Numbness and tingling     bilateral feet    Pneumonia     Spinal stenosis     Tinnitus     occassionally    Wears glasses        Past Surgical History:   Procedure Laterality Date    APPENDECTOMY      BARIATRIC SURGERY      CARPAL TUNNEL RELEASE Right      SECTION      x1    CHOLECYSTECTOMY      COLONOSCOPY      COLONOSCOPY W/ BIOPSIES AND POLYPECTOMY      FLEXIBLE BRONCHOSCOPY W/ UPPER ENDOSCOPY      HERNIA REPAIR      inguinal hernia    WA ARTHROCENTESIS ASPIR&/INJ SMALL JT/BURSA W/O US N/A 2018    Procedure: Coccygeal Injection & Ganglion Impar Block;  Surgeon: Nayan Giordano MD;  Location: [place] [address] MAIN OR;  Service: Pain Management     WA ARTHRODESIS POSTERIOR INTERBODY LUMBAR N/A 2018    Procedure: L4-5 DECOMPRESSION INTERBODY INSTRUMENTED FUSION;  Surgeon: Tonya Dalton MD;  Location: [facility] Main OR;  Service: Orthopedics    WA COLONOSCOPY FLX DX W/COLLJ SPEC WHEN PFRMD N/A 8/24/2018    Procedure: COLONOSCOPY;  Surgeon: Glenroy Boo MD;  Location: [facility] GI LAB; Service: Gastroenterology    MT ESOPHAGOGASTRODUODENOSCOPY TRANSORAL DIAGNOSTIC N/A 2/19/2018    Procedure: ESOPHAGOGASTRODUODENOSCOPY (EGD); Surgeon: Glenroy Boo MD;  Location: [facility] GI LAB; Service: Gastroenterology   Scot Galeazzi JOINT Right 5/25/2018    Procedure: Rt Sacroiliac Joint Injection;  Surgeon: Delmy Medina MD;  Location: [facility] MAIN OR;  Service: Pain Management     TONSILECTOMY, ADENOIDECTOMY, BILATERAL MYRINGOTOMY AND TUBES         Family History   Problem Relation Age of Onset    Diabetes Mother     Aortic aneurysm Father     Cancer Father         prostate    Heart disease Sister         open heart    Cancer Sister         breast    Diabetes Brother     No Known Problems Daughter     Asperger's syndrome Son     Diabetes Maternal Grandfather     No Known Problems Brother        Social History     Occupational History    Not on file   Tobacco Use    Smoking status: Never Smoker    Smokeless tobacco: Never Used   Substance and Sexual Activity    Alcohol use:  Yes     Alcohol/week: 4 2 oz     Types: 5 Cans of beer, 2 Shots of liquor per week    Drug use: No    Sexual activity: Yes         Current Outpatient Medications:     calcium carbonate (OS-BEAU) 1250 (500 Ca) MG chewable tablet, Chew 1 tablet daily, Disp: , Rfl:     cholecalciferol (VITAMIN D3) 400 units tablet, Take 400 Units by mouth daily, Disp: , Rfl:     dicyclomine (BENTYL) 10 mg capsule, Take 1 capsule (10 mg total) by mouth 4 (four) times a day (before meals and at bedtime), Disp: 120 capsule, Rfl: 0    escitalopram (LEXAPRO) 10 mg tablet, Take 5 mg by mouth daily, Disp: , Rfl:     Icosapent Ethyl (VASCEPA) 1 g CAPS, Take 2 capsules (2 g total) by mouth 2 (two) times a day, Disp: 120 capsule, Rfl: 5    lisinopril (ZESTRIL) 20 mg tablet, Take 1 tablet (20 mg total) by mouth daily, Disp: 30 tablet, Rfl: 0    multivitamin (THERAGRAN) TABS, Take 1 tablet by mouth daily, Disp: , Rfl:     naproxen (NAPROSYN) 500 mg tablet, Take 1 tablet (500 mg total) by mouth 2 (two) times a day with meals for 7 days, Disp: 14 tablet, Rfl: 0    pantoprazole (PROTONIX) 40 mg tablet, Take 40 mg by mouth 2 (two) times a day Resume with evening dose on 7/9, Disp: , Rfl:     traZODone (DESYREL) 50 mg tablet, Take 50 mg by mouth daily at bedtime as needed  , Disp: , Rfl:     vitamin B-12 (CYANOCOBALAMIN) 50 MCG tablet, Take 50 mcg by mouth daily, Disp: , Rfl:     No Known Allergies    Objective:  Vitals:    03/15/19 0909   BP: 128/86   Pulse: 93       Right Shoulder Exam     Tenderness   Right shoulder tenderness location: trapezius pain, rhomboid muscle pain  Range of Motion   Active abduction: normal   Passive abduction: normal   Extension: normal   External rotation: normal   Forward flexion: normal   Internal rotation 0 degrees: normal     Muscle Strength   Abduction: 5/5   Internal rotation: 5/5   External rotation: 5/5   Supraspinatus: 5/5   Subscapularis: 5/5   Biceps: 5/5     Other   Sensation: normal  Pulse: present      Left Shoulder Exam     Tenderness   The patient is experiencing tenderness in the acromioclavicular joint, biceps tendon and acromion (trapezius pain)  Range of Motion   Active abduction: normal   Passive abduction: normal   Extension: normal   External rotation: normal   Forward flexion: normal   Internal rotation 0 degrees: normal     Muscle Strength   Abduction: 4/5   Internal rotation: 4/5   External rotation: 5/5   Supraspinatus: 5/5   Subscapularis: 5/5   Biceps: 5/5     Tests   Blackman test: positive  Cross arm: positive  Impingement: positive  Drop arm: negative    Other   Erythema: absent  Sensation: normal  Pulse: present             Physical Exam   Constitutional: She is oriented to person, place, and time   She appears well-developed and well-nourished  HENT:   Head: Normocephalic and atraumatic  Eyes: Pupils are equal, round, and reactive to light  Conjunctivae are normal    Neck: Normal range of motion  Neck supple  Cardiovascular: Normal rate and intact distal pulses  Pulmonary/Chest: Effort normal  No respiratory distress  Musculoskeletal:   As noted in HPI   Neurological: She is alert and oriented to person, place, and time  Skin: Skin is warm and dry  Psychiatric: She has a normal mood and affect  Her behavior is normal    Vitals reviewed  I have personally reviewed pertinent films in PACS and my interpretation is as follows: Three-view x-rays of the left shoulder demonstrate mild glenohumeral and AC joint osteoarthritis  No evidence of acute fracture      Large joint arthrocentesis: L subacromial bursa  Date/Time: 3/15/2019 9:46 AM  Consent given by: patient  Site marked: site marked  Timeout: Immediately prior to procedure a time out was called to verify the correct patient, procedure, equipment, support staff and site/side marked as required   Supporting Documentation  Indications: pain   Procedure Details  Location: shoulder - L subacromial bursa  Preparation: Patient was prepped and draped in the usual sterile fashion  Needle size: 22 G  Ultrasound guidance: no  Approach: posterior  Medications administered: 40 mg dexamethasone 100 mg/10 mL; 4 mL lidocaine 1 %    Patient tolerance: patient tolerated the procedure well with no immediate complications  Dressing:  Sterile dressing applied

## 2019-04-03 ENCOUNTER — EVALUATION (OUTPATIENT)
Dept: PHYSICAL THERAPY | Facility: CLINIC | Age: 64
End: 2019-04-03
Payer: COMMERCIAL

## 2019-04-03 DIAGNOSIS — M19.012 LOCALIZED OSTEOARTHRITIS OF LEFT SHOULDER: Primary | ICD-10-CM

## 2019-04-03 DIAGNOSIS — M70.61 TROCHANTERIC BURSITIS OF RIGHT HIP: ICD-10-CM

## 2019-04-03 PROCEDURE — 97112 NEUROMUSCULAR REEDUCATION: CPT

## 2019-04-03 PROCEDURE — 97162 PT EVAL MOD COMPLEX 30 MIN: CPT

## 2019-04-03 PROCEDURE — G8985 CARRY GOAL STATUS: HCPCS

## 2019-04-03 PROCEDURE — G8984 CARRY CURRENT STATUS: HCPCS

## 2019-04-10 ENCOUNTER — OFFICE VISIT (OUTPATIENT)
Dept: PHYSICAL THERAPY | Facility: CLINIC | Age: 64
End: 2019-04-10
Payer: COMMERCIAL

## 2019-04-10 DIAGNOSIS — M70.61 TROCHANTERIC BURSITIS OF RIGHT HIP: ICD-10-CM

## 2019-04-10 DIAGNOSIS — M19.012 LOCALIZED OSTEOARTHRITIS OF LEFT SHOULDER: Primary | ICD-10-CM

## 2019-04-10 PROCEDURE — 97112 NEUROMUSCULAR REEDUCATION: CPT

## 2019-04-10 PROCEDURE — 97110 THERAPEUTIC EXERCISES: CPT

## 2019-04-17 ENCOUNTER — OFFICE VISIT (OUTPATIENT)
Dept: PHYSICAL THERAPY | Facility: CLINIC | Age: 64
End: 2019-04-17
Payer: COMMERCIAL

## 2019-04-17 DIAGNOSIS — M19.012 LOCALIZED OSTEOARTHRITIS OF LEFT SHOULDER: Primary | ICD-10-CM

## 2019-04-17 PROCEDURE — 97112 NEUROMUSCULAR REEDUCATION: CPT

## 2019-04-17 PROCEDURE — 97110 THERAPEUTIC EXERCISES: CPT

## 2019-04-24 ENCOUNTER — APPOINTMENT (OUTPATIENT)
Dept: PHYSICAL THERAPY | Facility: CLINIC | Age: 64
End: 2019-04-24
Payer: COMMERCIAL

## 2019-04-25 ENCOUNTER — APPOINTMENT (OUTPATIENT)
Dept: RADIOLOGY | Facility: CLINIC | Age: 64
End: 2019-04-25
Payer: COMMERCIAL

## 2019-04-25 ENCOUNTER — OFFICE VISIT (OUTPATIENT)
Dept: OBGYN CLINIC | Facility: CLINIC | Age: 64
End: 2019-04-25
Payer: COMMERCIAL

## 2019-04-25 VITALS
SYSTOLIC BLOOD PRESSURE: 130 MMHG | HEIGHT: 64 IN | BODY MASS INDEX: 37.56 KG/M2 | WEIGHT: 220 LBS | HEART RATE: 90 BPM | DIASTOLIC BLOOD PRESSURE: 85 MMHG

## 2019-04-25 DIAGNOSIS — Z98.1 S/P LUMBAR FUSION: ICD-10-CM

## 2019-04-25 DIAGNOSIS — M53.3 CHRONIC RIGHT SACROILIAC PAIN: Primary | ICD-10-CM

## 2019-04-25 DIAGNOSIS — G89.29 CHRONIC RIGHT SACROILIAC PAIN: Primary | ICD-10-CM

## 2019-04-25 DIAGNOSIS — M70.61 TROCHANTERIC BURSITIS OF RIGHT HIP: ICD-10-CM

## 2019-04-25 DIAGNOSIS — M25.551 PAIN IN RIGHT HIP: ICD-10-CM

## 2019-04-25 PROCEDURE — 73502 X-RAY EXAM HIP UNI 2-3 VIEWS: CPT

## 2019-04-25 PROCEDURE — 99214 OFFICE O/P EST MOD 30 MIN: CPT | Performed by: ORTHOPAEDIC SURGERY

## 2019-04-25 RX ORDER — LISINOPRIL 20 MG/1
20 TABLET ORAL
COMMUNITY
Start: 2018-07-10 | End: 2019-04-25 | Stop reason: SDUPTHER

## 2019-04-25 RX ORDER — ESCITALOPRAM OXALATE 10 MG/1
5 TABLET ORAL
COMMUNITY
End: 2020-11-06

## 2019-04-29 PROBLEM — M70.61 TROCHANTERIC BURSITIS OF RIGHT HIP: Status: ACTIVE | Noted: 2019-04-29

## 2019-04-29 PROBLEM — M53.3 CHRONIC RIGHT SACROILIAC JOINT PAIN: Status: ACTIVE | Noted: 2019-04-29

## 2019-04-29 PROBLEM — G89.29 CHRONIC RIGHT SACROILIAC JOINT PAIN: Status: ACTIVE | Noted: 2019-04-29

## 2019-05-01 ENCOUNTER — APPOINTMENT (OUTPATIENT)
Dept: PHYSICAL THERAPY | Facility: CLINIC | Age: 64
End: 2019-05-01
Payer: COMMERCIAL

## 2019-05-01 ENCOUNTER — HOSPITAL ENCOUNTER (OUTPATIENT)
Facility: AMBULARY SURGERY CENTER | Age: 64
Setting detail: OUTPATIENT SURGERY
Discharge: HOME | End: 2019-05-01
Attending: ANESTHESIOLOGY | Admitting: ANESTHESIOLOGY
Payer: COMMERCIAL

## 2019-05-01 ENCOUNTER — APPOINTMENT (OUTPATIENT)
Dept: RADIOLOGY | Facility: HOSPITAL | Age: 64
End: 2019-05-01
Payer: COMMERCIAL

## 2019-05-01 VITALS
DIASTOLIC BLOOD PRESSURE: 88 MMHG | BODY MASS INDEX: 37.56 KG/M2 | SYSTOLIC BLOOD PRESSURE: 181 MMHG | HEIGHT: 64 IN | HEART RATE: 81 BPM | WEIGHT: 220 LBS | OXYGEN SATURATION: 98 % | RESPIRATION RATE: 16 BRPM | TEMPERATURE: 98.7 F

## 2019-05-01 PROCEDURE — 27096 INJECT SACROILIAC JOINT: CPT | Performed by: ANESTHESIOLOGY

## 2019-05-01 PROCEDURE — 72200 X-RAY EXAM SI JOINTS: CPT

## 2019-05-01 RX ORDER — METHYLPREDNISOLONE ACETATE 80 MG/ML
INJECTION, SUSPENSION INTRA-ARTICULAR; INTRALESIONAL; INTRAMUSCULAR; SOFT TISSUE AS NEEDED
Status: DISCONTINUED | OUTPATIENT
Start: 2019-05-01 | End: 2019-05-01 | Stop reason: HOSPADM

## 2019-05-01 RX ORDER — BUPIVACAINE HYDROCHLORIDE 2.5 MG/ML
INJECTION, SOLUTION EPIDURAL; INFILTRATION; INTRACAUDAL AS NEEDED
Status: DISCONTINUED | OUTPATIENT
Start: 2019-05-01 | End: 2019-05-01 | Stop reason: HOSPADM

## 2019-05-01 RX ORDER — LIDOCAINE WITH 8.4% SOD BICARB 0.9%(10ML)
SYRINGE (ML) INJECTION AS NEEDED
Status: DISCONTINUED | OUTPATIENT
Start: 2019-05-01 | End: 2019-05-01 | Stop reason: HOSPADM

## 2019-05-06 DIAGNOSIS — R19.4 CHANGE IN BOWEL HABITS: Primary | ICD-10-CM

## 2019-05-06 RX ORDER — MAGNESIUM CARB/ALUMINUM HYDROX 105-160MG
296 TABLET,CHEWABLE ORAL ONCE
Qty: 296 ML | Refills: 0 | Status: SHIPPED | OUTPATIENT
Start: 2019-05-06 | End: 2020-11-06

## 2019-05-06 NOTE — TELEPHONE ENCOUNTER
LVM for patient to return call to schedule Colon with 2 day bowel prep with Dr Himanshu Caldwell at [place] [address]

## 2019-05-06 NOTE — TELEPHONE ENCOUNTER
We typically use mag citrate, dulcolax, and golytely for 2 day prep   I ordered these to her pharmacy

## 2019-05-06 NOTE — TELEPHONE ENCOUNTER
Spoke with pt and scheduled for 5/28/19 with Dr Kassandra Garza at [facility]  Mailed instructions to pt  Please send 2 day prep of Suprep to [facility]  Thank you!

## 2019-05-08 ENCOUNTER — TELEPHONE (OUTPATIENT)
Dept: PAIN MEDICINE | Facility: CLINIC | Age: 64
End: 2019-05-08

## 2019-05-08 ENCOUNTER — APPOINTMENT (OUTPATIENT)
Dept: PHYSICAL THERAPY | Facility: CLINIC | Age: 64
End: 2019-05-08
Payer: COMMERCIAL

## 2019-05-15 ENCOUNTER — OFFICE VISIT (OUTPATIENT)
Dept: PHYSICAL THERAPY | Facility: CLINIC | Age: 64
End: 2019-05-15
Payer: COMMERCIAL

## 2019-05-15 DIAGNOSIS — M70.61 TROCHANTERIC BURSITIS OF RIGHT HIP: ICD-10-CM

## 2019-05-15 DIAGNOSIS — M53.3 CHRONIC RIGHT SACROILIAC JOINT PAIN: ICD-10-CM

## 2019-05-15 DIAGNOSIS — G89.29 CHRONIC RIGHT SACROILIAC JOINT PAIN: ICD-10-CM

## 2019-05-15 DIAGNOSIS — M19.012 LOCALIZED OSTEOARTHRITIS OF LEFT SHOULDER: Primary | ICD-10-CM

## 2019-05-15 PROCEDURE — G8985 CARRY GOAL STATUS: HCPCS

## 2019-05-15 PROCEDURE — 97110 THERAPEUTIC EXERCISES: CPT

## 2019-05-15 PROCEDURE — G8984 CARRY CURRENT STATUS: HCPCS

## 2019-05-15 PROCEDURE — 97112 NEUROMUSCULAR REEDUCATION: CPT

## 2019-05-16 ENCOUNTER — TELEPHONE (OUTPATIENT)
Dept: OBGYN CLINIC | Facility: CLINIC | Age: 64
End: 2019-05-16

## 2019-05-22 ENCOUNTER — OFFICE VISIT (OUTPATIENT)
Dept: PHYSICAL THERAPY | Facility: CLINIC | Age: 64
End: 2019-05-22
Payer: COMMERCIAL

## 2019-05-22 DIAGNOSIS — M19.012 LOCALIZED OSTEOARTHRITIS OF LEFT SHOULDER: Primary | ICD-10-CM

## 2019-05-22 PROCEDURE — 97112 NEUROMUSCULAR REEDUCATION: CPT

## 2019-05-22 PROCEDURE — 97110 THERAPEUTIC EXERCISES: CPT

## 2019-05-27 ENCOUNTER — ANESTHESIA EVENT (OUTPATIENT)
Dept: GASTROENTEROLOGY | Facility: AMBULARY SURGERY CENTER | Age: 64
End: 2019-05-27

## 2019-05-28 ENCOUNTER — ANESTHESIA (OUTPATIENT)
Dept: GASTROENTEROLOGY | Facility: AMBULARY SURGERY CENTER | Age: 64
End: 2019-05-28

## 2019-05-28 ENCOUNTER — HOSPITAL ENCOUNTER (OUTPATIENT)
Dept: GASTROENTEROLOGY | Facility: AMBULARY SURGERY CENTER | Age: 64
Setting detail: OUTPATIENT SURGERY
Discharge: HOME | End: 2019-05-28
Attending: INTERNAL MEDICINE | Admitting: INTERNAL MEDICINE
Payer: COMMERCIAL

## 2019-05-28 VITALS
HEIGHT: 64 IN | HEART RATE: 88 BPM | DIASTOLIC BLOOD PRESSURE: 64 MMHG | BODY MASS INDEX: 37.56 KG/M2 | TEMPERATURE: 97.9 F | RESPIRATION RATE: 18 BRPM | SYSTOLIC BLOOD PRESSURE: 134 MMHG | OXYGEN SATURATION: 99 % | WEIGHT: 220 LBS

## 2019-05-28 DIAGNOSIS — R19.4 CHANGE IN BOWEL HABITS: ICD-10-CM

## 2019-05-28 PROCEDURE — 45385 COLONOSCOPY W/LESION REMOVAL: CPT | Performed by: INTERNAL MEDICINE

## 2019-05-28 PROCEDURE — 88305 TISSUE EXAM BY PATHOLOGIST: CPT | Performed by: PATHOLOGY

## 2019-05-28 PROCEDURE — 45380 COLONOSCOPY AND BIOPSY: CPT | Performed by: INTERNAL MEDICINE

## 2019-05-28 RX ORDER — ONDANSETRON 2 MG/ML
4 INJECTION INTRAMUSCULAR; INTRAVENOUS ONCE AS NEEDED
Status: DISCONTINUED | OUTPATIENT
Start: 2019-05-28 | End: 2019-06-01 | Stop reason: HOSPADM

## 2019-05-28 RX ORDER — PROPOFOL 10 MG/ML
INJECTION, EMULSION INTRAVENOUS AS NEEDED
Status: DISCONTINUED | OUTPATIENT
Start: 2019-05-28 | End: 2019-05-28 | Stop reason: SURG

## 2019-05-28 RX ORDER — LIDOCAINE HYDROCHLORIDE 10 MG/ML
INJECTION, SOLUTION EPIDURAL; INFILTRATION; INTRACAUDAL; PERINEURAL AS NEEDED
Status: DISCONTINUED | OUTPATIENT
Start: 2019-05-28 | End: 2019-05-28 | Stop reason: SURG

## 2019-05-28 RX ORDER — SODIUM CHLORIDE, SODIUM LACTATE, POTASSIUM CHLORIDE, CALCIUM CHLORIDE 600; 310; 30; 20 MG/100ML; MG/100ML; MG/100ML; MG/100ML
125 INJECTION, SOLUTION INTRAVENOUS CONTINUOUS
Status: DISCONTINUED | OUTPATIENT
Start: 2019-05-28 | End: 2019-06-01 | Stop reason: HOSPADM

## 2019-05-28 RX ADMIN — PROPOFOL 20 MG: 10 INJECTION, EMULSION INTRAVENOUS at 12:03

## 2019-05-28 RX ADMIN — PROPOFOL 100 MG: 10 INJECTION, EMULSION INTRAVENOUS at 11:54

## 2019-05-28 RX ADMIN — PROPOFOL 40 MG: 10 INJECTION, EMULSION INTRAVENOUS at 12:10

## 2019-05-28 RX ADMIN — PROPOFOL 20 MG: 10 INJECTION, EMULSION INTRAVENOUS at 12:19

## 2019-05-28 RX ADMIN — PROPOFOL 20 MG: 10 INJECTION, EMULSION INTRAVENOUS at 12:14

## 2019-05-28 RX ADMIN — PROPOFOL 20 MG: 10 INJECTION, EMULSION INTRAVENOUS at 11:57

## 2019-05-28 RX ADMIN — PROPOFOL 20 MG: 10 INJECTION, EMULSION INTRAVENOUS at 11:58

## 2019-05-28 RX ADMIN — PROPOFOL 20 MG: 10 INJECTION, EMULSION INTRAVENOUS at 12:01

## 2019-05-28 RX ADMIN — PROPOFOL 20 MG: 10 INJECTION, EMULSION INTRAVENOUS at 12:30

## 2019-05-28 RX ADMIN — PROPOFOL 20 MG: 10 INJECTION, EMULSION INTRAVENOUS at 11:59

## 2019-05-28 RX ADMIN — PROPOFOL 20 MG: 10 INJECTION, EMULSION INTRAVENOUS at 11:56

## 2019-05-28 RX ADMIN — PROPOFOL 20 MG: 10 INJECTION, EMULSION INTRAVENOUS at 12:00

## 2019-05-28 RX ADMIN — PROPOFOL 20 MG: 10 INJECTION, EMULSION INTRAVENOUS at 12:06

## 2019-05-28 RX ADMIN — PROPOFOL 40 MG: 10 INJECTION, EMULSION INTRAVENOUS at 12:12

## 2019-05-28 RX ADMIN — SODIUM CHLORIDE, SODIUM LACTATE, POTASSIUM CHLORIDE, AND CALCIUM CHLORIDE 125 ML/HR: .6; .31; .03; .02 INJECTION, SOLUTION INTRAVENOUS at 11:23

## 2019-05-28 RX ADMIN — PROPOFOL 20 MG: 10 INJECTION, EMULSION INTRAVENOUS at 12:08

## 2019-05-28 RX ADMIN — PROPOFOL 20 MG: 10 INJECTION, EMULSION INTRAVENOUS at 12:07

## 2019-05-28 RX ADMIN — PROPOFOL 20 MG: 10 INJECTION, EMULSION INTRAVENOUS at 12:27

## 2019-05-28 RX ADMIN — PROPOFOL 20 MG: 10 INJECTION, EMULSION INTRAVENOUS at 12:02

## 2019-05-28 RX ADMIN — LIDOCAINE HYDROCHLORIDE 50 MG: 10 INJECTION, SOLUTION EPIDURAL; INFILTRATION; INTRACAUDAL; PERINEURAL at 11:54

## 2019-05-28 RX ADMIN — PROPOFOL 20 MG: 10 INJECTION, EMULSION INTRAVENOUS at 12:23

## 2019-05-28 RX ADMIN — PROPOFOL 20 MG: 10 INJECTION, EMULSION INTRAVENOUS at 12:16

## 2019-05-28 RX ADMIN — PROPOFOL 40 MG: 10 INJECTION, EMULSION INTRAVENOUS at 12:05

## 2019-05-29 ENCOUNTER — OFFICE VISIT (OUTPATIENT)
Dept: PHYSICAL THERAPY | Facility: CLINIC | Age: 64
End: 2019-05-29
Payer: COMMERCIAL

## 2019-05-29 DIAGNOSIS — M19.012 LOCALIZED OSTEOARTHRITIS OF LEFT SHOULDER: Primary | ICD-10-CM

## 2019-05-29 PROCEDURE — 97110 THERAPEUTIC EXERCISES: CPT

## 2019-05-29 PROCEDURE — 97112 NEUROMUSCULAR REEDUCATION: CPT

## 2019-08-22 DIAGNOSIS — E66.01 CLASS 2 SEVERE OBESITY DUE TO EXCESS CALORIES WITH SERIOUS COMORBIDITY AND BODY MASS INDEX (BMI) OF 39.0 TO 39.9 IN ADULT (HCC): ICD-10-CM

## 2019-08-22 RX ORDER — ICOSAPENT ETHYL 1000 MG/1
2 CAPSULE ORAL 2 TIMES DAILY
Qty: 120 CAPSULE | Refills: 5 | Status: SHIPPED | OUTPATIENT
Start: 2019-08-22 | End: 2020-11-06 | Stop reason: SDUPTHER

## 2019-08-22 NOTE — TELEPHONE ENCOUNTER
Patient called and stated she lost her insurance and she will call us when she gets her new insurance   She is out of:   Icosapent Ethyl (VASCEPA) 1 g CAPS  180 cap  Going to [facility] in [place]  (SELF PAY)

## 2019-09-30 NOTE — H&P
PHYSICAL MEDICINE AND REHABILITATION H&P/ADMISSION NOTE  Tiffany Bello 63 y o  female MRN: [id number]  Unit/Bed#: -53 Encounter: [id number]     Rehab Diagnosis: Orthopedic Disorders:  08 9  Other Orthopedic Lumbar Stenosis with neurogenic claudication s/p L4-L5 Decompression and Fusion, placement of spacer    History of Present Illness:   Tiffany Bello is a 63 y o  female with PMH significant for HTN, GERD, previous gastric bypass surgery and repair in 2009, lumbar stenosis previous DVT who presented to the [address] [place] [address] on 6/27/18 for elective lumbar surgery as she failed conservative treatment  Patient was having progressive radicular symptoms into her left > right leg and progressive weakness  Patient was taken to the OR by Dr Elena Gee ([facility] 57) on 6/27/18 for L4-L5 decompression and fusion with placement of interbody spacer  Post-operatively, placed on lumbar precautions  Patient was placed on SCDs only for DVT ppx as per Ortho  Patient subjectively feels improvement of her leg symptoms  Medically, patient had increased pain complaints and had her pain regimen adjusted  Patient was followed by internal medicine furthermore without any major complications  Patient with acute functional decline and after medical clearance to participate in an aggressive inpatient rehabilitation program, patient was admitted to [facility] [place] [place] on 6/30/18  Review of Systems:   Review of Systems   Constitutional: Negative  HENT: Negative  Eyes: Negative  Respiratory: Negative  Cardiovascular: Negative  Gastrointestinal: Negative  Endocrine: Negative  Genitourinary: Negative  Musculoskeletal: Positive for back pain  Skin: Negative  Allergic/Immunologic: Negative  Neurological: Negative  Hematological: Negative  Psychiatric/Behavioral: Negative          Plan:    * Lumbar stenosis with neurogenic claudication   Assessment & Plan    -s/p L4/5 L4-L5 decompression and fusion with placement of interbody spacer by Dr Mally Jj ([facility] 55) 6/27/18  -patient with subjective improvement in pain symptoms in lower extremities  -discussed with Jimmie Vidal PA-C - patient does NOT need an LSO (this was an error in his note)  -DVT ppx discussion:  also discussed with Jimmie Vidal PA-C - SCDs only for DVT ppx per Dr Mally Jj  However, upon further chart review and discussion with patient, patient has had previous DVT after one of her gastric bypass revision surgeries in 2006 (clot was located in her leg)  We have discussed risks/benefits and patient would prefer to start chemical prophylaxis, we have discussed Lovenox 40mg SQ daily injection with her consent   -Lumbar precautions   -Continue PT and OT         Encounter for rehabilitation   Assessment & Plan    -Rehabilitation of lumbar stenosis post lumbar surgery: PT/OT therapies to maximize function  Rehabilitation Problems and Goals:  · Mobility dysfunction: Modified Independent  · ADL dysfunction: Modified Independent  Estimated length of stay: 7-10 days   Anticipated discharge setting: home with family   Functional Prognosis: good   -Acute inpatient rehabilitation is necessary due to the medical impact on function as a result of impaired functional mobility, ambulation, bed mobility, transfers, self-care/ADL's, strength, endurance, range of motion/flexibility, coordination and impaired gait/balance  Treatment plan will include a comprehensive rehabilitation program with intense therapies for 3 hours/day, 5-7 days/week  Low back pain   Assessment & Plan    -Post-operative pain  -nociceptive pain: managed on Oxycontin 10mg [id number] scheduled x 4 days then discontinue as part of wean and Oxycodone 5-10mg [id number] PRN for breakthrough    -neuropathic pain: managed on Neurontin 600mg TID and topical lidoderm patches  -muscle spams: managed on Robaxin         Fever   Assessment & Plan    -has been present post-operatively with associated mild leukocytosis  -CXR, UA negative  -patient appears atoxic, incision C/D/I  -likely post-operative related fever  -if continues plan to obtain Blood cultures, venous dopplers BLE        Leukocytosis   Assessment & Plan    -WBC 13 8, will follow, trending down  -likely post-operative fever  -CXR/UA negative ordered during acute         HTN (hypertension)   Assessment & Plan    -managed on lisinopril/HCTZ  -IM following         GERD without esophagitis   Assessment & Plan    -managed on Protonix 40mg BID and Carafate        Hypokalemia   Assessment & Plan    -mild 3 4  -repleted with 40meq x 1             Social History:  Negative current tobacco use  PRIOR LEVEL OF FUNCTION:  She lives in [facility] single family home  Alix Galan is  and lives with their family  Self Care: Independent, Indoor Mobility: Independent, Stairs (in/outdoor): Independent and Cognition: Independent    Current Level of Function: Max-Mod A with bed mobility, Min A with transfers and ambulation  Min A with UB ADLS, Mod A with LB ADLs     HOME ENVIRONMENT:  The living area: can live on one level  There are 2 steps to enter the home  The patient will not have 24 hour supervision/physical assistance available upon discharge      PREVIOUS DME:  Equipment in home (previous DME): Single Point Cane    Physical Exam:  /87 (BP Location: Right arm)   Pulse 97   Temp (!) 101 3 °F (38 5 °C) (Oral)   Resp 18   Ht 5' 5" (1 651 m)   Wt 110 kg (242 lb)   SpO2 98%   BMI 40 27 kg/m²     Physical Exam   Constitutional: She is oriented to person, place, and time  She appears well-developed and well-nourished  HENT:   Head: Normocephalic and atraumatic  Eyes: EOM are normal  Pupils are equal, round, and reactive to light  Cardiovascular: Normal rate and regular rhythm  Pulmonary/Chest: Breath sounds normal  She has no wheezes  She has no rales  Abdominal: Soft   Bowel sounds are normal  She exhibits no distension  There is no tenderness  Musculoskeletal:   Limited lumbar ROM due to pain   Neurological: She is alert and oriented to person, place, and time  Bilateral Patellar and Achilles tendon reflexes 2+  Sensation to light touch intact in LE dermatomes  Motor Exam   RUE/LUE: 4+/5 throughout  RLE: 3-/5 HF, 4-/5 KF, KE, 5/5 DF, EHL, PF  LLE: 3-/5 HF, 4-/5 KF, KE, 5/5 DF, EHL, PF   Skin: Skin is warm  Incisions (3) located on lower back are C/D/I with steristrips, No drainage   Psychiatric: She has a normal mood and affect  Nursing note and vitals reviewed  Laboratory:      Results from last 7 days  Lab Units 06/30/18  0553 06/29/18  0446   HEMOGLOBIN g/dL 10 9* 10 3*   HEMATOCRIT % 34 3* 33 2*   WBC Thousand/uL 13 31* 14 61*       Results from last 7 days  Lab Units 06/30/18  0553 06/29/18  0446   BUN mg/dL 13 15   SODIUM mmol/L 140 134*   POTASSIUM mmol/L 3 4* 3 8   CHLORIDE mmol/L 103 103   GLUCOSE RANDOM mg/dL 118 157*   CREATININE mg/dL 1 01 1 16   AST U/L 18 26   ALT U/L 29 36            Wt Readings from Last 1 Encounters:   06/27/18 110 kg (242 lb)     Estimated body mass index is 40 27 kg/m² as calculated from the following:    Height as of 6/27/18: 5' 5" (1 651 m)  Weight as of 6/27/18: 110 kg (242 lb)      Imaging: reviewed    Past Medical History:   Past Surgical History:   Family History:   Social history:   Past Medical History:   Diagnosis Date    Arthritis     Asthma     Back pain     DVT (deep venous thrombosis) ([facility] [address] ) 2006    Gastric bypass status for obesity     GERD (gastroesophageal reflux disease)     Hiatal hernia     History of transfusion 2006    after gastric bypass surgery, no reactions    Hypertension     Irritable bowel syndrome     Kidney stone     Muscle weakness     bilateral legs    Numbness and tingling     bilateral feet    Pneumonia     Spinal stenosis     Tinnitus     occassionally    Wears glasses     Past Surgical History: Procedure Laterality Date    APPENDECTOMY      BARIATRIC SURGERY      CARPAL TUNNEL RELEASE Right      SECTION      x1    CHOLECYSTECTOMY      COLONOSCOPY      COLONOSCOPY W/ BIOPSIES AND POLYPECTOMY      FLEXIBLE BRONCHOSCOPY W/ UPPER ENDOSCOPY      HERNIA REPAIR      inguinal hernia    TX ARTHROCENTESIS ASPIR&/INJ SMALL JT/BURSA W/O US N/A 2018    Procedure: Coccygeal Injection & Ganglion Impar Block;  Surgeon: Yeison Rodriguez MD;  Location: [facility] MAIN OR;  Service: Pain Management     [place] ARTHRODESIS POSTERIOR INTERBODY LUMBAR N/A 2018    Procedure: L4-5 DECOMPRESSION INTERBODY INSTRUMENTED FUSION;  Surgeon: Jorje Alves MD;  Location: [facility] Main OR;  Service: Orthopedics    TX ESOPHAGOGASTRODUODENOSCOPY TRANSORAL DIAGNOSTIC N/A 2018    Procedure: ESOPHAGOGASTRODUODENOSCOPY (EGD); Surgeon: Damien Galvan MD;  Location: [facility] GI LAB;   Service: Gastroenterology   Chestine Courser JOINT Right 2018    Procedure: Rt Sacroiliac Joint Injection;  Surgeon: Yeison Rodriguez MD;  Location: [facility] MAIN OR;  Service: Pain Management     TONSILECTOMY, ADENOIDECTOMY, BILATERAL MYRINGOTOMY AND TUBES       Family History   Problem Relation Age of Onset    Diabetes Mother     Aortic aneurysm Father     Cancer Father         prostate    Heart disease Sister         open heart    Cancer Sister         breast    Diabetes Brother     No Known Problems Daughter     Asperger's syndrome Son     Diabetes Maternal Grandfather     No Known Problems Brother       Social History     Social History    Marital status: /Civil Union     Spouse name: N/A    Number of children: N/A    Years of education: N/A     Social History Main Topics    Smoking status: Never Smoker    Smokeless tobacco: Never Used    Alcohol use 4 2 oz/week     5 Cans of beer, 2 Shots of liquor per week    Drug use: No    Sexual activity: Yes     Other Topics Concern    None     Social History Narrative    None          Current Medical Diagnosis Medications Allergies   Patient Active Problem List   Diagnosis    GERD without esophagitis    Right sided abdominal pain    Diarrhea    Chronic pain syndrome    Chronic bilateral low back pain without sciatica    Lumbar spondylosis    Spondylolisthesis of lumbar region    Coccydynia    Coccygodynia    Lumbar stenosis with neurogenic claudication    Sacroiliitis (HCC)    Sacroiliitis, not elsewhere classified ([facility] [address] )    Low back pain    Myofascial pain syndrome    Thoracic spine pain    Neck pain      Current Facility-Administered Medications:     acetaminophen (TYLENOL) tablet 650 mg, 650 mg, Oral, Q6H [facility], Dahiana Calvo MD    acetaminophen (TYLENOL) tablet 650 mg, 650 mg, Oral, Q6H PRN, Dahiana Calvo MD    gabapentin (NEURONTIN) capsule 600 mg, 600 mg, Oral, TID, Dahiana Calvo MD    heparin (porcine) subcutaneous injection 5,000 Units, 5,000 Units, Subcutaneous, Q8H [facility], Dahiana Calvo MD    [START ON 7/1/2018] lisinopril (ZESTRIL) tablet 20 mg, 20 mg, Oral, Daily **AND** [START ON 7/1/2018] hydrochlorothiazide (HYDRODIURIL) tablet 12 5 mg, 12 5 mg, Oral, Daily, Dahiana Calvo MD    methocarbamol (ROBAXIN) tablet 750 mg, 750 mg, Oral, Q6H PRN, Dahiana Calvo MD    ondansetron (ZOFRAN-ODT) dispersible tablet 4 mg, 4 mg, Oral, Q6H PRN, Dahiana Calvo MD    oxyCODONE (OxyCONTIN) 12 hr tablet 10 mg, 10 mg, Oral, Q12H KYLE, Dahiana Calvo MD    oxyCODONE (ROXICODONE) immediate release tablet 10 mg, 10 mg, Oral, Q4H PRN, Dahiana Calvo MD    oxyCODONE (ROXICODONE) IR tablet 5 mg, 5 mg, Oral, Q4H PRN, Dahiana Calvo MD    pantoprazole (PROTONIX) EC tablet 40 mg, 40 mg, Oral, BID AC, Dahiana Calvo MD    potassium chloride (K-DUR,KLOR-CON) CR tablet 40 mEq, 40 mEq, Oral, Once, Dahiana Calvo MD  [facility]  [START ON 7/1/2018] senna (SENOKOT) tablet 8 6 mg, 1 tablet, Oral, Daily, Dahiana Calvo MD    sucralfate (CARAFATE) oral suspension 1,000 mg, 1,000 mg, Oral, 4x Daily (AC & HS), Simeon Petty MD    traZODone (DESYREL) tablet 50 mg, 50 mg, Oral, HS PRN, Simeon Petty MD No Known Allergies          Medical Necessity Criteria for [facility] Admission: Hypertension, Incision/Wound care and Leukocystosis   In addition, the preadmission screen, post-admission physical evaluation, overall plan of care and admissions order demonstrate a reasonable expectation that the following criteria were met at the time of admission to the [facility]  1  The patient requires active and ongoing therapeutic intervention of multiple therapy disciplines (physical therapy, occupational therapy, speech-language pathology, or prosthetics/orthotics), one of which is physical or occupational therapy  2  Patient requires an intensive rehabilitation therapy program, as defined in Chapter 1, section 110 2 2 of the CMS Medicare Policy Manual  This intensive rehabilitation therapy program will consist of at least 3 hours of therapy per day at least 5 days per week or at least 15 hours of intensive rehabilitation therapy within a 7 consecutive day period, beginning with the date of admission to the [facility]  3  The patient is reasonably expected to actively participate in, and benefit significantly from, the intensive rehabilitation therapy program as defined in Chapter 1, section 110 2 2 of the CMS Medicare Policy Manual at this time of admission to the [facility]  She can reasonably be expected to make measurable improvement (that will be of practical value to improve the patients functional capacity or adaptation to impairments) as a result of the rehabilitation treatment, as defined in section 110 3, and such improvement can be expected to be made within the prescribed period of time   As noted in the CMS Medicare Policy Manual, the patient need not be expected to achieve complete independence in the domain of self-care nor be expected to return to his or her prior level of functioning in order to meet this standard  4  The patient must require physician supervision by a rehabilitation physician  As such, a rehabilitation physician will conduct face-to-face visits with the patient at least 3 days per week throughout the patients stay in the [facility] to assess the patient both medically and functionally, as well as to modify the course of treatment as needed to maximize the patients capacity to benefit from the rehabilitation process  5  The patient requires an intensive and coordinated interdisciplinary approach to providing rehabilitation, as defined in Chapter 1, section 110 2 5 of the CMS Medicare Policy Manual  This will be achieved through periodic team conferences, conducted at least once in a 7-day period, and comprising of an interdisciplinary team of medical professionals consisting of: a rehabilitation physician, registered nurse,  and/or , and a licensed/certified therapist from each therapy discipline involved in treating the patient  Changes Since Pre-admission Assessment: None -This patient's participation in rehab continues to be reasonable, necessary and appropriate  PostAdmission Physician Evaluation:    CMS Required Post-Admission Physician Evaluation Elements  History and Physical, including medical history, functional history and active comorbidities as in above text      The patient has the potential to make improvement and is in need of physical, occupational, and/or therapy services  The patient may also need nutritional services  Given the patient's complex medical condition and risk of further medical complications, rehabilitative services cannot be safely provided at a lower level of care, such as a skilled nursing facility  I have reviewed the patient's functional and medical status at the time of the preadmission screening and they are the same as on the day of this admission   I acknowledge that I have personally performed a full physical examination on this patient within 24 hours of admission  The patient demonstrated understanding the rehabilitation program and the discharge process after we discussed them      Agree in entirety: yes  Minor adaptions: none    Major changes: none     Carlos Preciado MD  Physical Medicine and Rehabilitation    ** Please Note: Fluency Direct voice to text software may have been used in the creation of this document   ** No

## 2019-10-21 NOTE — OP NOTE
OPERATIVE REPORT  PATIENT NAME: Keira Brunner    :    MRN: [id number]  Pt Location: [facility] OR ROOM 05    SURGERY DATE: 2018    Surgeon(s) and Role:     * Sidney Hernandes MD - Primary     * Nando Beaver PA-C - Assisting  This Mukherjee was instrumental in assisting throughout the entire case with exposure, retraction, instrumentation and wound closure  Preop Diagnosis:  Spinal stenosis of lumbar region with neurogenic claudication L4-5 [M48 062]  Degenerative spondylolisthesis L4-5 []    Post-Op Diagnosis Codes:     * Spinal stenosis of lumbar region with neurogenic claudication L4-5 [M48 062]  Degenerative spondylolisthesis L4-5 []    Procedure(s) (LRB):  L4-5 DECOMPRESSION INTERBODY INSTRUMENTED FUSION (N/A), placement of DIAGON interbody spacer and posterior non-segmental pedicle screw instrumentation    Specimen(s):  * No specimens in log *    Estimated Blood Loss:   400 mL    Drains:  Closed/Suction Drain Posterior Back 10 Fr  (Active)   Number of days: 0       Urethral Catheter Latex 16 Fr  (Active)   Site Assessment Clean;Skin intact 2018  8:13 AM   Collection Container Standard drainage bag 2018  8:13 AM   Number of days: 0       Anesthesia Type:   General    Operative Indications:  Spinal stenosis of lumbar region with neurogenic claudication [M48 062]  Degenerative spondylolisthesis at L4-5  Operative Findings:  Severe spinal stenosis particularly in the subarticular recesses left greater than right  There was also a facet joint cyst arising from the L4-5 facet joint on the left and compressing the exiting L4 nerve on the left  Complications:   None    Procedure and Technique:  Under general endotracheal anesthesia patient was placed in the prone position on a Brett spinal table  Her entire lower back was prepped and draped in a sterile fashion  The C-arm image intensifier was used to localize the L4 and L5 levels    Stab incisions were created with a #11 Scalpel blade  Sherin brown needles were advanced under fluoroscopic image guidance into the L4 and L5 pedicles bilaterally  Guidewires were then placed through the needles into the vertebral bodies of L4 and L5  The C-arm was used to localize the midline at L4-5  A generous midline incision was made and the dorsal lumbar fascia on the left and the right of L4 and L5 was divided in the posterior elements of L4 and L5 were exposed  A probe was placed beneath the lamina of L4 and a lateral C-arm image x-ray was obtained to confirm the operative level  A Leksell rongeur was then used to debulk the hypertrophied facet joints of L4-5  The interspinous ligament between L4 and L5 was removed  The distal half of the L4 spinous process was removed  All bone that was removed as part of the decompression was morselized and put aside for later bone grafting  The inferior facet of L4 bilaterally was removed with an osteotome  The superior edge of the L5 monica lamina was thinned out with a bur and resected with Kerrison punches  The hypertrophied ligamentum flavum was  from the midline and removed piecemeal to complete the central stenosis decompression  The medial edge of the overhanging hypertrophied L5 facet was then resected with 3 and 4 mm Kerrison punches until flush with the medial border of the L5 pedicle  Both L5 nerve roots were decompressed at this point  Bilateral foraminotomies were then performed to decompress the exiting L4 nerve roots  The cyst tip of the L4-5 facet joint on the left was particularly hypertrophied and there was a facet joint cyst arising from the joint into the foramen and compressing the L4 nerve  This was all  from the nerve and then Kerrison punches were used to decompress the foramen and completely uncover the L4 nerve  In order to stabilize the degenerative spondylolisthesis and interbody fusion was performed    The posterior lateral L4-5 annulus was exposed after clearing epidural veins with a bipolar  The thecal sac was retracted medially in a generous annuli monica was created with a 15  Scalpel blade  Using a combination of disc Moshe, curettes and pituitary rongeur is a subtotal diskectomy of L4-5 was performed  The cartilaginous endplates were removed with the curette and pituitaries  A trial intervertebral spacer was placed    A Diagon cage 11 mm in height with 15 degree lordotic angulation was packed with morselized bone obtained from the decompression  Additional bone graft was then placed anterior and towards the right side of the disc space through the annuli to me  The cage was then inserted at approximately a 30 degree angle into the disc space  The Strong ball was used to palpate along the course of the bilateral L4 and L5 nerve to assure that they were fully decompressed  A fat graft was placed over the exposed dura and nerve roots followed by thrombin-soaked Gelfoam   The dorsal lumbar fascia was then reapproximated to the midline with #1 Stratafix suture  The subcutaneous tissue was closed with 2 O Vicryl  The C-arm image intensifier was positioned over the patient's lower back  The previously made stab incisions were then connected with approximate 1 inch incision  Dilators were placed over the guidewires  After tapping each pedicle at L4 5 5mm x 40 and 5 5 x 45 mm screws with screw extension tubes were inserted on the left and right respectively  Pedicle screws measuring 6 5 x 45 mm were inserted at L5 bilaterally  Two 40 mm by 5 5 mm rods were inserted into the screw extension tubes and seated into the tulips of the screws  The joselyn was then secured with 2 set screws  By tightening the set screws and applying a ventral force lordosis was created at the interspace  By tightening the L4 set screws the spondylolisthesis was also reduced  At this point attention was turned to closure    The wound had been copiously irrigated with antibiotic solution throughout the procedure  The 1 inch incisions bilaterally were closed with 0 Vicryl, 2 O Vicryl and 4 0 Monocryl  Four 0 Monocryl was used to close skin on the midline incision  Steri-Strips and sterile dressing were applied  Patient was then awakened, extubated and taken to recovery in satisfactory condition after tolerating the procedure well  A physician assistant was required during the procedure for retraction tissue handling,dissection and suturing of the wound      Patient Disposition:  PACU     SIGNATURE: Sweetie Vega MD  DATE: June 27, 2018  TIME: 12:36 PM xray L foot/ankle: Trimalleolar fracture with oblique fractures of the distal tibia and fibula and additional vertical fracture of the posterior tibia with minimal posterior displacement.     LE dopplers: Acute below the knee DVT of the right soleal vein. Left lower extremity veins distal to the femoral vein could not be evaluated.

## 2020-10-29 ENCOUNTER — OFFICE VISIT (OUTPATIENT)
Dept: URGENT CARE | Facility: CLINIC | Age: 65
End: 2020-10-29
Payer: COMMERCIAL

## 2020-10-29 ENCOUNTER — OFFICE VISIT (OUTPATIENT)
Dept: OBGYN CLINIC | Facility: CLINIC | Age: 65
End: 2020-10-29
Payer: COMMERCIAL

## 2020-10-29 ENCOUNTER — TRANSCRIBE ORDERS (OUTPATIENT)
Dept: URGENT CARE | Facility: CLINIC | Age: 65
End: 2020-10-29

## 2020-10-29 ENCOUNTER — APPOINTMENT (OUTPATIENT)
Dept: RADIOLOGY | Facility: CLINIC | Age: 65
End: 2020-10-29
Payer: COMMERCIAL

## 2020-10-29 VITALS
HEIGHT: 64 IN | SYSTOLIC BLOOD PRESSURE: 138 MMHG | HEART RATE: 112 BPM | BODY MASS INDEX: 44.22 KG/M2 | WEIGHT: 259 LBS | DIASTOLIC BLOOD PRESSURE: 77 MMHG | TEMPERATURE: 97.6 F

## 2020-10-29 VITALS
SYSTOLIC BLOOD PRESSURE: 162 MMHG | HEART RATE: 97 BPM | DIASTOLIC BLOOD PRESSURE: 79 MMHG | WEIGHT: 258 LBS | OXYGEN SATURATION: 97 % | TEMPERATURE: 98.3 F | BODY MASS INDEX: 44.05 KG/M2 | HEIGHT: 64 IN | RESPIRATION RATE: 18 BRPM

## 2020-10-29 DIAGNOSIS — M25.559 HIP PAIN: Primary | ICD-10-CM

## 2020-10-29 DIAGNOSIS — M25.552 CHRONIC HIP PAIN, BILATERAL: ICD-10-CM

## 2020-10-29 DIAGNOSIS — G89.29 CHRONIC BILATERAL LOW BACK PAIN WITHOUT SCIATICA: ICD-10-CM

## 2020-10-29 DIAGNOSIS — M25.551 CHRONIC HIP PAIN, BILATERAL: ICD-10-CM

## 2020-10-29 DIAGNOSIS — G89.29 CHRONIC HIP PAIN, BILATERAL: ICD-10-CM

## 2020-10-29 DIAGNOSIS — Z98.1 HISTORY OF LUMBAR SPINAL FUSION: ICD-10-CM

## 2020-10-29 DIAGNOSIS — M54.50 CHRONIC BILATERAL LOW BACK PAIN WITHOUT SCIATICA: ICD-10-CM

## 2020-10-29 DIAGNOSIS — M25.551 CHRONIC HIP PAIN, BILATERAL: Primary | ICD-10-CM

## 2020-10-29 DIAGNOSIS — M54.16 BILATERAL LUMBAR RADICULOPATHY: Primary | ICD-10-CM

## 2020-10-29 DIAGNOSIS — M25.552 CHRONIC HIP PAIN, BILATERAL: Primary | ICD-10-CM

## 2020-10-29 DIAGNOSIS — G89.29 CHRONIC HIP PAIN, BILATERAL: Primary | ICD-10-CM

## 2020-10-29 PROCEDURE — 73502 X-RAY EXAM HIP UNI 2-3 VIEWS: CPT

## 2020-10-29 PROCEDURE — 99214 OFFICE O/P EST MOD 30 MIN: CPT | Performed by: ORTHOPAEDIC SURGERY

## 2020-10-29 PROCEDURE — 99213 OFFICE O/P EST LOW 20 MIN: CPT | Performed by: PHYSICIAN ASSISTANT

## 2020-10-29 RX ORDER — METHYLPREDNISOLONE 4 MG/1
TABLET ORAL
Qty: 21 TABLET | Refills: 0 | Status: SHIPPED | OUTPATIENT
Start: 2020-10-29 | End: 2020-11-06

## 2020-10-29 RX ORDER — LISINOPRIL AND HYDROCHLOROTHIAZIDE 20; 12.5 MG/1; MG/1
TABLET ORAL
COMMUNITY
End: 2020-11-06 | Stop reason: SDUPTHER

## 2020-11-06 ENCOUNTER — OFFICE VISIT (OUTPATIENT)
Dept: FAMILY MEDICINE CLINIC | Facility: CLINIC | Age: 65
End: 2020-11-06
Payer: COMMERCIAL

## 2020-11-06 VITALS
HEART RATE: 8 BPM | TEMPERATURE: 97.8 F | RESPIRATION RATE: 20 BRPM | HEIGHT: 64 IN | SYSTOLIC BLOOD PRESSURE: 136 MMHG | DIASTOLIC BLOOD PRESSURE: 74 MMHG | OXYGEN SATURATION: 98 % | BODY MASS INDEX: 43.54 KG/M2 | WEIGHT: 255 LBS

## 2020-11-06 DIAGNOSIS — Z12.31 ENCOUNTER FOR SCREENING MAMMOGRAM FOR MALIGNANT NEOPLASM OF BREAST: ICD-10-CM

## 2020-11-06 DIAGNOSIS — I10 HTN (HYPERTENSION): ICD-10-CM

## 2020-11-06 DIAGNOSIS — Z98.84 HISTORY OF ROUX-EN-Y GASTRIC BYPASS: ICD-10-CM

## 2020-11-06 DIAGNOSIS — Z13.29 SCREENING FOR THYROID DISORDER: ICD-10-CM

## 2020-11-06 DIAGNOSIS — R10.11 RIGHT UPPER QUADRANT ABDOMINAL PAIN: Primary | ICD-10-CM

## 2020-11-06 DIAGNOSIS — Z13.1 SCREENING FOR DIABETES MELLITUS: ICD-10-CM

## 2020-11-06 DIAGNOSIS — M51.36 DDD (DEGENERATIVE DISC DISEASE), LUMBAR: ICD-10-CM

## 2020-11-06 DIAGNOSIS — E66.01 CLASS 3 SEVERE OBESITY DUE TO EXCESS CALORIES WITH SERIOUS COMORBIDITY AND BODY MASS INDEX (BMI) OF 40.0 TO 44.9 IN ADULT (HCC): ICD-10-CM

## 2020-11-06 DIAGNOSIS — I10 ESSENTIAL HYPERTENSION: ICD-10-CM

## 2020-11-06 PROBLEM — R19.7 DIARRHEA: Status: RESOLVED | Noted: 2018-02-06 | Resolved: 2020-11-06

## 2020-11-06 PROBLEM — Z51.89 ENCOUNTER FOR REHABILITATION: Status: RESOLVED | Noted: 2018-06-30 | Resolved: 2020-11-06

## 2020-11-06 PROBLEM — R50.9 FEVER: Status: RESOLVED | Noted: 2018-06-30 | Resolved: 2020-11-06

## 2020-11-06 PROBLEM — M54.9 MID BACK PAIN: Status: RESOLVED | Noted: 2019-03-06 | Resolved: 2020-11-06

## 2020-11-06 PROBLEM — M43.16 SPONDYLOLISTHESIS OF LUMBAR REGION: Status: RESOLVED | Noted: 2018-04-16 | Resolved: 2020-11-06

## 2020-11-06 PROBLEM — M53.3 COCCYGODYNIA: Status: RESOLVED | Noted: 2018-04-24 | Resolved: 2020-11-06

## 2020-11-06 PROBLEM — M46.1 SACROILIITIS, NOT ELSEWHERE CLASSIFIED (HCC): Status: RESOLVED | Noted: 2018-05-16 | Resolved: 2020-11-06

## 2020-11-06 PROBLEM — E87.6 HYPOKALEMIA: Status: RESOLVED | Noted: 2018-06-30 | Resolved: 2020-11-06

## 2020-11-06 PROBLEM — D72.829 LEUKOCYTOSIS: Status: RESOLVED | Noted: 2018-06-30 | Resolved: 2020-11-06

## 2020-11-06 PROBLEM — A69.20 LYME DISEASE: Status: RESOLVED | Noted: 2017-07-11 | Resolved: 2020-11-06

## 2020-11-06 PROBLEM — M70.61 TROCHANTERIC BURSITIS OF RIGHT HIP: Status: RESOLVED | Noted: 2019-04-29 | Resolved: 2020-11-06

## 2020-11-06 PROBLEM — E66.812 CLASS 2 SEVERE OBESITY DUE TO EXCESS CALORIES WITH SERIOUS COMORBIDITY AND BODY MASS INDEX (BMI) OF 39.0 TO 39.9 IN ADULT (HCC): Status: RESOLVED | Noted: 2019-02-06 | Resolved: 2020-11-06

## 2020-11-06 PROCEDURE — 99203 OFFICE O/P NEW LOW 30 MIN: CPT | Performed by: NURSE PRACTITIONER

## 2020-11-06 PROCEDURE — 3725F SCREEN DEPRESSION PERFORMED: CPT | Performed by: NURSE PRACTITIONER

## 2020-11-06 RX ORDER — LISINOPRIL AND HYDROCHLOROTHIAZIDE 20; 12.5 MG/1; MG/1
1 TABLET ORAL DAILY
Qty: 90 TABLET | Refills: 1 | Status: SHIPPED | OUTPATIENT
Start: 2020-11-06 | End: 2021-11-30 | Stop reason: SDUPTHER

## 2020-11-06 RX ORDER — ICOSAPENT ETHYL 1000 MG/1
2 CAPSULE ORAL 2 TIMES DAILY
Qty: 120 CAPSULE | Refills: 5 | Status: SHIPPED | OUTPATIENT
Start: 2020-11-06 | End: 2020-11-18 | Stop reason: ALTCHOICE

## 2020-11-09 LAB
ALBUMIN SERPL-MCNC: 4.2 G/DL (ref 3.8–4.8)
ALBUMIN/GLOB SERPL: 1.6 {RATIO} (ref 1.2–2.2)
ALP SERPL-CCNC: 75 IU/L (ref 39–117)
ALT SERPL-CCNC: 23 IU/L (ref 0–32)
AST SERPL-CCNC: 29 IU/L (ref 0–40)
BASOPHILS # BLD AUTO: 0.1 X10E3/UL (ref 0–0.2)
BASOPHILS NFR BLD AUTO: 1 %
BILIRUB SERPL-MCNC: 0.5 MG/DL (ref 0–1.2)
BUN SERPL-MCNC: 16 MG/DL (ref 8–27)
BUN/CREAT SERPL: 18 (ref 12–28)
CALCIUM SERPL-MCNC: 9.7 MG/DL (ref 8.7–10.3)
CHLORIDE SERPL-SCNC: 103 MMOL/L (ref 96–106)
CHOLEST SERPL-MCNC: 165 MG/DL (ref 100–199)
CHOLEST/HDLC SERPL: 2.8 RATIO (ref 0–4.4)
CO2 SERPL-SCNC: 20 MMOL/L (ref 20–29)
CREAT SERPL-MCNC: 0.91 MG/DL (ref 0.57–1)
EOSINOPHIL # BLD AUTO: 0.2 X10E3/UL (ref 0–0.4)
EOSINOPHIL NFR BLD AUTO: 2 %
ERYTHROCYTE [DISTWIDTH] IN BLOOD BY AUTOMATED COUNT: 13.5 % (ref 11.7–15.4)
EST. AVERAGE GLUCOSE BLD GHB EST-MCNC: 114 MG/DL
GLOBULIN SER-MCNC: 2.6 G/DL (ref 1.5–4.5)
GLUCOSE SERPL-MCNC: 106 MG/DL (ref 65–99)
HBA1C MFR BLD: 5.6 % (ref 4.8–5.6)
HCT VFR BLD AUTO: 35.6 % (ref 34–46.6)
HDLC SERPL-MCNC: 59 MG/DL
HGB BLD-MCNC: 12 G/DL (ref 11.1–15.9)
IMM GRANULOCYTES # BLD: 0 X10E3/UL (ref 0–0.1)
IMM GRANULOCYTES NFR BLD: 0 %
LDLC SERPL CALC-MCNC: 79 MG/DL (ref 0–99)
LYMPHOCYTES # BLD AUTO: 3.1 X10E3/UL (ref 0.7–3.1)
LYMPHOCYTES NFR BLD AUTO: 29 %
MCH RBC QN AUTO: 30.8 PG (ref 26.6–33)
MCHC RBC AUTO-ENTMCNC: 33.7 G/DL (ref 31.5–35.7)
MCV RBC AUTO: 92 FL (ref 79–97)
MICRODELETION SYND BLD/T FISH: NORMAL
MONOCYTES # BLD AUTO: 0.9 X10E3/UL (ref 0.1–0.9)
MONOCYTES NFR BLD AUTO: 8 %
NEUTROPHILS # BLD AUTO: 6.5 X10E3/UL (ref 1.4–7)
NEUTROPHILS NFR BLD AUTO: 60 %
PLATELET # BLD AUTO: 353 X10E3/UL (ref 150–450)
POTASSIUM SERPL-SCNC: 4.6 MMOL/L (ref 3.5–5.2)
PROT SERPL-MCNC: 6.8 G/DL (ref 6–8.5)
RBC # BLD AUTO: 3.89 X10E6/UL (ref 3.77–5.28)
SL AMB EGFR AFRICAN AMERICAN: 78 ML/MIN/1.73
SL AMB EGFR NON AFRICAN AMERICAN: 67 ML/MIN/1.73
SL AMB VLDL CHOLESTEROL CALC: 27 MG/DL (ref 5–40)
SODIUM SERPL-SCNC: 141 MMOL/L (ref 134–144)
TRIGL SERPL-MCNC: 156 MG/DL (ref 0–149)
TSH SERPL DL<=0.005 MIU/L-ACNC: 0.75 UIU/ML (ref 0.45–4.5)
WBC # BLD AUTO: 10.6 X10E3/UL (ref 3.4–10.8)

## 2020-11-17 ENCOUNTER — HOSPITAL ENCOUNTER (OUTPATIENT)
Dept: RADIOLOGY | Facility: HOSPITAL | Age: 65
Discharge: HOME | End: 2020-11-17
Payer: COMMERCIAL

## 2020-11-17 DIAGNOSIS — R10.11 RIGHT UPPER QUADRANT ABDOMINAL PAIN: ICD-10-CM

## 2020-11-17 PROCEDURE — 76705 ECHO EXAM OF ABDOMEN: CPT

## 2020-11-18 ENCOUNTER — CONSULT (OUTPATIENT)
Dept: CARDIOLOGY CLINIC | Facility: CLINIC | Age: 65
End: 2020-11-18
Payer: COMMERCIAL

## 2020-11-18 ENCOUNTER — OFFICE VISIT (OUTPATIENT)
Dept: FAMILY MEDICINE CLINIC | Facility: CLINIC | Age: 65
End: 2020-11-18
Payer: COMMERCIAL

## 2020-11-18 ENCOUNTER — TELEPHONE (OUTPATIENT)
Dept: FAMILY MEDICINE CLINIC | Facility: CLINIC | Age: 65
End: 2020-11-18

## 2020-11-18 VITALS
HEIGHT: 64 IN | WEIGHT: 256 LBS | SYSTOLIC BLOOD PRESSURE: 122 MMHG | OXYGEN SATURATION: 98 % | BODY MASS INDEX: 43.71 KG/M2 | DIASTOLIC BLOOD PRESSURE: 80 MMHG | TEMPERATURE: 98.2 F | HEART RATE: 110 BPM

## 2020-11-18 VITALS
BODY MASS INDEX: 45.18 KG/M2 | DIASTOLIC BLOOD PRESSURE: 68 MMHG | TEMPERATURE: 99.3 F | RESPIRATION RATE: 16 BRPM | HEART RATE: 108 BPM | OXYGEN SATURATION: 95 % | HEIGHT: 63 IN | SYSTOLIC BLOOD PRESSURE: 100 MMHG | WEIGHT: 255 LBS

## 2020-11-18 DIAGNOSIS — K64.4 EXTERNAL HEMORRHOID: Primary | ICD-10-CM

## 2020-11-18 DIAGNOSIS — I10 ESSENTIAL HYPERTENSION: ICD-10-CM

## 2020-11-18 DIAGNOSIS — E78.1 HYPERTRIGLYCERIDEMIA: Primary | ICD-10-CM

## 2020-11-18 DIAGNOSIS — E78.5 DYSLIPIDEMIA: ICD-10-CM

## 2020-11-18 DIAGNOSIS — Z82.49 FAMILY HISTORY OF HEART DISEASE: ICD-10-CM

## 2020-11-18 PROCEDURE — 3078F DIAST BP <80 MM HG: CPT | Performed by: NURSE PRACTITIONER

## 2020-11-18 PROCEDURE — 3074F SYST BP LT 130 MM HG: CPT | Performed by: NURSE PRACTITIONER

## 2020-11-18 PROCEDURE — 99214 OFFICE O/P EST MOD 30 MIN: CPT | Performed by: NURSE PRACTITIONER

## 2020-11-18 PROCEDURE — 93000 ELECTROCARDIOGRAM COMPLETE: CPT | Performed by: INTERNAL MEDICINE

## 2020-11-18 PROCEDURE — 99215 OFFICE O/P EST HI 40 MIN: CPT | Performed by: INTERNAL MEDICINE

## 2020-11-18 RX ORDER — CHLORAL HYDRATE 500 MG
1000 CAPSULE ORAL 2 TIMES DAILY
Qty: 60 CAPSULE | Refills: 6 | Status: SHIPPED | OUTPATIENT
Start: 2020-11-18

## 2020-11-21 ENCOUNTER — TELEPHONE (OUTPATIENT)
Dept: FAMILY MEDICINE CLINIC | Facility: CLINIC | Age: 65
End: 2020-11-21

## 2020-11-23 ENCOUNTER — OFFICE VISIT (OUTPATIENT)
Dept: GASTROENTEROLOGY | Facility: AMBULARY SURGERY CENTER | Age: 65
End: 2020-11-23
Payer: COMMERCIAL

## 2020-11-23 VITALS — HEIGHT: 63 IN | WEIGHT: 257 LBS | RESPIRATION RATE: 18 BRPM | BODY MASS INDEX: 45.54 KG/M2

## 2020-11-23 DIAGNOSIS — R19.7 DIARRHEA, UNSPECIFIED TYPE: ICD-10-CM

## 2020-11-23 DIAGNOSIS — K21.9 GASTROESOPHAGEAL REFLUX DISEASE, UNSPECIFIED WHETHER ESOPHAGITIS PRESENT: ICD-10-CM

## 2020-11-23 DIAGNOSIS — R13.10 DYSPHAGIA, UNSPECIFIED TYPE: Primary | ICD-10-CM

## 2020-11-23 PROCEDURE — 3008F BODY MASS INDEX DOCD: CPT | Performed by: PHYSICIAN ASSISTANT

## 2020-11-23 PROCEDURE — 1036F TOBACCO NON-USER: CPT | Performed by: PHYSICIAN ASSISTANT

## 2020-11-23 PROCEDURE — 99214 OFFICE O/P EST MOD 30 MIN: CPT | Performed by: PHYSICIAN ASSISTANT

## 2020-11-23 RX ORDER — CHOLESTYRAMINE 4 G/9G
1 POWDER, FOR SUSPENSION ORAL 3 TIMES DAILY PRN
Qty: 270 PACKET | Refills: 0 | Status: SHIPPED | OUTPATIENT
Start: 2020-11-23 | End: 2021-03-17

## 2020-11-23 RX ORDER — DICYCLOMINE HYDROCHLORIDE 10 MG/1
10 CAPSULE ORAL 3 TIMES DAILY PRN
Qty: 90 CAPSULE | Refills: 0 | Status: SHIPPED | OUTPATIENT
Start: 2020-11-23 | End: 2021-03-01

## 2020-11-30 ENCOUNTER — TELEPHONE (OUTPATIENT)
Dept: FAMILY MEDICINE CLINIC | Facility: CLINIC | Age: 65
End: 2020-11-30

## 2020-12-02 ENCOUNTER — HOSPITAL ENCOUNTER (OUTPATIENT)
Dept: CT IMAGING | Facility: HOSPITAL | Age: 65
Discharge: HOME | End: 2020-12-02
Payer: COMMERCIAL

## 2020-12-02 DIAGNOSIS — I10 ESSENTIAL HYPERTENSION: ICD-10-CM

## 2020-12-02 DIAGNOSIS — Z82.49 FAMILY HISTORY OF HEART DISEASE: ICD-10-CM

## 2020-12-02 DIAGNOSIS — E78.5 DYSLIPIDEMIA: ICD-10-CM

## 2020-12-02 PROCEDURE — 75571 CT HRT W/O DYE W/CA TEST: CPT

## 2020-12-02 PROCEDURE — G1004 CDSM NDSC: HCPCS

## 2020-12-04 ENCOUNTER — HOSPITAL ENCOUNTER (OUTPATIENT)
Dept: RADIOLOGY | Facility: HOSPITAL | Age: 65
Discharge: HOME | End: 2020-12-04
Payer: COMMERCIAL

## 2020-12-04 ENCOUNTER — OFFICE VISIT (OUTPATIENT)
Dept: FAMILY MEDICINE CLINIC | Facility: CLINIC | Age: 65
End: 2020-12-04
Payer: COMMERCIAL

## 2020-12-04 ENCOUNTER — TRANSCRIBE ORDERS (OUTPATIENT)
Dept: ADMINISTRATIVE | Facility: HOSPITAL | Age: 65
End: 2020-12-04

## 2020-12-04 VITALS
SYSTOLIC BLOOD PRESSURE: 108 MMHG | WEIGHT: 254 LBS | DIASTOLIC BLOOD PRESSURE: 70 MMHG | HEART RATE: 86 BPM | RESPIRATION RATE: 18 BRPM | BODY MASS INDEX: 45 KG/M2 | TEMPERATURE: 97.4 F | OXYGEN SATURATION: 97 % | HEIGHT: 63 IN

## 2020-12-04 DIAGNOSIS — M54.9 BACK PAIN: ICD-10-CM

## 2020-12-04 DIAGNOSIS — M25.551 RIGHT HIP PAIN: ICD-10-CM
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
## 2020-12-04 DIAGNOSIS — M54.50 ACUTE RIGHT-SIDED LOW BACK PAIN WITHOUT SCIATICA: ICD-10-CM
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
## 2020-12-04 PROCEDURE — 99213 OFFICE O/P EST LOW 20 MIN: CPT | Performed by: NURSE PRACTITIONER

## 2020-12-04 PROCEDURE — 1036F TOBACCO NON-USER: CPT | Performed by: NURSE PRACTITIONER

## 2020-12-04 PROCEDURE — 3008F BODY MASS INDEX DOCD: CPT | Performed by: NURSE PRACTITIONER

## 2020-12-04 PROCEDURE — 3725F SCREEN DEPRESSION PERFORMED: CPT | Performed by: NURSE PRACTITIONER

## 2020-12-04 PROCEDURE — 73502 X-RAY EXAM HIP UNI 2-3 VIEWS: CPT

## 2020-12-04 PROCEDURE — 3074F SYST BP LT 130 MM HG: CPT | Performed by: NURSE PRACTITIONER

## 2020-12-04 PROCEDURE — 72100 X-RAY EXAM L-S SPINE 2/3 VWS: CPT

## 2020-12-04 PROCEDURE — 3078F DIAST BP <80 MM HG: CPT | Performed by: NURSE PRACTITIONER

## 2020-12-04 RX ORDER — NAPROXEN 500 MG/1
500 TABLET ORAL 2 TIMES DAILY WITH MEALS
Qty: 14 TABLET | Refills: 0 | Status: SHIPPED | OUTPATIENT
Start: 2020-12-04 | End: 2021-03-17

## 2020-12-04 RX ORDER — CYCLOBENZAPRINE HCL 10 MG
10 TABLET ORAL 3 TIMES DAILY PRN
Qty: 30 TABLET | Refills: 0 | Status: SHIPPED | OUTPATIENT
Start: 2020-12-04 | End: 2021-03-17

## 2020-12-29 ENCOUNTER — HOSPITAL ENCOUNTER (OUTPATIENT)
Dept: RADIOLOGY | Facility: HOSPITAL | Age: 65
Discharge: HOME | End: 2020-12-29
Payer: COMMERCIAL

## 2020-12-29 VITALS — BODY MASS INDEX: 45 KG/M2 | WEIGHT: 254 LBS | HEIGHT: 63 IN

## 2020-12-29 DIAGNOSIS — Z12.31 ENCOUNTER FOR SCREENING MAMMOGRAM FOR MALIGNANT NEOPLASM OF BREAST: ICD-10-CM

## 2020-12-29 PROCEDURE — 77063 BREAST TOMOSYNTHESIS BI: CPT

## 2020-12-29 PROCEDURE — 77067 SCR MAMMO BI INCL CAD: CPT

## 2021-02-05 ENCOUNTER — TELEPHONE (OUTPATIENT)
Dept: OTHER | Facility: OTHER | Age: 66
End: 2021-02-05

## 2021-02-05 NOTE — TELEPHONE ENCOUNTER
Date Time [facility] Providers Departments Visit Type                     C [x] 2/05/21 8:15 AM 30 JACINTO Velasquez [[id number]] [facility] [[id number]] Joyce [address] [[id number]]                                  C - Canceled

## 2021-02-28 ENCOUNTER — TELEPHONE (OUTPATIENT)
Dept: OTHER | Facility: OTHER | Age: 66
End: 2021-02-28

## 2021-03-01 ENCOUNTER — TELEPHONE (OUTPATIENT)
Dept: FAMILY MEDICINE CLINIC | Facility: CLINIC | Age: 66
End: 2021-03-01

## 2021-03-01 DIAGNOSIS — R19.7 DIARRHEA, UNSPECIFIED TYPE: ICD-10-CM

## 2021-03-01 DIAGNOSIS — Z20.822 EXPOSURE TO COVID-19 VIRUS: Primary | ICD-10-CM

## 2021-03-01 DIAGNOSIS — R13.10 DYSPHAGIA, UNSPECIFIED TYPE: ICD-10-CM

## 2021-03-01 DIAGNOSIS — Z20.822 EXPOSURE TO COVID-19 VIRUS: ICD-10-CM

## 2021-03-01 DIAGNOSIS — K21.9 GASTROESOPHAGEAL REFLUX DISEASE, UNSPECIFIED WHETHER ESOPHAGITIS PRESENT: ICD-10-CM

## 2021-03-01 PROCEDURE — U0003 INFECTIOUS AGENT DETECTION BY NUCLEIC ACID (DNA OR RNA); SEVERE ACUTE RESPIRATORY SYNDROME CORONAVIRUS 2 (SARS-COV-2) (CORONAVIRUS DISEASE [COVID-19]), AMPLIFIED PROBE TECHNIQUE, MAKING USE OF HIGH THROUGHPUT TECHNOLOGIES AS DESCRIBED BY CMS-2020-01-R: HCPCS | Performed by: NURSE PRACTITIONER

## 2021-03-01 PROCEDURE — U0005 INFEC AGEN DETEC AMPLI PROBE: HCPCS | Performed by: NURSE PRACTITIONER

## 2021-03-01 RX ORDER — DICYCLOMINE HYDROCHLORIDE 10 MG/1
CAPSULE ORAL
Qty: 90 CAPSULE | Refills: 0 | Status: SHIPPED | OUTPATIENT
Start: 2021-03-01 | End: 2021-06-03

## 2021-03-01 NOTE — TELEPHONE ENCOUNTER
Son was just tested at work for [employer] and came back positive  Can we please order a covid test for Carla Chacon  Son lives with her  Direct contact  No symptoms   Aware of $150

## 2021-03-01 NOTE — TELEPHONE ENCOUNTER
Patient called to Cancel her Appointment for March 2, 2021 at 8:15am  Patient would like the office to call her Monday Morning to Reschedule   Thank You

## 2021-03-02 LAB — SARS-COV-2 RNA RESP QL NAA+PROBE: NEGATIVE

## 2021-03-04 ENCOUNTER — TELEPHONE (OUTPATIENT)
Dept: FAMILY MEDICINE CLINIC | Facility: CLINIC | Age: 66
End: 2021-03-04

## 2021-03-04 NOTE — TELEPHONE ENCOUNTER
Farhana results of covid test done on Monday 03/01/2021  [facility]

## 2021-03-15 ENCOUNTER — TELEPHONE (OUTPATIENT)
Dept: GASTROENTEROLOGY | Facility: CLINIC | Age: 66
End: 2021-03-15

## 2021-03-15 NOTE — TELEPHONE ENCOUNTER
Pt returned call, needs an office visit to go over symptoms that she is having  Scheduled visit 3/23 with Leilani Dubon  Nursing:  Pt is experiencing pain in her right side and after she eats will have severe pain within 20 minutes  Please call pt to go over symptoms  Thank you!

## 2021-03-17 ENCOUNTER — OFFICE VISIT (OUTPATIENT)
Dept: OBGYN CLINIC | Facility: CLINIC | Age: 66
End: 2021-03-17
Payer: COMMERCIAL

## 2021-03-17 ENCOUNTER — TELEPHONE (OUTPATIENT)
Dept: FAMILY MEDICINE CLINIC | Facility: CLINIC | Age: 66
End: 2021-03-17

## 2021-03-17 ENCOUNTER — OFFICE VISIT (OUTPATIENT)
Dept: FAMILY MEDICINE CLINIC | Facility: CLINIC | Age: 66
End: 2021-03-17
Payer: COMMERCIAL

## 2021-03-17 VITALS
HEART RATE: 120 BPM | SYSTOLIC BLOOD PRESSURE: 112 MMHG | DIASTOLIC BLOOD PRESSURE: 69 MMHG | WEIGHT: 253.6 LBS | HEIGHT: 63 IN | BODY MASS INDEX: 44.93 KG/M2

## 2021-03-17 VITALS
HEART RATE: 102 BPM | SYSTOLIC BLOOD PRESSURE: 116 MMHG | RESPIRATION RATE: 18 BRPM | BODY MASS INDEX: 44.65 KG/M2 | WEIGHT: 252 LBS | HEIGHT: 63 IN | DIASTOLIC BLOOD PRESSURE: 72 MMHG | OXYGEN SATURATION: 96 % | TEMPERATURE: 98.7 F

## 2021-03-17 DIAGNOSIS — I10 ESSENTIAL HYPERTENSION: ICD-10-CM

## 2021-03-17 DIAGNOSIS — M53.3 CHRONIC RIGHT SACROILIAC PAIN: ICD-10-CM

## 2021-03-17 DIAGNOSIS — Z00.00 ROUTINE ADULT HEALTH MAINTENANCE: Primary | ICD-10-CM

## 2021-03-17 DIAGNOSIS — G89.29 CHRONIC RIGHT SACROILIAC PAIN: ICD-10-CM

## 2021-03-17 DIAGNOSIS — M47.26 OSTEOARTHRITIS OF SPINE WITH RADICULOPATHY, LUMBAR REGION: Primary | ICD-10-CM

## 2021-03-17 DIAGNOSIS — Z98.1 HISTORY OF LUMBAR SPINAL FUSION: ICD-10-CM

## 2021-03-17 DIAGNOSIS — Z23 NEED FOR VACCINATION: ICD-10-CM

## 2021-03-17 DIAGNOSIS — M54.16 BILATERAL LUMBAR RADICULOPATHY: ICD-10-CM

## 2021-03-17 DIAGNOSIS — Z01.419 ENCOUNTER FOR CERVICAL PAP SMEAR WITH PELVIC EXAM: ICD-10-CM

## 2021-03-17 DIAGNOSIS — Z98.84 HISTORY OF ROUX-EN-Y GASTRIC BYPASS: ICD-10-CM

## 2021-03-17 DIAGNOSIS — E66.01 MORBID OBESITY (HCC): ICD-10-CM

## 2021-03-17 PROCEDURE — 99396 PREV VISIT EST AGE 40-64: CPT | Performed by: NURSE PRACTITIONER

## 2021-03-17 PROCEDURE — 90471 IMMUNIZATION ADMIN: CPT

## 2021-03-17 PROCEDURE — 90715 TDAP VACCINE 7 YRS/> IM: CPT

## 2021-03-17 PROCEDURE — 99214 OFFICE O/P EST MOD 30 MIN: CPT | Performed by: ORTHOPAEDIC SURGERY

## 2021-03-17 NOTE — PROGRESS NOTES
FAMILY PRACTICE HEALTH MAINTENANCE OFFICE VISIT  [facility] - [facility]    NAME: Shaw Garcia  AGE: 63 y o  SEX: female  : 1955     DATE: 3/17/2021    Assessment and Plan     1  Routine adult health maintenance    2  Morbid obesity ([address] )    3  History of Michael-en-Y gastric bypass  -     Ambulatory referral to Weight Management; Future    4  Need for vaccination  -     TDAP VACCINE GREATER THAN OR EQUAL TO 6YO IM    5  Essential hypertension  Assessment & Plan:  Home readings have occasionally been in the 140s  Will have her continue with her current dose and monitor her BP at home  Consider lowering dose if she has continued episodes of light headedness    Orders:  -     Lipid panel; Future  -     Comprehensive metabolic panel; Future  -     Lipid panel  -     Comprehensive metabolic panel    6  Encounter for cervical Pap smear with pelvic exam  -     IGP, rfx Aptima HPV ASCU      · Patient Counseling:   · Nutrition: Stressed importance of a well balanced diet, moderation of sodium/saturated fat, caloric balance and sufficient intake of fiber  · Exercise: Stressed the importance of regular exercise with a goal of 150 minutes per week  · Dental Health: Discussed daily flossing and brushing and regular dental visits     · Immunizations reviewed: See Orders   Shingrix series done at [employer] [facility]  Records requested  · Discussed benefits of:  Mammogram , Cervical Cancer screening and Screening labs   BMI Counseling: Body mass index is 44 64 kg/m²  Discussed with patient's BMI with her  The BMI is above normal  Exercise recommendations include moderate aerobic physical activity for 150 minutes/week  Return in about 6 months (around 2021)  Chief Complaint     Chief Complaint   Patient presents with    Physical Exam     sas/cma       History of Present Illness     Here today for CPE    Reports occasional light headedness with position changes and spots in her vision  Hasn't seen her eye doctor in several years  Due for gyn exam        Well Adult Physical   Patient here for a comprehensive physical exam       Diet and Physical Activity  Diet: well balanced diet  Exercise: infrequently, due to mobility issues      Depression Screen  PHQ-9 Depression Screening    PHQ-9:   Frequency of the following problems over the past two weeks:              General Health  Hearing: Normal:  bilateral  Vision: most recent eye exam >1 year, wears glasses and has an astigmatism  Dental: regular dental visits    Reproductive Health  Post-menopausal       The following portions of the patient's history were reviewed and updated as appropriate: allergies, current medications, past family history, past medical history, past social history, past surgical history and problem list     Review of Systems     Review of Systems   Constitutional: Negative  Respiratory: Negative  Cardiovascular: Negative  Gastrointestinal: Negative  Genitourinary: Negative  Musculoskeletal: Negative  Neurological: Positive for light-headedness  Psychiatric/Behavioral: Negative          Past Medical History     Past Medical History:   Diagnosis Date    Arthritis     Asthma     Back pain     Colon polyp     DVT (deep venous thrombosis) ([facility] [address] )     Gastric bypass status for obesity     GERD (gastroesophageal reflux disease)     Hiatal hernia     History of transfusion 2006    after gastric bypass surgery, no reactions    Hypertension     Irritable bowel syndrome     Kidney stone     Muscle weakness     bilateral legs    Numbness and tingling     bilateral feet    Pneumonia     Spinal stenosis     Tinnitus     occassionally    Wears glasses        Past Surgical History     Past Surgical History:   Procedure Laterality Date    APPENDECTOMY      BARIATRIC SURGERY      CARPAL TUNNEL RELEASE Right      SECTION      x1    CHOLECYSTECTOMY      COLONOSCOPY      COLONOSCOPY W/ BIOPSIES AND POLYPECTOMY      FLEXIBLE BRONCHOSCOPY W/ UPPER ENDOSCOPY      HERNIA REPAIR      inguinal hernia    LA ARTHROCENTESIS ASPIR&/INJ SMALL JT/BURSA W/O US N/A 5/2/2018    Procedure: Coccygeal Injection & Ganglion Impar Block;  Surgeon: Jeferson Hidalgo MD;  Location[facility] MAIN OR;  Service: Pain Management     LA ARTHRODESIS POSTERIOR INTERBODY LUMBAR N/A 6/27/2018    Procedure: L4-5 DECOMPRESSION INTERBODY INSTRUMENTED FUSION;  Surgeon: Mo Ybarra MD;  Location: [facility] Main OR;  Service: Orthopedics    LA COLONOSCOPY FLX DX W/COLLJ SPEC WHEN PFRMD N/A 8/24/2018    Procedure: COLONOSCOPY;  Surgeon: Po Dai MD;  Location: [facility] GI LAB; Service: Gastroenterology    LA ESOPHAGOGASTRODUODENOSCOPY TRANSORAL DIAGNOSTIC N/A 2/19/2018    Procedure: ESOPHAGOGASTRODUODENOSCOPY (EGD); Surgeon: Po Dai MD;  Location: [facility] GI LAB; Service: Gastroenterology   [place] JOINT Right 5/25/2018    Procedure: Rt Sacroiliac Joint Injection;  Surgeon: Jeferson Hidalgo MD;  Location: [facility] MAIN OR;  Service: Pain Management     LA INJECTION,SACROILIAC JOINT Right 5/1/2019    Procedure: Sacroiliac Joint Injection (34720); Surgeon: Jeferson Hidalgo MD;  Location: [facility] OR;  Service: Pain Management     TONSILECTOMY, ADENOIDECTOMY, BILATERAL MYRINGOTOMY AND TUBES         Social History     Social History     Socioeconomic History    Marital status: /Civil Union     Spouse name: None    Number of children: None    Years of education: None    Highest education level: None   Occupational History    None   Social Needs    Financial resource strain: None    Food insecurity     Worry: None     Inability: None    Transportation needs     Medical: None     Non-medical: None   Tobacco Use    Smoking status: Never Smoker    Smokeless tobacco: Never Used   Substance and Sexual Activity    Alcohol use:  Yes     Alcohol/week: 7 0 standard drinks     Types: 5 Cans of beer, 2 Shots of liquor per week     Frequency: 4 or more times a week    Drug use: Not Currently     Types: Marijuana     Comment: has medical marijuana card has not purchased any at this time     Sexual activity: Yes   Lifestyle    Physical activity     Days per week: None     Minutes per session: None    Stress: None   Relationships    Social connections     Talks on phone: None     Gets together: None     Attends Sabianist service: None     Active member of club or organization: None     Attends meetings of clubs or organizations: None     Relationship status: None    Intimate partner violence     Fear of current or ex partner: None     Emotionally abused: None     Physically abused: None     Forced sexual activity: None   Other Topics Concern    None   Social History Narrative    None       Family History     Family History   Problem Relation Age of Onset    Diabetes Mother     Aortic aneurysm Father     Cancer Father         prostate    Heart disease Sister         open heart    Cancer Sister         breast    Breast cancer Sister 47    Diabetes Brother     No Known Problems Daughter     Asperger's syndrome Son     Diabetes Maternal Grandfather     No Known Problems Brother     No Known Problems Maternal Aunt     No Known Problems Maternal Aunt     No Known Problems Paternal Aunt     No Known Problems Paternal Aunt     Colon cancer Maternal Uncle 60       Current Medications       Current Outpatient Medications:     calcium carbonate (OS-BEAU) 1250 (500 Ca) MG chewable tablet, Chew 1 tablet daily, Disp: , Rfl:     cholecalciferol (VITAMIN D3) 400 units tablet, Take 400 Units by mouth daily, Disp: , Rfl:     dicyclomine (BENTYL) 10 mg capsule, TAKE ONE CAPSULE BY MOUTH THREE TIMES A DAY AS NEEDED FOR FECAL URGENCY AND ABDOMINAL CRAMPING (GENERIC FOR BENTYL), Disp: 90 capsule, Rfl: 0    lisinopril-hydrochlorothiazide (PRINZIDE,ZESTORETIC) 20-12 5 MG per tablet, Take 1 tablet by mouth daily, Disp: 90 tablet, Rfl: 1    Omega-3 Fatty Acids (fish oil) 1,000 mg, Take 1 capsule (1,000 mg total) by mouth 2 (two) times a day, Disp: 60 capsule, Rfl: 6    pantoprazole (PROTONIX) 40 mg tablet, Take 40 mg by mouth 2 (two) times a day Resume with evening dose on 7/9, Disp: , Rfl:     traZODone (DESYREL) 50 mg tablet, Take 50 mg by mouth daily at bedtime as needed  , Disp: , Rfl:     vitamin B-12 (CYANOCOBALAMIN) 50 MCG tablet, Take 50 mcg by mouth daily, Disp: , Rfl:     multivitamin (THERAGRAN) TABS, Take 1 tablet by mouth daily, Disp: , Rfl:      Allergies     No Known Allergies    Objective     /72   Pulse 102   Temp 98 7 °F (37 1 °C)   Resp 18   Ht 5' 3" (1 6 m)   Wt 114 kg (252 lb)   SpO2 96%   BMI 44 64 kg/m²      Physical Exam  Vitals signs and nursing note reviewed  Constitutional:       Appearance: She is well-developed  She is obese  HENT:      Head: Normocephalic  Right Ear: External ear normal       Left Ear: External ear normal       Nose: Nose normal    Eyes:      Conjunctiva/sclera: Conjunctivae normal       Pupils: Pupils are equal, round, and reactive to light  Neck:      Musculoskeletal: Neck supple  Thyroid: No thyromegaly  Cardiovascular:      Rate and Rhythm: Normal rate and regular rhythm  Pulses: Normal pulses  Heart sounds: Normal heart sounds  No murmur  Pulmonary:      Effort: Pulmonary effort is normal       Breath sounds: Normal breath sounds  Chest:      Breasts:         Right: No inverted nipple, mass, nipple discharge, skin change or tenderness  Left: No inverted nipple, mass, nipple discharge, skin change or tenderness  Abdominal:      General: Bowel sounds are normal  There is no distension  Palpations: Abdomen is soft  Genitourinary:     Labia:         Right: No rash or lesion  Left: No rash or lesion  Vagina: Normal       Cervix: No cervical motion tenderness or discharge        Uterus: Not enlarged and not tender  Adnexa:         Right: No mass or tenderness  Left: No mass or tenderness  Musculoskeletal: Normal range of motion  Lymphadenopathy:      Cervical: No cervical adenopathy  Lower Body: No right inguinal adenopathy  No left inguinal adenopathy  Skin:     General: Skin is warm and dry  Capillary Refill: Capillary refill takes less than 2 seconds  Neurological:      Mental Status: She is alert and oriented to person, place, and time  Deep Tendon Reflexes: Reflexes are normal and symmetric     Psychiatric:         Mood and Affect: Mood normal          Behavior: Behavior normal             Visual Acuity Screening    Right eye Left eye Both eyes   Without correction: 20/30 20/40 20/40   With correction:              Natalie Johnson, [address] [facility]

## 2021-03-17 NOTE — ASSESSMENT & PLAN NOTE
Home readings have occasionally been in the 140s  Will have her continue with her current dose and monitor her BP at home    Consider lowering dose if she has continued episodes of light headedness

## 2021-03-17 NOTE — PROGRESS NOTES
Assessment/Plan:  1  Osteoarthritis of spine with radiculopathy, lumbar region  MRI lumbar spine wo contrast   2  Bilateral lumbar radiculopathy  MRI lumbar spine wo contrast   3  History of lumbar spinal fusion  MRI lumbar spine wo contrast   4  Chronic right sacroiliac pain  MRI lumbar spine wo contrast       Scribe Attestation    I,:  Jluis Williamson am acting as a scribe while in the presence of the attending physician :       I,:  Isaiah Green MD personally performed the services described in this documentation    as scribed in my presence :         Mari Davila upon physical examination, review of her office notes from [facility] from March 2019 as well as Dr Kaylee Khan previous office note from  October 2020 demonstrates signs and symptoms that are consistent with spinal stenosis and lumbar radiculopathy  I do have concern for the bilateral paresthesias she is experiencing on the L5 distribution  This is the level of which she did have a fusion  I do have concern also given the continued weakness she is experiencing particularly into the right lower extremity as well as the pain  With this in mind and with her surgical history of spinal fusion, and failure of symptom relief with her home exercises I would like to order an MRI with mars sequence to question spinal stenosis and lumbar radiculopathy of the lumbar spine  My office will help facilitate having her appointment scheduled  I would like her to follow up with me when she has the MRI of her lumbar spine completed  Subjective:   Rah Perry is a 63 y o  female who presents to the office today for low back and bilateral hip pain  She states that her right hip is most symptomatic  She states that she has had activity related plan for many years  However notes that recently she has had increase in painful symptoms particularly into the right hip and low back region    She states that she does experience bilateral lower extremity weakness and paresthesias anteriorly into the lower leg and feet  She does have a history of L4-L5 fusion  She states that prolonged weight-bearing activities will exacerbate her pain she notes that she does have symptom relief while at rest   She has been constantly trying to continue to rehab herself from therapy exercises that she had acquired from her back surgery with leg lifts and notes that she has continued weakness  She states that when she is at the grocery store she does rely on shopping cart for additional support as she does quite painful into the lower extremities  She was evaluated for hip by Dr Elsy Jackson on October 2020 who did believe her clinical symptoms were consistent with lumbar radiculopathy  She then had a follow-up evaluation with her spine surgeon at [facility] and did have x-rays of the lumbar spine that demonstrated intact and stable appearing spinal fusion at the L4-5 level  She states that she does have a history of gastric bypass and in conjunction with her GERD as well as her low back pain she has difficulty finding a comfortable position to sleep in at night  Today she does report to her appointment with tingling into the anterior aspect of the lower extremities bilaterally  Pain for yeears, but recent increaes in pain   Gastric bypass      Review of Systems   Constitutional: Negative for chills, fever and unexpected weight change  HENT: Negative for hearing loss, nosebleeds and sore throat  Eyes: Negative for pain, redness and visual disturbance  Respiratory: Negative for cough, shortness of breath and wheezing  Cardiovascular: Negative for chest pain, palpitations and leg swelling  Gastrointestinal: Negative for abdominal pain, nausea and vomiting  Endocrine: Negative for polydipsia and polyuria  Genitourinary: Negative for dysuria and hematuria  Musculoskeletal: Positive for arthralgias and back pain          See HPI   Skin: Negative for rash and wound  Neurological: Negative for dizziness, numbness and headaches  Psychiatric/Behavioral: Negative for decreased concentration and suicidal ideas  The patient is not nervous/anxious  Past Medical History:   Diagnosis Date    Arthritis     Asthma     Back pain     Colon polyp     DVT (deep venous thrombosis) (HCC)     Gastric bypass status for obesity     GERD (gastroesophageal reflux disease)     Hiatal hernia     History of transfusion 2006    after gastric bypass surgery, no reactions    Hypertension     Irritable bowel syndrome     Kidney stone     Muscle weakness     bilateral legs    Numbness and tingling     bilateral feet    Pneumonia     Spinal stenosis     Tinnitus     occassionally    Wears glasses        Past Surgical History:   Procedure Laterality Date    APPENDECTOMY      BARIATRIC SURGERY      CARPAL TUNNEL RELEASE Right      SECTION      x1    CHOLECYSTECTOMY      COLONOSCOPY      COLONOSCOPY W/ BIOPSIES AND POLYPECTOMY      FLEXIBLE BRONCHOSCOPY W/ UPPER ENDOSCOPY      HERNIA REPAIR      inguinal hernia    SD ARTHROCENTESIS ASPIR&/INJ SMALL JT/BURSA W/O US N/A 2018    Procedure: Coccygeal Injection & Ganglion Impar Block;  Surgeon: Paras Barrios MD;  Location: [place] [address] MAIN OR;  Service: Pain Management     SD ARTHRODESIS POSTERIOR INTERBODY LUMBAR N/A 2018    Procedure: L4-5 DECOMPRESSION INTERBODY INSTRUMENTED FUSION;  Surgeon: Maryjo Ramey MD;  Location: [facility] OR;  Service: Orthopedics    SD COLONOSCOPY FLX DX W/COLLJ SPEC WHEN PFRMD N/A 2018    Procedure: COLONOSCOPY;  Surgeon: Rubens Altamirano MD;  Location: [place] [address] GI LAB; Service: Gastroenterology    SD ESOPHAGOGASTRODUODENOSCOPY TRANSORAL DIAGNOSTIC N/A 2018    Procedure: ESOPHAGOGASTRODUODENOSCOPY (EGD); Surgeon: Rubens Altamirano MD;  Location: [facility] GI LAB;   Service: Gastroenterology   [facility] JOINT Right 2018 Procedure: Rt Sacroiliac Joint Injection;  Surgeon: Jenelle Weiner MD;  Location: [facility] MAIN OR;  Service: Pain Management     AK INJECTION,SACROILIAC JOINT Right 5/1/2019    Procedure: Sacroiliac Joint Injection ([id number]); Surgeon: Jenelle Weiner MD;  Location: [facility] MAIN OR;  Service: Pain Management     TONSILECTOMY, ADENOIDECTOMY, BILATERAL MYRINGOTOMY AND TUBES         Family History   Problem Relation Age of Onset    Diabetes Mother     Aortic aneurysm Father     Cancer Father         prostate    Heart disease Sister         open heart    Cancer Sister         breast    Breast cancer Sister 47    Diabetes Brother     No Known Problems Daughter     Asperger's syndrome Son     Diabetes Maternal Grandfather     No Known Problems Brother     No Known Problems Maternal Aunt     No Known Problems Maternal Aunt     No Known Problems Paternal Aunt     No Known Problems Paternal [de-identified]     Colon cancer Maternal Uncle 61       Social History     Occupational History    Not on file   Tobacco Use    Smoking status: Never Smoker    Smokeless tobacco: Never Used   Substance and Sexual Activity    Alcohol use:  Yes     Alcohol/week: 7 0 standard drinks     Types: 5 Cans of beer, 2 Shots of liquor per week     Frequency: 4 or more times a week    Drug use: Not Currently     Types: Marijuana     Comment: has medical marijuana card has not purchased any at this time     Sexual activity: Yes         Current Outpatient Medications:     calcium carbonate (OS-BEAU) 1250 (500 Ca) MG chewable tablet, Chew 1 tablet daily, Disp: , Rfl:     cholecalciferol (VITAMIN D3) 400 units tablet, Take 400 Units by mouth daily, Disp: , Rfl:     dicyclomine (BENTYL) 10 mg capsule, TAKE ONE CAPSULE BY MOUTH THREE TIMES A DAY AS NEEDED FOR FECAL URGENCY AND ABDOMINAL CRAMPING (GENERIC FOR BENTYL), Disp: 90 capsule, Rfl: 0    lisinopril-hydrochlorothiazide (PRINZIDE,ZESTORETIC) 20-12 5 MG per tablet, Take 1 tablet by mouth daily, Disp: 90 tablet, Rfl: 1    multivitamin (THERAGRAN) TABS, Take 1 tablet by mouth daily, Disp: , Rfl:     Omega-3 Fatty Acids (fish oil) 1,000 mg, Take 1 capsule (1,000 mg total) by mouth 2 (two) times a day, Disp: 60 capsule, Rfl: 6    pantoprazole (PROTONIX) 40 mg tablet, Take 40 mg by mouth 2 (two) times a day Resume with evening dose on , Disp: , Rfl:     traZODone (DESYREL) 50 mg tablet, Take 50 mg by mouth daily at bedtime as needed  , Disp: , Rfl:     vitamin B-12 (CYANOCOBALAMIN) 50 MCG tablet, Take 50 mcg by mouth daily, Disp: , Rfl:     No Known Allergies    Objective:  Vitals:    21 1333   BP: 112/69   Pulse: (!) 120       Back Exam     Tenderness   Back tenderness location: right SI joint  Other   Sensation: decreased (Bilateral L5)  Gait: abductor lurch     Comments:    Bilateral Lower Extremity Strength Testing  Tib ant  5  EHL 5  Gastroc: 5  Quad: 5/5  Hamstrin/5                Physical Exam  Vitals signs reviewed  HENT:      Head: Normocephalic and atraumatic  Eyes:      General:         Right eye: No discharge  Left eye: No discharge  Conjunctiva/sclera: Conjunctivae normal       Pupils: Pupils are equal, round, and reactive to light  Neck:      Musculoskeletal: Normal range of motion and neck supple  Cardiovascular:      Rate and Rhythm: Normal rate  Pulmonary:      Effort: Pulmonary effort is normal  No respiratory distress  Musculoskeletal:      Comments:   As noted in HPI   Skin:     General: Skin is warm and dry  Neurological:      Mental Status: She is alert and oriented to person, place, and time  I have personally reviewed pertinent films in PACS and my interpretation is as follows:  X-rays of the lumbar spine from 2020 demonstrates no acute fracture  There is a stable appearance of the spinal fusion L4-L5 with interbody graft    X-rays of both hips from 2020 demonstrate no obvious signs of arthritic change or other obvious abnormality in the hips themselves

## 2021-03-17 NOTE — PATIENT INSTRUCTIONS

## 2021-03-17 NOTE — TELEPHONE ENCOUNTER
Pt had 2 doses of Shingrix at [facility] in [facility], please call to have them fax us documentation  Thanks!

## 2021-03-18 LAB
ALBUMIN SERPL-MCNC: 4 G/DL (ref 3.8–4.8)
ALBUMIN/GLOB SERPL: 1.6 {RATIO} (ref 1.2–2.2)
ALP SERPL-CCNC: 81 IU/L (ref 39–117)
ALT SERPL-CCNC: 19 IU/L (ref 0–32)
AST SERPL-CCNC: 23 IU/L (ref 0–40)
BILIRUB SERPL-MCNC: 0.4 MG/DL (ref 0–1.2)
BUN SERPL-MCNC: 13 MG/DL (ref 8–27)
BUN/CREAT SERPL: 14 (ref 12–28)
CALCIUM SERPL-MCNC: 9.1 MG/DL (ref 8.7–10.3)
CHLORIDE SERPL-SCNC: 101 MMOL/L (ref 96–106)
CHOLEST SERPL-MCNC: 149 MG/DL (ref 100–199)
CHOLEST/HDLC SERPL: 2.7 RATIO (ref 0–4.4)
CO2 SERPL-SCNC: 23 MMOL/L (ref 20–29)
CREAT SERPL-MCNC: 0.91 MG/DL (ref 0.57–1)
GLOBULIN SER-MCNC: 2.5 G/DL (ref 1.5–4.5)
GLUCOSE SERPL-MCNC: 94 MG/DL (ref 65–99)
HDLC SERPL-MCNC: 56 MG/DL
LDLC SERPL CALC-MCNC: 70 MG/DL (ref 0–99)
MICRODELETION SYND BLD/T FISH: NORMAL
POTASSIUM SERPL-SCNC: 4.1 MMOL/L (ref 3.5–5.2)
PROT SERPL-MCNC: 6.5 G/DL (ref 6–8.5)
SL AMB EGFR AFRICAN AMERICAN: 78 ML/MIN/1.73
SL AMB EGFR NON AFRICAN AMERICAN: 67 ML/MIN/1.73
SL AMB VLDL CHOLESTEROL CALC: 23 MG/DL (ref 5–40)
SODIUM SERPL-SCNC: 139 MMOL/L (ref 134–144)
TRIGL SERPL-MCNC: 130 MG/DL (ref 0–149)

## 2021-03-23 ENCOUNTER — PREP FOR PROCEDURE (OUTPATIENT)
Dept: GASTROENTEROLOGY | Facility: CLINIC | Age: 66
End: 2021-03-23

## 2021-03-23 ENCOUNTER — OFFICE VISIT (OUTPATIENT)
Dept: GASTROENTEROLOGY | Facility: CLINIC | Age: 66
End: 2021-03-23
Payer: COMMERCIAL

## 2021-03-23 VITALS
BODY MASS INDEX: 44.4 KG/M2 | HEIGHT: 63 IN | SYSTOLIC BLOOD PRESSURE: 115 MMHG | DIASTOLIC BLOOD PRESSURE: 67 MMHG | TEMPERATURE: 95 F | HEART RATE: 97 BPM | WEIGHT: 250.6 LBS

## 2021-03-23 DIAGNOSIS — K52.9 CHRONIC DIARRHEA: ICD-10-CM

## 2021-03-23 DIAGNOSIS — R10.10 UPPER ABDOMINAL PAIN: ICD-10-CM

## 2021-03-23 DIAGNOSIS — R14.0 ABDOMINAL BLOATING: ICD-10-CM

## 2021-03-23 DIAGNOSIS — E66.01 CLASS 3 SEVERE OBESITY WITH SERIOUS COMORBIDITY AND BODY MASS INDEX (BMI) OF 40.0 TO 44.9 IN ADULT, UNSPECIFIED OBESITY TYPE (HCC): Primary | ICD-10-CM

## 2021-03-23 DIAGNOSIS — R19.4 CHANGE IN BOWEL HABITS: ICD-10-CM

## 2021-03-23 DIAGNOSIS — K21.9 GASTROESOPHAGEAL REFLUX DISEASE, UNSPECIFIED WHETHER ESOPHAGITIS PRESENT: ICD-10-CM

## 2021-03-23 DIAGNOSIS — R13.10 DYSPHAGIA, UNSPECIFIED TYPE: ICD-10-CM

## 2021-03-23 DIAGNOSIS — K90.9 STOOL FAT INCREASED: ICD-10-CM

## 2021-03-23 DIAGNOSIS — K76.0 HEPATIC STEATOSIS: ICD-10-CM

## 2021-03-23 DIAGNOSIS — R19.7 DIARRHEA, UNSPECIFIED TYPE: ICD-10-CM

## 2021-03-23 DIAGNOSIS — K52.9 CHRONIC DIARRHEA: Primary | ICD-10-CM

## 2021-03-23 DIAGNOSIS — Z20.822 ENCOUNTER FOR LABORATORY TESTING FOR COVID-19 VIRUS: ICD-10-CM

## 2021-03-23 LAB
CYTOLOGIST CVX/VAG CYTO: NORMAL
DX ICD CODE: NORMAL
OTHER STN SPEC: NORMAL
OTHER STN SPEC: NORMAL
PATH REPORT.FINAL DX SPEC: NORMAL
SL AMB NOTE:: NORMAL
SL AMB SPECIMEN ADEQUACY: NORMAL
SL AMB TEST METHODOLOGY: NORMAL

## 2021-03-23 PROCEDURE — 3074F SYST BP LT 130 MM HG: CPT | Performed by: PHYSICIAN ASSISTANT

## 2021-03-23 PROCEDURE — 1036F TOBACCO NON-USER: CPT | Performed by: PHYSICIAN ASSISTANT

## 2021-03-23 PROCEDURE — 3078F DIAST BP <80 MM HG: CPT | Performed by: PHYSICIAN ASSISTANT

## 2021-03-23 PROCEDURE — 3008F BODY MASS INDEX DOCD: CPT | Performed by: PHYSICIAN ASSISTANT

## 2021-03-23 PROCEDURE — 99214 OFFICE O/P EST MOD 30 MIN: CPT | Performed by: PHYSICIAN ASSISTANT

## 2021-03-23 NOTE — PROGRESS NOTES
Assessment and Plan    #1  Abdominal bloating and diarrhea: suspect bile acid induced diarrhea versus IBS,  Also rule out pancreatic insufficiency  -continue questran TID, she just Recently started this and did note some improvement  - continue Bentyl p r n  t i d  for abdominal pain and diarrhea  Also reports some improvement with this  - will check an upper GI series as was ordered previously to assess for dysmotility and any stenosis at the esophagojejunostomy anastomosis site  -  Will plan for EGD after this to assess for any ulcerations, inflammation, or possible need for dilation  - continue PPI once daily  -can trial peppermint supplement for bloating  - check pancreatic elastase and fecal fat due to greasy floating stools to assess for any pancreatic insufficiency that would require enzyme replacement  - small meals, low residue foods    #2  Upper abdominal discomfort with GERD:  Pain has improved with Bentyl and PPI  Has not used NSAIDs for the last few months   -  Continue PPI  - avoid NSAIDs  - anti-reflux diet and measures  - Questran for any bile acid induced gastritis  -EGD as above    #3  Morbid obesity: patient needs to lose weight due to several health comorbidities, especially fatty liver and orthopedic problems  -gave referral for weight management to help with weight loss program and to get in with a nutritionist     #4   Hepatic steatosis: secondary to obesity, LFTs normal  -discussed fatty liver with patient, explained no signs of SANCHEZ at this time, advised treatment is mainly weight loss and healthy diet as discussed above   --------------------------------------------------------------------------------------------------------------------    Chief Complaint: f/u diarrhea, abdominal bloating    HPI: Nelsy Rice is a 63 y o  female with history of multiple abdominal surgeries including cholecystectomy, yarelis en y revised and ultimately converted to total gastrectomy and esophagojejunostomy in 2005 who presents for followup; she last saw our service in November for GERD and diarrhea  She she underwent colonoscopy showing three adenomatous polyps which were removed; colon was noted tortuous and spastic but prep was good (despite poor prep on colonoscopy from 1 year prior)  Patient says that she's been on pantoprazole 40 mg daily for years at this point  Her GERD is relatively well controlled at night if she is propped up in bed  She continues to have frequent stool urgency and most of her stools are loose or watery, without bleeding, often in the morning  She lost insurance between her last visit and now so was unable to get stool studies and UGI performed but recently started both the questran and bentyl a week ago with some improvement  She has less pain but still with bloating primarily in right abdomen  She says the stool often floats and sometimes looks greasy  She says it also sometimes feels like food gets stuck in the epigastric region when swallowing ad then all of a sudden will release and flow through; she says she has had to have dilation of her esophagojejunostomy in the past but not for at least several years at this point  Denies any black stools or blood in stool  No vomiting of food  No unexplained weight loss      Review of Systems:   General: negative for fatigue, fever, night sweats or unexpected weight loss  Psychological: negative for anxiety or depression  Ophthalmic: negative for blurry vision or scleral icterus  ENT: negative for headaches, oral lesions, sore throat, vocal changes or dysphagia  Hematological and Lymphatic: negative for pallor or swollen lymph nodes  Respiratory: negative for cough, shortness of breath or wheezing  Cardiovascular: negative for chest pain, edema or murmur  Gastrointestinal: as mentioned in HPI  Genito-Urinary: negative for dysuria or incontinence  Musculoskeletal: negative for joint pain, joint stiffness or joint swelling  Dermatological: negative for pruritus, rash, or jaundice    Current Medications  Current Outpatient Medications   Medication Sig Dispense Refill    calcium carbonate (OS-BEAU) 1250 (500 Ca) MG chewable tablet Chew 1 tablet daily      cholecalciferol (VITAMIN D3) 400 units tablet Take 400 Units by mouth daily      dicyclomine (BENTYL) 10 mg capsule TAKE ONE CAPSULE BY MOUTH THREE TIMES A DAY AS NEEDED FOR FECAL URGENCY AND ABDOMINAL CRAMPING (GENERIC FOR BENTYL) 90 capsule 0    lisinopril-hydrochlorothiazide (PRINZIDE,ZESTORETIC) 20-12 5 MG per tablet Take 1 tablet by mouth daily 90 tablet 1    Omega-3 Fatty Acids (fish oil) 1,000 mg Take 1 capsule (1,000 mg total) by mouth 2 (two) times a day 60 capsule 6    pantoprazole (PROTONIX) 40 mg tablet Take 40 mg by mouth 2 (two) times a day Resume with evening dose on       traZODone (DESYREL) 50 mg tablet Take 50 mg by mouth daily at bedtime as needed        vitamin B-12 (CYANOCOBALAMIN) 50 MCG tablet Take 50 mcg by mouth daily      multivitamin (THERAGRAN) TABS Take 1 tablet by mouth daily       No current facility-administered medications for this visit          Past Medical History  Past Medical History:   Diagnosis Date    Arthritis     Asthma     Back pain     Colon polyp     DVT (deep venous thrombosis) ([facility] [address] )     Gastric bypass status for obesity     GERD (gastroesophageal reflux disease)     Hiatal hernia     History of transfusion     after gastric bypass surgery, no reactions    Hypertension     Irritable bowel syndrome     Kidney stone     Muscle weakness     bilateral legs    Numbness and tingling     bilateral feet    Pneumonia     Spinal stenosis     Tinnitus     occassionally    Wears glasses        Past Surgical History  Past Surgical History:   Procedure Laterality Date    APPENDECTOMY      BARIATRIC SURGERY      CARPAL TUNNEL RELEASE Right      SECTION      x1    CHOLECYSTECTOMY      COLONOSCOPY  COLONOSCOPY W/ BIOPSIES AND POLYPECTOMY      FLEXIBLE BRONCHOSCOPY W/ UPPER ENDOSCOPY      HERNIA REPAIR      inguinal hernia    MA ARTHROCENTESIS ASPIR&/INJ SMALL JT/BURSA W/O US N/A 5/2/2018    Procedure: Coccygeal Injection & Ganglion Impar Block;  Surgeon: Yinka Copeland MD;  Location: [facility] MAIN OR;  Service: Pain Management     MA ARTHRODESIS POSTERIOR INTERBODY LUMBAR N/A 6/27/2018    Procedure: L4-5 DECOMPRESSION INTERBODY INSTRUMENTED FUSION;  Surgeon: Jesus Sorto MD;  Location: [facility] Main OR;  Service: Orthopedics    MA COLONOSCOPY FLX DX W/COLLJ SPEC WHEN PFRMD N/A 8/24/2018    Procedure: COLONOSCOPY;  Surgeon: Denita Mckinley MD;  Location: [facility] GI LAB; Service: Gastroenterology    MA ESOPHAGOGASTRODUODENOSCOPY TRANSORAL DIAGNOSTIC N/A 2/19/2018    Procedure: ESOPHAGOGASTRODUODENOSCOPY (EGD); Surgeon: Denita Mckinley MD;  Location: [facility] GI LAB; Service: Gastroenterology   Gill Sage JOINT Right 5/25/2018    Procedure: Rt Sacroiliac Joint Injection;  Surgeon: Yinka Copeland MD;  Location: [facility] MAIN OR;  Service: Pain Management     MA INJECTION,SACROILIAC JOINT Right 5/1/2019    Procedure: Sacroiliac Joint Injection (88631); Surgeon: Yinka Copeland MD;  Location: [facility] OR;  Service: Pain Management     TONSILECTOMY, ADENOIDECTOMY, BILATERAL MYRINGOTOMY AND TUBES         Past Social History   Social History     Socioeconomic History    Marital status: /Civil Union     Spouse name: None    Number of children: None    Years of education: None    Highest education level: None   Occupational History    None   Social Needs    Financial resource strain: None    Food insecurity     Worry: None     Inability: None    Transportation needs     Medical: None     Non-medical: None   Tobacco Use    Smoking status: Never Smoker    Smokeless tobacco: Never Used   Substance and Sexual Activity    Alcohol use:  Yes     Alcohol/week: 7 0 standard drinks     Types: 5 Cans of beer, 2 Shots of liquor per week     Frequency: 4 or more times a week    Drug use: Not Currently     Types: Marijuana     Comment: has medical marijuana card has not purchased any at this time     Sexual activity: Yes   Lifestyle    Physical activity     Days per week: None     Minutes per session: None    Stress: None   Relationships    Social connections     Talks on phone: None     Gets together: None     Attends [employer] service: None     Active member of club or organization: None     Attends meetings of clubs or organizations: None     Relationship status: None    Intimate partner violence     Fear of current or ex partner: None     Emotionally abused: None     Physically abused: None     Forced sexual activity: None   Other Topics Concern    None   Social History Narrative    None       The following portions of the patient's history were reviewed and updated as appropriate: allergies, current medications, past family history, past medical history, past social history, past surgical history and problem list     Vital Signs  Vitals:    03/23/21 0857   BP: 115/67   Pulse: 97   Temp: (!) 95 °F (35 °C)   Weight: 114 kg (250 lb 9 6 oz)   Height: 5' 3" (1 6 m)       Physical Exam:  General appearance: alert, cooperative, no distress; obese  HEENT: normocephalic, anicteric, no eye erythema or discharge, no oropharyngeal thrush  Neck: supple, trachea midline, no adenopathy  Lungs: CTA b/l, no rales, rhonchi, or wheezing, unlabored respirations  Heart: RRR, no murmur, rubs, or gallops  Abdomen: soft, non-tender, non-distended, normal bowel sounds, no masses or organomegaly; obese abdomen  Rectal: deferred  Extremities: no cyanosis, clubbing, or edema  Musculoskeletal: normal gait  Skin: color and texture normal, no jaundice, no rashes or lesions  Psychiatric: alert and oriented, normal affect and behavior

## 2021-03-25 ENCOUNTER — HOSPITAL ENCOUNTER (OUTPATIENT)
Dept: RADIOLOGY | Facility: HOSPITAL | Age: 66
Discharge: HOME | End: 2021-03-25
Payer: COMMERCIAL

## 2021-03-25 DIAGNOSIS — R19.7 DIARRHEA, UNSPECIFIED TYPE: ICD-10-CM

## 2021-03-25 DIAGNOSIS — R13.10 DYSPHAGIA, UNSPECIFIED TYPE: ICD-10-CM

## 2021-03-25 DIAGNOSIS — K21.9 GASTROESOPHAGEAL REFLUX DISEASE, UNSPECIFIED WHETHER ESOPHAGITIS PRESENT: ICD-10-CM

## 2021-03-25 PROCEDURE — 74240 X-RAY XM UPR GI TRC 1CNTRST: CPT

## 2021-03-26 ENCOUNTER — TELEPHONE (OUTPATIENT)
Dept: OBGYN CLINIC | Facility: CLINIC | Age: 66
End: 2021-03-26

## 2021-03-26 ENCOUNTER — TELEPHONE (OUTPATIENT)
Dept: OBGYN CLINIC | Facility: HOSPITAL | Age: 66
End: 2021-03-26

## 2021-03-26 DIAGNOSIS — Z98.1 HISTORY OF LUMBAR SPINAL FUSION: Primary | ICD-10-CM

## 2021-03-26 NOTE — TELEPHONE ENCOUNTER
Referral placed for Dr Sunny Uriarte  I have spoken with Ingrid Ramesh to let her know that MRI denied and we are referring to Dr Sunny Uriarte  Spoke with Salazar Mcgraw in our central phone room, she sent message to Dr Sunny Uriarte (or his staff) regarding referral and appointment

## 2021-03-26 NOTE — TELEPHONE ENCOUNTER
Elmer Dickens from Dr June Longoria office is calling to schedule patient with Dr Salinas Dukes  Patient tried to get an mri but insurance will not allow it  Patient had a previous sx in 2018 at [facility]  Patient did see Dr Salinas Dueks prior to the sx in 2016  Surgery notes are in the patient's charts  Please let patient know if she can schedule or if there is anything else that is needed before to review  Patient is not aware of this so please wait until at least 2pm to reach out to her      cb [phone]

## 2021-03-26 NOTE — TELEPHONE ENCOUNTER
----- Message from Sonja Nguyen PA-C sent at 3/26/2021 11:32 AM EDT -----  Regarding: FW: MRI DENIED  Does not appear patinet has done any recent PT  Given patient's history of spine surgery, can we see if hse is abl to see Dr Junaid Cabrera for this  I am not sure if Leonid Gracia can go to [place]   ----- Message -----  From: Juany Gilman  Sent: 3/26/2021  10:23 AM EDT  To: Sonja Nguyen PA-C  Subject: FW: MRI DENIED                                     ----- Message -----  From: Yecenia Galvan  Sent: 3/24/2021   3:40 PM EDT  To: Juany Gilman, Jean Lopez, #  Subject: MRI DENIED                                       MRI WAS DENIED:    Based on [employer] [facility] Guidelines (Radiology 159) Section(s): SP 15 1 Greater   than Six Months Post-Operative and 1 0 General Guidelines, we cannot approve this request    Your records show that you have had a repair (surgery) to treat a problem with your spine more   than six months ago  The reason this request cannot be approved is because:  One of the following must be met  -Follow up contact (in person, by phone, by mail, or by   messaging) with your doctor confirmed a failed recent (within three months) six week trial of   doctor prescribed treatment  Supported treatments include (but are not limited to) medications   for swelling or pain, physical therapy, and/or oral or injected steroids  -You have a sign or   symptom that suggests a serious underlying condition for which a trial of treatment is not   needed  WOULD YOU LIKE A P2P?

## 2021-03-27 ENCOUNTER — TELEPHONE (OUTPATIENT)
Dept: OBGYN CLINIC | Facility: HOSPITAL | Age: 66
End: 2021-03-27

## 2021-03-27 NOTE — TELEPHONE ENCOUNTER
Patient is calling in stating that she had received a letter from the insurance which states it was denied "greater than 6 months post operative treatment"  She is asking for a call back on what further she can do for this  Patient is asking for a call back relating this            Call back# [phone]

## 2021-03-27 NOTE — TELEPHONE ENCOUNTER
I called patient to schedule an appointment with Dr Rhianna Syed per [facility]  Per chart OK by Malick Goldman  Patient is going to check with her insurance to make sure we are in network  Patient has [employer] which looks like we participate but maybe with Tier II  Patient will contact insurance to find out details of coverage  Patient's appointment is scheduled but she will call back when she finds out insurance info      Thank you

## 2021-03-29 NOTE — TELEPHONE ENCOUNTER
I reached out and spoke with Teodora  I confirmed her appointment that is currently scheduled with Dr Merlene Bauman for 4/12 in [place]  She has been on the phone with her insurance company to discuss a change in her plan so she is able to seek treatment in [place] without any high oop costs  She will reach out to us, if she requires anything additional before her appointment with Dr Merlene Bauman

## 2021-03-31 DIAGNOSIS — Z20.822 ENCOUNTER FOR LABORATORY TESTING FOR COVID-19 VIRUS: ICD-10-CM

## 2021-03-31 PROCEDURE — U0003 INFECTIOUS AGENT DETECTION BY NUCLEIC ACID (DNA OR RNA); SEVERE ACUTE RESPIRATORY SYNDROME CORONAVIRUS 2 (SARS-COV-2) (CORONAVIRUS DISEASE [COVID-19]), AMPLIFIED PROBE TECHNIQUE, MAKING USE OF HIGH THROUGHPUT TECHNOLOGIES AS DESCRIBED BY CMS-2020-01-R: HCPCS | Performed by: INTERNAL MEDICINE

## 2021-03-31 PROCEDURE — U0005 INFEC AGEN DETEC AMPLI PROBE: HCPCS | Performed by: INTERNAL MEDICINE

## 2021-04-01 LAB — SARS-COV-2 RNA RESP QL NAA+PROBE: NEGATIVE

## 2021-04-05 ENCOUNTER — ANESTHESIA EVENT (OUTPATIENT)
Dept: GASTROENTEROLOGY | Facility: AMBULARY SURGERY CENTER | Age: 66
End: 2021-04-05

## 2021-04-05 NOTE — PRE-PROCEDURE INSTRUCTIONS
Pre-Surgery Instructions:   Medication Instructions    calcium carbonate (OS-BEAU) 1250 (500 Ca) MG chewable tablet Instructed patient per Anesthesia Guidelines   cholecalciferol (VITAMIN D3) 400 units tablet Instructed patient per Anesthesia Guidelines   dicyclomine (BENTYL) 10 mg capsule Instructed patient per Anesthesia Guidelines   lisinopril-hydrochlorothiazide (PRINZIDE,ZESTORETIC) 20-12 5 MG per tablet Instructed patient per Anesthesia Guidelines   multivitamin (THERAGRAN) TABS Instructed patient per Anesthesia Guidelines   Omega-3 Fatty Acids (fish oil) 1,000 mg Instructed patient per Anesthesia Guidelines   pantoprazole (PROTONIX) 40 mg tablet Instructed patient per Anesthesia Guidelines   traZODone (DESYREL) 50 mg tablet Instructed patient per Anesthesia Guidelines   vitamin B-12 (CYANOCOBALAMIN) 50 MCG tablet Instructed patient per Anesthesia Guidelines  Pt to follow Dr Christina Doe instructions   Shashi Aguirre Dire [phone]

## 2021-04-06 ENCOUNTER — ANESTHESIA (OUTPATIENT)
Dept: GASTROENTEROLOGY | Facility: AMBULARY SURGERY CENTER | Age: 66
End: 2021-04-06

## 2021-04-06 ENCOUNTER — HOSPITAL ENCOUNTER (OUTPATIENT)
Dept: GASTROENTEROLOGY | Facility: AMBULARY SURGERY CENTER | Age: 66
Setting detail: OUTPATIENT SURGERY
Discharge: HOME | End: 2021-04-06
Attending: INTERNAL MEDICINE | Admitting: INTERNAL MEDICINE
Payer: COMMERCIAL

## 2021-04-06 VITALS
DIASTOLIC BLOOD PRESSURE: 75 MMHG | OXYGEN SATURATION: 98 % | SYSTOLIC BLOOD PRESSURE: 135 MMHG | RESPIRATION RATE: 18 BRPM | TEMPERATURE: 97 F | HEIGHT: 63 IN | HEART RATE: 90 BPM | WEIGHT: 250 LBS | BODY MASS INDEX: 44.3 KG/M2

## 2021-04-06 DIAGNOSIS — K21.9 GASTROESOPHAGEAL REFLUX DISEASE, UNSPECIFIED WHETHER ESOPHAGITIS PRESENT: ICD-10-CM

## 2021-04-06 DIAGNOSIS — R19.4 CHANGE IN BOWEL HABITS: ICD-10-CM

## 2021-04-06 DIAGNOSIS — K52.9 CHRONIC DIARRHEA: ICD-10-CM

## 2021-04-06 DIAGNOSIS — K90.9 STOOL FAT INCREASED: ICD-10-CM

## 2021-04-06 DIAGNOSIS — R19.7 DIARRHEA, UNSPECIFIED TYPE: ICD-10-CM

## 2021-04-06 DIAGNOSIS — R13.10 DYSPHAGIA, UNSPECIFIED TYPE: ICD-10-CM

## 2021-04-06 PROBLEM — E66.01 CLASS 3 SEVERE OBESITY IN ADULT (HCC): Status: ACTIVE | Noted: 2021-04-06

## 2021-04-06 PROBLEM — E66.813 CLASS 3 SEVERE OBESITY IN ADULT (HCC): Status: ACTIVE | Noted: 2021-04-06

## 2021-04-06 PROCEDURE — 88305 TISSUE EXAM BY PATHOLOGIST: CPT | Performed by: PATHOLOGY

## 2021-04-06 PROCEDURE — 43239 EGD BIOPSY SINGLE/MULTIPLE: CPT | Performed by: INTERNAL MEDICINE

## 2021-04-06 RX ORDER — SODIUM CHLORIDE, SODIUM LACTATE, POTASSIUM CHLORIDE, CALCIUM CHLORIDE 600; 310; 30; 20 MG/100ML; MG/100ML; MG/100ML; MG/100ML
100 INJECTION, SOLUTION INTRAVENOUS CONTINUOUS
Status: DISCONTINUED | OUTPATIENT
Start: 2021-04-06 | End: 2021-04-10 | Stop reason: HOSPADM

## 2021-04-06 RX ORDER — LIDOCAINE HYDROCHLORIDE 10 MG/ML
INJECTION, SOLUTION EPIDURAL; INFILTRATION; INTRACAUDAL; PERINEURAL AS NEEDED
Status: DISCONTINUED | OUTPATIENT
Start: 2021-04-06 | End: 2021-04-06

## 2021-04-06 RX ORDER — ONDANSETRON 2 MG/ML
4 INJECTION INTRAMUSCULAR; INTRAVENOUS ONCE AS NEEDED
Status: DISCONTINUED | OUTPATIENT
Start: 2021-04-06 | End: 2021-04-10 | Stop reason: HOSPADM

## 2021-04-06 RX ORDER — PROPOFOL 10 MG/ML
INJECTION, EMULSION INTRAVENOUS AS NEEDED
Status: DISCONTINUED | OUTPATIENT
Start: 2021-04-06 | End: 2021-04-06

## 2021-04-06 RX ADMIN — SODIUM CHLORIDE, SODIUM LACTATE, POTASSIUM CHLORIDE, AND CALCIUM CHLORIDE 100 ML/HR: .6; .31; .03; .02 INJECTION, SOLUTION INTRAVENOUS at 09:59

## 2021-04-06 RX ADMIN — LIDOCAINE HYDROCHLORIDE 50 MG: 10 INJECTION, SOLUTION EPIDURAL; INFILTRATION; INTRACAUDAL; PERINEURAL at 10:20

## 2021-04-06 RX ADMIN — PROPOFOL 40 MG: 10 INJECTION, EMULSION INTRAVENOUS at 10:24

## 2021-04-06 RX ADMIN — PROPOFOL 20 MG: 10 INJECTION, EMULSION INTRAVENOUS at 10:22

## 2021-04-06 RX ADMIN — PROPOFOL 100 MG: 10 INJECTION, EMULSION INTRAVENOUS at 10:20

## 2021-04-06 NOTE — ANESTHESIA POSTPROCEDURE EVALUATION
Post-Op Assessment Note    CV Status:  Stable  Pain Score: 0    Pain management: adequate     Mental Status:  Alert and awake   Hydration Status:  Euvolemic and stable   PONV Controlled:  Controlled   Airway Patency:  Patent      Post Op Vitals Reviewed: Yes      Staff: Anesthesiologist, CRNA   Comments: Pt states she is comfortable, VSS, Report given to recovering RN        No complications documented      BP 91/56 (04/06/21 1032)    Temp     Pulse 92 (04/06/21 1032)   Resp 16 (04/06/21 1032)    SpO2   94

## 2021-04-06 NOTE — ANESTHESIA PREPROCEDURE EVALUATION
Review of Systems/Medical History  Patient summary reviewed  Chart reviewed  No history of anesthetic complications     Cardiovascular  EKG reviewed , Exercise tolerance (METS): >4 ,  Hypertension controlled, No dysrhythmias , No angina ,   Comment: SUMMARY     LEFT VENTRICLE:  Systolic function was normal by visual assessment  Ejection fraction was estimated to be 55 %  There were no regional wall motion abnormalities  There was mild concentric hypertrophy  Doppler parameters were consistent with abnormal left ventricular relaxation (grade 1 diastolic dysfunction)      MITRAL VALVE:  There was mild regurgitation      AORTIC VALVE:  There was no evidence for stenosis  There was no regurgitation      TRICUSPID VALVE:  There was mild regurgitation  Pulmonary artery systolic pressure was within the normal range ,  Pulmonary  Not a smoker , Asthma , well controlled/ stable Last rescue: > 1 year ago ,        GI/Hepatic    GERD well controlled,  Hiatal hernia, Bowel prep       Kidney stones,        Endo/Other    Obesity    GYN       Hematology  Negative hematology ROS      Musculoskeletal    Arthritis     Neurology  Negative neurology ROS      Psychology   Negative psychology ROS              Physical Exam    Airway    Mallampati score: I  TM Distance: >3 FB  Neck ROM: full     Dental   Comment: No loose,     Cardiovascular  Rhythm: regular, Rate: normal,     Pulmonary  Breath sounds clear to auscultation,     Other Findings        Anesthesia Plan  ASA Score- 3     Anesthesia Type- IV sedation with anesthesia with ASA Monitors  Additional Monitors:   Airway Plan:           Plan Factors-Exercise tolerance (METS): >4     Chart reviewed  EKG reviewed  Existing labs reviewed  Patient summary reviewed  Patient did not smoke on day of surgery  Induction- intravenous  Postoperative Plan-     Informed Consent- Anesthetic plan and risks discussed with patient    I personally reviewed this patient with the CRNA  Discussed and agreed on the Anesthesia Plan with the CRNA  Zuhair Pavon

## 2021-04-06 NOTE — H&P
History and Physical -  [facility]  Vianney Puri 63 y o  female MRN: [id number]    HPI: Vianney Puri is a 63y o  year old female who presents with abdominal discomfort, reflux       Review of Systems    Historical Information   Past Medical History:   Diagnosis Date    Arthritis     Asthma     Back pain     Chronic pain disorder     hips into the legs    Colon polyp     DVT [facility] thrombosis) ([facility] [address]     Gastric bypass status for obesity     yarelis en y    GERD (gastroesophageal reflux disease)     Hiatal hernia     History of transfusion 2006    after gastric bypass surgery, no reactions    Hypertension     Irritable bowel syndrome     Kidney stone     Muscle weakness     bilateral legs    Numbness and tingling     bilateral feet    Pneumonia     Spinal stenosis     Tinnitus     occassionally    Wears glasses      Past Surgical History:   Procedure Laterality Date    APPENDECTOMY      BACK SURGERY  2018    L4-5 fusion    BARIATRIC SURGERY      yarelis en y- pt states the procedure was done incorrectly which lead to additional surgeries from -   [address] Right      SECTION      x1   [address] [facility]      partial removal of the small bowel-necrosis-between -    COLONOSCOPY      COLONOSCOPY W/ BIOPSIES AND POLYPECTOMY      FLEXIBLE BRONCHOSCOPY W/ UPPER ENDOSCOPY      GASTRECTOMY      after gastric bypass-(failed surgery per pt) 2006    HERNIA REPAIR      incisional hernias-diaphragm area    UT ARTHROCENTESIS ASPIR&/INJ SMALL JT/BURSA W/O US N/A 2018    Procedure: Coccygeal Injection & Ganglion Impar Block;  Surgeon: Nick Colbert MD;  Location: [facility] MAIN OR;  Service: Pain Management     UT ARTHRODESIS POSTERIOR INTERBODY LUMBAR N/A 2018    Procedure: L4-5 DECOMPRESSION INTERBODY INSTRUMENTED FUSION;  Surgeon: Berkley Guzman MD;  Location: [facility] Main OR;  Service: Orthopedics    ND COLONOSCOPY FLX DX W/COLLJ SPEC WHEN PFRMD N/A 8/24/2018    Procedure: COLONOSCOPY;  Surgeon: Cherylene Needy, MD;  Location: [facility] GI LAB; Service: Gastroenterology    ND ESOPHAGOGASTRODUODENOSCOPY TRANSORAL DIAGNOSTIC N/A 2/19/2018    Procedure: ESOPHAGOGASTRODUODENOSCOPY (EGD); Surgeon: Cherylene Needy, MD;  Location: [facility] GI LAB; Service: Gastroenterology   Valma Bonds JOINT Right 5/25/2018    Procedure: Rt Sacroiliac Joint Injection;  Surgeon: Gabriele Meyer MD;  Location: [facility] MAIN OR;  Service: Pain Management     ND INJECTION,SACROILIAC JOINT Right 5/1/2019    Procedure: Sacroiliac Joint Injection ([id number]);   Surgeon: Gabriele Meyer MD;  Location: [facility] MAIN OR;  Service: Pain Management     TONSILECTOMY, ADENOIDECTOMY, BILATERAL MYRINGOTOMY AND TUBES      TONSILLECTOMY       Social History   Social History     Substance and Sexual Activity   Alcohol Use Yes    Alcohol/week: 7 0 standard drinks    Types: 5 Cans of beer, 2 Shots of liquor per week    Frequency: 4 or more times a week     Social History     Substance and Sexual Activity   Drug Use Yes    Types: Marijuana    Comment: has medical marijuana card has not purchased any at this time      Social History     Tobacco Use   Smoking Status Never Smoker   Smokeless Tobacco Never Used     Family History   Problem Relation Age of Onset    Diabetes Mother     Aortic aneurysm Father     Cancer Father         prostate    Heart disease Sister         open heart    Cancer Sister         breast    Breast cancer Sister 47    Diabetes Brother     Cancer Brother         spinal cancer    Other Daughter         concussion syndrome from MVA    Asperger's syndrome Son         high functioning    Diabetes Maternal Grandfather     Stroke Brother     Hypertension Brother     No Known Problems Maternal Aunt     No Known Problems Maternal Aunt     No Known Problems Paternal Aunt     No Known Problems Paternal Aunt     Colon cancer Maternal Uncle 61       Meds/Allergies     (Not in a hospital admission)      No Known Allergies    Objective     /97   Pulse 103   Temp (!) 97 °F (36 1 °C) (Temporal)   Resp 16   Ht 5' 3" (1 6 m)   Wt 113 kg (250 lb)   SpO2 96%   BMI 44 29 kg/m²       PHYSICAL EXAM    Gen: NAD  CV: RRR  CHEST: Clear  ABD: soft, NT/ND  EXT: no edema  Neuro: AAO      ASSESSMENT/PLAN:  This is a 63y o  year old female here for abdominal discomfort, reflux       PLAN:   Procedure: John Campbell

## 2021-04-12 ENCOUNTER — OFFICE VISIT (OUTPATIENT)
Dept: OBGYN CLINIC | Facility: HOSPITAL | Age: 66
End: 2021-04-12
Payer: COMMERCIAL

## 2021-04-12 VITALS
SYSTOLIC BLOOD PRESSURE: 126 MMHG | BODY MASS INDEX: 44.3 KG/M2 | HEIGHT: 63 IN | WEIGHT: 250 LBS | DIASTOLIC BLOOD PRESSURE: 76 MMHG | HEART RATE: 102 BPM

## 2021-04-12 DIAGNOSIS — M53.3 CHRONIC RIGHT SACROILIAC PAIN: ICD-10-CM

## 2021-04-12 DIAGNOSIS — G89.29 CHRONIC RIGHT SACROILIAC PAIN: ICD-10-CM

## 2021-04-12 DIAGNOSIS — Z98.1 HISTORY OF LUMBAR SPINAL FUSION: Primary | ICD-10-CM

## 2021-04-12 DIAGNOSIS — M54.16 BILATERAL LUMBAR RADICULOPATHY: ICD-10-CM

## 2021-04-12 PROCEDURE — 3074F SYST BP LT 130 MM HG: CPT | Performed by: ORTHOPAEDIC SURGERY

## 2021-04-12 PROCEDURE — 3078F DIAST BP <80 MM HG: CPT | Performed by: ORTHOPAEDIC SURGERY

## 2021-04-12 PROCEDURE — 3008F BODY MASS INDEX DOCD: CPT | Performed by: ORTHOPAEDIC SURGERY

## 2021-04-12 PROCEDURE — 1036F TOBACCO NON-USER: CPT | Performed by: ORTHOPAEDIC SURGERY

## 2021-04-12 PROCEDURE — 99214 OFFICE O/P EST MOD 30 MIN: CPT | Performed by: ORTHOPAEDIC SURGERY

## 2021-04-12 NOTE — LETTER
April 12, 2021     [facility], [address] [place] [address]     Patient: Jesus Mcmahon   YOB: 1955   Date of Visit: 4/12/2021       Dear Dr Jolene Rodriguez: Thank you for referring Jesus Mcmahon to me for evaluation  Below are my notes for this consultation  If you have questions, please do not hesitate to call me  I look forward to following your patient along with you           Sincerely,        Wolf Hsu MD        CC: Chiquita Morrell MD

## 2021-04-12 NOTE — PROGRESS NOTES
Assessment:   Diagnosis ICD-10-CM Associated Orders   1  History of lumbar spinal fusion  Z98 1 Ambulatory referral to Physical Therapy   2  Chronic right sacroiliac pain  M53 3 Ambulatory referral to Physical Therapy    G89 29    3  Bilateral lumbar radiculopathy  M54 16 Ambulatory referral to Physical Therapy       Plan:    Patient has history L4 and L5 fusion with interbody cage by Dr Bridger Park, imaging reviewed showed stable hardware  She was tender over right SI joint, maintained good strength and sensation bilateral LE, hypoactive reflexes at L4 and S1  Patient will restart physical therapy, use OTC anti inflammatories for pain as needed  If she continues with pain, radiating symptoms down right leg then will obtain new MRI with contrast to further evaluate  If further surgery needed in future she may need to return to Dr Bridger Park  To do next visit:  No follow-ups on file  The above stated was discussed in layman's terms and the patient expressed understanding  All questions were answered to the patient's satisfaction  Scribe Attestation    I,:  Dada Appiah am acting as a scribe while in the presence of the attending physician :       I,:  Angelita Alfonso MD personally performed the services described in this documentation    as scribed in my presence :             Subjective:   Dwain Syed is a 63 y o  female who presents for evaluation of lower back pain with radiating symptoms  Referred by Dr Norman Staley  History L4 &5 fusion by Dr Bridger Park 2018 which was beneficial  Past year she has been experiencing deep pain above fusion level which wraps around her ribs, also pain on right side below her fusion which is tender to palpation with bilateral radiating pain down posterior thighs with tingling and numbness to her heel  Symptoms are aggravated by prolonged standing, activity  She will get burning sensation down right leg   She leans on shopping cart when out, she is using [address] to help balance  She is also noting giving out weakness in right leg at times, has to go up and down stairs one at a time  Her life is being impacted by her symptoms, she cant ride her horse, motorcycle  She does try to maintain HEP/stretching as shown by therapist in past  Denies any bowel or bladder issues  Dr Cindy Mcduffie is no longer in her network last saw her 2020/December and told her no issues with fusion level and didn't recommend any other work up  She is working on weight loss  Review of systems negative unless otherwise specified in HPI  Review of Systems   Constitutional: Negative for chills and fever  HENT: Negative for ear pain and sore throat  Eyes: Negative for pain and visual disturbance  Respiratory: Negative for cough and shortness of breath  Cardiovascular: Negative for chest pain and palpitations  Gastrointestinal: Negative for abdominal pain and vomiting  Genitourinary: Negative for dysuria and hematuria  Musculoskeletal: Negative for arthralgias and back pain  Skin: Negative for color change and rash  Neurological: Negative for seizures and syncope  All other systems reviewed and are negative        Past Medical History:   Diagnosis Date    Arthritis     Asthma     Back pain     Chronic pain disorder     hips into the legs    Colon polyp     DVT (deep venous thrombosis) ([facility] [address] ) 2006    Gastric bypass status for obesity     yarelis en y    GERD (gastroesophageal reflux disease)     Hiatal hernia     History of transfusion 2006    after gastric bypass surgery, no reactions    Hypertension     Irritable bowel syndrome     Kidney stone     Muscle weakness     bilateral legs    Numbness and tingling     bilateral feet    Pneumonia     Spinal stenosis     Tinnitus     occassionally    Wears glasses        Past Surgical History:   Procedure Laterality Date    APPENDECTOMY      BACK SURGERY  06/18/2018    L4-5 fusion    BARIATRIC SURGERY  2005    yarelis en y- pt states the procedure was done incorrectly which lead to additional surgeries from 2006   [address] Right      SECTION      x1   [address]      partial removal of the small bowel-necrosis-between 2006    COLONOSCOPY      COLONOSCOPY W/ BIOPSIES AND POLYPECTOMY      FLEXIBLE BRONCHOSCOPY W/ UPPER ENDOSCOPY      GASTRECTOMY      after gastric bypass-(failed surgery per pt) 2006    HERNIA REPAIR      incisional hernias-diaphragm area    GA ARTHROCENTESIS ASPIR&/INJ SMALL JT/BURSA W/O US N/A 2018    Procedure: Coccygeal Injection & Ganglion Impar Block;  Surgeon: Nick Colbert MD;  Location: [place] [address] MAIN OR;  Service: Pain Management     GA ARTHRODESIS POSTERIOR INTERBODY LUMBAR N/A 2018    Procedure: L4-5 DECOMPRESSION INTERBODY INSTRUMENTED FUSION;  Surgeon: Berkley Guzman MD;  Location: [facility];  Service: Orthopedics    GA COLONOSCOPY FLX DX W/COLLJ [address] PFRMD N/A 2018    Procedure: COLONOSCOPY;  Surgeon: Ranjit Parks MD;  Location: [place] [address] GI LAB; Service: Gastroenterology    GA ESOPHAGOGASTRODUODENOSCOPY TRANSORAL DIAGNOSTIC N/A 2018    Procedure: ESOPHAGOGASTRODUODENOSCOPY (EGD); Surgeon: Ranjit Parks MD;  Location: [facility] GI LAB; Service: Gastroenterology   [address] JOINT Right 2018    Procedure: Rt Sacroiliac Joint Injection;  Surgeon: Nick Colbert MD;  Location: [facility] MAIN OR;  Service: Pain Management     GA INJECTION,SACROILIAC JOINT Right 2019    Procedure: Sacroiliac Joint Injection (49491);   Surgeon: Nick Colbert MD;  Location: [facility] MAIN OR;  Service: Pain Management     TONSILECTOMY, ADENOIDECTOMY, BILATERAL MYRINGOTOMY AND TUBES      TONSILLECTOMY         Family History   Problem Relation Age of Onset    Diabetes Mother     Aortic aneurysm Father     Cancer Father         prostate    Heart disease Sister         open heart    Cancer Sister         breast    Breast cancer Sister 47    Diabetes Brother     Cancer Brother         spinal cancer    Other Daughter         concussion syndrome from MVA    Asperger's syndrome Son         high functioning    Diabetes Maternal Grandfather     Stroke Brother     Hypertension Brother     No Known Problems Maternal Aunt     No Known Problems Maternal Aunt     No Known Problems Paternal Aunt     No Known Problems Paternal [de-identified]     Colon cancer Maternal Uncle 61       Social History     Occupational History    Not on file   Tobacco Use    Smoking status: Never Smoker    Smokeless tobacco: Never Used   Substance and Sexual Activity    Alcohol use:  Yes     Alcohol/week: 7 0 standard drinks     Types: 5 Cans of beer, 2 Shots of liquor per week     Frequency: 4 or more times a week    Drug use: Yes     Types: Marijuana     Comment: has medical marijuana card has not purchased any at this time     Sexual activity: Yes     Birth control/protection: Post-menopausal         Current Outpatient Medications:     calcium carbonate (OS-BEAU) 1250 (500 Ca) MG chewable tablet, Chew 1 tablet daily, Disp: , Rfl:     cholecalciferol (VITAMIN D3) 400 units tablet, Take 400 Units by mouth daily, Disp: , Rfl:     dicyclomine (BENTYL) 10 mg capsule, TAKE ONE CAPSULE BY MOUTH THREE TIMES A DAY AS NEEDED FOR FECAL URGENCY AND ABDOMINAL CRAMPING (GENERIC FOR BENTYL) (Patient taking differently: 10 mg 3 (three) times a day before meals ), Disp: 90 capsule, Rfl: 0    lisinopril-hydrochlorothiazide (PRINZIDE,ZESTORETIC) 20-12 5 MG per tablet, Take 1 tablet by mouth daily, Disp: 90 tablet, Rfl: 1    multivitamin (THERAGRAN) TABS, Take 1 tablet by mouth daily, Disp: , Rfl:     Omega-3 Fatty Acids (fish oil) 1,000 mg, Take 1 capsule (1,000 mg total) by mouth 2 (two) times a day, Disp: 60 capsule, Rfl: 6    pantoprazole (PROTONIX) 40 mg tablet, Take 40 mg by mouth 2 (two) times a day Resume with evening dose on 7/9, Disp: , Rfl:    traZODone (DESYREL) 50 mg tablet, Take 50 mg by mouth daily at bedtime as needed  , Disp: , Rfl:     vitamin B-12 (CYANOCOBALAMIN) 50 MCG tablet, Take 50 mcg by mouth daily, Disp: , Rfl:     No Known Allergies         Vitals:    04/12/21 0740   BP: 126/76   Pulse: 102       Objective:                    Back Exam     Comments:  Lumbar spine  No acute distress, well healed lumbar fusion  No tenderness to palpation midline lower lumbar musculature, right SI tenderness   L2-S1 strength 5/5 strength  L2-S1 sensation sensation intact and symmetric   Hypoactive at L4 and S1 bilateral   Equivocal patricks test             Diagnostics, reviewed and taken today if performed as documented: The attending physician has personally reviewed the pertinent films in PACS and interpretation is as follows:Xr lumbar spine demonstrates stable L4 &L5 fusion with interbody graft  Procedures, if performed today:    Procedures    None performed      Portions of the record may have been created with voice recognition software  Occasional wrong word or "sound a like" substitutions may have occurred due to the inherent limitations of voice recognition software  Read the chart carefully and recognize, using context, where substitutions have occurred

## 2021-04-13 ENCOUNTER — TELEPHONE (OUTPATIENT)
Dept: GASTROENTEROLOGY | Facility: AMBULARY SURGERY CENTER | Age: 66
End: 2021-04-13

## 2021-04-13 ENCOUNTER — TELEPHONE (OUTPATIENT)
Dept: FAMILY MEDICINE CLINIC | Facility: CLINIC | Age: 66
End: 2021-04-13

## 2021-04-13 DIAGNOSIS — R10.9 RIGHT SIDED ABDOMINAL PAIN: Primary | ICD-10-CM

## 2021-04-13 DIAGNOSIS — K90.9 STEATORRHEA: ICD-10-CM

## 2021-04-13 NOTE — TELEPHONE ENCOUNTER
Pt aware of results, verbalized understand  PT is still having abdominal pain  Can CT be ordered? Please advise  Thank you!

## 2021-04-13 NOTE — TELEPHONE ENCOUNTER
Please advise, I have placed order for CT scan of the abdomen and pelvis with contrast, for right sided abdominal pain and steatorrhea and recent EGD showing no obvious underlying etiology    Thank you

## 2021-04-13 NOTE — TELEPHONE ENCOUNTER
PT aware, gave her number to central scheduling  Mailed out script as well  Advised to call office with any issues scheduling or concerns

## 2021-04-13 NOTE — TELEPHONE ENCOUNTER
----- Message from Glenroy Boo MD sent at 4/12/2021  9:17 AM EDT -----  Please inform the patient biopsies from  [facility] intestine are normal, no evidence of celiac sprue  Please have the patient call if she continues to have abdominal pain at which point I would recommend CT scan, call with any questions or concerns

## 2021-04-13 NOTE — TELEPHONE ENCOUNTER
Just calling to let us know that she has received both Pfizer Vaccines as of yesterday, please add to her chart

## 2021-04-19 ENCOUNTER — HOSPITAL ENCOUNTER (OUTPATIENT)
Dept: RADIOLOGY | Facility: HOSPITAL | Age: 66
Discharge: HOME | End: 2021-04-19
Payer: COMMERCIAL

## 2021-04-19 ENCOUNTER — APPOINTMENT (OUTPATIENT)
Dept: PHYSICAL THERAPY | Facility: CLINIC | Age: 66
End: 2021-04-19
Payer: COMMERCIAL

## 2021-04-19 DIAGNOSIS — R10.9 RIGHT SIDED ABDOMINAL PAIN: ICD-10-CM

## 2021-04-19 DIAGNOSIS — K90.9 STEATORRHEA: ICD-10-CM

## 2021-04-19 PROCEDURE — 74177 CT ABD & PELVIS W/CONTRAST: CPT

## 2021-04-19 PROCEDURE — G1004 CDSM NDSC: HCPCS

## 2021-04-19 RX ADMIN — IOHEXOL 100 ML: 350 INJECTION, SOLUTION INTRAVENOUS at 14:34

## 2021-04-20 LAB
FAT 24H STL-MRATE: 12.7 G/24 HR (ref 0–7.1)
SPECIMEN WT 72H STL-MRATE: 367 G

## 2021-04-23 ENCOUNTER — EVALUATION (OUTPATIENT)
Dept: PHYSICAL THERAPY | Facility: CLINIC | Age: 66
End: 2021-04-23
Payer: COMMERCIAL

## 2021-04-23 DIAGNOSIS — M53.3 SACROILIAC PAIN: ICD-10-CM

## 2021-04-23 DIAGNOSIS — M54.50 LUMBAR SPINE PAIN: ICD-10-CM

## 2021-04-23 DIAGNOSIS — Z98.1 HISTORY OF FUSION OF LUMBAR SPINE: Primary | ICD-10-CM

## 2021-04-23 PROCEDURE — 97110 THERAPEUTIC EXERCISES: CPT

## 2021-04-23 PROCEDURE — 97161 PT EVAL LOW COMPLEX 20 MIN: CPT

## 2021-04-23 NOTE — PROGRESS NOTES
PT Evaluation     Today's date: 2021  Patient name: Cindy Herzog  :   MRN: [id number]  Referring provider: Venu Lane MD  Dx:   Encounter Diagnosis     ICD-10-CM    1  History of fusion of lumbar spine  Z98 1    2  Sacroiliac pain  M53 3    3  Lumbar spine pain  M54 5        Start Time: 0800  Stop Time: 0845  Total time in clinic (min): 45 minutes    Assessment  Assessment details: Cindy Herzog is a 63y o  year old female reports to physical therapy with symptoms consistent with referring diagnosis of: History of fusion of lumbar spine  (primary encounter diagnosis), Sacroiliac pain, Lumbar spine pain  Patient has complex history of lumbar spine pain, resulting in a lumbar spine fusion in 2018  Patient currently ambulating with trendelenburg gait, with lack of heel off bilaterally  During evaluation, patient did not respond mechanically during repeated motion testing  Poor core stability and activation noted throughout testing  Patient demonstrates limitations in lumbar spine ROM, strength, and functional mobility  Patient is limited in the following functional activities: riding her horse and motorcycle, ascending/descending stairs, walking at a brisk pace, and 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  FOTO score is 33% with a 50% prediction in function  Patient requires skilled physical therapy to restore prior level of function, address functional limitations, and progress towards independence with home exercise program  Patient was educated on HEP as noted on flow sheet, nature of lumbar spine pain, and importance of performing heat on lumbar spine at the end of the day to maximize benefits of PT  Patient verbalized and demonstrated understanding of HEP and plan of care  Primary focus of PT is to maximize core and hip musculature strength for independence in community      Symptom irritability: moderateBarriers to therapy: Due to chronic nature of lumbar spine pain, and diffuse nature of lumbar spine pain, patient's tolerance and progress towards goals may be limited at this time  Goals  STGs to be achieved in 4 weeks  -STG 1: Patient will be independent with HEP    -STG 2: Patient will have 0/10 lumbar spine pain at rest    -STG 3: Patient will increase lumbar spine ROM to within normal limits  -STG 4: Patient will report 40% functional improvement  -STG 5: Patient will demonstrate 1/2 grade MMT improvement in R LE    -STG 6: Patient will be able to stand for greater than 30 minutes with no pain  LTGs to be achieved in 8 weeks  -LTG 1: Patient will demonstrate independence with maintenance program    -LTG 2: Patient will perform all functional activities with less than 2/10 lumbar spine pain  -LTG 3: Patient will improve FOTO score by 10 points  -LTG 4: Patient will report 80% functional improvement  -LTG 5: Patient will be able to stand for greater than 60 minutes with no pain  -LTG 6: Patient will demonstrate 1 grade MMT improvement in R LE    -LTG 7: Patient will be able to mount and dismount horse and motorcycle with no difficulties       Plan  Patient would benefit from: PT eval and skilled physical therapy  Planned modality interventions: TENS, thermotherapy: hydrocollator packs, cryotherapy, electrical stimulation/Russian stimulation, traction and ultrasound  Planned therapy interventions: manual therapy, joint mobilization, massage, Garcia taping, ADL retraining, activity modification, ADL training, abdominal trunk stabilization, balance, neuromuscular re-education, patient education, postural training, strengthening, stretching, therapeutic activities, therapeutic exercise, therapeutic training, flexibility, functional ROM exercises, gait training, graded activity, graded exercise, graded motor and home exercise program  Frequency: 2x week  Duration in weeks: 8        Subjective Evaluation    History of Present Illness  Date of surgery: 2018  Mechanism of injury: Patient reports history of lumbar spine fusion on 18  She reports that her lumbar spine pain was at [address] when she returned to work as a   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  She rides horses and her [address], but notes difficulties mounting her horse and mounting the motorcycle  She reports lumbar spine pain that radiates across her low back 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  Functionally, she notes difficulties with ascending/descending stairs without AD  She has inability to perform a heel raise  She has difficulties driving due to lumbar spine pain  She reports numbness and tingling down the posterior aspect of B legs to her heels  She reports per tingling is when she is standing  When she sits down, her symptoms are relieved  She was denied recent MRI  Patient recently received medical marijuana card to help with pain levels as she would not like to take pain medication at this time  Pain  Current pain ratin  At best pain ratin  At worst pain ratin  Quality: needle-like and pressure  Relieving factors: rest and ice  Aggravating factors: standing and stair climbing  Progression: no change    Social Support  Steps to enter house: yes  Stairs in house: yes   Lives in: multiple-level home  Lives with: significant other    Employment status: working  Treatments  Previous treatment: physical therapy  Current treatment: medication  Patient Goals  Patient goals for therapy: increased strength, return to sport/leisure activities and decreased pain  Patient goal: Walk at a normal pace, ride horse and motorcycle with no difficulties  Objective     Concurrent Complaints  Positive for disturbed sleep   Negative for night pain, bladder dysfunction and bowel dysfunction    Postural Observations  Seated posture: good  Standing posture: fair  Correction of posture: makes symptoms better        Palpation   Left   Tenderness of the erector spinae and lumbar paraspinals  Right   Tenderness of the erector spinae and lumbar paraspinals  Tenderness     Lumbar Spine  Tenderness in the spinous process (T12-L5)  Left Hip   Tenderness in the PSIS  Right Hip   Tenderness in the PSIS  Neurological Testing     Sensation     Lumbar   Left   Intact: light touch    Right   Intact: light touch    Reflexes   Left   Patellar (L4): normal (2+)    Right   Patellar (L4): normal (2+)    Strength/Myotome Testing     Lumbar   Left   Normal strength    Right Hip   Planes of Motion   Flexion: 4-  Abduction: 4-    Right Knee   Flexion: 3+    Right Ankle/Foot   Dorsiflexion: 3+  Plantar flexion: 3+    Additional Strength Details  Patient unable to heel walk or toe walk due to functional weakness  Unable to perform heel raises due to pain  Muscle Activation     Additional Muscle Activation Details  Poor activation of TrA    Tests     Lumbar   Positive SIJ compression and sacroiliac distraction   Left   Positive crossed SLR, passive SLR, quadrant and slump test      Right   Positive crossed SLR, passive SLR, quadrant and slump test      Left Pelvic Girdle/Sacrum   Positive: active SLR test      Right Pelvic Girdle/Sacrum   Positive: active SLR test      Left Hip   Positive KAROLINA and FADIR  Right Hip   Positive KAROLINA and FADIR  Ambulation     Ambulation: Stairs   Ascend stairs: independent  Pattern: non-reciprocal  Railings: two rails  Descend stairs: independent  Pattern: non-reciprocal  Railings: two rails    Observational Gait   Gait: antalgic   Decreased walking speed  Quality of Movement During Gait     Pelvis    Pelvis (Left): Positive Trendelenburg  Pelvis (Right): Positive Trendelenburg  Additional Quality of Movement During Gait Details  Lacks proper toe off gait pattern bilaterally       General Comments:      Lumbar Comments  Lumbar AROM    Flexion: 100%, radicular to toes, (+) Lafayette's sign    Extension: 100%, decreased    R SB: 50%, sharp pain    L SB: 50%, sharp pain    R rotation: 25%, sharp pain on R   L rotation: 25%, sharp pain on R    Repeated motion testing    Repeated flexion in standing: increased, worse    Repeated flexion in sitting: decreased, better    Repeated extension in standing: decreased, no better    Prone lying: increased, worse    Prone on elbows: increased, worse    Repeated R side gliding: decreased, no better    Repeated L side gliding: NT      Flowsheet Rows      Most Recent Value   PT/OT G-Codes   Current Score  33   Projected Score  50             Precautions: Standard, Hx of lumbar spine fusion 2018, failed gastric bypass surgery, GERD, Elevated surface please      Manuals 4/23                                       Neuro Re-Ed        TrA progression Rev       Glute sets  Rev       Hip add squeeze         Supine clamshells                                Ther Ex        NuStep warmup        LTR Rev       LAQ        SLR        SAQ                                Ther Activity                        Gait Training                        Modalities        Heat post

## 2021-04-27 ENCOUNTER — APPOINTMENT (OUTPATIENT)
Dept: PHYSICAL THERAPY | Facility: CLINIC | Age: 66
End: 2021-04-27
Payer: COMMERCIAL

## 2021-04-29 ENCOUNTER — TELEPHONE (OUTPATIENT)
Dept: GASTROENTEROLOGY | Facility: AMBULARY SURGERY CENTER | Age: 66
End: 2021-04-29

## 2021-04-29 DIAGNOSIS — R10.13 EPIGASTRIC PAIN: ICD-10-CM

## 2021-04-29 DIAGNOSIS — K90.9 STEATORRHEA: Primary | ICD-10-CM

## 2021-04-29 DIAGNOSIS — Z98.84 HISTORY OF ROUX-EN-Y GASTRIC BYPASS: ICD-10-CM

## 2021-04-29 NOTE — TELEPHONE ENCOUNTER
Informed pt of results, pt verbalized understanding  Can we order the pancreatic alatace do pt can have done  Also pt would like someone to call her and discuss these results in detail  She noticed on scan it showed she had lung collapse  Also  the bypass stomach is full of fluid and is this the cause of her pain  Pt wants it notated that the original surgeon who did the surgery performed the surgery  backwards so she does not want anyone correlating with them  She stated the doctor from [facility] who corrected the surgery has since retired  Please contact pt    Thank you

## 2021-04-29 NOTE — TELEPHONE ENCOUNTER
----- Message from Gaetano Wilson MD sent at 4/28/2021 12:11 PM EDT -----  Please inform the patient that recent CT scan shows that her bypassed stomach is filled with fluid, there are no other concerning findings on her CT scan  This can sometimes be the cause of the abdominal pain she is describing  But it is a nonspecific finding  She may need follow-up with her bariatric surgeon to have this re-evaluated  Have her follow up in the office with us in 3 to 4 months

## 2021-04-29 NOTE — TELEPHONE ENCOUNTER
I spoke with the patient, please provide referral to Bariatric surgery as I have ordered, I also reordered her stool pancreatic elastase which I encouraged her to get done    Thank you

## 2021-04-29 NOTE — TELEPHONE ENCOUNTER
Rio reviewed results with pt  Please schedule pt for follow up for 3-4 months per notes     Thank you

## 2021-04-29 NOTE — TELEPHONE ENCOUNTER
----- Message from Shona Pack PA-C sent at 4/28/2021  2:51 PM EDT -----  Please advise patient that her fecal fat level is elevated but this could just be secondary to her history of bypass  Her pancreas did look normal on CT scan without any signs of inflammation or chronic changes  It does not appear the pancreatic elastase was performed  I would recommend she have this done and if this is also abnormal, we could consider starting her on a pancreatic enzyme replacement before meals to help break down fats better  This could be potentially a source of diarrhea

## 2021-04-30 ENCOUNTER — OFFICE VISIT (OUTPATIENT)
Dept: PHYSICAL THERAPY | Facility: CLINIC | Age: 66
End: 2021-04-30
Payer: COMMERCIAL

## 2021-04-30 DIAGNOSIS — Z98.1 HISTORY OF FUSION OF LUMBAR SPINE: Primary | ICD-10-CM

## 2021-04-30 DIAGNOSIS — M54.50 LUMBAR SPINE PAIN: ICD-10-CM

## 2021-04-30 DIAGNOSIS — M53.3 SACROILIAC PAIN: ICD-10-CM

## 2021-04-30 PROCEDURE — 97110 THERAPEUTIC EXERCISES: CPT

## 2021-04-30 NOTE — PROGRESS NOTES
Daily Note     Today's date: 2021  Patient name: Elvira Arias  :   MRN: [id number]  Referring provider: Roselia Martinez DO  Dx:   Encounter Diagnosis     ICD-10-CM    1  History of fusion of lumbar spine  Z98 1    2  Sacroiliac pain  M53 3    3  Lumbar spine pain  M54 5        Start Time: 0900  Stop Time: 0930  Total time in clinic (min): 30 minutes    Subjective: Patient reports lumbar spine pain at 8/10 today  Objective: See treatment diary below      Assessment: Tolerated treatment fair  Difficulties activating transverse abdominis noted despite use of biofeedback cuff  Global strengthening implemented to maximize strength  Patient would benefit from continued PT to maximize function for independence in community  Plan: Continue per plan of care        Precautions: Standard, Hx of lumbar spine fusion 2018, failed gastric bypass surgery, GERD, Elevated surface please      Manuals                                       Neuro Re-Ed        TrA progression Rev 3sx10 with cuff      Glute sets  Rev 10      Hip add squeeze   3sx20      Supine clamshells  20 GTB                              Ther Ex        NuStep warmup        LTR Rev 20      LAQ  3sx10 B      SLR        SAQ        Seated lumbar flexion   3sx10 3 way                       Ther Activity                        Gait Training                        Modalities        Heat pre  10'

## 2021-05-01 ENCOUNTER — APPOINTMENT (EMERGENCY)
Dept: RADIOLOGY | Facility: HOSPITAL | Age: 66
End: 2021-05-01
Payer: COMMERCIAL

## 2021-05-01 ENCOUNTER — HOSPITAL ENCOUNTER (EMERGENCY)
Facility: HOSPITAL | Age: 66
Discharge: HOME | End: 2021-05-01
Attending: EMERGENCY MEDICINE | Admitting: EMERGENCY MEDICINE
Payer: COMMERCIAL

## 2021-05-01 VITALS
OXYGEN SATURATION: 96 % | WEIGHT: 254.41 LBS | TEMPERATURE: 98.2 F | HEART RATE: 92 BPM | RESPIRATION RATE: 24 BRPM | SYSTOLIC BLOOD PRESSURE: 113 MMHG | BODY MASS INDEX: 45.07 KG/M2 | DIASTOLIC BLOOD PRESSURE: 60 MMHG

## 2021-05-01 DIAGNOSIS — R55 NEAR SYNCOPE: Primary | ICD-10-CM

## 2021-05-01 DIAGNOSIS — R42 VERTIGO: ICD-10-CM

## 2021-05-01 LAB
ALBUMIN SERPL BCP-MCNC: 3.3 G/DL (ref 3.5–5)
ALP SERPL-CCNC: 82 U/L (ref 46–116)
ALT SERPL W P-5'-P-CCNC: 43 U/L (ref 12–78)
ANION GAP SERPL CALCULATED.3IONS-SCNC: 13 MMOL/L (ref 4–13)
AST SERPL W P-5'-P-CCNC: 31 U/L (ref 5–45)
BASOPHILS # BLD AUTO: 0.06 THOUSANDS/ΜL (ref 0–0.1)
BASOPHILS NFR BLD AUTO: 1 % (ref 0–1)
BILIRUB SERPL-MCNC: 0.27 MG/DL (ref 0.2–1)
BILIRUB UR QL STRIP: NEGATIVE
BUN SERPL-MCNC: 19 MG/DL (ref 5–25)
CALCIUM ALBUM COR SERPL-MCNC: 9.2 MG/DL (ref 8.3–10.1)
CALCIUM SERPL-MCNC: 8.6 MG/DL (ref 8.3–10.1)
CHLORIDE SERPL-SCNC: 106 MMOL/L (ref 100–108)
CLARITY UR: CLEAR
CO2 SERPL-SCNC: 22 MMOL/L (ref 21–32)
COLOR UR: NORMAL
CREAT SERPL-MCNC: 1.31 MG/DL (ref 0.6–1.3)
EOSINOPHIL # BLD AUTO: 0.18 THOUSAND/ΜL (ref 0–0.61)
EOSINOPHIL NFR BLD AUTO: 2 % (ref 0–6)
ERYTHROCYTE [DISTWIDTH] IN BLOOD BY AUTOMATED COUNT: 14.6 % (ref 11.6–15.1)
GFR SERPL CREATININE-BSD FRML MDRD: 43 ML/MIN/1.73SQ M
GLUCOSE SERPL-MCNC: 145 MG/DL (ref 65–140)
GLUCOSE SERPL-MCNC: 72 MG/DL (ref 65–140)
GLUCOSE UR STRIP-MCNC: NEGATIVE MG/DL
HCT VFR BLD AUTO: 35.5 % (ref 34.8–46.1)
HGB BLD-MCNC: 10.7 G/DL (ref 11.5–15.4)
HGB UR QL STRIP.AUTO: NEGATIVE
IMM GRANULOCYTES # BLD AUTO: 0.03 THOUSAND/UL (ref 0–0.2)
IMM GRANULOCYTES NFR BLD AUTO: 0 % (ref 0–2)
KETONES UR STRIP-MCNC: NEGATIVE MG/DL
LEUKOCYTE ESTERASE UR QL STRIP: NEGATIVE
LIPASE SERPL-CCNC: 189 U/L (ref 73–393)
LYMPHOCYTES # BLD AUTO: 2.05 THOUSANDS/ΜL (ref 0.6–4.47)
LYMPHOCYTES NFR BLD AUTO: 22 % (ref 14–44)
MAGNESIUM SERPL-MCNC: 2.1 MG/DL (ref 1.6–2.6)
MCH RBC QN AUTO: 29.3 PG (ref 26.8–34.3)
MCHC RBC AUTO-ENTMCNC: 30.1 G/DL (ref 31.4–37.4)
MCV RBC AUTO: 97 FL (ref 82–98)
MONOCYTES # BLD AUTO: 0.63 THOUSAND/ΜL (ref 0.17–1.22)
MONOCYTES NFR BLD AUTO: 7 % (ref 4–12)
NEUTROPHILS # BLD AUTO: 6.28 THOUSANDS/ΜL (ref 1.85–7.62)
NEUTS SEG NFR BLD AUTO: 68 % (ref 43–75)
NITRITE UR QL STRIP: NEGATIVE
NRBC BLD AUTO-RTO: 0 /100 WBCS
PH UR STRIP.AUTO: 5.5 [PH]
PLATELET # BLD AUTO: 339 THOUSANDS/UL (ref 149–390)
PMV BLD AUTO: 8.4 FL (ref 8.9–12.7)
POTASSIUM SERPL-SCNC: 3.6 MMOL/L (ref 3.5–5.3)
PROT SERPL-MCNC: 6.5 G/DL (ref 6.4–8.2)
PROT UR STRIP-MCNC: NEGATIVE MG/DL
RBC # BLD AUTO: 3.65 MILLION/UL (ref 3.81–5.12)
SODIUM SERPL-SCNC: 141 MMOL/L (ref 136–145)
SP GR UR STRIP.AUTO: 1.01 (ref 1–1.03)
TROPONIN I SERPL-MCNC: <0.02 NG/ML
UROBILINOGEN UR QL STRIP.AUTO: 0.2 E.U./DL
WBC # BLD AUTO: 9.23 THOUSAND/UL (ref 4.31–10.16)

## 2021-05-01 PROCEDURE — 82948 REAGENT STRIP/BLOOD GLUCOSE: CPT

## 2021-05-01 PROCEDURE — G1004 CDSM NDSC: HCPCS

## 2021-05-01 PROCEDURE — 93005 ELECTROCARDIOGRAM TRACING: CPT

## 2021-05-01 PROCEDURE — 74177 CT ABD & PELVIS W/CONTRAST: CPT

## 2021-05-01 PROCEDURE — 96374 THER/PROPH/DIAG INJ IV PUSH: CPT

## 2021-05-01 PROCEDURE — 80053 COMPREHEN METABOLIC PANEL: CPT | Performed by: EMERGENCY MEDICINE

## 2021-05-01 PROCEDURE — 70496 CT ANGIOGRAPHY HEAD: CPT

## 2021-05-01 PROCEDURE — 70498 CT ANGIOGRAPHY NECK: CPT

## 2021-05-01 PROCEDURE — 99285 EMERGENCY DEPT VISIT HI MDM: CPT | Performed by: EMERGENCY MEDICINE

## 2021-05-01 PROCEDURE — 83690 ASSAY OF LIPASE: CPT | Performed by: EMERGENCY MEDICINE

## 2021-05-01 PROCEDURE — 96361 HYDRATE IV INFUSION ADD-ON: CPT

## 2021-05-01 PROCEDURE — 81003 URINALYSIS AUTO W/O SCOPE: CPT | Performed by: EMERGENCY MEDICINE

## 2021-05-01 PROCEDURE — 96375 TX/PRO/DX INJ NEW DRUG ADDON: CPT

## 2021-05-01 PROCEDURE — 83735 ASSAY OF MAGNESIUM: CPT | Performed by: EMERGENCY MEDICINE

## 2021-05-01 PROCEDURE — 85025 COMPLETE CBC W/AUTO DIFF WBC: CPT | Performed by: EMERGENCY MEDICINE

## 2021-05-01 PROCEDURE — C9113 INJ PANTOPRAZOLE SODIUM, VIA: HCPCS | Performed by: EMERGENCY MEDICINE

## 2021-05-01 PROCEDURE — 84484 ASSAY OF TROPONIN QUANT: CPT | Performed by: EMERGENCY MEDICINE

## 2021-05-01 PROCEDURE — 36415 COLL VENOUS BLD VENIPUNCTURE: CPT | Performed by: EMERGENCY MEDICINE

## 2021-05-01 PROCEDURE — 99285 EMERGENCY DEPT VISIT HI MDM: CPT

## 2021-05-01 RX ORDER — MECLIZINE HYDROCHLORIDE 25 MG/1
25 TABLET ORAL 3 TIMES DAILY PRN
Qty: 12 TABLET | Refills: 0 | Status: ON HOLD | OUTPATIENT
Start: 2021-05-01 | End: 2021-11-24 | Stop reason: ALTCHOICE

## 2021-05-01 RX ORDER — FENTANYL CITRATE 50 UG/ML
25 INJECTION, SOLUTION INTRAMUSCULAR; INTRAVENOUS ONCE
Status: COMPLETED | OUTPATIENT
Start: 2021-05-01 | End: 2021-05-01

## 2021-05-01 RX ORDER — PANTOPRAZOLE SODIUM 40 MG/1
40 INJECTION, POWDER, FOR SOLUTION INTRAVENOUS ONCE
Status: COMPLETED | OUTPATIENT
Start: 2021-05-01 | End: 2021-05-01

## 2021-05-01 RX ADMIN — FENTANYL CITRATE 25 MCG: 50 INJECTION, SOLUTION INTRAMUSCULAR; INTRAVENOUS at 17:59

## 2021-05-01 RX ADMIN — PANTOPRAZOLE SODIUM 40 MG: 40 INJECTION, POWDER, FOR SOLUTION INTRAVENOUS at 17:59

## 2021-05-01 RX ADMIN — SODIUM CHLORIDE 1000 ML: 0.9 INJECTION, SOLUTION INTRAVENOUS at 18:00

## 2021-05-01 RX ADMIN — IOHEXOL 50 ML: 350 INJECTION, SOLUTION INTRAVENOUS at 20:48

## 2021-05-01 RX ADMIN — IOHEXOL 50 ML: 350 INJECTION, SOLUTION INTRAVENOUS at 20:46

## 2021-05-01 NOTE — ED PROVIDER NOTES
History  Chief Complaint   Patient presents with    Abdominal Pain     Pt arrives BLS, reported that she was finishing eating small meals at [employer]  Went to bathroom, sweating, almost syncope, R upper quadrant abd pain  Hx of gastric bypass on 2005  Has been having work up with GI       79-year-old female presents with lightheadedness today after she ate some food  She got up after using the bathroom and felt like she was going to pass out  Denies any syncope hitting her head neck pain back pain fevers chills diarrhea constipation or any other symptoms  She does admit to an episode of vomiting and slight nausea and also admits to some abdominal pain  She says she normally has abdominal pain which he she has dealt with for 15 years since her gastric bypass surgery and she is being evaluated by GI for that  She also told me that for the past week she is having balance issues where she is unable to walk without a cane which is all new and feels like swimming in her head and vertigo  Patient denies any headache focal weakness numbness tingling expressive aphasia slurred speech or any other symptoms no chest pain cough congestion  History provided by:  Patient   used: No        Prior to Admission Medications   Prescriptions Last Dose Informant Patient Reported? Taking?    Omega-3 Fatty Acids (fish oil) 1,000 mg  Self No No   Sig: Take 1 capsule (1,000 mg total) by mouth 2 (two) times a day   calcium carbonate (OS-BEAU) 1250 (500 Ca) MG chewable tablet  Self Yes No   Sig: Chew 1 tablet daily   cholecalciferol (VITAMIN D3) 400 units tablet  Self Yes No   Sig: Take 400 Units by mouth daily   dicyclomine (BENTYL) 10 mg capsule  Self No No   Sig: TAKE ONE CAPSULE BY MOUTH THREE TIMES A DAY AS NEEDED FOR FECAL URGENCY AND ABDOMINAL CRAMPING (GENERIC FOR BENTYL)   Patient taking differently: 10 mg 3 (three) times a day before meals    lisinopril-hydrochlorothiazide (PRINZIDE,ZESTORETIC) 20-12 5 MG per tablet  Self No No   Sig: Take 1 tablet by mouth daily   multivitamin (THERAGRAN) TABS  Self Yes No   Sig: Take 1 tablet by mouth daily   pantoprazole (PROTONIX) 40 mg tablet  Self Yes No   Sig: Take 40 mg by mouth 2 (two) times a day Resume with evening dose on    traZODone (DESYREL) 50 mg tablet  Self Yes No   Sig: Take 50 mg by mouth daily at bedtime as needed     vitamin B-12 (CYANOCOBALAMIN) 50 MCG tablet  Self Yes No   Sig: Take 50 mcg by mouth daily      Facility-Administered Medications: None       Past Medical History:   Diagnosis Date    Arthritis     Asthma     Back pain     Chronic pain disorder     hips into the legs    Colon polyp     DVT (deep venous thrombosis) ([facility] [address] )     Gastric bypass status for obesity     yarelis en y    GERD (gastroesophageal reflux disease)     Hiatal hernia     History of transfusion 2006    after gastric bypass surgery, no reactions    Hypertension     Irritable bowel syndrome     Kidney stone     Muscle weakness     bilateral legs    Numbness and tingling     bilateral feet    Pneumonia     Spinal stenosis     Tinnitus     occassionally    Wears glasses        Past Surgical History:   Procedure Laterality Date    APPENDECTOMY      BACK SURGERY  2018    L4-5 fusion    BARIATRIC SURGERY      yarelis en y- pt states the procedure was done incorrectly which lead to additional surgeries from -   [address] Right      SECTION      x1   [address] [facility]      partial removal of the small bowel-necrosis-between -    COLONOSCOPY      COLONOSCOPY W/ BIOPSIES AND POLYPECTOMY      FLEXIBLE BRONCHOSCOPY W/ UPPER ENDOSCOPY      GASTRECTOMY      after gastric bypass-(failed surgery per pt) -    HERNIA REPAIR      incisional hernias-diaphragm area    WY ARTHROCENTESIS ASPIR&/INJ SMALL JT/BURSA W/O US N/A 2018    Procedure: Coccygeal Injection & Ganglion Impar Block;  Surgeon: Randal Sender Tina Carvalho MD;  [facility] MAIN OR;  Service: Pain Management     AR ARTHRODESIS POSTERIOR INTERBODY LUMBAR N/A 6/27/2018    Procedure: L4-5 DECOMPRESSION INTERBODY INSTRUMENTED FUSION;  Surgeon: Amanda Huitron MD;  Location: [facility] Main OR;  Service: Orthopedics    AR COLONOSCOPY FLX DX W/COLLJ SPEC WHEN PFRMD N/A 8/24/2018    Procedure: COLONOSCOPY;  Surgeon: Keven Patel MD;  Location: [facility] GI LAB; Service: Gastroenterology    AR ESOPHAGOGASTRODUODENOSCOPY TRANSORAL DIAGNOSTIC N/A 2/19/2018    Procedure: ESOPHAGOGASTRODUODENOSCOPY (EGD); Surgeon: Keven Patel MD;  Location: [facility] GI LAB; Service: Gastroenterology   Liseth Valerie JOINT Right 5/25/2018    Procedure: Rt Sacroiliac Joint Injection;  Surgeon: Jing Whaley MD;  Location: [facility] MAIN OR;  Service: Pain Management     AR INJECTION,SACROILIAC JOINT Right 5/1/2019    Procedure: Sacroiliac Joint Injection (33516); Surgeon: Jing Whaley MD;  Location: [facility] MAIN OR;  Service: Pain Management     TONSILECTOMY, ADENOIDECTOMY, BILATERAL MYRINGOTOMY AND TUBES      TONSILLECTOMY         Family History   Problem Relation Age of Onset    Diabetes Mother     Aortic aneurysm Father     Cancer Father         prostate    Heart disease Sister         open heart    Cancer Sister         breast    Breast cancer Sister 47    Diabetes Brother     Cancer Brother         spinal cancer    Other Daughter         concussion syndrome from MVA    Asperger's syndrome Son         high functioning    Diabetes Maternal Grandfather     Stroke Brother     Hypertension Brother     No Known Problems Maternal Aunt     No Known Problems Maternal Aunt     No Known Problems Paternal Aunt     No Known Problems Paternal Aunt     Colon cancer Maternal Uncle 61     I have reviewed and agree with the history as documented      E-Cigarette/Vaping    E-Cigarette Use Never User      E-Cigarette/Vaping Substances    Nicotine No     THC No     CBD No     Flavoring No     Other No     Unknown No      Social History     Tobacco Use    Smoking status: Never Smoker    Smokeless tobacco: Never Used   Substance Use Topics    Alcohol use: Yes     Alcohol/week: 7 0 standard drinks     Types: 5 Cans of beer, 2 Shots of liquor per week     Frequency: 4 or more times a week     Comment: socially    Drug use: Yes     Types: Marijuana     Comment: has medical marijuana card  lozenges       Review of Systems   Constitutional: Negative  HENT: Negative  Eyes: Negative  Respiratory: Negative  Cardiovascular: Negative  Gastrointestinal: Positive for abdominal pain, nausea and vomiting  Endocrine: Negative  Genitourinary: Negative  Musculoskeletal: Negative  Skin: Negative  Allergic/Immunologic: Negative  Neurological: Positive for dizziness and light-headedness  Hematological: Negative  Psychiatric/Behavioral: Negative  All other systems reviewed and are negative  Physical Exam  Physical Exam  Vitals signs and nursing note reviewed  Constitutional:       Appearance: She is well-developed  HENT:      Head: Normocephalic and atraumatic  Eyes:      Pupils: Pupils are equal, round, and reactive to light  Neck:      Musculoskeletal: Normal range of motion and neck supple  Cardiovascular:      Rate and Rhythm: Normal rate and regular rhythm  Pulmonary:      Effort: Pulmonary effort is normal       Breath sounds: Normal breath sounds  Abdominal:      General: Bowel sounds are normal       Palpations: Abdomen is soft  Tenderness: There is generalized abdominal tenderness  There is no right CVA tenderness, left CVA tenderness, guarding or rebound  Negative signs include Strong's sign, Rovsing's sign, McBurney's sign, psoas sign and obturator sign  Musculoskeletal: Normal range of motion  Skin:     General: Skin is warm and dry  Neurological:      General: No focal deficit present        Mental Status: She is alert and oriented to person, place, and time  Cranial Nerves: No cranial nerve deficit  Motor: No weakness           Vital Signs  ED Triage Vitals [05/01/21 1733]   Temperature Pulse Respirations Blood Pressure SpO2   98 2 °F (36 8 °C) 101 20 (!) 100/47 95 %      Temp Source Heart Rate Source Patient Position - Orthostatic VS BP Location FiO2 (%)   Tympanic Monitor Lying Left arm --      Pain Score       8           Vitals:    05/01/21 2045 05/01/21 2100 05/01/21 2115 05/01/21 2130   BP:   114/59 113/60   Pulse: 92 92 92 92   Patient Position - Orthostatic VS:             Visual Acuity      ED Medications  Medications   sodium chloride 0 9 % bolus 1,000 mL (0 mL Intravenous Stopped 5/1/21 1900)   pantoprazole (PROTONIX) injection 40 mg (40 mg Intravenous Given 5/1/21 1759)   fentanyl citrate (PF) 100 MCG/2ML 25 mcg (25 mcg Intravenous Given 5/1/21 1759)   iohexol (OMNIPAQUE) 350 MG/ML injection (SINGLE-DOSE) 100 mL (50 mL Intravenous Given 5/1/21 2046)   iohexol (OMNIPAQUE) 350 MG/ML injection (SINGLE-DOSE) 100 mL (50 mL Intravenous Given 5/1/21 2048)       Diagnostic Studies  Results Reviewed     Procedure Component Value Units Date/Time    UA (URINE) with reflex to Scope [[id number]] Collected: 05/01/21 2051    Lab Status: Final result Specimen: Urine, Clean Catch Updated: 05/01/21 2057     Color, UA Light Yellow     Clarity, UA Clear     Specific Gravity, UA 1 015     pH, UA 5 5     Leukocytes, UA Negative     Nitrite, UA Negative     Protein, UA Negative mg/dl      Glucose, UA Negative mg/dl      Ketones, UA Negative mg/dl      Urobilinogen, UA 0 2 E U /dl      Bilirubin, UA Negative     Blood, UA Negative    Fingerstick Glucose (POCT) [[id number]]  (Normal) Collected: 05/01/21 1843    Lab Status: Final result Updated: 05/01/21 2008     POC Glucose 72 mg/dl     Troponin I [[id number]]  (Normal) Collected: 05/01/21 1759    Lab Status: Final result Specimen: Blood from Arm, Left Updated: 05/01/21 1835 Troponin I <0 02 ng/mL     Comprehensive metabolic panel [[id number]]  (Abnormal) Collected: 05/01/21 1759    Lab Status: Final result Specimen: Blood from Arm, Left Updated: 05/01/21 1832     Sodium 141 mmol/L      Potassium 3 6 mmol/L      Chloride 106 mmol/L      CO2 22 mmol/L      ANION GAP 13 mmol/L      BUN 19 mg/dL      Creatinine 1 31 mg/dL      Glucose 145 mg/dL      Calcium 8 6 mg/dL      Corrected Calcium 9 2 mg/dL      AST 31 U/L      ALT 43 U/L      Alkaline Phosphatase 82 U/L      Total Protein 6 5 g/dL      Albumin 3 3 g/dL      Total Bilirubin 0 27 mg/dL      eGFR 43 ml/min/1 73sq m     Narrative:      [place] guidelines for Chronic Kidney Disease (CKD):     Stage 1 with normal or high GFR (GFR > 90 mL/min/1 73 square meters)    Stage 2 Mild CKD (GFR = 60-89 mL/min/1 73 square meters)    Stage 3A Moderate CKD (GFR = 45-59 mL/min/1 73 square meters)    Stage 3B Moderate CKD (GFR = 30-44 mL/min/1 73 square meters)    Stage 4 Severe CKD (GFR = 15-29 mL/min/1 73 square meters)    Stage 5 End Stage CKD (GFR <15 mL/min/1 73 square meters)  Note: GFR calculation is accurate only with a steady state creatinine    Magnesium [[id number]]  (Normal) Collected: 05/01/21 1759    Lab Status: Final result Specimen: Blood from Arm, Left Updated: 05/01/21 1832     Magnesium 2 1 mg/dL     Lipase [[id number]]  (Normal) Collected: 05/01/21 1759    Lab Status: Final result Specimen: Blood from Arm, Left Updated: 05/01/21 1832     Lipase 189 u/L     CBC and differential [[id number]]  (Abnormal) Collected: 05/01/21 1759    Lab Status: Final result Specimen: Blood from Arm, Left Updated: 05/01/21 1816     WBC 9 23 Thousand/uL      RBC 3 65 Million/uL      Hemoglobin 10 7 g/dL      Hematocrit 35 5 %      MCV 97 fL      MCH 29 3 pg      MCHC 30 1 g/dL      RDW 14 6 %      MPV 8 4 fL      Platelets 299 Thousands/uL      nRBC 0 /100 WBCs      Neutrophils Relative 68 %      Immat GRANS % 0 % Lymphocytes Relative 22 %      Monocytes Relative 7 %      Eosinophils Relative 2 %      Basophils Relative 1 %      Neutrophils Absolute 6 28 Thousands/µL      Immature Grans Absolute 0 03 Thousand/uL      Lymphocytes Absolute 2 05 Thousands/µL      Monocytes Absolute 0 63 Thousand/µL      Eosinophils Absolute 0 18 Thousand/µL      Basophils Absolute 0 06 Thousands/µL                  CT abdomen pelvis with contrast   Final Result by Juni Dixon MD (05/01 2109)      No acute pathology  Colonic diverticulosis without diverticulitis  Workstation performed: [id number]         CTA head and neck with and without contrast   Final Result by Jeronimo Quintero DO (05/01 2100)      No acute intracranial abnormality  No significant carotid or vertebral artery stenosis  No focal intracranial stenosis or aneurysm  Workstation performed: [id number]                    Procedures  ECG 12 Lead Documentation Only  Performed by: Janet Nick DO  Authorized by: Janet Nick DO     ECG reviewed by me, the ED Provider: yes    Patient location:  ED  Previous ECG:     Previous ECG:  Unavailable    Comparison to cardiac monitor: Yes    Interpretation:     Interpretation: non-specific    Rate:     ECG rate assessment: normal    Rhythm:     Rhythm: sinus rhythm    Ectopy:     Ectopy: none    QRS:     QRS axis:  Normal  Conduction:     Conduction: normal    ST segments:     ST segments:  Non-specific  T waves:     T waves: non-specific               ED Course                             SBIRT 20yo+      Most Recent Value   SBIRT (24 yo +)   In order to provide better care to our patients, we are screening all of our patients for alcohol and drug use  Would it be okay to ask you these screening questions?   No Filed at: 05/01/2021 2137                    MDM  Number of Diagnoses or Management Options  Near syncope:   Vertigo:      Amount and/or Complexity of Data Reviewed  Clinical lab tests: ordered and reviewed  Tests in the radiology section of CPT®: ordered and reviewed  Tests in the medicine section of CPT®: ordered and reviewed    Patient Progress  Patient progress: stable      Disposition  Final diagnoses:   Near syncope   Vertigo     Time reflects when diagnosis was documented in both MDM as applicable and the Disposition within this note     Time User Action Codes Description Comment    5/1/2021  9:32 PM Anepu, Feliciano Bunk Add [R55] Near syncope     5/1/2021  9:32 PM Anepu, Feliciano Bunk Add [R42] Vertigo       ED Disposition     ED Disposition Condition Date/Time Comment    Discharge Stable Sat May 1, 2021  9:32 PM Alvaro Garcia discharge to home/self care              Follow-up Information     Follow up With Specialties Details Why Contact Info Additional [address] [place],[address], [address] [facility], Nurse Practitioner Schedule an appointment as soon as possible for a visit   [facility] [address]       [address] [facility] Emergency Department Emergency Medicine  If symptoms worsen [address] [facility]  [phone] [address] [facility] Emergency Department, [facility], [facility] Maycol Lord MD Neurology   [address] [facility] [address]  [address] [place] [address]  [phone]             Discharge Medication List as of 5/1/2021  9:33 PM      START taking these medications    Details   meclizine (ANTIVERT) 25 mg tablet Take 1 tablet (25 mg total) by mouth 3 (three) times a day as needed for dizziness for up to 3 days, Starting Sat 5/1/2021, Until Tue 5/4/2021, Normal         CONTINUE these medications which have NOT CHANGED    Details   calcium carbonate (OS-BEAU) 1250 (500 Ca) MG chewable tablet Chew 1 tablet daily, Historical Med      cholecalciferol (VITAMIN D3) 400 units tablet Take 400 Units by mouth daily, Historical Med      dicyclomine (BENTYL) 10 mg capsule TAKE ONE CAPSULE BY MOUTH THREE TIMES A DAY AS NEEDED FOR FECAL URGENCY AND ABDOMINAL CRAMPING (GENERIC FOR BENTYL), Normal      lisinopril-hydrochlorothiazide (PRINZIDE,ZESTORETIC) 20-12 5 MG per tablet Take 1 tablet by mouth daily, Starting Fri 11/6/2020, Normal      multivitamin (THERAGRAN) TABS Take 1 tablet by mouth daily, Historical Med      Omega-3 Fatty Acids (fish oil) 1,000 mg Take 1 capsule (1,000 mg total) by mouth 2 (two) times a day, Starting Wed 11/18/2020, Normal      pantoprazole (PROTONIX) 40 mg tablet Take 40 mg by mouth 2 (two) times a day Resume with evening dose on 7/9, Historical Med      traZODone (DESYREL) 50 mg tablet Take 50 mg by mouth daily at bedtime as needed  , Historical Med      vitamin B-12 (CYANOCOBALAMIN) 50 MCG tablet Take 50 mcg by mouth daily, Historical Med           No discharge procedures on file      PDMP Review     None          ED Provider  Electronically Signed by           Donte Tang DO  05/02/21 6530

## 2021-05-03 ENCOUNTER — VBI (OUTPATIENT)
Dept: FAMILY MEDICINE CLINIC | Facility: CLINIC | Age: 66
End: 2021-05-03

## 2021-05-03 LAB
ATRIAL RATE: 85 BPM
P AXIS: 34 DEGREES
PR INTERVAL: 160 MS
QRS AXIS: -19 DEGREES
QRSD INTERVAL: 132 MS
QT INTERVAL: 398 MS
QTC INTERVAL: 473 MS
T WAVE AXIS: 6 DEGREES
VENTRICULAR RATE: 85 BPM

## 2021-05-03 PROCEDURE — 93010 ELECTROCARDIOGRAM REPORT: CPT | Performed by: INTERNAL MEDICINE

## 2021-05-03 NOTE — TELEPHONE ENCOUNTER
Left Message - PATIENT WENT TO THE ED FOR DIZZY AND VERTIGO ED SUGGEST F/U WITH [facility]

## 2021-05-04 ENCOUNTER — APPOINTMENT (OUTPATIENT)
Dept: PHYSICAL THERAPY | Facility: CLINIC | Age: 66
End: 2021-05-04
Payer: COMMERCIAL

## 2021-05-05 ENCOUNTER — OFFICE VISIT (OUTPATIENT)
Dept: CARDIOLOGY CLINIC | Facility: CLINIC | Age: 66
End: 2021-05-05
Payer: COMMERCIAL

## 2021-05-05 ENCOUNTER — TELEPHONE (OUTPATIENT)
Dept: NEUROLOGY | Facility: CLINIC | Age: 66
End: 2021-05-05

## 2021-05-05 VITALS
DIASTOLIC BLOOD PRESSURE: 80 MMHG | SYSTOLIC BLOOD PRESSURE: 128 MMHG | RESPIRATION RATE: 18 BRPM | HEIGHT: 63 IN | TEMPERATURE: 98.3 F | WEIGHT: 251.2 LBS | BODY MASS INDEX: 44.51 KG/M2 | HEART RATE: 96 BPM | OXYGEN SATURATION: 95 %

## 2021-05-05 DIAGNOSIS — R55 NEAR SYNCOPE: Primary | ICD-10-CM

## 2021-05-05 PROCEDURE — 99215 OFFICE O/P EST HI 40 MIN: CPT | Performed by: INTERNAL MEDICINE

## 2021-05-05 NOTE — PROGRESS NOTES
Progress Note - Cardiology Office  [facility]    Maritza Sam 63 y o  female MRN: [id number]  : 1955  Encounter: [id number]      ASSESSMENT:   Recent Near syncope following severe abdominal pain after eating at a restaurant  Patient was evaluated in ED with normal cardiac enzymes and unchanged EKG and has also been referred to see GI  Etiology of near syncope was probably vasovagal secondary to severe abdominal pain    Hypertension:  BP today is 128/80    Obesity:  BMI is 45 07    History of Fatigue in bilateral leg weakness  History of back pain status post spinal surgery in 2020  Nuclear stress test, 2019:  Normal, EF 72%  Echo, 2019:  EF 55%, mild cLVH, G1DD, mild MR    History of Alcohol consumption    History of arthritis, DVT, GERD, gastric bypass and intra-abdominal adhesions    RECOMMENDATIONS:  F/u with GI  Continue fish oral and antihypertensive medications      Please call [phone] if any questions  HPI :     Maritza Sam is a 63y o  year old female who came for follow up  Patient was recently at a restaurant where after eating she developed severe abdominal pain and got lightheaded and had a near syncopal episode  She was evaluated in the ED with normal cardiac enzymes and unchanged EKG  She has a history of gastric bypass and has off and on abdominal pain and according to her she has adhesions        REVIEW OF SYSTEMS:  Recent severe abdominal pain and near syncope  No chest pain, unusual dyspnea      Historical Information   Past Medical History:   Diagnosis Date    Arthritis     Asthma     Back pain     Chronic pain disorder     hips into the legs    Colon polyp     DVT (deep venous thrombosis) ([facility] [address] )     Gastric bypass status for obesity     yarelis en y    GERD (gastroesophageal reflux disease)     Hiatal hernia     History of transfusion 2006    after gastric bypass surgery, no reactions    Hypertension  Irritable bowel syndrome     Kidney stone     Muscle weakness     bilateral legs    Numbness and tingling     bilateral feet    Pneumonia     Spinal stenosis     Tinnitus     occassionally    Wears glasses      Past Surgical History:   Procedure Laterality Date    APPENDECTOMY      BACK SURGERY  2018    L4-5 fusion    BARIATRIC SURGERY      yarelis en y- pt states the procedure was done incorrectly which lead to additional surgeries from 2006   [address] Right      SECTION      x1   [address]      partial removal of the small bowel-necrosis-between 2006    COLONOSCOPY      COLONOSCOPY W/ BIOPSIES AND POLYPECTOMY      FLEXIBLE BRONCHOSCOPY W/ UPPER ENDOSCOPY      GASTRECTOMY      after gastric bypass-(failed surgery per pt) 2006    HERNIA REPAIR      incisional hernias-diaphragm area    CA ARTHROCENTESIS ASPIR&/INJ SMALL JT/BURSA W/O US N/A 2018    Procedure: Coccygeal Injection & Ganglion Impar Block;  Surgeon: Yolanda Park MD;  Location: [facility] MAIN OR;  Service: Pain Management     CA ARTHRODESIS POSTERIOR INTERBODY LUMBAR N/A 2018    Procedure: L4-5 DECOMPRESSION INTERBODY INSTRUMENTED FUSION;  Surgeon: Bailey Price MD;  Location: [facility] Main OR;  Service: Orthopedics    CA COLONOSCOPY FLX DX W/COLLJ [address] PFRMD N/A 2018    Procedure: COLONOSCOPY;  Surgeon: Campos Knight MD;  Location: [facility] GI LAB; Service: Gastroenterology    CA ESOPHAGOGASTRODUODENOSCOPY TRANSORAL DIAGNOSTIC N/A 2018    Procedure: ESOPHAGOGASTRODUODENOSCOPY (EGD); Surgeon: Campos Knight MD;  Location: [facility] GI LAB; Service: Gastroenterology   Tyler Lowell JOINT Right 2018    Procedure: Rt Sacroiliac Joint Injection;  Surgeon: Yolanda Park MD;  Location: [facility] MAIN OR;  Service: Pain Management     CA INJECTION,SACROILIAC JOINT Right 2019    Procedure: Sacroiliac Joint Injection (56853);   Surgeon: Jing Whaley MD;  Location: [facility] MAIN OR;  Service: Pain Management     TONSILECTOMY, ADENOIDECTOMY, BILATERAL MYRINGOTOMY AND TUBES      TONSILLECTOMY       Social History     Substance and Sexual Activity   Alcohol Use Yes    Alcohol/week: 7 0 standard drinks    Types: 5 Cans of beer, 2 Shots of liquor per week    Frequency: 4 or more times a week    Comment: socially     Social History     Substance and Sexual Activity   Drug Use Yes    Types: Marijuana    Comment: has medical marijuana card   lozenges     Social History     Tobacco Use   Smoking Status Never Smoker   Smokeless Tobacco Never Used     Family History:   Family History   Problem Relation Age of Onset    Diabetes Mother     Aortic aneurysm Father     Cancer Father         prostate    Heart disease Sister         open heart    Cancer Sister         breast    Breast cancer Sister 47    Diabetes Brother     Cancer Brother         spinal cancer    Other Daughter         concussion syndrome from MVA    Asperger's syndrome Son         high functioning    Diabetes Maternal Grandfather     Stroke Brother     Hypertension Brother     No Known Problems Maternal Aunt     No Known Problems Maternal Aunt     No Known Problems Paternal Aunt     No Known Problems Paternal Aunt     Colon cancer Maternal Uncle 60       Meds/Allergies     No Known Allergies    Current Outpatient Medications:     calcium carbonate (OS-BEAU) 1250 (500 Ca) MG chewable tablet, Chew 1 tablet daily, Disp: , Rfl:     cholecalciferol (VITAMIN D3) 400 units tablet, Take 400 Units by mouth daily, Disp: , Rfl:     dicyclomine (BENTYL) 10 mg capsule, TAKE ONE CAPSULE BY MOUTH THREE TIMES A DAY AS NEEDED FOR FECAL URGENCY AND ABDOMINAL CRAMPING (GENERIC FOR BENTYL) (Patient taking differently: 10 mg 3 (three) times a day before meals ), Disp: 90 capsule, Rfl: 0    lisinopril-hydrochlorothiazide (PRINZIDE,ZESTORETIC) 20-12 5 MG per tablet, Take 1 tablet by mouth daily, Disp: 90 tablet, Rfl: 1    meclizine (ANTIVERT) 25 mg tablet, Take 1 tablet (25 mg total) by mouth 3 (three) times a day as needed for dizziness for up to 3 days, Disp: 12 tablet, Rfl: 0    multivitamin (THERAGRAN) TABS, Take 1 tablet by mouth daily, Disp: , Rfl:     Omega-3 Fatty Acids (fish oil) 1,000 mg, Take 1 capsule (1,000 mg total) by mouth 2 (two) times a day, Disp: 60 capsule, Rfl: 6    pantoprazole (PROTONIX) 40 mg tablet, Take 40 mg by mouth 2 (two) times a day Resume with evening dose on 7/9, Disp: , Rfl:     traZODone (DESYREL) 50 mg tablet, Take 50 mg by mouth daily at bedtime as needed  , Disp: , Rfl:     vitamin B-12 (CYANOCOBALAMIN) 50 MCG tablet, Take 50 mcg by mouth daily, Disp: , Rfl:     Vitals:   /80 mmHg  HR 96/Min  BP Readings from Last 3 Encounters:   05/01/21 113/60   04/12/21 126/76   04/06/21 135/75       Physical Exam:  Neurologic:  Alert & oriented x 3, no new focal deficits, Not in any acute distress,  Constitutional:  Obese  Eyes:  Pupil equal and reacting to light, conjunctiva normal,   HENT:  Neck is supple, no JVD no carotid bruit  Respiratory:  Bilateral air entry, mostly clear to auscultation  Cardiovascular: S1-S2 regular with a I/VI ejection systolic murmur   GI:  Soft, normal bowel sounds, nontender, no hepatosplenomegaly appreciated  Musculoskeletal:  No edema, no tenderness, no deformities     Skin:  Well hydrated, no rash   Lymphatic:  No lymphadenopathy noted   Extremities:  No edema and distal pulses are present        Diagnostic Studies Review Cardio:      EKG:  No EKG performed today    Cardiac testing:   Results for orders placed during the hospital encounter of 02/20/19   Echo complete with contrast if indicated    Timothy Perez [address]  [address] [facility] [facility], [place] [address]  [phone]    Transthoracic Echocardiogram  2D, M-mode, Doppler, and Color Doppler    Study date:  20-Feb-2019    Patient: Severo Simas  MR number: [id number]  Account number: [de-identified]  : 1955  Age: 66 years  Gender: Female  Status: Outpatient  Location: Echo lab  Height: 64 in  Weight: 219 6 lb  BP: 120/ 72 mmHg    Indications: Dyspnea    Diagnoses: R06 00 - Dyspnea, unspecified    Sonographer:  Andre Anne RCS, RVS  Primary Physician:  Tina Walker MD  Referring Physician:  Winnie Munoz DO  Group:  [facility]  Interpreting Physician:  Winnie Munoz DO    SUMMARY    LEFT VENTRICLE:  Systolic function was normal by visual assessment  Ejection fraction was estimated to be 55 %  There were no regional wall motion abnormalities  There was mild concentric hypertrophy  Doppler parameters were consistent with abnormal left ventricular relaxation (grade 1 diastolic dysfunction)  MITRAL VALVE:  There was mild regurgitation  AORTIC VALVE:  There was no evidence for stenosis  There was no regurgitation  TRICUSPID VALVE:  There was mild regurgitation  Pulmonary artery systolic pressure was within the normal range  HISTORY: PRIOR HISTORY: IBS, GERD, Gastric Bypass, DVT, Asthma    PROCEDURE: The procedure was performed in the echo lab  This was a routine study  The transthoracic approach was used  The study included complete 2D imaging, M-mode, complete spectral Doppler, and color Doppler  Images were obtained from  the parasternal, apical, subcostal, and suprasternal notch acoustic windows  Image quality was adequate  LEFT VENTRICLE: Size was normal  Systolic function was normal by visual assessment  Ejection fraction was estimated to be 55 %  There were no regional wall motion abnormalities  There was mild concentric hypertrophy  DOPPLER: Doppler  parameters were consistent with abnormal left ventricular relaxation (grade 1 diastolic dysfunction)  RIGHT VENTRICLE: The size was normal  Systolic function was normal  DOPPLER: Systolic pressure was within the normal range      LEFT ATRIUM: Size was normal  No thrombus was identified  RIGHT ATRIUM: Size was normal     MITRAL VALVE: Valve structure was normal  There was normal leaflet separation  No echocardiographic evidence for prolapse  DOPPLER: The transmitral velocity was within the normal range  There was no evidence for stenosis  There was mild  regurgitation  AORTIC VALVE: The valve was trileaflet  Leaflets exhibited normal thickness and normal cuspal separation  DOPPLER: Transaortic velocity was within the normal range  There was no evidence for stenosis  There was no regurgitation  TRICUSPID VALVE: The valve structure was normal  There was normal leaflet separation  DOPPLER: The transtricuspid velocity was within the normal range  There was mild regurgitation  Pulmonary artery systolic pressure was within the normal  range  Estimated peak PA pressure was 24 mmHg  PULMONIC VALVE: Leaflets exhibited normal thickness, no calcification, and normal cuspal separation  DOPPLER: The transpulmonic velocity was within the normal range  There was no regurgitation  PERICARDIUM: There was no thickening  There was no pericardial effusion  AORTA: The root exhibited normal size  PULMONARY ARTERY: The size was normal  The morphology appeared normal     SYSTEM MEASUREMENT TABLES    2D mode  AoR Diam 2D: 2 7 cm  LA Diam (2D): 3 8 cm  LA/Ao (2D): 1 41  FS (2D Teich): 32 %  IVSd (2D): 0 95 cm  LVDEV: 113 cm³  LVESV: 45 1 cm³  LVIDd(2D): 4 9 cm  LVISd (2D): 3 33 cm  LVPWd (2D): 0 94 cm  SV (Teich): 67 9 cm³    Apical four chamber  LVEF A4C: 58 %    Unspecified Scan Mode  MV Peak A Wade: 809 mm/s  MV Peak E Wade  Mean: 745 mm/s  Max P mm[Hg]  V Max: 1980 mm/s  Vmax: 1900 mm/s  RA Area: 13 8 cm squared  RA Volume: 31 7 cm³  TAPSE: 2 2 cm    [employer] Accredited Echocardiography Laboratory    Prepared and electronically signed by    Richelle Skinner DO  Signed 2019 15:30:56       No results found for this or any previous visit    No results found for this or any previous visit  No results found for this or any previous visit  No results found for this or any previous visit  Results for orders placed during the hospital encounter of 19   NM myocardial perfusion spect (rx stress and/or rest)    Timothy Perez [address]  [address]  [address] [address]   [phone]    Rest/Stress Gated SPECT Myocardial Perfusion Imaging After Exercise    Patient: Levar Chi  MR number: [id number]  Account number: [de-identified]  : 1955  Age: 66 years  Gender: Female  Status: Outpatient  Location: Stress lab  Height: 63 in  Weight: 222 lb  BP: 120/ 80 mmHg    Allergies: NO KNOWN ALLERGIES    Diagnosis: R06 09 - Other forms of dyspnea    Technician:  Mary Jo Eddy  RN:  LIS Wagner  Group:  Hari Turk  Report Prepared By[de-identified]  LIS Wagner  Interpreting Physician:  Isaias Meadows DO    INDICATIONS: Evaluation for coronary artery disease  HISTORY: The patient is a 66year old  female  Chest pain status: no chest pain  Other symptoms: dyspnea and decreased effort tolerance  Coronary artery disease risk factors: hypertension and family history of premature coronary  artery disease  Cardiovascular history: peripheral vascular occlusive disease  Co-morbidity: obesity  Medications: an ACE inhibitor/ARB  PHYSICAL EXAM: Baseline physical exam screening: normal     REST ECG: Normal sinus rhythm  Normal baseline ECG  PROCEDURE: The study was performed in the the Stress lab  Treadmill exercise testing was performed, using the Raz protocol  Gated SPECT myocardial perfusion imaging was performed after stress and at rest  Systolic blood pressure was 120  mmHg, at the start of the study  Diastolic blood pressure was 80 mmHg, at the start of the study  The heart rate was 83 bpm, at the start of the study   Patient was not experiencing chest pain at the time of the injection of the  radiopharmaceutical  IV double checked  REBECA PROTOCOL:  HR bpm SBP mmHg DBP mmHg Symptoms ST change Rhythm/conduct  Baseline 83 120 80 none none NSR, no ectopy, NSR  Stage 1 139 126 80 none -- --  Stage 2 146 138 80 moderate dyspnea -- --  Stage 3 153 -- -- same as above -- --  Immediate 141 160 82 subsiding -- --  Recovery 1 122 140 82 none -- --  Recovery 2 104 124 80 none -- --  No medications or fluids given  STRESS SUMMARY: Duration of exercise was 6 min and 19 sec  The patient exercised to protocol stage 3  Maximal work rate was 7 9 METs  Functional capacity was normal  Maximal heart rate during stress was 155 bpm ( 97 % of maximal predicted  heart rate)  The heart rate response to stress was normal  There was normal resting blood pressure with an appropriate response to stress  The rate-pressure product for the peak heart rate and blood pressure was 26528  There was no chest  pain during stress  The stress test was terminated due to achievement of target heart rate and moderate dyspnea  Pre oxygen saturation: 99 %  Peak oxygen saturation: 96 %  The stress ECG was normal  There were no stress arrhythmias or  conduction abnormalities  ISOTOPE ADMINISTRATION:  Resting isotope administration Stress isotope administration  Agent Sestamibi Sestamibi  Dose 11 mCi 32 mCi  Date 02/20/2019 02/20/2019    The radiopharmaceutical was injected at the peak effect of pharmacologic stress  Radiopharmaceutical was administered 4 min, 15 sec into the stress protocol  There was 2 min of exercise after the injection  MYOCARDIAL PERFUSION IMAGING:  The image quality was good  Left ventricular size was normal     PERFUSION DEFECTS:  -  There were no perfusion defects  GATED SPECT:  The calculated left ventricular ejection fraction was 72 %  Left ventricular ejection fraction was within normal limits by visual estimate  There was no left ventricular regional abnormality  SUMMARY:  -  Stress results: Duration of exercise was 6 min and 19 sec  Target heart rate was achieved  There was no chest pain during stress  -  ECG conclusions: The stress ECG was normal   -  Perfusion imaging: There were no perfusion defects   -  Gated SPECT: The calculated left ventricular ejection fraction was 72 %  Left ventricular ejection fraction was within normal limits by visual estimate  There was no left ventricular regional abnormality  IMPRESSIONS: Normal study after maximal exercise  Myocardial perfusion imaging was normal at rest and with stress  Left ventricular systolic function was normal     Prepared and signed by    Viviana Veloz DO  Signed 02/20/2019 13:42:32           Imaging:  Chest X-Ray:   No Chest XR results available for this patient  CT-scan of the chest:     Cta Head And Neck With And Without Contrast    Result Date: 5/1/2021  Impression No acute intracranial abnormality  No significant carotid or vertebral artery stenosis  No focal intracranial stenosis or aneurysm   Workstation performed: [id number]     Lab Review   Lab Results   Component Value Date    WBC 9 23 05/01/2021    HGB 10 7 (L) 05/01/2021    HCT 35 5 05/01/2021    MCV 97 05/01/2021    RDW 14 6 05/01/2021     05/01/2021     BMP:  Lab Results   Component Value Date    SODIUM 141 05/01/2021    K 3 6 05/01/2021     05/01/2021    CO2 22 05/01/2021    BUN 19 05/01/2021    CREATININE 1 31 (H) 05/01/2021    GLUC 145 (H) 05/01/2021    GLUF 102 (H) 07/03/2018    CALCIUM 8 6 05/01/2021    CORRECTEDCA 9 2 05/01/2021    EGFR 43 05/01/2021    MG 2 1 05/01/2021     LFT:  Lab Results   Component Value Date    AST 31 05/01/2021    ALT 43 05/01/2021    ALKPHOS 82 05/01/2021    TP 6 5 05/01/2021    ALB 3 3 (L) 05/01/2021      No components found for: TSH3  No results found for: [facility]  Lab Results   Component Value Date    HGBA1C 5 6 11/06/2020     Lipid Profile:   Lab Results   Component Value Date    CHOLESTEROL 149 03/17/2021    HDL 56 03/17/2021    LDLCALC 70 03/17/2021    TRIG 130 03/17/2021     Lab Results   Component Value Date    CHOLESTEROL 149 03/17/2021    CHOLESTEROL 165 11/06/2020     Lab Results   Component Value Date    TROPONINI <0 02 05/01/2021     No results found for: NTBNP   Recent Results (from the past 672 hour(s))   Fecal fat, quantitative    Collection Time: 04/08/21 12:00 AM   Result Value Ref Range    Stool Weight 367 g    Fat,(Fecal Lipids)Qn 12 7 (H) 0 0 - 7 1 g/24 hr   CBC and differential    Collection Time: 05/01/21  5:59 PM   Result Value Ref Range    WBC 9 23 4 31 - 10 16 Thousand/uL    RBC 3 65 (L) 3 81 - 5 12 Million/uL    Hemoglobin 10 7 (L) 11 5 - 15 4 g/dL    Hematocrit 35 5 34 8 - 46 1 %    MCV 97 82 - 98 fL    MCH 29 3 26 8 - 34 3 pg    MCHC 30 1 (L) 31 4 - 37 4 g/dL    RDW 14 6 11 6 - 15 1 %    MPV 8 4 (L) 8 9 - 12 7 fL    Platelets 948 345 - 184 Thousands/uL    nRBC 0 /100 WBCs    Neutrophils Relative 68 43 - 75 %    Immat GRANS % 0 0 - 2 %    Lymphocytes Relative 22 14 - 44 %    Monocytes Relative 7 4 - 12 %    Eosinophils Relative 2 0 - 6 %    Basophils Relative 1 0 - 1 %    Neutrophils Absolute 6 28 1 85 - 7 62 Thousands/µL    Immature Grans Absolute 0 03 0 00 - 0 20 Thousand/uL    Lymphocytes Absolute 2 05 0 60 - 4 47 Thousands/µL    Monocytes Absolute 0 63 0 17 - 1 22 Thousand/µL    Eosinophils Absolute 0 18 0 00 - 0 61 Thousand/µL    Basophils Absolute 0 06 0 00 - 0 10 Thousands/µL   Comprehensive metabolic panel    Collection Time: 05/01/21  5:59 PM   Result Value Ref Range    Sodium 141 136 - 145 mmol/L    Potassium 3 6 3 5 - 5 3 mmol/L    Chloride 106 100 - 108 mmol/L    CO2 22 21 - 32 mmol/L    ANION GAP 13 4 - 13 mmol/L    BUN 19 5 - 25 mg/dL    Creatinine 1 31 (H) 0 60 - 1 30 mg/dL    Glucose 145 (H) 65 - 140 mg/dL    Calcium 8 6 8 3 - 10 1 mg/dL    Corrected Calcium 9 2 8 3 - 10 1 mg/dL    AST 31 5 - 45 U/L    ALT 43 12 - 78 U/L    Alkaline Phosphatase 82 46 - 116 U/L    Total Protein 6 5 6 4 - 8 2 g/dL    Albumin 3 3 (L) 3 5 - 5 0 g/dL Total Bilirubin 0 27 0 20 - 1 00 mg/dL    eGFR 43 ml/min/1 73sq m   Magnesium    Collection Time: 05/01/21  5:59 PM   Result Value Ref Range    Magnesium 2 1 1 6 - 2 6 mg/dL   Lipase    Collection Time: 05/01/21  5:59 PM   Result Value Ref Range    Lipase 189 73 - 393 u/L   Troponin I    Collection Time: 05/01/21  5:59 PM   Result Value Ref Range    Troponin I <0 02 <=0 04 ng/mL   ECG 12 lead    Collection Time: 05/01/21  6:36 PM   Result Value Ref Range    Ventricular Rate 85 BPM    Atrial Rate 85 BPM    VA Interval 160 ms    QRSD Interval 132 ms    QT Interval 398 ms    QTC Interval 473 ms    P Axis 34 degrees    QRS Axis -19 degrees    T Wave Axis 6 degrees   Fingerstick Glucose (POCT)    Collection Time: 05/01/21  6:43 PM   Result Value Ref Range    POC Glucose 72 65 - 140 mg/dl   UA (URINE) with reflex to Scope    Collection Time: 05/01/21  8:51 PM   Result Value Ref Range    Color, UA Light Yellow     Clarity, UA Clear     Specific [place], UA 1 015 1 000 - 1 030    pH, UA 5 5 5 0, 5 5, 6 0, 6 5, 7 0, 7 5, 8 0, 8 5, 9 0    Leukocytes, UA Negative Negative    Nitrite, UA Negative Negative    Protein, UA Negative Negative mg/dl    Glucose, UA Negative Negative mg/dl    Ketones, UA Negative Negative mg/dl    Urobilinogen, UA 0 2 0 2, 1 0 E U /dl E U /dl    Bilirubin, UA Negative Negative    Blood, UA Negative Negative             Dr Jennifer Pak MD, [facility]      "This note has been constructed using a voice recognition system  Therefore there may be syntax, spelling, and/or grammatical errors   Please call if you have any questions  "

## 2021-05-05 NOTE — TELEPHONE ENCOUNTER
Best contact number for patient: [phone]     Emergency Contact name and number:    Referring provider and telephone number:    Primary Care Provider Name and if affiliated with [facility]: Mindi Guthrie     Reason for Appointment/Dx: vertigo     Have you seen and followed up with a pediatric Neurologist for this disease in the past?  No     No (If yes ok to schedule with Dr Zamzam Nolen)    Neurology Location patient would like to be seen:    Order received? Yes                                                 Records Received? Yes    Have you ever seen another Neurologist?       No    Insurance Information    Insurance Name:    ID/Policy #:    Secondary Insurance:    ID/Policy#: Workman's Comp/ Accident/ School  Information      Workman's Comp/Accident/School related?        No    If yes name of Insurance company:    Claim #:    Date of Injury:    Type of Injury:     Name and Telephone Number:    Notes:                   Appointment date: 08/11/2021

## 2021-05-05 NOTE — TELEPHONE ENCOUNTER
Dionna Wood    ED Visit Information     Ed visit date: 5/2/21   Diagnosis Description: NEAR SYNCOPE  In Network? Yes Chestine Blazing  Discharge status: Home  Discharged with meds ? Yes  Number of ED visits to date: 1  ED Severity:1     Outreach Information    Outreach successful: Yes 2  Date letter mailed:N/A  Date Finalized:5/5/21    Care Coordination    Follow up appointment with pcp: yes APPT CARDIO 5/5  Transportation issues ?  No    Value Consolidated Edmundo

## 2021-05-06 ENCOUNTER — OFFICE VISIT (OUTPATIENT)
Dept: PHYSICAL THERAPY | Facility: CLINIC | Age: 66
End: 2021-05-06
Payer: COMMERCIAL

## 2021-05-06 DIAGNOSIS — M53.3 SACROILIAC PAIN: ICD-10-CM

## 2021-05-06 DIAGNOSIS — Z98.1 HISTORY OF FUSION OF LUMBAR SPINE: Primary | ICD-10-CM

## 2021-05-06 DIAGNOSIS — M54.50 LUMBAR SPINE PAIN: ICD-10-CM

## 2021-05-06 PROCEDURE — 97110 THERAPEUTIC EXERCISES: CPT

## 2021-05-06 NOTE — PROGRESS NOTES
Daily Note     Today's date: 2021  Patient name: Stacie Clement  :   MRN: [id number]  Referring provider: Ramírez Barnett DO  Dx:   Encounter Diagnosis     ICD-10-CM    1  History of fusion of lumbar spine  Z98 1    2  Sacroiliac pain  M53 3    3  Lumbar spine pain  M54 5        Start Time: 0845  Stop Time: 930  Total time in clinic (min): 45 minutes    Subjective: Patient reports 7/10 lumbar spine pain today  She reports that she is having a difficult time lifting her leg to ambulate  She is still unable to perform B heel raise due to weakness  Patient went to ER on 2021 due to fever, chills, and passing out at a restaurant  They believe these symptoms were caused by excess fluid collecting in her abdomen from her gastric bypass surgery  Objective: See treatment diary below      Assessment: Tolerated treatment well  Core and hip musculature strengthening dissipates pain  B LAD abolishes pain during treatment session  Patient instructed on nature of performing B lumbar flexion to dissipate pain throughout the course of her day  Discussed possible transition to aquatic PT, as land based exercises increaes lumbar spine pain at times  Patient would benefit from continued PT to maximize function for independence in community  Plan: Continue per plan of care        Precautions: Standard, Hx of lumbar spine fusion 2018, failed gastric bypass surgery, GERD, Elevated surface please      Manuals  5/6     B LAD   5' with mob belt                             Neuro Re-Ed        TrA progression Rev 3sx10 with cuff 3sx10 with cuff     Glute sets  Rev 10 3sx20     Hip add squeeze   3sx20 3sx20     Supine clamshells  20 GTB 20 GTB                             Ther Ex        NuStep warmup        LTR Rev 20 20     LAQ  3sx10 B      SLR        SAQ        Seated lumbar flexion   3sx10 3 way  3sx10 3 way      SKTC   3x10s B      Piriformis stretch    3x10s B Modalities        Heat pre  10' 10' post

## 2021-05-11 ENCOUNTER — APPOINTMENT (OUTPATIENT)
Dept: PHYSICAL THERAPY | Facility: CLINIC | Age: 66
End: 2021-05-11
Payer: COMMERCIAL

## 2021-05-12 ENCOUNTER — APPOINTMENT (OUTPATIENT)
Dept: PHYSICAL THERAPY | Facility: CLINIC | Age: 66
End: 2021-05-12
Payer: COMMERCIAL

## 2021-05-14 ENCOUNTER — OFFICE VISIT (OUTPATIENT)
Dept: PHYSICAL THERAPY | Facility: CLINIC | Age: 66
End: 2021-05-14
Payer: COMMERCIAL

## 2021-05-14 DIAGNOSIS — M54.50 LUMBAR SPINE PAIN: ICD-10-CM

## 2021-05-14 DIAGNOSIS — M53.3 SACROILIAC PAIN: ICD-10-CM

## 2021-05-14 DIAGNOSIS — Z98.1 HISTORY OF FUSION OF LUMBAR SPINE: Primary | ICD-10-CM

## 2021-05-14 PROCEDURE — 97112 NEUROMUSCULAR REEDUCATION: CPT

## 2021-05-14 PROCEDURE — 97110 THERAPEUTIC EXERCISES: CPT

## 2021-05-14 NOTE — PROGRESS NOTES
Daily Note     Today's date: 2021  Patient name: Lizzette Bean  :   MRN: [id number]  Referring provider: Silvana Razo DO  Dx:   Encounter Diagnosis     ICD-10-CM    1  History of fusion of lumbar spine  Z98 1    2  Sacroiliac pain  M53 3    3  Lumbar spine pain  M54 5        Start Time: 0800  Stop Time: 0825  Total time in clinic (min): 25 minutes    Subjective: Patient reports 6/10 lumbar spine pain today  She states that she had a "horrible day" yesterday  Objective: See treatment diary below      Assessment: Tolerated treatment fair  Patient demonstrates improved core activation, but increased pain with performing activities in supine and standing  Patient has follow up with surgeon next week  She would benefit from aquatic PT, as she does not tolerate land based PT  Patient would benefit from continued PT to maximize function for independence in community  Plan: Continue per plan of care        Precautions: Standard, Hx of lumbar spine fusion , failed gastric bypass surgery, GERD, Elevated surface please      Manuals     B LAD   5' with mob belt 5' with mob belt                            Neuro Re-Ed        TrA progression Rev 3sx10 with cuff 3sx10 with cuff 3sx10 with cuff    Glute sets  Rev 10 3sx20 3sx20    Hip add squeeze   3sx20 3sx20 3sx20    Supine clamshells  20 GTB 20 GTB 20 GTB    Supine marches    10 B                    Ther Ex        NuStep warmup        LTR Rev 20 20 20    LAQ  3sx10 B  3sx10 B with DF    SLR        SAQ        Seated lumbar flexion   3sx10 3 way  3sx10 3 way  3sx10 3 way     SKTC   3x10s B  3x10s B     Piriformis stretch    3x10s B 3x10s B                                                    Modalities        Heat pre  10' 10' post

## 2021-05-18 ENCOUNTER — APPOINTMENT (OUTPATIENT)
Dept: PHYSICAL THERAPY | Facility: CLINIC | Age: 66
End: 2021-05-18
Payer: COMMERCIAL

## 2021-05-19 ENCOUNTER — OFFICE VISIT (OUTPATIENT)
Dept: OBGYN CLINIC | Facility: CLINIC | Age: 66
End: 2021-05-19
Payer: COMMERCIAL

## 2021-05-19 ENCOUNTER — OFFICE VISIT (OUTPATIENT)
Dept: PHYSICAL THERAPY | Facility: CLINIC | Age: 66
End: 2021-05-19
Payer: COMMERCIAL

## 2021-05-19 VITALS
WEIGHT: 251 LBS | DIASTOLIC BLOOD PRESSURE: 84 MMHG | HEIGHT: 63 IN | HEART RATE: 121 BPM | BODY MASS INDEX: 44.47 KG/M2 | SYSTOLIC BLOOD PRESSURE: 150 MMHG

## 2021-05-19 DIAGNOSIS — M54.50 LUMBAR SPINE PAIN: ICD-10-CM

## 2021-05-19 DIAGNOSIS — M48.061 SPINAL STENOSIS OF LUMBAR REGION WITHOUT NEUROGENIC CLAUDICATION: Primary | ICD-10-CM

## 2021-05-19 DIAGNOSIS — Z98.1 HISTORY OF FUSION OF LUMBAR SPINE: Primary | ICD-10-CM

## 2021-05-19 DIAGNOSIS — Z98.1 STATUS POST LUMBAR SPINAL FUSION: ICD-10-CM

## 2021-05-19 DIAGNOSIS — M53.3 SACROILIAC PAIN: ICD-10-CM

## 2021-05-19 PROCEDURE — 99213 OFFICE O/P EST LOW 20 MIN: CPT | Performed by: ORTHOPAEDIC SURGERY

## 2021-05-19 PROCEDURE — 97110 THERAPEUTIC EXERCISES: CPT

## 2021-05-19 NOTE — PROGRESS NOTES
Assessment:    Lumbar degenerative disc disease  History of Lumbar fusion, L4-5 done in 2018 (Dr Melchor Ibrahim)  Lumbar radiculopathy       Plan:    Given her prior surgical history and the fact she is still symptomatic despite 4 to 6 weeks of outpatient physical therapy within the last two months, we will order MRI of the lumbar spine with contrast for further evaluation  She will need BUN and creatinine prior to this  Follow-up 1 to 2 weeks for imaging review  Further treatment plan will be discussed at that time  Problem List Items Addressed This Visit        Other    Spinal stenosis of lumbar region without neurogenic claudication - Primary    Status post lumbar spinal fusion                   Subjective:     Patient ID:  Vianney Puri is a 63 y o  female    HPI    80-year-old female presenting for follow-up  She was last seen about a month ago for lower back pain  She has a history of Lumbar fusion procedure done by Dr Melchor Ibrahim in 2018  at her last visit with us, physical therapy and over-the-counter anti-inflammatory pain medications were recommended  Overall, she still has transverse lower back pain especially when she stands and moves that radiates to the posterior thighs down to the bottom of her heels that is associated with intermittent numbness of the heals  Symptoms have not changed much  The following portions of the patient's history were reviewed and updated as appropriate: allergies, current medications, past family history, past medical history, past social history, past surgical history and problem list     Review of Systems   Constitutional: Negative for chills, diaphoresis, fatigue and fever  Respiratory: Negative  Cardiovascular: Negative  Genitourinary: Negative for decreased urine volume and difficulty urinating  Musculoskeletal: Positive for arthralgias and back pain  Skin: Negative  Neurological: Positive for numbness     All other systems reviewed and are negative  Objective:    Imaging:  None      Vitals:    05/19/21 1522   BP: 150/84   Pulse: (!) 121           Physical Exam  Vitals signs and nursing note reviewed  Constitutional:       General: She is not in acute distress  Appearance: Normal appearance  She is not ill-appearing, toxic-appearing or diaphoretic  HENT:      Head: Normocephalic and atraumatic  Eyes:      General:         Right eye: No discharge  Left eye: No discharge  Pulmonary:      Effort: Pulmonary effort is normal  No respiratory distress  Musculoskeletal:         General: No tenderness  Skin:     General: Skin is warm and dry  Neurological:      General: No focal deficit present  Mental Status: She is alert and oriented to person, place, and time  Motor: No weakness  Psychiatric:         Mood and Affect: Mood normal          Behavior: Behavior normal           No acute distress  Gait is without assistance  Old lumbar incision is well healed  Lumbosacral region nontender to palpation  Negative modified straight leg raise bilaterally  Strength is 5/5 L2-S1 bilaterally, sensation equal intact

## 2021-05-20 ENCOUNTER — OFFICE VISIT (OUTPATIENT)
Dept: PHYSICAL THERAPY | Facility: CLINIC | Age: 66
End: 2021-05-20
Payer: COMMERCIAL

## 2021-05-20 DIAGNOSIS — R42 DIZZINESS: Primary | ICD-10-CM

## 2021-05-20 PROCEDURE — 97163 PT EVAL HIGH COMPLEX 45 MIN: CPT | Performed by: PHYSICAL THERAPIST

## 2021-05-20 NOTE — PROGRESS NOTES
PT Evaluation   Today's date: 2021  Patient name: Jesus Mcmahon  :   MRN: [id number]  Referring provider: JEANNE Kent  Dx:   Encounter Diagnosis     ICD-10-CM    1  Dizziness  R42          Assessment  Assessment details: Patient is a 63 y o  Female who presents to skilled outpatient PT with swimming dizziness which started a few months ago  Pt displays unremarkable with oculomotor  findings except: NPC, [place], VOR  CX and Head Thrust test on right side  Overall findings indicated possible hypofunction on right side  Pt balance measures higher risk for falls per [facility] and mCTSIB finding, however low back pain which is chronic did affect scoring  Patient was referred to ENT secondary to prolonged ringing in the ears and fullness sensation as well as reduction in hearing  Patient will benefit from skilled PT for vestibular therapy to improve function back to dizzy free  Patient verbalized understanding of POC  Please contact me if you have any questions or recommendations  Thank you for the referral and the opportunity to share in [facility]        Cut off score   All date taken from [facility] Neuro Section or Rehab Measures    DGI:  MDC for Vestibular Disorders: 4 points  [facility] for Geriatrics/Community Dwelling Older Adults: 3 Points  Falls risk cut off: <19/24    FGA:  MCID: 4 points  Geriatrics/Community Dwelling Older Adults: </= 22/30 fall risk  Geriatrics/Community Dwelling Older Adults: </= 20/30 unexplained falls in the next 6 months  Parkinsons: </= 18/30 fall risk    mCTSIB (normed on ages 19-56, lower number is less sway or better static balance)  Eyes open firm surface (norm 0 21-0 48)  Eyes closed firm surface (norm 0 48-0 99)  Eyes open foam surface (norm 0 38-0 71)  Eyes closed foam surface (norm 0 70-2 22)    DHI:  0-39: low perception of handicap  40-69: moderate perception of handicap  : severe perception of handicap  > 60: increased risk for falls    Joint Position Error Testing (JPET):  > 4 5 degrees: abnormal joint proprioception        Impairments: abnormal coordination, abnormal gait, abnormal or restricted ROM, activity intolerance, impaired balance, lacks appropriate HEP, poor posture, poor body mechanics, pain with function, safety issue and abnormal movement  Understanding of Dx/Px/POC: good  Prognosis: good      Goals    Vestibular Short Term Goals:  - Patient will be independent with simple HEP  - Patient will tolerate 60 seconds of oculomotor exercises with minimal increase in symptoms  - Patient will demonstrate 10% decrease in symptom severity scoring with independent use of modalities  - Patient will be able to tolerate 30 seconds with eyes closed on foam surface without any loss of balance demonstrating improvement in vestibular system  - Patient will improve FGA score by 4 points per MDC to promote improved safety with dynamic tasks    Vestibular Long Term Goals:  - Patient will be independent with complex HEP  - Patient will tolerate >=2 minutes of oculomotor exercises to facilitate return to reading and computer work  - Patient will report >= 50% improvement on symptom severity scoring  - Patient will demonstrate ability to perform HT in gait without veering  - Patient will score low risk for falls with DGI test with score of 19/24 or higher per current research data  - Patient will score low risk for falls with FGA test with score of 23/30 or higher per current research data  - Patient will report 2/10 dizziness or less with visual stimulating surround with duration of 2 minutes   - Patient will report subjective improvement to 90% or higher to promote return to PLOF    Plan  Plan details:   Planned modality interventions: TENS  Planned therapy interventions: balance, balance/WB training, breathing training, body mechanics training, coordination, flexibility, functional ROM exercises, gait training, HEP, Garcia Taping, motor coordination training, neuromuscular re-education, patient education, postural training, therapeutic activities and therapeutic exercises  Frequency: 2x/wk  Duration in weeks: 12  Plan of Care beginning date: 2021  Plan of Care expiration date: 12 weeks - 2021  Treatment plan discussed with: Patient and referring MD         Subjective Evaluation    History of Present Illness  Mechanism of injury: Pt reports a few months of dizziness notes having swimming sensation  Has noticed increase in ringing in the ears around the same time frame  Just started Meclizine for about a week  Education on taking Meclizine when needed to improve ability to perform physical activity  Notes increased frustration with hearing         Dizziness Subjective  How long does dizziness last: all day   How would you describe the dizziness: swimming/ swaying sensation  Rolling in bed: No  Supine to/from sit: No      Pain  Current pain rating: 3/10  At best pain rating: 3/10  At worst pain ratin/10  Location: low back   Aggravating factors: long duration standign     Social Support  Steps to enter house: 1  Stairs in house: 1   Lives in: 2 story home, stays on 1st floor   Lives with: Lives most alone; does have adult son at times to assist     Employment status: retired   Hand dominance: right     Treatments  Previous treatment: none  Current treatment: low back treatment   Diagnostic Testing: MRI pending       Objective     Vestibular Objective  Cervical Spine AROM:  - Flexion: WFL pain throughout range  - Extension: WFL no pain  - R Rotation: WFL pain throughout range  - L Rotation: WFL no pain  - R Lateral Flexion: WFL pain throughout range  - L Lateral Flexion: WFL no pain    Integrity Testing  - mVBI: denies any increase in dizziness   - Posture: moderate forward head   - Palpation: moderate hypertoxicity to C spine      Coordination Screen  - Dysmetria: intact   - Dysdiadochokinesia: Intact   - Alternating Toe Taps: Intact     Oculomotor Screen  - Baseline Symptoms: 2/10  - Baseline Observation: nA  - Gaze Holding Nystagmus: Normal Dizziness: baseline , Observation: normal   - Spontaneous Nystagmus Room Light: Normal Dizziness: Baseline Observation: normal   - Smooth Pursuits (central): Normal Dizziness: baseline, Observation: normal   - Near Point Convergence (normal: < 4"/10 cm - central): Abnormal Dizziness: baseline dizziness , Observation: distance 6 CM  - Saccades (central): Abnormal Dizziness: baseline,  Observation: slower speed with tracking   - VOR Screen (motion sensitivity): Normal Dizziness: 2/10, Observation: able to maintain focus   - VOR Cancel (central): Abnormal Dizziness: 4/10, Observation: decrease ability to maintain tracking   - Head Thrust (moderate to severe hypofunction):  Abnormal Dizziness: 3/10, Observation: unable to maintain focus   - Dynamic Visual Acuity (2 Hz):   Static Head: 20/25 Line   Dynamic Head: 20/30 Line   Difference in number of lines: 1 (abnormal if 3 or >)      Outcome Measures Initial Eval  5/20/2021        mCTSIB  - FTEO (firm)  - FTEC (firm)  - FTEO (foam)  - FTEC (foam)   30 sec  12 sec  30 sec  2 sec        DGI 17/24        FGA         DHI                                                                    Precautions: Fall risk, Tinnitus, Chronic pain in legs   Past Medical History:   Diagnosis Date    Arthritis     Asthma     Back pain     Chronic pain disorder     hips into the legs    Colon polyp     [facility]VT (deep venous thrombosis) ([facility] [address] ) 2006    Gastric bypass status for obesity     yarelis en y    GERD (gastroesophageal reflux disease)     Hiatal hernia     History of transfusion 2006    after gastric bypass surgery, no reactions    Hypertension     Irritable bowel syndrome     Kidney stone     Muscle weakness     bilateral legs    Numbness and tingling     bilateral feet    Pneumonia     Spinal stenosis     Tinnitus     occassionally    Wears glasses

## 2021-05-21 ENCOUNTER — OFFICE VISIT (OUTPATIENT)
Dept: PHYSICAL THERAPY | Facility: CLINIC | Age: 66
End: 2021-05-21
Payer: COMMERCIAL

## 2021-05-21 DIAGNOSIS — M54.50 LUMBAR SPINE PAIN: ICD-10-CM

## 2021-05-21 DIAGNOSIS — M53.3 SACROILIAC PAIN: ICD-10-CM

## 2021-05-21 DIAGNOSIS — Z98.1 HISTORY OF FUSION OF LUMBAR SPINE: Primary | ICD-10-CM

## 2021-05-21 PROCEDURE — 97110 THERAPEUTIC EXERCISES: CPT

## 2021-05-21 NOTE — PROGRESS NOTES
Daily Note     Today's date: 2021  Patient name: Israel Perez  :   MRN: [id number]  Referring provider: JEANNE Ford  Dx:   Encounter Diagnosis     ICD-10-CM    1  History of fusion of lumbar spine  Z98 1    2  Sacroiliac pain  M53 3    3  Lumbar spine pain  M54 5        Start Time: 08  Stop Time: 0845  Total time in clinic (min): 40 minutes    Subjective: Patient reports 6/10 lumbar spine pain today  She reports that she is having difficulties ambulating today due to her pain  She feels as though she has to  her feet and concentrate on walking  Objective: See treatment diary below      Assessment: Tolerated treatment well  Patient able to perform a sit to stand with little to no pain with activation of gluteus deven  She is able to maintain activation of gluteus deven after removal of mobilization belt  Verbal cueing for hip external rotation for activation of gluteus deven  Patient would benefit from continued PT      Plan: Continue per plan of care        Precautions: Standard, Hx of lumbar spine fusion 2018, failed gastric bypass surgery, GERD, Elevated surface please        Manuals    B LAD  5' with mob belt 5' with mob belt                             Neuro Re-Ed        TrA progression 3sx10 with cuff 3sx10 with cuff 3sx10 with cuff 3sx10 with cuff 3sx10 with cuff   Glute sets  10 3sx20 3sx20 3sx20 3sx20   Hip add squeeze  3sx20 3sx20 3sx20 3sx20 3sx20   Supine clamshells 20 GTB 20 GTB 20 GTB 20 GTB 20 GTB   Supine marches   10 B 10B 10 B                   Ther Ex        NuStep warmup        LTR 20 20 20 20 20   LAQ 3sx10 B  3sx10 B with DF     SLR        SAQ        Seated lumbar flexion  3sx10 3 way  3sx10 3 way  3sx10 3 way  3sx10 3 way  3sx10 3 way    SKTC  3x10s B  3x10s B      Piriformis stretch   3x10s B 3x10s B     Sit to stand with mob belt     2x5                                           Modalities        Heat pre 10' 10' post

## 2021-05-24 ENCOUNTER — TELEPHONE (OUTPATIENT)
Dept: OBGYN CLINIC | Facility: HOSPITAL | Age: 66
End: 2021-05-24

## 2021-05-24 ENCOUNTER — OFFICE VISIT (OUTPATIENT)
Dept: PHYSICAL THERAPY | Facility: CLINIC | Age: 66
End: 2021-05-24
Payer: COMMERCIAL

## 2021-05-24 DIAGNOSIS — R42 DIZZINESS: Primary | ICD-10-CM

## 2021-05-24 DIAGNOSIS — M54.2 NECK PAIN: Primary | ICD-10-CM

## 2021-05-24 DIAGNOSIS — M54.50 LUMBAR SPINE PAIN: ICD-10-CM

## 2021-05-24 DIAGNOSIS — M53.3 SACROILIAC PAIN: ICD-10-CM

## 2021-05-24 DIAGNOSIS — Z98.1 HISTORY OF FUSION OF LUMBAR SPINE: Primary | ICD-10-CM

## 2021-05-24 PROCEDURE — 97112 NEUROMUSCULAR REEDUCATION: CPT

## 2021-05-24 PROCEDURE — 97110 THERAPEUTIC EXERCISES: CPT

## 2021-05-24 PROCEDURE — 97112 NEUROMUSCULAR REEDUCATION: CPT | Performed by: PHYSICAL THERAPIST

## 2021-05-24 NOTE — PROGRESS NOTES
Daily Note     Today's date: 2021  Patient name: Manjeet Cuevas  :   MRN: [id number]  Referring provider: JEANNE Mendoza  Dx:   Encounter Diagnosis     ICD-10-CM    1  History of fusion of lumbar spine  Z98 1    2  Sacroiliac pain  M53 3    3  Lumbar spine pain  M54 5        Start Time: 1100  Stop Time: 1145  Total time in clinic (min): 45 minutes    Subjective: Patient reports 3/10 lumbar spine pain today  Patient states she would like to seek PT for cervical spine pain  Objective: See treatment diary below      Assessment: Tolerated treatment well  Patient able to activate TrA without cueing verbally  She reports ease with activating musculature with movement  Patient provided with core stabilization in standing  Able to tolerate all exercises today with no increased pain in lumbar spine or B LE  Patient would benefit from continued PT to maximize function for independence in community  Plan: Continue per plan of care        Precautions: Standard, Hx of lumbar spine fusion 2018, failed gastric bypass surgery, GERD, Elevated surface please        Manuals     B LAD 5' with mob belt                               Neuro Re-Ed        TrA progression 3sx10 with cuff 3sx10 with cuff 3sx10 with cuff 3sx10 with cuff    Glute sets  3sx20 3sx20 3sx20 3sx20    Hip add squeeze  3sx20 3sx20 3sx20 3sx20    Supine clamshells 20 GTB 20 GTB 20 GTB 20 GTB    Supine marches 10 B 10B 10 B 10 B    Supine leg ext with TrA    10 B            Ther Ex        NuStep warmup        LTR 20 20 20 20    LAQ 3sx10 B with DF       SLR        SAQ        Seated lumbar flexion  3sx10 3 way  3sx10 3 way  3sx10 3 way  3sx10 3 way     SKTC 3x10s B        Piriformis stretch  3x10s B       Sit to stand with mob belt   2x5 2x5    TB row     10 RTB    TB shoulder ext     10 RTB    Paloff press    10 RTB                    Modalities        Heat pre

## 2021-05-24 NOTE — PROGRESS NOTES
Daily Note: Vestibular     Today's date: 2021  Patient name: Janelle Riddle  : [id number]  MRN: [id number]  Referring provider: JEANNE Gibson  Dx:   Encounter Diagnosis     ICD-10-CM    1  Dizziness  R42                   Subjective: Pt reports doing ok with dizziness still an issues  Has increased in neck stiffness which Ortho PT is addressing       Objective: See treatment diary below    VOR x 1: self selected speed as long as target is clear and in focus   H: 2-3/10: 3/10   V: 1/10 :2/10    FTEC foam pad:   30 sec hold x 4 reps     Gait with head turns  Side to side: 2-3/10   Up and down: 3-10     VOR cx: plain surround, 10 reps   H: 3/10  V: 3/10    HEP:  Gait with head turns:  VOR x 1  VOR cx    Assessment: Tolerated treatment well this date with overall dizziness of 2-3/10  Overall imbalance with head turns and nodes with increased postural sway and veering  Patient would benefit from continued PT      Plan: Continue per plan of care        Precautions: Standard, Hx of lumbar spine fusion 2018, failed gastric bypass surgery, GERD, Elevated surface please        Manuals  56    B LAD  5' with mob belt 5' with mob belt                             Neuro Re-Ed        TrA progression 3sx10 with cuff 3sx10 with cuff 3sx10 with cuff 3sx10 with cuff 3sx10 with cuff   Glute sets  10 3sx20 3sx20 3sx20 3sx20   Hip add squeeze  3sx20 3sx20 3sx20 3sx20 3sx20   Supine clamshells 20 GTB 20 GTB 20 GTB 20 GTB 20 GTB   Supine marches   10 B 10B 10 B                   Ther Ex        NuStep warmup        LTR 20 20 20 20 20   LAQ 3sx10 B  3sx10 B with DF     SLR        SAQ        Seated lumbar flexion  3sx10 3 way  3sx10 3 way  3sx10 3 way  3sx10 3 way  3sx10 3 way    SKTC  3x10s B  3x10s B      Piriformis stretch   3x10s B 3x10s B     Sit to stand with mob belt     2x5                                           Modalities        Heat pre 10' 10' post

## 2021-05-24 NOTE — TELEPHONE ENCOUNTER
Patient sees Dr Jerri Elkins  Patient has been seeing PT for her lower back and PT thinks she could benefit with the cervical region as well  Can an order be placed for this?             CB: [phone]- ok to leave message

## 2021-05-25 ENCOUNTER — OFFICE VISIT (OUTPATIENT)
Dept: FAMILY MEDICINE CLINIC | Facility: CLINIC | Age: 66
End: 2021-05-25
Payer: COMMERCIAL

## 2021-05-25 VITALS
HEART RATE: 88 BPM | DIASTOLIC BLOOD PRESSURE: 82 MMHG | RESPIRATION RATE: 16 BRPM | SYSTOLIC BLOOD PRESSURE: 122 MMHG | HEIGHT: 63 IN | BODY MASS INDEX: 44.12 KG/M2 | WEIGHT: 249 LBS | TEMPERATURE: 97.6 F

## 2021-05-25 DIAGNOSIS — I10 ESSENTIAL HYPERTENSION: ICD-10-CM

## 2021-05-25 DIAGNOSIS — E66.01 MORBID OBESITY (HCC): ICD-10-CM

## 2021-05-25 DIAGNOSIS — H69.83 DYSFUNCTION OF BOTH EUSTACHIAN TUBES: Primary | ICD-10-CM

## 2021-05-25 DIAGNOSIS — Z98.84 HISTORY OF ROUX-EN-Y GASTRIC BYPASS: ICD-10-CM

## 2021-05-25 DIAGNOSIS — R42 DIZZINESS: ICD-10-CM

## 2021-05-25 PROBLEM — E66.813 CLASS 3 SEVERE OBESITY IN ADULT (HCC): Status: RESOLVED | Noted: 2021-04-06 | Resolved: 2021-05-25

## 2021-05-25 LAB
BUN SERPL-MCNC: 14 MG/DL (ref 8–27)
CREAT SERPL-MCNC: 0.92 MG/DL (ref 0.57–1)
SL AMB EGFR AFRICAN AMERICAN: 77 ML/MIN/1.73
SL AMB EGFR NON AFRICAN AMERICAN: 66 ML/MIN/1.73

## 2021-05-25 PROCEDURE — 99214 OFFICE O/P EST MOD 30 MIN: CPT | Performed by: NURSE PRACTITIONER

## 2021-05-25 PROCEDURE — 3079F DIAST BP 80-89 MM HG: CPT | Performed by: NURSE PRACTITIONER

## 2021-05-25 PROCEDURE — 3074F SYST BP LT 130 MM HG: CPT | Performed by: NURSE PRACTITIONER

## 2021-05-25 RX ORDER — FLUTICASONE PROPIONATE 50 MCG
2 SPRAY, SUSPENSION (ML) NASAL DAILY
Qty: 16 G | Refills: 3 | Status: SHIPPED | OUTPATIENT
Start: 2021-05-25 | End: 2022-01-17 | Stop reason: SDUPTHER

## 2021-05-25 NOTE — PROGRESS NOTES
Assessment/Plan:    1  Dysfunction of both eustachian tubes  -     fluticasone (FLONASE) 50 mcg/act nasal spray; 2 sprays into each nostril daily    2  Dizziness    3  Essential hypertension  Assessment & Plan:  Stable on current medications  Continue same      4  Morbid obesity ([address] )  Assessment & Plan: Will be scheduling with bariatrics pending record review      5  History of Michael-en-Y gastric bypass          There are no Patient Instructions on file for this visit  Return in about 6 months (around 11/25/2021)  Subjective:      Patient ID: Stas Judd is a 63 y o  female  Chief Complaint   Patient presents with    questions     [facility]       Here today with multiple concerns  Has been going to PT for her back and was evaluated for her dizziness as well  She was referred to ENT, has an appt 6/16 for evaluation  Will also be continuing with neuro PT as well as therapy for her cervical spine  Was given Meclizine, which she has been using sparingly  Unsure if it has helped when she uses it  She complaints of an abnormal sensation in her ear  Feels like something ticking in the inside of her ears and now a stiff neck for the past week  She has tried heat and ROM and this has not helped  Right side is worse than the left  Has appt with neurology and will be following up with weight management, however needs to request previous records given complicated surgical history  Neurologic Problem  The patient's primary symptoms include clumsiness, focal weakness, a loss of balance, memory loss, a visual change and weakness  The patient's pertinent negatives include no altered mental status, focal sensory loss, near-syncope, slurred speech or syncope  This is a recurrent problem  The current episode started more than 1 month ago  The neurological problem developed gradually  The problem has been waxing and waning since onset   There was no focality noted and lower extremity focality noted  Associated symptoms include abdominal pain, an auditory change, back pain, bladder incontinence, diaphoresis, dizziness, fatigue, light-headedness, nausea, neck pain and vertigo  Pertinent negatives include no aura, bowel incontinence, chest pain, confusion, fever, headaches, palpitations, shortness of breath or vomiting  The following portions of the patient's history were reviewed and updated as appropriate: allergies, current medications, past family history, past medical history, past social history, past surgical history and problem list     Review of Systems   Constitutional: Positive for diaphoresis and fatigue  Negative for fever  HENT:        See HPI   Respiratory: Negative for shortness of breath  Cardiovascular: Negative for chest pain, palpitations and near-syncope  Gastrointestinal: Positive for abdominal pain and nausea  Negative for bowel incontinence and vomiting  Genitourinary: Positive for bladder incontinence  Musculoskeletal: Positive for back pain and neck pain  Neurological: Positive for dizziness, vertigo, focal weakness, weakness, light-headedness and loss of balance  Negative for syncope and headaches  Psychiatric/Behavioral: Positive for memory loss  Negative for confusion           Current Outpatient Medications   Medication Sig Dispense Refill    calcium carbonate (OS-BEAU) 1250 (500 Ca) MG chewable tablet Chew 1 tablet daily      cholecalciferol (VITAMIN D3) 400 units tablet Take 400 Units by mouth daily      dicyclomine (BENTYL) 10 mg capsule TAKE ONE CAPSULE BY MOUTH THREE TIMES A DAY AS NEEDED FOR FECAL URGENCY AND ABDOMINAL CRAMPING (GENERIC FOR BENTYL) (Patient taking differently: 10 mg 3 (three) times a day before meals ) 90 capsule 0    lisinopril-hydrochlorothiazide (PRINZIDE,ZESTORETIC) 20-12 5 MG per tablet Take 1 tablet by mouth daily 90 tablet 1    meclizine (ANTIVERT) 25 mg tablet Take 1 tablet (25 mg total) by mouth 3 (three) times a day as needed for dizziness for up to 3 days 12 tablet 0    multivitamin (THERAGRAN) TABS Take 1 tablet by mouth daily      Omega-3 Fatty Acids (fish oil) 1,000 mg Take 1 capsule (1,000 mg total) by mouth 2 (two) times a day 60 capsule 6    traZODone (DESYREL) 50 mg tablet Take 50 mg by mouth daily at bedtime as needed        vitamin B-12 (CYANOCOBALAMIN) 50 MCG tablet Take 50 mcg by mouth daily      fluticasone (FLONASE) 50 mcg/act nasal spray 2 sprays into each nostril daily 16 g 3     No current facility-administered medications for this visit  Objective:    /82   Pulse 88   Temp 97 6 °F (36 4 °C)   Resp 16   Ht 5' 3" (1 6 m)   Wt 113 kg (249 lb)   BMI 44 11 kg/m²        Physical Exam  Vitals signs and nursing note reviewed  Constitutional:       Appearance: She is well-developed  She is obese  HENT:      Head: Normocephalic and atraumatic  Right Ear: A middle ear effusion (mild, serous) is present  Tympanic membrane is bulging  Left Ear: Tympanic membrane is bulging  Cardiovascular:      Rate and Rhythm: Normal rate and regular rhythm  Pulses: Normal pulses  Heart sounds: Normal heart sounds  No murmur  Pulmonary:      Effort: Pulmonary effort is normal       Breath sounds: Normal breath sounds  Skin:     General: Skin is warm and dry  Capillary Refill: Capillary refill takes less than 2 seconds  Neurological:      Mental Status: She is alert     Psychiatric:         Mood and Affect: Mood normal          Behavior: Behavior normal                 JACINTO Carpenter

## 2021-05-28 ENCOUNTER — APPOINTMENT (OUTPATIENT)
Dept: PHYSICAL THERAPY | Facility: CLINIC | Age: 66
End: 2021-05-28
Payer: COMMERCIAL

## 2021-06-01 LAB — ELASTASE PANC STL-MCNT: 238 UG ELAST./G

## 2021-06-02 ENCOUNTER — OFFICE VISIT (OUTPATIENT)
Dept: PHYSICAL THERAPY | Facility: CLINIC | Age: 66
End: 2021-06-02
Payer: COMMERCIAL

## 2021-06-02 DIAGNOSIS — R42 DIZZINESS: Primary | ICD-10-CM

## 2021-06-02 DIAGNOSIS — R19.7 DIARRHEA, UNSPECIFIED TYPE: ICD-10-CM

## 2021-06-02 DIAGNOSIS — K21.9 GASTROESOPHAGEAL REFLUX DISEASE, UNSPECIFIED WHETHER ESOPHAGITIS PRESENT: ICD-10-CM

## 2021-06-02 DIAGNOSIS — M53.3 SACROILIAC PAIN: ICD-10-CM

## 2021-06-02 DIAGNOSIS — Z98.1 HISTORY OF FUSION OF LUMBAR SPINE: Primary | ICD-10-CM

## 2021-06-02 DIAGNOSIS — M54.50 LUMBAR SPINE PAIN: ICD-10-CM

## 2021-06-02 DIAGNOSIS — R13.10 DYSPHAGIA, UNSPECIFIED TYPE: ICD-10-CM

## 2021-06-02 PROCEDURE — 97112 NEUROMUSCULAR REEDUCATION: CPT | Performed by: PHYSICAL THERAPIST

## 2021-06-02 PROCEDURE — 97110 THERAPEUTIC EXERCISES: CPT | Performed by: PHYSICAL THERAPIST

## 2021-06-02 NOTE — PROGRESS NOTES
Daily Note: Vestibular     Today's date: 2021  Patient name: Vianney Puri  : 1955  MRN: [id number]  Referring provider: JEANNE Islas  Dx:   Encounter Diagnosis     ICD-10-CM    1  Dizziness  R42                   Subjective: Pt reports 4/10 dizziness upon arrival      Objective: See treatment diary below      VOR x 1: self selected speed as long as target is clear and in focus   H: 4/10: 3/10   V: 4/10 :5/10    FTEC foam pad:   30 sec hold x 4 reps     Gait with head turns  Side to side: 7/10   Up and down: 7/10     VOR cx: plain surround, 10 reps   H: 6/10, 6/10  V: 5/10, 5/10      Assessment: Patient tolerated treatment well  Patient reports increased dizziness today, 7/10 at most during gait with head turns  Her dizziness subsides with seated rest break  During FTEC w/ foam, she shows reduced sway with each rep  She will benefit from skilled PT services to reduce her dizziness and improve her overall balance  Plan: Continue per plan of care        Precautions: Standard, Hx of lumbar spine fusion 2018, failed gastric bypass surgery, GERD, Elevated surface please        Manuals    B LAD  5' with mob belt 5' with mob belt                             Neuro Re-Ed        TrA progression 3sx10 with cuff 3sx10 with cuff 3sx10 with cuff 3sx10 with cuff 3sx10 with cuff   Glute sets  10 3sx20 3sx20 3sx20 3sx20   Hip add squeeze  3sx20 3sx20 3sx20 3sx20 3sx20   Supine clamshells 20 GTB 20 GTB 20 GTB 20 GTB 20 GTB   Supine marches   10 B 10B 10 B                   Ther Ex        NuStep warmup        LTR 20 20 20 20 20   LAQ 3sx10 B  3sx10 B with DF     SLR        SAQ        Seated lumbar flexion  3sx10 3 way  3sx10 3 way  3sx10 3 way  3sx10 3 way  3sx10 3 way    SKTC  3x10s B  3x10s B      Piriformis stretch   3x10s B 3x10s B     Sit to stand with mob belt     2x5                                           Modalities        Heat pre 10' 10' post

## 2021-06-02 NOTE — PROGRESS NOTES
Daily Note     Today's date: 2021  Patient name: Lizzette Bean  : [birth year]  MRN: [id number]  Referring provider: JEANNE Jung  Dx:   Encounter Diagnosis     ICD-10-CM    1  History of fusion of lumbar spine  Z98 1    2  Sacroiliac pain  M53 3    3  Lumbar spine pain  M54 5        Subjective: Patient reports 7/10 lumbar spine pain today  "I have this stiff neck for weeks and I cant seem to shake it"  Pt was seen by Mindi Guthrie, and issued Flonase  Pt states she has been taking it for 5 days with some relief  "I did a lot of cleaning and moving/packing this weekend"  Pt states she was helping clean out her mother's home that passed away  Objective: See treatment diary below    Assessment: Tolerated treatment well  Patient reporting sciatic pain R LE  Pt states new sciatic nerve glides assisted in decreasing c/o pain  Pt able to progress with resistance on TB therex as indicated on flow sheet  Patient would benefit from continued PT to maximize function for independence in community  Plan: Continue per plan of care  Continue as per primary PT        Precautions: Standard, Hx of lumbar spine fusion 2018, failed gastric bypass surgery, GERD, Elevated surface please        Manuals  6   B LAD 5' with mob belt                               Neuro Re-Ed        TrA progression 3sx10 with cuff 3sx10 with cuff 3sx10 with cuff 3sx10 with cuff -   Glute sets  3sx20 3sx20 3sx20 3sx20 5 s x 20   Hip add squeeze  3sx20 3sx20 3sx20 3sx20 5s x 20   Supine clamshells 20 GTB 20 GTB 20 GTB 20 GTB    Supine marches 10 B 10B 10 B 10 B x10 B   Supine leg ext with TrA    10 B x15 B           Ther Ex     SI Nerve glides 2 x10 reps   NuStep warmup        LTR 20 20 20 20    LAQ 3sx10 B with DF       SLR        SAQ        Seated lumbar flexion  3sx10 3 way  3sx10 3 way  3sx10 3 way  3sx10 3 way  3 s x 10 3 way   SKTC 3x10s B        Piriformis stretch  3x10s B       Sit to stand with mob belt   2x5 2x5    TB row     10 RTB 2x10 BTB   TB shoulder ext     10 RTB 2x10 BTB    Paloff press    10 RTB 2x10 BTB                   Modalities        Heat pre

## 2021-06-03 RX ORDER — DICYCLOMINE HYDROCHLORIDE 10 MG/1
CAPSULE ORAL
Qty: 90 CAPSULE | Refills: 0 | Status: SHIPPED | OUTPATIENT
Start: 2021-06-03 | End: 2021-08-24

## 2021-06-04 ENCOUNTER — TELEPHONE (OUTPATIENT)
Dept: GASTROENTEROLOGY | Facility: CLINIC | Age: 66
End: 2021-06-04

## 2021-06-04 ENCOUNTER — OFFICE VISIT (OUTPATIENT)
Dept: GASTROENTEROLOGY | Facility: CLINIC | Age: 66
End: 2021-06-04
Payer: COMMERCIAL

## 2021-06-04 ENCOUNTER — OFFICE VISIT (OUTPATIENT)
Dept: PHYSICAL THERAPY | Facility: CLINIC | Age: 66
End: 2021-06-04
Payer: COMMERCIAL

## 2021-06-04 VITALS
SYSTOLIC BLOOD PRESSURE: 134 MMHG | BODY MASS INDEX: 44.12 KG/M2 | WEIGHT: 249 LBS | DIASTOLIC BLOOD PRESSURE: 96 MMHG | HEART RATE: 99 BPM | HEIGHT: 63 IN | TEMPERATURE: 97.8 F

## 2021-06-04 DIAGNOSIS — K58.0 IRRITABLE BOWEL SYNDROME WITH DIARRHEA: Primary | ICD-10-CM

## 2021-06-04 DIAGNOSIS — R42 DIZZINESS: Primary | ICD-10-CM

## 2021-06-04 DIAGNOSIS — M54.16 LUMBAR RADICULOPATHY: Primary | ICD-10-CM

## 2021-06-04 PROCEDURE — 97110 THERAPEUTIC EXERCISES: CPT | Performed by: PHYSICAL THERAPIST

## 2021-06-04 PROCEDURE — 97112 NEUROMUSCULAR REEDUCATION: CPT

## 2021-06-04 PROCEDURE — 97112 NEUROMUSCULAR REEDUCATION: CPT | Performed by: PHYSICAL THERAPIST

## 2021-06-04 PROCEDURE — 1036F TOBACCO NON-USER: CPT | Performed by: PHYSICIAN ASSISTANT

## 2021-06-04 PROCEDURE — 99213 OFFICE O/P EST LOW 20 MIN: CPT | Performed by: PHYSICIAN ASSISTANT

## 2021-06-04 NOTE — TELEPHONE ENCOUNTER
Pt requested medical records from Dr Mino Salas  Faxed request to Dr Zenaida Griffin, but [facility] is permanently closed, phone number is no longer in service

## 2021-06-04 NOTE — PROGRESS NOTES
Daily Note     Today's date: 2021  Patient name: Richard Huffman  : 1955  MRN: [id number]  Referring provider: Liu Place, CR*  Dx:   Encounter Diagnosis     ICD-10-CM    1  Lumbar radiculopathy  M54 16        Subjective: Patient reports 7-8/10 lumbar spine pain today  "I have this stiff neck for weeks and I cant seem to shake it    I now got a Rx for my neck for therapy"  Pt states she has MRI of lumbar spine on   Pt states she will call MD to inquire about MRI of cervical spine while she is getting MRI of lumbar spine  Pt to have re-eval with primary PT on  for cervical spine pain  Objective: See treatment diary below    Assessment: Tolerated treatment well  Patient reporting sciatic pain R LE  Pt states new sciatic nerve glides assisted in decreasing c/o pain  Pt able to progress with supine mat therex as indicated on flow sheet  Patient would benefit from continued PT to maximize function for independence in community  Plan: Continue per plan of care  Continue as per primary PT  Re-eval for cervical spine set for        Precautions: Standard, Hx of lumbar spine fusion 2018, failed gastric bypass surgery, GERD, Elevated surface please        Manuals  6   B LAD                                Neuro Re-Ed        TrA progression  3sx10 with cuff 3sx10 with cuff 3sx10 with cuff -   Glute sets  5sx20 3sx20 3sx20 3sx20 5 s x 20   Hip add squeeze  5sx30 3sx20 3sx20 3sx20 5s x 20   Supine clamshells  20 GTB 20 GTB 20 GTB    Supine marches 3x10 B 10B 10 B 10 B x10 B   Supine leg ext with TrA x15 L LE   10 B x15 B           Ther Ex SI nerve glides 2x10 reps    SI Nerve glides 2 x10 reps   NuStep warmup        LTR 20 20 20 20    LAQ        SLR        SAQ        Seated lumbar flexion  3sx10 3 way  3sx10 3 way  3sx10 3 way  3sx10 3 way  3 s x 10 3 way   SKTC 3x10s B        Piriformis stretch  3x10s B       Sit to stand with mob belt   2x5 2x5    TB row     10 RTB 2x10 BTB   TB shoulder ext     10 RTB 2x10 BTB    Paloff press    10 RTB 2x10 BTB   Hamstring stretch With SOS supine x 10 hold 20 ea               Modalities        Heat pre

## 2021-06-04 NOTE — PROGRESS NOTES
Follow-up Note -  [facility]  Frank Roach [birth year] 63 y o  female         Reason:  Follow-up; loose stools, bloating    HPI:  53-year-old female with history of morbid obesity, spinal stenosis, chronic pain, gastric bypass which was revised in ultimately converted to a total gastrectomy and esophagojejunostomy in 2005 who presents for follow-up  She was seen in our office with our physician assistant Shelli Ring in March evaluation of abdominal bloating and diarrhea; she had undergone colonoscopy in November 2020 evaluation of diarrhea showing only 3 adenomatous polyps which were removed, with spastic tortuous colon  She had also mentioned that she had to have dilation of her esophagojejunostomy in the past but not for several years at that point  She was recommended for upper GI series to assess for dysmotility or stenosis at the anastomosis, continue her recently started Questran, continue Bentyl in PPI, and check stool pancreatic elastase/quantitative fat (stool tests showed mildly elevated fat and normal elastase; felt to be nonspecific)  Upper GI series showed stable findings from 2018 including patent anastomosis, multiple episodes of reflux to the level of the proximal 3rd of the esophagus  She then underwent EGD on 4/6 showing healthy appearing anastomosis and visualized small bowel  She was recommended for peppermint supplementation, continuing pantoprazole, and consideration for CT scan if pain persisted  She underwent CT scan of the abdomen and pelvis on 5/1 showing no acute pathology  At this time, patient is days she is actually feeling relatively well, she has been intentionally losing weight via intermittent fasting, states she has lost about 17 lb so far  She is taking Bentyl once a day in the morning which has been helpful    She says she has also been off Protonix for about a week, is not having any significant burning sensation, was also advised by 1 of her doctors that she may not necessarily need a PPI seeing as she has had a total gastrectomy  She says she has bowel movements about once a day which are usually loose or fluffy in texture  Denies any rectal bleeding or melena, she says she has been having some dizziness and balance issues lately for which she is going to be seeing ENT in the near future  REVIEW OF SYSTEMS:      CONSTITUTIONAL: Denies any fever, chills, or rigors  Good appetite, and no recent weight loss  HEENT: No earache or tinnitus  Denies hearing loss or visual disturbances  Some dizziness and balance issues over the last month  CARDIOVASCULAR: No chest pain or palpitations  RESPIRATORY: Denies any cough, hemoptysis, shortness of breath or dyspnea on exertion  GASTROINTESTINAL: As noted in the History of Present Illness  GENITOURINARY: No problems with urination  Denies any hematuria or dysuria  NEUROLOGIC: No dizziness or vertigo, denies headaches  MUSCULOSKELETAL: Denies any muscle or joint pain  SKIN: Denies skin rashes or itching  ENDOCRINE: Denies excessive thirst  Denies intolerance to heat or cold  PSYCHOSOCIAL: Denies depression or anxiety  Denies any recent memory loss       Past Medical History:   Diagnosis Date    Arthritis     Asthma     Back pain     Chronic pain disorder     hips into the legs    Colon polyp     DVT [facility]deep venous thrombosis) ([facility] [address] ) 2006    Gastric bypass status for obesity     yarelis en y    GERD (gastroesophageal reflux disease)     Hiatal hernia     History of transfusion 2006    after gastric bypass surgery, no reactions    Hypertension     Irritable bowel syndrome     Kidney stone     Muscle weakness     bilateral legs    Numbness and tingling     bilateral feet    Pneumonia     Spinal stenosis     Tinnitus     occassionally    Vertigo     Wears glasses       Past Surgical History:   Procedure Laterality Date    APPENDECTOMY      BACK SURGERY  06/18/2018    L4-5 fusion    BARIATRIC SURGERY      yarelis en y- pt states the procedure was done incorrectly which lead to additional surgeries from 2006   [address] Right      SECTION      x1   [address]      partial removal of the small bowel-necrosis-between -    COLONOSCOPY      COLONOSCOPY W/ BIOPSIES AND POLYPECTOMY      FLEXIBLE BRONCHOSCOPY W/ UPPER ENDOSCOPY      GASTRECTOMY      after gastric bypass-(failed surgery per pt) 2006    HERNIA REPAIR      incisional hernias-diaphragm area    [place] ARTHROCENTESIS ASPIR&/INJ SMALL JT/BURSA W/O US N/A 2018    Procedure: Coccygeal Injection & Ganglion Impar Block;  Surgeon: Tomás Zazueta MD;  Location: [facility] MAIN OR;  Service: Pain Management     [place] ARTHRODESIS POSTERIOR INTERBODY LUMBAR N/A 2018    Procedure: L4-5 DECOMPRESSION INTERBODY INSTRUMENTED FUSION;  Surgeon: Teresa Headley MD;  Location: [facility] Main OR;  Service: Orthopedics    [place] COLONOSCOPY FLX DX W/COLLJ [address] PFRMD N/A 2018    Procedure: COLONOSCOPY;  Surgeon: Armida Marroquin MD;  Location: [facility] GI LAB; Service: Gastroenterology    [place] ESOPHAGOGASTRODUODENOSCOPY TRANSORAL DIAGNOSTIC N/A 2018    Procedure: ESOPHAGOGASTRODUODENOSCOPY (EGD); Surgeon: Armida Marroquin MD;  Location: [facility] GI LAB; Service: Gastroenterology   [address]enn[address] Niece JOINT Right 2018    Procedure: Rt Sacroiliac Joint Injection;  Surgeon: Tomás Zazueta MD;  Location: [facility] MAIN OR;  Service: Pain Management     [place] INJECTION,SACROILIAC JOINT Right 2019    Procedure: Sacroiliac Joint Injection (15247);   Surgeon: Tomás Zazueta MD;  Location: [facility] MAIN OR;  Service: Pain Management     TONSILECTOMY, ADENOIDECTOMY, BILATERAL MYRINGOTOMY AND TUBES      TONSILLECTOMY       Social History     Socioeconomic History    Marital status: /Civil Union     Spouse name: Not on file    Number of children: Not on file    Years of education: Not on file    Highest education level: Not on file   Occupational History    Not on file   Social Needs    Financial resource strain: Not on file    Food insecurity     Worry: Not on file     Inability: Not on file    Transportation needs     Medical: Not on file     Non-medical: Not on file   Tobacco Use    Smoking status: Never Smoker    Smokeless tobacco: Never Used   Substance and Sexual Activity    Alcohol use: Yes     Alcohol/week: 7 0 standard drinks     Types: 5 Cans of beer, 2 Shots of liquor per week     Frequency: 4 or more times a week     Comment: socially    Drug use: Not Currently     Types: Marijuana     Comment: has medical marijuana card   lozenges    Sexual activity: Yes     Birth control/protection: Post-menopausal   Lifestyle    Physical activity     Days per week: Not on file     Minutes per session: Not on file    Stress: Not on file   Relationships    Social connections     Talks on phone: Not on file     Gets together: Not on file     Attends Church service: Not on file     Active member of club or organization: Not on file     Attends meetings of clubs or organizations: Not on file     Relationship status: Not on file    Intimate partner violence     Fear of current or ex partner: Not on file     Emotionally abused: Not on file     Physically abused: Not on file     Forced sexual activity: Not on file   Other Topics Concern    Not on file   Social History Narrative    Not on file     Family History   Problem Relation Age of Onset    Diabetes Mother     Aortic aneurysm Father     Cancer Father         prostate    Heart disease Sister         open heart    Cancer Sister         breast    Breast cancer Sister 47    Diabetes Brother     Cancer Brother         spinal cancer    Other Daughter         concussion syndrome from [address] Asperger's syndrome Son         high functioning    Diabetes Maternal Grandfather     Stroke Brother     Hypertension Brother     No Known Problems Maternal Aunt     No Known Problems Maternal Aunt     No Known Problems Paternal Aunt     No Known Problems Paternal Aunt     Colon cancer Maternal Uncle 61     Patient has no known allergies  Current Outpatient Medications   Medication Sig Dispense Refill    calcium carbonate (OS-BEAU) 1250 (500 Ca) MG chewable tablet Chew 1 tablet daily      cholecalciferol (VITAMIN D3) 400 units tablet Take 400 Units by mouth daily      dicyclomine (BENTYL) 10 mg capsule TAKE ONE CAPSULE BY MOUTH THREE TIMES A DAY AS NEEDED FOR FECAL URGENCY AND ABDOMINAL CRAMPING (GENERIC FOR BENTYL) 90 capsule 0    fluticasone (FLONASE) 50 mcg/act nasal spray 2 sprays into each nostril daily 16 g 3    lisinopril-hydrochlorothiazide (PRINZIDE,ZESTORETIC) 20-12 5 MG per tablet Take 1 tablet by mouth daily 90 tablet 1    meclizine (ANTIVERT) 25 mg tablet Take 1 tablet (25 mg total) by mouth 3 (three) times a day as needed for dizziness for up to 3 days 12 tablet 0    multivitamin (THERAGRAN) TABS Take 1 tablet by mouth daily      Omega-3 Fatty Acids (fish oil) 1,000 mg Take 1 capsule (1,000 mg total) by mouth 2 (two) times a day 60 capsule 6    traZODone (DESYREL) 50 mg tablet Take 50 mg by mouth daily at bedtime as needed        vitamin B-12 (CYANOCOBALAMIN) 50 MCG tablet Take 50 mcg by mouth daily       No current facility-administered medications for this visit  Blood pressure 134/96, pulse 99, temperature 97 8 °F (36 6 °C), height 5' 3" (1 6 m), weight 113 kg (249 lb)      PHYSICAL EXAM:      General Appearance:   Alert, cooperative, no distress, appears stated age    HEENT:   Normocephalic, atraumatic, anicteric      Neck:  Supple, symmetrical, trachea midline, no adenopathy;    thyroid: no enlargement/tenderness/nodules; no carotid  bruit or JVD    Lungs:   Clear to auscultation bilaterally; no rales, rhonchi or wheezing; respirations unlabored    Heart[de-identified]   S1 and S2 normal; regular rate and rhythm; no murmur, rub, or gallop  Abdomen:   Soft, non-tender, non-distended; normal bowel sounds; no masses, no organomegaly    Extremities: No edema, erythema, wounds, rashes   Rectal:   Deferred                      Lab Results   Component Value Date    WBC 9 23 05/01/2021    HGB 10 7 (L) 05/01/2021    HCT 35 5 05/01/2021    MCV 97 05/01/2021     05/01/2021     Lab Results   Component Value Date    CALCIUM 8 6 05/01/2021    K 3 6 05/01/2021    CO2 22 05/01/2021     05/01/2021    BUN 14 05/24/2021    CREATININE 0 92 05/24/2021     Lab Results   Component Value Date    ALT 43 05/01/2021    AST 31 05/01/2021    ALKPHOS 82 05/01/2021     Lab Results   Component Value Date    INR 0 95 07/23/2018    PROTIME 10 0 07/23/2018       Cta Head And Neck With And Without Contrast    Result Date: 5/1/2021  Impression: No acute intracranial abnormality  No significant carotid or vertebral artery stenosis  No focal intracranial stenosis or aneurysm  Workstation performed: [id number]     Ct Abdomen Pelvis With Contrast    Result Date: 5/1/2021  Impression: No acute pathology  Colonic diverticulosis without diverticulitis  Workstation performed: [id number]       ASSESSMENT & PLAN:    Irritable bowel syndrome with diarrhea  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    Recently had extensive workup including EGD, upper GI series, CT scan    - as patient is reporting good control of symptoms with her current regimen, will continue Bentyl once daily    - may continue off PPI therapy for now    - advised patient to let us know if she has any recurrences of acid reflux/heartburn, difficulty swallowing, chest/abdominal pains    - patient also requested if we could obtain records from her previous bariatric surgeon as these were requested by her weight Management Clinic, who could not request the records themselves for unclear reasons, will try to get records from her gastric bypass at [facility] in [address] from 2005 by Dr Raji Batres, and revision by Dr Iliana Tadeo at [place] the same year    - if she does have recurrence or worsening of diarrhea, we can consider starting pancreatic enzyme supplement, as were stool fat was noted to be mildly elevated    - she may continue to see improvement of her symptoms as she continues with success at her weight loss efforts

## 2021-06-04 NOTE — ASSESSMENT & PLAN NOTE
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    Recently had extensive workup including EGD, upper GI series, CT scan    - as patient is reporting good control of symptoms with her current regimen, will continue Bentyl once daily    - may continue off PPI therapy for now    - advised patient to let us know if she has any recurrences of acid reflux/heartburn, difficulty swallowing, chest/abdominal pains    - patient also requested if we could obtain records from her previous bariatric surgeon as these were requested by her weight Management Clinic, who could not request the records themselves for unclear reasons, will try to get records from her gastric bypass at [facility] in [address] from 2005 by Dr Shirley Dsozua, and revision by Dr Rik Boone at [place] the same year    - if she does have recurrence or worsening of diarrhea, we can consider starting pancreatic enzyme supplement, as were stool fat was noted to be mildly elevated    - she may continue to see improvement of her symptoms as she continues with success at her weight loss efforts

## 2021-06-04 NOTE — PROGRESS NOTES
Daily Note: Vestibular     Today's date: 2021  Patient name: Lizzette Bean  : 1955  MRN: [id number]  Referring provider: JEANNE Jung  Dx:   Encounter Diagnosis     ICD-10-CM    1  Dizziness  R42        Start Time: 805  Stop Time: 845  Total time in clinic (min): 40 minutes    Subjective: Pt reports /10 dizziness upon arrival today  She reports overall that she is doing well      Objective: See treatment diary below      VOR x 1: self selected speed as long as target is clear and in focus, 2x ea 90 seconds  H: 4/10: 7/10   V: 4/10 :10    FTEC foam pad:   30 sec hold x 1   1 minute x3    Gait with head turns  Side to side: 5/10   Up and down: 5/10     VOR cx: plain surround, 15 reps x2   H: 4/10, 6/10  V: 4/10, 6/10    Side stepping on foam  -5 laps no UE       Assessment: Patient tolerated treatment well  Patient demonstrates good recovery from [facility] after performing for 90 seconds when moving in the horizontal direction  Patient is more challenged with horizontal versus vertical VOR1 at this time  Patient demonstrated improved static balance with progression of 1 minute with standing FTEC, but continues to illustrate substantial lateral and anterior sway  Patient demonstrated improved tolerance to ambulation with HN and HT and reported reduced symptoms as compared to last sessison  Patient also reports substantial LE numbness, which could be a factor with her static balance and ambulation tolerance  Patient was most limited by back pain versus "dizziness" symptoms today  She will benefit from skilled PT services to reduce her dizziness and improve her overall balance  Plan: Continue per plan of care        Precautions: Standard, Hx of lumbar spine fusion 2018, failed gastric bypass surgery, GERD, Elevated surface please        Manuals    B LAD  5' with mob belt 5' with mob belt                             Neuro Re-Ed        TrA progression 3sx10 with cuff 3sx10 with cuff 3sx10 with cuff 3sx10 with cuff 3sx10 with cuff   Glute sets  10 3sx20 3sx20 3sx20 3sx20   Hip add squeeze  3sx20 3sx20 3sx20 3sx20 3sx20   Supine clamshells 20 GTB 20 GTB 20 GTB 20 GTB 20 GTB   Supine marches   10 B 10B 10 B                   Ther Ex        NuStep warmup        LTR 20 20 20 20 21   LAQ 3sx10 B  3sx10 B with DF     SLR        SAQ        Seated lumbar flexion  3sx10 3 way  3sx10 3 way  3sx10 3 way  3sx10 3 way  3sx10 3 way    SKTC  3x10s B  3x10s B      Piriformis stretch   3x10s B 3x10s B     Sit to stand with mob belt     2x5                                           Modalities        Heat pre 10' 10' post

## 2021-06-07 ENCOUNTER — APPOINTMENT (OUTPATIENT)
Dept: PHYSICAL THERAPY | Facility: CLINIC | Age: 66
End: 2021-06-07
Payer: COMMERCIAL

## 2021-06-08 ENCOUNTER — OFFICE VISIT (OUTPATIENT)
Dept: PHYSICAL THERAPY | Facility: CLINIC | Age: 66
End: 2021-06-08
Payer: COMMERCIAL

## 2021-06-08 DIAGNOSIS — M54.12 CERVICAL RADICULOPATHY: ICD-10-CM

## 2021-06-08 DIAGNOSIS — M53.3 SACROILIAC PAIN: ICD-10-CM

## 2021-06-08 DIAGNOSIS — M54.16 LUMBAR RADICULOPATHY: Primary | ICD-10-CM

## 2021-06-08 DIAGNOSIS — M54.50 LUMBAR SPINE PAIN: ICD-10-CM

## 2021-06-08 DIAGNOSIS — R42 DIZZINESS: Primary | ICD-10-CM

## 2021-06-08 DIAGNOSIS — Z98.1 HISTORY OF FUSION OF LUMBAR SPINE: ICD-10-CM

## 2021-06-08 PROCEDURE — 97110 THERAPEUTIC EXERCISES: CPT

## 2021-06-08 PROCEDURE — 97112 NEUROMUSCULAR REEDUCATION: CPT | Performed by: PHYSICAL THERAPIST

## 2021-06-08 NOTE — PROGRESS NOTES
Daily Note: Vestibular     Today's date: 2021  Patient name: Meghan Swift  : 1955  MRN: [id number]  Referring provider: JEANNE Whittaker  Dx:   Encounter Diagnosis     ICD-10-CM    1  Dizziness  R42                   Subjective: Pt reports from her orthopedic treatment, reports her current dizziness is "not too bad"      Objective: See treatment diary below      VOR x 1: self selected speed as long as target is clear and in focus, 2x ea 90 seconds  H: 6/10: 5/10   V: 4/10 :6/10    FTEC foam pad:   1 minute x3    Gait with head turns (4 laps)  Side to side: 5/10   Up and down: 5/10     VOR cx: plain surround, 15 reps   H: 4/10  V: 4/10    Side stepping on foam  -5 laps no UE       Assessment: Patient tolerated treatment well  She reported reduced cervical tightness from her treatment session  Her dizziness increased to 6/10 at most while doing VOR x1 horizontally and reduced with seated rest break  She displays min sway during FTEC w/ foam, but is able to maintain balance independently  She shows slight veering during gait with head turns, and was limited in reps by her hip pain  She will benefit from skilled PT services to reduce her dizziness and improve her overall balance  Plan: Continue per plan of care        Precautions: Standard, Hx of lumbar spine fusion 2018, failed gastric bypass surgery, GERD, Elevated surface please        Manuals    B LAD  5' with mob belt 5' with mob belt                             Neuro Re-Ed        TrA progression 3sx10 with cuff 3sx10 with cuff 3sx10 with cuff 3sx10 with cuff 3sx10 with cuff   Glute sets  10 3sx20 3sx20 3sx20 3sx20   Hip add squeeze  3sx20 3sx20 3sx20 3sx20 3sx20   Supine clamshells 20 GTB 20 GTB 20 GTB 20 GTB 20 GTB   Supine marches   10 B 10B 10 B                   Ther Ex        NuStep warmup        LTR 20 20 20 20 20   LAQ 3sx10 B  3sx10 B with DF     SLR        SAQ        Seated lumbar flexion 3sx10 3 way  3sx10 3 way  3sx10 3 way  3sx10 3 way  3sx10 3 way    SKTC  3x10s B  3x10s B      Piriformis stretch   3x10s B 3x10s B     Sit to stand with mob belt     2x5                                           Modalities        Heat pre 10' 10' post

## 2021-06-08 NOTE — LETTER
Lana 10, 2021    Diana Diggs, [address] [facility] [address]    Patient: Ann Ty   YOB: 1955   Date of Visit: 2021     Encounter Diagnosis     ICD-10-CM    1  Lumbar radiculopathy  M54 16    2  History of fusion of lumbar spine  Z98 1    3  Sacroiliac pain  M53 3    4  Lumbar spine pain  M54 5    5  Cervical radiculopathy  M54 12        Dear Dr Karolina Kinney: Thank you for your recent referral of Ann Ty  Please review the attached evaluation summary from Fariba's recent visit  Please verify that you agree with the plan of care by signing the attached order  If you have any questions or concerns, please do not hesitate to call  I sincerely appreciate the opportunity to share in the care of one of your patients and hope to have another opportunity to work with you in the near future  Sincerely,    Jona Simpson, PT      Referring Provider:      I certify that I have read the below Plan of Care and certify the need for these services furnished under this plan of treatment while under my care  JACINTO Diaz  [place] [address]  [place]          PT Re-Evaluation     Today's date: 2021  Patient name: Ann Ty  :   MRN: [id number]  Referring provider: EJANNE Mendoza  Dx:   Encounter Diagnosis     ICD-10-CM    1  Lumbar radiculopathy  M54 16    2  History of fusion of lumbar spine  Z98 1    3  Sacroiliac pain  M53 3    4  Lumbar spine pain  M54 5    5  Cervical radiculopathy  M54 12        Start Time: 1700  Stop Time: 1745  Total time in clinic (min): 45 minutes    Assessment  Assessment details: Ann Ty is a 63y o  year old female reports to physical therapy with symptoms consistent with referring diagnosis of: History of fusion of lumbar spine  (primary encounter diagnosis), Sacroiliac pain, Lumbar spine pain  Patient reports cervical spine pain over the course of the last several months, contributed to possible cervical radiculopathy  Additional diagnoses added for further treatment of conditions  Patient demonstrates slight improvements in lumbar spine ROM, funciton, and core musculature strength  Movement based program implemented, as she does not demonstrate directional preference in the lumbar spine  Upon examination of her cervical spine, patient demonstrates difficulties with performing full motion due to stiffness and pain  Directional preference of repeated retraction, with centralization at end of assessment noted  Patient demonstrates limitations in lumbar spine ROM, strength, and functional mobility  Patient is limited in the following functional activities: riding her horse and motorcycle, ascending/descending stairs, walking at a brisk pace, looking over shoulder when driving, looking at the ceiling, sleeping through the night, and 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  Patient requires skilled physical therapy to restore prior level of function, address functional limitations, and progress towards independence with home exercise program  Patient was educated on HEP as noted on flow sheet, nature of lumbar spine pain, and importance of performing heat on lumbar spine at the end of the day to maximize benefits of PT  Patient verbalized and demonstrated understanding of HEP and plan of care  Primary focus of PT is to maximize core and hip musculature strength for independence in community  Symptom irritability: moderateBarriers to therapy: Due to chronic nature of lumbar spine pain, and diffuse nature of lumbar spine pain, patient's tolerance and progress towards goals may be limited at this time  Goals  STGs to be achieved in 4 weeks     -STG 1: Patient will be independent with HEP  -MET  -STG 2: Patient will have 0/10 lumbar spine pain at rest  -NOT MET  -STG 3: Patient will increase lumbar spine ROM to within normal limits  -NOT MET  -STG 4: Patient will report 40% functional improvement  -NOT MET  -STG 5: Patient will demonstrate 1/2 grade MMT improvement in R LE  -NOT MET  -STG 6: Patient will be able to stand for greater than 30 minutes with no pain  -NOT MET    LTGs to be achieved in 8 weeks  -LTG 1: Patient will demonstrate independence with maintenance program  -NOT MET  -LTG 2: Patient will perform all functional activities with less than 2/10 lumbar spine pain  -NOT MET   -LTG 3: Patient will improve FOTO score by 10 points  -NOT MET  -LTG 4: Patient will report 80% functional improvement  -NOT MET  -LTG 5: Patient will be able to stand for greater than 60 minutes with no pain  -NOT MET  -LTG 6: Patient will demonstrate 1 grade MMT improvement in R LE  -NOT MET  -LTG 7: Patient will be able to mount and dismount horse and motorcycle with no difficulties  -NOT MET    Plan  Patient would benefit from: PT eval and skilled physical therapy  Planned modality interventions: TENS, thermotherapy: hydrocollator packs, cryotherapy, electrical stimulation/Russian stimulation, traction and ultrasound  Planned therapy interventions: manual therapy, joint mobilization, massage, Garcia taping, ADL retraining, activity modification, ADL training, abdominal trunk stabilization, balance, neuromuscular re-education, patient education, postural training, strengthening, stretching, therapeutic activities, therapeutic exercise, therapeutic training, flexibility, functional ROM exercises, gait training, graded activity, graded exercise, graded motor and home exercise program  Frequency: 2x week  Duration in weeks: 8        Subjective Evaluation    History of Present Illness  Date of surgery: 6/18/2018  Mechanism of injury: Patient reports history of lumbar spine fusion on 6/18/18  She reports that her lumbar spine pain was at [facility] when she returned to work as a    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  She rides horses and her [employer] [address], but notes difficulties mounting her horse and mounting the motorcycle  She reports lumbar spine pain that radiates across her low back 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  Functionally, she notes difficulties with ascending/descending stairs without AD  She has inability to perform a heel raise  She has difficulties driving due to lumbar spine pain  She reports numbness and tingling down the posterior aspect of B legs to her heels  She reports per tingling is when she is standing  When she sits down, her symptoms are relieved  She was denied recent MRI  Patient recently received medical marijuana card to help with pain levels as she would not like to take pain medication at this time  21: Patient reports 6/10 lumbar spine pain today  She feels as though her pain has improved since starting PT, with ease of movement  She would like to be evaluated for her neck pain, as this is causing her difficulties when driving, looking at the ceiling, and performing overhead tasks  She is currently seeing PT for her dizziness  Pain  Current pain ratin  At best pain ratin  At worst pain ratin  Quality: needle-like and pressure  Relieving factors: rest and ice  Aggravating factors: standing and stair climbing  Progression: no change    Social Support  Steps to enter house: yes  Stairs in house: yes   Lives in: multiple-level home  Lives with: significant other    Employment status: working  Treatments  Previous treatment: physical therapy  Current treatment: medication  Patient Goals  Patient goals for therapy: increased strength, return to sport/leisure activities and decreased pain  Patient goal: Walk at a normal pace, ride horse and motorcycle with no difficulties  Objective     Concurrent Complaints  Positive for disturbed sleep   Negative for night pain, bladder dysfunction and bowel dysfunction    Postural Observations  Seated posture: good  Standing posture: fair  Correction of posture: makes symptoms better        Palpation   Left   Tenderness of the erector spinae, cervical paraspinals, lumbar paraspinals, suboccipitals and upper trapezius  Right   Tenderness of the erector spinae, cervical paraspinals, lumbar paraspinals, suboccipitals and upper trapezius  Trigger point to upper trapezius  Tenderness   Cervical Spine   Tenderness in the spinous process (C6-C7)  Lumbar Spine  Tenderness in the spinous process (T12-L5)  Left Hip   Tenderness in the PSIS  Right Hip   Tenderness in the PSIS  Neurological Testing     Sensation   Cervical/Thoracic   Left   Intact: light touch    Right   Intact: light touch    Lumbar   Left   Intact: light touch    Right   Intact: light touch    Reflexes   Left   Patellar (L4): normal (2+)    Right   Patellar (L4): normal (2+)    Strength/Myotome Testing   Cervical Spine     Left   Normal strength    Right   Normal strength    Lumbar   Left   Normal strength    Right Hip   Planes of Motion   Flexion: 4-  Abduction: 4-    Right Knee   Flexion: 4-    Right Ankle/Foot   Dorsiflexion: 4-  Plantar flexion: 4-    Additional Strength Details  Patient unable to heel walk or toe walk due to functional weakness  Unable to perform heel raises due to pain  Muscle Activation     Additional Muscle Activation Details  Poor activation of TrA    Tests     Lumbar   Positive SIJ compression and sacroiliac distraction   Left   Positive crossed SLR, passive SLR, quadrant and slump test      Right   Positive crossed SLR, passive SLR, quadrant and slump test      Left Pelvic Girdle/Sacrum   Positive: active SLR test      Right Pelvic Girdle/Sacrum   Positive: active SLR test      Left Hip   Positive KAROLINA and FADIR  Right Hip   Positive KAROLINA and FADIR       Ambulation     Ambulation: Stairs   Ascend stairs: independent  Pattern: non-reciprocal  Railings: two rails  Descend stairs: independent  Pattern: non-reciprocal  Railings: two rails    Observational Gait   Gait: antalgic   Decreased walking speed  Quality of Movement During Gait     Pelvis    Pelvis (Left): Positive Trendelenburg  Pelvis (Right): Positive Trendelenburg  Additional Quality of Movement During Gait Details  Lacks proper toe off gait pattern bilaterally       General Comments:      Lumbar Comments  Lumbar AROM    Flexion: 100%, radicular to toes, (+) Andreas's sign    Extension: 100%, decreased    R SB: 50%, sharp pain    L SB: 50%, sharp pain    R rotation: 25%, sharp pain on R   L rotation: 25%, sharp pain on R    Repeated motion testing    Repeated flexion in standing: increased, worse    Repeated flexion in sitting: decreased, better    Repeated extension in standing: decreased, no better    Prone lying: increased, worse    Prone on elbows: increased, worse    Repeated R side gliding: decreased, no better    Repeated L side gliding: NT    Cervical/Thoracic Comments  Cervical AROM   Flexion: 100%    Extension: 75%    R rotation: 25%    L rotation: 25%    R side bendin%    L side bendin%     Repeated motions testing:    Repeated retractions: centralising, worse     Repeated protractions: NT              Precautions: Standard, Hx of lumbar spine fusion 2018, failed gastric bypass surgery, GERD, Elevated surface please      Manuals  68  62   B LAD        Cervical distraction         Trigger point release R UT                Neuro Re-Ed        TrA progression  3sx10 with cuff 3sx10 with cuff 3sx10 with cuff -   Glute sets  5sx20 3sx20 3sx20 3sx20 5 s x 20   Hip add squeeze  5sx30 3sx20 3sx20 3sx20 5s x 20   Supine clamshells  20 GTB 20 GTB 20 GTB    Supine marches 3x10 B 10B 10 B 10 B x10 B   Supine leg ext with TrA x15 L LE x15 L LE  10 B x15 B           Ther Ex SI nerve glides 2x10 reps SI nerve glides 2x10 reps   SI Nerve glides 2 x10 reps   NuStep warmup        LTR 20 20 20 20    LAQ SLR        SAQ        Seated lumbar flexion  3sx10 3 way  3sx10 3 way  3sx10 3 way  3sx10 3 way  3 s x 10 3 way   SKTC 3x10s B  3x10s B       Piriformis stretch  3x10s B 3x10s B       Sit to stand with mob belt   2x5 2x5    TB row     10 RTB 2x10 BTB   TB shoulder ext     10 RTB 2x10 BTB    Paloff press    10 RTB 2x10 BTB   Hamstring stretch With SOS supine x 10 hold 20 ea With SOS supine x 10 hold 20 ea      Cervical retraction   10      Modalities        Heat pre

## 2021-06-08 NOTE — PROGRESS NOTES
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

## 2021-06-08 NOTE — PROGRESS NOTES
PT Re-Evaluation     Today's date: 2021  Patient name: Alena Najjar  : [id number]  MRN: [id number]  Referring provider: JEANNE Perez  Dx:   Encounter Diagnosis     ICD-10-CM    1  Lumbar radiculopathy  M54 16    2  History of fusion of lumbar spine  Z98 1    3  Sacroiliac pain  M53 3    4  Lumbar spine pain  M54 5    5  Cervical radiculopathy  M54 12        Start Time: 1700  Stop Time: 1745  Total time in clinic (min): 45 minutes    Assessment  Assessment details: Alena Najjar is a 63y o  year old female reports to physical therapy with symptoms consistent with referring diagnosis of: History of fusion of lumbar spine  (primary encounter diagnosis), Sacroiliac pain, Lumbar spine pain  Patient reports cervical spine pain over the course of the last several months, contributed to possible cervical radiculopathy  Additional diagnoses added for further treatment of conditions  Patient demonstrates slight improvements in lumbar spine ROM, funciton, and core musculature strength  Movement based program implemented, as she does not demonstrate directional preference in the lumbar spine  Upon examination of her cervical spine, patient demonstrates difficulties with performing full motion due to stiffness and pain  Directional preference of repeated retraction, with centralization at end of assessment noted  Patient demonstrates limitations in lumbar spine ROM, strength, and functional mobility  Patient is limited in the following functional activities: riding her horse and motorcycle, ascending/descending stairs, walking at a brisk pace, looking over shoulder when driving, looking at the ceiling, sleeping through the night, and 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       Patient requires skilled physical therapy to restore prior level of function, address functional limitations, and progress towards independence with home exercise program  Patient was educated on HEP as noted on flow sheet, nature of lumbar spine pain, and importance of performing heat on lumbar spine at the end of the day to maximize benefits of PT  Patient verbalized and demonstrated understanding of HEP and plan of care  Primary focus of PT is to maximize core and hip musculature strength for independence in community  Symptom irritability: moderateBarriers to therapy: Due to chronic nature of lumbar spine pain, and diffuse nature of lumbar spine pain, patient's tolerance and progress towards goals may be limited at this time  Goals  STGs to be achieved in 4 weeks  -STG 1: Patient will be independent with HEP  -MET  -STG 2: Patient will have 0/10 lumbar spine pain at rest  -NOT MET  -STG 3: Patient will increase lumbar spine ROM to within normal limits  -NOT MET  -STG 4: Patient will report 40% functional improvement  -NOT MET  -STG 5: Patient will demonstrate 1/2 grade MMT improvement in R LE  -NOT MET  -STG 6: Patient will be able to stand for greater than 30 minutes with no pain  -NOT MET    LTGs to be achieved in 8 weeks  -LTG 1: Patient will demonstrate independence with maintenance program  -NOT MET  -LTG 2: Patient will perform all functional activities with less than 2/10 lumbar spine pain  -NOT MET   -LTG 3: Patient will improve FOTO score by 10 points  -NOT MET  -LTG 4: Patient will report 80% functional improvement  -NOT MET  -LTG 5: Patient will be able to stand for greater than 60 minutes with no pain  -NOT MET  -LTG 6: Patient will demonstrate 1 grade MMT improvement in R LE  -NOT MET  -LTG 7: Patient will be able to mount and dismount horse and motorcycle with no difficulties   -NOT MET    Plan  Patient would benefit from: PT eval and skilled physical therapy  Planned modality interventions: TENS, thermotherapy: hydrocollator packs, cryotherapy, electrical stimulation/Russian stimulation, traction and ultrasound  Planned therapy interventions: manual therapy, joint mobilization, massage, Garcia taping, ADL retraining, activity modification, ADL training, abdominal trunk stabilization, balance, neuromuscular re-education, patient education, postural training, strengthening, stretching, therapeutic activities, therapeutic exercise, therapeutic training, flexibility, functional ROM exercises, gait training, graded activity, graded exercise, graded motor and home exercise program  Frequency: 2x week  Duration in weeks: 8        Subjective Evaluation    History of Present Illness  Date of surgery: 2018  Mechanism of injury: Patient reports history of lumbar spine fusion on 18  She reports that her lumbar spine pain was at [facility]94 when she returned to work as a   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  She rides horses and her [employer], but notes difficulties mounting her horse and mounting the motorcycle  She reports lumbar spine pain that radiates across her low back 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  Functionally, she notes difficulties with ascending/descending stairs without AD  She has inability to perform a heel raise  She has difficulties driving due to lumbar spine pain  She reports numbness and tingling down the posterior aspect of B legs to her heels  She reports per tingling is when she is standing  When she sits down, her symptoms are relieved  She was denied recent MRI  Patient recently received medical marijuana card to help with pain levels as she would not like to take pain medication at this time  21: Patient reports 6/10 lumbar spine pain today  She feels as though her pain has improved since starting PT, with ease of movement  She would like to be evaluated for her neck pain, as this is causing her difficulties when driving, looking at the ceiling, and performing overhead tasks  She is currently seeing PT for her dizziness     Pain  Current pain ratin  At best pain ratin  At worst pain ratin  Quality: needle-like and pressure  Relieving factors: rest and ice  Aggravating factors: standing and stair climbing  Progression: no change    Social Support  Steps to enter house: yes  Stairs in house: yes   Lives in: multiple-level home  Lives with: significant other    Employment status: working  Treatments  Previous treatment: physical therapy  Current treatment: medication  Patient Goals  Patient goals for therapy: increased strength, return to sport/leisure activities and decreased pain  Patient goal: Walk at a normal pace, ride horse and motorcycle with no difficulties  Objective     Concurrent Complaints  Positive for disturbed sleep  Negative for night pain, bladder dysfunction and bowel dysfunction    Postural Observations  Seated posture: good  Standing posture: fair  Correction of posture: makes symptoms better        Palpation   Left   Tenderness of the erector spinae, cervical paraspinals, lumbar paraspinals, suboccipitals and upper trapezius  Right   Tenderness of the erector spinae, cervical paraspinals, lumbar paraspinals, suboccipitals and upper trapezius  Trigger point to upper trapezius  Tenderness   Cervical Spine   Tenderness in the spinous process (C6-C7)  Lumbar Spine  Tenderness in the spinous process (T12-L5)  Left Hip   Tenderness in the PSIS  Right Hip   Tenderness in the PSIS       Neurological Testing     Sensation   Cervical/Thoracic   Left   Intact: light touch    Right   Intact: light touch    Lumbar   Left   Intact: light touch    Right   Intact: light touch    Reflexes   Left   Patellar (L4): normal (2+)    Right   Patellar (L4): normal (2+)    Strength/Myotome Testing   Cervical Spine     Left   Normal strength    Right   Normal strength    Lumbar   Left   Normal strength    Right Hip   Planes of Motion   Flexion: 4-  Abduction: 4-    Right Knee   Flexion: 4-    Right Ankle/Foot   Dorsiflexion: 4-  Plantar flexion: 4-    Additional Strength Details  Patient unable to heel walk or toe walk due to functional weakness  Unable to perform heel raises due to pain  Muscle Activation     Additional Muscle Activation Details  Poor activation of TrA    Tests     Lumbar   Positive SIJ compression and sacroiliac distraction   Left   Positive crossed SLR, passive SLR, quadrant and slump test      Right   Positive crossed SLR, passive SLR, quadrant and slump test      Left Pelvic Girdle/Sacrum   Positive: active SLR test      Right Pelvic Girdle/Sacrum   Positive: active SLR test      Left Hip   Positive KAROLINA and FADIR  Right Hip   Positive KAROLINA and FADIR  Ambulation     Ambulation: Stairs   Ascend stairs: independent  Pattern: non-reciprocal  Railings: two rails  Descend stairs: independent  Pattern: non-reciprocal  Railings: two rails    Observational Gait   Gait: antalgic   Decreased walking speed  Quality of Movement During Gait     Pelvis    Pelvis (Left): Positive Trendelenburg  Pelvis (Right): Positive Trendelenburg  Additional Quality of Movement During Gait Details  Lacks proper toe off gait pattern bilaterally       General Comments:      Lumbar Comments  Lumbar AROM    Flexion: 100%, radicular to toes, (+) [place]'s sign    Extension: 100%, decreased    R SB: 50%, sharp pain    L SB: 50%, sharp pain    R rotation: 25%, sharp pain on R   L rotation: 25%, sharp pain on R    Repeated motion testing    Repeated flexion in standing: increased, worse    Repeated flexion in sitting: decreased, better    Repeated extension in standing: decreased, no better    Prone lying: increased, worse    Prone on elbows: increased, worse    Repeated R side gliding: decreased, no better    Repeated L side gliding: NT    Cervical/Thoracic Comments  Cervical AROM   Flexion: 100%    Extension: 75%    R rotation: 25%    L rotation: 25%    R side bendin%    L side bendin%     Repeated motions testing:    Repeated retractions: centralising, worse     Repeated protractions: NT              Precautions: Standard, Hx of lumbar spine fusion 2018, failed gastric bypass surgery, GERD, Elevated surface please      Manuals 6/4 6/8 5/21 5/24 6/2   B LAD        Cervical distraction         Trigger point release R UT                Neuro Re-Ed        TrA progression  3sx10 with cuff 3sx10 with cuff 3sx10 with cuff -   Glute sets  5sx20 3sx20 3sx20 3sx20 5 s x 20   Hip add squeeze  5sx30 3sx20 3sx20 3sx20 5s x 20   Supine clamshells  20 GTB 20 GTB 20 GTB    Supine marches 3x10 B 10B 10 B 10 B x10 B   Supine leg ext with TrA x15 L LE x15 L LE  10 B x15 B           Ther Ex SI nerve glides 2x10 reps SI nerve glides 2x10 reps   SI Nerve glides 2 x10 reps   NuStep warmup        LTR 20 20 20 20    LAQ        SLR        SAQ        Seated lumbar flexion  3sx10 3 way  3sx10 3 way  3sx10 3 way  3sx10 3 way  3 s x 10 3 way   SKTC 3x10s B  3x10s B       Piriformis stretch  3x10s B 3x10s B       Sit to stand with mob belt   2x5 2x5    TB row     10 RTB 2x10 BTB   TB shoulder ext     10 RTB 2x10 BTB    Paloff press    10 RTB 2x10 BTB   Hamstring stretch With SOS supine x 10 hold 20 ea With SOS supine x 10 hold 20 ea      Cervical retraction   10      Modalities        Heat pre

## 2021-06-10 ENCOUNTER — TRANSCRIBE ORDERS (OUTPATIENT)
Dept: PHYSICAL THERAPY | Facility: CLINIC | Age: 66
End: 2021-06-10

## 2021-06-10 ENCOUNTER — OFFICE VISIT (OUTPATIENT)
Dept: PHYSICAL THERAPY | Facility: CLINIC | Age: 66
End: 2021-06-10
Payer: COMMERCIAL

## 2021-06-10 DIAGNOSIS — M54.16 LUMBAR RADICULOPATHY: Primary | ICD-10-CM

## 2021-06-10 DIAGNOSIS — M54.50 LUMBAR SPINE PAIN: ICD-10-CM

## 2021-06-10 DIAGNOSIS — M54.12 CERVICAL RADICULOPATHY: ICD-10-CM

## 2021-06-10 DIAGNOSIS — Z98.1 HISTORY OF FUSION OF LUMBAR SPINE: ICD-10-CM

## 2021-06-10 DIAGNOSIS — R42 DIZZINESS: Primary | ICD-10-CM

## 2021-06-10 DIAGNOSIS — M53.3 SACROILIAC PAIN: ICD-10-CM

## 2021-06-10 DIAGNOSIS — M54.50 LUMBAR SPINE PAIN: Primary | ICD-10-CM

## 2021-06-10 DIAGNOSIS — M54.12 BRACHIAL NEURITIS: ICD-10-CM

## 2021-06-10 PROCEDURE — 97112 NEUROMUSCULAR REEDUCATION: CPT

## 2021-06-10 PROCEDURE — 97112 NEUROMUSCULAR REEDUCATION: CPT | Performed by: PHYSICAL THERAPIST

## 2021-06-10 PROCEDURE — 97110 THERAPEUTIC EXERCISES: CPT | Performed by: PHYSICAL THERAPIST

## 2021-06-10 NOTE — PROGRESS NOTES
Daily Note     Today's date: 6/10/2021  Patient name: Escobar Campoverde  :   MRN: [id number]  Referring provider: JEANNE Thapa  Dx:   Encounter Diagnosis     ICD-10-CM    1  Lumbar spine pain  M54 5    2  Cervical radiculopathy  M54 12        Subjective: Patient reports 7-8/10 cervical spine and 5-6/10 lumbar spine pain today  "And no matter what position I am in for sleep the pain wakes me up"  Pt states she has MRI of lumbar spine on   Pt states she will call MD to inquire about MRI of cervical spine while she is getting MRI of lumbar spine  Educated pt re: cervical roll for sleeping  Objective: See treatment diary below    Assessment: Tolerated treatment well  Patient reporting sciatic pain R LE  Pt states new sciatic nerve glides assisted in decreasing c/o pain  Pt able to progress with supine mat therex as indicated on flow sheet  Patient would benefit from continued PT to maximize function for independence in community  Median nerve glides assisting with decreasing pain/tightness R UT/ R UE region  Cervical retraction with cervical roll assisting to decrease c/o cervical tightness  Pt purchased cervical roll for sleeping  Will f/u with pt sleep tolerance NV with new cervical roll  Educated pt re: back buddy for self- TP release  Plan: Continue per plan of care  Continue as per primary PT        Precautions: Standard, Hx of lumbar spine fusion 2018, failed gastric bypass surgery, GERD, Elevated surface please        Manuals 6/4 6/8 6/10 5/24 6/2   B LAD        Cervical distraction         Trigger point release R UT           R Median nerve glides x 7 min      Neuro Re-Ed        TrA progression  3sx10 with cuff  3sx10 with cuff -   Glute sets  5sx20 3sx20 3sx20 3sx20 5 s x 20   Hip add squeeze  5sx30 3sx20 3sx20 3sx20 5s x 20   Supine clamshells  20 GTB 20  20 GTB    Supine marches 3x10 B 10B 10 B 10 B x10 B   Supine leg ext with TrA x15 L LE x15 L LE  10 B x15 B Cervical retraction 2x5 hold 5 with cervical roll     Ther Ex SI nerve glides 2x10 reps SI nerve glides 2x10 reps SI nerve glides 2x10  SI Nerve glides 2 x10 reps   NuStep warmup        LTR 20 20 20 20    LAQ        SLR        SAQ        Seated lumbar flexion  3sx10 3 way  3sx10 3 way  3sx10 3 way  3sx10 3 way  3 s x 10 3 way   SKTC 3x10s B  3x10s B       Piriformis stretch  3x10s B 3x10s B       Sit to stand with mob belt    2x5    TB row     10 RTB 2x10 BTB   TB shoulder ext     10 RTB 2x10 BTB    Paloff press    10 RTB 2x10 BTB   Hamstring stretch With SOS supine x 10 hold 20 ea With SOS supine x 10 hold 20 ea      Cervical retraction   10      Modalities        Heat pre

## 2021-06-10 NOTE — PROGRESS NOTES
Daily Note: Vestibular     Today's date: 6/10/2021  Patient name: Jami Ness  : 1955  MRN: [id number]  Referring provider: JEANNE Regan  Dx:   Encounter Diagnosis     ICD-10-CM    1  Dizziness  R42                   Subjective: Patient reports to PT today with 10 dizziness and noted "I'm not sleeping well so it's making things a little worse today "      Objective: See treatment diary below      VOR x 1: self selected speed as long as target is clear and in focus, 2x ea 90 seconds  H: 6/10 dizziness: 5/10  V: 6/10 dizziness: 6/10    FTEC foam pad:   1 minute x3    Gait with head turns (4 laps)  Side to side: 6/10   Up and down: 5/10     VOR cx: plain surround, 15 reps   H: 6/10  V: 4/10    Side stepping on foam  -5 laps no UE       Assessment: Patient able to tolerate treatment session well today with continuation of exercise program due to exacerbation of symptoms today  Quick recovery noted between exercises to allow for symptoms reduction to baseline  Instability noted with dynamic exercises with fair self correction and UE support  She will continue to benefit from skilled outpatient PT in order to reduce her dizziness and improve her overall balance  Plan: Continue per plan of care        Precautions: Standard, Hx of lumbar spine fusion 2018, failed gastric bypass surgery, GERD, Elevated surface please        Manuals    B LAD  5' with mob belt 5' with mob belt                             Neuro Re-Ed        TrA progression 3sx10 with cuff 3sx10 with cuff 3sx10 with cuff 3sx10 with cuff 3sx10 with cuff   Glute sets  10 3sx20 3sx20 3sx20 3sx20   Hip add squeeze  3sx20 3sx20 3sx20 3sx20 3sx20   Supine clamshells 20 GTB 20 GTB 20 GTB 20 GTB 20 GTB   Supine marches   10 B 10B 10 B                   Ther Ex        NuStep warmup        LTR 20 20 20 20 20   LAQ 3sx10 B  3sx10 B with DF     SLR        SAQ        Seated lumbar flexion  3sx10 3 way  3sx10 3 way 3sx10 3 way  3sx10 3 way  3sx10 3 way    SKTC  3x10s B  3x10s B      Piriformis stretch   3x10s B 3x10s B     Sit to stand with mob belt     2x5                                           Modalities        Heat pre 10' 10' post

## 2021-06-11 ENCOUNTER — TELEPHONE (OUTPATIENT)
Dept: OBGYN CLINIC | Facility: HOSPITAL | Age: 66
End: 2021-06-11

## 2021-06-11 NOTE — TELEPHONE ENCOUNTER
Patient is having MRI on 6/17  She is wondering if she can be seen before that time so they would have time to add the MRI to her appt  Is this something you can see for evaluation or does this have to see Dr Shane Robles?

## 2021-06-11 NOTE — TELEPHONE ENCOUNTER
Can be seen by either but patient coming in before her MRI appointment does not mean she will get MRI cervical spine that day  It has to be approved with insurance, and MRI would need to have a slot available

## 2021-06-11 NOTE — TELEPHONE ENCOUNTER
Patient sees Dr Karina Castro    Patient called in asking if the cervical region can be added to the MRI- pt stating that shes having increased pain after having PT in this area   Thank you      [phone]

## 2021-06-14 ENCOUNTER — APPOINTMENT (OUTPATIENT)
Dept: PHYSICAL THERAPY | Facility: CLINIC | Age: 66
End: 2021-06-14
Payer: COMMERCIAL

## 2021-06-14 ENCOUNTER — OFFICE VISIT (OUTPATIENT)
Dept: PHYSICAL THERAPY | Facility: CLINIC | Age: 66
End: 2021-06-14
Payer: COMMERCIAL

## 2021-06-14 DIAGNOSIS — M53.3 SACROILIAC PAIN: ICD-10-CM

## 2021-06-14 DIAGNOSIS — M54.16 LUMBAR RADICULOPATHY: ICD-10-CM

## 2021-06-14 DIAGNOSIS — M54.12 CERVICAL RADICULOPATHY: ICD-10-CM

## 2021-06-14 DIAGNOSIS — R42 DIZZINESS: Primary | ICD-10-CM

## 2021-06-14 DIAGNOSIS — Z98.1 HISTORY OF FUSION OF LUMBAR SPINE: ICD-10-CM

## 2021-06-14 DIAGNOSIS — M54.50 LUMBAR SPINE PAIN: Primary | ICD-10-CM

## 2021-06-14 PROCEDURE — 97110 THERAPEUTIC EXERCISES: CPT

## 2021-06-14 PROCEDURE — 97112 NEUROMUSCULAR REEDUCATION: CPT

## 2021-06-14 PROCEDURE — 97112 NEUROMUSCULAR REEDUCATION: CPT | Performed by: PHYSICAL THERAPIST

## 2021-06-14 NOTE — PROGRESS NOTES
Daily Note: Vestibular     Today's date: 2021  Patient name: Escobar Campoverde  : 1955  MRN: [id number]  Referring provider: JEANNE Thapa  Dx:   Encounter Diagnosis     ICD-10-CM    1  Dizziness  R42                   Subjective: Patient reports to PT today with 3/10 dizziness       Objective: See treatment diary below      VOR x 1: self selected speed as long as target is clear and in focus, 2x ea 90 seconds  H: 6/10 dizziness: 5/10  V: 4/10 dizziness: /10      Gait with head turns (4 laps)  Side to side: 7/10   Up and down: 5/10     VOR cx: plain surround, 20 reps   H: 5/10, 6/10  V: 5/10, 5/10    Step ups: 10x, 2 sets      Assessment: Patient tolerated treatment fairly  She reported increased neck pain and dizziness upon arrival of 3/10  Her dizziness increased to 7/10 at most, both with VOR x1 vertical and gait w/ head turns horizontal, which decreased to baseline 3/10 with standing rest break  Step ups added today to focus on balance while ascending/descending stairs and increasing RLE use  She will continue to benefit from skilled PT services to reduce her dizziness and improve her overall balance  Plan: Continue per plan of care        Precautions: Standard, Hx of lumbar spine fusion 2018, failed gastric bypass surgery, GERD, Elevated surface please        Manuals    B LAD  5' with mob belt 5' with mob belt                             Neuro Re-Ed        TrA progression 3sx10 with cuff 3sx10 with cuff 3sx10 with cuff 3sx10 with cuff 3sx10 with cuff   Glute sets  10 3sx20 3sx20 3sx20 3sx20   Hip add squeeze  3sx20 3sx20 3sx20 3sx20 3sx20   Supine clamshells 20 GTB 20 GTB 20 GTB 20 GTB 20 GTB   Supine marches   10 B 10B 10 B                   Ther Ex        NuStep warmup        LTR 20 20 20 20 20   LAQ 3sx10 B  3sx10 B with DF     SLR        SAQ        Seated lumbar flexion  3sx10 3 way  3sx10 3 way  3sx10 3 way  3sx10 3 way  3sx10 3 way    SKTC  3x10s B  3x10s B      Piriformis stretch   3x10s B 3x10s B     Sit to stand with mob belt     2x5                                           Modalities        Heat pre 10' 10' post

## 2021-06-14 NOTE — PROGRESS NOTES
Daily Note     Today's date: 2021  Patient name: Cindy Herzog  : 2059  MRN: [id number]  Referring provider: JEANNE Torres  Dx:   Encounter Diagnosis     ICD-10-CM    1  Lumbar spine pain  M54 5    2  Cervical radiculopathy  M54 12    3  Lumbar radiculopathy  M54 16    4  History of fusion of lumbar spine  Z98 1    5  Sacroiliac pain  M53 3        Start Time: 1400  Stop Time: 6442  Total time in clinic (min): 45 minutes    Subjective: Patient reports 8/10 cervical spine and lumbar spine pain today  She is having a difficult time sleeping due to pain in her cervical spine  She is performing her exercises at home, with no relief  Objective: See treatment diary below      Assessment: Tolerated treatment poor  Patient noted centralization of symptoms with repeated thoracic extension with cervical extension  Provided exercise for HEP to continue centralization of symptoms in cervical spine  Severe tenderness to palpation of thoracic and cervical paraspinals noted during passive treatment  Patient would benefit from continued PT to maximize function for independence in community  Plan: Continue per plan of care        Precautions: Standard, Hx of lumbar spine fusion 2018, failed gastric bypass surgery, GERD, Elevated surface please        Manuals 6/4 6/8 6/10 6/14 6/2   B LAD        Cervical distraction         Trigger point release R UT           R Median nerve glides x 7 min      Neuro Re-Ed        TrA progression  3sx10 with cuff  3sx10 with cuff -   Glute sets  5sx20 3sx20 3sx20 3sx20 5 s x 20   Hip add squeeze  5sx30 3sx20 3sx20 3sx20 5s x 20   Supine clamshells  20 GTB 20  20 GTB    Supine marches 3x10 B 10B 10 B 10 B x10 B   Supine leg ext with TrA x15 L LE x15 L LE  10 B x15 B      Cervical retraction 2x5 hold 5 with cervical roll Cervical retraction 2x5 hold 5 with cervical roll    Ther Ex         SI nerve glides 2x10 reps SI nerve glides 2x10 reps SI nerve glides 2x10 SI nerve glides 2x10 SI Nerve glides 2 x10 reps   NuStep warmup        LTR 20 20 20 20    Seated lumbar flexion  3sx10 3 way  3sx10 3 way  3sx10 3 way  3sx10 3 way  3 s x 10 3 way   SKTC 3x10s B  3x10s B   3x10s B     Piriformis stretch  3x10s B 3x10s B   3x10s B     Sit to stand with mob belt        TB row      2x10 BTB   TB shoulder ext      2x10 BTB    Paloff press     2x10 BTB   Hamstring stretch With SOS supine x 10 hold 20 ea With SOS supine x 10 hold 20 ea  With SOS supine x 10 hold 20 ea    Cervical retraction   10      Modalities        Heat pre

## 2021-06-16 ENCOUNTER — OFFICE VISIT (OUTPATIENT)
Dept: AUDIOLOGY | Facility: CLINIC | Age: 66
End: 2021-06-16
Payer: COMMERCIAL

## 2021-06-16 ENCOUNTER — OFFICE VISIT (OUTPATIENT)
Dept: OTOLARYNGOLOGY | Facility: CLINIC | Age: 66
End: 2021-06-16
Payer: COMMERCIAL

## 2021-06-16 VITALS — BODY MASS INDEX: 44.3 KG/M2 | WEIGHT: 250 LBS | HEIGHT: 63 IN | TEMPERATURE: 98.6 F

## 2021-06-16 DIAGNOSIS — H93.13 TINNITUS OF BOTH EARS: ICD-10-CM

## 2021-06-16 DIAGNOSIS — M54.2 NECK PAIN: ICD-10-CM

## 2021-06-16 DIAGNOSIS — M79.18 MYOFASCIAL PAIN SYNDROME: ICD-10-CM

## 2021-06-16 DIAGNOSIS — H90.5 SENSORY HEARING LOSS: Primary | ICD-10-CM

## 2021-06-16 DIAGNOSIS — R42 VERTIGO: Primary | ICD-10-CM

## 2021-06-16 DIAGNOSIS — E55.9 VITAMIN D DEFICIENCY: ICD-10-CM

## 2021-06-16 DIAGNOSIS — R53.83 FATIGUE, UNSPECIFIED TYPE: ICD-10-CM

## 2021-06-16 DIAGNOSIS — G89.4 CHRONIC PAIN SYNDROME: ICD-10-CM
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
## 2021-06-16 PROCEDURE — 92567 TYMPANOMETRY: CPT | Performed by: AUDIOLOGIST

## 2021-06-16 PROCEDURE — 99244 OFF/OP CNSLTJ NEW/EST MOD 40: CPT | Performed by: NURSE PRACTITIONER

## 2021-06-16 PROCEDURE — 3008F BODY MASS INDEX DOCD: CPT | Performed by: PHYSICIAN ASSISTANT

## 2021-06-16 PROCEDURE — 92557 COMPREHENSIVE HEARING TEST: CPT | Performed by: AUDIOLOGIST
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
## 2021-06-16 NOTE — ASSESSMENT & PLAN NOTE
Tick found lying on pt right arm during visit  Recommend lab for Lyme disease due to recurrent tick exposure on farm
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
## 2021-06-17 ENCOUNTER — APPOINTMENT (OUTPATIENT)
Dept: PHYSICAL THERAPY | Facility: CLINIC | Age: 66
End: 2021-06-17
Payer: COMMERCIAL

## 2021-06-17 ENCOUNTER — HOSPITAL ENCOUNTER (OUTPATIENT)
Dept: RADIOLOGY | Facility: HOSPITAL | Age: 66
Discharge: HOME | End: 2021-06-17
Payer: COMMERCIAL

## 2021-06-17 DIAGNOSIS — Z98.1 STATUS POST LUMBAR SPINAL FUSION: ICD-10-CM

## 2021-06-17 DIAGNOSIS — M48.061 SPINAL STENOSIS OF LUMBAR REGION WITHOUT NEUROGENIC CLAUDICATION: ICD-10-CM

## 2021-06-17 PROBLEM — H93.13 BILATERAL TINNITUS: Status: ACTIVE | Noted: 2021-06-17

## 2021-06-17 PROCEDURE — A9585 GADOBUTROL INJECTION: HCPCS | Performed by: PHYSICIAN ASSISTANT

## 2021-06-17 PROCEDURE — G1004 CDSM NDSC: HCPCS

## 2021-06-17 PROCEDURE — 72158 MRI LUMBAR SPINE W/O & W/DYE: CPT

## 2021-06-17 RX ADMIN — GADOBUTROL 11 ML: 604.72 INJECTION INTRAVENOUS at 11:43

## 2021-06-17 NOTE — ASSESSMENT & PLAN NOTE
Reviewed symptoms including imbalance, tinnitus, fatigue, neck pain  Discussed possible causes of vertigo including neurology, cardiac, autoimmune, Otitis media, sinusitis, and inner ear concerns  Suggest audiogram today to further evaluate bilateral ears, unable to complete due to insurance to rule out otitis media and meniere's disease  Audiogram today indicating bilateral borderline high frequency SNHL, right more than left, Tymps type A bilaterally, good word discrimination  Treatment options include at home epley's, oral steroids, lab studies, vestibular therapy,  VNG testing, neurology consultation, MRI brain with IAC  After discussion agreed to lab studies and VNG testing  Continue vestibular PT with additional myofascial treatment     Follow up in 6 to 8 weeks

## 2021-06-18 ENCOUNTER — OFFICE VISIT (OUTPATIENT)
Dept: PHYSICAL THERAPY | Facility: CLINIC | Age: 66
End: 2021-06-18
Payer: COMMERCIAL

## 2021-06-18 DIAGNOSIS — M54.12 CERVICAL RADICULOPATHY: ICD-10-CM

## 2021-06-18 DIAGNOSIS — M54.16 LUMBAR RADICULOPATHY: ICD-10-CM

## 2021-06-18 DIAGNOSIS — M53.3 SACROILIAC PAIN: ICD-10-CM

## 2021-06-18 DIAGNOSIS — R42 DIZZINESS: Primary | ICD-10-CM

## 2021-06-18 DIAGNOSIS — M54.50 LUMBAR SPINE PAIN: Primary | ICD-10-CM

## 2021-06-18 DIAGNOSIS — Z98.1 HISTORY OF FUSION OF LUMBAR SPINE: ICD-10-CM

## 2021-06-18 PROCEDURE — 97110 THERAPEUTIC EXERCISES: CPT

## 2021-06-18 PROCEDURE — 97112 NEUROMUSCULAR REEDUCATION: CPT | Performed by: PHYSICAL THERAPIST

## 2021-06-18 PROCEDURE — 97112 NEUROMUSCULAR REEDUCATION: CPT

## 2021-06-18 NOTE — PROGRESS NOTES
Daily Note     Today's date: 2021  Patient name: Alena Najjar  :   MRN: [id number]  Referring provider: JEANNE Perez  Dx:   Encounter Diagnosis     ICD-10-CM    1  Lumbar spine pain  M54 5    2  Cervical radiculopathy  M54 12    3  Lumbar radiculopathy  M54 16    4  History of fusion of lumbar spine  Z98 1    5  Sacroiliac pain  M53 3        Start Time: 845  Stop Time: 930  Total time in clinic (min): 45 minutes    Subjective: Patient states that that she was walking up the stairs and noticed that she has to use her UEs t pull herself up due to weakness and pain  She states that that she was having a hard time sleeping due to her cervical spine pain  She is sleeping with her cervical pillow and performing her repeated retractions, which has dissipated her pain to 6/10 cervical spine pain  Objective: See treatment diary below      Assessment: Tolerated treatment well  Patient demonstrates difficulties with performing step ups due to weakness and pain of R LE  Implementing functional activities today  Cervical distraction helps dissipate pain  Lateral glides of cervical spine helped improve cervical ROM by 25%  Patient would benefit from continued PT to maximize function for independence in community  Plan: Continue per plan of care        Precautions: Standard, Hx of lumbar spine fusion 2018, failed gastric bypass surgery, GERD, Elevated surface please        Manuals 6/4 6/8 6/10 6/14 6/18   Cervical mobs     AR   Cervical distraction      AR   Trigger point release R UT           R Median nerve glides x 7 min      Neuro Re-Ed        TrA progression  3sx10 with cuff  3sx10 with cuff    Glute sets  5sx20 3sx20 3sx20 3sx20    Hip add squeeze  5sx30 3sx20 3sx20 3sx20 3sx20   Supine clamshells  20 GTB 20  20 GTB 20 GTB   Supine marches 3x10 B 10B 10 B 10 B 10 B   Supine leg ext with TrA x15 L LE x15 L LE  10 B 10 B      Cervical retraction 2x5 hold 5 with cervical roll Cervical retraction 2x5 hold 5 with cervical roll    Ther Ex         SI nerve glides 2x10 reps SI nerve glides 2x10 reps SI nerve glides 2x10 SI nerve glides 2x10    Step ups and step taps     20 ea 4"    LTR 20 20 20 20    Seated lumbar flexion  3sx10 3 way  3sx10 3 way  3sx10 3 way  3sx10 3 way  3sx10 3 way    SKTC 3x10s B  3x10s B   3x10s B     Piriformis stretch  3x10s B 3x10s B   3x10s B     Side stepping at // bars     4x10'   Hamstring stretch With SOS supine x 10 hold 20 ea With SOS supine x 10 hold 20 ea  With SOS supine x 10 hold 20 ea    Self cervical distraction     10    Cervical retraction   10   10   Modalities        Heat pre

## 2021-06-18 NOTE — PROGRESS NOTES
Daily Note: Vestibular     Today's date: 2021  Patient name: Elvira Arias  : 1955  MRN: [id number]  Referring provider: JEANNE Carrasquillo  Dx:   Encounter Diagnosis     ICD-10-CM    1  Dizziness  R42                   Subjective: Patient reports to PT today with 3/10 dizziness       Objective: See treatment diary below      VOR x 1: self selected speed as long as target is clear and in focus, 2x ea 90 seconds  H: 410 dizziness: 5/10  V: 2/10 dizziness: 4/10    Gait with head turns (4 laps)  Side to side: 5/10   Up and down: 5/10     VOR cx: plain surround, 20 reps   H: 4/10, 510  V: 4/10, 410    Step ups: 10x, from foam to 8" step    Tandem ambulation: 10 ft, 5 laps      Assessment: Patient tolerated treatment well  She states her ENT and audiology visit went well and that she is getting tested for Lyme disease  She displays overall reduction with dizziness that she rates 5/10 at most  Patient challenged with her step ups, requiring occasional UE assist and reporting increased RLE pain  She had 2 LOB to left side during gait w/ head turns, but was able to regain balance independently with 1 step  She will benefit from skilled PT services to reduce her dizziness and improve her overall balance  Plan: Continue per plan of care        Precautions: Standard, Hx of lumbar spine fusion 2018, failed gastric bypass surgery, GERD, Elevated surface please        Manuals  5   B LAD  5' with mob belt 5' with mob belt                             Neuro Re-Ed        TrA progression 3sx10 with cuff 3sx10 with cuff 3sx10 with cuff 3sx10 with cuff 3sx10 with cuff   Glute sets  10 3sx20 3sx20 3sx20 3sx20   Hip add squeeze  3sx20 3sx20 3sx20 3sx20 3sx20   Supine clamshells 20 GTB 20 GTB 20 GTB 20 GTB 20 GTB   Supine marches   10 B 10B 10 B                   Ther Ex        NuStep warmup        LTR 20 20 20 20 20   LAQ 3sx10 B  3sx10 B with DF     SLR        SAQ        Seated lumbar flexion  3sx10 3 way  3sx10 3 way  3sx10 3 way  3sx10 3 way  3sx10 3 way    SKTC  3x10s B  3x10s B      Piriformis stretch   3x10s B 3x10s B     Sit to stand with mob belt     2x5                                           Modalities        Heat pre 10' 10' post

## 2021-06-21 ENCOUNTER — OFFICE VISIT (OUTPATIENT)
Dept: PHYSICAL THERAPY | Facility: CLINIC | Age: 66
End: 2021-06-21
Payer: COMMERCIAL

## 2021-06-21 DIAGNOSIS — R42 DIZZINESS: Primary | ICD-10-CM

## 2021-06-21 DIAGNOSIS — Z98.1 HISTORY OF FUSION OF LUMBAR SPINE: ICD-10-CM

## 2021-06-21 DIAGNOSIS — M53.3 SACROILIAC PAIN: ICD-10-CM

## 2021-06-21 DIAGNOSIS — M54.16 LUMBAR RADICULOPATHY: ICD-10-CM

## 2021-06-21 DIAGNOSIS — M54.50 LUMBAR SPINE PAIN: Primary | ICD-10-CM

## 2021-06-21 DIAGNOSIS — M54.12 CERVICAL RADICULOPATHY: ICD-10-CM

## 2021-06-21 PROCEDURE — 97112 NEUROMUSCULAR REEDUCATION: CPT

## 2021-06-21 PROCEDURE — 97110 THERAPEUTIC EXERCISES: CPT

## 2021-06-21 PROCEDURE — 97112 NEUROMUSCULAR REEDUCATION: CPT | Performed by: PHYSICAL THERAPIST

## 2021-06-21 NOTE — PROGRESS NOTES
Daily Note     Today's date: 2021  Patient name: Ann Ty  :   MRN: [id number]  Referring provider: Maddie Ewing DO  Dx:   Encounter Diagnosis     ICD-10-CM    1  Lumbar spine pain  M54 5    2  Cervical radiculopathy  M54 12    3  Lumbar radiculopathy  M54 16    4  History of fusion of lumbar spine  Z98 1    5  Sacroiliac pain  M53 3        Start Time: 1100  Stop Time: 1145  Total time in clinic (min): 45 minutes    Subjective: Patient states that she came in with minimal pain today  After performing her head turns and trunk rotations in vestibular rehab, she notes that her pain was increased  She reports 6/10 pain in her cervical spine today  She is sleeping better throughout the night when using her cervical pillow  Objective: See treatment diary below      Assessment: Tolerated treatment well  Patient pain decreased with cervical distraction and repeated lumbar flexion  Cervical rotation ROM improved 25% post tx  Encouraged to perform active cervical ROM at home to maximize function  Verbalized understanding  Patient would benefit from continued PT      Plan: Continue per plan of care        Precautions: Standard, Hx of lumbar spine fusion 2018, failed gastric bypass surgery, GERD, Elevated surface please        Manuals 6/8 6/10 6/14 6/18 6/21   Cervical mobs    AR AR   Cervical distraction     AR AR   Trigger point release R UT     AR     R Median nerve glides x 7 min       Neuro Re-Ed        TrA progression 3sx10 with cuff  3sx10 with cuff     Glute sets  3sx20 3sx20 3sx20     Hip add squeeze  3sx20 3sx20 3sx20 3sx20 3sx20   Supine clamshells 20 GTB 20  20 GTB 20 GTB 20 GTB   Supine marches 10B 10 B 10 B 10 B 10 B   Supine leg ext with TrA x15 L LE  10 B 10 B 10B     Cervical retraction 2x5 hold 5 with cervical roll Cervical retraction 2x5 hold 5 with cervical roll  Cervical retraction 2x5 hold 5 with cervical roll   Ther Ex         SI nerve glides 2x10 reps SI nerve glides 2x10 SI nerve glides 2x10  SI nerve glides 2x10   Step ups and step taps    20 ea 4"  20 ea 4"    LTR 20 20 20     Seated lumbar flexion  3sx10 3 way  3sx10 3 way  3sx10 3 way  3sx10 3 way  3sx10 3 way    SKTC 3x10s B   3x10s B      Piriformis stretch  3x10s B   3x10s B      Side stepping at // bars    4x10' 4x10'   Hamstring stretch With SOS supine x 10 hold 20 ea  With SOS supine x 10 hold 20 ea  With SOS supine x 10 hold 20 ea   Self cervical distraction    10  10   Cervical retraction  10   10 10   Modalities        Heat pre

## 2021-06-21 NOTE — PROGRESS NOTES
Daily Note: Vestibular     Today's date: 2021  Patient name: Stacie Clement  :   MRN: [id number]  Referring provider: Ramírez Barnett DO  Dx:   Encounter Diagnosis     ICD-10-CM    1  Dizziness  R42                   Subjective: Patient reports to PT today with 3/10 dizziness       Objective: See treatment diary below      VOR x 1: 90 hz, 120 seconds; changed from self selected due to speed being too low  H: 4/10 dizziness:  100 hz: Dizziness: 5/10  V: 4/10 dizziness: 4/10    Gait with head turns (4 laps)  Side to side: /10   Up and down: /10     VOR cx: plain surround, 20 reps   H: 410, 5/10  V: 410, 4/10    Step ups: 10x, from foam to 8" step    Tandem ambulation: 10 ft, 5 laps      Assessment: Patient tolerated treatment well with progression of exercises this date with average dizziness of 3 to 5/10 with LOB only once during VOR cx  Recovery time of 60 seconds on average prior to starting next exercise  Decrease in veering with gait with head turns compared to last seen  She will benefit from skilled PT services to reduce her dizziness and improve her overall balance  Plan: Continue per plan of care    Re-Evaluation next session      Precautions: Standard, Hx of lumbar spine fusion 2018, failed gastric bypass surgery, GERD, Elevated surface please        Manuals    B LAD  5' with mob belt 5' with mob belt                             Neuro Re-Ed        TrA progression 3sx10 with cuff 3sx10 with cuff 3sx10 with cuff 3sx10 with cuff 3sx10 with cuff   Glute sets  10 3sx20 3sx20 3sx20 3sx20   Hip add squeeze  3sx20 3sx20 3sx20 3sx20 3sx20   Supine clamshells 20 GTB 20 GTB 20 GTB 20 GTB 20 GTB   Supine marches   10 B 10B 10 B                   Ther Ex        NuStep warmup        LTR 20 20 20 20 20   LAQ 3sx10 B  3sx10 B with DF     SLR        SAQ        Seated lumbar flexion  3sx10 3 way  3sx10 3 way  3sx10 3 way  3sx10 3 way  3sx10 3 way    SKTC  3x10s B 3x10s B      Piriformis stretch   3x10s B 3x10s B     Sit to stand with mob belt     2x5                                           Modalities        Heat pre 10' 10' post

## 2021-06-23 ENCOUNTER — OFFICE VISIT (OUTPATIENT)
Dept: OBGYN CLINIC | Facility: CLINIC | Age: 66
End: 2021-06-23
Payer: COMMERCIAL

## 2021-06-23 VITALS
SYSTOLIC BLOOD PRESSURE: 144 MMHG | BODY MASS INDEX: 44.3 KG/M2 | DIASTOLIC BLOOD PRESSURE: 87 MMHG | WEIGHT: 250 LBS | HEART RATE: 93 BPM | HEIGHT: 63 IN

## 2021-06-23 DIAGNOSIS — G89.4 CHRONIC PAIN SYNDROME: Primary | ICD-10-CM

## 2021-06-23 DIAGNOSIS — M47.816 LUMBAR SPONDYLOSIS: ICD-10-CM

## 2021-06-23 LAB
25(OH)D3+25(OH)D2 SERPL-MCNC: 32.2 NG/ML (ref 30–100)
B BURGDOR IGG+IGM SER-ACNC: <0.91 ISR (ref 0–0.9)
B BURGDOR IGM SER IA-ACNC: <0.8 INDEX (ref 0–0.79)
CRP SERPL-MCNC: 1 MG/L (ref 0–10)
ERYTHROCYTE [SEDIMENTATION RATE] IN BLOOD BY WESTERGREN METHOD: 10 MM/HR (ref 0–40)
T4 SERPL-MCNC: 6.5 UG/DL (ref 4.5–12)
TSH SERPL DL<=0.005 MIU/L-ACNC: 1.05 UIU/ML (ref 0.45–4.5)

## 2021-06-23 PROCEDURE — 3077F SYST BP >= 140 MM HG: CPT | Performed by: ORTHOPAEDIC SURGERY

## 2021-06-23 PROCEDURE — 1036F TOBACCO NON-USER: CPT | Performed by: ORTHOPAEDIC SURGERY

## 2021-06-23 PROCEDURE — 99213 OFFICE O/P EST LOW 20 MIN: CPT | Performed by: ORTHOPAEDIC SURGERY

## 2021-06-23 PROCEDURE — 3079F DIAST BP 80-89 MM HG: CPT | Performed by: ORTHOPAEDIC SURGERY

## 2021-06-23 PROCEDURE — 3008F BODY MASS INDEX DOCD: CPT | Performed by: ORTHOPAEDIC SURGERY

## 2021-06-23 NOTE — PROGRESS NOTES
Assessment:    Lumbar degenerative disc disease  History of Lumbar fusion, L4-5 done in 2018 (Dr Jaiden Hoyos)  Lumbar radiculopathy       Plan:    Overall patients symptoms are improving with therapy  MRI reviewed which shows some adjacent segment degeneration however with progress with PT no surgical intervention required at this time  Patient should continue therapy regimen and may follow up as needed         Problem List Items Addressed This Visit        Other    Spinal stenosis of lumbar region without neurogenic claudication - Primary    Status post lumbar spinal fusion                   Subjective:     Patient ID:  Marvin An is a 63 y o  female    HPI    80-year-old female presenting for follow-up  She was last seen about a month ago for lower back pain  She has a history of Lumbar fusion procedure done by Dr Jaiden Hoyos in 2018  Since her last visit she has been attending therapy 3x per week with significant improvement in her symptoms  She will still have occasional pain radiating to her legs however her low back pain is much better  The following portions of the patient's history were reviewed and updated as appropriate: allergies, current medications, past family history, past medical history, past social history, past surgical history and problem list     Review of Systems   Constitutional: Negative for chills, diaphoresis, fatigue and fever  Respiratory: Negative  Cardiovascular: Negative  Genitourinary: Negative for decreased urine volume and difficulty urinating  Musculoskeletal: Positive for arthralgias and back pain  Skin: Negative  Neurological: Positive for numbness  All other systems reviewed and are negative  Objective:    Imaging:  None      Vitals:    06/23/21 0909   BP: 144/87   Pulse: 93           Physical Exam  Vitals and nursing note reviewed  Constitutional:       General: She is not in acute distress  Appearance: Normal appearance   She is not ill-appearing, toxic-appearing or diaphoretic  HENT:      Head: Normocephalic and atraumatic  Eyes:      General:         Right eye: No discharge  Left eye: No discharge  Pulmonary:      Effort: Pulmonary effort is normal  No respiratory distress  Musculoskeletal:         General: No tenderness  Skin:     General: Skin is warm and dry  Neurological:      General: No focal deficit present  Mental Status: She is alert and oriented to person, place, and time  Motor: No weakness  Psychiatric:         Mood and Affect: Mood normal          Behavior: Behavior normal           No acute distress  Gait is without assistance  Old lumbar incision is well healed  Lumbosacral region nontender to palpation  Negative modified straight leg raise bilaterally  Strength is 5/5 L2-S1 bilaterally, sensation equal intact

## 2021-06-25 ENCOUNTER — OFFICE VISIT (OUTPATIENT)
Dept: PHYSICAL THERAPY | Facility: CLINIC | Age: 66
End: 2021-06-25
Payer: COMMERCIAL

## 2021-06-25 ENCOUNTER — EVALUATION (OUTPATIENT)
Dept: PHYSICAL THERAPY | Facility: CLINIC | Age: 66
End: 2021-06-25
Payer: COMMERCIAL

## 2021-06-25 DIAGNOSIS — M54.50 LUMBAR SPINE PAIN: Primary | ICD-10-CM

## 2021-06-25 DIAGNOSIS — Z98.1 HISTORY OF FUSION OF LUMBAR SPINE: ICD-10-CM

## 2021-06-25 DIAGNOSIS — R42 DIZZINESS: Primary | ICD-10-CM

## 2021-06-25 DIAGNOSIS — M54.12 CERVICAL RADICULOPATHY: ICD-10-CM

## 2021-06-25 DIAGNOSIS — M54.16 LUMBAR RADICULOPATHY: ICD-10-CM

## 2021-06-25 DIAGNOSIS — M53.3 SACROILIAC PAIN: ICD-10-CM

## 2021-06-25 PROCEDURE — 97112 NEUROMUSCULAR REEDUCATION: CPT | Performed by: PHYSICAL THERAPIST

## 2021-06-25 PROCEDURE — 97112 NEUROMUSCULAR REEDUCATION: CPT

## 2021-06-25 PROCEDURE — 97140 MANUAL THERAPY 1/> REGIONS: CPT

## 2021-06-25 PROCEDURE — 97110 THERAPEUTIC EXERCISES: CPT

## 2021-06-25 NOTE — PROGRESS NOTES
PT Re-Evaluation Progress Report   Today's date: 2021  Patient name: Manjeet Cuevas  : [birth year]  MRN: [id number]  Referring provider: Fe Estrada DO  Dx:   Encounter Diagnosis     ICD-10-CM    1  Dizziness  R42          Assessment  Assessment details: Patient is a 63 y o  Female who presents to skilled outpatient PT with swimming dizziness which started a few months ago  Pt continues to displays unremarkable with oculomotor  findings except: NPC, [place], VOR  CX and Head Thrust test on right side  Balance measures improved by [address] secondary to improvement in function  Patient is able to tolerate VOR for 2 minutes with plain surround, progressed to visual stimulating background with ability to tolerate 2 minutes this date  Patient will continue to  benefit from skilled PT for vestibular therapy to improve function back to dizzy free  Patient verbalized understanding of POC  Please contact me if you have any questions or recommendations  Thank you for the referral and the opportunity to share in [facility]'s care        Cut off score   All date taken from [facility] Neuro Section or Rehab Measures    DGI:  MDC for Vestibular Disorders: 4 points  [address] for Geriatrics/Community Dwelling Older Adults: 3 Points  Falls risk cut off: <19/24    FGA:  MCID: 4 points  Geriatrics/Community Dwelling Older Adults: </= 22/30 fall risk  Geriatrics/Community Dwelling Older Adults: </= 20/30 unexplained falls in the next 6 months  Parkinsons: </= 18/30 fall risk    mCTSIB (normed on ages 19-56, lower number is less sway or better static balance)  Eyes open firm surface (norm 0 21-0 48)  Eyes closed firm surface (norm 0 48-0 99)  Eyes open foam surface (norm 0 38-0 71)  Eyes closed foam surface (norm 0 70-2 22)    DHI:  0-39: low perception of handicap  40-69: moderate perception of handicap  : severe perception of handicap  > 60: increased risk for falls    Joint Position Error Testing (JPET):  > 4 5 degrees: abnormal joint proprioception        Impairments: abnormal coordination, abnormal gait, abnormal or restricted ROM, activity intolerance, impaired balance, lacks appropriate HEP, poor posture, poor body mechanics, pain with function, safety issue and abnormal movement  Understanding of Dx/Px/POC: good  Prognosis: good      Goals    Vestibular Short Term Goals:  - Patient will be independent with simple HEP MET  - Patient will tolerate 60 seconds of oculomotor exercises with minimal increase in symptoms MET  - Patient will demonstrate 10% decrease in symptom severity scoring with independent use of modalities MET  - Patient will be able to tolerate 30 seconds with eyes closed on foam surface without any loss of balance demonstrating improvement in vestibular system MET  - Patient will improve DGI score by 4 points per MDC to promote improved safety with dynamic tasks MET    Vestibular Long Term Goals:  - Patient will be independent with complex HEP  - Patient will tolerate >=2 minutes of oculomotor exercises to facilitate return to reading and computer work  - Patient will report >= 50% improvement on symptom severity scoring  - Patient will demonstrate ability to perform HT in gait without veering MET  - Patient will score low risk for falls with DGI test with score of 19/24 or higher per current research data MET  - Patient will score low risk for falls with FGA test with score of 23/30 or higher per current research data NOT ME  - Patient will report 2/10 dizziness or less with visual stimulating surround with duration of 2 minutes   - Patient will report subjective improvement to 90% or higher to promote return to PLOF    Plan  Plan details:   Planned modality interventions: TENS  Planned therapy interventions: balance, balance/WB training, breathing training, body mechanics training, coordination, flexibility, functional ROM exercises, gait training, HEP, Garcia Taping, motor coordination training, neuromuscular re-education, patient education, postural training, therapeutic activities and therapeutic exercises  Frequency: 2x/wk  Duration in weeks: 12  Plan of Care beginning date: 2021  Plan of Care expiration date: 12 weeks - 2021  Treatment plan discussed with: Patient and referring MD         Subjective Evaluation    History of Present Illness  Mechanism of injury: Pt reports a few months of dizziness notes having swimming sensation  Has noticed increase in ringing in the ears around the same time frame  Just started Meclizine for about a week  Education on taking Meclizine when needed to improve ability to perform physical activity  Notes increased frustration with hearing  Pt reports limited change in dizziness but feels more steady since starting VRT  Saw ENT who gav meds to help assist with fluid reduction in the ear which can cause pressure on the system it self  Pt notes being cleared for about a week since starting that medication and feels less fullness         Dizziness Subjective  How long does dizziness last: all day   How would you describe the dizziness: swimming/ swaying sensation  Rolling in bed: No  Supine to/from sit: No      Pain  Current pain rating: 3/10  At best pain rating: 3/10  At worst pain ratin/10  Location: low back   Aggravating factors: long duration standign     Social Support  Steps to enter house: 1  Stairs in house: 1   Lives in: 2 story home, stays on 1st floor   Lives with: Lives most alone; does have adult son at times to assist     Employment status: retired   Hand dominance: right     Treatments  Previous treatment: none  Current treatment: low back treatment   Diagnostic Testing: MRI pending       Objective     Vestibular Objective  Cervical Spine AROM:  - Flexion: WFL pain throughout range  - Extension: WFL no pain  - R Rotation: WFL pain throughout range  - L Rotation: WFL no pain  - R Lateral Flexion: WFL pain throughout range  - L Lateral Flexion: WFL no pain    Integrity Testing  - mVBI: denies any increase in dizziness   - Posture: moderate forward head   - Palpation: moderate hypertoxicity to C spine      Coordination Screen  - Dysmetria: intact   - Dysdiadochokinesia: Intact   - Alternating Toe Taps: Intact     Oculomotor Screen  - Baseline Symptoms: 2/10  - Baseline Observation: nA  - Gaze Holding Nystagmus: Normal Dizziness: baseline , Observation: normal   - Spontaneous Nystagmus Room Light: Normal Dizziness: Baseline Observation: normal   - Smooth Pursuits (central): Normal Dizziness: baseline, Observation: normal   - Near Point Convergence (normal: < 4"/10 cm - central): Abnormal Dizziness: baseline dizziness , Observation: distance 6 CM  - Saccades (central): Abnormal Dizziness: baseline,  Observation: slower speed with tracking   - VOR Screen (motion sensitivity): Normal Dizziness: 2/10, Observation: able to maintain focus   - VOR Cancel (central): Abnormal Dizziness: 4/10, Observation: decrease ability to maintain tracking   - Head Thrust (moderate to severe hypofunction): Abnormal Dizziness: 3/10, Observation: unable to maintain focus   - Dynamic Visual Acuity (2 Hz):   Static Head: 20/25 Line   Dynamic Head: 20/30 Line   Difference in number of lines: 1 (abnormal if 3 or >)      Outcome Measures Initial Eval  5/20/2021 [facility]  6/25/2021       mCTSIB  - FTEO (firm)  - FTEC (firm)  - FTEO (foam)  - FTEC (foam)   30 sec  12 sec  30 sec  2 sec   30 sec  30 sec  30 sec  30 sec        DGI 17/24 20/24       FGA         DHI                                                                  Daily Note:  VOR x 1: 90 hz, 120 seconds; changed from self selected due to speed being too low     H: 4/10 dizziness:  100 hz: Dizziness: 5/10  V: 4/10 dizziness: 4/10    Gait with head turns (4 laps)  Side to side: 4/10   Up and down: 4/10     VOR cx: plain surround, 20 reps   H: 4/10, 5/10  V: 4/10, 4/10    Step ups: 10x, from foam to 8" step    Tandem ambulation: 10 ft, 5 laps    Precautions: Fall risk, Tinnitus, Chronic pain in legs   Past Medical History:   Diagnosis Date    Arthritis     Asthma     Back pain     Chronic pain disorder     hips into the legs    Colon polyp     [facility]    Gastric bypass status for obesity     yarelis en y    GERD (gastroesophageal reflux disease)     Hiatal hernia     History of transfusion 2006    after gastric bypass surgery, no reactions    Hypertension     Irritable bowel syndrome     Kidney stone     Muscle weakness     bilateral legs    Numbness and tingling     bilateral feet    Pneumonia     Spinal stenosis     Tinnitus     occassionally    Vertigo     Wears glasses

## 2021-06-25 NOTE — PROGRESS NOTES
Daily Note     Today's date: 2021  Patient name: Meghan Swift  : [birth year]  MRN: [id number]  Referring provider: Jerson Saravia DO  Dx:   Encounter Diagnosis     ICD-10-CM    1  Lumbar spine pain  M54 5    2  Cervical radiculopathy  M54 12    3  Lumbar radiculopathy  M54 16    4  History of fusion of lumbar spine  Z98 1    5  Sacroiliac pain  M53 3        Start Time: 0845  Stop Time: 930  Total time in clinic (min): 45 minutes    Subjective: Patient reports 7/10 lumbar spine pain today  She reports having difficulties turning her cervical spine when driving due to stiffness  Objective: See treatment diary below      Assessment: Tolerated treatment well  Patient's pain dissipated in cervical spine with STM and mobilizations  Patient would benefit from continued PT to normalize gait and maximize function for independence in community  Plan: Continue per plan of care        Precautions: Standard, Hx of lumbar spine fusion 2018, failed gastric bypass surgery, GERD, Elevated surface please        Manuals 6/10 6/14 6/18 6/21 6/25   Cervical mobs   AR AR AR   Cervical distraction    AR AR AR   Trigger point release R UT    AR AR    R Median nerve glides x 7 min        Neuro Re-Ed        TrA progression  3sx10 with cuff      Glute sets  3sx20 3sx20      Hip add squeeze  3sx20 3sx20 3sx20 3sx20 3sx20   Supine clamshells 20  20 GTB 20 GTB 20 GTB    Supine marches 10 B 10 B 10 B 10 B 10 B   Supine leg ext with TrA  10 B 10 B 10B 10B    Cervical retraction 2x5 hold 5 with cervical roll Cervical retraction 2x5 hold 5 with cervical roll  Cervical retraction 2x5 hold 5 with cervical roll    Ther Ex         SI nerve glides 2x10 SI nerve glides 2x10  SI nerve glides 2x10    Step ups and step taps   20 ea 4"  20 ea 4"     LTR 20 20      Seated lumbar flexion  3sx10 3 way  3sx10 3 way  3sx10 3 way  3sx10 3 way  3sx10 3 way    SKTC  3x10s B    3x10s B    Piriformis stretch   3x10s B    3x10s B    Side stepping at // bars   4x10' 4x10'    Hamstring stretch  With SOS supine x 10 hold 20 ea  With SOS supine x 10 hold 20 ea With SOS supine x 10 hold 20 ea   Self cervical distraction   10  10 10   Cervical retraction    10 10 10   Modalities        Heat pre

## 2021-06-28 ENCOUNTER — TELEPHONE (OUTPATIENT)
Dept: OBGYN CLINIC | Facility: HOSPITAL | Age: 66
End: 2021-06-28

## 2021-06-28 ENCOUNTER — APPOINTMENT (OUTPATIENT)
Dept: PHYSICAL THERAPY | Facility: CLINIC | Age: 66
End: 2021-06-28
Payer: COMMERCIAL

## 2021-06-28 NOTE — TELEPHONE ENCOUNTER
Patient forgot to ask Dr Fercho Mckeon at the appt last week if she should continue PT and for how long? Please advise      Ainsley Leonard CF#[phone]

## 2021-06-29 ENCOUNTER — OFFICE VISIT (OUTPATIENT)
Dept: PHYSICAL THERAPY | Facility: CLINIC | Age: 66
End: 2021-06-29
Payer: COMMERCIAL

## 2021-06-29 DIAGNOSIS — R42 DIZZINESS: Primary | ICD-10-CM

## 2021-06-29 DIAGNOSIS — M54.50 LUMBAR SPINE PAIN: Primary | ICD-10-CM

## 2021-06-29 DIAGNOSIS — M54.16 LUMBAR RADICULOPATHY: ICD-10-CM

## 2021-06-29 DIAGNOSIS — M53.3 SACROILIAC PAIN: ICD-10-CM

## 2021-06-29 DIAGNOSIS — M54.12 CERVICAL RADICULOPATHY: ICD-10-CM

## 2021-06-29 DIAGNOSIS — Z98.1 HISTORY OF FUSION OF LUMBAR SPINE: ICD-10-CM

## 2021-06-29 PROCEDURE — 97112 NEUROMUSCULAR REEDUCATION: CPT | Performed by: PHYSICAL THERAPIST

## 2021-06-29 PROCEDURE — 97140 MANUAL THERAPY 1/> REGIONS: CPT | Performed by: PHYSICAL THERAPIST

## 2021-06-29 PROCEDURE — 97110 THERAPEUTIC EXERCISES: CPT | Performed by: PHYSICAL THERAPIST

## 2021-06-29 NOTE — TELEPHONE ENCOUNTER
Patient given above information and verbalized understanding  Renewed PT order for Cervical and lumbar needed  Thanks

## 2021-06-29 NOTE — PROGRESS NOTES
Daily Note: Vestibular  RE- 2021      Today's date: 2021  Patient name: Ruma Reyes  : [birth year]  MRN: [id number]  Referring provider: Dk Rogers DO  Dx:   Encounter Diagnosis     ICD-10-CM    1  Dizziness  R42                   Subjective: Patient reports from treatment, stating she is feeling pretty good today      Objective: See treatment diary below      VOR x 1: 100 hz, 120 seconds  H: 3/10 dizziness, 4/10 dizziness  V: 4/10 dizziness, 2nd trial held due to increased cervical pain    Gait with head turns (8 laps)  Side to side: 4/10       VOR cx: plain surround, 20 reps   H: 4/10, 5/10  V: 3/10, 4/10      Tandem ambulation: 10 ft, 3 laps      Assessment: Patient tolerated treatment well  Patient able to progress with gait and head turns, completing full 180 degree turn without stopping at midline  She displays slight increased veering but is able to maintain balance and does not report subjective increases in dizziness  She was limited with her VOR exercises in vertical direction due to cervical pain  She will benefit from skilled PT services to reduce her dizziness and improve her overall balance  Plan: Continue per plan of care         Precautions: Standard, Hx of lumbar spine fusion 2018, failed gastric bypass surgery, GERD, Elevated surface please        Manuals    B LAD  5' with mob belt 5' with mob belt                             Neuro Re-Ed        TrA progression 3sx10 with cuff 3sx10 with cuff 3sx10 with cuff 3sx10 with cuff 3sx10 with cuff   Glute sets  10 3sx20 3sx20 3sx20 3sx20   Hip add squeeze  3sx20 3sx20 3sx20 3sx20 3sx20   Supine clamshells 20 GTB 20 GTB 20 GTB 20 GTB 20 GTB   Supine marches   10 B 10B 10 B                   Ther Ex        NuStep warmup        LTR 20 20 20 20 20   LAQ 3sx10 B  3sx10 B with DF     SLR        SAQ        Seated lumbar flexion  3sx10 3 way  3sx10 3 way  3sx10 3 way  3sx10 3 way  3sx10 3 way    [facility] 3x10s B  3x10s B      Piriformis stretch   3x10s B 3x10s B     Sit to stand with mob belt     2x5                                           Modalities        Heat pre 10' 10' post

## 2021-06-29 NOTE — PROGRESS NOTES
Daily Note     Today's date: 2021  Patient name: Jami Ness  :   MRN: [id number]  Referring provider: Mei Bañuelos DO  Dx:   Encounter Diagnosis     ICD-10-CM    1  Lumbar spine pain  M54 5    2  Cervical radiculopathy  M54 12    3  Lumbar radiculopathy  M54 16    4  History of fusion of lumbar spine  Z98 1    5  Sacroiliac pain  M53 3                   Subjective: Pt reports she feels her neck is improving since initiation of strategies however stiffness perissts  She does feel her dizziness is slightly improved now that she is working on her neck  Pt feels piriformis stretch is the most effective stretch for her  Objective: See treatment diary below      Assessment: Tolerated treatment well  Patient able to demo inc cervical AROM post treatment and generally feels relief with today's session  Pt couraged to continue wtih HEP  Plan: Continue per plan of care  Next session pt will book out appts further        Precautions: Standard, Hx of lumbar spine fusion , failed gastric bypass surgery, GERD, Elevated surface please        Manuals    Cervical mobs  AR AR AR KW   Cervical distraction   AR AR AR KW   Trigger point release R UT   AR AR            Neuro Re-Ed        TrA progression 3sx10 with cuff       Glute sets  3sx20       Hip add squeeze  3sx20 3sx20 3sx20 3sx20 3s x20   Supine clamshells 20 GTB 20 GTB 20 GTB  20 Blue TB   Supine marches 10 B 10 B 10 B 10 B    Supine leg ext with TrA 10 B 10 B 10B 10B     Cervical retraction 2x5 hold 5 with cervical roll  Cervical retraction 2x5 hold 5 with cervical roll  10x sitting   Ther Ex         SI nerve glides 2x10  SI nerve glides 2x10  2x10   Step ups and step taps  20 ea 4"  20 ea 4"      LTR 20    20   Seated lumbar flexion  3sx10 3 way  3sx10 3 way  3sx10 3 way  3sx10 3 way     SKTC 3x10s B    3x10s B     Piriformis stretch  3x10s B    3x10s B  3x10s B   Side stepping at // bars  4x10' 4x10' Hamstring stretch With SOS supine x 10 hold 20 ea  With SOS supine x 10 hold 20 ea With SOS supine x 10 hold 20 ea x10 20s   Self cervical distraction  10  10 10 10   Cervical retraction   10 10 10 10, Cervical extension SNAG 10x   Modalities        Heat pre

## 2021-07-01 ENCOUNTER — OFFICE VISIT (OUTPATIENT)
Dept: PHYSICAL THERAPY | Facility: CLINIC | Age: 66
End: 2021-07-01
Payer: COMMERCIAL

## 2021-07-01 DIAGNOSIS — R42 DIZZINESS: Primary | ICD-10-CM

## 2021-07-01 DIAGNOSIS — Z98.1 HISTORY OF FUSION OF LUMBAR SPINE: ICD-10-CM

## 2021-07-01 DIAGNOSIS — M53.3 SACROILIAC PAIN: ICD-10-CM

## 2021-07-01 DIAGNOSIS — M54.12 CERVICAL RADICULOPATHY: ICD-10-CM

## 2021-07-01 DIAGNOSIS — M54.16 LUMBAR RADICULOPATHY: ICD-10-CM

## 2021-07-01 DIAGNOSIS — M54.50 LUMBAR SPINE PAIN: Primary | ICD-10-CM

## 2021-07-01 PROCEDURE — 97110 THERAPEUTIC EXERCISES: CPT

## 2021-07-01 PROCEDURE — 97112 NEUROMUSCULAR REEDUCATION: CPT | Performed by: PHYSICAL THERAPIST

## 2021-07-01 PROCEDURE — 97112 NEUROMUSCULAR REEDUCATION: CPT

## 2021-07-01 PROCEDURE — 97140 MANUAL THERAPY 1/> REGIONS: CPT

## 2021-07-01 NOTE — PROGRESS NOTES
Daily Note: Vestibular  RE- 2021      Today's date: 2021  Patient name: Edwige Brown  : [id number]  MRN: [id number]  Referring provider: Beatrice Pimentel DO  Dx:   Encounter Diagnosis     ICD-10-CM    1  Dizziness  R42                   Subjective: Patient reports from treatment, stating she is feeling pretty good today with a 2/10 dizziness  Sleep really well yesterday       Objective: See treatment diary below      VOR x 1: 100 hz, 120 seconds  H:  4/10 dizziness  V: 5/10 dizziness    Gait with head turns (8 laps)  Side to side: 4/10       VOR cx: plain surround, 20 reps   CW: 6/10,   CCW: 3/10,       Tandem ambulation: 10 ft, 3 laps      Assessment: Patient tolerated treatment well  Increase in symptoms of 4 to 5/10 on average with progression of exercises  Increased standing rest time for recovery to allow symptoms to reduce to baseline prior to next exercise  She will benefit from skilled PT services to reduce her dizziness and improve her overall balance  Plan: Continue per plan of care         Precautions: Standard, Hx of lumbar spine fusion 2018, failed gastric bypass surgery, GERD, Elevated surface please        Manuals    B LAD  5' with mob belt 5' with mob belt                             Neuro Re-Ed        TrA progression 3sx10 with cuff 3sx10 with cuff 3sx10 with cuff 3sx10 with cuff 3sx10 with cuff   Glute sets  10 3sx20 3sx20 3sx20 3sx20   Hip add squeeze  3sx20 3sx20 3sx20 3sx20 3sx20   Supine clamshells 20 GTB 20 GTB 20 GTB 20 GTB 20 GTB   Supine marches   10 B 10B 10 B                   Ther Ex        NuStep warmup        LTR 20 20 20 20 20   LAQ 3sx10 B  3sx10 B with DF     SLR        SAQ        Seated lumbar flexion  3sx10 3 way  3sx10 3 way  3sx10 3 way  3sx10 3 way  3sx10 3 way    SKTC  3x10s B  3x10s B      Piriformis stretch   3x10s B 3x10s B     Sit to stand with mob belt     2x5                                           Modalities Heat pre 10' 10' post

## 2021-07-01 NOTE — PROGRESS NOTES
Daily Note     Today's date: 2021  Patient name: Macie Black  :   MRN: [id number]  Referring provider: Lm Zapien DO  Dx:   Encounter Diagnosis     ICD-10-CM    1  Lumbar spine pain  M54 5    2  Lumbar radiculopathy  M54 16    3  Cervical radiculopathy  M54 12    4  History of fusion of lumbar spine  Z98 1    5  Sacroiliac pain  M53 3        Start Time: 845  Stop Time: 930  Total time in clinic (min): 45 minutes    Subjective: Patient states that she slept 7 hours last night for the first time in several years  She reports having increased dizziness after her vestibular PT this morning  Her "spider like" symptoms in the R upper quadrant persist despite stretching and performing HEP  Objective: See treatment diary below      Assessment: Tolerated treatment well  Patient able to seek relief of symptoms and improvements in cervical ROM after mobilization  Posture exercises performed to improve symptoms  Increased pain in R gluteus deven noted when performing side stepping  Difficulties performing R SL balance due to weakness and pain  Patient would benefit from continued PT to maximize function for independence in community  Plan: Continue per plan of care        Precautions: Standard, Hx of lumbar spine fusion 2018, failed gastric bypass surgery, GERD, Elevated surface please        Manuals    Cervical mobs AR AR AR KW AR   Cervical distraction  AR AR AR KW AR   Trigger point release R UT  AR AR  AR           Neuro Re-Ed        TrA progression        Glute sets         Hip add squeeze  3sx20 3sx20 3sx20 3s x20 3s x20   Supine clamshells 20 GTB 20 GTB  20 Blue TB 20 Blue TB   Supine marches 10 B 10 B 10 B  10B    Supine leg ext with TrA 10 B 10B 10B  10B      Cervical retraction 2x5 hold 5 with cervical roll  10x sitting    Ther Ex          SI nerve glides 2x10  2x10 2x10   Step ups and step taps 20 ea 4"  20 ea 4"    20 ea 6"   LTR    20 20 Seated lumbar flexion  3sx10 3 way  3sx10 3 way  3sx10 3 way   3sx10 3 way    SKTC   3x10s B   3x10s B    Piriformis stretch    3x10s B  3x10s B 3x10s B   Side stepping at // bars 4x10' 4x10'   2x10' RTB  4x10'    Hamstring stretch  With SOS supine x 10 hold 20 ea With SOS supine x 10 hold 20 ea x10 20s x10 20s   Self cervical distraction 10  10 10 10    Cervical retraction  10 10 10 10, Cervical extension SNAG 10x    TB row, shoulder ext, paloff press     10 GTB each    Modalities        Heat pre

## 2021-07-06 ENCOUNTER — OFFICE VISIT (OUTPATIENT)
Dept: PHYSICAL THERAPY | Facility: CLINIC | Age: 66
End: 2021-07-06
Payer: COMMERCIAL

## 2021-07-06 DIAGNOSIS — M54.16 LUMBAR RADICULOPATHY: ICD-10-CM

## 2021-07-06 DIAGNOSIS — M54.50 LUMBAR SPINE PAIN: Primary | ICD-10-CM

## 2021-07-06 DIAGNOSIS — M54.12 CERVICAL RADICULOPATHY: ICD-10-CM

## 2021-07-06 DIAGNOSIS — Z98.1 HISTORY OF FUSION OF LUMBAR SPINE: ICD-10-CM

## 2021-07-06 DIAGNOSIS — R42 DIZZINESS: Primary | ICD-10-CM

## 2021-07-06 DIAGNOSIS — M53.3 SACROILIAC PAIN: ICD-10-CM

## 2021-07-06 PROCEDURE — 97112 NEUROMUSCULAR REEDUCATION: CPT

## 2021-07-06 PROCEDURE — 97110 THERAPEUTIC EXERCISES: CPT

## 2021-07-06 NOTE — PROGRESS NOTES
Daily Note: Vestibular  RE- 2021      Today's date: 2021  Patient name: Hank Simpson  : [id number]  MRN: [id number]  Referring provider: Sedrick Homans, DO  Dx:   Encounter Diagnosis     ICD-10-CM    1  Dizziness  R42                 Subjective: Patient reports that she feels "a little on the dizzy side" today, at a 4/10  She also notes that she has a stiff neck on the R and has been performing self stretches and massage to help with some relief  Objective: See treatment diary below    NMR:  - VOR x 1: 100 hz, 120 seconds   H: 5/10 dizziness   V: 5/10 dizziness  - VOR cx: plain surround, 20 reps    CW: 4/10 dizziness   CCW: 5/10 dizziness  - Gait with head turns ( 16 ft, 6 laps each)   Side to side: -6/10 dizziness     Up and down: 4/10 dizziness  - Tandem ambulation: 14 ft, 2 laps, -6/10 dizziness  - Gait with ball toss: 16 ft, 2 laps, -6/10 dizziness    Assessment: Patient tolerated treatment well, despite reports of increased dizziness prior to beginning today's session  Symptoms remained in range from 4-5/10  No LoB this session, indicating good static and dynamic balance  During ambulation with head turns side to side, initially her dizziness increased to a 6/10, but by the last of 6 laps, her dizziness remained at her baseline of 4/10  Improved performance of tandem ambulation with reduced staggering today  Added gait with ball toss today, mild to moderate staggering noted  Dizziness returned to baseline post session  She will benefit from skilled PT services to reduce her dizziness and improve her overall balance  Plan: Continue per plan of care         Precautions: Standard, Hx of lumbar spine fusion 2018, failed gastric bypass surgery, GERD, Elevated surface please        Manuals    B LAD  5' with mob belt 5' with mob belt                             Neuro Re-Ed        TrA progression 3sx10 with cuff 3sx10 with cuff 3sx10 with cuff 3sx10 with cuff 3sx10 with cuff   Glute sets  10 3sx20 3sx20 3sx20 3sx20   Hip add squeeze  3sx20 3sx20 3sx20 3sx20 3sx20   Supine clamshells 20 GTB 20 GTB 20 GTB 20 GTB 20 GTB   Supine marches   10 B 10B 10 B                   Ther Ex        NuStep warmup        LTR 20 20 20 20 20   LAQ 3sx10 B  3sx10 B with DF     SLR        SAQ        Seated lumbar flexion  3sx10 3 way  3sx10 3 way  3sx10 3 way  3sx10 3 way  3sx10 3 way    SKTC  3x10s B  3x10s B      Piriformis stretch   3x10s B 3x10s B     Sit to stand with mob belt     2x5                                           Modalities        Heat pre 10' 10' post

## 2021-07-06 NOTE — PROGRESS NOTES
Daily Note     Today's date: 2021  Patient name: Jackson Stewart  : [id number]  MRN: [id number]  Referring provider: Bishnu Guadalupe DO  Dx:   Encounter Diagnosis     ICD-10-CM    1  Lumbar spine pain  M54 5    2  Lumbar radiculopathy  M54 16    3  Cervical radiculopathy  M54 12    4  History of fusion of lumbar spine  Z98 1    5  Sacroiliac pain  M53 3        Start Time: 1700  Stop Time: 1745  Total time in clinic (min): 45 minutes    Subjective: Patient reports "spider" like sensation of her R shoulder recently  She reports her lumbar spine pain is improving slightly  She would like to have more motion in her cervical spine at this time  Objective: See treatment diary below      Assessment: Tolerated treatment well  SNAGs provided to improve cervical rotation ROM  Able to perform all exercises with no difficulties  Patient would benefit from continued PT to maximize function for independence with ADLs  Plan: Continue per plan of care        Precautions: Standard, Hx of lumbar spine fusion 2018, failed gastric bypass surgery, GERD, Elevated surface please        Manuals    Cervical mobs AR AR KW AR AR   Cervical distraction  AR AR KW AR AR   Trigger point release R UT AR AR  AR AR           Neuro Re-Ed        TrA progression        Glute sets         Hip add squeeze  3sx20 3sx20 3s x20 3s x20 3sx20   Supine clamshells 20 GTB  20 Blue TB 20 Blue TB 20 GTB   Supine marches 10 B 10 B  10B  10 B   Supine leg ext with TrA 10B 10B  10B  10 B    Cervical retraction 2x5 hold 5 with cervical roll  10x sitting     Ther Ex         SI nerve glides 2x10  2x10 2x10 2x10   Step ups and step taps 20 ea 4"    20 ea 6" 20 ea 6"   LTR   20 20 20   Seated lumbar flexion  3sx10 3 way  3sx10 3 way   3sx10 3 way  3sx10 3 way    SKTC  3x10s B   3x10s B  3x10s B    Piriformis stretch   3x10s B  3x10s B 3x10s B 3x10s B    Side stepping at // bars 4x10'   2x10' RTB  4x10'     Hamstring stretch With SOS supine x 10 hold 20 ea With SOS supine x 10 hold 20 ea x10 20s x10 20s x10 20s   Self cervical distraction 10 10 10     Cervical retraction  10 10 10, Cervical extension SNAG 10x     TB row, shoulder ext, paloff press    10 GTB each  10 GTB each    Modalities        Heat pre

## 2021-07-08 ENCOUNTER — OFFICE VISIT (OUTPATIENT)
Dept: PHYSICAL THERAPY | Facility: CLINIC | Age: 66
End: 2021-07-08
Payer: COMMERCIAL

## 2021-07-08 DIAGNOSIS — R42 DIZZINESS: Primary | ICD-10-CM

## 2021-07-08 DIAGNOSIS — M54.16 LUMBAR RADICULOPATHY: ICD-10-CM

## 2021-07-08 DIAGNOSIS — M53.3 SACROILIAC PAIN: ICD-10-CM

## 2021-07-08 DIAGNOSIS — Z98.1 HISTORY OF FUSION OF LUMBAR SPINE: ICD-10-CM

## 2021-07-08 DIAGNOSIS — M54.50 LUMBAR SPINE PAIN: Primary | ICD-10-CM

## 2021-07-08 DIAGNOSIS — M54.12 CERVICAL RADICULOPATHY: ICD-10-CM

## 2021-07-08 PROCEDURE — 97110 THERAPEUTIC EXERCISES: CPT

## 2021-07-08 PROCEDURE — 97112 NEUROMUSCULAR REEDUCATION: CPT | Performed by: PHYSICAL THERAPIST

## 2021-07-08 NOTE — PROGRESS NOTES
Daily Note     Today's date: 2021  Patient name: Raisa Fuchs  : [birth year]  MRN: [id number]  Referring provider: Merlin Hanson DO  Dx:   Encounter Diagnosis     ICD-10-CM    1  Lumbar spine pain  M54 5    2  Lumbar radiculopathy  M54 16    3  Cervical radiculopathy  M54 12    4  History of fusion of lumbar spine  Z98 1    5  Sacroiliac pain  M53 3        Start Time: 2835  Stop Time: 1030  Total time in clinic (min): 15 minutes    Subjective: Patient reports 4/10 cervical spine pain today  She states that her dizziness is improving as well  She asked to leave session early due to work  Objective: See treatment diary below      Assessment: Tolerated treatment well  R cervical rotation ROM improved after passive treatment  Patient would benefit from continued PT      Plan: Continue per plan of care        Precautions: Standard, Hx of lumbar spine fusion , failed gastric bypass surgery, GERD, Elevated surface please        Manuals    Cervical mobs AR KW AR AR AR   Cervical distraction  AR KW AR AR AR   Trigger point release R UT AR  AR AR AR           Neuro Re-Ed        TrA progression        Glute sets         Hip add squeeze  3sx20 3s x20 3s x20 3sx20 10   Supine clamshells  20 Blue TB 20 Blue TB 20 GTB 10   Supine marches 10 B  10B  10 B 10B    Supine leg ext with TrA 10B  10B  10 B 10B     10x sitting      Ther Ex          2x10 2x10 2x10    Step ups and step taps   20 ea 6" 20 ea 6"    LTR  20 20 20    Seated lumbar flexion  3sx10 3 way   3sx10 3 way  3sx10 3 way     SKTC 3x10s B   3x10s B  3x10s B     Piriformis stretch  3x10s B  3x10s B 3x10s B 3x10s B     Side stepping at // bars   2x10' RTB  4x10'      Hamstring stretch With SOS supine x 10 hold 20 ea x10 20s x10 20s x10 20s    Self cervical distraction 10 10      Cervical retraction  10 10, Cervical extension SNAG 10x      TB row, shoulder ext, paloff press   10 GTB each  10 GTB each     Modalities        Heat pre

## 2021-07-08 NOTE — PROGRESS NOTES
Daily Note: Vestibular  RE- 2021      Today's date: 2021  Patient name: Nhung Petersen  :   MRN: [id number]  Referring provider: Ryan Sim DO  Dx:   Encounter Diagnosis     ICD-10-CM    1  Dizziness  R42                 Subjective: Patient reports dizziness that she rates 3/10, states she was feeling good this morning        Objective: See treatment diary below      NMR:  - VOR x 1: 100 hz, 120 seconds   H: 510 dizziness   V: 5/10 dizziness (stop after 30 sec due to increased dizziness)   V: 310 dizziness seated   - VOR cx: plain surround, 20 reps    CW: 4/10 dizziness   CCW: 4/10 dizziness   - FTEO on foam w/ head turns (20x)   H: 3/10 dizziness   V: 3/10 dizziness  - Tandem Ambulation: 3 laps x 15 ft      Assessment: Patient tolerated treatment fairly  Her dizziness symptoms remain at 5/10 with VOR exercises  She was unable to complete VOR in vertical direction due to increased LBP  Added FTEO on foam w/ head turns to further challenge her dynamic balance and dizziness  She reported no increased dizziness with horizontal head turns, however displayed increased sway with vertical head turns and stepped off foam after 12 reps  She will benefit from skilled PT services to reduce her dizziness and improve her overall balance  Plan: Continue per plan of care         Precautions: Standard, Hx of lumbar spine fusion 2018, failed gastric bypass surgery, GERD, Elevated surface please        Manuals    B LAD  5' with mob belt 5' with mob belt                             Neuro Re-Ed        TrA progression 3sx10 with cuff 3sx10 with cuff 3sx10 with cuff 3sx10 with cuff 3sx10 with cuff   Glute sets  10 3sx20 3sx20 3sx20 3sx20   Hip add squeeze  3sx20 3sx20 3sx20 3sx20 3sx20   Supine clamshells 20 GTB 20 GTB 20 GTB 20 GTB 20 GTB   Supine marches   10 B 10B 10 B                   Ther Ex        NuStep warmup        LTR 20 20 20 20 20   LAQ 3sx10 B  3sx10 B with DF     SLR        SAQ        Seated lumbar flexion  3sx10 3 way  3sx10 3 way  3sx10 3 way  3sx10 3 way  3sx10 3 way    SKTC  3x10s B  3x10s B      Piriformis stretch   3x10s B 3x10s B     Sit to stand with mob belt     2x5                                           Modalities        Heat pre 10' 10' post

## 2021-07-13 ENCOUNTER — APPOINTMENT (OUTPATIENT)
Dept: PHYSICAL THERAPY | Facility: CLINIC | Age: 66
End: 2021-07-13
Payer: COMMERCIAL

## 2021-07-15 ENCOUNTER — APPOINTMENT (OUTPATIENT)
Dept: PHYSICAL THERAPY | Facility: CLINIC | Age: 66
End: 2021-07-15
Payer: COMMERCIAL

## 2021-07-16 DIAGNOSIS — K21.9 GASTROESOPHAGEAL REFLUX DISEASE, UNSPECIFIED WHETHER ESOPHAGITIS PRESENT: Primary | ICD-10-CM

## 2021-07-16 NOTE — TELEPHONE ENCOUNTER
GI Physician: Dr Rocío Stevenson for Medication: Pantoprazole     Dose: 40 mg      Quantity: ?     Pharmacy and Location: [facility]

## 2021-07-19 RX ORDER — PANTOPRAZOLE SODIUM 40 MG/1
40 TABLET, DELAYED RELEASE ORAL
Qty: 90 TABLET | Refills: 3 | OUTPATIENT
Start: 2021-07-19

## 2021-07-20 ENCOUNTER — APPOINTMENT (OUTPATIENT)
Dept: PHYSICAL THERAPY | Facility: CLINIC | Age: 66
End: 2021-07-20
Payer: COMMERCIAL

## 2021-07-22 ENCOUNTER — APPOINTMENT (OUTPATIENT)
Dept: PHYSICAL THERAPY | Facility: CLINIC | Age: 66
End: 2021-07-22
Payer: COMMERCIAL

## 2021-07-26 ENCOUNTER — OFFICE VISIT (OUTPATIENT)
Dept: AUDIOLOGY | Facility: CLINIC | Age: 66
End: 2021-07-26
Payer: COMMERCIAL

## 2021-07-26 DIAGNOSIS — R42 DIZZINESS: Primary | ICD-10-CM

## 2021-07-26 PROCEDURE — 92567 TYMPANOMETRY: CPT | Performed by: AUDIOLOGIST

## 2021-07-26 PROCEDURE — 92537 CALORIC VSTBLR TEST W/REC: CPT | Performed by: AUDIOLOGIST

## 2021-07-26 PROCEDURE — 92540 BASIC VESTIBULAR EVALUATION: CPT | Performed by: AUDIOLOGIST

## 2021-07-26 NOTE — PROGRESS NOTES
Videonystagmography (VNG) Evaluation    Name:  Jcarlos Drummond  :    Age:  63 y o  Date of Evaluation: 21     History: Dizziness  Reason for visit: Jcarlos Drummond is seen today at the request of Abby Licea for an evaluation of balance  Patient complains of ongoing dizziness that she reports has been on going for several months now  Patient describes her dizziness as slight unsteadiness with occasional true spinning vertigo  Patient reports these sensations typically last for several seconds at a time and primarily occur when she is standing or moving; denies it occurring when sitting or lying down  Patient reports over the last few days her dizziness has been much worse for her  Patient denies any falls but attributes this to her cane helping her keep her balance  Patient does report experiencing a slight headache with he dizziness  Patient denies any changes in her hearing or roaring low pitched tinnitus  Patient does report a history of unilateral tinnitus of the right ear  Patient does have a history of concussions  She currently is receiving physical therapy for her balance and her neck  Patient notes since her last therapy session on her neck she will ocassional feel as if a handful of spiders is crawling up her shoulder and neck  Patient denies use of Meclizine as she feels it does not help her  Tympanometry:   Right: Type A - normal middle ear pressure and compliance   Left: Type Ad - hypermobile compliance    Oculomotor battery:    Gaze:          Right: Normal        Left: Normal         Up: Normal        Down: Normal      Saccades: Normal     Tracking: Normal     Optokinetic: Normal    Positioning/Positionals:     [employer]:    Right: Negative      Left: Negative  , Left beating nystagmus 2 degrees present; NO torsion  Brief presence        Positionals:     Sitting: Normal    Supine: Normal    Head Right: Normal    Head Left: Normal      Calorics:     Bithermal Caloric Irrigation: Normal    Notes:  Normal central findings  Normal peripheral findings      Recommendations:  Continue medical work-up  Follow up with managing physician        Selene Galarza , CCC-A  Clinical Audiologist

## 2021-07-27 ENCOUNTER — APPOINTMENT (OUTPATIENT)
Dept: PHYSICAL THERAPY | Facility: CLINIC | Age: 66
End: 2021-07-27
Payer: COMMERCIAL

## 2021-07-29 ENCOUNTER — EVALUATION (OUTPATIENT)
Dept: PHYSICAL THERAPY | Facility: CLINIC | Age: 66
End: 2021-07-29
Payer: COMMERCIAL

## 2021-07-29 ENCOUNTER — EVALUATION (OUTPATIENT)
Dept: OCCUPATIONAL THERAPY | Facility: CLINIC | Age: 66
End: 2021-07-29
Payer: COMMERCIAL

## 2021-07-29 ENCOUNTER — OFFICE VISIT (OUTPATIENT)
Dept: PHYSICAL THERAPY | Facility: CLINIC | Age: 66
End: 2021-07-29
Payer: COMMERCIAL

## 2021-07-29 DIAGNOSIS — H54.7 VISION IMPAIRMENT: Primary | ICD-10-CM

## 2021-07-29 DIAGNOSIS — R42 DIZZINESS OF UNKNOWN ETIOLOGY: ICD-10-CM

## 2021-07-29 DIAGNOSIS — R42 DIZZINESS: Primary | ICD-10-CM

## 2021-07-29 DIAGNOSIS — M54.16 LUMBAR RADICULOPATHY: ICD-10-CM

## 2021-07-29 DIAGNOSIS — M53.3 SACROILIAC PAIN: ICD-10-CM

## 2021-07-29 DIAGNOSIS — Z98.1 HISTORY OF FUSION OF LUMBAR SPINE: ICD-10-CM

## 2021-07-29 DIAGNOSIS — M54.12 CERVICAL RADICULOPATHY: ICD-10-CM

## 2021-07-29 DIAGNOSIS — M54.50 LUMBAR SPINE PAIN: Primary | ICD-10-CM

## 2021-07-29 PROCEDURE — 97112 NEUROMUSCULAR REEDUCATION: CPT | Performed by: PHYSICAL THERAPIST

## 2021-07-29 PROCEDURE — 97110 THERAPEUTIC EXERCISES: CPT

## 2021-07-29 PROCEDURE — 97166 OT EVAL MOD COMPLEX 45 MIN: CPT

## 2021-07-29 PROCEDURE — 97112 NEUROMUSCULAR REEDUCATION: CPT

## 2021-07-29 NOTE — PROGRESS NOTES
OT Evaluation     Today's date: 2021  Patient name: Hammad Luna  :   MRN: [id number]  Referring provider: Self, Referral  Dx:   Encounter Diagnosis     ICD-10-CM    1  Vision impairment  H54 7    2   Dizziness of unknown etiology  R42        Start Time: 0900  Stop Time: 0950  Total time in clinic (min): 50 minutes    Assessment  Impairments: abnormal coordination, abnormal muscle firing, activity intolerance, difficulty understanding, lacks appropriate home exercise program and safety issue  Understanding of Dx/Px/POC: excellent  Plan  Patient would benefit from: skilled occupational therapy  Planned therapy interventions: IADL retraining, activity modification, ADL retraining, motor coordination training, balance, neuromuscular re-education, balance/weight bearing training, behavior modification, breathing training, patient education, cognitive skills, self care, sensory integrative techniques, coordination, strengthening, therapeutic activities, therapeutic exercise, graded activity, graded exercise, work reintegration and home exercise program  Frequency: 2x week (recommend OT services 1-2/wk for 45-60 minute sessions)  Duration in weeks: 12  Plan of Care beginning date: 2021  Treatment plan discussed with: patient          Today's Date: 2021  Patient Name: Hammad Luna  : 1955  MRN: [id number]  Referring Provider: Self, Referral  Dx: Vision impairment [H54 7]    Active Problem List:   Patient Active Problem List   Diagnosis    Gastroesophageal reflux disease    Right upper quadrant abdominal pain    Diarrhea    Chronic pain syndrome    Chronic bilateral low back pain without sciatica    Lumbar spondylosis    Coccydynia    Spinal stenosis of lumbar region without neurogenic claudication    Sacroiliitis ([address] )    Low back pain    Myofascial pain syndrome    Thoracic spine pain    Neck pain    HTN (hypertension)    Status post lumbar spinal fusion  Irregular bowel habits    History of colon polyps    Small intestinal bacterial overgrowth    Arthralgia of lumbar spine    DDD (degenerative disc disease), lumbar    Postmenopausal state    Fatigue    Morbid obesity (HCC)    Chronic right sacroiliac joint pain    History of Michael-en-Y gastric bypass    Dysphagia    Irritable bowel syndrome with diarrhea    Vertigo    Vitamin D deficiency    Tick bite    Bilateral tinnitus     Past Medical Hx:   Past Medical History:   Diagnosis Date    Arthritis     Asthma     Back pain     Chronic pain disorder     hips into the legs    Colon polyp     DVT (deep venous thrombosis) ([address] )     Gastric bypass status for obesity     michael en y    GERD (gastroesophageal reflux disease)     Hiatal hernia     History of transfusion 2006    after gastric bypass surgery, no reactions    Hypertension     Irritable bowel syndrome     Kidney stone     Muscle weakness     bilateral legs    Numbness and tingling     bilateral feet    Pneumonia     Spinal stenosis     Tinnitus     occassionally    Vertigo     Wears glasses      Past Surgical Hx:   Past Surgical History:   Procedure Laterality Date    APPENDECTOMY      BACK SURGERY  2018    L4-5 fusion    BARIATRIC SURGERY  2005    michael en y- pt states the procedure was done incorrectly which lead to additional surgeries from -   [address] Right      SECTION      x1    CHOLECYSTECTOMY      COLON SURGERY      partial removal of the small bowel-necrosis-between -    COLONOSCOPY      COLONOSCOPY W/ BIOPSIES AND POLYPECTOMY      FLEXIBLE BRONCHOSCOPY W/ UPPER ENDOSCOPY      GASTRECTOMY      after gastric bypass-(failed surgery per pt) -    HERNIA REPAIR      incisional hernias-diaphragm area    CT ARTHROCENTESIS ASPIR&/INJ SMALL JT/BURSA W/O US N/A 2018    Procedure: Coccygeal Injection & Ganglion Impar Block;  Surgeon: José Antonio Koehler MD;  Location: [place] [address] MAIN OR;  Service: Pain Management     LA ARTHRODESIS POSTERIOR INTERBODY LUMBAR N/A 6/27/2018    Procedure: L4-5 DECOMPRESSION INTERBODY INSTRUMENTED FUSION;  Surgeon: Bella Luis MD;  Location[facility];  Service: Orthopedics    LA COLONOSCOPY FLX DX W/COLLJ SPEC WHEN PFRMD N/A 8/24/2018    Procedure: COLONOSCOPY;  Surgeon: Escobar Jay MD;  Location: [place] [address] GI LAB; Service: Gastroenterology    LA ESOPHAGOGASTRODUODENOSCOPY TRANSORAL DIAGNOSTIC N/A 2/19/2018    Procedure: ESOPHAGOGASTRODUODENOSCOPY (EGD); Surgeon: Escobar Jay MD;  Location: [facility] GI LAB; Service: Gastroenterology   Benjamine Anastasia JOINT Right 5/25/2018    Procedure: Rt Sacroiliac Joint Injection;  Surgeon: Chase Giang MD;  Location: [facility] MAIN OR;  Service: Pain Management     LA INJECTION,SACROILIAC JOINT Right 5/1/2019    Procedure: Sacroiliac Joint Injection (02335); Surgeon: Chase Giang MD;  Location: [facility] MAIN OR;  Service: Pain Management     TONSILECTOMY, ADENOIDECTOMY, BILATERAL MYRINGOTOMY AND TUBES      TONSILLECTOMY            SKILLED ANALYSIS:  Pt is a 63 y o  female referred to Occupational Therapy by physical therapist s/p Vision impairment [H54 7]  Pt presents with blurred vision, dizziness, and impaired memory causing cognitive/oculomotor and physiologic intolerance impairment affecting her function  Pt also presents with prolonged removal of normal routine and cognitive/visual stimulating tasks affecting concussion recovery process  Educated on vision impairments and therapy process  All questions answered  Pt will benefit from Occupational Therapy 2x/week for 8-12 weeks with focus on vision and cognitive impairments (binocularity, saccades, convergence, and immediate and delayed memory)  Subjective    Reports multiple surgeries including a gastrocbypass that was done "backwards" causing fast weight loss that made have triggered her symptoms  Pt reports dizziness when standing  She reports it the dizziness is "on and off" but always when standing and walking, not when sitting  Patient also reports feeling foggy and having difficulty with short term memory  Reports of heaviness on right temporal lobe  ADLs: Pt requires extended time when trying to stand and dress    IADLs:   Driving: able to drive car but unable to drive motorcycle due to dizziness   Leisure: unable to walk dogs around farm  Pt reports using a cable to allow dogs to move in backyard  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 , maid, and   She is currently hired part time with [employer]  She  reports difficulty with processing speed when completing written work      PATIENT GOAL: "go into my pinto years with better health"    HISTORY OF PRESENT ILLNESS:     Pt is a 63 y o  female who was referred to Occupational Therapy s/p  Vision impairment [H54 7]       PMH:   Past Medical History:   Diagnosis Date    Arthritis     Asthma     Back pain     Chronic pain disorder     hips into the legs    Colon polyp     DVT [facility]) [facility]     Gastric bypass status for obesity     yarelis en y    GERD (gastroesophageal reflux disease)     Hiatal hernia     History of transfusion 2006    after gastric bypass surgery, no reactions    Hypertension     Irritable bowel syndrome     Kidney stone     Muscle weakness     bilateral legs    Numbness and tingling     bilateral feet    Pneumonia     Spinal stenosis     Tinnitus     occassionally    Vertigo     Wears glasses        Past Surgical Hx:   Past Surgical History:   Procedure Laterality Date    APPENDECTOMY      BACK SURGERY  2018    L4-5 fusion    BARIATRIC SURGERY      yarelis en y- pt states the procedure was done incorrectly which lead to additional surgeries from -   [address] Right      SECTION      x1   [address]      partial removal of the small bowel-necrosis-between 2006    COLONOSCOPY      COLONOSCOPY W/ BIOPSIES AND POLYPECTOMY      FLEXIBLE BRONCHOSCOPY W/ UPPER ENDOSCOPY      GASTRECTOMY      after gastric bypass-(failed surgery per pt) 2006    HERNIA REPAIR      incisional hernias-diaphragm area    NJ ARTHROCENTESIS ASPIR&/INJ SMALL JT/BURSA W/O US N/A 2018    Procedure: Coccygeal Injection & Ganglion Impar Block;  Surgeon: Antonietta Aguila MD;  Location: [facility] MAIN OR;  Service: Pain Management     NJ ARTHRODESIS POSTERIOR INTERBODY LUMBAR N/A 2018    Procedure: L4-5 DECOMPRESSION INTERBODY INSTRUMENTED FUSION;  Surgeon: Darwin Hahn MD;  Location: [facility];  Service: Orthopedics    NJ COLONOSCOPY FLX DX W/COLLJ SPEC WHEN PFRMD N/A 2018    Procedure: COLONOSCOPY;  Surgeon: Mikayla Munoz MD;  Location: [facility] GI LAB; Service: Gastroenterology    NJ ESOPHAGOGASTRODUODENOSCOPY TRANSORAL DIAGNOSTIC N/A 2018    Procedure: ESOPHAGOGASTRODUODENOSCOPY (EGD); Surgeon: Mikayla Munoz MD;  Location: [facility] GI LAB; Service: Gastroenterology   [place]  JOINT Right 2018    Procedure: Rt Sacroiliac Joint Injection;  Surgeon: Antonietta Aguila MD;  Location: [facility] MAIN OR;  Service: Pain Management     NJ INJECTION,SACROILIAC JOINT Right 2019    Procedure: Sacroiliac Joint Injection (21883); Surgeon: Antonietta Aguila MD;  Location: [facility] MAIN OR;  Service: Pain Management     TONSILECTOMY, ADENOIDECTOMY, BILATERAL MYRINGOTOMY AND TUBES      TONSILLECTOMY          Pain Levels:      Restin/10    With Activity:  0/10 - more heaviness rather than pain      Objective    IMPAIRMENTS SECTION:    Contextual Memory Test:  COMPLETE NEXT SESSION   Immediate recall: , falling into the range     Delayed recall: , falling into the range  Recognition:       [facility] Cognitive Assessment Version 8 1 (MoCA V8 1)  Visuospatial/executive functionin/5  Naming: 3/3  Memory: 1st trial: , 2nd trial:   Attention/concentration:   List of letters:   Serial Seven Subtraction: 3/3 w/ 1 error  Language/sentence repetition:   Language Fluency:   Abstract/Correlational Thinkin/2   Delayed Recall:   Orientation:   Memory Index Score: 13/15  MoCA V1 8 1 Raw Score: 28/30, MIS: 13/15, indicative of no neurocognitive impairments  Vision Screen: GLASSES (prescription) 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 wearing only driving glasses at night  Using readers when using phone  Visual acuity, near: R eye: 20/30  L eye 20/30    Binocularity, far: orthotropia and orthophoria B/L    Binocularity, near: esophoria and exotropia B/L     Red/Green fusion: PLRG 6 inches    Convergence: 8 inches     Cristiano string: X    Pursuits: slightly jerky in vertical planes    Saccades:  slightly jerky  in horizontal and vertical planes    Range of Motion: [facility]    Visual perceptual midline: WNL horizon;   WNL midline      Convergence Insufficiency Symptom Survey (CISS):  suggestive of convergence insufficiencies      GOALS:    Short Term (4 week):    COGNITION      - Direction Following      o Pt will increase verbal and written 3 step direction following with processing time of <2 min and 80% accuracy for improved work performance         -      Memory    o Pt will demo G recall of 90% of Written information utilizing memory strategy of choice for improved STM/delayed memory     o Pt will demo G carryover of use of internal/external memory strategy aides for improved recall of daily events, improved executive functioning with 90% accuracy       VISION      · Pt will increase oculomotor control for improved saccades, pursuits, con/divergent tasks for improved reading, board to table tasks x 75% accuracy  with minimal increase in symptoms             LTGs 8 weeks:     - Direction Following      o Pt will increase verbal and written 3 step direction following with processing time of <1 min and 90% accuracy for improved work performance         -      Memory    o Pt will demo G recall of 95% of Written information utilizing memory strategy of choice for improved STM/delayed memory     o Pt will demo G carryover of use of internal/external memory strategy aides for improved recall of daily events, improved executive functioning with 95% accuracy       VISION      · Pt will increase oculomotor control for improved saccades, pursuits, con/divergent tasks for improved reading, board to table tasks x 95% accuracy  with minimal increase in symptoms    · Pt will increase oculomotor control for improved dynamic activities with head turns, board/screen to table tasks symptom free with 90% accuracy for improved work roles                 PLANNED THERAPY INTERVENTIONS:    Internal and external memory aides  Multimatrix for saccades/ visual clutter/attention  Hypersensitivity strategies education  Multi-modal environment  Sustained/alternating/divided attention  Tracking tube  Oculomotor control:  saccades, con/divergence  Conv /div   Dynamic tasks  Work stations with timed transitions  Temporal Awareness: Organize the Hour activities  Memory and mental manipulation  Auditory processing with immediate recall  Memory retention with immediate and delayed recall  Edu on cog/vision apps  Caro brady scanning sheets    INTERVENTION COMMENTS:  Diagnosis: Vision impairment [H54 7]  Precautions:  FOTO:  Insurance: Payor: [employer]RIJALVA / Plan: P[employer] / Product Type: Blue Fee for Service /            Precautions: dizziness      Manuals                                                                 Neuro Re-Ed                                                                                                        Ther Ex                                                                                                                     Ther Activity Gait Training                                       Modalities

## 2021-07-29 NOTE — PROGRESS NOTES
PT Discharge  Today's date: 2021  Patient name: Mikayla Moran  : [birth year]  MRN: [id number]  Referring provider: Marques Singh DO  Dx:   Encounter Diagnosis     ICD-10-CM    1  Dizziness  R42          Assessment  Assessment details: Patient is a 63 y o  Female who presents to skilled outpatient PT with swimming dizziness which started a few months ago  Has been on skilled PT for 2 months with limited improvement  Had VNG which noted normal finding for all testing on that date per report which indicate normal vestibular function  Re-testing notes more vision impairment findings with referral to Skilled OT for assessment and treatment  Patient will be DC from skilled PT for vestibular therapy and switch to vision therapy  Met all Short term goals and all long term goals except 3 which are marked in goal section below  Patient verbalized understanding of POC  Please contact me if you have any questions or recommendations  Thank you for the referral and the opportunity to share in Patrick Garcia's care        Cut off score   All date taken from [facility] Neuro Section or Rehab Measures    DGI:  MDC for Vestibular Disorders: 4 points  [address] for Geriatrics/Community Dwelling Older Adults: 3 Points  Falls risk cut off: <19/24    FGA:  MCID: 4 points  Geriatrics/Community Dwelling Older Adults: </= 22/30 fall risk  Geriatrics/Community Dwelling Older Adults: </= 20/30 unexplained falls in the next 6 months  Parkinsons: </= 18/30 fall risk    mCTSIB (normed on ages 19-56, lower number is less sway or better static balance)  Eyes open firm surface (norm 0 21-0 48)  Eyes closed firm surface (norm 0 48-0 99)  Eyes open foam surface (norm 0 38-0 71)  Eyes closed foam surface (norm 0 70-2 22)    DHI:  0-39: low perception of handicap  40-69: moderate perception of handicap  : severe perception of handicap  > 60: increased risk for falls    Joint Position Error Testing (JPET):  > 4 5 degrees: abnormal joint proprioception        Impairments: abnormal coordination, abnormal gait, abnormal or restricted ROM, activity intolerance, impaired balance, lacks appropriate HEP, poor posture, poor body mechanics, pain with function, safety issue and abnormal movement  Understanding of Dx/Px/POC: good  Prognosis: good      Goals    Vestibular Short Term Goals:  - Patient will be independent with simple HEP MET  - Patient will tolerate 60 seconds of oculomotor exercises with minimal increase in symptoms MET  - Patient will demonstrate 10% decrease in symptom severity scoring with independent use of modalities MET  - Patient will be able to tolerate 30 seconds with eyes closed on foam surface without any loss of balance demonstrating improvement in vestibular system MET  - Patient will improve DGI score by 4 points per MDC to promote improved safety with dynamic tasks MET    Vestibular Long Term Goals:  - Patient will be independent with complex HEP MET  - Patient will tolerate >=2 minutes of oculomotor exercises to facilitate return to reading and computer work MET  - Patient will report >= 50% improvement on symptom severity scoring MET  - Patient will demonstrate ability to perform HT in gait without veering NOT MET  - Patient will score low risk for falls with DGI test with score of 19/24 or higher per current research data MET  - Patient will score low risk for falls with FGA test with score of 23/30 or higher per current research data NOT MET   - Patient will report 2/10 dizziness or less with visual stimulating surround with duration of 2 minutes MET  - Patient will report subjective improvement to 90% or higher to promote return to PLOF NOT MET     Plan  Plan details:   Planned modality interventions: TENS  Planned therapy interventions: balance, balance/WB training, breathing training, body mechanics training, coordination, flexibility, functional ROM exercises, gait training, HEP, Garcia Taping, motor coordination training, neuromuscular re-education, patient education, postural training, therapeutic activities and therapeutic exercises  Frequency: 2x/wk  Duration in weeks: 12  Plan of Care beginning date: 2021  Plan of Care expiration date: 12 weeks - 10/21/2021  Treatment plan discussed with: Patient and referring MD         Subjective Evaluation    History of Present Illness  Mechanism of injury:  Pt continues to reports only slight improvement but limited change since starting skilled PT for vestibular  Had follow up VNG with normal findings for all areas  Possible benefit from vision therapy secondary to limited improvement with VNG  Dizziness Subjective  How long does dizziness last: all day   How would you describe the dizziness: swimming/ swaying sensation  Rolling in bed: No  Supine to/from sit: No      Pain  Current pain rating: 3/10  At best pain rating: 3/10  At worst pain ratin/10  Location: low back   Aggravating factors: long duration standing     Social Support  Steps to enter house: 1  Stairs in house: 1   Lives in: 2 story home, stays on 1st floor   Lives with: Lives most alone; does have adult son at times to assist     Employment status: retired   Hand dominance: right     Treatments  Previous treatment: none  Current treatment: low back treatment   Diagnostic Testing: MRI pending       Objective     Vestibular Objective  Cervical Spine AROM:  - Flexion: WFL pain throughout range  - Extension: WFL no pain  - R Rotation: WFL pain throughout range  - L Rotation: WFL no pain  - R Lateral Flexion: WFL pain throughout range  - L Lateral Flexion: WFL no pain    Integrity Testing  - mVBI: denies any increase in dizziness   - Posture: moderate forward head   - Palpation: moderate hypertoxicity to C spine      Coordination Screen  - Dysmetria: intact   - Dysdiadochokinesia: Intact   - Alternating Toe Taps:  Intact Oculomotor Screen  - Baseline Symptoms: 2/10  - Baseline Observation: nA  - Gaze Holding Nystagmus: Normal Dizziness: baseline , Observation: normal   - Spontaneous Nystagmus Room Light: Normal Dizziness: Baseline Observation: normal   - Smooth Pursuits (central): Normal Dizziness: baseline, Observation: normal   - Near Point Convergence (normal: < 4"/10 cm - central): Abnormal Dizziness: baseline dizziness , Observation: distance 6 CM  - Saccades (central): Abnormal Dizziness: baseline,  Observation: slower speed with tracking   - VOR Screen (motion sensitivity): Normal Dizziness: normal   - VOR Cancel (central):  Abnormal Dizziness: 4/10, Observation: decrease ability to maintain tracking   - Head Thrust (moderate to severe hypofunction): normal   - Dynamic Visual Acuity (2 Hz):   Static Head: 20/25 Line   Dynamic Head: 20/30 Line   Difference in number of lines: 1 (abnormal if 3 or >)      Outcome Measures Initial Eval  5/20/2021 TN  6/25/2021       mCTSIB  - FTEO (firm)  - FTEC (firm)  - FTEO (foam)  - FTEC (foam)   30 sec  12 sec  30 sec  2 sec   30 sec  30 sec  30 sec  30 sec        DGI 17/24 20/24       FGA         DHI                                                                    Precautions: Fall risk, Tinnitus, Chronic pain in legs   Past Medical History:   Diagnosis Date    Arthritis     Asthma     Back pain     Chronic pain disorder     hips into the legs    Colon polyp     [facility]    Gastric bypass status for obesity     yarelis en y    GERD (gastroesophageal reflux disease)     Hiatal hernia     History of transfusion 2006    after gastric bypass surgery, no reactions    Hypertension     Irritable bowel syndrome     Kidney stone     Muscle weakness     bilateral legs    Numbness and tingling     bilateral feet    Pneumonia     Spinal stenosis     Tinnitus     occassionally    Vertigo     Wears glasses

## 2021-07-29 NOTE — PROGRESS NOTES
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  She reports 3/10 cervical spine pain today, with difficulties rotating her head to the right when she is driving  Objective: See treatment diary below      Assessment: Tolerated treatment well  Able to perform all exercises with no difficulties  Ambulating without AD in clinic due to decreased pain levels  Cervical spine ROM improving with manual techniques  Patient would benefit from continued PT to decrease pain and maximize function for independence in community  Plan: Continue per plan of care        Precautions: Standard, Hx of lumbar spine fusion 2018, failed gastric bypass surgery, GERD, Elevated surface please        Manuals    Cervical mobs KW AR AR AR AR   Cervical distraction  KW AR AR AR AR   Trigger point release R UT  AR AR AR AR           Neuro Re-Ed        TrA progression        Glute sets         Hip add squeeze  3s x20 3s x20 3sx20 10 10   Supine clamshells 20 Blue TB 20 Blue TB 20 GTB 10 10   Supine marches  10B  10 B 10B  10B   Supine leg ext with TrA  10B  10 B 10B 10B    10x sitting       Ther Ex         2x10 2x10 2x10     Step ups and step taps  20 ea 6" 20 ea 6"     LTR 20 20 20     Seated lumbar flexion   3sx10 3 way  3sx10 3 way   3sx10 3 way    SKTC  3x10s B  3x10s B   3x10s B    Piriformis stretch  3x10s B 3x10s B 3x10s B   3x10s B    Side stepping at // bars  2x10' RTB  4x10'       Hamstring stretch x10 20s x10 20s x10 20s  x10 20s   Self cervical distraction 10    10   Cervical retraction  10, Cervical extension SNAG 10x    10, Cervical extension SNAG 10x   TB row, shoulder ext, paloff press  10 GTB each  10 GTB each   20 GTB B   Modalities        Heat pre

## 2021-08-03 ENCOUNTER — EVALUATION (OUTPATIENT)
Dept: PHYSICAL THERAPY | Facility: CLINIC | Age: 66
End: 2021-08-03
Payer: COMMERCIAL

## 2021-08-03 ENCOUNTER — OFFICE VISIT (OUTPATIENT)
Dept: OTOLARYNGOLOGY | Facility: CLINIC | Age: 66
End: 2021-08-03
Payer: COMMERCIAL

## 2021-08-03 ENCOUNTER — APPOINTMENT (OUTPATIENT)
Dept: PHYSICAL THERAPY | Facility: CLINIC | Age: 66
End: 2021-08-03
Payer: COMMERCIAL

## 2021-08-03 ENCOUNTER — OFFICE VISIT (OUTPATIENT)
Dept: OCCUPATIONAL THERAPY | Facility: CLINIC | Age: 66
End: 2021-08-03
Payer: COMMERCIAL

## 2021-08-03 VITALS — HEIGHT: 63 IN | WEIGHT: 240 LBS | TEMPERATURE: 98.4 F | BODY MASS INDEX: 42.52 KG/M2

## 2021-08-03 DIAGNOSIS — G89.4 CHRONIC PAIN SYNDROME: ICD-10-CM

## 2021-08-03 DIAGNOSIS — H93.13 TINNITUS OF BOTH EARS: ICD-10-CM

## 2021-08-03 DIAGNOSIS — M54.50 LUMBAR SPINE PAIN: Primary | ICD-10-CM

## 2021-08-03 DIAGNOSIS — Z98.1 HISTORY OF FUSION OF LUMBAR SPINE: ICD-10-CM

## 2021-08-03 DIAGNOSIS — M54.2 NECK PAIN: ICD-10-CM

## 2021-08-03 DIAGNOSIS — M54.16 LUMBAR RADICULOPATHY: ICD-10-CM

## 2021-08-03 DIAGNOSIS — R42 DIZZINESS OF UNKNOWN ETIOLOGY: Primary | ICD-10-CM

## 2021-08-03 DIAGNOSIS — M79.18 MYOFASCIAL PAIN SYNDROME: ICD-10-CM

## 2021-08-03 DIAGNOSIS — R53.83 FATIGUE, UNSPECIFIED TYPE: ICD-10-CM

## 2021-08-03 DIAGNOSIS — M53.3 SACROILIAC PAIN: ICD-10-CM

## 2021-08-03 DIAGNOSIS — M54.12 CERVICAL RADICULOPATHY: ICD-10-CM

## 2021-08-03 DIAGNOSIS — R42 VERTIGO: Primary | ICD-10-CM

## 2021-08-03 DIAGNOSIS — H54.7 VISION IMPAIRMENT: ICD-10-CM

## 2021-08-03 PROCEDURE — 3008F BODY MASS INDEX DOCD: CPT | Performed by: ORTHOPAEDIC SURGERY

## 2021-08-03 PROCEDURE — 99214 OFFICE O/P EST MOD 30 MIN: CPT | Performed by: NURSE PRACTITIONER

## 2021-08-03 PROCEDURE — 97530 THERAPEUTIC ACTIVITIES: CPT

## 2021-08-03 PROCEDURE — 97112 NEUROMUSCULAR REEDUCATION: CPT

## 2021-08-03 PROCEDURE — 97110 THERAPEUTIC EXERCISES: CPT

## 2021-08-03 NOTE — ASSESSMENT & PLAN NOTE
Reviewed symptoms including imbalance, tinnitus, fatigue, neck pain, difficulty moving arms    Discussed possible causes of vertigo including neurology, cardiac, autoimmune, Otitis media, sinusitis, and inner ear concerns  MS vs autoimmune remain within the differential     Audiogram last visit indicating bilateral borderline high frequency SNHL, right more than left, Tymps type A bilaterally, good word discrimination  VNG with inconclusive findings, no findings related to BPPV  Lab studies negative except for positive RF  Treatment options include at home epley's, vestibular therapy,  OT, neurology consultation, rheumatology consultation, MRI brain with IAC  After discussion agreed to lab studies and VNG testing  Continue vestibular PT with additional myofascial treatment  Proceed with MRI brain with IAC  Consider consultations        Follow up If needed

## 2021-08-03 NOTE — PROGRESS NOTES
PT Re-Evaluation     Today's date: 8/3/2021  Patient name: Maria Elena Bailey  : [id number]  MRN: [id number]  Referring provider: Ismael Esquivel DO  Dx:   Encounter Diagnosis     ICD-10-CM    1  Lumbar spine pain  M54 5    2  Lumbar radiculopathy  M54 16    3  Cervical radiculopathy  M54 12    4  History of fusion of lumbar spine  Z98 1    5  Sacroiliac pain  M53 3        Start Time: 1400  Stop Time: 7880  Total time in clinic (min): 45 minutes    Assessment  Assessment details: Maria Elena Bailey is a 63y o  year old female reports to physical therapy with symptoms consistent with referring diagnosis of: History of fusion of lumbar spine  (primary encounter diagnosis), Sacroiliac pain, Lumbar spine pain, and cervical radiculopathy  Patient demonstrates significant improvements in cervical spine and lumbar spine ROM  Patient demonstrates limitations in lumbar and cervical spine ROM, strength, and functional mobility  Overall function has not improved since starting PT, as she notes difficulties with ascending/descending stairs reciprocally, standing and walking greater than 15 minutes, looking up at the ceiling, sleeping through the night without increased pain, and taking care of clients at work  Patient requires skilled physical therapy to restore prior level of function, address functional limitations, and progress towards independence with home exercise program  Patient was educated on HEP as noted on flow sheet, nature of lumbar spine pain, and importance of performing heat on lumbar spine at the end of the day to maximize benefits of PT  Patient verbalized and demonstrated understanding of HEP and plan of care  Primary focus of PT is to maximize core and hip musculature strength for independence in community      Symptom irritability: moderateBarriers to therapy: Due to chronic nature of lumbar spine pain, and diffuse nature of lumbar spine pain, patient's tolerance and progress towards goals may be limited at this time  Goals  STGs to be achieved in 4 weeks  -STG 1: Patient will be independent with HEP  -MET  -STG 2: Patient will have 0/10 lumbar spine pain at rest  -NOT MET  -STG 3: Patient will increase lumbar spine ROM to within normal limits  -MET  -STG 4: Patient will report 40% functional improvement  -MET  -STG 5: Patient will demonstrate 1/2 grade MMT improvement in R LE  -MET  -STG 6: Patient will be able to stand for greater than 30 minutes with no pain  -NOT MET    LTGs to be achieved in 8 weeks  -LTG 1: Patient will demonstrate independence with maintenance program  - MET  -LTG 2: Patient will perform all functional activities with less than 2/10 lumbar spine pain  - MET   -LTG 3: Patient will improve FOTO score by 10 points  -NOT MET  -LTG 4: Patient will report 80% functional improvement  -NOT MET  -LTG 5: Patient will be able to stand for greater than 60 minutes with no pain  -NOT MET  -LTG 6: Patient will demonstrate 1 grade MMT improvement in R LE  - MET  -LTG 7: Patient will be able to mount and dismount horse and motorcycle with no difficulties   -NOT MET    Plan  Patient would benefit from: PT eval and skilled physical therapy  Planned modality interventions: TENS, thermotherapy: hydrocollator packs, cryotherapy, electrical stimulation/Russian stimulation, traction and ultrasound  Planned therapy interventions: manual therapy, joint mobilization, massage, Garcia taping, ADL retraining, activity modification, ADL training, abdominal trunk stabilization, balance, neuromuscular re-education, patient education, postural training, strengthening, stretching, therapeutic activities, therapeutic exercise, therapeutic training, flexibility, functional ROM exercises, gait training, graded activity, graded exercise, graded motor and home exercise program  Frequency: 2x week  Duration in weeks: 8        Subjective Evaluation    History of Present Illness  Date of surgery: 2018  Mechanism of injury: Patient reports history of lumbar spine fusion on 18  She reports that her lumbar spine pain was at [employer] when she returned to work as a   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  She rides horses and her [employer], but notes difficulties mounting her horse and mounting the motorcycle  She reports lumbar spine pain that radiates across her low back 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  Functionally, she notes difficulties with ascending/descending stairs without AD  She has inability to perform a heel raise  She has difficulties driving due to lumbar spine pain  She reports numbness and tingling down the posterior aspect of B legs to her heels  She reports per tingling is when she is standing  When she sits down, her symptoms are relieved  She was denied recent MRI  Patient recently received medical marijuana card to help with pain levels as she would not like to take pain medication at this time  21: Patient reports 610 lumbar spine pain today  She feels as though her pain has improved since starting PT, with ease of movement  She would like to be evaluated for her neck pain, as this is causing her difficulties when driving, looking at the ceiling, and performing overhead tasks  She is currently seeing PT for her dizziness  8/3/21: Patient notes improvements in cervical spine ROM, especially when she is driving  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, as her tolerance to standing and walking has improved  She notes that stair navigation is rather difficult for her, as she is ascending/descening reciprocally     Pain  Current pain ratin  At best pain ratin  At worst pain ratin  Quality: needle-like and pressure  Relieving factors: rest and ice  Aggravating factors: standing and stair climbing  Progression: no change    Social Support  Steps to enter house: yes  Stairs in house: yes Lives in: multiple-level home  Lives with: significant other    Employment status: working  Treatments  Previous treatment: physical therapy  Current treatment: medication  Patient Goals  Patient goals for therapy: increased strength, return to sport/leisure activities and decreased pain  Patient goal: Walk at a normal pace, ride horse and motorcycle with no difficulties  Objective     Concurrent Complaints  Positive for disturbed sleep  Negative for night pain, bladder dysfunction and bowel dysfunction    Postural Observations  Seated posture: good  Standing posture: fair  Correction of posture: makes symptoms better        Palpation   Left   Tenderness of the erector spinae, cervical paraspinals, lumbar paraspinals, suboccipitals and upper trapezius  Right   Tenderness of the erector spinae, cervical paraspinals, lumbar paraspinals, suboccipitals and upper trapezius  Trigger point to upper trapezius  Tenderness   Cervical Spine   Tenderness in the spinous process (C6-C7)  Lumbar Spine  Tenderness in the spinous process (T12-L5)  Left Hip   Tenderness in the PSIS  Right Hip   Tenderness in the PSIS  Neurological Testing     Sensation   Cervical/Thoracic   Left   Intact: light touch    Right   Intact: light touch    Lumbar   Left   Intact: light touch    Right   Intact: light touch    Reflexes   Left   Patellar (L4): normal (2+)    Right   Patellar (L4): normal (2+)    Strength/Myotome Testing   Cervical Spine     Left   Normal strength    Right   Normal strength    Lumbar   Left   Normal strength    Right Hip   Planes of Motion   Flexion: 4-  Abduction: 4-    Right Knee   Flexion: 4-    Right Ankle/Foot   Dorsiflexion: 4-  Plantar flexion: 4-    Additional Strength Details  Patient unable to heel walk or toe walk due to functional weakness  Unable to perform heel raises due to pain       Muscle Activation     Additional Muscle Activation Details  Poor activation of TrA    Tests Lumbar   Positive SIJ compression and sacroiliac distraction   Left   Positive crossed SLR, passive SLR, quadrant and slump test      Right   Positive crossed SLR, passive SLR, quadrant and slump test      Left Pelvic Girdle/Sacrum   Positive: active SLR test      Right Pelvic Girdle/Sacrum   Positive: active SLR test      Left Hip   Positive KAROLINA and FADIR  Right Hip   Positive KAROLINA and FADIR  Ambulation     Ambulation: Stairs   Ascend stairs: independent  Pattern: non-reciprocal  Railings: two rails  Descend stairs: independent  Pattern: non-reciprocal  Railings: two rails    Observational Gait   Gait: antalgic   Decreased walking speed  Quality of Movement During Gait     Pelvis    Pelvis (Left): Positive Trendelenburg  Pelvis (Right): Positive Trendelenburg  Additional Quality of Movement During Gait Details  Lacks proper toe off gait pattern bilaterally       General Comments:      Lumbar Comments  Lumbar AROM    Flexion: 100%, radicular to toes, (+) He's sign    Extension: 100%, decreased    R SB: 50%, sharp pain    L SB: 50%, sharp pain    R rotation: 25%, sharp pain on R   L rotation: 25%, sharp pain on R    Repeated motion testing    Repeated flexion in standing: increased, worse    Repeated flexion in sitting: decreased, better    Repeated extension in standing: decreased, no better    Prone lying: increased, worse    Prone on elbows: increased, worse    Repeated R side gliding: decreased, no better    Repeated L side gliding: NT    Cervical/Thoracic Comments  Cervical AROM   Flexion: 100%    Extension: 75%    R rotation: 50%    L rotation: 50%    R side bendin%    L side bendin%     Repeated motions testing:    Repeated retractions: centralising, worse     Repeated protractions: NT              Precautions: Standard, Hx of lumbar spine fusion 2018, failed gastric bypass surgery, GERD, Elevated surface please      Manuals 8/3 7/1 7/6 7/8 7/29   Cervical mobs AR AR AR AR AR   Cervical distraction  AR AR AR AR AR   Trigger point release R UT  AR AR AR AR           Neuro Re-Ed        TrA progression        Glute sets         Hip add squeeze  3s x20 3s x20 3sx20 10 10   Supine clamshells 20 Blue TB 20 Blue TB 20 GTB 10 10   Supine marches  10B  10 B 10B  10B   Supine leg ext with TrA  10B  10 B 10B 10B    10x sitting       Ther Ex         2x10 2x10 2x10     Step ups and step taps 20 B 20 ea 6" 20 ea 6"     LTR 20 20 20     Seated lumbar flexion  3sx10 3 way 3sx10 3 way  3sx10 3 way   3sx10 3 way    SKTC  3x10s B  3x10s B   3x10s B    Piriformis stretch  3x10s B 3x10s B 3x10s B   3x10s B    Side stepping at // bars 4x10' 2x10' RTB  4x10'       Hamstring stretch x10 20s x10 20s x10 20s  x10 20s   Self cervical distraction 10    10   Cervical retraction  10, Cervical extension SNAG 10x    10, Cervical extension SNAG 10x   TB row, shoulder ext, paloff press 20 BTB each  10 GTB each  10 GTB each   20 GTB B   Modalities        Heat pre
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
## 2021-08-03 NOTE — PROGRESS NOTES
Daily Note     Today's date: 8/3/2021  Patient name: Nhung Petersen  :   MRN: [id number]  Referring provider: Self, Referral  Dx:   Encounter Diagnosis     ICD-10-CM    1  Dizziness of unknown etiology  R42    2  Vision impairment  H54 7        Start Time:   Stop Time: 1400  Total time in clinic (min): 45 minutes    Subjective: "they're tired"      Objective:   HA at beginning of session 10  Educated and provided with YOST chart to perform monocularly and Bimonocularly   Performed on slant board focusing on sustained convergence and horizontal saccades while completing decoding last letter  erformed decoding first letter while completing accomodation task looking at near to/from far, convergence and saccades       Assessment: Tolerated treatment well  Patient would benefit from continued OT      Plan: Continue per plan of care        Precautions: dizziness      Manuals                                                                 Neuro Re-Ed                                                                                                        Ther Ex                                                                                                                     Ther Activity                                       Gait Training                                       Modalities

## 2021-08-05 ENCOUNTER — OFFICE VISIT (OUTPATIENT)
Dept: OCCUPATIONAL THERAPY | Facility: CLINIC | Age: 66
End: 2021-08-05
Payer: COMMERCIAL

## 2021-08-05 ENCOUNTER — OFFICE VISIT (OUTPATIENT)
Dept: PHYSICAL THERAPY | Facility: CLINIC | Age: 66
End: 2021-08-05
Payer: COMMERCIAL

## 2021-08-05 ENCOUNTER — APPOINTMENT (OUTPATIENT)
Dept: PHYSICAL THERAPY | Facility: CLINIC | Age: 66
End: 2021-08-05
Payer: COMMERCIAL

## 2021-08-05 DIAGNOSIS — M54.50 LUMBAR SPINE PAIN: Primary | ICD-10-CM

## 2021-08-05 DIAGNOSIS — R42 DIZZINESS OF UNKNOWN ETIOLOGY: Primary | ICD-10-CM

## 2021-08-05 DIAGNOSIS — M54.16 LUMBAR RADICULOPATHY: ICD-10-CM

## 2021-08-05 DIAGNOSIS — H54.7 VISION IMPAIRMENT: ICD-10-CM

## 2021-08-05 DIAGNOSIS — M54.12 CERVICAL RADICULOPATHY: ICD-10-CM

## 2021-08-05 DIAGNOSIS — Z98.1 HISTORY OF FUSION OF LUMBAR SPINE: ICD-10-CM

## 2021-08-05 DIAGNOSIS — M53.3 SACROILIAC PAIN: ICD-10-CM

## 2021-08-05 PROCEDURE — 97112 NEUROMUSCULAR REEDUCATION: CPT

## 2021-08-05 PROCEDURE — 97110 THERAPEUTIC EXERCISES: CPT

## 2021-08-05 PROCEDURE — 97530 THERAPEUTIC ACTIVITIES: CPT

## 2021-08-05 NOTE — PROGRESS NOTES
Daily Note     Today's date: 2021  Patient name: Jcarlos Drummond  :   MRN: [id number]  Referring provider: JEANNE Slater  Dx:   Encounter Diagnosis     ICD-10-CM    1  Dizziness of unknown etiology  R42    2  Vision impairment  H54 7        Start Time:   Stop Time: 1013  Total time in clinic (min): 34 minutes    Subjective: arrived 9 min late  Pt reports ENT stated no concerns  Recommended to neurology due to concerns for possible MS  She reports an appt on  and MRI on   Objective:   HA at beginning of session 0/10  Completed multimatrix with figure ground shapes and numbers to rotate adding and subtracting  Working on saccades, accomodation, visual perception, and divided attention  Completed with relooking at numbers x3 to recall numbers  Reports blurry vision "almost double vision" - break provided    Completed map locations on slant board to work on visual saccades, pursuits, and convergence  Completed with min A for problem solving  Break provided after 6 due to increased symptoms  Recommended to complete as HEP      Assessment: Tolerated treatment well  Patient reporting increased symptoms with saccades, convergence, and cognitive tasks  Patient would benefit from continued OT      Plan: Continue per plan of care        Precautions: dizziness      Manuals                                                                 Neuro Re-Ed                                                                                                        Ther Ex                                                                                                                     Ther Activity                                       Gait Training                                       Modalities

## 2021-08-05 NOTE — PROGRESS NOTES
Daily Note     Today's date: 2021  Patient name: Jailene Olivera  :   MRN: [id number]  Referring provider: Grover Davis DO  Dx:   Encounter Diagnosis     ICD-10-CM    1  Lumbar spine pain  M54 5    2  Lumbar radiculopathy  M54 16    3  Cervical radiculopathy  M54 12    4  History of fusion of lumbar spine  Z98 1    5  Sacroiliac pain  M53 3        Start Time: 1904  Stop Time: 1100  Total time in clinic (min): 45 minutes    Subjective: Patient had follow up with ENT yesterday regarding symptoms within inner ear  Patient is to receive further testing for possible diagnosis of MS, as she is experiencing progressive weakness of her arms, legs, and notes dizziness persists despite PT and OT intervention  She is scheduled to see neurology on 2021, with an MRI on 2021  Objective: See treatment diary below      Assessment: Tolerated treatment fair  Patient able to demonstrate SLR with no increased lumbar spine pain  Patient unable to perform SLR during initial evaluation  Added squats with kettle bell and overhead press for improved strength globally  No soreness or increased pain noted post tx  Patient would benefit from continued PT to maximize strength and function for independence with community activities  Plan: Continue per plan of care        Precautions: dizziness    Manuals 8/3 8/5 7/6 7/8 7/29   Cervical mobs AR AR AR AR AR   Cervical distraction  AR AR AR AR AR   Trigger point release R UT  AR AR AR AR           Neuro Re-Ed        TrA progression        Glute sets         Hip add squeeze  3s x20 3s x20 3sx20 10 10   Supine clamshells 20 Blue TB 20 Blue TB 20 GTB 10 10   Supine marches  10B  10 B 10B  10B   Supine leg ext with TrA  10B  10 B 10B 10B    10x sitting       Ther Ex         2x10 2x10 2x10     Step ups and step taps 20 B 20 ea 6" 20 ea 6"     LTR 20 20 20     Seated lumbar flexion  3sx10 3 way 3sx10 3 way  3sx10 3 way   3sx10 3 way    SKTC  3x10s B  3x10s B   3x10s B    Piriformis stretch  3x10s B 3x10s B 3x10s B   3x10s B    Side stepping at // bars 4x10' 2x10' RTB  4x10'       Hamstring stretch x10 20s x10 20s x10 20s  x10 20s   Squat with KB   9# x10      Shoulder flex, abd, scap  10 1#, 10 2#       Self cervical distraction 10    10   Cervical retraction  10, Cervical extension SNAG 10x 10, Cervical extension SNAG 10x   10, Cervical extension SNAG 10x   TB row, shoulder ext, paloff press 20 BTB each  10 GTB each  10 GTB each   20 GTB B   Modalities        Heat pre

## 2021-08-06 ENCOUNTER — TELEPHONE (OUTPATIENT)
Dept: OTOLARYNGOLOGY | Facility: CLINIC | Age: 66
End: 2021-08-06

## 2021-08-06 NOTE — TELEPHONE ENCOUNTER
Patient had the VNG done last week  Patient mentioned that it was noted about "possible MS"  Patient asks if an appt with an audiologist is still needed or should the patient f/u with Neurology or Rheumotology? Please advise

## 2021-08-10 ENCOUNTER — OFFICE VISIT (OUTPATIENT)
Dept: OCCUPATIONAL THERAPY | Facility: CLINIC | Age: 66
End: 2021-08-10
Payer: COMMERCIAL

## 2021-08-10 ENCOUNTER — APPOINTMENT (OUTPATIENT)
Dept: PHYSICAL THERAPY | Facility: CLINIC | Age: 66
End: 2021-08-10
Payer: COMMERCIAL

## 2021-08-10 DIAGNOSIS — H54.7 VISION IMPAIRMENT: ICD-10-CM

## 2021-08-10 DIAGNOSIS — R42 DIZZINESS OF UNKNOWN ETIOLOGY: Primary | ICD-10-CM

## 2021-08-10 PROCEDURE — 97530 THERAPEUTIC ACTIVITIES: CPT

## 2021-08-11 ENCOUNTER — CONSULT (OUTPATIENT)
Dept: NEUROLOGY | Facility: CLINIC | Age: 66
End: 2021-08-11
Payer: COMMERCIAL

## 2021-08-11 VITALS
SYSTOLIC BLOOD PRESSURE: 120 MMHG | HEIGHT: 63 IN | DIASTOLIC BLOOD PRESSURE: 80 MMHG | WEIGHT: 240 LBS | BODY MASS INDEX: 42.52 KG/M2 | TEMPERATURE: 97.2 F | HEART RATE: 101 BPM

## 2021-08-11 DIAGNOSIS — R42 VERTIGO: ICD-10-CM

## 2021-08-11 DIAGNOSIS — G89.4 CHRONIC PAIN SYNDROME: ICD-10-CM

## 2021-08-11 DIAGNOSIS — M54.2 NECK PAIN: ICD-10-CM

## 2021-08-11 DIAGNOSIS — M79.18 MYOFASCIAL PAIN SYNDROME: ICD-10-CM

## 2021-08-11 DIAGNOSIS — H93.13 TINNITUS OF BOTH EARS: ICD-10-CM

## 2021-08-11 DIAGNOSIS — R29.898 WEAKNESS OF BOTH LEGS: Primary | ICD-10-CM

## 2021-08-11 DIAGNOSIS — R53.83 FATIGUE, UNSPECIFIED TYPE: ICD-10-CM

## 2021-08-11 PROCEDURE — 99205 OFFICE O/P NEW HI 60 MIN: CPT | Performed by: PSYCHIATRY & NEUROLOGY
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
## 2021-08-11 NOTE — PROGRESS NOTES
[facility] - Outpatient Follow-up Note  Germaine Santillan 63 y o  female MRN: [id number]  Encounter: [id number]          ASSESSMENT AND PLAN:      1  Reflux   Patient with history of gastric bypass converted to total gastrectomy and esophagojejunostomy presenting for follow up  At last office she reported she was off of Protonix and doing well so continued to stop using it  Her symptoms re-occurred and she scheduled a follow up with us to discuss restarting  In the meantime, she started medical marijuana lozenges which has significantly improved her abdominal pain as well as reflux symptoms and no longer thinks she needs Protonix  Her PCP recently prescribed Pepcid 20 mg daily instead which she takes occasionally instead with relief  She denies any nausea, vomiting, dysphagia, odynophagia, decreased appetite or unintentional weight loss  Recent EGD 4/2021 with healthy anastomosis and no evidence of stenosis, normal small bowel      -Will continue to hold Protonix  If symptoms of epigastric pain or reflux re-occur can consider restarting daily Protonix    -Continue to use Pepcid as needed   -Continue to avoid NSAIDs   -Continue lifestyle modifications including avoiding trigger foods such as spicy foods, pepper, citrus, tomato based products, smoking or alcohol   -Patient will call us with any questions or concerns in the meantime  Patient will follow up in 3-4 months   ______________________________________________________________________    SUBJECTIVE:      Germaine Santillan is a very pleasant 61-year-old female with past medical history of gastric bypass converted to total gastrectomy and esophagojejunostomy in 2005, SIBO, IBS, hypertension presents for follow-up  Patient was last seen 06/2021 for follow-up of GERD  At that time her Protonix was discontinued    She recently had EGD 04/2021 with healthy-appearing esophagus to jejunalanastomosis with small diverticular pouch noted above the anastomosis  She recently had upper GI series this year which appeared with wide open surgical anastomosis, improved from prior stenosis seen in the past   There was noted to be mild dysmotility in the distal 3rd part of the esophagus and reflux  She is also status post cholecystectomy and was having diarrhea  She had fecal fat which was slightly elevated  Pancreatic elastase was normal     Patient reports today to discuss restarting Protonix  It was recently d/c at her last OV  Patient reports after scheduling this appointment she further discussed medical marijuana use with her PCP and started using the lozenges  She reports this has significantly improved her abdominal pain as well as her reflux  She denies any further symptoms after using them and does not think she needs the Protonix anymore  Instead, her PCP prescribed Pepcid which she takes occasionally  She denies any nausea, vomiting, dysphagia, odynophagia, decreased appetite  She is currently starting a weight loss program and has changed many dietary aspects with improvement in her GI symptoms as well  REVIEW OF SYSTEMS IS OTHERWISE NEGATIVE        Historical Information   Past Medical History:   Diagnosis Date    Arthritis     Asthma     Back pain     Chronic pain disorder     hips into the legs    Colon polyp     DVT (deep venous thrombosis) ([facility] [address] ) 2006    Gastric bypass status for obesity     yarelis en y    GERD (gastroesophageal reflux disease)     Hiatal hernia     History of transfusion 2006    after gastric bypass surgery, no reactions    Hypertension     Irritable bowel syndrome     Kidney stone     Muscle weakness     bilateral legs    Numbness and tingling     bilateral feet    Pneumonia     Spinal stenosis     Tinnitus     occassionally    Vertigo     Wears glasses      Past Surgical History:   Procedure Laterality Date    APPENDECTOMY      BACK SURGERY  06/18/2018    L4-5 fusion    BARIATRIC SURGERY      yarelis en y- pt states the procedure was done incorrectly which lead to additional surgeries from -   [address]      SECTION      x1   [address]      partial removal of the small bowel-necrosis-between -    COLONOSCOPY      COLONOSCOPY W/ BIOPSIES AND POLYPECTOMY      FLEXIBLE BRONCHOSCOPY W/ UPPER ENDOSCOPY      GASTRECTOMY      after gastric bypass-(failed surgery per pt) 2006    HERNIA REPAIR      incisional hernias-diaphragm area    CT ARTHROCENTESIS ASPIR&/INJ SMALL JT/BURSA W/O US N/A 2018    Procedure: Coccygeal Injection & Ganglion Impar Block;  Surgeon: Charisse Rolle MD;  Location: [facility] MAIN OR;  Service: Pain Management     CT ARTHRODESIS POSTERIOR INTERBODY LUMBAR N/A 2018    Procedure: L4-5 DECOMPRESSION INTERBODY INSTRUMENTED FUSION;  Surgeon: Meghan Shrestha MD;  Location: [facility];  Service: Orthopedics    CT COLONOSCOPY FLX DX W/COLLJ [address] PFRMD N/A 2018    Procedure: COLONOSCOPY;  Surgeon: Greg Mukherjee MD;  Location: [facility] GI LAB; Service: Gastroenterology    CT ESOPHAGOGASTRODUODENOSCOPY TRANSORAL DIAGNOSTIC N/A 2018    Procedure: ESOPHAGOGASTRODUODENOSCOPY (EGD); Surgeon: Greg Mukherjee MD;  Location: [facility] GI LAB; Service: Gastroenterology   [place] JOINT Right 2018    Procedure: Rt Sacroiliac Joint Injection;  Surgeon: Charisse Rolle MD;  Location: [facility] MAIN OR;  Service: Pain Management     CT INJECTION,SACROILIAC JOINT Right 2019    Procedure: Sacroiliac Joint Injection (75003);   Surgeon: Charisse Rolle MD;  Location: [facility] MAIN OR;  Service: Pain Management     TONSILECTOMY, ADENOIDECTOMY, BILATERAL MYRINGOTOMY AND TUBES      TONSILLECTOMY       Social History   Social History     Substance and Sexual Activity   Alcohol Use Yes    Alcohol/week: 7 0 standard drinks    Types: 5 Cans of beer, 2 Shots of liquor per week Comment: socially     Social History     Substance and Sexual Activity   Drug Use Not Currently    Types: Marijuana    Comment: has medical marijuana card  lozenges     Social History     Tobacco Use   Smoking Status Never Smoker   Smokeless Tobacco Never Used     Family History   Problem Relation Age of Onset    Diabetes Mother     Aortic aneurysm Father     Cancer Father         prostate    Heart disease Sister         open heart    Cancer Sister         breast    Breast cancer Sister 47    Diabetes Brother     Cancer Brother         spinal cancer    Other Daughter         concussion syndrome from MVA    Asperger's syndrome Son         high functioning    Diabetes Maternal Grandfather     Stroke Brother     Hypertension Brother     No Known Problems Maternal Aunt     No Known Problems Maternal Aunt     No Known Problems Paternal Aunt     No Known Problems Paternal Aunt     Colon cancer Maternal Uncle 61       Meds/Allergies       Current Outpatient Medications:     calcium carbonate (OS-BEAU) 1250 (500 Ca) MG chewable tablet    cholecalciferol (VITAMIN D3) 400 units tablet    dicyclomine (BENTYL) 10 mg capsule    famotidine (PEPCID) 20 mg tablet    fluticasone (FLONASE) 50 mcg/act nasal spray    lisinopril-hydrochlorothiazide (PRINZIDE,ZESTORETIC) 20-12 5 MG per tablet    multivitamin (THERAGRAN) TABS    Omega-3 Fatty Acids (fish oil) 1,000 mg    traZODone (DESYREL) 50 mg tablet    vitamin B-12 (CYANOCOBALAMIN) 50 MCG tablet    meclizine (ANTIVERT) 25 mg tablet    No Known Allergies        Objective     Blood pressure 120/77, pulse 100, temperature 97 6 °F (36 4 °C), height 5' 3" (1 6 m), weight 108 kg (238 lb)  Body mass index is 42 16 kg/m²        PHYSICAL EXAM:      General Appearance:   Alert, cooperative, no distress   HEENT:   Normocephalic, atraumatic, anicteric      Neck:  Supple, symmetrical, trachea midline   Lungs:   Clear to auscultation bilaterally; no rales, rhonchi or wheezing; respirations unlabored    Heart[de-identified]   Regular rate and rhythm; no murmur, rub, or gallop  Abdomen:   Soft, non-tender, non-distended; normal bowel sounds; no masses, no organomegaly    Genitalia:   Deferred    Rectal:   Deferred    Extremities:  No cyanosis, clubbing or edema    Pulses:  2+ and symmetric    Skin:  No jaundice, rashes, or lesions    Lymph nodes:  No palpable cervical lymphadenopathy        Lab Results:   No visits with results within 1 Day(s) from this visit  Latest known visit with results is:   Orders Only on 06/22/2021   Component Date Value    Rheumatoid Factor (RF) 95/67/0917 64 9     Cyclic Citrullin Peptide* 06/22/2021 32*    VALERIA 06/22/2021 Negative          Radiology Results:   No results found

## 2021-08-12 ENCOUNTER — OFFICE VISIT (OUTPATIENT)
Dept: PHYSICAL THERAPY | Facility: CLINIC | Age: 66
End: 2021-08-12
Payer: COMMERCIAL

## 2021-08-12 ENCOUNTER — TELEPHONE (OUTPATIENT)
Dept: GASTROENTEROLOGY | Facility: CLINIC | Age: 66
End: 2021-08-12

## 2021-08-12 ENCOUNTER — OFFICE VISIT (OUTPATIENT)
Dept: GASTROENTEROLOGY | Facility: CLINIC | Age: 66
End: 2021-08-12
Payer: COMMERCIAL

## 2021-08-12 ENCOUNTER — APPOINTMENT (OUTPATIENT)
Dept: PHYSICAL THERAPY | Facility: CLINIC | Age: 66
End: 2021-08-12
Payer: COMMERCIAL

## 2021-08-12 ENCOUNTER — OFFICE VISIT (OUTPATIENT)
Dept: OCCUPATIONAL THERAPY | Facility: CLINIC | Age: 66
End: 2021-08-12
Payer: COMMERCIAL

## 2021-08-12 VITALS
TEMPERATURE: 97.6 F | SYSTOLIC BLOOD PRESSURE: 120 MMHG | HEART RATE: 100 BPM | WEIGHT: 238 LBS | HEIGHT: 63 IN | BODY MASS INDEX: 42.17 KG/M2 | DIASTOLIC BLOOD PRESSURE: 77 MMHG

## 2021-08-12 DIAGNOSIS — M54.16 LUMBAR RADICULOPATHY: ICD-10-CM

## 2021-08-12 DIAGNOSIS — K21.9 GASTROESOPHAGEAL REFLUX DISEASE, UNSPECIFIED WHETHER ESOPHAGITIS PRESENT: Primary | ICD-10-CM

## 2021-08-12 DIAGNOSIS — M54.50 LUMBAR SPINE PAIN: Primary | ICD-10-CM

## 2021-08-12 DIAGNOSIS — H54.7 VISION IMPAIRMENT: ICD-10-CM

## 2021-08-12 DIAGNOSIS — R42 DIZZINESS OF UNKNOWN ETIOLOGY: Primary | ICD-10-CM

## 2021-08-12 PROCEDURE — 3074F SYST BP LT 130 MM HG: CPT | Performed by: PHYSICIAN ASSISTANT

## 2021-08-12 PROCEDURE — 97110 THERAPEUTIC EXERCISES: CPT

## 2021-08-12 PROCEDURE — 99213 OFFICE O/P EST LOW 20 MIN: CPT | Performed by: PHYSICIAN ASSISTANT

## 2021-08-12 PROCEDURE — 3078F DIAST BP <80 MM HG: CPT | Performed by: PHYSICIAN ASSISTANT

## 2021-08-12 PROCEDURE — 3008F BODY MASS INDEX DOCD: CPT | Performed by: PHYSICIAN ASSISTANT

## 2021-08-12 PROCEDURE — 97530 THERAPEUTIC ACTIVITIES: CPT

## 2021-08-12 PROCEDURE — 97112 NEUROMUSCULAR REEDUCATION: CPT

## 2021-08-12 PROCEDURE — 97140 MANUAL THERAPY 1/> REGIONS: CPT

## 2021-08-12 PROCEDURE — 1036F TOBACCO NON-USER: CPT | Performed by: PHYSICIAN ASSISTANT

## 2021-08-12 RX ORDER — FAMOTIDINE 20 MG/1
20 TABLET, FILM COATED ORAL DAILY PRN
COMMUNITY
End: 2021-12-07 | Stop reason: SDUPTHER

## 2021-08-12 NOTE — PROGRESS NOTES
Daily Note       Today's date: 2021  Patient name: Tae Rodriguez  :   MRN: [id number]  Referring provider: JEANNE Strong  Dx:   Encounter Diagnosis     ICD-10-CM    1  Dizziness of unknown etiology  R42    2  Vision impairment  H54 7        Start Time: 930  Stop Time: 5130  Total time in clinic (min): 45 minutes    Subjective: Pt reports she had PT and could do everything except the kettle ball due to increase in dizziness  She reports she went to neuology and reported symtpoms are unlikely MS  Looking into a possible compression of nerve  MRI scheduled for end      Objective:     Q-bitz completed with design on slant board to work on visual perception, saccades, and convergence  Completed with min A    Completed letter decoding message with 3 letters originally provided in order to problem solve rest of letters  Working on saccades, visual pursuits, visual perception, and EF skills  Completed with min A for problem solving  Reports blurriness during completion  Assessment: Tolerated treatment well, but requires frequent cues for attention in semi modal environment  Patient would benefit from continued OT      Plan: Continue per plan of care        Precautions: dizziness

## 2021-08-12 NOTE — PROGRESS NOTES
Daily Note     Today's date: 2021  Patient name: Keira Brunner  :   MRN: [id number]  Referring provider: Alma Anderson DO  Dx:   Encounter Diagnosis     ICD-10-CM    1  Lumbar spine pain  M54 5    2  Lumbar radiculopathy  M54 16        Start Time: 0845  Stop Time: 09  Total time in clinic (min): 45 minutes    Subjective: Patient reports 5/10 pain today in low back      Objective: See treatment diary below      Assessment: Tolerated treatment fair  Patient reported sharp pain in middle of back during KB hip hinge  No other complaints during session,  Patient would benefit from continued PT to maximize strength and function for independence with community activities  Plan: Continue per plan of care        Precautions: dizziness    Manuals 8/3 8/5 8/12 7/8 7/29   Cervical mobs AR AR  AR AR   Cervical distraction  AR AR JK AR AR   Trigger point release R UT  AR  AR AR           Neuro Re-Ed        TrA progression        Glute sets         Hip add squeeze  3s x20 3s x20 3sx20 10 10   Supine clamshells 20 Blue TB 20 Blue TB 20 Blue TB 10 10   Supine marches  10B  10 B 10B  10B   Supine leg ext with TrA  10B  10 B 10B 10B    10x sitting       Ther Ex         2x10 2x10      Step ups and step taps 20 B 20 ea 6" 20 ea 6"     LTR 20 20 20     Seated lumbar flexion  3sx10 3 way 3sx10 3 way  3sx10 3 way   3sx10 3 way    SKTC  3x10s B    3x10s B    Piriformis stretch  3x10s B 3x10s B 3x10s B  3x10s B    Side stepping at // bars 4x10' 2x10' RTB  4x10'  4x10' RTB     Hamstring stretch x10 20s x10 20s 5x10s  x10 20s   Squat with KB   9# x10 9# x10     Shoulder flex, abd, scap  10 1#, 10 2#  10x ea 2#     Self cervical distraction 10    10   Cervical retraction  10, Cervical extension SNAG 10x 10, Cervical extension SNAG 10x 10, Cervical extension SNAG 10x  10, Cervical extension SNAG 10x   TB row, shoulder ext, paloff press 20 BTB each  10 GTB each  10 GTB each  20 GTB B   Modalities        Heat pre

## 2021-08-12 NOTE — TELEPHONE ENCOUNTER
Patients GI provider:  Dr Reyes Leiva    Number to return call: [phone]    Reason for call: Pt calling requesting to speak to someone  Pt started taking VSL 3 and stated it is working really well for her  Pt wants to know if it is possible to send her a script for this?     Scheduled procedure/appointment date if applicable: Appt 86/5/19

## 2021-08-13 NOTE — TELEPHONE ENCOUNTER
Hx gerd, gastric bypass    Patient requesting script for VSL 3    I am unsure which one to order  Thanks

## 2021-08-17 ENCOUNTER — TELEPHONE (OUTPATIENT)
Dept: OBGYN CLINIC | Facility: HOSPITAL | Age: 66
End: 2021-08-17

## 2021-08-17 ENCOUNTER — APPOINTMENT (OUTPATIENT)
Dept: PHYSICAL THERAPY | Facility: CLINIC | Age: 66
End: 2021-08-17
Payer: COMMERCIAL

## 2021-08-17 ENCOUNTER — OFFICE VISIT (OUTPATIENT)
Dept: OCCUPATIONAL THERAPY | Facility: CLINIC | Age: 66
End: 2021-08-17
Payer: COMMERCIAL

## 2021-08-17 ENCOUNTER — OFFICE VISIT (OUTPATIENT)
Dept: PHYSICAL THERAPY | Facility: CLINIC | Age: 66
End: 2021-08-17
Payer: COMMERCIAL

## 2021-08-17 DIAGNOSIS — M54.16 LUMBAR RADICULOPATHY: ICD-10-CM

## 2021-08-17 DIAGNOSIS — M54.50 LUMBAR SPINE PAIN: Primary | ICD-10-CM

## 2021-08-17 DIAGNOSIS — M53.3 SACROILIAC PAIN: ICD-10-CM

## 2021-08-17 DIAGNOSIS — H54.7 VISION IMPAIRMENT: ICD-10-CM

## 2021-08-17 DIAGNOSIS — R42 DIZZINESS OF UNKNOWN ETIOLOGY: Primary | ICD-10-CM

## 2021-08-17 DIAGNOSIS — M54.12 CERVICAL RADICULOPATHY: ICD-10-CM

## 2021-08-17 DIAGNOSIS — Z98.1 HISTORY OF FUSION OF LUMBAR SPINE: ICD-10-CM

## 2021-08-17 PROCEDURE — 97112 NEUROMUSCULAR REEDUCATION: CPT

## 2021-08-17 PROCEDURE — 97110 THERAPEUTIC EXERCISES: CPT

## 2021-08-17 PROCEDURE — 97530 THERAPEUTIC ACTIVITIES: CPT | Performed by: OCCUPATIONAL THERAPIST

## 2021-08-17 NOTE — TELEPHONE ENCOUNTER
Dr Tomlin,         Patient is calling in requesting a call back  She is requesting water therapy script  She also would like to know if  can recommend any neurosurgeons?          Please advise,

## 2021-08-17 NOTE — PROGRESS NOTES
RTB  4x10'  4x10' RTB 4x10' RTB    Hamstring stretch x10 20s x10 20s 5x10s 5x10s x10 20s   Squat with KB   9# x10 9# x10     Shoulder flex, abd, scap  10 1#, 10 2#  10x ea 2#     Self cervical distraction 10    10   Cervical retraction  10, Cervical extension SNAG 10x 10, Cervical extension SNAG 10x 10, Cervical extension SNAG 10x 10, Cervical extension SNAG 10x 10, Cervical extension SNAG 10x   TB row, shoulder ext, paloff press 20 BTB each  10 GTB each  10 GTB each 10 GTB each 20 GTB B   Modalities        Heat pre

## 2021-08-17 NOTE — TELEPHONE ENCOUNTER
PT referral with aqua therapy noted placed  Any of the [facility] Neurosurgery group members is recommended

## 2021-08-17 NOTE — PROGRESS NOTES
Daily Note     Today's date: 2021  Patient name: Ivis Ventura  :   MRN: [id number]  Referring provider: JEANNE Lorenzo  Dx:   Encounter Diagnosis     ICD-10-CM    1  Dizziness of unknown etiology  R42    2  Vision impairment  H54 7        Start Time:   Stop Time: 1029  Total time in clinic (min): 45 minutes    Subjective: "I feel like I'm on a boat "      Objective: See treatment diary below  Convergence/saccades task completed with [employer] tiles on table top and word circles on vertical surface (window) to improve oculomotor performance for IADLs  Pt reported increased symptoms of dizziness/feeling "off" during task from 2 at baseline to 3-4 after task, however finished task without rest break  Convergence/divergence and vertical/horizontal saccades task involving number/letter code completed with pt completing table top worksheet and code on vertical surface (cabinet)  Pt reported eyes becoming tired during task, however continued until end of session  Assessment: Tolerated treatment fairly and without incident  Patient would benefit from continued OT  Pt tolerated dynamic vision tasks fairly, with mild increase in symptoms and eye fatigue with convergence/divergence activity  Plan: Continue per plan of care        Precautions: dizziness

## 2021-08-19 ENCOUNTER — OFFICE VISIT (OUTPATIENT)
Dept: OCCUPATIONAL THERAPY | Facility: CLINIC | Age: 66
End: 2021-08-19
Payer: COMMERCIAL

## 2021-08-19 ENCOUNTER — APPOINTMENT (OUTPATIENT)
Dept: PHYSICAL THERAPY | Facility: CLINIC | Age: 66
End: 2021-08-19
Payer: COMMERCIAL

## 2021-08-19 ENCOUNTER — OFFICE VISIT (OUTPATIENT)
Dept: PHYSICAL THERAPY | Facility: CLINIC | Age: 66
End: 2021-08-19
Payer: COMMERCIAL

## 2021-08-19 DIAGNOSIS — H54.7 VISION IMPAIRMENT: ICD-10-CM

## 2021-08-19 DIAGNOSIS — M54.12 CERVICAL RADICULOPATHY: ICD-10-CM

## 2021-08-19 DIAGNOSIS — R42 DIZZINESS OF UNKNOWN ETIOLOGY: Primary | ICD-10-CM

## 2021-08-19 DIAGNOSIS — Z98.1 HISTORY OF FUSION OF LUMBAR SPINE: ICD-10-CM

## 2021-08-19 DIAGNOSIS — M54.50 LUMBAR SPINE PAIN: Primary | ICD-10-CM

## 2021-08-19 DIAGNOSIS — K21.9 GASTROESOPHAGEAL REFLUX DISEASE, UNSPECIFIED WHETHER ESOPHAGITIS PRESENT: ICD-10-CM

## 2021-08-19 DIAGNOSIS — R19.7 DIARRHEA, UNSPECIFIED TYPE: ICD-10-CM

## 2021-08-19 DIAGNOSIS — M54.16 LUMBAR RADICULOPATHY: ICD-10-CM

## 2021-08-19 DIAGNOSIS — M53.3 SACROILIAC PAIN: ICD-10-CM

## 2021-08-19 DIAGNOSIS — R13.10 DYSPHAGIA, UNSPECIFIED TYPE: ICD-10-CM

## 2021-08-19 PROCEDURE — 97110 THERAPEUTIC EXERCISES: CPT

## 2021-08-19 PROCEDURE — 97530 THERAPEUTIC ACTIVITIES: CPT

## 2021-08-19 NOTE — PROGRESS NOTES
Daily Note     Today's date: 2021  Patient name: Patria Isbell  : [id number]  MRN: [id number]  Referring provider: Anastacio Saldaña DO  Dx:   Encounter Diagnosis     ICD-10-CM    1  Lumbar spine pain  M54 5    2  Lumbar radiculopathy  M54 16    3  Cervical radiculopathy  M54 12    4  History of fusion of lumbar spine  Z98 1    5  Sacroiliac pain  M53 3        Start Time: 3255  Stop Time: 915  Total time in clinic (min): 25 minutes    Subjective: Patient reports increased lumbar spine and LE pain today after a poor sleep last night  She reports "zingers" down into her R LE  Objective: See treatment diary below      Assessment: Tolerated treatment well  Due to increased pain, patient requested PT session to be cut short today  She is concerned about potential "drop foot " Patient is to begin transition to aquatic PT, as well as chronic pain group within [address]  Patient will continue with traditional PT until transition into alternative forms of PT  Patient would benefit from continued PT to maximize function for independence with community activities  Plan: Continue per plan of care        Precautions: dizziness      Manuals 8/3 8/5 8/12 8/17 8/19   Cervical mobs AR AR  AR    Cervical distraction  AR AR JK AR    Trigger point release R UT  AR  AR            Neuro Re-Ed        TrA progression        Glute sets         Hip add squeeze  3s x20 3s x20 3sx20 10 10   Supine clamshells 20 Blue TB 20 Blue TB 20 Blue TB 10 10   Supine marches  10B  10 B 10B  10B   Supine leg ext with TrA  10B  10 B 10B 10B    10x sitting       Ther Ex        Step ups and step taps 20 B 20 ea 6" 20 ea 6" 20 ea 6"    LTR 20 20 20 20 20   Seated lumbar flexion  3sx10 3 way 3sx10 3 way  3sx10 3 way  3sx10 3 way     SKTC  3x10s B   3x10s B  3x10s B    Piriformis stretch  3x10s B 3x10s B 3x10s B 3x10s B 3x10s B    Side stepping at // bars 4x10' 2x10' RTB  4x10'  4x10' RTB 4x10' RTB    Hamstring stretch x10 20s x10 20s 5x10s 5x10s x10 20s   Squat with KB   9# x10 9# x10     Shoulder flex, abd, scap  10 1#, 10 2#  10x ea 2#     Self cervical distraction 10    10   Cervical retraction  10, Cervical extension SNAG 10x 10, Cervical extension SNAG 10x 10, Cervical extension SNAG 10x 10, Cervical extension SNAG 10x 10, Cervical extension SNAG 10x   TB row, shoulder ext, paloff press 20 BTB each  10 GTB each  10 GTB each 10 GTB each 20 GTB B   Modalities        Heat pre

## 2021-08-19 NOTE — PROGRESS NOTES
Daily Note     Today's date: 2021  Patient name: Maria Elena Bailey  :   MRN: [id number]  Referring provider: JEANNE Be  Dx:   Encounter Diagnosis     ICD-10-CM    1  Dizziness of unknown etiology  R42    2  Vision impairment  H54 7        Start Time:   Stop Time:   Total time in clinic (min): 43 minutes    Subjective: pt arrived 7 minutes late  Objective:     - looking chart with focus on working memory, visual scanning horizontally and upgraded to diagonally  Average recall of 3 items at a time ~75% with only 1 rehearsal, inc to 85% with inc verbal rehearsal of items  Reports inc dizziness from 0/10 to 2/10 during activity  Assessment: Tolerated treatment well  2 point inc of dizziness with repetitive horizontal and diagonal saccades  Noted to have improved recall with use of verbal rehearsal of items  Patient would benefit from continued OT      Plan: Continue per plan of care        Precautions: dizziness

## 2021-08-24 ENCOUNTER — EVALUATION (OUTPATIENT)
Dept: PHYSICAL THERAPY | Age: 66
End: 2021-08-24
Payer: COMMERCIAL

## 2021-08-24 ENCOUNTER — APPOINTMENT (OUTPATIENT)
Dept: PHYSICAL THERAPY | Facility: CLINIC | Age: 66
End: 2021-08-24
Payer: COMMERCIAL

## 2021-08-24 ENCOUNTER — APPOINTMENT (OUTPATIENT)
Dept: OCCUPATIONAL THERAPY | Facility: CLINIC | Age: 66
End: 2021-08-24
Payer: COMMERCIAL

## 2021-08-24 DIAGNOSIS — M47.816 LUMBAR SPONDYLOSIS: ICD-10-CM

## 2021-08-24 PROCEDURE — 97164 PT RE-EVAL EST PLAN CARE: CPT | Performed by: PHYSICAL THERAPIST

## 2021-08-24 RX ORDER — DICYCLOMINE HYDROCHLORIDE 10 MG/1
CAPSULE ORAL
Qty: 90 CAPSULE | Refills: 0 | Status: SHIPPED | OUTPATIENT
Start: 2021-08-24 | End: 2021-11-30 | Stop reason: SDUPTHER

## 2021-08-24 NOTE — PROGRESS NOTES
PT Re-Evaluation     Today's date: 2021  Patient name: Wil León  :   MRN: [id number]  Referring provider: Mady Lao  Dx:   Encounter Diagnosis     ICD-10-CM    1  Lumbar spondylosis  M47 816 Ambulatory referral to Physical Therapy                  Assessment  Assessment details: Wil León is a 61 y o  female who presents to physical therapy with diagnosis of lumbar spondylosis  Chyna Sherman presents with pain, abnormal gait, and limitations in range of motion, strength, joint mobility, flexibility, and functional ability  The current limitations are affecting Fariba's ability to function at prior level  She will benefit from aquatic physical therapy to address the current impairments and functional limitations to enable her to return to daily activities at maximal level   Thank you for the referral     Impairments: abnormal gait, abnormal or restricted ROM, activity intolerance, impaired balance, impaired physical strength, lacks appropriate home exercise program, weight-bearing intolerance and poor posture     Symptom irritability: lowUnderstanding of Dx/Px/POC: good   Prognosis: good    Goals  In 4 weeks pt will:  -Be independent with phase I of HEP  -Increase LE strength by 1/2 grade  -Increase Lumbar ROM by 25%    By discharge pt will:  -Be independent with Phase II of HEP  -Demonstrate full LE strength  -Demonstrate full Lumbar ROM  -Report minimal pain with ADLs  -Report improved ability to negotiate steps    Plan  Patient would benefit from: skilled physical therapy  Planned modality interventions: cryotherapy and thermotherapy: hydrocollator packs  Planned therapy interventions: manual therapy, neuromuscular re-education, patient education, therapeutic activities, therapeutic exercise, therapeutic training, home exercise program and aquatic therapy  Frequency: 2x week  Duration in weeks: 6  Treatment plan discussed with: patient        Subjective Evaluation    History of Present Illness  Mechanism of injury: Nino Redd is a 61 y o  female presenting to physical therapy on 21 to transition from traditional PT to aquatic PT with primary complaints of low back pain  She attended traditional physical therapy for 4 months with little improvement in her symptoms  She mention she is no longer getting sharp pains in the back but still having a nagging pain  She is still having significant difficulty with the stairs  She mentions she has numbness in the back of the legs  Incline walking is terrible  She is doing a lot of exercises at home which she gets relief from  She doesn't feel as though walking is as natural as it should  They decided to trial pool therapy  Can be on her feet about 10 minutes before needing to stop because of pain  Needs to use grabbers to reach things overhead due to back pain  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  She also has to be very careful how she moves when she helps them  Pain  Current pain ratin  At best pain ratin  At worst pain rating: 10  Quality: dull ache, discomfort and sharp  Relieving factors: heat and rest  Aggravating factors: stair climbing and overhead activity    Social Support    Employment status: working  Treatments  Previous treatment: physical therapy  Patient Goals  Patient goals for therapy: decreased pain, independence with ADLs/IADLs, increased strength and return to sport/leisure activities  Patient goal: "get up and walk, negotaite steps without pulling myself up"        Objective    Gait: patient ambulates with SPC  Posture: Rounded shoulder    Lumbar AROM:   Lumbar ROM as % of normal ROM    Flexion: WFL  Extension: 25  R Side Bendin p!    L Side Bendin  R Rotation: 50  L Rotation: 50    LE Strength (R,L)  Hip Flexion: 4/5 , 4/5  Hip Abduction: 3+/5 , 3+/5  Hip IR (): 4-/5 , 4/5  Hip ER (): 4-/5 , 4/5  Knee Extension: 4+/5 , 4+/5  Knee Flexion: 4/5 , 4/5  Ankle DF: 5/5 , 5/5    5x sit to stand: 22 67 seconds       Precautions: Hx of lumbar spine fusion 2018, failed gastric bypass surgery, GERD,       Manuals                                                                 Neuro Re-Ed                                                                                                        Ther Ex                                                                                                                     Ther Activity                                       Gait Training                                       Modalities

## 2021-08-26 ENCOUNTER — APPOINTMENT (OUTPATIENT)
Dept: PHYSICAL THERAPY | Facility: CLINIC | Age: 66
End: 2021-08-26
Payer: COMMERCIAL

## 2021-08-26 ENCOUNTER — OFFICE VISIT (OUTPATIENT)
Dept: OCCUPATIONAL THERAPY | Facility: CLINIC | Age: 66
End: 2021-08-26
Payer: COMMERCIAL

## 2021-08-26 DIAGNOSIS — H54.7 VISION IMPAIRMENT: ICD-10-CM

## 2021-08-26 DIAGNOSIS — R42 DIZZINESS OF UNKNOWN ETIOLOGY: Primary | ICD-10-CM

## 2021-08-26 PROCEDURE — 97530 THERAPEUTIC ACTIVITIES: CPT

## 2021-08-26 NOTE — PROGRESS NOTES
Daily Note     Today's date: 2021  Patient name: Keira Brunner  :   MRN: [id number]  Referring provider: JEANNE Parra  Dx:   Encounter Diagnosis     ICD-10-CM    1  Dizziness of unknown etiology  R42    2  Vision impairment  H54 7        Start Time: 935  Stop Time:   Total time in clinic (min): 40 minutes    Subjective: "I'm all mixed up today"    -Dizziness at start of session: 3/10      Objective:     -Card matching tabletop activity with focus on horizontal saccades  Denies inc in dizziness    -Decoding activity with key placed laterally on pt's R with focus on saccades in all directions in combination with head turns to simulate turning head and scanning environment while driving  Denies inc in symptoms    -Itrax activity dual tasking with 4 item word list retention attribute inclusion  Focus on immediate recall, rehearsal strategies for improved recall, visual scanning to L with head turns  Without rehearsal strategies, recalls ~2/4 on avg  With use of rehearsal strategies, ~90% accurate recall      Assessment: Tolerated treatment well  Reports decreased acuity throughout but symptoms not exacerbated by complex visual activities  Improved delayed memory with use of rehearsal strategies  Patient would benefit from continued OT      Plan: Continue per plan of care        Precautions: Hx of lumbar spine fusion 2018, failed gastric bypass surgery, GERD,

## 2021-08-30 ENCOUNTER — OFFICE VISIT (OUTPATIENT)
Dept: PHYSICAL THERAPY | Age: 66
End: 2021-08-30
Payer: COMMERCIAL

## 2021-08-30 DIAGNOSIS — M54.50 LUMBAR SPINE PAIN: ICD-10-CM

## 2021-08-30 DIAGNOSIS — M54.16 LUMBAR RADICULOPATHY: ICD-10-CM

## 2021-08-30 DIAGNOSIS — Z98.1 HISTORY OF FUSION OF LUMBAR SPINE: ICD-10-CM

## 2021-08-30 DIAGNOSIS — M47.816 LUMBAR SPONDYLOSIS: Primary | ICD-10-CM

## 2021-08-30 DIAGNOSIS — M53.3 SACROILIAC PAIN: ICD-10-CM

## 2021-08-30 DIAGNOSIS — M54.12 CERVICAL RADICULOPATHY: ICD-10-CM

## 2021-08-30 PROCEDURE — 97113 AQUATIC THERAPY/EXERCISES: CPT | Performed by: SPECIALIST/TECHNOLOGIST

## 2021-08-30 NOTE — PROGRESS NOTES
Daily Note     Today's date: 2021  Patient name: Jcarlos Drummond  : [id number]  MRN: [id number]  Referring provider: Kiki Valverde  Dx:   Encounter Diagnosis     ICD-10-CM    1  Lumbar spondylosis  M47 816    2  Lumbar spine pain  M54 5    3  Lumbar radiculopathy  M54 16    4  Cervical radiculopathy  M54 12    5  History of fusion of lumbar spine  Z98 1    6  Sacroiliac pain  M53 3                   Subjective: No significant changes to report since IE  Objective: See treatment diary below      Assessment: Pt describes RLE paresthesias with hip extension and TA press down, otherwise no reproduction of radicular sx throughout tx session  Pt may benefit from lumbar flexion based therex as she had no presence of radicular symptoms with sktc and dktc this visit  Tolerated treatment well  Patient demonstrated fatigue post treatment, exhibited good technique with therapeutic exercises and would benefit from continued PT      Plan: Continue per plan of care  Progress treatment as tolerated           Precautions: Hx of lumbar spine fusion 2018, failed gastric bypass surgery, GERD,       Manuals                                                                 Neuro Re-Ed                                                                                                        Ther Ex                          Laps 5x            Piriformis stretch  Trial?                                      Biking w B UE support 5 min             DKTC, SKTC w B UE support 30x                          3 way hip 30x ea            Step Ups 20x ea                         TA w [employer] Down 10x C                         Paddle Rows, Ext, Horiz Add 30x ea                         Seated lumbar flexion w turtle  Trial?                         Ther Activity                                       Gait Training                                       Modalities

## 2021-08-31 ENCOUNTER — APPOINTMENT (OUTPATIENT)
Dept: PHYSICAL THERAPY | Facility: CLINIC | Age: 66
End: 2021-08-31
Payer: COMMERCIAL

## 2021-08-31 ENCOUNTER — EVALUATION (OUTPATIENT)
Dept: OCCUPATIONAL THERAPY | Facility: CLINIC | Age: 66
End: 2021-08-31
Payer: COMMERCIAL

## 2021-08-31 ENCOUNTER — HOSPITAL ENCOUNTER (OUTPATIENT)
Dept: RADIOLOGY | Facility: HOSPITAL | Age: 66
Discharge: HOME | End: 2021-08-31
Payer: COMMERCIAL

## 2021-08-31 DIAGNOSIS — R42 VERTIGO: ICD-10-CM

## 2021-08-31 DIAGNOSIS — M79.18 MYOFASCIAL PAIN SYNDROME: ICD-10-CM

## 2021-08-31 DIAGNOSIS — R42 DIZZINESS OF UNKNOWN ETIOLOGY: ICD-10-CM

## 2021-08-31 DIAGNOSIS — H54.7 VISION IMPAIRMENT: Primary | ICD-10-CM

## 2021-08-31 DIAGNOSIS — H93.13 TINNITUS OF BOTH EARS: ICD-10-CM

## 2021-08-31 DIAGNOSIS — R53.83 FATIGUE, UNSPECIFIED TYPE: ICD-10-CM

## 2021-08-31 DIAGNOSIS — M54.2 NECK PAIN: ICD-10-CM

## 2021-08-31 DIAGNOSIS — G89.4 CHRONIC PAIN SYNDROME: ICD-10-CM

## 2021-08-31 PROCEDURE — 97530 THERAPEUTIC ACTIVITIES: CPT | Performed by: OCCUPATIONAL THERAPIST

## 2021-08-31 PROCEDURE — A9585 GADOBUTROL INJECTION: HCPCS | Performed by: NURSE PRACTITIONER

## 2021-08-31 PROCEDURE — 70553 MRI BRAIN STEM W/O & W/DYE: CPT

## 2021-08-31 PROCEDURE — G1004 CDSM NDSC: HCPCS

## 2021-08-31 RX ADMIN — GADOBUTROL 10 ML: 604.72 INJECTION INTRAVENOUS at 08:39

## 2021-08-31 NOTE — PROGRESS NOTES
Re-Eval/Daily Note     Today's date: 2021  Patient name: Keira Brunner  :   MRN: [id number]  Referring provider: JEANNE Parra  Dx:   Encounter Diagnoses   Name Primary?  Vision impairment Yes    Dizziness of unknown etiology                    Visit 1 of 10  PN due     Assessment: Re-eval done to assess progress with POC  Pt demonstrates improved OM control with orthophoria and orthotropia noted for near vision, and smooth pursuits and saccades  Her score on the Convergence Insufficiency Symptom Survey improved from  to , however continues to suggest convergence insufficiency  Pt also continues to demonstrate deficits with immediate recall and needs cues to utilize memory strategies/aids  Recommend continued OT services 2x/week for 4-8 weeks to address these deficits for improved work and IADL performance  Subjective: Pt reports that she still has difficulty focusing, difficulty with recall of written material, and may need glasses  She feels she is doing better with problem solving and memory  Objective: See treatment below  Pain: none        Objective     IMPAIRMENTS SECTION:     Vision Screen: GLASSES (prescription) 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 wearing only driving glasses at night  Using readers when using phone      Visual acuity, near: 20/25 binocularly     Binocularity, far: orthotropia and orthophoria B/L     Binocularity, near: orthophoria and orthotropia B/L      Convergence: 7 inches without red/green glasses, 3" with red/green glasses           Cristiano string: X     Pursuits: smooth     Saccades:  smooth     Range of Motion: WFL     Visual perceptual midline: WNL horizon; WNL midline    Visual Fields:  WNL        Convergence Insufficiency Symptom Survey (CISS):  suggestive of convergence insufficiencies        GOALS:     Short Term (4 week):     COGNITION        · Direction Following        ? Pt will increase verbal and written 3 step direction following with processing time of <2 min and 80% accuracy for improved work performance  ACHIEVED          -      Memory     ? Pt will demo G recall of 90% of Written information utilizing memory strategy of choice for improved STM/delayed memory PROGRESSING     ? Pt will demo G carryover of use of internal/external memory strategy aides for improved recall of daily events, improved executive functioning with 90% accuracy PROGRESSING        VISION        § Pt will increase oculomotor control for improved saccades, pursuits, con/divergent tasks for improved reading, board to table tasks x 75% accuracy with minimal increase in symptoms  PROGRESSING               LTGs 8 weeks:      · Direction Following        ? Pt will increase verbal and written 3 step direction following with processing time of <1 min and 90% accuracy for improved work performance          -      Memory     ? Pt will demo G recall of 95% of Written information utilizing memory strategy of choice for improved STM/delayed memory      ? Pt will demo G carryover of use of internal/external memory strategy aides for improved recall of daily events, improved executive functioning with 95% accuracy         VISION        § Pt will increase oculomotor control for improved saccades, pursuits, con/divergent tasks for improved reading, board to table tasks x 95% accuracy  with minimal increase in symptoms     § Pt will increase oculomotor control for improved dynamic activities with head turns, board/screen to table tasks symptom free with 90% accuracy for improved work roles      Plan: Continued skilled OT services 2x/week for 4-8 weeks

## 2021-09-02 ENCOUNTER — HOSPITAL ENCOUNTER (OUTPATIENT)
Dept: RADIOLOGY | Facility: HOSPITAL | Age: 66
Discharge: HOME | End: 2021-09-02
Attending: PSYCHIATRY & NEUROLOGY
Payer: COMMERCIAL

## 2021-09-02 DIAGNOSIS — R29.898 WEAKNESS OF BOTH LEGS: ICD-10-CM

## 2021-09-02 DIAGNOSIS — M54.2 NECK PAIN: ICD-10-CM

## 2021-09-02 PROCEDURE — 72141 MRI NECK SPINE W/O DYE: CPT

## 2021-09-02 PROCEDURE — G1004 CDSM NDSC: HCPCS

## 2021-09-03 ENCOUNTER — TELEPHONE (OUTPATIENT)
Dept: FAMILY MEDICINE CLINIC | Facility: CLINIC | Age: 66
End: 2021-09-03

## 2021-09-03 ENCOUNTER — APPOINTMENT (OUTPATIENT)
Dept: PHYSICAL THERAPY | Age: 66
End: 2021-09-03
Payer: COMMERCIAL

## 2021-09-07 ENCOUNTER — TELEPHONE (OUTPATIENT)
Dept: NEUROLOGY | Facility: CLINIC | Age: 66
End: 2021-09-07

## 2021-09-07 DIAGNOSIS — M47.812 CERVICAL SPINE DEGENERATION: Primary | ICD-10-CM

## 2021-09-07 NOTE — TELEPHONE ENCOUNTER
I called and spoke with the patient; advised result  She would like a referral to pain management as she is in severe pain  Patient verbalized understanding with no further questions

## 2021-09-07 NOTE — TELEPHONE ENCOUNTER
----- Message from Latasha Rehman MD sent at 9/7/2021  4:44 PM EDT -----  Patient's cervical spine reveals moderate to severe multiple level disc degenerative disease  Pain management for epidural shot would be recommended if he is agreeable

## 2021-09-10 ENCOUNTER — APPOINTMENT (OUTPATIENT)
Dept: PHYSICAL THERAPY | Age: 66
End: 2021-09-10
Payer: COMMERCIAL

## 2021-09-13 ENCOUNTER — APPOINTMENT (OUTPATIENT)
Dept: PHYSICAL THERAPY | Age: 66
End: 2021-09-13
Payer: COMMERCIAL

## 2021-09-15 ENCOUNTER — OFFICE VISIT (OUTPATIENT)
Dept: PHYSICAL THERAPY | Age: 66
End: 2021-09-15
Payer: COMMERCIAL

## 2021-09-15 DIAGNOSIS — M47.816 LUMBAR SPONDYLOSIS: Primary | ICD-10-CM

## 2021-09-15 PROCEDURE — 97113 AQUATIC THERAPY/EXERCISES: CPT | Performed by: PHYSICAL THERAPIST

## 2021-09-15 NOTE — PROGRESS NOTES
Daily Note     Today's date: 9/15/2021  Patient name: Wil León  :   MRN: [id number]  Referring provider: Mady Lao  Dx:   Encounter Diagnosis     ICD-10-CM    1  Lumbar spondylosis  M47 816                   Subjective: Pt reports N/T into R LE following ball press down exercise last visit       Objective: See treatment diary below      Assessment: Tolerated treatment well  Patient demonstrated fatigue post treatment and would benefit from continued PT      Plan: Continue per plan of care  Progress treatment as tolerated           Precautions: Hx of lumbar spine fusion , failed gastric bypass surgery, GERD,       Manuals 8/30 9/15                                                               Neuro Re-Ed                                                                                                        Ther Ex                          Laps 5x 5x           Piriformis stretch  R side 5x30s                                     Biking w B UE support 5 min 5 min            DKTC, SKTC w B UE support 30x  30x                         3 way hip 30x ea 30x ea           Step Ups 20x ea 20x ea                        TA w [employer] Down 10x C np                        Paddle Rows, Ext, Horiz Add 30x ea                         Seated lumbar flexion w turtle  5" x10 L/R/C                        Ther Activity                                       Gait Training                                       Modalities

## 2021-09-17 ENCOUNTER — TELEPHONE (OUTPATIENT)
Dept: OTOLARYNGOLOGY | Facility: CLINIC | Age: 66
End: 2021-09-17

## 2021-09-17 NOTE — TELEPHONE ENCOUNTER
Patient requested to review Cspine MRI results again  Reviewed this with patient  She verbalized understanding

## 2021-09-20 ENCOUNTER — OFFICE VISIT (OUTPATIENT)
Dept: PHYSICAL THERAPY | Age: 66
End: 2021-09-20
Payer: COMMERCIAL

## 2021-09-20 DIAGNOSIS — M54.50 LUMBAR SPINE PAIN: ICD-10-CM

## 2021-09-20 DIAGNOSIS — M53.3 SACROILIAC PAIN: ICD-10-CM

## 2021-09-20 DIAGNOSIS — M54.12 CERVICAL RADICULOPATHY: ICD-10-CM

## 2021-09-20 DIAGNOSIS — M54.16 LUMBAR RADICULOPATHY: ICD-10-CM

## 2021-09-20 DIAGNOSIS — Z98.1 HISTORY OF FUSION OF LUMBAR SPINE: ICD-10-CM

## 2021-09-20 DIAGNOSIS — M47.816 LUMBAR SPONDYLOSIS: Primary | ICD-10-CM

## 2021-09-20 PROCEDURE — 97113 AQUATIC THERAPY/EXERCISES: CPT | Performed by: SPECIALIST/TECHNOLOGIST

## 2021-09-20 NOTE — TELEPHONE ENCOUNTER
Returned pt call  Reviewed results of Mri brain with IAC negative  Noted MRI cervical spine with degenerative changes  Encouraged to continue care with orthopedic, OT and PT    Pt agreed

## 2021-09-20 NOTE — PROGRESS NOTES
Daily Note     Today's date: 2021  Patient name: Wil León  :   MRN: [id number]  Referring provider: Mady Lao  Dx:   Encounter Diagnosis     ICD-10-CM    1  Lumbar spondylosis  M47 816    2  Lumbar spine pain  M54 5    3  Lumbar radiculopathy  M54 16    4  Cervical radiculopathy  M54 12    5  History of fusion of lumbar spine  Z98 1    6  Sacroiliac pain  M53 3                   Subjective: Intermittent paresthesia's persisit however pt reports seated lumbar flexion decreases severity of sx  Objective: See treatment diary below    Assessment: No reproduction of LE radicular sx this visit with therex assigned and lumbar flexion based therex  Tolerated treatment well  Patient demonstrated fatigue post treatment, exhibited good technique with therapeutic exercises and would benefit from continued PT    Plan: Continue per plan of care  Progress treatment as tolerated           Precautions: Hx of lumbar spine fusion , failed gastric bypass surgery, GERD,     Manuals 8/30 9/15 9/20                                                              Neuro Re-Ed                                                                                                        Ther Ex                          Laps 5x 5x 5x          Piriformis stretch  R side 5x30s           Side Stepping    5x                       Biking w B UE support 5 min 5 min 5 min           DKTC, SKTC w B UE support 30x  30x  30x ea                       3 way hip 30x ea 30x ea 30x ea          Step Ups 20x ea 20x ea 30x ea                       TA w [employer] Down 10x C np                        Paddle Rows, Ext, Horiz Add 30x ea  30x ea med DB                       Seated lumbar flexion w turtle  5" x10 L/R/C 10x L/R/C                       Ther Activity                                       Gait Training                                       Modalities

## 2021-09-22 ENCOUNTER — OFFICE VISIT (OUTPATIENT)
Dept: PHYSICAL THERAPY | Age: 66
End: 2021-09-22
Payer: COMMERCIAL

## 2021-09-22 DIAGNOSIS — M47.816 LUMBAR SPONDYLOSIS: Primary | ICD-10-CM

## 2021-09-22 PROCEDURE — 97113 AQUATIC THERAPY/EXERCISES: CPT | Performed by: PHYSICAL THERAPIST

## 2021-09-22 NOTE — PROGRESS NOTES
Daily Note     Today's date: 2021  Patient name: MariaE lena Bailey  : [birth year]20  MRN: [id number]  Referring provider: Leta Michelle  Dx:   Encounter Diagnosis     ICD-10-CM    1  Lumbar spondylosis  M47 816                   Subjective: pt reports difficulty with walking today      Objective: See treatment diary below      Assessment: Tolerated treatment well  Patient demonstrated fatigue post treatment and would benefit from continued PT      Plan: Continue per plan of care  Progress treatment as tolerated           Precautions: Hx of lumbar spine fusion , failed gastric bypass surgery, GERD,     Manuals 8/30 9/15 9/20 9/22                                                             Neuro Re-Ed                                                                                                        Ther Ex                          Laps 5x 5x 5x 5x         Piriformis stretch  R side 5x30s           Side Stepping    5x 5x                      Biking w B UE support 5 min 5 min 5 min 5'          DKTC, SKTC w B UE support 30x  30x  30x ea 30x ea                      3 way hip 30x ea 30x ea 30x ea 30x ea         Step Ups 20x ea 20x ea 30x ea 30x ea                      TA w [employer] Down 10x C np                        Paddle Rows, Ext, Horiz Add 30x ea  30x ea med DB 30x ea med DB                      Seated lumbar flexion w turtle  5" x10 L/R/C 10x L/R/C 10x L/R/C                      Ther Activity                                       Gait Training                                       Modalities

## 2021-09-27 ENCOUNTER — OFFICE VISIT (OUTPATIENT)
Dept: PHYSICAL THERAPY | Age: 66
End: 2021-09-27
Payer: COMMERCIAL

## 2021-09-27 DIAGNOSIS — M47.816 LUMBAR SPONDYLOSIS: Primary | ICD-10-CM

## 2021-09-27 PROCEDURE — 97113 AQUATIC THERAPY/EXERCISES: CPT | Performed by: PHYSICAL THERAPIST

## 2021-09-27 NOTE — PROGRESS NOTES
Daily Note     Today's date: 2021  Patient name: Andrew Lino  :   MRN: [id number]  Referring provider: Sejal Ruiz  Dx:   Encounter Diagnosis     ICD-10-CM    1  Lumbar spondylosis  M47 816                   Subjective: Pt reports minimal changes       Objective: See treatment diary below      Assessment: Tolerated treatment well  Patient demonstrated fatigue post treatment and would benefit from continued PT      Plan: Continue per plan of care  Progress treatment as tolerated           Precautions: Hx of lumbar spine fusion , failed gastric bypass surgery, GERD,     Manuals 8/30 9/15 9/20 9/22 9/27                                                            Neuro Re-Ed                                                                                                        Ther Ex                          Laps 5x 5x 5x 5x 5x        Piriformis stretch  R side 5x30s           Side Stepping    5x 5x 5x                     Biking w B UE support 5 min 5 min 5 min 5' 5'         DKTC, SKTC w B UE support 30x  30x  30x ea 30x ea 30x ea                     3 way hip 30x ea 30x ea 30x ea 30x ea 30x ea        Step Ups 20x ea 20x ea 30x ea 30x ea 30x ea                     TA w [employer] Down 10x C np                        Paddle Rows, Ext, Horiz Add 30x ea  30x ea med DB 30x ea med DB 30x ea med DB                     Seated lumbar flexion w turtle  5" x10 L/R/C 10x L/R/C 10x L/R/C 10x L/R/C                     Ther Activity                                       Gait Training                                       Modalities

## 2021-09-29 ENCOUNTER — OFFICE VISIT (OUTPATIENT)
Dept: PHYSICAL THERAPY | Age: 66
End: 2021-09-29
Payer: COMMERCIAL

## 2021-09-29 DIAGNOSIS — M54.50 LUMBAR SPINE PAIN: ICD-10-CM

## 2021-09-29 DIAGNOSIS — M47.816 LUMBAR SPONDYLOSIS: Primary | ICD-10-CM

## 2021-09-29 DIAGNOSIS — M53.3 SACROILIAC PAIN: ICD-10-CM

## 2021-09-29 DIAGNOSIS — M54.12 CERVICAL RADICULOPATHY: ICD-10-CM

## 2021-09-29 DIAGNOSIS — M54.16 LUMBAR RADICULOPATHY: ICD-10-CM

## 2021-09-29 DIAGNOSIS — Z98.1 HISTORY OF FUSION OF LUMBAR SPINE: ICD-10-CM

## 2021-09-29 PROCEDURE — 97113 AQUATIC THERAPY/EXERCISES: CPT | Performed by: SPECIALIST/TECHNOLOGIST

## 2021-09-29 NOTE — PROGRESS NOTES
Daily Note     Today's date: 2021  Patient name: Obed Madden  :   MRN: [id number]  Referring provider: Candelario Malagon  Dx:   Encounter Diagnosis     ICD-10-CM    1  Lumbar spondylosis  M47 816    2  Lumbar spine pain  M54 5    3  Lumbar radiculopathy  M54 16    4  Cervical radiculopathy  M54 12    5  History of fusion of lumbar spine  Z98 1    6  Sacroiliac pain  M53 3                   Subjective: Pt reports improvement in LBP with desired activities ie riding motorcycle  Objective: See treatment diary below    Assessment: Pt able to tolerate added cuff wt resistance this visit and perform additional TA therex without reproduction of LBP or radicular sx  Tolerated treatment well  Patient demonstrated fatigue post treatment, exhibited good technique with therapeutic exercises and would benefit from continued PT    Plan: Continue per plan of care  Progress treatment as tolerated           Precautions: Hx of lumbar spine fusion , failed gastric bypass surgery, GERD,     Manuals 8/30 9/15 9/20 9/22 9/27 9/29 FOTO            ROYAL BLUE CUFF                                              Neuro Re-Ed                                                                                                        Ther Ex                          Laps 5x 5x 5x 5x 5x 5x        Piriformis stretch  R side 5x30s           Side Stepping    5x 5x 5x 7x                    Biking w B UE support 5 min 5 min 5 min 5' 5' 5'        DKTC, SKTC w B UE support 30x  30x  30x ea 30x ea 30x ea 30x ea                    3 way hip 30x ea 30x ea 30x ea 30x ea 30x ea 30x ea       Step Ups 20x ea 20x ea 30x ea 30x ea 30x ea 30x ea       HR from step      30x        TA w [employer] Down 10x C np    15x C/L/R                    Paddle Rows, Ext, Horiz Add 30x ea  30x ea med DB 30x ea med DB 30x ea med DB 30x ea med DB                    Seated lumbar flexion w turtle  5" x10 L/R/C 10x L/R/C 10x L/R/C 10x L/R/C 15x L/R/C                    Ther Activity                                       Gait Training                                       Modalities

## 2021-10-04 ENCOUNTER — OFFICE VISIT (OUTPATIENT)
Dept: PHYSICAL THERAPY | Age: 66
End: 2021-10-04
Payer: COMMERCIAL

## 2021-10-04 DIAGNOSIS — M53.3 SACROILIAC PAIN: ICD-10-CM

## 2021-10-04 DIAGNOSIS — M54.16 LUMBAR RADICULOPATHY: ICD-10-CM

## 2021-10-04 DIAGNOSIS — M54.50 LUMBAR SPINE PAIN: ICD-10-CM

## 2021-10-04 DIAGNOSIS — M47.816 LUMBAR SPONDYLOSIS: Primary | ICD-10-CM

## 2021-10-04 DIAGNOSIS — Z98.1 HISTORY OF FUSION OF LUMBAR SPINE: ICD-10-CM

## 2021-10-04 DIAGNOSIS — M54.12 CERVICAL RADICULOPATHY: ICD-10-CM

## 2021-10-04 PROCEDURE — 97113 AQUATIC THERAPY/EXERCISES: CPT | Performed by: SPECIALIST/TECHNOLOGIST

## 2021-10-06 ENCOUNTER — APPOINTMENT (OUTPATIENT)
Dept: PHYSICAL THERAPY | Age: 66
End: 2021-10-06
Payer: COMMERCIAL

## 2021-10-11 ENCOUNTER — APPOINTMENT (OUTPATIENT)
Dept: PHYSICAL THERAPY | Age: 66
End: 2021-10-11
Payer: COMMERCIAL

## 2021-10-13 ENCOUNTER — APPOINTMENT (OUTPATIENT)
Dept: PHYSICAL THERAPY | Age: 66
End: 2021-10-13
Payer: COMMERCIAL

## 2021-10-18 ENCOUNTER — APPOINTMENT (OUTPATIENT)
Dept: PHYSICAL THERAPY | Age: 66
End: 2021-10-18
Payer: COMMERCIAL

## 2021-10-20 ENCOUNTER — APPOINTMENT (OUTPATIENT)
Dept: PHYSICAL THERAPY | Age: 66
End: 2021-10-20
Payer: COMMERCIAL

## 2021-10-25 ENCOUNTER — TELEPHONE (OUTPATIENT)
Dept: PAIN MEDICINE | Facility: CLINIC | Age: 66
End: 2021-10-25

## 2021-10-25 ENCOUNTER — CONSULT (OUTPATIENT)
Dept: PAIN MEDICINE | Facility: CLINIC | Age: 66
End: 2021-10-25
Payer: COMMERCIAL

## 2021-10-25 VITALS
HEIGHT: 63 IN | DIASTOLIC BLOOD PRESSURE: 72 MMHG | WEIGHT: 236 LBS | SYSTOLIC BLOOD PRESSURE: 113 MMHG | BODY MASS INDEX: 41.82 KG/M2 | HEART RATE: 105 BPM

## 2021-10-25 DIAGNOSIS — G89.4 CHRONIC PAIN SYNDROME: Primary | ICD-10-CM

## 2021-10-25 DIAGNOSIS — M48.02 CERVICAL SPINAL STENOSIS: ICD-10-CM

## 2021-10-25 DIAGNOSIS — M54.12 CERVICAL RADICULOPATHY: ICD-10-CM

## 2021-10-25 DIAGNOSIS — M54.2 NECK PAIN: ICD-10-CM

## 2021-10-25 PROCEDURE — 1036F TOBACCO NON-USER: CPT | Performed by: ANESTHESIOLOGY

## 2021-10-25 PROCEDURE — 3078F DIAST BP <80 MM HG: CPT | Performed by: ANESTHESIOLOGY

## 2021-10-25 PROCEDURE — 3074F SYST BP LT 130 MM HG: CPT | Performed by: ANESTHESIOLOGY

## 2021-10-25 PROCEDURE — 3008F BODY MASS INDEX DOCD: CPT | Performed by: ANESTHESIOLOGY

## 2021-10-25 PROCEDURE — 99204 OFFICE O/P NEW MOD 45 MIN: CPT | Performed by: ANESTHESIOLOGY

## 2021-10-27 ENCOUNTER — APPOINTMENT (OUTPATIENT)
Dept: PHYSICAL THERAPY | Age: 66
End: 2021-10-27
Payer: COMMERCIAL

## 2021-10-28 ENCOUNTER — HOSPITAL ENCOUNTER (OUTPATIENT)
Dept: NEUROLOGY | Facility: HOSPITAL | Age: 66
Discharge: HOME | End: 2021-10-28
Attending: PSYCHIATRY & NEUROLOGY
Payer: COMMERCIAL

## 2021-10-28 DIAGNOSIS — R29.898 WEAKNESS OF BOTH LEGS: ICD-10-CM

## 2021-10-28 PROCEDURE — 95886 MUSC TEST DONE W/N TEST COMP: CPT | Performed by: PSYCHIATRY & NEUROLOGY

## 2021-10-28 PROCEDURE — 95910 NRV CNDJ TEST 7-8 STUDIES: CPT | Performed by: PSYCHIATRY & NEUROLOGY

## 2021-11-08 ENCOUNTER — OFFICE VISIT (OUTPATIENT)
Dept: CARDIOLOGY CLINIC | Facility: CLINIC | Age: 66
End: 2021-11-08
Payer: COMMERCIAL

## 2021-11-08 ENCOUNTER — OFFICE VISIT (OUTPATIENT)
Dept: URGENT CARE | Facility: CLINIC | Age: 66
End: 2021-11-08
Payer: COMMERCIAL

## 2021-11-08 VITALS
WEIGHT: 232 LBS | RESPIRATION RATE: 20 BRPM | OXYGEN SATURATION: 97 % | TEMPERATURE: 99 F | SYSTOLIC BLOOD PRESSURE: 151 MMHG | DIASTOLIC BLOOD PRESSURE: 77 MMHG | HEART RATE: 90 BPM | BODY MASS INDEX: 41.1 KG/M2

## 2021-11-08 VITALS
WEIGHT: 232 LBS | DIASTOLIC BLOOD PRESSURE: 80 MMHG | HEART RATE: 100 BPM | TEMPERATURE: 98.9 F | BODY MASS INDEX: 41.11 KG/M2 | HEIGHT: 63 IN | OXYGEN SATURATION: 95 % | SYSTOLIC BLOOD PRESSURE: 110 MMHG

## 2021-11-08 DIAGNOSIS — M25.551 RIGHT HIP PAIN: Primary | ICD-10-CM

## 2021-11-08 DIAGNOSIS — I10 PRIMARY HYPERTENSION: Primary | ICD-10-CM

## 2021-11-08 PROCEDURE — 99214 OFFICE O/P EST MOD 30 MIN: CPT | Performed by: INTERNAL MEDICINE

## 2021-11-08 PROCEDURE — 3078F DIAST BP <80 MM HG: CPT | Performed by: PHYSICIAN ASSISTANT

## 2021-11-08 PROCEDURE — 99213 OFFICE O/P EST LOW 20 MIN: CPT | Performed by: PHYSICIAN ASSISTANT

## 2021-11-08 PROCEDURE — 93000 ELECTROCARDIOGRAM COMPLETE: CPT | Performed by: INTERNAL MEDICINE

## 2021-11-08 PROCEDURE — 3077F SYST BP >= 140 MM HG: CPT | Performed by: PHYSICIAN ASSISTANT

## 2021-11-08 PROCEDURE — 1036F TOBACCO NON-USER: CPT | Performed by: PHYSICIAN ASSISTANT

## 2021-11-08 RX ORDER — CYCLOBENZAPRINE HCL 10 MG
10 TABLET ORAL 3 TIMES DAILY PRN
Qty: 30 TABLET | Refills: 0 | Status: SHIPPED | OUTPATIENT
Start: 2021-11-08 | End: 2022-05-10

## 2021-11-23 ENCOUNTER — RA CDI HCC (OUTPATIENT)
Dept: OTHER | Facility: HOSPITAL | Age: 66
End: 2021-11-23

## 2021-11-24 ENCOUNTER — HOSPITAL ENCOUNTER (OUTPATIENT)
Facility: AMBULARY SURGERY CENTER | Age: 66
Setting detail: OUTPATIENT SURGERY
Discharge: HOME | End: 2021-11-24
Attending: ANESTHESIOLOGY | Admitting: ANESTHESIOLOGY
Payer: COMMERCIAL

## 2021-11-24 ENCOUNTER — APPOINTMENT (OUTPATIENT)
Dept: RADIOLOGY | Facility: HOSPITAL | Age: 66
End: 2021-11-24
Payer: COMMERCIAL

## 2021-11-24 VITALS
SYSTOLIC BLOOD PRESSURE: 185 MMHG | DIASTOLIC BLOOD PRESSURE: 100 MMHG | HEART RATE: 94 BPM | TEMPERATURE: 97.9 F | OXYGEN SATURATION: 98 % | RESPIRATION RATE: 18 BRPM

## 2021-11-24 PROCEDURE — 62321 NJX INTERLAMINAR CRV/THRC: CPT | Performed by: ANESTHESIOLOGY

## 2021-11-24 PROCEDURE — 72020 X-RAY EXAM OF SPINE 1 VIEW: CPT

## 2021-11-24 RX ORDER — LIDOCAINE WITH 8.4% SOD BICARB 0.9%(10ML)
SYRINGE (ML) INJECTION AS NEEDED
Status: DISCONTINUED | OUTPATIENT
Start: 2021-11-24 | End: 2021-11-24 | Stop reason: HOSPADM

## 2021-11-24 RX ORDER — METHYLPREDNISOLONE ACETATE 80 MG/ML
INJECTION, SUSPENSION INTRA-ARTICULAR; INTRALESIONAL; INTRAMUSCULAR; SOFT TISSUE AS NEEDED
Status: DISCONTINUED | OUTPATIENT
Start: 2021-11-24 | End: 2021-11-24 | Stop reason: HOSPADM

## 2021-11-30 ENCOUNTER — OFFICE VISIT (OUTPATIENT)
Dept: FAMILY MEDICINE CLINIC | Facility: CLINIC | Age: 66
End: 2021-11-30
Payer: COMMERCIAL

## 2021-11-30 VITALS
HEART RATE: 76 BPM | TEMPERATURE: 97.4 F | DIASTOLIC BLOOD PRESSURE: 74 MMHG | SYSTOLIC BLOOD PRESSURE: 128 MMHG | BODY MASS INDEX: 41.11 KG/M2 | WEIGHT: 232 LBS | HEIGHT: 63 IN

## 2021-11-30 DIAGNOSIS — R19.7 DIARRHEA, UNSPECIFIED TYPE: ICD-10-CM

## 2021-11-30 DIAGNOSIS — K21.9 GASTROESOPHAGEAL REFLUX DISEASE, UNSPECIFIED WHETHER ESOPHAGITIS PRESENT: ICD-10-CM

## 2021-11-30 DIAGNOSIS — I10 ESSENTIAL HYPERTENSION: Primary | ICD-10-CM

## 2021-11-30 DIAGNOSIS — K76.0 HEPATIC STEATOSIS: ICD-10-CM

## 2021-11-30 DIAGNOSIS — R13.10 DYSPHAGIA, UNSPECIFIED TYPE: ICD-10-CM
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
## 2021-11-30 LAB
ALBUMIN SERPL-MCNC: 4.1 G/DL (ref 3.8–4.8)
ALBUMIN/GLOB SERPL: 1.5 {RATIO} (ref 1.2–2.2)
ALP SERPL-CCNC: 90 IU/L (ref 44–121)
ALT SERPL-CCNC: 15 IU/L (ref 0–32)
AST SERPL-CCNC: 16 IU/L (ref 0–40)
BILIRUB SERPL-MCNC: 0.6 MG/DL (ref 0–1.2)
BUN SERPL-MCNC: 16 MG/DL (ref 8–27)
BUN/CREAT SERPL: 16 (ref 12–28)
CALCIUM SERPL-MCNC: 9.5 MG/DL (ref 8.7–10.3)
CHLORIDE SERPL-SCNC: 103 MMOL/L (ref 96–106)
CHOLEST SERPL-MCNC: 185 MG/DL (ref 100–199)
CHOLEST/HDLC SERPL: 2.1 RATIO (ref 0–4.4)
CO2 SERPL-SCNC: 22 MMOL/L (ref 20–29)
CREAT SERPL-MCNC: 0.98 MG/DL (ref 0.57–1)
GLOBULIN SER-MCNC: 2.7 G/DL (ref 1.5–4.5)
GLUCOSE SERPL-MCNC: 102 MG/DL (ref 65–99)
HDLC SERPL-MCNC: 90 MG/DL
LDLC SERPL CALC-MCNC: 78 MG/DL (ref 0–99)
MICRODELETION SYND BLD/T FISH: NORMAL
POTASSIUM SERPL-SCNC: 4.7 MMOL/L (ref 3.5–5.2)
PROT SERPL-MCNC: 6.8 G/DL (ref 6–8.5)
SL AMB EGFR AFRICAN AMERICAN: 71 ML/MIN/1.73
SL AMB EGFR NON AFRICAN AMERICAN: 61 ML/MIN/1.73
SL AMB VLDL CHOLESTEROL CALC: 17 MG/DL (ref 5–40)
SODIUM SERPL-SCNC: 140 MMOL/L (ref 134–144)
TRIGL SERPL-MCNC: 95 MG/DL (ref 0–149)

## 2021-11-30 PROCEDURE — 99214 OFFICE O/P EST MOD 30 MIN: CPT | Performed by: NURSE PRACTITIONER

## 2021-11-30 PROCEDURE — 3008F BODY MASS INDEX DOCD: CPT | Performed by: PHYSICIAN ASSISTANT

## 2021-11-30 RX ORDER — DICYCLOMINE HYDROCHLORIDE 10 MG/1
10 CAPSULE ORAL 3 TIMES DAILY PRN
Qty: 90 CAPSULE | Refills: 0 | Status: SHIPPED | OUTPATIENT
Start: 2021-11-30 | End: 2021-12-07 | Stop reason: SDUPTHER

## 2021-11-30 RX ORDER — LISINOPRIL AND HYDROCHLOROTHIAZIDE 20; 12.5 MG/1; MG/1
1 TABLET ORAL DAILY
Qty: 90 TABLET | Refills: 1 | Status: SHIPPED | OUTPATIENT
Start: 2021-11-30 | End: 2022-05-23 | Stop reason: SDUPTHER

## 2021-12-01 ENCOUNTER — TELEPHONE (OUTPATIENT)
Dept: PAIN MEDICINE | Facility: CLINIC | Age: 66
End: 2021-12-01

## 2021-12-01 NOTE — TELEPHONE ENCOUNTER
Pt reports 30% improvement post inj   Pain level 10/10 (when she gets a shock)  Pt aware I will call next week for an update

## 2021-12-06 ENCOUNTER — APPOINTMENT (OUTPATIENT)
Dept: RADIOLOGY | Facility: CLINIC | Age: 66
End: 2021-12-06
Payer: COMMERCIAL

## 2021-12-06 ENCOUNTER — OFFICE VISIT (OUTPATIENT)
Dept: OBGYN CLINIC | Facility: CLINIC | Age: 66
End: 2021-12-06
Payer: COMMERCIAL

## 2021-12-06 VITALS
HEIGHT: 63 IN | WEIGHT: 230 LBS | DIASTOLIC BLOOD PRESSURE: 78 MMHG | BODY MASS INDEX: 40.75 KG/M2 | HEART RATE: 112 BPM | SYSTOLIC BLOOD PRESSURE: 119 MMHG

## 2021-12-06 DIAGNOSIS — R53.1 WEAKNESS: Primary | ICD-10-CM

## 2021-12-06 DIAGNOSIS — M54.41 LOW BACK PAIN WITH BILATERAL SCIATICA, UNSPECIFIED BACK PAIN LATERALITY, UNSPECIFIED CHRONICITY: ICD-10-CM

## 2021-12-06 DIAGNOSIS — M54.42 LOW BACK PAIN WITH BILATERAL SCIATICA, UNSPECIFIED BACK PAIN LATERALITY, UNSPECIFIED CHRONICITY: ICD-10-CM

## 2021-12-06 DIAGNOSIS — M48.061 SPINAL STENOSIS OF LUMBAR REGION, UNSPECIFIED WHETHER NEUROGENIC CLAUDICATION PRESENT: ICD-10-CM

## 2021-12-06 PROCEDURE — 3074F SYST BP LT 130 MM HG: CPT | Performed by: ORTHOPAEDIC SURGERY

## 2021-12-06 PROCEDURE — 72110 X-RAY EXAM L-2 SPINE 4/>VWS: CPT

## 2021-12-06 PROCEDURE — 3078F DIAST BP <80 MM HG: CPT | Performed by: ORTHOPAEDIC SURGERY

## 2021-12-06 PROCEDURE — 99214 OFFICE O/P EST MOD 30 MIN: CPT | Performed by: ORTHOPAEDIC SURGERY

## 2021-12-07 ENCOUNTER — OFFICE VISIT (OUTPATIENT)
Dept: GASTROENTEROLOGY | Facility: CLINIC | Age: 66
End: 2021-12-07
Payer: COMMERCIAL

## 2021-12-07 VITALS
HEART RATE: 101 BPM | TEMPERATURE: 97 F | HEIGHT: 63 IN | BODY MASS INDEX: 41.14 KG/M2 | DIASTOLIC BLOOD PRESSURE: 85 MMHG | WEIGHT: 232.2 LBS | SYSTOLIC BLOOD PRESSURE: 114 MMHG

## 2021-12-07 DIAGNOSIS — Z86.010 HISTORY OF COLON POLYPS: ICD-10-CM

## 2021-12-07 DIAGNOSIS — R13.10 DYSPHAGIA, UNSPECIFIED TYPE: ICD-10-CM

## 2021-12-07 DIAGNOSIS — R19.7 DIARRHEA, UNSPECIFIED TYPE: ICD-10-CM

## 2021-12-07 DIAGNOSIS — K21.9 GASTROESOPHAGEAL REFLUX DISEASE WITHOUT ESOPHAGITIS: Primary | ICD-10-CM

## 2021-12-07 DIAGNOSIS — K21.9 GASTROESOPHAGEAL REFLUX DISEASE, UNSPECIFIED WHETHER ESOPHAGITIS PRESENT: ICD-10-CM

## 2021-12-07 PROCEDURE — 3008F BODY MASS INDEX DOCD: CPT | Performed by: ANESTHESIOLOGY

## 2021-12-07 PROCEDURE — 99213 OFFICE O/P EST LOW 20 MIN: CPT | Performed by: INTERNAL MEDICINE

## 2021-12-07 RX ORDER — FAMOTIDINE 20 MG/1
20 TABLET, FILM COATED ORAL DAILY
Qty: 90 TABLET | Refills: 2 | Status: SHIPPED | OUTPATIENT
Start: 2021-12-07

## 2021-12-07 RX ORDER — SODIUM, POTASSIUM,MAG SULFATES 17.5-3.13G
1 SOLUTION, RECONSTITUTED, ORAL ORAL ONCE
Qty: 177 ML | Refills: 0 | Status: SHIPPED | OUTPATIENT
Start: 2021-12-07 | End: 2022-05-11 | Stop reason: ALTCHOICE

## 2021-12-07 RX ORDER — DICYCLOMINE HYDROCHLORIDE 10 MG/1
10 CAPSULE ORAL 3 TIMES DAILY PRN
Qty: 90 CAPSULE | Refills: 1 | Status: SHIPPED | OUTPATIENT
Start: 2021-12-07

## 2021-12-13 ENCOUNTER — OFFICE VISIT (OUTPATIENT)
Dept: PAIN MEDICINE | Facility: CLINIC | Age: 66
End: 2021-12-13
Payer: COMMERCIAL

## 2021-12-13 ENCOUNTER — TELEPHONE (OUTPATIENT)
Dept: PAIN MEDICINE | Facility: CLINIC | Age: 66
End: 2021-12-13

## 2021-12-13 VITALS — DIASTOLIC BLOOD PRESSURE: 82 MMHG | TEMPERATURE: 95.9 F | HEART RATE: 94 BPM | SYSTOLIC BLOOD PRESSURE: 134 MMHG

## 2021-12-13 DIAGNOSIS — M54.2 NECK PAIN: ICD-10-CM

## 2021-12-13 DIAGNOSIS — M54.12 CERVICAL RADICULOPATHY: ICD-10-CM

## 2021-12-13 DIAGNOSIS — G89.4 CHRONIC PAIN SYNDROME: Primary | ICD-10-CM

## 2021-12-13 DIAGNOSIS — M48.02 CERVICAL SPINAL STENOSIS: ICD-10-CM

## 2021-12-13 PROCEDURE — 1036F TOBACCO NON-USER: CPT | Performed by: ANESTHESIOLOGY

## 2021-12-13 PROCEDURE — 99214 OFFICE O/P EST MOD 30 MIN: CPT | Performed by: ANESTHESIOLOGY

## 2021-12-14 ENCOUNTER — TELEPHONE (OUTPATIENT)
Dept: GASTROENTEROLOGY | Facility: CLINIC | Age: 66
End: 2021-12-14

## 2021-12-28 ENCOUNTER — APPOINTMENT (OUTPATIENT)
Dept: RADIOLOGY | Facility: HOSPITAL | Age: 66
End: 2021-12-28
Payer: COMMERCIAL

## 2021-12-28 ENCOUNTER — HOSPITAL ENCOUNTER (OUTPATIENT)
Facility: AMBULARY SURGERY CENTER | Age: 66
Setting detail: OUTPATIENT SURGERY
Discharge: HOME | End: 2021-12-28
Attending: ANESTHESIOLOGY | Admitting: ANESTHESIOLOGY
Payer: COMMERCIAL

## 2021-12-28 VITALS
SYSTOLIC BLOOD PRESSURE: 161 MMHG | OXYGEN SATURATION: 97 % | DIASTOLIC BLOOD PRESSURE: 91 MMHG | RESPIRATION RATE: 18 BRPM | HEART RATE: 85 BPM | TEMPERATURE: 97.1 F

## 2021-12-28 PROCEDURE — 62321 NJX INTERLAMINAR CRV/THRC: CPT | Performed by: ANESTHESIOLOGY

## 2021-12-28 PROCEDURE — 72020 X-RAY EXAM OF SPINE 1 VIEW: CPT

## 2021-12-28 RX ORDER — METHYLPREDNISOLONE ACETATE 80 MG/ML
INJECTION, SUSPENSION INTRA-ARTICULAR; INTRALESIONAL; INTRAMUSCULAR; SOFT TISSUE AS NEEDED
Status: DISCONTINUED | OUTPATIENT
Start: 2021-12-28 | End: 2021-12-28 | Stop reason: HOSPADM

## 2021-12-28 RX ORDER — METHYLPREDNISOLONE ACETATE 40 MG/ML
INJECTION, SUSPENSION INTRA-ARTICULAR; INTRALESIONAL; INTRAMUSCULAR; SOFT TISSUE AS NEEDED
Status: DISCONTINUED | OUTPATIENT
Start: 2021-12-28 | End: 2021-12-28 | Stop reason: HOSPADM

## 2021-12-28 RX ORDER — LIDOCAINE WITH 8.4% SOD BICARB 0.9%(10ML)
SYRINGE (ML) INJECTION AS NEEDED
Status: DISCONTINUED | OUTPATIENT
Start: 2021-12-28 | End: 2021-12-28 | Stop reason: HOSPADM

## 2021-12-30 ENCOUNTER — HOSPITAL ENCOUNTER (OUTPATIENT)
Dept: RADIOLOGY | Facility: HOSPITAL | Age: 66
Discharge: HOME | End: 2021-12-30
Payer: COMMERCIAL

## 2021-12-30 VITALS — HEIGHT: 63 IN | WEIGHT: 226 LBS | BODY MASS INDEX: 40.04 KG/M2

## 2021-12-30 DIAGNOSIS — Z12.31 ENCOUNTER FOR SCREENING MAMMOGRAM FOR MALIGNANT NEOPLASM OF BREAST: ICD-10-CM

## 2021-12-30 DIAGNOSIS — N64.4 BREAST PAIN, LEFT: ICD-10-CM

## 2021-12-30 PROCEDURE — 77066 DX MAMMO INCL CAD BI: CPT

## 2021-12-30 PROCEDURE — 76642 ULTRASOUND BREAST LIMITED: CPT

## 2021-12-30 PROCEDURE — G0279 TOMOSYNTHESIS, MAMMO: HCPCS

## 2022-01-06 ENCOUNTER — TELEPHONE (OUTPATIENT)
Dept: FAMILY MEDICINE CLINIC | Facility: CLINIC | Age: 67
End: 2022-01-06

## 2022-01-06 ENCOUNTER — TELEMEDICINE (OUTPATIENT)
Dept: FAMILY MEDICINE CLINIC | Facility: CLINIC | Age: 67
End: 2022-01-06
Payer: COMMERCIAL

## 2022-01-06 DIAGNOSIS — J01.00 ACUTE NON-RECURRENT MAXILLARY SINUSITIS: ICD-10-CM

## 2022-01-06 DIAGNOSIS — U07.1 COVID-19 VIRUS INFECTION: Primary | ICD-10-CM

## 2022-01-06 PROCEDURE — 1036F TOBACCO NON-USER: CPT | Performed by: NURSE PRACTITIONER

## 2022-01-06 PROCEDURE — 99213 OFFICE O/P EST LOW 20 MIN: CPT | Performed by: NURSE PRACTITIONER

## 2022-01-06 RX ORDER — AZITHROMYCIN 250 MG/1
TABLET, FILM COATED ORAL
Qty: 6 TABLET | Refills: 0 | Status: SHIPPED | OUTPATIENT
Start: 2022-01-06 | End: 2022-01-11

## 2022-01-06 RX ORDER — ALBUTEROL SULFATE 90 UG/1
2 AEROSOL, METERED RESPIRATORY (INHALATION) EVERY 4 HOURS PRN
Qty: 8.5 G | Refills: 0 | Status: SHIPPED | OUTPATIENT
Start: 2022-01-06 | End: 2022-01-18 | Stop reason: SDUPTHER

## 2022-01-06 NOTE — TELEPHONE ENCOUNTER
Oleg Bravo tested pos at [facility] on Sunday  She stated she was taking Tamiflu  Can we please patient back on what she can take over the counter  Or should we do a virtual with patient?   [phone]

## 2022-01-06 NOTE — PROGRESS NOTES
COVID-19 Outpatient Progress Note    Assessment/Plan:    Problem List Items Addressed This Visit     None      Visit Diagnoses     COVID-19 virus infection    -  Primary    Acute non-recurrent maxillary sinusitis        Relevant Medications    azithromycin (ZITHROMAX) 250 mg tablet         Disposition:     Patient has COVID-19 infection  Based off CDC guidelines, they were recommended to isolate for 5 days from the date of the positive test  If they remain asymptomatic, isolation may be ended followed by 5 days of wearing a mask when around othes to minimize risk of infecting others  If they have a fever, continue to stay home until fever resolves for at least 24 hours  Suspect secondary sinusitis  Will start on Zithromax and recommended she use Mucinex and continue Flonase  In office eval for any persistent or worsening symptoms  I have spent 7 minutes directly with the patient  Encounter provider JACINTO Hernandez    Provider located at [address] [address]    Recent Visits  No visits were found meeting these conditions  Showing recent visits within past 7 days and meeting all other requirements  Today's Visits  Date Type Provider Dept   01/06/22 Telemedicine Mango Hernandez [facility]   01/06/22 Telephone Deshaun Hernandez Jordan today's visits and meeting all other requirements  Future Appointments  No visits were found meeting these conditions  Showing future appointments within next 150 days and meeting all other requirements     This virtual check-in was done via telephone and she agrees to proceed  Patient agrees to participate in a virtual check in via telephone or video visit instead of presenting to the office to address urgent/immediate medical needs  Patient is aware this is a billable service      After connecting through Telephone, the patient was identified by name and date of birth  Dwain Syed was informed that this was a telemedicine visit and that the exam was being conducted confidentially over secure lines  My office door was closed  No one else was in the room  Dwain Syed acknowledged consent and understanding of privacy and security of the telemedicine visit  I informed the patient that I have reviewed her record in Epic and presented the opportunity for her to ask any questions regarding the visit today  The patient agreed to participate  It was my intent to perform this visit via video technology but the patient was not able to do a video connection so the visit was completed via audio telephone only  Verification of patient location:  Patient is located in the following state in which I hold an active license: [place]    Subjective:   Dwain Syed is a 61 y o  female who has been screened for COVID-19  Symptom change since last report: unchanged  Patient's symptoms include fever, rhinorrhea and cough  Patient denies chills, fatigue, malaise, congestion, sore throat, anosmia, loss of taste, shortness of breath, chest tightness, abdominal pain, nausea, vomiting, diarrhea, myalgias and headaches  Date of symptom onset: 12/26/2021  Date of positive COVID-19 PCR: 1/2/2022  COVID-19 vaccination status: Fully vaccinated with booster    Cb Low has been staying home and has isolated themselves in her home  She is taking care to not share personal items and is cleaning all surfaces that are touched often, like counters, tabletops, and doorknobs using household cleaning sprays or wipes  She is wearing a mask when she leaves her room  She has continued congestion and productive cough  Now has a fever of 100 7  Taking Theraflu and using Flonase  Did an at home test yesterday, which was negative      Lab Results   Component Value Date    SARSCOV2 Negative 03/31/2021     Past Medical History:   Diagnosis Date    Arthritis     Asthma     Back pain     Chronic pain disorder     hips into the legs    Colon polyp     DVT [facility]deep venous thrombosis) ([facility] [address]     Gastric bypass status for obesity     yarelis en y    GERD (gastroesophageal reflux disease)     Hiatal hernia     History of transfusion 2006    after gastric bypass surgery, no reactions    Hypertension     Irritable bowel syndrome     Kidney stone     Muscle weakness     bilateral legs    Numbness and tingling     bilateral feet    Pneumonia     Spinal stenosis     Tinnitus     occassionally    Vertigo     Wears glasses      Past Surgical History:   Procedure Laterality Date    APPENDECTOMY      BACK SURGERY  2018    L4-5 fusion    BARIATRIC SURGERY      yarelis en y- pt states the procedure was done incorrectly which lead to additional surgeries from 2006   [address] Right      SECTION      x1   [address]      partial removal of the small bowel-necrosis-between 2006    COLONOSCOPY      COLONOSCOPY W/ BIOPSIES AND POLYPECTOMY      EPIDURAL BLOCK INJECTION N/A 2021    Procedure: C6 C7 cervical epidural steroid injection ( 68927); Surgeon: Vincent Klinefelter, MD;  Location[facility] MAIN OR;  Service: Pain Management     EPIDURAL BLOCK INJECTION N/A 2021    Procedure: C6 C7 cervical epidural steroid injection (99644);   Surgeon: Vincent Klinefelter, MD;  Location: [facility] MAIN OR;  Service: Pain Management     FLEXIBLE BRONCHOSCOPY W/ UPPER ENDOSCOPY      GASTRECTOMY      after gastric bypass-(failed surgery per pt) 2006    HERNIA REPAIR      incisional hernias-diaphragm area    FL ARTHROCENTESIS ASPIR&/INJ SMALL JT/BURSA W/O US N/A 2018    Procedure: Coccygeal Injection & Ganglion Impar Block;  Surgeon: Vincent Klinefelter, MD;  Location[facility] MAIN OR;  Service: Pain Management     FL ARTHRODESIS POSTERIOR INTERBODY LUMBAR N/A 2018    Procedure: L4-5 DECOMPRESSION INTERBODY INSTRUMENTED FUSION;  Surgeon: Romario Cabello MD;  [facility];  Service: Orthopedics    NM COLONOSCOPY FLX DX W/COLLJ SPEC WHEN PFRMD N/A 8/24/2018    Procedure: COLONOSCOPY;  Surgeon: Jim Salas MD;  Location: [facility] GI LAB; Service: Gastroenterology    NM ESOPHAGOGASTRODUODENOSCOPY TRANSORAL DIAGNOSTIC N/A 2/19/2018    Procedure: ESOPHAGOGASTRODUODENOSCOPY (EGD); Surgeon: Jim Salas MD;  Location: [facility] GI LAB; Service: Gastroenterology   [place] JOINT Right 5/25/2018    Procedure: Rt Sacroiliac Joint Injection;  Surgeon: Thais Lewis MD;  Location: [facility] MAIN OR;  Service: Pain Management     NM INJECTION,SACROILIAC JOINT Right 5/1/2019    Procedure: Sacroiliac Joint Injection (26420);   Surgeon: Thais Lewis MD;  Location: [facility] MAIN OR;  Service: Pain Management     TONSILECTOMY, ADENOIDECTOMY, BILATERAL MYRINGOTOMY AND TUBES      TONSILLECTOMY       Current Outpatient Medications   Medication Sig Dispense Refill    azithromycin (ZITHROMAX) 250 mg tablet 2 tabs PO day 1, then 1 tab PO days 2-5 6 tablet 0    calcium carbonate (OS-BEAU) 1250 (500 Ca) MG chewable tablet Chew 1 tablet daily      cholecalciferol (VITAMIN D3) 400 units tablet Take 400 Units by mouth daily      cyclobenzaprine (FLEXERIL) 10 mg tablet Take 1 tablet (10 mg total) by mouth 3 (three) times a day as needed for muscle spasms for up to 10 days 30 tablet 0    dicyclomine (BENTYL) 10 mg capsule Take 1 capsule (10 mg total) by mouth 3 (three) times a day as needed (stomach cramping) 90 capsule 1    famotidine (PEPCID) 20 mg tablet Take 1 tablet (20 mg total) by mouth daily 90 tablet 2    fluticasone (FLONASE) 50 mcg/act nasal spray 2 sprays into each nostril daily 16 g 3    lisinopril-hydrochlorothiazide (PRINZIDE,ZESTORETIC) 20-12 5 MG per tablet Take 1 tablet by mouth daily 90 tablet 1    multivitamin (THERAGRAN) TABS Take 1 tablet by mouth daily      Na Sulfate-K Sulfate-Mg Sulf (Suprep Bowel Prep Kit) 17 5-3 13-1 6 GM/177ML SOLN Take 1 kit by mouth 1 (one) time for 1 dose 177 mL 0    Omega-3 Fatty Acids (fish oil) 1,000 mg Take 1 capsule (1,000 mg total) by mouth 2 (two) times a day 60 capsule 6    Probiotic Product (VSL#3 DS) PACK Take 1 each by mouth daily 20 each 3    traZODone (DESYREL) 50 mg tablet Take 50 mg by mouth daily at bedtime as needed        vitamin B-12 (CYANOCOBALAMIN) 50 MCG tablet Take 50 mcg by mouth daily       No current facility-administered medications for this visit  No Known Allergies    Review of Systems   Constitutional: Positive for fever  Negative for chills and fatigue  HENT: Positive for rhinorrhea  Negative for congestion and sore throat  Respiratory: Positive for cough  Negative for chest tightness and shortness of breath  Gastrointestinal: Negative for abdominal pain, diarrhea, nausea and vomiting  Musculoskeletal: Negative for myalgias  Neurological: Negative for headaches  Objective: There were no vitals filed for this visit  Physical Exam    VIRTUAL VISIT DISCLAIMER    Krystal Garcia verbally agrees to participate in [employer]  Pt is aware that Virtual Care Services could be limited without vital signs or the ability to perform a full hands-on physical exam  Fariba Garcia understands she or the provider may request at any time to terminate the video visit and request the patient to seek care or treatment in person

## 2022-01-16 ENCOUNTER — OFFICE VISIT (OUTPATIENT)
Dept: URGENT CARE | Facility: CLINIC | Age: 67
End: 2022-01-16
Payer: COMMERCIAL

## 2022-01-16 VITALS
RESPIRATION RATE: 18 BRPM | BODY MASS INDEX: 40.04 KG/M2 | HEART RATE: 87 BPM | WEIGHT: 226 LBS | TEMPERATURE: 97.1 F | SYSTOLIC BLOOD PRESSURE: 136 MMHG | HEIGHT: 63 IN | OXYGEN SATURATION: 97 % | DIASTOLIC BLOOD PRESSURE: 80 MMHG

## 2022-01-16 DIAGNOSIS — R05.9 COUGH: Primary | ICD-10-CM

## 2022-01-16 PROCEDURE — 99213 OFFICE O/P EST LOW 20 MIN: CPT | Performed by: PHYSICIAN ASSISTANT

## 2022-01-16 PROCEDURE — 3008F BODY MASS INDEX DOCD: CPT | Performed by: NURSE PRACTITIONER

## 2022-01-16 RX ORDER — DOXYCYCLINE 100 MG/1
100 CAPSULE ORAL 2 TIMES DAILY
Qty: 14 CAPSULE | Refills: 0 | Status: SHIPPED | OUTPATIENT
Start: 2022-01-16 | End: 2022-01-23

## 2022-01-16 NOTE — PROGRESS NOTES
[employer] [employer]        NAME: Jessy Ventura is a 61 y o  female  : 1955    MRN: [id number]  DATE: 2022  TIME: 12:23 PM    Assessment and Plan   Cough [R05 9]  1  Cough  doxycycline monohydrate (MONODOX) 100 mg capsule         Patient Instructions   Cough: ddx CAP  -There is crackles heard over the lower lobes  Will treat empirically with Doxycycline 100mg PO BID x 7 days  Her pulse ox is 97%  She is well appearing  She is afebrile    -Fluticasone Nasal Spray for PND and congestion  -You can use OTC zyrtec or claritin  You can OTC mucinex  -Use a humidifier next to your bed  Take steam showers  -You can take Advil or Tylenol for fever or pain  -Stay very well hydrated and rest   -Follow up with your PCP tomorrow as scheduled for recheck     Follow up with PCP in 3-5 days  Proceed to  ER if symptoms worsen  Chief Complaint     Chief Complaint   Patient presents with    chest congestion         History of Present Illness       The patient is a 62-year-old female who presents today for chest congestion, productive cough and nasal congestion  She states that she tested positive for COVID-19 on 21  She was placed on Azithromycin shortly after for a sinus infection which completed ten days ago  She has an appointment with her PCP tomorrow but is concerned that she has pneumonia and would like her oxygen levels checked  She has worsening productive cough of a green sputum  She has no fever, chills, body aches  No wheezing, chest tightness, cp, palpitations  No dizziness or weakness  No back pain or night sweats  She states that she has a hx of asthma but is not currently being treated  Review of Systems   Review of Systems   Constitutional: Positive for fatigue  Negative for activity change, appetite change, chills, diaphoresis and fever  HENT: Positive for congestion   Negative for ear discharge, ear pain, facial swelling, hearing loss, postnasal drip, rhinorrhea, sinus pressure, sinus pain, sore throat, tinnitus, trouble swallowing and voice change  Eyes: Negative for visual disturbance  Respiratory: Positive for cough  Negative for apnea, chest tightness, shortness of breath, wheezing and stridor  Cardiovascular: Negative for chest pain, palpitations and leg swelling  Gastrointestinal: Negative for abdominal distention and abdominal pain  Genitourinary: Negative for decreased urine volume  Musculoskeletal: Negative for arthralgias, joint swelling, myalgias, neck pain and neck stiffness  Skin: Negative for rash  Allergic/Immunologic: Negative for immunocompromised state  Neurological: Negative for dizziness, weakness, light-headedness, numbness and headaches  Hematological: Negative for adenopathy           Current Medications       Current Outpatient Medications:     calcium carbonate (OS-BEAU) 1250 (500 Ca) MG chewable tablet, Chew 1 tablet daily, Disp: , Rfl:     cholecalciferol (VITAMIN D3) 400 units tablet, Take 400 Units by mouth daily, Disp: , Rfl:     dicyclomine (BENTYL) 10 mg capsule, Take 1 capsule (10 mg total) by mouth 3 (three) times a day as needed (stomach cramping), Disp: 90 capsule, Rfl: 1    famotidine (PEPCID) 20 mg tablet, Take 1 tablet (20 mg total) by mouth daily, Disp: 90 tablet, Rfl: 2    fluticasone (FLONASE) 50 mcg/act nasal spray, 2 sprays into each nostril daily, Disp: 16 g, Rfl: 3    lisinopril-hydrochlorothiazide (PRINZIDE,ZESTORETIC) 20-12 5 MG per tablet, Take 1 tablet by mouth daily, Disp: 90 tablet, Rfl: 1    multivitamin (THERAGRAN) TABS, Take 1 tablet by mouth daily, Disp: , Rfl:     Probiotic Product (VSL#3 DS) PACK, Take 1 each by mouth daily, Disp: 20 each, Rfl: 3    vitamin B-12 (CYANOCOBALAMIN) 50 MCG tablet, Take 50 mcg by mouth daily, Disp: , Rfl:     albuterol (ProAir HFA) 90 mcg/act inhaler, Inhale 2 puffs every 4 (four) hours as needed for wheezing or shortness of breath (Patient not taking: Reported on 1/16/2022 ), Disp: 8 5 g, Rfl: 0    cyclobenzaprine (FLEXERIL) 10 mg tablet, Take 1 tablet (10 mg total) by mouth 3 (three) times a day as needed for muscle spasms for up to 10 days, Disp: 30 tablet, Rfl: 0    doxycycline monohydrate (MONODOX) 100 mg capsule, Take 1 capsule (100 mg total) by mouth 2 (two) times a day for 7 days, Disp: 14 capsule, Rfl: 0    Na Sulfate-K Sulfate-Mg Sulf (Suprep Bowel Prep Kit) 17 5-3 13-1 6 GM/177ML SOLN, Take 1 kit by mouth 1 (one) time for 1 dose, Disp: 177 mL, Rfl: 0    Omega-3 Fatty Acids (fish oil) 1,000 mg, Take 1 capsule (1,000 mg total) by mouth 2 (two) times a day (Patient not taking: Reported on 1/16/2022 ), Disp: 60 capsule, Rfl: 6    traZODone (DESYREL) 50 mg tablet, Take 50 mg by mouth daily at bedtime as needed   (Patient not taking: Reported on 1/16/2022 ), Disp: , Rfl:     Current Allergies     Allergies as of 01/16/2022    (No Known Allergies)            The following portions of the patient's history were reviewed and updated as appropriate: allergies, current medications, past family history, past medical history, past social history, past surgical history and problem list      Past Medical History:   Diagnosis Date    Arthritis     Asthma     Back pain     Chronic pain disorder     hips into the legs    Colon polyp     DVT (deep venous thrombosis) ([facility] [address] ) 2006    Gastric bypass status for obesity     yarelis en y    GERD (gastroesophageal reflux disease)     Hiatal hernia     History of transfusion 2006    after gastric bypass surgery, no reactions    Hypertension     Irritable bowel syndrome     Kidney stone     Muscle weakness     bilateral legs    Numbness and tingling     bilateral feet    Pneumonia     Spinal stenosis     Tinnitus     occassionally    Vertigo     Wears glasses        Past Surgical History:   Procedure Laterality Date    APPENDECTOMY      BACK SURGERY  06/18/2018    L4-5 fusion    BARIATRIC SURGERY  2005 yarelis en y- pt states the procedure was done incorrectly which lead to additional surgeries from 2006   [address]      SECTION      x1   [address] [facility]      partial removal of the small bowel-necrosis-between 2006    COLONOSCOPY      COLONOSCOPY W/ BIOPSIES AND POLYPECTOMY      EPIDURAL BLOCK INJECTION N/A 2021    Procedure: C6 C7 cervical epidural steroid injection ( 30417); Surgeon: Rashida Morton MD;  Location: [facility] MAIN OR;  Service: Pain Management     EPIDURAL BLOCK INJECTION N/A 2021    Procedure: C6 C7 cervical epidural steroid injection (26594); Surgeon: Rashida Morton MD;  Location: [facility] MAIN OR;  Service: Pain Management     FLEXIBLE BRONCHOSCOPY W/ UPPER ENDOSCOPY      GASTRECTOMY      after gastric bypass-(failed surgery per pt) 2006    HERNIA REPAIR      incisional hernias-diaphragm area    OH ARTHROCENTESIS ASPIR&/INJ SMALL JT/BURSA W/O US N/A 2018    Procedure: Coccygeal Injection & Ganglion Impar Block;  Surgeon: Rashida Morton MD;  Location: [place] [address] MAIN OR;  Service: Pain Management     OH ARTHRODESIS POSTERIOR INTERBODY LUMBAR N/A 2018    Procedure: L4-5 DECOMPRESSION INTERBODY INSTRUMENTED FUSION;  Surgeon: Serina Mcneill MD;  Location: [facility];  Service: Orthopedics    [place] COLONOSCOPY FLX DX W/COLLJ [address] PFRMD N/A 2018    Procedure: COLONOSCOPY;  Surgeon: Abdullahi Arce MD;  Location: [place] [address] GI LAB; Service: Gastroenterology    OH ESOPHAGOGASTRODUODENOSCOPY TRANSORAL DIAGNOSTIC N/A 2018    Procedure: ESOPHAGOGASTRODUODENOSCOPY (EGD); Surgeon: Abdullahi Arce MD;  Location: [facility] GI LAB;   Service: Gastroenterology   [place] JOINT Right 2018    Procedure: Rt Sacroiliac Joint Injection;  Surgeon: Rashida Morton MD;  Location: [facility] MAIN OR;  Service: Pain Management     [place] INJECTION,SACROILIAC JOINT Right 2019    Procedure: Sacroiliac Joint Injection (23153); Surgeon: Joyce Tuttle MD;  Location: [facility] MAIN OR;  Service: Pain Management     TONSILECTOMY, ADENOIDECTOMY, BILATERAL MYRINGOTOMY AND TUBES      TONSILLECTOMY         Family History   Problem Relation Age of Onset    Diabetes Mother     Aortic aneurysm Father     Cancer Father         prostate    Heart disease Sister         open heart    Cancer Sister         breast    Breast cancer Sister 47    Diabetes Brother     Cancer Brother         spinal cancer    Other Daughter         concussion syndrome from MVA    Asperger's syndrome Son         high functioning    Diabetes Maternal Grandfather     Stroke Brother     Hypertension Brother     No Known Problems Maternal Aunt     No Known Problems Maternal Aunt     No Known Problems Paternal Aunt     No Known Problems Paternal Aunt     Colon cancer Maternal Uncle 60         Medications have been verified  Objective   /80   Pulse 87   Temp (!) 97 1 °F (36 2 °C)   Resp 18   Ht 5' 3" (1 6 m)   Wt 103 kg (226 lb)   SpO2 97%   BMI 40 03 kg/m²   No LMP recorded  Patient is postmenopausal        Physical Exam     Physical Exam  Vitals and nursing note reviewed  Constitutional:       General: She is not in acute distress  Appearance: She is well-developed  She is not ill-appearing, toxic-appearing or diaphoretic  HENT:      Head: Normocephalic and atraumatic  Right Ear: Hearing, tympanic membrane, ear canal and external ear normal       Left Ear: Hearing, tympanic membrane, ear canal and external ear normal       Nose: Nose normal  No mucosal edema or rhinorrhea  Right Sinus: No maxillary sinus tenderness or frontal sinus tenderness  Left Sinus: No maxillary sinus tenderness or frontal sinus tenderness  Mouth/Throat:      Pharynx: Uvula midline  No oropharyngeal exudate, posterior oropharyngeal erythema or uvula swelling  Tonsils: No tonsillar abscesses     Cardiovascular:      Rate and Rhythm: Normal rate and regular rhythm  Heart sounds: S1 normal and S2 normal  Heart sounds not distant  No murmur heard  No friction rub  No gallop  No S3 or S4 sounds  Pulmonary:      Effort: No tachypnea, bradypnea, accessory muscle usage or respiratory distress  Breath sounds: Normal air entry  No stridor, decreased air movement or transmitted upper airway sounds  Rhonchi present  No decreased breath sounds, wheezing or rales  Comments: Rhonchi bilaterally of the lower lung bases   Musculoskeletal:      Cervical back: Normal range of motion and neck supple  Lymphadenopathy:      Cervical: No cervical adenopathy  Neurological:      Mental Status: She is alert and oriented to person, place, and time     Psychiatric:         Behavior: Behavior normal

## 2022-01-16 NOTE — PATIENT INSTRUCTIONS
Pneumonia   WHAT YOU NEED TO KNOW:   Pneumonia is an infection in your lungs caused by bacteria, viruses, fungi, or parasites  You can become infected if you come in contact with someone who is sick  You can get pneumonia if you recently had surgery or needed a ventilator to help you breathe  Pneumonia can also be caused by accidentally inhaling saliva or small pieces of food  Pneumonia may cause mild symptoms, or it can be severe and life-threatening  DISCHARGE INSTRUCTIONS:   Return to the emergency department if:   · You cough up blood  · Your heart beats more than 100 beats in 1 minute  · You are very tired, confused, and cannot think clearly  · You have chest pain or trouble breathing  · Your lips or fingernails turn gray or blue  Call your doctor if:   · Your symptoms are the same or get worse 48 hours after you start antibiotics  · Your fever is not below 99°F (37 2°C) 48 hours after you start antibiotics  · You have a fever higher than 101°F (38 3°C)  · You cannot eat, or you have loss of appetite, nausea, or are vomiting  · You have questions or concerns about your condition or care  Medicines: You may need any of the following:  · Antibiotics  treat pneumonia caused by bacteria  · Acetaminophen  decreases pain and fever  It is available without a doctor's order  Ask how much to take and how often to take it  Follow directions  Read the labels of all other medicines you are using to see if they also contain acetaminophen, or ask your doctor or pharmacist  Acetaminophen can cause liver damage if not taken correctly  Do not use more than 4 grams (4,000 milligrams) total of acetaminophen in one day  · NSAIDs , such as ibuprofen, help decrease swelling, pain, and fever  This medicine is available with or without a doctor's order  NSAIDs can cause stomach bleeding or kidney problems in certain people   If you take blood thinner medicine, always ask your healthcare provider if NSAIDs are safe for you  Always read the medicine label and follow directions  · Take your medicine as directed  Contact your healthcare provider if you think your medicine is not helping or if you have side effects  Tell him or her if you are allergic to any medicine  Keep a list of the medicines, vitamins, and herbs you take  Include the amounts, and when and why you take them  Bring the list or the pill bottles to follow-up visits  Carry your medicine list with you in case of an emergency  Follow up with your doctor as directed: You will need to return for more tests  Write down your questions so you remember to ask them during your visits  Manage your symptoms:   · Rest as needed  Rest often throughout the day  Alternate times of activity with times of rest     · Drink liquids as directed  Ask how much liquid to drink each day and which liquids are best for you  Liquids help thin your mucus, which may make it easier for you to cough it up  · Do not smoke  Avoid secondhand smoke  Smoking increases your risk for pneumonia  Smoking also makes it harder for you to get better after you have had pneumonia  Ask your healthcare provider for information if you need help to quit smoking  · Limit alcohol  Women should limit alcohol to 1 drink a day  Men should limit alcohol to 2 drinks a day  A drink of alcohol is 12 ounces of beer, 5 ounces of wine, or 1½ ounces of liquor  · Use a cool mist humidifier  A humidifier will help increase air moisture in your home  This may make it easier for you to breathe and help decrease your cough  · Keep your head elevated  You may be able to breathe better if you lie down with the head of your bed up  Prevent pneumonia:   · Wash your hands often  Use soap and water every time you wash your hands  Rub your soapy hands together, lacing your fingers  Use the fingers of one hand to scrub under the nails of the other hand   Wash for at least 20 seconds  Rinse with warm, running water for several seconds  Then dry your hands with a clean towel or paper towel  Use hand  that contains alcohol if soap and water are not available  Do not touch your eyes, nose, or mouth without washing your hands first          · Cover a sneeze or cough  Use a tissue that covers your mouth and nose  Throw the tissue away in a trash can right away  Use the bend of your arm if a tissue is not available  Wash your hands well with soap and water or use a hand   Do not stand close to anyone who is sneezing or coughing  · Stay away from others until you are well  Do not go to work or other activities  Wait until your symptoms are gone or your healthcare provider says it is okay to return  · Ask about vaccines you may need  You may need a vaccine to help prevent pneumonia  Get an influenza (flu) vaccine every year as soon as recommended, usually in September or October  Flu viruses change, so it is important to get a yearly flu vaccine  © Copyright [employer] 2021 Information is for End User's use only and may not be sold, redistributed or otherwise used for commercial purposes  All illustrations and images included in CareNotes® are the copyrighted property of A D A M , Inc  or [address] [place] [address]  The above information is an  only  It is not intended as medical advice for individual conditions or treatments  Talk to your doctor, nurse or pharmacist before following any medical regimen to see if it is safe and effective for you  Cough: ddx CAP  -There is crackles heard over the lower lobes  Will treat empirically with Doxycycline 100mg PO BID x 7 days  Her pulse ox is 97%  She is well appearing  She is afebrile    -Fluticasone Nasal Spray for PND and congestion  -You can use OTC zyrtec or claritin  You can OTC mucinex  -Use a humidifier next to your bed  Take steam showers     -You can take Advil or Tylenol for fever or pain  -Stay very well hydrated and rest   -Follow up with your PCP tomorrow as scheduled for recheck

## 2022-01-17 ENCOUNTER — PATIENT MESSAGE (OUTPATIENT)
Dept: FAMILY MEDICINE CLINIC | Facility: CLINIC | Age: 67
End: 2022-01-17

## 2022-01-17 DIAGNOSIS — I10 ESSENTIAL HYPERTENSION: ICD-10-CM

## 2022-01-18 ENCOUNTER — TELEPHONE (OUTPATIENT)
Dept: FAMILY MEDICINE CLINIC | Facility: CLINIC | Age: 67
End: 2022-01-18

## 2022-01-18 DIAGNOSIS — U07.1 COVID-19 VIRUS INFECTION: ICD-10-CM

## 2022-01-18 RX ORDER — ALBUTEROL SULFATE 90 UG/1
2 AEROSOL, METERED RESPIRATORY (INHALATION) EVERY 4 HOURS PRN
Qty: 8.5 G | Refills: 0 | Status: SHIPPED | OUTPATIENT
Start: 2022-01-18

## 2022-01-18 NOTE — TELEPHONE ENCOUNTER
Both the albuterol inhaler and Flonase have been spent  As far as her other concerns, she can certainly schedule a virtual visit to discuss this further  Thanks!

## 2022-01-18 NOTE — TELEPHONE ENCOUNTER
----- Message from [facility] sent at 1/17/2022  3:31 PM EST -----  Regarding: FW: Address my asthma, allergies sinuses liver kidneys     ----- Message -----  From: María Genao  Sent: 1/17/2022  10:54 AM EST  To: THE [facility]  Subject: Address my asthma, allergies sinuses liver k#    Need to come discuss my asthma and get an Rx for an inhaler again  I thought you already ordered one and also to renew my refills for my flonaise but [employer] had nothing for me  I am glad the ZPak you prescribed during our virtual visit has been helping clear up my nasal infection  My right sinus is still bleeding now and then and producing crusty clumps of blood and mucus   the constant blowing and funky colors have stopped for the most part  I have 3 dogs and 3 [place]  This rental homes windows have been painted shut for decades  I need a maintenance routine for my nose and lungs  Must discuss a nephrologist New Years resolution Ive Quit alcohol, experiencing pain right  abdomen &  right side of back  liver and kidney disease can go undetected until too late, I need pro-active roll in determining the exact state my liver and kidneys are in   Its especially concerning because of my compromised anatomy due to the botched gastric bypass  You already know I have fatty liver, but I need , not just want, but need to have a biopsy to get a clear picture of what treatment and diet I need to erase the damages  In the past transplant was the only option, but I am hoping that the articles I read about some sort of diet plan can reverse the damages is true  I am not sure if I need a referral or not, but need to discuss these concerns with you Thony Sorto   I know there is a nephrology office  at [facility]  Are you familiar with their practice?

## 2022-01-20 ENCOUNTER — RA CDI HCC (OUTPATIENT)
Dept: OTHER | Facility: HOSPITAL | Age: 67
End: 2022-01-20

## 2022-01-20 NOTE — PROGRESS NOTES
[facility]  coding opportunities          Number of diagnosis code(s) already on the problem list added to FYI fla                     Patients insurance company: [employer] (Medicare and Commercial for [employer])     Visit status: Patient canceled the appointment        [facility]  coding opportunities          Number of diagnosis code(s) already on the problem list added to FYI fla      M46 1 Sacroiliitis ([facility] )     E66 01 Morbid obesity ([facility] )     If this is correct, please document and assess at your next visit,2022                Patients insurance company: [employer] (Medicare and Commercial for [employer])

## 2022-03-22 ENCOUNTER — RA CDI HCC (OUTPATIENT)
Dept: OTHER | Facility: HOSPITAL | Age: 67
End: 2022-03-22

## 2022-03-22 NOTE — PROGRESS NOTES
[facility]  coding opportunities          Chart Reviewed number of suggestions sent to Provider: 2     Patients Insurance        Commercial Insurance: [address]     [place] [address]  coding opportunities          Chart Reviewed number of suggestions sent to Provider: 2     Patients Insurance        Commercial Insurance: [address]     Please review the following Dx as per the active problem list:    E66 01 Morbid Obesity    M46 1 Sacroiliitis     If this is correct, please assess and document during your next visit, March 29

## 2022-05-11 ENCOUNTER — HOSPITAL ENCOUNTER (OUTPATIENT)
Dept: GASTROENTEROLOGY | Facility: AMBULARY SURGERY CENTER | Age: 67
Setting detail: OUTPATIENT SURGERY
Discharge: HOME | End: 2022-05-11
Attending: INTERNAL MEDICINE | Admitting: INTERNAL MEDICINE
Payer: COMMERCIAL

## 2022-05-11 ENCOUNTER — ANESTHESIA (OUTPATIENT)
Dept: GASTROENTEROLOGY | Facility: AMBULARY SURGERY CENTER | Age: 67
End: 2022-05-11

## 2022-05-11 ENCOUNTER — ANESTHESIA EVENT (OUTPATIENT)
Dept: GASTROENTEROLOGY | Facility: AMBULARY SURGERY CENTER | Age: 67
End: 2022-05-11

## 2022-05-11 VITALS
OXYGEN SATURATION: 97 % | DIASTOLIC BLOOD PRESSURE: 64 MMHG | WEIGHT: 231 LBS | HEART RATE: 87 BPM | TEMPERATURE: 97.8 F | SYSTOLIC BLOOD PRESSURE: 107 MMHG | BODY MASS INDEX: 39.44 KG/M2 | RESPIRATION RATE: 20 BRPM | HEIGHT: 64 IN

## 2022-05-11 DIAGNOSIS — Z86.010 HISTORY OF COLON POLYPS: ICD-10-CM

## 2022-05-11 PROBLEM — J45.20 MILD INTERMITTENT ASTHMA: Status: ACTIVE | Noted: 2022-05-11

## 2022-05-11 PROCEDURE — 45385 COLONOSCOPY W/LESION REMOVAL: CPT | Performed by: INTERNAL MEDICINE

## 2022-05-11 PROCEDURE — 88305 TISSUE EXAM BY PATHOLOGIST: CPT | Performed by: SPECIALIST

## 2022-05-11 PROCEDURE — 45380 COLONOSCOPY AND BIOPSY: CPT | Performed by: INTERNAL MEDICINE

## 2022-05-11 RX ORDER — SODIUM CHLORIDE, SODIUM LACTATE, POTASSIUM CHLORIDE, CALCIUM CHLORIDE 600; 310; 30; 20 MG/100ML; MG/100ML; MG/100ML; MG/100ML
125 INJECTION, SOLUTION INTRAVENOUS CONTINUOUS
Status: DISCONTINUED | OUTPATIENT
Start: 2022-05-11 | End: 2022-05-15 | Stop reason: HOSPADM

## 2022-05-11 RX ORDER — SODIUM CHLORIDE, SODIUM LACTATE, POTASSIUM CHLORIDE, CALCIUM CHLORIDE 600; 310; 30; 20 MG/100ML; MG/100ML; MG/100ML; MG/100ML
INJECTION, SOLUTION INTRAVENOUS CONTINUOUS PRN
Status: DISCONTINUED | OUTPATIENT
Start: 2022-05-11 | End: 2022-05-11

## 2022-05-11 RX ORDER — ONDANSETRON 2 MG/ML
4 INJECTION INTRAMUSCULAR; INTRAVENOUS ONCE AS NEEDED
Status: CANCELLED | OUTPATIENT
Start: 2022-05-11

## 2022-05-11 RX ORDER — LIDOCAINE HYDROCHLORIDE 10 MG/ML
INJECTION, SOLUTION EPIDURAL; INFILTRATION; INTRACAUDAL; PERINEURAL AS NEEDED
Status: DISCONTINUED | OUTPATIENT
Start: 2022-05-11 | End: 2022-05-11

## 2022-05-11 RX ORDER — PROPOFOL 10 MG/ML
INJECTION, EMULSION INTRAVENOUS AS NEEDED
Status: DISCONTINUED | OUTPATIENT
Start: 2022-05-11 | End: 2022-05-11

## 2022-05-11 RX ORDER — PROPOFOL 10 MG/ML
INJECTION, EMULSION INTRAVENOUS CONTINUOUS PRN
Status: DISCONTINUED | OUTPATIENT
Start: 2022-05-11 | End: 2022-05-11

## 2022-05-11 RX ADMIN — PROPOFOL 40 MG: 10 INJECTION, EMULSION INTRAVENOUS at 08:20

## 2022-05-11 RX ADMIN — SODIUM CHLORIDE, SODIUM LACTATE, POTASSIUM CHLORIDE, AND CALCIUM CHLORIDE: .6; .31; .03; .02 INJECTION, SOLUTION INTRAVENOUS at 07:57

## 2022-05-11 RX ADMIN — PROPOFOL 80 MG: 10 INJECTION, EMULSION INTRAVENOUS at 08:06

## 2022-05-11 RX ADMIN — PROPOFOL 80 MCG/KG/MIN: 10 INJECTION, EMULSION INTRAVENOUS at 08:09

## 2022-05-11 RX ADMIN — PROPOFOL 50 MG: 10 INJECTION, EMULSION INTRAVENOUS at 08:16

## 2022-05-11 RX ADMIN — LIDOCAINE HYDROCHLORIDE 50 MG: 10 INJECTION, SOLUTION EPIDURAL; INFILTRATION; INTRACAUDAL; PERINEURAL at 08:06

## 2022-05-11 RX ADMIN — PROPOFOL 50 MG: 10 INJECTION, EMULSION INTRAVENOUS at 08:07

## 2022-05-11 RX ADMIN — SODIUM CHLORIDE, SODIUM LACTATE, POTASSIUM CHLORIDE, AND CALCIUM CHLORIDE 125 ML/HR: .6; .31; .03; .02 INJECTION, SOLUTION INTRAVENOUS at 07:54

## 2022-05-11 NOTE — ANESTHESIA PREPROCEDURE EVALUATION
Procedure:  COLONOSCOPY    Relevant Problems   ANESTHESIA (within normal limits)      CARDIO   (+) Essential hypertension   (+) Thoracic spine pain      GI/HEPATIC   (+) Dysphagia   (+) Gastroesophageal reflux disease   (+) Hepatic steatosis      MUSCULOSKELETAL   (+) Chronic bilateral low back pain without sciatica   (+) Coccydynia   (+) DDD (degenerative disc disease), lumbar   (+) Low back pain   (+) Lumbar spondylosis   (+) Myofascial pain syndrome   (+) Sacroiliitis (HCC)   (+) Thoracic spine pain      NEURO/PSYCH   (+) Chronic bilateral low back pain without sciatica   (+) Chronic pain syndrome   (+) Chronic right sacroiliac joint pain   (+) History of colon polyps   (+) Myofascial pain syndrome      PULMONARY   (+) Mild intermittent asthma        Physical Exam    Airway    Mallampati score: I  TM Distance: >3 FB  Neck ROM: full     Dental   Comment: No loose ,     Cardiovascular  Rhythm: regular, Rate: normal,     Pulmonary  Breath sounds clear to auscultation,     Other Findings        Anesthesia Plan  ASA Score- 3     Anesthesia Type- IV sedation with anesthesia with ASA Monitors  Additional Monitors:   Airway Plan:           Plan Factors-Exercise tolerance (METS): >4 METS  Chart reviewed  Existing labs reviewed  Patient summary reviewed  Patient is not a current smoker  Induction-     Postoperative Plan-     Informed Consent- Anesthetic plan and risks discussed with patient  I personally reviewed this patient with the CRNA  Discussed and agreed on the Anesthesia Plan with the CRNA  Bakari Fischer

## 2022-05-11 NOTE — H&P
History and Physical -  [facility]  Lauren Louise 61 y o  female MRN: [id number]    HPI: Lauren Louise is a 61y o  year old female who presents with hx of tubular adenomas x3, 2019  Review of Systems    Historical Information   Past Medical History:   Diagnosis Date    Arthritis     Asthma     Back pain     Chronic pain disorder     hips into the legs    Colon polyp     DVT (deep venous thrombosis) ([address] )     Gastric bypass status for obesity     yarelis en y    GERD (gastroesophageal reflux disease)     Hiatal hernia     History of transfusion 2006    after gastric bypass surgery, no reactions    Hypertension     Irritable bowel syndrome     Kidney stone     Muscle weakness     bilateral legs    Numbness and tingling     bilateral feet    Pneumonia     Spinal stenosis     Tinnitus     occassionally    Vertigo     Wears glasses      Past Surgical History:   Procedure Laterality Date    APPENDECTOMY      BACK SURGERY  2018    L4-5 fusion    BARIATRIC SURGERY      yarelis en y- pt states the procedure was done incorrectly which lead to additional surgeries from 2006   [address] Right      SECTION      x1   [address]      partial removal of the small bowel-necrosis-between 2006    COLONOSCOPY      COLONOSCOPY W/ BIOPSIES AND POLYPECTOMY      EPIDURAL BLOCK INJECTION N/A 2021    Procedure: C6 C7 cervical epidural steroid injection ( 62884); Surgeon: Arnaldo Armstrong MD;  Location: [facility] MAIN OR;  Service: Pain Management     EPIDURAL BLOCK INJECTION N/A 2021    Procedure: C6 C7 cervical epidural steroid injection ([id number]);   Surgeon: Arnaldo Armstrong MD;  Location: [facility] MAIN OR;  Service: Pain Management     FLEXIBLE BRONCHOSCOPY W/ UPPER ENDOSCOPY      GASTRECTOMY      after gastric bypass-(failed surgery per pt) 2006    HERNIA REPAIR      incisional hernias-diaphragm area    ME ARTHROCENTESIS ASPIR&/INJ SMALL JT/BURSA W/O US N/A 5/2/2018    Procedure: Coccygeal Injection & Ganglion Impar Block;  Surgeon: Jeferson Hidalgo MD;  Location: [place] [address] MAIN OR;  Service: Pain Management     ME ARTHRODESIS POSTERIOR INTERBODY LUMBAR N/A 6/27/2018    Procedure: L4-5 DECOMPRESSION INTERBODY INSTRUMENTED FUSION;  Surgeon: Mo Ybarra MD;  Location: [facility] Main OR;  Service: Orthopedics    ME COLONOSCOPY FLX DX W/COLLJ SPEC WHEN PFRMD N/A 8/24/2018    Procedure: COLONOSCOPY;  Surgeon: Po Dai MD;  Location: [place] [address] GI LAB; Service: Gastroenterology    ME ESOPHAGOGASTRODUODENOSCOPY TRANSORAL DIAGNOSTIC N/A 2/19/2018    Procedure: ESOPHAGOGASTRODUODENOSCOPY (EGD); Surgeon: Po Dai MD;  Location: [facility] GI LAB; Service: Gastroenterology   [place] JOINT Right 5/25/2018    Procedure: Rt Sacroiliac Joint Injection;  Surgeon: Jeferson Hidalgo MD;  Location: [facility] MAIN OR;  Service: Pain Management     ME INJECTION,SACROILIAC JOINT Right 5/1/2019    Procedure: Sacroiliac Joint Injection (89497); Surgeon: Jeferson Hidalgo MD;  Location: [facility] MAIN OR;  Service: Pain Management     TONSILECTOMY, ADENOIDECTOMY, BILATERAL MYRINGOTOMY AND TUBES      TONSILLECTOMY       Social History   Social History     Substance and Sexual Activity   Alcohol Use Yes    Alcohol/week: 7 0 standard drinks    Types: 5 Cans of beer, 2 Shots of liquor per week    Comment: socially     Social History     Substance and Sexual Activity   Drug Use Yes    Types: Marijuana    Comment: has medical marijuana card   lozenges     Social History     Tobacco Use   Smoking Status Never Smoker   Smokeless Tobacco Never Used     Family History   Problem Relation Age of Onset    Diabetes Mother     Aortic aneurysm Father     Cancer Father         prostate    Heart disease Sister         open heart    Cancer Sister         breast    Breast cancer Sister 47    Diabetes Brother     Cancer Brother spinal cancer    Other Daughter         concussion syndrome from MVA    Asperger's syndrome Son         high functioning    Diabetes Maternal Grandfather     Stroke Brother     Hypertension Brother     No Known Problems Maternal Aunt     No Known Problems Maternal Aunt     No Known Problems Paternal Aunt     No Known Problems Paternal [de-identified]     Colon cancer Maternal Uncle 61       Meds/Allergies     (Not in a hospital admission)      No Known Allergies    Objective     /84   Pulse 89   Temp 97 8 °F (36 6 °C) (Probe)   Resp 18   Ht 5' 4" (1 626 m)   Wt 105 kg (231 lb)   SpO2 98%   BMI 39 65 kg/m²       PHYSICAL EXAM    Gen: NAD  CV: RRR  CHEST: Clear  ABD: soft, NT/ND  EXT: no edema  Neuro: AAO      ASSESSMENT/PLAN:  This is a 61y o  year old female here for hx of tubular adenomas x3, 2019        PLAN:   Procedure: colonoscopy

## 2022-05-11 NOTE — ANESTHESIA POSTPROCEDURE EVALUATION
Post-Op Assessment Note    CV Status:  Stable  Pain Score: 0    Pain management: adequate     Mental Status:  Awake   Hydration Status:  Stable   PONV Controlled:  None   Airway Patency:  Patent      Post Op Vitals Reviewed: Yes      Staff: CRNA         No complications documented      BP      Temp      Pulse     Resp      SpO2   100

## 2022-05-23 RX ORDER — LISINOPRIL AND HYDROCHLOROTHIAZIDE 20; 12.5 MG/1; MG/1
1 TABLET ORAL DAILY
Qty: 90 TABLET | Refills: 0 | Status: SHIPPED | OUTPATIENT
Start: 2022-05-23

## 2022-05-25 ENCOUNTER — TELEPHONE (OUTPATIENT)
Dept: GASTROENTEROLOGY | Facility: CLINIC | Age: 67
End: 2022-05-25

## 2022-05-25 NOTE — TELEPHONE ENCOUNTER
----- Message from Pascale Galindo MD sent at 5/24/2022 10:52 AM EDT -----  Three of the polyps removed were tubular adenomas, there was no high-grade dysplasia and no cancer  The polyp in the descending colon was a hyperplastic polyp  As we discussed, recommend repeat colonoscopy in 3 years  Please call with any questions or concerns

## 2022-05-25 NOTE — TELEPHONE ENCOUNTER
Pt aware results and verbalized understanding  Pt says thank you to everyone and especially to Dr Reagan Ruano

## 2022-08-19 DIAGNOSIS — K21.9 GASTROESOPHAGEAL REFLUX DISEASE, UNSPECIFIED WHETHER ESOPHAGITIS PRESENT: ICD-10-CM

## 2022-08-19 DIAGNOSIS — I10 ESSENTIAL HYPERTENSION: ICD-10-CM

## 2022-08-19 DIAGNOSIS — R19.7 DIARRHEA, UNSPECIFIED TYPE: ICD-10-CM

## 2022-08-19 DIAGNOSIS — R13.10 DYSPHAGIA, UNSPECIFIED TYPE: ICD-10-CM

## 2022-08-19 RX ORDER — LISINOPRIL AND HYDROCHLOROTHIAZIDE 20; 12.5 MG/1; MG/1
1 TABLET ORAL DAILY
Qty: 90 TABLET | Refills: 0 | Status: SHIPPED | OUTPATIENT
Start: 2022-08-19

## 2022-08-19 RX ORDER — DICYCLOMINE HYDROCHLORIDE 10 MG/1
CAPSULE ORAL
Qty: 90 CAPSULE | Refills: 1 | Status: SHIPPED | OUTPATIENT
Start: 2022-08-19

## 2022-08-23 ENCOUNTER — APPOINTMENT (OUTPATIENT)
Dept: LAB | Facility: CLINIC | Age: 67
End: 2022-08-23
Payer: MEDICARE

## 2022-08-23 ENCOUNTER — OFFICE VISIT (OUTPATIENT)
Dept: FAMILY MEDICINE CLINIC | Facility: CLINIC | Age: 67
End: 2022-08-23
Payer: MEDICARE

## 2022-08-23 VITALS
DIASTOLIC BLOOD PRESSURE: 70 MMHG | TEMPERATURE: 97.5 F | RESPIRATION RATE: 16 BRPM | WEIGHT: 238 LBS | BODY MASS INDEX: 40.63 KG/M2 | HEIGHT: 64 IN | SYSTOLIC BLOOD PRESSURE: 110 MMHG | OXYGEN SATURATION: 95 % | HEART RATE: 86 BPM

## 2022-08-23 DIAGNOSIS — I10 ESSENTIAL HYPERTENSION: ICD-10-CM

## 2022-08-23 DIAGNOSIS — K76.0 HEPATIC STEATOSIS: ICD-10-CM

## 2022-08-23 DIAGNOSIS — Z78.0 ASYMPTOMATIC MENOPAUSAL STATE: ICD-10-CM

## 2022-08-23 DIAGNOSIS — I10 ESSENTIAL HYPERTENSION: Primary | ICD-10-CM

## 2022-08-23 DIAGNOSIS — Z13.29 SCREENING FOR THYROID DISORDER: ICD-10-CM

## 2022-08-23 DIAGNOSIS — Z13.6 SCREENING FOR CARDIOVASCULAR CONDITION: ICD-10-CM

## 2022-08-23 DIAGNOSIS — M48.02 CERVICAL SPINAL STENOSIS: ICD-10-CM

## 2022-08-23 DIAGNOSIS — Z12.31 ENCOUNTER FOR SCREENING MAMMOGRAM FOR MALIGNANT NEOPLASM OF BREAST: ICD-10-CM

## 2022-08-23 DIAGNOSIS — M46.1 SACROILIITIS (HCC): ICD-10-CM

## 2022-08-23 PROBLEM — R10.11 RIGHT UPPER QUADRANT ABDOMINAL PAIN: Status: RESOLVED | Noted: 2018-02-06 | Resolved: 2022-08-23

## 2022-08-23 LAB
ALBUMIN SERPL BCP-MCNC: 3.4 G/DL (ref 3.5–5)
ALP SERPL-CCNC: 77 U/L (ref 46–116)
ALT SERPL W P-5'-P-CCNC: 27 U/L (ref 12–78)
ANION GAP SERPL CALCULATED.3IONS-SCNC: 5 MMOL/L (ref 4–13)
AST SERPL W P-5'-P-CCNC: 25 U/L (ref 5–45)
BASOPHILS # BLD AUTO: 0.06 THOUSANDS/ΜL (ref 0–0.1)
BASOPHILS NFR BLD AUTO: 1 % (ref 0–1)
BILIRUB SERPL-MCNC: 0.72 MG/DL (ref 0.2–1)
BUN SERPL-MCNC: 13 MG/DL (ref 5–25)
CALCIUM ALBUM COR SERPL-MCNC: 9.5 MG/DL (ref 8.3–10.1)
CALCIUM SERPL-MCNC: 9 MG/DL (ref 8.3–10.1)
CHLORIDE SERPL-SCNC: 108 MMOL/L (ref 96–108)
CHOLEST SERPL-MCNC: 164 MG/DL
CO2 SERPL-SCNC: 27 MMOL/L (ref 21–32)
CREAT SERPL-MCNC: 1.06 MG/DL (ref 0.6–1.3)
EOSINOPHIL # BLD AUTO: 0.19 THOUSAND/ΜL (ref 0–0.61)
EOSINOPHIL NFR BLD AUTO: 2 % (ref 0–6)
ERYTHROCYTE [DISTWIDTH] IN BLOOD BY AUTOMATED COUNT: 14.8 % (ref 11.6–15.1)
GFR SERPL CREATININE-BSD FRML MDRD: 55 ML/MIN/1.73SQ M
GLUCOSE P FAST SERPL-MCNC: 108 MG/DL (ref 65–99)
HCT VFR BLD AUTO: 37.7 % (ref 34.8–46.1)
HDLC SERPL-MCNC: 71 MG/DL
HGB BLD-MCNC: 11.8 G/DL (ref 11.5–15.4)
IMM GRANULOCYTES # BLD AUTO: 0.02 THOUSAND/UL (ref 0–0.2)
IMM GRANULOCYTES NFR BLD AUTO: 0 % (ref 0–2)
LDLC SERPL CALC-MCNC: 71 MG/DL (ref 0–100)
LYMPHOCYTES # BLD AUTO: 2.39 THOUSANDS/ΜL (ref 0.6–4.47)
LYMPHOCYTES NFR BLD AUTO: 26 % (ref 14–44)
MCH RBC QN AUTO: 29.7 PG (ref 26.8–34.3)
MCHC RBC AUTO-ENTMCNC: 31.3 G/DL (ref 31.4–37.4)
MCV RBC AUTO: 95 FL (ref 82–98)
MONOCYTES # BLD AUTO: 0.72 THOUSAND/ΜL (ref 0.17–1.22)
MONOCYTES NFR BLD AUTO: 8 % (ref 4–12)
NEUTROPHILS # BLD AUTO: 5.76 THOUSANDS/ΜL (ref 1.85–7.62)
NEUTS SEG NFR BLD AUTO: 63 % (ref 43–75)
NONHDLC SERPL-MCNC: 93 MG/DL
NRBC BLD AUTO-RTO: 0 /100 WBCS
PLATELET # BLD AUTO: 351 THOUSANDS/UL (ref 149–390)
PMV BLD AUTO: 8.6 FL (ref 8.9–12.7)
POTASSIUM SERPL-SCNC: 4.3 MMOL/L (ref 3.5–5.3)
PROT SERPL-MCNC: 7.4 G/DL (ref 6.4–8.4)
RBC # BLD AUTO: 3.97 MILLION/UL (ref 3.81–5.12)
SODIUM SERPL-SCNC: 140 MMOL/L (ref 135–147)
TRIGL SERPL-MCNC: 111 MG/DL
TSH SERPL DL<=0.05 MIU/L-ACNC: 0.98 UIU/ML (ref 0.45–4.5)
WBC # BLD AUTO: 9.14 THOUSAND/UL (ref 4.31–10.16)

## 2022-08-23 PROCEDURE — 80061 LIPID PANEL: CPT

## 2022-08-23 PROCEDURE — 99214 OFFICE O/P EST MOD 30 MIN: CPT | Performed by: NURSE PRACTITIONER

## 2022-08-23 PROCEDURE — 36415 COLL VENOUS BLD VENIPUNCTURE: CPT

## 2022-08-23 PROCEDURE — 84443 ASSAY THYROID STIM HORMONE: CPT

## 2022-08-23 PROCEDURE — 85025 COMPLETE CBC W/AUTO DIFF WBC: CPT

## 2022-08-23 PROCEDURE — 80053 COMPREHEN METABOLIC PANEL: CPT

## 2022-08-23 RX ORDER — FAMOTIDINE 40 MG/1
40 TABLET, FILM COATED ORAL DAILY
COMMUNITY
Start: 2022-08-17

## 2022-08-23 NOTE — ASSESSMENT & PLAN NOTE
Pt is requesting a referral to see a hepatologist given history of liver disease  Labs currently unremarkable  Recent imaging of liver approx 1 year ago which shows stable steatosis

## 2022-08-23 NOTE — PROGRESS NOTES
Assessment/Plan:    1  Essential hypertension  Assessment & Plan:  Stable on current medications     Orders:  -     CBC and differential; Future    2  Screening for cardiovascular condition  -     CBC and differential; Future  -     Comprehensive metabolic panel; Future  -     Lipid panel; Future    3  Screening for thyroid disorder  -     TSH, 3rd generation with Free T4 reflex; Future    4  Encounter for screening mammogram for malignant neoplasm of breast  -     Mammo screening bilateral w cad; Future; Expected date: 08/23/2022  -     DXA bone density spine hip and pelvis; Future; Expected date: 08/23/2022    5  Asymptomatic menopausal state  -     DXA bone density spine hip and pelvis; Future; Expected date: 08/23/2022    6  Hepatic steatosis  Assessment & Plan:  Pt is requesting a referral to see a hepatologist given history of liver disease  Labs currently unremarkable  Recent imaging of liver approx 1 year ago which shows stable steatosis  Orders:  -     Ambulatory Referral to Gastroenterology; Future    7  Cervical spinal stenosis  Assessment & Plan:  To f/u with Dr Katherine Sanon to discuss potential injections  Given worsening pain       8  Sacroiliitis ([facility] [address] )      BMI Counseling: Body mass index is 40 85 kg/m²  The BMI is above normal  Nutrition recommendations include consuming healthier snacks  Exercise recommendations include moderate physical activity 150 minutes/week  Rationale for BMI follow-up plan is due to patient being overweight or obese  Depression Screening and Follow-up Plan: Patient was screened for depression during today's encounter  They screened negative with a PHQ-2 score of 2  Urinary Incontinence Plan of Care: counseling topics discussed: use restroom every 2 hours and weight loss  There are no Patient Instructions on file for this visit  Return in about 3 months (around 11/23/2022) for Welcome to Medicare      Subjective:      Patient ID: Juliana Gunn is a 74 y o  female  Chief Complaint   Patient presents with    Follow-up     F/U medication-wmcma       Here today for follow up on medications and health issues  She has lost several family members over the past few months  Due for labs  She has chronic pain issues, neck pain is worsening  The following portions of the patient's history were reviewed and updated as appropriate: allergies, current medications, past family history, past medical history, past social history, past surgical history and problem list     Review of Systems   Constitutional: Negative  Respiratory: Negative  Cardiovascular: Negative  Gastrointestinal: Negative  Musculoskeletal: Positive for neck pain  Neurological: Negative  Psychiatric/Behavioral: Negative  All other systems reviewed and are negative          Current Outpatient Medications   Medication Sig Dispense Refill    albuterol (ProAir HFA) 90 mcg/act inhaler Inhale 2 puffs every 4 (four) hours as needed for wheezing or shortness of breath 8 5 g 0    calcium carbonate (OS-BEAU) 1250 (500 Ca) MG chewable tablet Chew 1 tablet daily      cholecalciferol (VITAMIN D3) 400 units tablet Take 400 Units by mouth daily      dicyclomine (BENTYL) 10 mg capsule TAKE ONE CAPSULE BY MOUTH THREE TIMES A DAY AS NEEDED FOR STOMACH CRAMPING (GENERIC FOR BENTYL) 90 capsule 1    famotidine (PEPCID) 40 MG tablet Take 40 mg by mouth in the morning      fluticasone (FLONASE) 50 mcg/act nasal spray 2 sprays into each nostril daily 16 g 3    lisinopril-hydrochlorothiazide (PRINZIDE,ZESTORETIC) 20-12 5 MG per tablet Take 1 tablet by mouth daily 90 tablet 0    multivitamin (THERAGRAN) TABS Take 1 tablet by mouth daily      Omega-3 Fatty Acids (fish oil) 1,000 mg Take 1 capsule (1,000 mg total) by mouth 2 (two) times a day 60 capsule 6    Probiotic Product (VSL#3 DS) PACK Take 1 each by mouth daily 20 each 3    traZODone (DESYREL) 50 mg tablet Take 50 mg by mouth daily at bedtime as needed        vitamin B-12 (CYANOCOBALAMIN) 50 MCG tablet Take 50 mcg by mouth daily       No current facility-administered medications for this visit  Objective:    /70   Pulse 86   Temp 97 5 °F (36 4 °C)   Resp 16   Ht 5' 4" (1 626 m)   Wt 108 kg (238 lb)   SpO2 95%   BMI 40 85 kg/m²        Physical Exam  Vitals and nursing note reviewed  Constitutional:       Appearance: Normal appearance  She is well-developed  She is obese  Neck:      Vascular: No carotid bruit  Cardiovascular:      Rate and Rhythm: Normal rate and regular rhythm  Pulses: Normal pulses  Heart sounds: Normal heart sounds  No murmur heard  Pulmonary:      Effort: Pulmonary effort is normal       Breath sounds: Normal breath sounds  Skin:     General: Skin is warm and dry  Capillary Refill: Capillary refill takes less than 2 seconds  Neurological:      Mental Status: She is alert     Psychiatric:         Mood and Affect: Mood normal          Behavior: Behavior normal                 JACINTO Barth

## 2022-08-24 PROBLEM — M46.1 SACROILIITIS (HCC): Status: RESOLVED | Noted: 2018-05-15 | Resolved: 2022-08-24

## 2022-09-12 ENCOUNTER — OFFICE VISIT (OUTPATIENT)
Dept: GASTROENTEROLOGY | Facility: CLINIC | Age: 67
End: 2022-09-12
Payer: MEDICARE

## 2022-09-12 VITALS
HEART RATE: 86 BPM | DIASTOLIC BLOOD PRESSURE: 84 MMHG | SYSTOLIC BLOOD PRESSURE: 124 MMHG | OXYGEN SATURATION: 97 % | WEIGHT: 235.8 LBS | TEMPERATURE: 98 F | BODY MASS INDEX: 40.26 KG/M2 | HEIGHT: 64 IN

## 2022-09-12 DIAGNOSIS — Z86.010 PERSONAL HISTORY OF COLONIC POLYPS: ICD-10-CM

## 2022-09-12 DIAGNOSIS — Z78.9 ALCOHOL USE: ICD-10-CM

## 2022-09-12 DIAGNOSIS — K76.0 HEPATIC STEATOSIS: Primary | ICD-10-CM

## 2022-09-12 PROCEDURE — 99214 OFFICE O/P EST MOD 30 MIN: CPT | Performed by: STUDENT IN AN ORGANIZED HEALTH CARE EDUCATION/TRAINING PROGRAM

## 2022-09-12 NOTE — PROGRESS NOTES
[facility] - Outpatient Consultation  Jami Ness 74 y o  female MRN: [id number]  Encounter: [id number]    PCP:  Madi Jo, [phone]  Referring Provider:  No ref  provider found,     Patient: Jami Ness, [birth year]  Reason for Referral: hepatic steatosis    CC/HPI: 74 y o  female with history HTN, spinal stenosis, class III obesity, history of multiple abdominal surgeries, including prior RYGB with revision and total gastrectomy with esophagojejunostomy with an anastomotic stricture (2005) and CCY who presents for initial evaluation for hepatic steatosis  She has had incidental findings of hepatic steatosis dating back to 2018  Her liver tests have otherwise been normal and etiology was attributed to NAFLD in the setting of obesity  She also admits to daily EtOH use since the beginning of the pandemic (2019), drinking approx 4 alcoholic beverages daily in the form of beer and/or liquor  In regards to obesity, patient underwent RYGB with complication resulting in a total gastrectomy with esophagojejunostomy  States her initial RYGB was done backwards  States she was initially greater than 300 lbs, and now her weight fluctuates between 220-240 lbs  However, her weight loss has plateaued for multiple years despite reportedly maintaining a healthy diet and the use of multiple apps geared towards weight loss  Denies pruritus, confusion, dark urine, bruising easily, yellowing of the eyes and/or skin, new/worsening abdominal distension or swelling of the lower extremities, overt evidence of GI bleeding (hematemesis, coffee-ground emesis, hematochezia, melena), or unintentional weight loss   She does report RUQ pain x15 years, starting after her surgeries, for which she follows closely with her primary gastroenterologist      Additionally denies uncontrolled reflux/heartburn, dysphagia, odynophagia, early satiety, fevers/chills, nausea/vomiting, constipation, or diarrhea  ROS: Complete review of systems otherwise negative  PAST MEDICAL/SURGICAL HISTORY:  Past Medical History:   Diagnosis Date    Arthritis     Asthma     Back pain     Chronic pain disorder     hips into the legs    Colon polyp     DVT [facility]deep venous thrombosis) ([facility] )     Gastric bypass status for obesity     yarelis en y    GERD (gastroesophageal reflux disease)     Hiatal hernia     History of transfusion 2006    after gastric bypass surgery, no reactions    Hypertension     Irritable bowel syndrome     Kidney stone     Muscle weakness     bilateral legs    Numbness and tingling     bilateral feet    Pneumonia     Sacroiliitis ([facility] ) 5/15/2018    Spinal stenosis     Tinnitus     occassionally    Vertigo     Wears glasses         Past Surgical History:   Procedure Laterality Date    APPENDECTOMY      BACK SURGERY  2018    L4-5 fusion    BARIATRIC SURGERY      yarelis en y- pt states the procedure was done incorrectly which lead to additional surgeries from 2006   [address] Right      SECTION      x1   [address] [facility]      partial removal of the small bowel-necrosis-between 2006    COLONOSCOPY      COLONOSCOPY W/ BIOPSIES AND POLYPECTOMY      EPIDURAL BLOCK INJECTION N/A 2021    Procedure: C6 C7 cervical epidural steroid injection ( 54536); Surgeon: Av Majano MD;  Location: [facility] MAIN OR;  Service: Pain Management     EPIDURAL BLOCK INJECTION N/A 2021    Procedure: C6 C7 cervical epidural steroid injection (58604);   Surgeon: Av Majano MD;  Location: [facility] OR;  Service: Pain Management     FLEXIBLE BRONCHOSCOPY W/ UPPER ENDOSCOPY      GASTRECTOMY      after gastric bypass-(failed surgery per pt) 2006    HERNIA REPAIR      incisional hernias-diaphragm area    VA ARTHROCENTESIS ASPIR&/INJ SMALL JT/BURSA W/O US N/A 2018    Procedure: Coccygeal Injection & Ganglion Impar Block;  Surgeon: Paco Martin MD;  Location: [facility] MAIN OR;  Service: Pain Management     WA ARTHRODESIS POSTERIOR INTERBODY LUMBAR N/A 6/27/2018    Procedure: L4-5 DECOMPRESSION INTERBODY INSTRUMENTED FUSION;  Surgeon: Jaun Tyson MD;  Location: [facility] Main OR;  Service: Orthopedics    WA COLONOSCOPY FLX DX W/COLLJ [address] PFRMD N/A 8/24/2018    Procedure: COLONOSCOPY;  Surgeon: Rah Carolina MD;  Location: [place] [address] GI LAB; Service: Gastroenterology    WA ESOPHAGOGASTRODUODENOSCOPY TRANSORAL DIAGNOSTIC N/A 2/19/2018    Procedure: ESOPHAGOGASTRODUODENOSCOPY (EGD); Surgeon: Rah Carolina MD;  Location: [facility] GI LAB; Service: Gastroenterology   Caleb Amass JOINT Right 5/25/2018    Procedure: Rt Sacroiliac Joint Injection;  Surgeon: Paco Martin MD;  Location: [facility] MAIN OR;  Service: Pain Management     WA INJECTION,SACROILIAC JOINT Right 5/1/2019    Procedure: Sacroiliac Joint Injection (82776);   Surgeon: Paco Martin MD;  Location: [facility] MAIN OR;  Service: Pain Management     TONSILECTOMY, ADENOIDECTOMY, BILATERAL MYRINGOTOMY AND TUBES      TONSILLECTOMY         FAMILY/SOCIAL HISTORY:  Family History   Problem Relation Age of Onset    Diabetes Mother     Aortic aneurysm Father     Cancer Father         prostate    Heart disease Sister         open heart    Cancer Sister         breast    Breast cancer Sister 47    Diabetes Brother     Cancer Brother         spinal cancer    Other Daughter         concussion syndrome from MVA    Asperger's syndrome Son         high functioning    Diabetes Maternal Grandfather     Stroke Brother     Hypertension Brother     No Known Problems Maternal Aunt     No Known Problems Maternal Aunt     No Known Problems Paternal Aunt     No Known Problems Paternal Aunt     Colon cancer Maternal Uncle 61       Social History     Tobacco Use    Smoking status: Never Smoker    Smokeless tobacco: Never Used   Vaping Use    Vaping Use: Never used   Substance Use Topics    Alcohol use: Yes     Alcohol/week: 7 0 standard drinks     Types: 5 Cans of beer, 2 Shots of liquor per week     Comment: socially    Drug use: Yes     Types: Marijuana     Comment: has medical marijuana card  lozenges       MEDICATIONS:  Current Outpatient Medications on File Prior to Visit   Medication Sig Dispense Refill    albuterol (ProAir HFA) 90 mcg/act inhaler Inhale 2 puffs every 4 (four) hours as needed for wheezing or shortness of breath 8 5 g 0    calcium carbonate (OS-BEAU) 1250 (500 Ca) MG chewable tablet Chew 1 tablet daily      cholecalciferol (VITAMIN D3) 400 units tablet Take 400 Units by mouth daily      dicyclomine (BENTYL) 10 mg capsule TAKE ONE CAPSULE BY MOUTH THREE TIMES A DAY AS NEEDED FOR STOMACH CRAMPING (GENERIC FOR BENTYL) 90 capsule 1    famotidine (PEPCID) 40 MG tablet Take 40 mg by mouth in the morning      fluticasone (FLONASE) 50 mcg/act nasal spray 2 sprays into each nostril daily 16 g 3    lisinopril-hydrochlorothiazide (PRINZIDE,ZESTORETIC) 20-12 5 MG per tablet Take 1 tablet by mouth daily 90 tablet 0    multivitamin (THERAGRAN) TABS Take 1 tablet by mouth daily      Omega-3 Fatty Acids (fish oil) 1,000 mg Take 1 capsule (1,000 mg total) by mouth 2 (two) times a day 60 capsule 6    Probiotic Product (VSL#3 DS) PACK Take 1 each by mouth daily 20 each 3    vitamin B-12 (CYANOCOBALAMIN) 50 MCG tablet Take 50 mcg by mouth daily      traZODone (DESYREL) 50 mg tablet Take 50 mg by mouth daily at bedtime as needed   (Patient not taking: Reported on 9/12/2022)       No current facility-administered medications on file prior to visit       No Known Allergies    PHYSICAL EXAM:  /84 (BP Location: Left arm, Patient Position: Sitting, Cuff Size: Large)   Pulse 86   Temp 98 °F (36 7 °C) (Tympanic)   Ht 5' 4" (1 626 m)   Wt 107 kg (235 lb 12 8 oz)   SpO2 97%   BMI 40 47 kg/m²   GENERAL: NAD, AAO  HEENT: anicteric, OP clear, MMM  PULMONARY: CTAB  CARDIAC: RRR, no murmurs  ABDOMEN: S/ND/NT, normoactive BS, no hepatomegaly, spleen not palpable  EXTREMITIES: no edema  SKIN: no rashes, no palmar erythema, no spider angiomata   NEURO: normal gait, no tremor, no asterixis     LABS/RADIOLOGY/ENDOSCOPY:  Lab Results   Component Value Date    WBC 9 14 08/23/2022    HGB 11 8 08/23/2022    HCT 37 7 08/23/2022     08/23/2022    GLUF 108 (H) 08/23/2022    BUN 13 08/23/2022    CREATININE 1 06 08/23/2022    K 4 3 08/23/2022     08/23/2022    CO2 27 08/23/2022    ALKPHOS 77 08/23/2022    ALT 27 08/23/2022    AST 25 08/23/2022    GLOB 2 7 11/30/2021    CALCIUM 9 0 08/23/2022    EGFR 55 08/23/2022    TRIG 111 08/23/2022    HDL 71 08/23/2022    INR 0 95 07/23/2018    PTT 24 07/23/2018     HAV IgM-  HBcAb-  HCV Ab-    EGD 4/2021  Healthy-appearing esophagus jejunal anastomosis, small diverticulum/pouch was present above the anastomosis  Normal small bowel evaluation, random biopsies were taken  COY 5/2022  Two diminutive cecal polyp removed by cold biopsy forceps  5 mm descending colon polyp removed by cold snare  Diminutive rectal polyp removed by cold biopsy forceps  Left-sided diverticulosis  Mild internal hemorrhoids    Computed MELD-Na score unavailable  Necessary lab results were not found in the last year  Computed MELD score unavailable  Necessary lab results were not found in the last year  ASSESSMENT/PLAN:     74 y o  female with history HTN, spinal stenosis, class III obesity, history of multiple abdominal surgeries, including prior RYGB with revision and total gastrectomy with esophagojejunostomy with an anastomotic stricture (2005) and CCY who presents for initial evaluation for hepatic steatosis  1  Hepatic steatosis  2  Alcohol use  3  BMI 40 0-44 9, [facility])  Patient with evidence of hepatic steatosis on imaging since at least 2018 - Suspect to be a combination of ANAHY/SANCHEZ   Hepatic function historically within normal limits  No evidence of synthetic dysfunction or cirrhotic morphology to suggest advanced fibrosis/cirrhosis after review serologies and imaging, although she has not had dedicated liver imaging since   Would recommend pursuing US elastography given discordant FIB-4 and NFS  Discussed recommendations in regards to fatty liver includin  Strict control of contributing comorbidities (obesity, hypertension, hyperlipidemia, diabetes etc )  2  Weight loss of approx 10-15% of patient's current body weight over a period of 6-12 months through low fat diet and cardiovascular exercise as tolerated - Referred patient to Nutrition Services for assistance  Could consider the addition of a GLP-1 agonist, although would be cautious given patient's altered anatomy  3  Limiting alcohol consumption (currently drinking approx 4 alcoholic beverages daily), preferably complete abstinence, which should also aid with weight loss  Also recommend for patient to have their hepatic function monitored every 6 months with routine labs  - US with elastography  - Refer to nutrition   - Consider GLP1a therapy for medical weight loss if she is unsuccessful    4  Personal history of colonic polyps  CRC screening colonoscopy up-to-date  Colonoscopy on 2022 notable for multiple colonic polyps (tubular adenoma and hyperplastic), left-sided diverticulosis, and mild internal hemorrhoids  Recommended repeat x3 years      RTC in 3 months    Ej Lopez MD  Division of Gastroenterology and Hepatology  [address]

## 2022-11-21 ENCOUNTER — TELEPHONE (OUTPATIENT)
Dept: FAMILY MEDICINE CLINIC | Facility: CLINIC | Age: 67
End: 2022-11-21

## 2022-11-21 DIAGNOSIS — I10 ESSENTIAL HYPERTENSION: ICD-10-CM

## 2022-11-21 RX ORDER — LISINOPRIL AND HYDROCHLOROTHIAZIDE 20; 12.5 MG/1; MG/1
1 TABLET ORAL DAILY
Qty: 30 TABLET | Refills: 0 | Status: SHIPPED | OUTPATIENT
Start: 2022-11-21

## 2022-11-22 ENCOUNTER — OFFICE VISIT (OUTPATIENT)
Dept: FAMILY MEDICINE CLINIC | Facility: CLINIC | Age: 67
End: 2022-11-22

## 2022-11-22 VITALS
TEMPERATURE: 98.5 F | OXYGEN SATURATION: 95 % | WEIGHT: 235 LBS | HEART RATE: 105 BPM | RESPIRATION RATE: 16 BRPM | BODY MASS INDEX: 41.64 KG/M2 | SYSTOLIC BLOOD PRESSURE: 122 MMHG | HEIGHT: 63 IN | DIASTOLIC BLOOD PRESSURE: 78 MMHG

## 2022-11-22 DIAGNOSIS — I10 ESSENTIAL HYPERTENSION: ICD-10-CM

## 2022-11-22 DIAGNOSIS — K76.0 HEPATIC STEATOSIS: ICD-10-CM

## 2022-11-22 DIAGNOSIS — Z00.00 WELCOME TO MEDICARE PREVENTIVE VISIT: Primary | ICD-10-CM

## 2022-11-22 DIAGNOSIS — R73.01 IMPAIRED FASTING GLUCOSE: ICD-10-CM

## 2022-11-22 DIAGNOSIS — Z23 NEED FOR VACCINATION: ICD-10-CM

## 2022-11-22 DIAGNOSIS — F33.9 DEPRESSION, RECURRENT (HCC): ICD-10-CM

## 2022-11-22 DIAGNOSIS — E66.01 MORBID OBESITY (HCC): ICD-10-CM

## 2022-11-22 PROBLEM — R13.10 DYSPHAGIA: Status: RESOLVED | Noted: 2020-11-23 | Resolved: 2022-11-22

## 2022-11-22 RX ORDER — BLOOD SUGAR DIAGNOSTIC
1 STRIP MISCELLANEOUS DAILY
Qty: 100 EACH | Refills: 0 | Status: SHIPPED | OUTPATIENT
Start: 2022-11-22

## 2022-11-22 NOTE — ASSESSMENT & PLAN NOTE
Depression Screening Follow-up Plan: Patient's depression screening was positive with a PHQ-2 score of 4  Their PHQ-9 score was 14  Patient assessed for underlying major depression  They have no active suicidal ideations  Brief counseling provided and recommend additional follow-up/re-evaluation next office visit    Has had a lot of situational stressors at present

## 2022-11-22 NOTE — PROGRESS NOTES
Assessment and Plan:     Problem List Items Addressed This Visit        Digestive    Hepatic steatosis     Saw hepatology, recommended labs every 6 months  Due for imaging, reminded pt to schedule          Relevant Medications    Semaglutide-Weight Management (WEGOVY) 0 25 MG/0 5ML    Other Relevant Orders    Comprehensive metabolic panel    Hemoglobin A1C    Lipid panel       Cardiovascular and Mediastinum    Essential hypertension     Stable          Relevant Medications    Semaglutide-Weight Management (WEGOVY) 0 25 MG/0 5ML       Other    Morbid obesity ([address] )     Will start on Wegovy if insurance covers  Still needs to work on diet management and exercise          Relevant Medications    Semaglutide-Weight Management (WEGOVY) 0 25 MG/0 5ML    Insulin Pen Needle (Pen Needles) 32G X 6 MM MISC    Depression, recurrent (HCC)     Depression Screening Follow-up Plan: Patient's depression screening was positive with a PHQ-2 score of 4  Their PHQ-9 score was 14  Patient assessed for underlying major depression  They have no active suicidal ideations  Brief counseling provided and recommend additional follow-up/re-evaluation next office visit  Has had a lot of situational stressors at present            Relevant Medications    Semaglutide-Weight Management (WEGOVY) 0 25 MG/0 5ML   Other Visit Diagnoses     Welcome to Medicare preventive visit    -  Primary    Impaired fasting glucose        Relevant Medications    Semaglutide-Weight Management (WEGOVY) 0 25 MG/0 5ML    Other Relevant Orders    Comprehensive metabolic panel    Hemoglobin A1C    Lipid panel    Need for vaccination        Relevant Orders    Pneumococcal Conjugate Vaccine 20-valent (Pcv20) (Completed)          Depression Screening and Follow-up Plan: Patient's depression screening was positive with a PHQ-2 score of 4  Their PHQ-9 score was 14  Urinary Incontinence Plan of Care: counseling topics discussed: use restroom every 2 hours and weight loss  Preventive health issues were discussed with patient, and age appropriate screening tests were ordered as noted in patient's After Visit Summary  Personalized health advice and appropriate referrals for health education or preventive services given if needed, as noted in patient's After Visit Summary       History of Present Illness:     Patient presents for a Medicare Wellness Visit    HPI   Patient Care Team:  Ernestina Gifford as PCP - General (Family Medicine)  Ankita Quan MD (Gastroenterology)  Ankita Quan MD as Randi Moore MD as Referring Physician (Orthopedic Surgery)     Review of Systems:     Review of Systems     Problem List:     Patient Active Problem List   Diagnosis   • Gastroesophageal reflux disease   • Diarrhea   • Chronic pain syndrome   • Chronic bilateral low back pain without sciatica   • Lumbar spondylosis   • Coccydynia   • Spinal stenosis of lumbar region without neurogenic claudication   • Low back pain   • Myofascial pain syndrome   • Thoracic spine pain   • Neck pain   • Essential hypertension   • Status post lumbar spinal fusion   • Irregular bowel habits   • History of colon polyps   • Small intestinal bacterial overgrowth   • Arthralgia of lumbar spine   • DDD (degenerative disc disease), lumbar   • Postmenopausal state   • Fatigue   • Morbid obesity ([facility] )   • Chronic right sacroiliac joint pain   • History of Yarelis-en-Y gastric bypass   • Irritable bowel syndrome with diarrhea   • Vertigo   • Vitamin D deficiency   • Bilateral tinnitus   • Cervical radiculopathy   • Cervical spinal stenosis   • Hepatic steatosis   • Mild intermittent asthma   • Depression, recurrent ([address] )      Past Medical and Surgical History:     Past Medical History:   Diagnosis Date   • Arthritis    • Asthma    • Back pain    • Chronic pain disorder     hips into the legs   • Colon polyp    • DVT (deep venous thrombosis) ([facility] [address] ) 2006   • Gastric bypass status for obesity     yarelis en y • GERD (gastroesophageal reflux disease)    • Hiatal hernia    • History of transfusion 2006    after gastric bypass surgery, no reactions   • Hypertension    • Irritable bowel syndrome    • Kidney stone    • Muscle weakness     bilateral legs   • Numbness and tingling     bilateral feet   • Pneumonia    • Sacroiliitis ([address] ) 5/15/2018   • Spinal stenosis    • Tinnitus     occassionally   • Vertigo    • Wears glasses      Past Surgical History:   Procedure Laterality Date   • APPENDECTOMY     • BACK SURGERY  2018    L4-5 fusion   • BARIATRIC SURGERY      yarelis en y- pt states the procedure was done incorrectly which lead to additional surgeries from -   • [address] Right    •  SECTION      x1   • CHOLECYSTECTOMY     • COLON SURGERY      partial removal of the small bowel-necrosis-between -   • COLONOSCOPY     • COLONOSCOPY W/ BIOPSIES AND POLYPECTOMY     • EPIDURAL BLOCK INJECTION N/A 2021    Procedure: C6 C7 cervical epidural steroid injection ( 81106); Surgeon: Rainer Barajas MD;  Location: [facility] MAIN OR;  Service: Pain Management    • EPIDURAL BLOCK INJECTION N/A 2021    Procedure: C6 C7 cervical epidural steroid injection (94243);   Surgeon: Rainer Barajas MD;  Location: [facility] MAIN OR;  Service: Pain Management    • FLEXIBLE BRONCHOSCOPY W/ UPPER ENDOSCOPY     • GASTRECTOMY      after gastric bypass-(failed surgery per pt) -   • HERNIA REPAIR      incisional hernias-diaphragm area   • MS ARTHROCENTESIS ASPIR&/INJ SMALL JT/BURSA W/O US N/A 2018    Procedure: Coccygeal Injection & Ganglion Impar Block;  Surgeon: Rainer Barajas MD;  Location: [facility] MAIN OR;  Service: Pain Management    • MS ARTHRODESIS POSTERIOR INTERBODY LUMBAR N/A 2018    Procedure: L4-5 DECOMPRESSION INTERBODY INSTRUMENTED FUSION;  Surgeon: Adrian Yañez MD;  Location: [facility] Main OR;  Service: Orthopedics   • MS COLONOSCOPY FLX DX W/COLLJ SPEC WHEN PFRMD N/A 2018 Procedure: COLONOSCOPY;  Surgeon: Layne López MD;  Location: [place] [address] GI LAB; Service: Gastroenterology   • IL ESOPHAGOGASTRODUODENOSCOPY TRANSORAL DIAGNOSTIC N/A 2/19/2018    Procedure: ESOPHAGOGASTRODUODENOSCOPY (EGD); Surgeon: Layne López MD;  Location: [facility] GI LAB; Service: Gastroenterology   • [facility]INT Right 5/25/2018    Procedure: Rt Sacroiliac Joint Injection;  Surgeon: Gama Bustillos MD;  Location: [facility] MAIN OR;  Service: Pain Management    • IL INJECTION,SACROILIAC JOINT Right 5/1/2019    Procedure: Sacroiliac Joint Injection (71967); Surgeon: Gama Bustillos MD;  Location: [facility] MAIN OR;  Service: Pain Management    • TONSILECTOMY, ADENOIDECTOMY, BILATERAL MYRINGOTOMY AND TUBES     • TONSILLECTOMY        Family History:     Family History   Problem Relation Age of Onset   • Diabetes Mother    • Aortic aneurysm Father    • Cancer Father         prostate   • Heart disease Sister         open heart   • Cancer Sister         breast   • Breast cancer Sister 47   • Diabetes Brother    • Cancer Brother         spinal cancer   • Other Daughter         concussion syndrome from MVA   • Asperger's syndrome Son         high functioning   • Diabetes Maternal Grandfather    • Stroke Brother    • Hypertension Brother    • No Known Problems Maternal Aunt    • No Known Problems Maternal Aunt    • No Known Problems Paternal Aunt    • No Known Problems Paternal Aunt    • Colon cancer Maternal Uncle 61      Social History:     Social History     Socioeconomic History   • Marital status: /Civil Union     Spouse name: None   • Number of children: None   • Years of education: None   • Highest education level: None   Occupational History   • None   Tobacco Use   • Smoking status: Never   • Smokeless tobacco: Never   Vaping Use   • Vaping Use: Never used   Substance and Sexual Activity   • Alcohol use:  Yes     Alcohol/week: 7 0 standard drinks     Types: 5 Cans of beer, 2 Shots of liquor per week     Comment: socially   • Drug use: Yes     Types: Marijuana     Comment: has medical marijuana card  lozenges   • Sexual activity: Yes     Birth control/protection: Post-menopausal   Other Topics Concern   • None   Social History Narrative   • None     Social Determinants of Health     Financial Resource Strain: High Risk   • Difficulty of Paying Living Expenses: Hard   Food Insecurity: Not on file   Transportation Needs: No Transportation Needs   • Lack of Transportation (Medical): No   • Lack of Transportation (Non-Medical):  No   Physical Activity: Not on file   Stress: Not on file   Social Connections: Not on file   Intimate Partner Violence: Not on file   Housing Stability: Not on file      Medications and Allergies:     Current Outpatient Medications   Medication Sig Dispense Refill   • albuterol (ProAir HFA) 90 mcg/act inhaler Inhale 2 puffs every 4 (four) hours as needed for wheezing or shortness of breath 8 5 g 0   • calcium carbonate (OS-BEAU) 1250 (500 Ca) MG chewable tablet Chew 1 tablet daily     • cholecalciferol (VITAMIN D3) 400 units tablet Take 400 Units by mouth daily     • dicyclomine (BENTYL) 10 mg capsule TAKE ONE CAPSULE BY MOUTH THREE TIMES A DAY AS NEEDED FOR STOMACH CRAMPING (GENERIC FOR BENTYL) 90 capsule 1   • famotidine (PEPCID) 40 MG tablet Take 40 mg by mouth in the morning     • fluticasone (FLONASE) 50 mcg/act nasal spray 2 sprays into each nostril daily 16 g 3   • Insulin Pen Needle (Pen Needles) 32G X 6 MM MISC Use 1 each in the morning 100 each 0   • lisinopril-hydrochlorothiazide (PRINZIDE,ZESTORETIC) 20-12 5 MG per tablet Take 1 tablet by mouth daily 30 tablet 0   • multivitamin (THERAGRAN) TABS Take 1 tablet by mouth daily     • Omega-3 Fatty Acids (fish oil) 1,000 mg Take 1 capsule (1,000 mg total) by mouth 2 (two) times a day 60 capsule 6   • Probiotic Product (VSL#3 DS) PACK Take 1 each by mouth daily 20 each 3   • Semaglutide-Weight Management (WEGOVY) 0 25 MG/0 5ML Inject 0 5 mL (0 25 mg total) under the skin once a week 2 mL 0   • traZODone (DESYREL) 50 mg tablet Take 50 mg by mouth daily at bedtime as needed     • vitamin B-12 (CYANOCOBALAMIN) 50 MCG tablet Take 50 mcg by mouth daily       No current facility-administered medications for this visit  No Known Allergies   Immunizations:     Immunization History   Administered Date(s) Administered   • COVID-19 PFIZER VACCINE 0 3 ML IM 03/22/2021, 04/12/2021   • INFLUENZA 10/26/2020, 10/31/2022   • Pneumococcal Conjugate Vaccine 20-valent (Pcv20), Polysace 11/22/2022   • Tdap 03/17/2021   • Zoster Vaccine Recombinant 04/25/2018, 07/27/2018      Health Maintenance:         Topic Date Due   • HIV Screening  Never done   • Breast Cancer Screening: Mammogram  12/30/2022   • Colorectal Cancer Screening  05/10/2025   • Hepatitis C Screening  Completed         Topic Date Due   • Hepatitis B Vaccine (1 of 3 - 3-dose series) Never done   • COVID-19 Vaccine (3 - Booster for Pfizer series) 09/12/2021      Medicare Screening Tests and Risk Assessments:     Emani Theodoretanisha is here for her Welcome to Medicare visit  Health Risk Assessment:   Patient rates overall health as very good  Patient feels that their physical health rating is same  Patient is satisfied with their life  Eyesight was rated as slightly worse  Hearing was rated as same  Patient feels that their emotional and mental health rating is slightly worse  Patients states they are never, rarely angry  Patient states they are often unusually tired/fatigued  Pain experienced in the last 7 days has been none  Patient states that she has experienced no weight loss or gain in last 6 months  Depression Screening:   PHQ-2 Score: 4  PHQ-9 Score: 14      Fall Risk Screening: In the past year, patient has experienced: no history of falling in past year      Urinary Incontinence Screening:   Patient has leaked urine accidently in the last six months       Home Safety:  Patient has trouble with stairs inside or outside of their home  Patient has working smoke alarms and has working carbon monoxide detector  Home safety hazards include: none  Nutrition:   Current diet is Regular  Medications:   Patient is currently taking over-the-counter supplements  OTC medications include: see medication list  Patient is able to manage medications  Activities of Daily Living (ADLs)/Instrumental Activities of Daily Living (IADLs):   Walk and transfer into and out of bed and chair?: Yes  Dress and groom yourself?: Yes    Bathe or shower yourself?: Yes    Feed yourself? Yes  Do your laundry/housekeeping?: Yes  Manage your money, pay your bills and track your expenses?: Yes  Make your own meals?: Yes    Do your own shopping?: Yes    Previous Hospitalizations:   Any hospitalizations or ED visits within the last 12 months?: No      Advance Care Planning:   Living will: No    Durable POA for healthcare: No    Advanced directive: No    Five wishes given: Yes      Cognitive Screening:   Provider or family/friend/caregiver concerned regarding cognition?: No    PREVENTIVE SCREENINGS      Cardiovascular Screening:    General: Screening Current      Diabetes Screening:     General: Screening Current      Colorectal Cancer Screening:     General: Screening Current      Breast Cancer Screening:     General: Screening Current      Cervical Cancer Screening:    General: Screening Not Indicated      Osteoporosis Screening:    General: Screening Current      Abdominal Aortic Aneurysm (AAA) Screening:        General: Screening Not Indicated      Lung Cancer Screening:     General: Screening Not Indicated      Hepatitis C Screening:    General: Screening Current    Screening, Brief Intervention, and Referral to Treatment (SBIRT)    Screening  Typical number of drinks in a day: 1  Typical number of drinks in a week: 7  Interpretation: Low risk drinking behavior      AUDIT-C Screenin) How often did you have a drink containing alcohol in the past year? 4 or more times a week  2) How many drinks did you have on a typical day when you were drinking in the past year? 1 to 2  3) How often did you have 6 or more drinks on one occasion in the past year? never    AUDIT-C Score: 4  Interpretation: Score 3-12 (female): POSITIVE screen for alcohol misuse    AUDIT Screenin) How often during the last year have you found that you were not able to stop drinking once you had started? 0 - never  5) How often during the last year have you failed to do what was normally expected from you because of drinking? 0 - never  6) How often during the last year have you needed a first drink in the morning to get yourself going after a heavy drinking session? 0 - never  7) How often during the last year have you had a feeling of guilt or remorse after drinking? 0 - never  8) How often during the last year have you been unable to remember what happened the night before because you had been drinking? 0 - never  9) Have you or someone else been injured as a result of your drinking? 0 - no  10) Has a relative or friend or a doctor or another health worker been concerned about your drinking or suggested you cut down? 0 - no    AUDIT Score: 4  Interpretation: Low risk alcohol consumption    Single Item Drug Screening:  How often have you used an illegal drug (including marijuana) or a prescription medication for non-medical reasons in the past year? never    Single Item Drug Screen Score: 0  Interpretation: Negative screen for possible drug use disorder    Brief Intervention  Alcohol & drug use screenings were reviewed  No concerns regarding substance use disorder identified  Other Counseling Topics:   Regular weightbearing exercise and calcium and vitamin D intake       Vision Screening    Right eye Left eye Both eyes   Without correction 20/50 20/50 20/30   With correction           Physical Exam:     /78   Pulse 105   Temp 98 5 °F (36 9 °C) Resp 16   Ht 5' 3" (1 6 m)   Wt 107 kg (235 lb)   SpO2 95%   BMI 41 63 kg/m²     Physical Exam  Vitals and nursing note reviewed  Constitutional:       Appearance: Normal appearance  She is well-developed and well-nourished  HENT:      Head: Normocephalic and atraumatic  Right Ear: Tympanic membrane and external ear normal       Left Ear: Tympanic membrane and external ear normal       Nose: Nose normal       Mouth/Throat:      Mouth: Oropharynx is clear and moist    Eyes:      Extraocular Movements: EOM normal       Conjunctiva/sclera: Conjunctivae normal       Pupils: Pupils are equal, round, and reactive to light  Neck:      Thyroid: No thyromegaly  Cardiovascular:      Rate and Rhythm: Normal rate and regular rhythm  Pulses: Normal pulses and intact distal pulses  Heart sounds: Normal heart sounds  No murmur heard  Pulmonary:      Effort: Pulmonary effort is normal       Breath sounds: Normal breath sounds  Abdominal:      General: Bowel sounds are normal       Palpations: Abdomen is soft  Musculoskeletal:         General: No edema  Normal range of motion  Cervical back: Normal range of motion  Skin:     General: Skin is warm and dry  Capillary Refill: Capillary refill takes less than 2 seconds  Neurological:      Mental Status: She is alert and oriented to person, place, and time  Sensory: No sensory deficit        Coordination: Coordination normal       Deep Tendon Reflexes: Reflexes normal    Psychiatric:         Mood and Affect: Mood and affect and mood normal          Behavior: Behavior normal           JACINTO Sage

## 2022-11-22 NOTE — PATIENT INSTRUCTIONS
Please call central scheduling [phone] to schedule your ultrasounds, mammogram, and DXA scan  Plan to start [facility] injections for weight loss  You will be on the same dose weekly for 1 month  When you are due for a refill, please let me know, so I can send over the higher strength     Repeat labs in February- need to be fasting   Follow up in 3 months

## 2022-12-12 DIAGNOSIS — K21.9 GASTROESOPHAGEAL REFLUX DISEASE, UNSPECIFIED WHETHER ESOPHAGITIS PRESENT: ICD-10-CM

## 2022-12-12 DIAGNOSIS — R13.10 DYSPHAGIA, UNSPECIFIED TYPE: ICD-10-CM

## 2022-12-12 DIAGNOSIS — R19.7 DIARRHEA, UNSPECIFIED TYPE: ICD-10-CM

## 2022-12-12 RX ORDER — DICYCLOMINE HYDROCHLORIDE 10 MG/1
10 CAPSULE ORAL AS NEEDED
Qty: 90 CAPSULE | Refills: 1 | Status: SHIPPED | OUTPATIENT
Start: 2022-12-12

## 2023-02-10 ENCOUNTER — HOSPITAL ENCOUNTER (OUTPATIENT)
Dept: RADIOLOGY | Facility: HOSPITAL | Age: 68
Discharge: HOME | End: 2023-02-10

## 2023-02-10 DIAGNOSIS — K76.0 HEPATIC STEATOSIS: ICD-10-CM

## 2023-02-10 LAB
ALBUMIN SERPL-MCNC: 4.2 G/DL (ref 3.8–4.8)
ALBUMIN/GLOB SERPL: 1.5 {RATIO} (ref 1.2–2.2)
ALP SERPL-CCNC: 79 IU/L (ref 44–121)
ALT SERPL-CCNC: 21 IU/L (ref 0–32)
AST SERPL-CCNC: 24 IU/L (ref 0–40)
BILIRUB SERPL-MCNC: 0.5 MG/DL (ref 0–1.2)
BUN SERPL-MCNC: 12 MG/DL (ref 8–27)
BUN/CREAT SERPL: 12 (ref 12–28)
CALCIUM SERPL-MCNC: 10 MG/DL (ref 8.7–10.3)
CHLORIDE SERPL-SCNC: 103 MMOL/L (ref 96–106)
CHOLEST SERPL-MCNC: 185 MG/DL (ref 100–199)
CO2 SERPL-SCNC: 27 MMOL/L (ref 20–29)
CREAT SERPL-MCNC: 1.02 MG/DL (ref 0.57–1)
EGFR: 61 ML/MIN/1.73
GLOBULIN SER-MCNC: 2.8 G/DL (ref 1.5–4.5)
GLUCOSE SERPL-MCNC: 113 MG/DL (ref 70–99)
HBA1C MFR BLD: 5.7 % (ref 4.8–5.6)
HDLC SERPL-MCNC: 67 MG/DL
LDLC SERPL CALC-MCNC: 97 MG/DL (ref 0–99)
MICRODELETION SYND BLD/T FISH: NORMAL
POTASSIUM SERPL-SCNC: 4.9 MMOL/L (ref 3.5–5.2)
PROT SERPL-MCNC: 7 G/DL (ref 6–8.5)
SL AMB VLDL CHOLESTEROL CALC: 21 MG/DL (ref 5–40)
SODIUM SERPL-SCNC: 143 MMOL/L (ref 134–144)
TRIGL SERPL-MCNC: 117 MG/DL (ref 0–149)

## 2023-02-15 ENCOUNTER — RA CDI HCC (OUTPATIENT)
Dept: OTHER | Facility: HOSPITAL | Age: 68
End: 2023-02-15

## 2023-02-15 NOTE — PROGRESS NOTES
[address]  coding opportunities       Chart reviewed, no opportunity found: CHART REVIEWED, NO OPPORTUNITY FOUND        Patients Insurance     Medicare Insurance: Medicare

## 2023-02-22 ENCOUNTER — TELEPHONE (OUTPATIENT)
Dept: FAMILY MEDICINE CLINIC | Facility: CLINIC | Age: 68
End: 2023-02-22

## 2023-02-22 ENCOUNTER — OFFICE VISIT (OUTPATIENT)
Dept: FAMILY MEDICINE CLINIC | Facility: CLINIC | Age: 68
End: 2023-02-22

## 2023-02-22 VITALS
SYSTOLIC BLOOD PRESSURE: 120 MMHG | HEIGHT: 63 IN | HEART RATE: 88 BPM | DIASTOLIC BLOOD PRESSURE: 80 MMHG | BODY MASS INDEX: 41.36 KG/M2 | OXYGEN SATURATION: 98 % | TEMPERATURE: 98.2 F | WEIGHT: 233.4 LBS | RESPIRATION RATE: 16 BRPM

## 2023-02-22 DIAGNOSIS — I10 ESSENTIAL HYPERTENSION: Primary | ICD-10-CM

## 2023-02-22 DIAGNOSIS — R73.01 IMPAIRED FASTING GLUCOSE: ICD-10-CM

## 2023-02-22 DIAGNOSIS — F33.9 DEPRESSION, RECURRENT (HCC): ICD-10-CM

## 2023-02-22 DIAGNOSIS — K76.0 HEPATIC STEATOSIS: ICD-10-CM

## 2023-02-22 DIAGNOSIS — E66.01 MORBID OBESITY (HCC): ICD-10-CM

## 2023-02-22 PROBLEM — R53.83 FATIGUE: Status: RESOLVED | Noted: 2019-02-06 | Resolved: 2023-02-22

## 2023-02-22 PROBLEM — M54.2 NECK PAIN: Status: RESOLVED | Noted: 2018-06-06 | Resolved: 2023-02-22

## 2023-02-22 PROBLEM — R19.7 DIARRHEA: Status: RESOLVED | Noted: 2018-02-06 | Resolved: 2023-02-22

## 2023-02-22 PROBLEM — M54.50 LOW BACK PAIN: Status: RESOLVED | Noted: 2018-05-16 | Resolved: 2023-02-22

## 2023-02-22 NOTE — TELEPHONE ENCOUNTER
Handicap forms done, in clerical inbasket  We did not discuss the medication, I am not sure what the denial reason was    She can check with her insurance to see if there is an alternative on formulary vs reason denial  Elenore Model

## 2023-02-22 NOTE — TELEPHONE ENCOUNTER
Saw Bethany Jolie today and was sent weight loss medication  It was denied which they discussed in the room that it probably would be  What is the next step?

## 2023-02-22 NOTE — PROGRESS NOTES
Assessment/Plan:    1  Essential hypertension  Assessment & Plan:  Stable on current medications     Orders:  -     CBC and differential; Future  -     Lipid panel; Future  -     TSH, 3rd generation with Free T4 reflex; Future    2  Hepatic steatosis  Assessment & Plan:  Has been following up with GI for this       3  Depression, recurrent ([address] )  Assessment & Plan:  Overall stable  She is doing meditation  Will discuss at follow up     Orders:  -     TSH, 3rd generation with Free T4 reflex; Future    4  Morbid obesity ([facility] )  Assessment & Plan:  Cont to work on weight loss/diet       5  Impaired fasting glucose  -     Hemoglobin A1C; Future  -     TSH, 3rd generation with Free T4 reflex; Future            There are no Patient Instructions on file for this visit  Return in about 36 weeks (around 11/1/2023) for Medicare [id number]   Subjective:      Patient ID: Richard Huffman is a 74 y o  female  Chief Complaint   Patient presents with   • Follow-up     Three month f/u for blood pressure, L  Leiva/LPN       Here today for follow up  Doing well overall, labs done prior  Has been following up with GI for hepatic steatosis, had additional testing done  Mood stable  The following portions of the patient's history were reviewed and updated as appropriate: allergies, current medications, past family history, past medical history, past social history, past surgical history and problem list     Review of Systems   Constitutional: Negative  Respiratory: Negative  Cardiovascular: Negative  Gastrointestinal: Negative  Neurological: Negative      Psychiatric/Behavioral:        See HPI         Current Outpatient Medications   Medication Sig Dispense Refill   • calcium carbonate (OS-BEAU) 1250 (500 Ca) MG chewable tablet Chew 1 tablet daily     • cholecalciferol (VITAMIN D3) 400 units tablet Take 400 Units by mouth daily     • dicyclomine (BENTYL) 10 mg capsule Take 1 capsule (10 mg total) by mouth as needed (take once daily as needed) 90 capsule 1   • famotidine (PEPCID) 40 MG tablet Take 40 mg by mouth in the morning     • fluticasone (FLONASE) 50 mcg/act nasal spray 2 sprays into each nostril daily 16 g 3   • lisinopril-hydrochlorothiazide (PRINZIDE,ZESTORETIC) 20-12 5 MG per tablet Take 1 tablet by mouth daily 90 tablet 1   • multivitamin (THERAGRAN) TABS Take 1 tablet by mouth daily     • Omega-3 Fatty Acids (fish oil) 1,000 mg Take 1 capsule (1,000 mg total) by mouth 2 (two) times a day 60 capsule 6   • vitamin B-12 (CYANOCOBALAMIN) 50 MCG tablet Take 50 mcg by mouth daily       No current facility-administered medications for this visit  Objective:    /80   Pulse 88   Temp 98 2 °F (36 8 °C) (Tympanic)   Resp 16   Ht 5' 3" (1 6 m)   Wt 106 kg (233 lb 6 4 oz)   SpO2 98%   BMI 41 34 kg/m²        Physical Exam  Vitals and nursing note reviewed  Constitutional:       Appearance: Normal appearance  She is well-developed  She is obese  Cardiovascular:      Rate and Rhythm: Normal rate and regular rhythm  Pulses: Normal pulses  Heart sounds: Normal heart sounds  No murmur heard  Pulmonary:      Effort: Pulmonary effort is normal       Breath sounds: Normal breath sounds  Skin:     General: Skin is warm and dry  Neurological:      Mental Status: She is alert     Psychiatric:         Mood and Affect: Mood normal          Behavior: Behavior normal                 JACINTO Sales

## 2023-03-21 ENCOUNTER — APPOINTMENT (OUTPATIENT)
Dept: RADIOLOGY | Facility: CLINIC | Age: 68
End: 2023-03-21

## 2023-03-21 ENCOUNTER — OFFICE VISIT (OUTPATIENT)
Dept: URGENT CARE | Facility: CLINIC | Age: 68
End: 2023-03-21

## 2023-03-21 VITALS
TEMPERATURE: 97.9 F | WEIGHT: 235 LBS | RESPIRATION RATE: 16 BRPM | BODY MASS INDEX: 40.12 KG/M2 | HEIGHT: 64 IN | HEART RATE: 80 BPM | OXYGEN SATURATION: 98 %

## 2023-03-21 DIAGNOSIS — S61.219A: ICD-10-CM

## 2023-03-21 DIAGNOSIS — S61.219A: Primary | ICD-10-CM

## 2023-03-21 NOTE — PROGRESS NOTES
[facility]        NAME: Maritza Sam is a 74 y o  female  : 1955    MRN: [id number]  DATE: 2023  TIME: 10:35 AM    Assessment and Plan   Laceration of finger of right hand without damage to nail, initial encounter [S61 219A]  1  Laceration of finger of right hand without damage to nail, initial encounter  XR hand 3+ vw right    Laceration repair        Laceration repair    Date/Time: 3/21/2023 10:31 AM  Performed by: Dane Aragon MD  Authorized by: Dane Aragon MD   Consent: The procedure was performed in an emergent situation  Verbal consent obtained  Risks and benefits: risks, benefits and alternatives were discussed  Consent given by: patient  Patient understanding: patient states understanding of the procedure being performed  Patient consent: the patient's understanding of the procedure matches consent given  Required items: required blood products, implants, devices, and special equipment available  Patient identity confirmed: verbally with patient  Time out: Immediately prior to procedure a "time out" was called to verify the correct patient, procedure, equipment, support staff and site/side marked as required  Body area: upper extremity  Location details: right hand  Foreign bodies: no foreign bodies  Tendon involvement: none  Nerve involvement: none  Anesthesia: local infiltration    Anesthesia:  Local Anesthetic: lidocaine 2% without epinephrine  Anesthetic total: 4 mL    Sedation:  Patient sedated: no        Procedure Details:  Preparation: Patient was prepped and draped in the usual sterile fashion  Irrigation solution: saline  Amount of cleaning: standard  Debridement: none  Degree of undermining: none  Skin closure: 4-0 nylon  Number of sutures: 4  Technique: simple  Approximation: close  Approximation difficulty: simple  Dressing: Xeroform gauze and Coban    Patient tolerance: patient tolerated the procedure well with no immediate complications  Comments: No obvious foreign body on x-ray; official read pending  Wound probed with a Q-tip under local anesthesia without any obvious foreign body  Patient recommended to return in 2 days for removal of dressings and reassessment of wound  Afterwards, we will keep open to air and apply Vaseline twice daily on the wound  Sutures to be removed after 2 weeks  Patient Instructions     Follow up with PCP in 3-5 days  Proceed to  ER if symptoms worsen  Chief Complaint     Chief Complaint   Patient presents with   • Hand Laceration     Right hand laceration between pinky and ring finger, webbing- occurred yesterday  Pt was washing a glass cup and glass cut in between her fingers         History of Present Illness     62-year-old right-hand-dominant female presents today due to a laceration sustained in the fourth web of the right hand  Last night, while washing dishes, she caught her hand with a broken glass cup  Reports copious bleeding and throbbing sensation  Applied a Band-Aid with pressure packing  Presents today with complaints of weakness in the ulnar aspect of the hand and a deep burning pain with certain movements  Review of Systems   Review of Systems   Constitutional: Negative for chills and fever  Respiratory: Negative for cough and shortness of breath  Cardiovascular: Negative for chest pain  Gastrointestinal: Negative for nausea  Musculoskeletal: Negative for arthralgias  Skin: Positive for wound  Neurological: Positive for weakness  Negative for dizziness, numbness and headaches       Current Medications       Current Outpatient Medications:   •  calcium carbonate (OS-BEAU) 1250 (500 Ca) MG chewable tablet, Chew 1 tablet daily, Disp: , Rfl:   •  cholecalciferol (VITAMIN D3) 400 units tablet, Take 400 Units by mouth daily, Disp: , Rfl:   •  dicyclomine (BENTYL) 10 mg capsule, Take 1 capsule (10 mg total) by mouth as needed (take once daily as needed), Disp: 90 capsule, Rfl: 1  •  famotidine (PEPCID) 40 MG tablet, Take 40 mg by mouth in the morning, Disp: , Rfl:   •  fluticasone (FLONASE) 50 mcg/act nasal spray, 2 sprays into each nostril daily, Disp: 16 g, Rfl: 3  •  lisinopril-hydrochlorothiazide (PRINZIDE,ZESTORETIC) 20-12 5 MG per tablet, Take 1 tablet by mouth daily, Disp: 90 tablet, Rfl: 1  •  multivitamin (THERAGRAN) TABS, Take 1 tablet by mouth daily, Disp: , Rfl:   •  Omega-3 Fatty Acids (fish oil) 1,000 mg, Take 1 capsule (1,000 mg total) by mouth 2 (two) times a day, Disp: 60 capsule, Rfl: 6  •  vitamin B-12 (CYANOCOBALAMIN) 50 MCG tablet, Take 50 mcg by mouth daily, Disp: , Rfl:     Current Allergies     Allergies as of 2023   • (No Known Allergies)            The following portions of the patient's history were reviewed and updated as appropriate: allergies, current medications, past family history, past medical history, past social history, past surgical history and problem list      Past Medical History:   Diagnosis Date   • Arthritis    • Asthma    • Back pain    • Chronic pain disorder     hips into the legs   • Colon polyp    • DVT (deep venous thrombosis) ([facility] [address] )    • Gastric bypass status for obesity     yarelis en y   • GERD (gastroesophageal reflux disease)    • Hiatal hernia    • History of transfusion     after gastric bypass surgery, no reactions   • Hypertension    • Irritable bowel syndrome    • Kidney stone    • Muscle weakness     bilateral legs   • Numbness and tingling     bilateral feet   • Pneumonia    • Sacroiliitis ([address] ) 5/15/2018   • Spinal stenosis    • Tinnitus     occassionally   • Vertigo    • Wears glasses        Past Surgical History:   Procedure Laterality Date   • APPENDECTOMY     • BACK SURGERY  2018    L4-5 fusion   • BARIATRIC SURGERY      yarelis en y- pt states the procedure was done incorrectly which lead to additional surgeries from -   • CARPAL TUNNEL RELEASE Right    •  SECTION      x1 • CHOLECYSTECTOMY     • COLON SURGERY      partial removal of the small bowel-necrosis-between 2006-09   • COLONOSCOPY     • COLONOSCOPY W/ BIOPSIES AND POLYPECTOMY     • EPIDURAL BLOCK INJECTION N/A 11/24/2021    Procedure: C6 C7 cervical epidural steroid injection ( 85331); Surgeon: Diana Katz MD;  Location: [facility] MAIN OR;  Service: Pain Management    • EPIDURAL BLOCK INJECTION N/A 12/28/2021    Procedure: C6 C7 cervical epidural steroid injection ([id number]); Surgeon: Diana Katz MD;  Location: [facility] MAIN OR;  Service: Pain Management    • FLEXIBLE BRONCHOSCOPY W/ UPPER ENDOSCOPY     • GASTRECTOMY      after gastric bypass-(failed surgery per pt) 2006-09   • HERNIA REPAIR      incisional hernias-diaphragm area   • [place] ARTHROCENTESIS ASPIR&/INJ SMALL JT/BURSA W/O US N/A 5/2/2018    Procedure: Coccygeal Injection & Ganglion Impar Block;  Surgeon: Diana Katz MD;  Location: [place] [address] MAIN OR;  Service: Pain Management    • [place] ARTHRODESIS POSTERIOR INTERBODY 1 [facility] LUMBAR N/A 6/27/2018    Procedure: L4-5 DECOMPRESSION INTERBODY INSTRUMENTED FUSION;  Surgeon: César Luis MD;  Location: [facility] Main OR;  Service: Orthopedics   • [place] COLONOSCOPY FLX DX W/COLLJ SPEC WHEN PFRMD N/A 8/24/2018    Procedure: COLONOSCOPY;  Surgeon: Kaushal Barrientos MD;  Location: [place] [address] GI LAB; Service: Gastroenterology   • [place] ESOPHAGOGASTRODUODENOSCOPY TRANSORAL DIAGNOSTIC N/A 2/19/2018    Procedure: ESOPHAGOGASTRODUODENOSCOPY (EGD); Surgeon: Kaushal Barrientos MD;  Location: [facility] GI LAB; Service: Gastroenterology   • W[place] INJECT SI JOINT ARTHRGRPHY&/ANES/STEROID W/EMMANUEL Right 5/25/2018    Procedure: Rt Sacroiliac Joint Injection;  Surgeon: Diana Katz MD;  Location: [facility] MAIN OR;  Service: Pain Management    • [place] INJECT SI JOINT ARTHRGRPHY&/ANES/STEROID W/EMMANUEL Right 5/1/2019    Procedure: Sacroiliac Joint Injection ([id number]);   Surgeon: Diana Katz MD;  Location: [facility] MAIN OR;  Service: Pain Management    • TONSILECTOMY, ADENOIDECTOMY, BILATERAL MYRINGOTOMY AND TUBES     • TONSILLECTOMY         Family History   Problem Relation Age of Onset   • Diabetes Mother    • Aortic aneurysm Father    • Cancer Father         prostate   • Heart disease Sister         open heart   • Cancer Sister         breast   • Breast cancer Sister 47   • Diabetes Brother    • Cancer Brother         spinal cancer   • Other Daughter         concussion syndrome from MVA   • Asperger's syndrome Son         high functioning   • Diabetes Maternal Grandfather    • Stroke Brother    • Hypertension Brother    • No Known Problems Maternal Aunt    • No Known Problems Maternal Aunt    • No Known Problems Paternal Aunt    • No Known Problems Paternal Aunt    • Colon cancer Maternal Uncle 60         Medications have been verified  Objective   Pulse 80   Temp 97 9 °F (36 6 °C)   Resp 16   Ht 5' 4" (1 626 m)   Wt 107 kg (235 lb)   SpO2 98%   BMI 40 34 kg/m²   No LMP recorded  Patient is postmenopausal        Physical Exam     Physical Exam  Vitals and nursing note reviewed  Constitutional:       General: She is in acute distress  Appearance: Normal appearance  She is not ill-appearing, toxic-appearing or diaphoretic  HENT:      Head: Normocephalic and atraumatic  Eyes:      General:         Right eye: No discharge  Left eye: No discharge  Conjunctiva/sclera: Conjunctivae normal    Pulmonary:      Effort: Pulmonary effort is normal    Skin:     General: Skin is warm  Findings: No erythema  Neurological:      General: No focal deficit present  Mental Status: She is alert and oriented to person, place, and time  Psychiatric:         Mood and Affect: Mood normal          Behavior: Behavior normal          Thought Content:  Thought content normal          Judgment: Judgment normal

## 2023-03-30 NOTE — PROGRESS NOTES
[facility] - Outpatient Follow-up Note  Marely Estrada 74 y o  female MRN: [id number]  Encounter: [id number]          ASSESSMENT AND PLAN:      1  Hepatic steatosis  Recent ultrasound elastography last month notable for Metavir score of F 0-1 and less than 5% steatosis grading  Most recent liver enzymes normal   Patient is trying to lose weight however having difficulty doing so  Weight however is stable since last office visit     -Patient inquires about prior weight loss medicine that was described which I am assuming is Ozempic  I sent message to Dr Tea De León regarding this    -Continue healthy diet and healthy weight loss  - Recommend minimizing alcohol consumption   - Monitor liver enzymes occasionally, every 1 to 2 years  - Continue follow-up with PCP for further monitoring/management of coexisting comorbidities such as high blood pressure, hyperlipidemia, diabetes and obesity  - Consider repeat elastography in 1 to 2 years  2  Bloating  Patient with 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  She has difficulty digesting foods such as leafy greens     -Recommend trying Gas-X or Beano prior to eating raw fruits and vegetables   -She has had significant benefit from yoga to help with her bowel movements and pain which I recommend continuing  3   History of colon polyps  Last colonoscopy 5/2022 with removal of few polyps and repeat recommended in 3 years     - Repeat colonoscopy will be due 5/2025 or earlier if any alarm symptoms  Follow-up as needed or in 1 year   ______________________________________________________________________    SUBJECTIVE:      Marely Estrada is a 79-year-old female with history of hepatic steatosis, GERD, IBS, SIBO, asthma, hypertension, gastric bypass 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 who presents for follow up  Patient reports she is doing very well at this time  She has been trying to make dietary modifications including eating healthier  However due to her anatomy, she has a lot of gas and bloating with increased greens such as salads  She has occasional gas and bloating with this however is able to manage this with diet modifications  She takes Bentyl  She also takes Pepcid which is working well at this time  Recent lab work with normal liver enzymes  Lipid panel normal   A1c mildly elevated at 5 7  Colonoscopy 2022 with removal of few polyps and repeat recommended in 3 years  EGD 2021 with healthy-appearing esophageal jejunal anastomosis  REVIEW OF SYSTEMS IS OTHERWISE NEGATIVE        Historical Information   Past Medical History:   Diagnosis Date   • Arthritis    • Asthma    • Back pain    • Chronic pain disorder     hips into the legs   • Colon polyp    • DVT (deep venous thrombosis) ([facility] [address] )    • Gastric bypass status for obesity     yarelis en y   • GERD (gastroesophageal reflux disease)    • Hiatal hernia    • History of transfusion     after gastric bypass surgery, no reactions   • Hypertension    • Irritable bowel syndrome    • Kidney stone    • Muscle weakness     bilateral legs   • Numbness and tingling     bilateral feet   • Pneumonia    • Sacroiliitis ([facility] [address] ) 5/15/2018   • Spinal stenosis    • Tinnitus     occassionally   • Vertigo    • Wears glasses      Past Surgical History:   Procedure Laterality Date   • APPENDECTOMY     • BACK SURGERY  2018    L4-5 fusion   • BARIATRIC SURGERY      yarelis en y- pt states the procedure was done incorrectly which lead to additional surgeries from -   • [address] Right    •  SECTION      x1   • CHOLECYSTECTOMY     • COLON SURGERY      partial removal of the small bowel-necrosis-between -   • COLONOSCOPY     • COLONOSCOPY W/ BIOPSIES AND POLYPECTOMY     • EPIDURAL BLOCK INJECTION N/A 2021    Procedure: C6 C7 cervical epidural steroid injection ( 52551); Surgeon: Pranav Ambrosio MD;  Location: [facility] MAIN OR;  Service: Pain Management    • EPIDURAL BLOCK INJECTION N/A 12/28/2021    Procedure: C6 C7 cervical epidural steroid injection (76720); Surgeon: Pranav Ambrosio MD;  Location: [facility] MAIN OR;  Service: Pain Management    • FLEXIBLE BRONCHOSCOPY W/ UPPER ENDOSCOPY     • GASTRECTOMY      after gastric bypass-(failed surgery per pt) 2006-09   • HERNIA REPAIR      incisional hernias-diaphragm area   • [place] ARTHROCENTESIS ASPIR&/INJ SMALL JT/BURSA W/O US N/A 5/2/2018    Procedure: Coccygeal Injection & Ganglion Impar Block;  Surgeon: Pranav Ambrosio MD;  Location: [facility] MAIN OR;  Service: Pain Management    • TX ARTHRODESIS POSTERIOR INTERBODY 1 [facility] LUMBAR N/A 6/27/2018    Procedure: L4-5 DECOMPRESSION INTERBODY INSTRUMENTED FUSION;  Surgeon: Funmi Jacques MD;  Location: [facility];  Service: Orthopedics   • [place] COLONOSCOPY FLX DX W/COLLJ SPEC WHEN PFRMD N/A 8/24/2018    Procedure: COLONOSCOPY;  Surgeon: Terald Favre, MD;  Location: [facility] GI LAB; Service: Gastroenterology   • TX ESOPHAGOGASTRODUODENOSCOPY TRANSORAL DIAGNOSTIC N/A 2/19/2018    Procedure: ESOPHAGOGASTRODUODENOSCOPY (EGD); Surgeon: Terald Favre, MD;  Location: [facility] GI LAB; Service: Gastroenterology   • TX INJECT SI JOINT ARTHRGRPHY&/ANES/STEROID W/EMMANUEL Right 5/25/2018    Procedure: Rt Sacroiliac Joint Injection;  Surgeon: Pranav Ambrosio MD;  Location: [facility] MAIN OR;  Service: Pain Management    • TX INJECT SI JOINT ARTHRGRPHY&/ANES/STEROID W/EMMANUEL Right 5/1/2019    Procedure: Sacroiliac Joint Injection ([id number]);   Surgeon: Pranav Ambrosio MD;  Location: [facility] MAIN OR;  Service: Pain Management    • TONSILECTOMY, ADENOIDECTOMY, BILATERAL MYRINGOTOMY AND TUBES     • TONSILLECTOMY       Social History   Social History     Substance and Sexual Activity   Alcohol Use Yes   • Alcohol/week: 7 0 standard drinks   • Types: 5 Cans of beer, 2 Shots of liquor per week    Comment: socially     Social History "    Substance and Sexual Activity   Drug Use Not Currently   • Types: Marijuana    Comment: has medical marijuana card  lozenges     Social History     Tobacco Use   Smoking Status Never   Smokeless Tobacco Never     Family History   Problem Relation Age of Onset   • Diabetes Mother    • Aortic aneurysm Father    • Cancer Father         prostate   • Heart disease Sister         open heart   • Cancer Sister         breast   • Breast cancer Sister 47   • Diabetes Brother    • Cancer Brother         spinal cancer   • Other Daughter         concussion syndrome from MVA   • Asperger's syndrome Son         high functioning   • Diabetes Maternal Grandfather    • Stroke Brother    • Hypertension Brother    • No Known Problems Maternal Aunt    • No Known Problems Maternal Aunt    • No Known Problems Paternal Aunt    • No Known Problems Paternal Aunt    • Colon cancer Maternal Uncle 60       Meds/Allergies       Current Outpatient Medications:   •  calcium carbonate (OS-BEAU) 1250 (500 Ca) MG chewable tablet  •  cholecalciferol (VITAMIN D3) 400 units tablet  •  dicyclomine (BENTYL) 10 mg capsule  •  famotidine (PEPCID) 40 MG tablet  •  fluticasone (FLONASE) 50 mcg/act nasal spray  •  lisinopril-hydrochlorothiazide (PRINZIDE,ZESTORETIC) 20-12 5 MG per tablet  •  multivitamin (THERAGRAN) TABS  •  Omega-3 Fatty Acids (fish oil) 1,000 mg  •  vitamin B-12 (CYANOCOBALAMIN) 50 MCG tablet    No Known Allergies        Objective     Blood pressure 125/80, pulse 85, height 5' 3\" (1 6 m), weight 106 kg (234 lb 3 2 oz), SpO2 98 %  Body mass index is 41 49 kg/m²  PHYSICAL EXAM:      General Appearance:   Alert, cooperative, no distress   HEENT:   Normocephalic, atraumatic, anicteric      Neck:  Supple, symmetrical, trachea midline   Lungs:   Clear to auscultation bilaterally; no rales, rhonchi or wheezing; respirations unlabored    Heart[de-identified]   Regular rate and rhythm; no murmur, rub, or gallop     Abdomen:   Soft, non-tender, " non-distended; normal bowel sounds; no masses, no organomegaly    Genitalia:   Deferred    Rectal:   Deferred    Extremities:  No cyanosis, clubbing or edema    Pulses:  2+ and symmetric    Skin:  No jaundice, rashes, or lesions    Lymph nodes:  No palpable cervical lymphadenopathy        Lab Results:   No visits with results within 1 Day(s) from this visit  Latest known visit with results is:   Orders Only on 02/09/2023   Component Date Value   • Glucose, Random 02/09/2023 113 (H)    • BUN 02/09/2023 12    • Creatinine 02/09/2023 1 02 (H)    • eGFR 02/09/2023 61    • SL AMB BUN/CREATININE RA* 02/09/2023 12    • Sodium 02/09/2023 143    • Potassium 02/09/2023 4 9    • Chloride 02/09/2023 103    • CO2 02/09/2023 27    • CALCIUM 02/09/2023 10 0    • Protein, Total 02/09/2023 7 0    • Albumin 02/09/2023 4 2    • Globulin, Total 02/09/2023 2 8    • Albumin/Globulin Ratio 02/09/2023 1 5    • TOTAL BILIRUBIN 02/09/2023 0 5    • Alk Phos Isoenzymes 02/09/2023 79    • AST 02/09/2023 24    • ALT 02/09/2023 21    • Cholesterol, Total 02/09/2023 185    • Triglycerides 02/09/2023 117    • HDL 02/09/2023 67    • VLDL Cholesterol Calcula* 02/09/2023 21    • LDL Calculated 02/09/2023 97    • Hemoglobin A1C 02/09/2023 5 7 (H)    • Interpretation 02/09/2023 Note          Radiology Results:   XR hand 3+ vw right    Result Date: 3/21/2023  Narrative: RIGHT HAND INDICATION:   S61 219A: Laceration without foreign body of unspecified finger without damage to nail, initial encounter  Evaluate for foreign body COMPARISON:  11/29/2012 VIEWS:  XR HAND 3+ VW RIGHT For the purposes of institution wide universal language the following terms will apply: (thumb=1st digit/finger, index finger=2nd digit/finger, long finger=3rd digit/finger, ring=4th digit/finger and small finger=5th digit/finger) FINDINGS: There is no acute fracture or dislocation  No significant degenerative changes  No lytic or blastic osseous lesion   Suspicion of a radiopaque foreign body noted in the soft tissues of the proximal 3rd finger medially, measuring 2 mm  This finding not present on prior     Impression: Suspicion of 2 mm radiopaque foreign body as above  No acute osseous abnormality Findings concur with the referring clinician's preliminary interpretation already in the patient's electronic health record    Workstation performed: [id number]

## 2023-03-31 ENCOUNTER — OFFICE VISIT (OUTPATIENT)
Dept: GASTROENTEROLOGY | Facility: CLINIC | Age: 68
End: 2023-03-31

## 2023-03-31 VITALS
OXYGEN SATURATION: 98 % | HEIGHT: 63 IN | BODY MASS INDEX: 41.5 KG/M2 | SYSTOLIC BLOOD PRESSURE: 125 MMHG | WEIGHT: 234.2 LBS | HEART RATE: 85 BPM | DIASTOLIC BLOOD PRESSURE: 80 MMHG

## 2023-03-31 DIAGNOSIS — R19.7 DIARRHEA, UNSPECIFIED TYPE: ICD-10-CM

## 2023-03-31 DIAGNOSIS — K21.9 GASTROESOPHAGEAL REFLUX DISEASE, UNSPECIFIED WHETHER ESOPHAGITIS PRESENT: ICD-10-CM

## 2023-03-31 DIAGNOSIS — R13.10 DYSPHAGIA, UNSPECIFIED TYPE: ICD-10-CM

## 2023-03-31 DIAGNOSIS — K76.0 HEPATIC STEATOSIS: Primary | ICD-10-CM

## 2023-03-31 RX ORDER — DICYCLOMINE HYDROCHLORIDE 10 MG/1
10 CAPSULE ORAL AS NEEDED
Qty: 90 CAPSULE | Refills: 1 | Status: SHIPPED | OUTPATIENT
Start: 2023-03-31

## 2023-03-31 RX ORDER — FAMOTIDINE 40 MG/1
40 TABLET, FILM COATED ORAL DAILY
Qty: 90 TABLET | Refills: 2 | Status: SHIPPED | OUTPATIENT
Start: 2023-03-31

## 2023-04-01 ENCOUNTER — OFFICE VISIT (OUTPATIENT)
Dept: URGENT CARE | Facility: CLINIC | Age: 68
End: 2023-04-01

## 2023-04-01 VITALS
TEMPERATURE: 98.1 F | RESPIRATION RATE: 16 BRPM | WEIGHT: 237 LBS | BODY MASS INDEX: 41.98 KG/M2 | HEART RATE: 108 BPM | OXYGEN SATURATION: 99 %

## 2023-04-01 DIAGNOSIS — R52 PAIN ASSOCIATED WITH WOUND: Primary | ICD-10-CM

## 2023-04-01 DIAGNOSIS — Z48.02 ENCOUNTER FOR REMOVAL OF SUTURES: ICD-10-CM

## 2023-04-01 DIAGNOSIS — S61.219A: ICD-10-CM

## 2023-04-01 DIAGNOSIS — T14.8XXA PAIN ASSOCIATED WITH WOUND: Primary | ICD-10-CM

## 2023-04-01 NOTE — PROGRESS NOTES
[employer]        NAME: Bony Stephen is a 74 y o  female  : 1955    MRN: [id number]  DATE: 2023  TIME: 3:53 PM    Assessment and Plan   Pain associated with wound [T14  Hammad Anis  1  Pain associated with wound        2  Laceration of finger of right hand without damage to nail, initial encounter        3  Encounter for removal of sutures          Patient Instructions   Discomfort related to sutures needing to come out  Removed without complication  No discharge, bleeding or separation  Normal cleansing and care    Follow up with PCP in 3-5 days  Proceed to  ER if symptoms worsen  Chief Complaint     Chief Complaint   Patient presents with   • Wound Check     Pt presents with need for wound / suture check of the right hand between the fourth/fifth digit; states she is feeling throbbing at site and stinging sensaton         History of Present Illness       Erika Clements is a 24-year-old female who presents to clinic complaining of wound pain and discomfort of her wound on her right hand  She had 4 sutures placed on 3-  She denies any drainage or bleeding from the wound  She just notes burning and tightness of the wound  She denies any fever or chills  Review of Systems   Review of Systems   Constitutional: Negative for chills and fever  Skin: Positive for wound  Negative for color change  Neurological: Negative for numbness           Current Medications       Current Outpatient Medications:   •  calcium carbonate (OS-BEAU) 1250 (500 Ca) MG chewable tablet, Chew 1 tablet daily, Disp: , Rfl:   •  cholecalciferol (VITAMIN D3) 400 units tablet, Take 400 Units by mouth daily, Disp: , Rfl:   •  dicyclomine (BENTYL) 10 mg capsule, Take 1 capsule (10 mg total) by mouth as needed (take once daily as needed), Disp: 90 capsule, Rfl: 1  •  famotidine (PEPCID) 40 MG tablet, Take 1 tablet (40 mg total) by mouth in the morning, Disp: 90 tablet, Rfl: 2  •  fluticasone (FLONASE) 50 mcg/act nasal spray, 2 sprays into each nostril daily, Disp: 16 g, Rfl: 3  •  lisinopril-hydrochlorothiazide (PRINZIDE,ZESTORETIC) 20-12 5 MG per tablet, Take 1 tablet by mouth daily, Disp: 90 tablet, Rfl: 1  •  multivitamin (THERAGRAN) TABS, Take 1 tablet by mouth daily, Disp: , Rfl:   •  Omega-3 Fatty Acids (fish oil) 1,000 mg, Take 1 capsule (1,000 mg total) by mouth 2 (two) times a day, Disp: 60 capsule, Rfl: 6  •  vitamin B-12 (CYANOCOBALAMIN) 50 MCG tablet, Take 50 mcg by mouth daily, Disp: , Rfl:     Current Allergies     Allergies as of 2023   • (No Known Allergies)            The following portions of the patient's history were reviewed and updated as appropriate: allergies, current medications, past family history, past medical history, past social history, past surgical history and problem list      Past Medical History:   Diagnosis Date   • Arthritis    • Asthma    • Back pain    • Chronic pain disorder     hips into the legs   • Colon polyp    • DVT (deep venous thrombosis) ([facility] [address] )    • Gastric bypass status for obesity     yarelis en y   • GERD (gastroesophageal reflux disease)    • Hiatal hernia    • History of transfusion     after gastric bypass surgery, no reactions   • Hypertension    • Irritable bowel syndrome    • Kidney stone    • Muscle weakness     bilateral legs   • Numbness and tingling     bilateral feet   • Pneumonia    • Sacroiliitis ([facility] [address] ) 5/15/2018   • Spinal stenosis    • Tinnitus     occassionally   • Vertigo    • Wears glasses        Past Surgical History:   Procedure Laterality Date   • APPENDECTOMY     • BACK SURGERY  2018    L4-5 fusion   • BARIATRIC SURGERY      yarelis en y- pt states the procedure was done incorrectly which lead to additional surgeries from -   • CARPAL TUNNEL RELEASE Right    •  SECTION      x1   • CHOLECYSTECTOMY     • COLON SURGERY      partial removal of the small bowel-necrosis-between -   • COLONOSCOPY     • COLONOSCOPY W/ BIOPSIES AND POLYPECTOMY     • EPIDURAL BLOCK INJECTION N/A 11/24/2021    Procedure: C6 C7 cervical epidural steroid injection ( 15886); Surgeon: Chan Sung MD;  Location: [facility] MAIN OR;  Service: Pain Management    • EPIDURAL BLOCK INJECTION N/A 12/28/2021    Procedure: C6 C7 cervical epidural steroid injection (23685); Surgeon: Chan Sung MD;  Location: [facility] MAIN OR;  Service: Pain Management    • FLEXIBLE BRONCHOSCOPY W/ UPPER ENDOSCOPY     • GASTRECTOMY      after gastric bypass-(failed surgery per pt) 2006-09   • HERNIA REPAIR      incisional hernias-diaphragm area   • WY ARTHROCENTESIS ASPIR&/INJ SMALL JT/BURSA W/O US N/A 5/2/2018    Procedure: Coccygeal Injection & Ganglion Impar Block;  Surgeon: Chan Sung MD;  Location: [facility] MAIN OR;  Service: Pain Management    • WY ARTHRODESIS POSTERIOR INTERBODY 1 NTRSP LUMBAR N/A 6/27/2018    Procedure: L4-5 DECOMPRESSION INTERBODY INSTRUMENTED FUSION;  Surgeon: Martin Pimentel MD;  Location: [facility] Main OR;  Service: Orthopedics   • WY COLONOSCOPY FLX DX W/COLLJ SPEC WHEN PFRMD N/A 8/24/2018    Procedure: COLONOSCOPY;  Surgeon: Ana Heredia MD;  Location: [facility] GI LAB; Service: Gastroenterology   • WY ESOPHAGOGASTRODUODENOSCOPY TRANSORAL DIAGNOSTIC N/A 2/19/2018    Procedure: ESOPHAGOGASTRODUODENOSCOPY (EGD); Surgeon: Ana Heredia MD;  Location: [facility] GI LAB; Service: Gastroenterology   • WY INJECT SI JOINT ARTHRGRPHY&/ANES/STEROID W/EMMANUEL Right 5/25/2018    Procedure: Rt Sacroiliac Joint Injection;  Surgeon: Chan Sung MD;  Location: [facility] MAIN OR;  Service: Pain Management    • WY INJECT SI JOINT ARTHRGRPHY&/ANES/STEROID W/EMMANUEL Right 5/1/2019    Procedure: Sacroiliac Joint Injection (13223);   Surgeon: Chan Sung MD;  Location: [facility] MAIN OR;  Service: Pain Management    • TONSILECTOMY, ADENOIDECTOMY, BILATERAL MYRINGOTOMY AND TUBES     • TONSILLECTOMY         Family History   Problem Relation Age of Onset   • Diabetes Mother    • Aortic aneurysm Father    • Cancer Father         prostate   • Heart disease Sister         open heart   • Cancer Sister         breast   • Breast cancer Sister 47   • Diabetes Brother    • Cancer Brother         spinal cancer   • Other Daughter         concussion syndrome from MVA   • Asperger's syndrome Son         high functioning   • Diabetes Maternal Grandfather    • Stroke Brother    • Hypertension Brother    • No Known Problems Maternal Aunt    • No Known Problems Maternal Aunt    • No Known Problems Paternal Aunt    • No Known Problems Paternal Aunt    • Colon cancer Maternal Uncle 60         Medications have been verified  Objective   Pulse (!) 108   Temp 98 1 °F (36 7 °C)   Resp 16   Wt 108 kg (237 lb)   SpO2 99%   BMI 41 98 kg/m²   No LMP recorded  Patient is postmenopausal        Physical Exam     Physical Exam  Vitals and nursing note reviewed  Constitutional:       General: She is not in acute distress  Appearance: Normal appearance  She is not ill-appearing  Pulmonary:      Effort: Pulmonary effort is normal    Neurological:      Mental Status: She is alert and oriented to person, place, and time  Psychiatric:         Mood and Affect: Mood normal          Behavior: Behavior normal      Suture removal    Date/Time: 4/1/2023 3:57 PM  Performed by: Leonardo Oh PA-C  Authorized by: Leonardo Oh PA-C   Universal Protocol:  Consent: Verbal consent obtained  Risks and benefits: risks, benefits and alternatives were discussed  Consent given by: patient  Patient understanding: patient states understanding of the procedure being performed        Patient location:  Clinic  Location:     Laterality:  Right    Location:  Upper extremity    Upper extremity location:  Hand    Hand location:  R hand (between index and middle finger)  Procedure details:      Tools used:  Suture removal kit    Wound appearance:  No sign(s) of infection, good wound healing, clean and tender    Number of sutures removed:  4  Post-procedure details:     Post-removal:  No dressing applied    Patient tolerance of procedure:   Tolerated well, no immediate complications

## 2023-04-03 ENCOUNTER — TELEPHONE (OUTPATIENT)
Dept: GASTROENTEROLOGY | Facility: CLINIC | Age: 68
End: 2023-04-03

## 2023-04-03 NOTE — TELEPHONE ENCOUNTER
Called pt, scheduled ov with Dr Memo Porter on 5/10/23 at [facility] (as per pt's request, pt resides in [place])

## 2023-04-03 NOTE — TELEPHONE ENCOUNTER
----- Message from Kaiden Shaikh PA-C sent at 3/31/2023  9:04 AM EDT -----  Please call patient for nonurgent follow-up with Dr Chely Harris for hepatic steatosis at [facility]  Thank you!

## 2023-04-18 NOTE — PROGRESS NOTES
07/03/18 0830   Pain Assessment   Pain Assessment 0-10   Pain Score Worst Possible Pain   Pain Location Back   Pain Orientation Bilateral   Pain Radiating Towards "lightening bolt" that shoots down LEs   Pain Descriptors Shooting; Sharp   Pain Frequency Intermittent   Pain Onset Ongoing   Clinical Progression Not changed   Hospital Pain Intervention(s) Repositioned;Distraction   Response to Interventions Pt tolerated session; takes standing rest breaks until sharp pain subsides   Restrictions/Precautions   Precautions Spinal precautions; Fall Risk;Pain   Weight Bearing Restrictions No   ROM Restrictions Yes  (spinal precautions)   Cognition   Overall Cognitive Status WFL   Arousal/Participation Alert; Cooperative   Attention Within functional limits   Subjective   Subjective pt states her pain is 20/10 when she experiences the sharp pains- RN aware    QI: Roll Left and Right   Assistance Needed Supervision;Verbal cues  (VC for technique)   Roll Left and Right CARE Score 4   QI: Sit to Lying   Assistance Needed Physical assistance   Assistance Provided by [place] 50%-74%   Comment mod A for assist with BL LE   Sit to Lying CARE Score 2   QI: Lying to Sitting on Side of Bed   Assistance Needed Supervision;Verbal cues  (VC for technique)   Comment side rails or HOB elevated   Lying to Sitting on Side of Bed CARE Score 4   QI: Sit to Stand   Assistance Needed Incidental touching   Comment    Sit to Stand CARE Score 4   Bed Mobility   Able to Roll Left to Right;Right to Left   Adaptive Equipment Side Rails   QI: Chair/Bed-to-Chair Transfer   Assistance Needed Incidental touching   Comment    Chair/Bed-to-Chair Transfer CARE Score 4   Transfer Bed/Chair/Wheelchair   Limitations Noted In Confidence; Endurance;Pain Management;UE Strength;LE Strength   Adaptive Equipment Roller Walker   Stand Pivot Contact Guard   Sit to [employer]   Stand to [facility]   Supine to Sit Supervision   Sit to Supine Moderate Assist   Car Transfer Contact Guard   Findings Pt has difficulty lifting LE during sit to supine   Bed, Chair, Wheelchair Transfer (FIM) 3 - [place] lifts two limbs during transfer   QI: Car Transfer   Assistance Needed Incidental touching   Car Transfer CARE Score 4   QI: Walk 10 Feet   Assistance Needed Supervision   Walk 10 Feet CARE Score 4   QI: Walk 50 Feet with Two Turns   Assistance Needed Supervision   Walk 50 Feet with Two Turns CARE Score 4   QI: Walk 150 Feet   Assistance Needed Supervision   Walk 150 Feet CARE Score 4   QI: Walking 10 Feet on Uneven Surfaces   Assistance Needed Physical assistance   Assistance Provided by [place] 25%-49%   Comment min A on ramp   Walking 10 Feet on Uneven Surfaces CARE Score 3   Ambulation   Does the patient walk? 2  Yes   Primary Discharge Mode of Locomotion Walk   Walk Assist Level Close Supervision   Gait Pattern Slow Meghna; Inconsistant Meghna; Improper weight shift   Assist Device Elmer Polk Walked (feet) 150 ft  (250)   Limitations Noted In Speed;Strength; Heel Strike   Findings pt takes several standing rest breaks due to pain; CF for safety but pt did not sit during ambulation    Walking (FIM) 5 - Patient requires supervision/monitoring AND distance 150 feet or more, no rest   Wheelchair mobility   QI: Does the patient use a wheelchair? 0  No   QI: 1 Step (Curb)   Assistance Needed Physical assistance   Assistance Provided by [place] 25%-49%   Comment RW   1 Step (Curb) CARE Score 3   Stairs   Type Curb   # of Steps 1   Assist Devices Roller Walker   Findings requires cues for [employer] on curb   QI: Toilet Transfer   Assistance Needed Physical assistance   Assistance Provided by [place] 25%-49%   Toilet Transfer CARE Score 3   Toilet Transfer   Surface Assessed Standard Toilet   Limitations Noted In LE Strength; Safety   Adaptive Equipment Grab Bar   Findings S for stand to sit, min A for sit to stand   Toilet Transfer (FIM) 3 - [place] needs to lift, boost or assist to stand OR sit   Therapeutic Interventions   Strengthening standing marches with RW 2 sets of 5 reps    Other attempted mat exercises but this position increases pt pain   Assessment   Treatment Assessment PT session focused on ambulation, transfers and strengthening this session  Pt tolerate session well, although requires standing rest breaks during ambulation due to shooting pains that start in the back and go down the legs  Pt is able ambulate 150-300 ft with standing rest breaks  Attempted mat exercises but lying down causes an increase in pain, will perform exercises in sitting or standing  Pt cannot scoot on the mat due to pain/weakness  Pt bed mobility requires mod A, as pt needs help lifiting both LEs while maintaining spinal precautions  PT will continue to focus on strengthening, functional mobility, and transfers  Family/Caregiver Present no   Barriers to Discharge Inaccessible home environment;Decreased caregiver support   PT Barriers   Physical Impairment Decreased strength;Decreased endurance;Decreased mobility;Pain;Orthopedic restrictions   Functional Limitation Car transfers; [facility] negotiation   Plan   Treatment/Interventions Functional transfer training;LE strengthening/ROM; Elevations; Therapeutic exercise; Endurance training;Bed mobility;Gait training   Progress Progressing toward goals   Recommendation   Recommendation Outpatient PT   Equipment Recommended Walker   PT - OK to Discharge No   PT Therapy Minutes   PT Time In 0830   PT Time Out 0930   PT Total Time (minutes) 60   PT Mode of treatment - Individual (minutes) 30   PT Mode of treatment - Concurrent (minutes) 30   PT Mode of treatment - Group (minutes) 0   PT Mode of treatment - Co-treat (minutes) 0   PT Mode of Teatment - Total time(minutes) 60 minutes   Therapy Time missed   Time missed?  No Detail Level: Zone Post-Care Instructions: I reviewed with the patient in detail post-care instructions. Patient is to wear sunprotection, and avoid picking at any of the treated lesions. Pt may apply Vaseline to crusted or scabbing areas. Render In Bullet Format When Appropriate: No Consent: The patient's consent was obtained including but not limited to risks of crusting, scabbing, blistering, scarring, darker or lighter pigmentary change, recurrence, incomplete removal and infection. Total Number Of Aks Treated: 15 Number Of Freeze-Thaw Cycles: 3 freeze-thaw cycles Duration Of Freeze Thaw-Cycle (Seconds): 5

## 2023-05-10 ENCOUNTER — OFFICE VISIT (OUTPATIENT)
Dept: GASTROENTEROLOGY | Facility: CLINIC | Age: 68
End: 2023-05-10

## 2023-05-10 VITALS
WEIGHT: 239 LBS | HEIGHT: 63 IN | SYSTOLIC BLOOD PRESSURE: 148 MMHG | HEART RATE: 120 BPM | DIASTOLIC BLOOD PRESSURE: 92 MMHG | BODY MASS INDEX: 42.35 KG/M2

## 2023-05-10 DIAGNOSIS — Z11.59 ENCOUNTER FOR SCREENING FOR OTHER VIRAL DISEASES: ICD-10-CM

## 2023-05-10 DIAGNOSIS — R73.03 PRE-DIABETES: Primary | ICD-10-CM

## 2023-05-10 DIAGNOSIS — R60.0 LOWER EXTREMITY EDEMA: ICD-10-CM

## 2023-05-10 DIAGNOSIS — K76.0 HEPATIC STEATOSIS: ICD-10-CM

## 2023-05-10 NOTE — PATIENT INSTRUCTIONS
Start taking metformin 500 mg twice a day with meals     I will contact your PCP about stopping your thiazide and starting Lasix for your leg swelling

## 2023-05-10 NOTE — PROGRESS NOTES
[facility] - Outpatient Consultation  Kizzy Urena 74 y o  female MRN: [id number]  Encounter: [id number]    PCP:  Jose Cruz Frederick, [address] [place] [address], [phone]  Referring Provider:  No ref  provider found,     Patient: Kizzy Urena, [birth year]  Reason for Referral: fatty liver disease    ASSESSMENT/PLAN:  74 y o  female with history HTN, spinal stenosis, pre-diabetes, class III obesity, history of multiple abdominal surgeries, including prior RYGB with revision and total gastrectomy with esophagojejunostomy with an anastomotic stricture (2005) and CCY who presents for follow up  She was noted to have hepatic steatosis dating back to 2018 but has had normal liver chemistries, synthetic function and platelets  Etiology was felt to be SANCHEZ/ANAHY given her metabolic risk factors and history of EtOH use  She had an ultrasound elastography which showed minimal fibrosis  We discussed natural history, prognosis, and complications of NAFLD, including progression to cirrhosis as well as risk for cardiovascular events  We discussed that weight loss of at least 5-10% of her body weight is key in preventing progression of liver disease and hepatic decompensation  She was initially recommended GLP1a therapy for medical weight loss but was denied by insurance  Would recommend starting metformin for insulin sensitivity and weight loss  If she has not achieved successful weight loss with maximal therapy, would consider insurance appeal for semaglutide  She will return to clinic in 3 months or sooner if needed  I advised that she should avoid interval weight gain, excessive EtOH consumption and hepatotoxic medications  Thank you for the opportunity to consult in her care      - Metformin 500 mg BID; increase to 1000 mg BID if patient can tolerate  - HAV IgG and HBsAb; vaccinate if non-immune  - TBW PCP regarding switching thiazide to lasix for improved diuresis given her MIKAL    Verlee Pain, MD  Division of Gastroenterology and Hepatology  [address]    ============================================================================  CC/HPI: 74 y o  female with history HTN, spinal stenosis, prediabetes, class III obesity, history of multiple abdominal surgeries, including prior RYGB with revision and total gastrectomy with esophagojejunostomy with an anastomotic stricture (2005) and CCY who presents for follow up  Interval events  - Has not had weight loss since last visit  - US elastography showed minimal fibrosis   - Reporting increased lower extremity edema and difficult with ambulation  - She has cut out alcohol completely from her diet      Extended liver history  She has had incidental findings of hepatic steatosis dating back to 2018  Her liver tests have otherwise been normal and etiology was attributed to NAFLD in the setting of obesity  She also admits to daily EtOH use since the beginning of the pandemic (2019), drinking approx 4 alcoholic beverages daily in the form of beer and/or liquor       In regards to obesity, patient underwent RYGB with complication resulting in a total gastrectomy with esophagojejunostomy  States her initial RYGB was “done backwards”  States she was initially greater than 300 lbs, and now her weight fluctuates between 220-240 lbs  However, her weight loss has plateaued for multiple years despite reportedly maintaining a healthy diet and the use of multiple apps geared towards weight loss  ROS: Complete review of systems otherwise negative       PAST MEDICAL/SURGICAL HISTORY:  Past Medical History:   Diagnosis Date   • Arthritis    • Asthma    • Back pain    • Chronic pain disorder     hips into the legs   • Colon polyp    • DVT (deep venous thrombosis) ([facility] [address] ) 2006   • Gastric bypass status for obesity     yarelis en y   • GERD (gastroesophageal reflux disease)    • Hiatal hernia    • History of transfusion 2006    after gastric bypass surgery, no reactions   • Hypertension    • Irritable bowel syndrome    • Kidney stone    • Muscle weakness     bilateral legs   • Numbness and tingling     bilateral feet   • Pneumonia    • Sacroiliitis ([address] ) 5/15/2018   • Spinal stenosis    • Tinnitus     occassionally   • Vertigo    • Wears glasses         Past Surgical History:   Procedure Laterality Date   • APPENDECTOMY     • BACK SURGERY  2018    L4-5 fusion   • BARIATRIC SURGERY  2005    yarelis en y- pt states the procedure was done incorrectly which lead to additional surgeries from -   • [address] Right    •  SECTION      x1   • CHOLECYSTECTOMY     • COLON SURGERY      partial removal of the small bowel-necrosis-between -   • COLONOSCOPY     • COLONOSCOPY W/ BIOPSIES AND POLYPECTOMY     • EPIDURAL BLOCK INJECTION N/A 2021    Procedure: C6 C7 cervical epidural steroid injection ( 12160); Surgeon: Mason Reza MD;  Location: [facility] MAIN OR;  Service: Pain Management    • EPIDURAL BLOCK INJECTION N/A 2021    Procedure: C6 C7 cervical epidural steroid injection (20031); Surgeon: Mason Reza MD;  Location: [facility] MAIN OR;  Service: Pain Management    • FLEXIBLE BRONCHOSCOPY W/ UPPER ENDOSCOPY     • GASTRECTOMY      after gastric bypass-(failed surgery per pt) 2006   • HERNIA REPAIR      incisional hernias-diaphragm area   • NE ARTHROCENTESIS ASPIR&/INJ SMALL JT/BURSA W/O US N/A 2018    Procedure: Coccygeal Injection & Ganglion Impar Block;  Surgeon: Mason Reza MD;  Location: [facility] MAIN OR;  Service: Pain Management    • NE ARTHRODESIS POSTERIOR INTERBODY 1 NTRSPC LUMBAR N/A 2018    Procedure: L4-5 DECOMPRESSION INTERBODY INSTRUMENTED FUSION;  Surgeon: Huan Villarreal MD;  Location[facility] Main OR;  Service: Orthopedics   • NE COLONOSCOPY FLX DX W/COLLJ SPEC WHEN PFRMD N/A 2018    Procedure: COLONOSCOPY;  Surgeon: Nisa Hadley MD;  Location: [facility] GI LAB;   Service: Gastroenterology   • NE ESOPHAGOGASTRODUODENOSCOPY TRANSORAL DIAGNOSTIC N/A 2/19/2018    Procedure: ESOPHAGOGASTRODUODENOSCOPY (EGD); Surgeon: Sonja Hinson MD;  Location: [facility] GI LAB; Service: Gastroenterology   • AR INJECT SI JOINT ARTHRGRPHY&/ANES/STEROID W/EMMANUEL Right 5/25/2018    Procedure: Rt Sacroiliac Joint Injection;  Surgeon: Effie Torres MD;  Location: [facility] MAIN OR;  Service: Pain Management    • AR INJECT SI JOINT ARTHRGRPHY&/ANES/STEROID W/EMMANUEL Right 5/1/2019    Procedure: Sacroiliac Joint Injection (55094); Surgeon: Effie Torres MD;  Location: [facility] MAIN OR;  Service: Pain Management    • TONSILECTOMY, ADENOIDECTOMY, BILATERAL MYRINGOTOMY AND TUBES     • TONSILLECTOMY         FAMILY/SOCIAL HISTORY:  Family History   Problem Relation Age of Onset   • Diabetes Mother    • Aortic aneurysm Father    • Cancer Father         prostate   • Heart disease Sister         open heart   • Cancer Sister         breast   • Breast cancer Sister 47   • Diabetes Brother    • Cancer Brother         spinal cancer   • Other Daughter         concussion syndrome from MVA   • Asperger's syndrome Son         high functioning   • Diabetes Maternal Grandfather    • Stroke Brother    • Hypertension Brother    • No Known Problems Maternal Aunt    • No Known Problems Maternal Aunt    • No Known Problems Paternal Aunt    • No Known Problems Paternal Aunt    • Colon cancer Maternal Uncle 61       Social History     Tobacco Use   • Smoking status: Never     Passive exposure: Never   • Smokeless tobacco: Never   Vaping Use   • Vaping Use: Never used   Substance Use Topics   • Alcohol use: Yes     Alcohol/week: 7 0 standard drinks     Types: 5 Cans of beer, 2 Shots of liquor per week     Comment: socially   • Drug use: Not Currently     Types: Marijuana     Comment: has medical marijuana card   lozenges       MEDICATIONS:  Current Outpatient Medications on File Prior to Visit   Medication Sig Dispense Refill   • calcium carbonate (OS-BEAU) 1250 (500 Ca) "MG chewable tablet Chew 1 tablet daily     • cholecalciferol (VITAMIN D3) 400 units tablet Take 400 Units by mouth daily     • dicyclomine (BENTYL) 10 mg capsule Take 1 capsule (10 mg total) by mouth as needed (take once daily as needed) 90 capsule 1   • famotidine (PEPCID) 40 MG tablet Take 1 tablet (40 mg total) by mouth in the morning 90 tablet 2   • fluticasone (FLONASE) 50 mcg/act nasal spray 2 sprays into each nostril daily 16 g 3   • lisinopril-hydrochlorothiazide (PRINZIDE,ZESTORETIC) 20-12 5 MG per tablet Take 1 tablet by mouth daily 90 tablet 1   • multivitamin (THERAGRAN) TABS Take 1 tablet by mouth daily     • Omega-3 Fatty Acids (fish oil) 1,000 mg Take 1 capsule (1,000 mg total) by mouth 2 (two) times a day 60 capsule 6   • vitamin B-12 (CYANOCOBALAMIN) 50 MCG tablet Take 50 mcg by mouth daily       No current facility-administered medications on file prior to visit       No Known Allergies    PHYSICAL EXAM:  /92 (BP Location: Left arm, Patient Position: Sitting, Cuff Size: Standard)   Pulse (!) 120   Ht 5' 3\" (1 6 m)   Wt 108 kg (239 lb)   BMI 42 34 kg/m²   GENERAL: NAD, AAO  HEENT: anicteric, OP clear, MMM  EXTREMITIES: 1+ pitting edema  SKIN: no rashes, no palmar erythema, no spider angiomata   NEURO: normal gait, no tremor, no asterixis     LABS/RADIOLOGY/ENDOSCOPY:  Lab Results   Component Value Date    WBC 9 14 08/23/2022    HGB 11 8 08/23/2022    HCT 37 7 08/23/2022     08/23/2022    GLUF 108 (H) 08/23/2022    BUN 12 02/09/2023    CREATININE 1 02 (H) 02/09/2023    K 4 9 02/09/2023     02/09/2023    CO2 27 02/09/2023    ALKPHOS 77 08/23/2022    ALT 21 02/09/2023    AST 24 02/09/2023    GLOB 2 8 02/09/2023    CALCIUM 9 0 08/23/2022    EGFR 61 02/09/2023    TRIG 117 02/09/2023    HDL 67 02/09/2023    INR 0 95 07/23/2018    PTT 24 07/23/2018     US elastography (2/10/2023)  LSM 6 53 kPA wit hIQR 18 8%    US abdomen (2/10/2023)  Mildly dilated common bile duct, likely related to " postcholecystectomy state, similar to CT 5/1/2021      Computed MELD-Na score unavailable  Necessary lab results were not found in the last year  Computed MELD score unavailable  Necessary lab results were not found in the last year

## 2023-06-05 ENCOUNTER — TELEPHONE (OUTPATIENT)
Dept: OTHER | Facility: OTHER | Age: 68
End: 2023-06-05

## 2023-06-05 NOTE — TELEPHONE ENCOUNTER
Patient called in to inquire when last colonoscopy was done and follow up  Colonoscopy completed 5/11/2022 with repeat to be scheduled in 3 years  Patient is following up on receipt of medical records from [facility] with [facility]  Patient reports fastric bypass with additional procedures for small bowel and wants to make sure the records have been sent to [facility] for continuity of care  Please follow up with patient to obtain records

## 2023-06-08 DIAGNOSIS — I10 ESSENTIAL HYPERTENSION: ICD-10-CM

## 2023-06-08 RX ORDER — LISINOPRIL AND HYDROCHLOROTHIAZIDE 20; 12.5 MG/1; MG/1
TABLET ORAL
Qty: 90 TABLET | Refills: 1 | Status: SHIPPED | OUTPATIENT
Start: 2023-06-08

## 2023-08-24 ENCOUNTER — OFFICE VISIT (OUTPATIENT)
Dept: URGENT CARE | Facility: CLINIC | Age: 68
End: 2023-08-24
Payer: MEDICARE

## 2023-08-24 ENCOUNTER — APPOINTMENT (OUTPATIENT)
Dept: RADIOLOGY | Facility: CLINIC | Age: 68
End: 2023-08-24
Payer: MEDICARE

## 2023-08-24 VITALS
SYSTOLIC BLOOD PRESSURE: 143 MMHG | OXYGEN SATURATION: 100 % | RESPIRATION RATE: 16 BRPM | TEMPERATURE: 98 F | DIASTOLIC BLOOD PRESSURE: 81 MMHG | BODY MASS INDEX: 42.35 KG/M2 | HEART RATE: 76 BPM | HEIGHT: 63 IN | WEIGHT: 239 LBS

## 2023-08-24 DIAGNOSIS — M79.671 RIGHT FOOT PAIN: ICD-10-CM

## 2023-08-24 DIAGNOSIS — M79.671 RIGHT FOOT PAIN: Primary | ICD-10-CM

## 2023-08-24 PROCEDURE — 99214 OFFICE O/P EST MOD 30 MIN: CPT | Performed by: FAMILY MEDICINE

## 2023-08-24 PROCEDURE — 73630 X-RAY EXAM OF FOOT: CPT

## 2023-08-24 RX ORDER — PREDNISONE 20 MG/1
20 TABLET ORAL 2 TIMES DAILY WITH MEALS
Qty: 8 TABLET | Refills: 0 | Status: SHIPPED | OUTPATIENT
Start: 2023-08-24
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
## 2023-11-14 ENCOUNTER — RA CDI HCC (OUTPATIENT)
Dept: OTHER | Facility: HOSPITAL | Age: 68
End: 2023-11-14

## 2023-11-15 NOTE — PROGRESS NOTES
Internal Medicine Progress Note  Patient: Rosemary Mcguire  Age/sex: 63 y o  female  Medical Record #: [id number]      ASSESSMENT/PLAN:  Rosemary Mcguire is seen and examined and management for following issues:       Lumbar stenosis; L4-5 decompression/fusion 6/27/18 Troy Nissen): monitor incision; continue LSO brace         Fever:  Seems to have resolved currently but had been present post-operatively with associated mild leukocytosis; CXR, UA were negative; BCs were negative; incision C/D/I; likely post-operative related fever     Leukocytosis:  resolved     HTN :  Stable; was on lisinopril HCT 20/12 5 but stopped HCTZ since was makes K+ level too low even despite replacement; BP is OK off of HCTZ so far so will not restart for home     GERD without esophagitis: continue Protonix 40mg BID and Carafate     Hypokalemia: resolved after replacement  Abdominal discomfort with hx gastrectomy/numerous surgeries: had bloating/discomfortRUQ Sunday during day =  resolved; hx gastric bypass with eventual gastrectomy/esophagojejunostomy and six total surgeries for complications  Says has many abdominal adhesions which create issues of pain  Saw Dr Armin Nava in the office 2/27/18 for RUQ pain =  CT scan abdomen/pelvis (no acute changes); started rifaximin to treat her for small intestinal bacterial overgrowth and IBS-diarrhea; he said if her symptoms persist would consider amitriptyline for neuropathic pain  Her EGD in 2/2018 showed small diverticulum at the level of the esophagojejunostomy anastomosis staple line  Primary service started prn Bentyl  She vomited lunch yesterday but was ok today; c/o bloating today      Subjective: Patient seen and examined   Offers no complaints today except abd bloating    ROS:   GI: denies abdominal pain, change bowel habits or reflux symptoms  Neuro: No new neurologic changes  Respiratory: No Cough, SOB  Cardiovascular: No CP, palpitations     Scheduled Meds:    Current Facility-Administered Medications:  acetaminophen 650 mg Oral Q6H PRN Eri Mccormack MD   dicyclomine 10 mg Oral 4x Daily (AC & HS) Medina Morris MD   enoxaparin 40 mg Subcutaneous Q24H [facility] Eri Mccormack MD   gabapentin 300 mg Oral TID Medina Morris MD   lidocaine 2 patch Transdermal Daily Eri Mccormack MD   lisinopril 20 mg Oral Daily JACINTO Dockery   methocarbamol 750 mg Oral Q6H PRN Eri Mccormack MD   ondansetron 4 mg Oral Q6H PRN Eri Mccormakc MD   oxyCODONE 10 mg Oral Q4H PRN Eri Mccormack, MD   oxyCODONE 5 mg Oral Q4H PRN Eri Mccormack, MD   pantoprazole 40 mg Oral BID AC Eri Mccormack, MD   senna 1 tablet Oral Daily Eri Mccormack MD   simethicone 80 mg Oral Q6H PRN Eri Mccormack, MD   sucralfate 1,000 mg Oral 4x Daily (AC & HS) Eri Mccormack MD   traZODone 50 mg Oral HS PRN Eri Mccormack MD       Labs:       Results from last 7 days  Lab Units 07/05/18  0532 07/02/18  0549   WBC Thousand/uL 9 46 8 57   HEMOGLOBIN g/dL 9 6* 9 4*   HEMATOCRIT % 31 0* 30 1*   PLATELETS Thousands/uL 368 300       Results from last 7 days  Lab Units 07/05/18  0532 07/03/18  0715   SODIUM mmol/L 138 141   POTASSIUM mmol/L 3 5 3 4*   CHLORIDE mmol/L 107 105   CO2 mmol/L 25 27   BUN mg/dL 16 12   CREATININE mg/dL 0 76 0 84   GLUCOSE RANDOM mg/dL 101 102   CALCIUM mg/dL 8 5 8 5                  Glucose (mg/dL)   Date Value   07/05/2018 101   07/03/2018 102   07/02/2018 116   07/01/2018 113       Labs reviewed    Physical Examination:  Vitals:   Vitals:    07/07/18 0840 07/07/18 1420 07/07/18 2041 07/08/18 0611   BP: 106/64 121/58 138/72 122/70   BP Location: Left arm Right arm Left arm Left arm   Pulse: 96 83 85 78   Resp:  20 18 18   Temp:  98 °F (36 7 °C) 98 1 °F (36 7 °C) (!) 97 3 °F (36 3 °C)   TempSrc:  Oral Oral Oral   SpO2:  98% 97% 99%   Weight:       Height:           GEN: NAD  HEENT: NC/AT  RESP: BBS w/o crackles/wheeze/rhonci; resp unlabored  CV: +S1 S2, regular rate, no rubs/murmurs  ABD: soft, NT, ND, normal BS   EXT: no edema  Neuro: AAO; LEs 4+/5      [x ] Total time spent: 30 Mins and greater than 50% of this time was spent counseling/coordinating care  ** Please Note: Dragon 360 Dictation voice to text software may have been used in the creation of this document   ** Quality 130: Documentation Of Current Medications In The Medical Record: Current Medications Documented Detail Level: Detailed Quality 111:Pneumonia Vaccination Status For Older Adults: Patient received any pneumococcal conjugate or polysaccharide vaccine on or after their 60th birthday and before the end of the measurement period Quality 226: Preventive Care And Screening: Tobacco Use: Screening And Cessation Intervention: Patient screened for tobacco use and is an ex/non-smoker

## 2023-11-21 ENCOUNTER — OFFICE VISIT (OUTPATIENT)
Dept: FAMILY MEDICINE CLINIC | Facility: CLINIC | Age: 68
End: 2023-11-21
Payer: MEDICARE

## 2023-11-21 VITALS
WEIGHT: 238 LBS | RESPIRATION RATE: 18 BRPM | TEMPERATURE: 97.8 F | BODY MASS INDEX: 42.17 KG/M2 | DIASTOLIC BLOOD PRESSURE: 70 MMHG | SYSTOLIC BLOOD PRESSURE: 120 MMHG | HEIGHT: 63 IN | OXYGEN SATURATION: 98 % | HEART RATE: 102 BPM

## 2023-11-21 DIAGNOSIS — Z00.00 ENCOUNTER FOR SUBSEQUENT ANNUAL WELLNESS VISIT (AWV) IN MEDICARE PATIENT: Primary | ICD-10-CM

## 2023-11-21 DIAGNOSIS — Z12.31 SCREENING MAMMOGRAM FOR BREAST CANCER: ICD-10-CM

## 2023-11-21 DIAGNOSIS — I10 ESSENTIAL HYPERTENSION: ICD-10-CM

## 2023-11-21 PROCEDURE — G0438 PPPS, INITIAL VISIT: HCPCS | Performed by: NURSE PRACTITIONER

## 2023-11-21 RX ORDER — LISINOPRIL AND HYDROCHLOROTHIAZIDE 20; 12.5 MG/1; MG/1
1 TABLET ORAL DAILY
Qty: 90 TABLET | Refills: 1 | Status: SHIPPED | OUTPATIENT
Start: 2023-11-21

## 2023-11-21 NOTE — PATIENT INSTRUCTIONS
Medicare Preventive Visit Patient Instructions  Thank you for completing your Welcome to Medicare Visit or Medicare Annual Wellness Visit today. Your next wellness visit will be due in one year (11/21/2024). The screening/preventive services that you may require over the next 5-10 years are detailed below. Some tests may not apply to you based off risk factors and/or age. Screening tests ordered at today's visit but not completed yet may show as past due. Also, please note that scanned in results may not display below. Preventive Screenings:  Service Recommendations Previous Testing/Comments   Colorectal Cancer Screening  * Colonoscopy    * Fecal Occult Blood Test (FOBT)/Fecal Immunochemical Test (FIT)  * Fecal DNA/Cologuard Test  * Flexible Sigmoidoscopy Age: 43-73 years old   Colonoscopy: every 10 years (may be performed more frequently if at higher risk)  OR  FOBT/FIT: every 1 year  OR  Cologuard: every 3 years  OR  Sigmoidoscopy: every 5 years  Screening may be recommended earlier than age 39 if at higher risk for colorectal cancer. Also, an individualized decision between you and your healthcare provider will decide whether screening between the ages of 77-80 would be appropriate. Colonoscopy: 05/11/2022  FOBT/FIT: Not on file  Cologuard: Not on file  Sigmoidoscopy: Not on file    Screening Current     Breast Cancer Screening Age: 36 years old  Frequency: every 1-2 years  Not required if history of left and right mastectomy Mammogram: 04/12/2023    Screening Current   Cervical Cancer Screening Between the ages of 21-29, pap smear recommended once every 3 years. Between the ages of 32-69, can perform pap smear with HPV co-testing every 5 years.    Recommendations may differ for women with a history of total hysterectomy, cervical cancer, or abnormal pap smears in past. Pap Smear: Not on file    Screening Not Indicated   Hepatitis C Screening Once for adults born between 1945 and 1965  More frequently in patients at high risk for Hepatitis C Hep C Antibody: 02/19/2018    Screening Current   Diabetes Screening 1-2 times per year if you're at risk for diabetes or have pre-diabetes Fasting glucose: 108 mg/dL (8/23/2022)  A1C: 5.7 % (2/9/2023)  Screening Current   Cholesterol Screening Once every 5 years if you don't have a lipid disorder. May order more often based on risk factors. Lipid panel: 02/09/2023    Screening Current     Other Preventive Screenings Covered by Medicare:  Abdominal Aortic Aneurysm (AAA) Screening: covered once if your at risk. You're considered to be at risk if you have a family history of AAA. Lung Cancer Screening: covers low dose CT scan once per year if you meet all of the following conditions: (1) Age 48-67; (2) No signs or symptoms of lung cancer; (3) Current smoker or have quit smoking within the last 15 years; (4) You have a tobacco smoking history of at least 20 pack years (packs per day multiplied by number of years you smoked); (5) You get a written order from a healthcare provider. Glaucoma Screening: covered annually if you're considered high risk: (1) You have diabetes OR (2) Family history of glaucoma OR (3)  aged 48 and older OR (3)  American aged 72 and older  Osteoporosis Screening: covered every 2 years if you meet one of the following conditions: (1) You're estrogen deficient and at risk for osteoporosis based off medical history and other findings; (2) Have a vertebral abnormality; (3) On glucocorticoid therapy for more than 3 months; (4) Have primary hyperparathyroidism; (5) On osteoporosis medications and need to assess response to drug therapy. Last bone density test (DXA Scan): 04/12/2023. HIV Screening: covered annually if you're between the age of 14-79. Also covered annually if you are younger than 13 and older than 72 with risk factors for HIV infection.  For pregnant patients, it is covered up to 3 times per pregnancy. Immunizations:  Immunization Recommendations   Influenza Vaccine Annual influenza vaccination during flu season is recommended for all persons aged >= 6 months who do not have contraindications   Pneumococcal Vaccine   * Pneumococcal conjugate vaccine = PCV13 (Prevnar 13), PCV15 (Vaxneuvance), PCV20 (Prevnar 20)  * Pneumococcal polysaccharide vaccine = PPSV23 (Pneumovax) Adults 49-51 yo with certain risk factors or if 69+ yo  If never received any pneumonia vaccine: recommend Prevnar 20 (PCV20)  Give PCV20 if previously received 1 dose of PCV13 or PPSV23   Hepatitis B Vaccine 3 dose series if at intermediate or high risk (ex: diabetes, end stage renal disease, liver disease)   Respiratory syncytial virus (RSV) Vaccine - COVERED BY MEDICARE PART D  * RSVPreF3 (Arexvy) CDC recommends that adults 61years of age and older may receive a single dose of RSV vaccine using shared clinical decision-making (SCDM)   Tetanus (Td) Vaccine - COST NOT COVERED BY MEDICARE PART B Following completion of primary series, a booster dose should be given every 10 years to maintain immunity against tetanus. Td may also be given as tetanus wound prophylaxis. Tdap Vaccine - COST NOT COVERED BY MEDICARE PART B Recommended at least once for all adults. For pregnant patients, recommended with each pregnancy. Shingles Vaccine (Shingrix) - COST NOT COVERED BY MEDICARE PART B  2 shot series recommended in those 19 years and older who have or will have weakened immune systems or those 50 years and older     Health Maintenance Due:      Topic Date Due   • Breast Cancer Screening: Mammogram  04/12/2024   • Colorectal Cancer Screening  05/10/2025   • Hepatitis C Screening  Completed     Immunizations Due:      Topic Date Due   • COVID-19 Vaccine (3 - Pfizer series) 06/07/2021   • Influenza Vaccine (1) 09/01/2023     Advance Directives   What are advance directives?   Advance directives are legal documents that state your wishes and plans for medical care. These plans are made ahead of time in case you lose your ability to make decisions for yourself. Advance directives can apply to any medical decision, such as the treatments you want, and if you want to donate organs. What are the types of advance directives? There are many types of advance directives, and each state has rules about how to use them. You may choose a combination of any of the following:  Living will: This is a written record of the treatment you want. You can also choose which treatments you do not want, which to limit, and which to stop at a certain time. This includes surgery, medicine, IV fluid, and tube feedings. Durable power of  for healthcare [facility]): This is a written record that states who you want to make healthcare choices for you when you are unable to make them for yourself. This person, called a proxy, is usually a family member or a friend. You may choose more than 1 proxy. Do not resuscitate (DNR) order:  A DNR order is used in case your heart stops beating or you stop breathing. It is a request not to have certain forms of treatment, such as CPR. A DNR order may be included in other types of advance directives. Medical directive: This covers the care that you want if you are in a coma, near death, or unable to make decisions for yourself. You can list the treatments you want for each condition. Treatment may include pain medicine, surgery, blood transfusions, dialysis, IV or tube feedings, and a ventilator (breathing machine). Values history: This document has questions about your views, beliefs, and how you feel and think about life. This information can help others choose the care that you would choose. Why are advance directives important? An advance directive helps you control your care. Although spoken wishes may be used, it is better to have your wishes written down. Spoken wishes can be misunderstood, or not followed.  Treatments may be given even if you do not want them. An advance directive may make it easier for your family to make difficult choices about your care. Urinary Incontinence   Urinary incontinence (UI)  is when you lose control of your bladder. UI develops because your bladder cannot store or empty urine properly. The 3 most common types of UI are stress incontinence, urge incontinence, or both. Medicines:   May be given to help strengthen your bladder control. Report any side effects of medication to your healthcare provider. Do pelvic muscle exercises often:  Your pelvic muscles help you stop urinating. Squeeze these muscles tight for 5 seconds, then relax for 5 seconds. Gradually work up to squeezing for 10 seconds. Do 3 sets of 15 repetitions a day, or as directed. This will help strengthen your pelvic muscles and improve bladder control. Train your bladder:  Go to the bathroom at set times, such as every 2 hours, even if you do not feel the urge to go. You can also try to hold your urine when you feel the urge to go. For example, hold your urine for 5 minutes when you feel the urge to go. As that becomes easier, hold your urine for 10 minutes. Self-care:   Keep a UI record. Write down how often you leak urine and how much you leak. Make a note of what you were doing when you leaked urine. Drink liquids as directed. You may need to limit the amount of liquid you drink to help control your urine leakage. Do not drink any liquid right before you go to bed. Limit or do not have drinks that contain caffeine or alcohol. Prevent constipation. Eat a variety of high-fiber foods. Good examples are high-fiber cereals, beans, vegetables, and whole-grain breads. Walking is the best way to trigger your intestines to have a bowel movement. Exercise regularly and maintain a healthy weight. Weight loss and exercise will decrease pressure on your bladder and help you control your leakage.    Use a catheter as directed  to help empty your bladder. A catheter is a tiny, plastic tube that is put into your bladder to drain your urine. Go to behavior therapy as directed. Behavior therapy may be used to help you learn to control your urge to urinate. Weight Management   Why it is important to manage your weight:  Being overweight increases your risk of health conditions such as heart disease, high blood pressure, type 2 diabetes, and certain types of cancer. It can also increase your risk for osteoarthritis, sleep apnea, and other respiratory problems. Aim for a slow, steady weight loss. Even a small amount of weight loss can lower your risk of health problems. How to lose weight safely:  A safe and healthy way to lose weight is to eat fewer calories and get regular exercise. You can lose up about 1 pound a week by decreasing the number of calories you eat by 500 calories each day. Healthy meal plan for weight management:  A healthy meal plan includes a variety of foods, contains fewer calories, and helps you stay healthy. A healthy meal plan includes the following:  Eat whole-grain foods more often. A healthy meal plan should contain fiber. Fiber is the part of grains, fruits, and vegetables that is not broken down by your body. Whole-grain foods are healthy and provide extra fiber in your diet. Some examples of whole-grain foods are whole-wheat breads and pastas, oatmeal, brown rice, and bulgur. Eat a variety of vegetables every day. Include dark, leafy greens such as spinach, kale, jef greens, and mustard greens. Eat yellow and orange vegetables such as carrots, sweet potatoes, and winter squash. Eat a variety of fruits every day. Choose fresh or canned fruit (canned in its own juice or light syrup) instead of juice. Fruit juice has very little or no fiber. Eat low-fat dairy foods. Drink fat-free (skim) milk or 1% milk. Eat fat-free yogurt and low-fat cottage cheese.  Try low-fat cheeses such as mozzarella and other reduced-fat cheeses. Choose meat and other protein foods that are low in fat. Choose beans or other legumes such as split peas or lentils. Choose fish, skinless poultry (chicken or turkey), or lean cuts of red meat (beef or pork). Before you cook meat or poultry, cut off any visible fat. Use less fat and oil. Try baking foods instead of frying them. Add less fat, such as margarine, sour cream, regular salad dressing and mayonnaise to foods. Eat fewer high-fat foods. Some examples of high-fat foods include french fries, doughnuts, ice cream, and cakes. Eat fewer sweets. Limit foods and drinks that are high in sugar. This includes candy, cookies, regular soda, and sweetened drinks. Exercise:  Exercise at least 30 minutes per day on most days of the week. Some examples of exercise include walking, biking, dancing, and swimming. You can also fit in more physical activity by taking the stairs instead of the elevator or parking farther away from stores. Ask your healthcare provider about the best exercise plan for you. © Copyright [employer] 2018 Information is for End User's use only and may not be sold, redistributed or otherwise used for commercial purposes. All illustrations and images included in CareNotes® are the copyrighted property of [employer] or [employer] [facility] Preventive Visit Patient Instructions  Thank you for completing your Welcome to Medicare Visit or Medicare Annual Wellness Visit today. Your next wellness visit will be due in one year (11/21/2024). The screening/preventive services that you may require over the next 5-10 years are detailed below. Some tests may not apply to you based off risk factors and/or age. Screening tests ordered at today's visit but not completed yet may show as past due. Also, please note that scanned in results may not display below.   Preventive Screenings:  Service Recommendations Previous Testing/Comments   Colorectal Cancer Screening  * Colonoscopy    * Fecal Occult Blood Test (FOBT)/Fecal Immunochemical Test (FIT)  * Fecal DNA/Cologuard Test  * Flexible Sigmoidoscopy Age: 43-73 years old   Colonoscopy: every 10 years (may be performed more frequently if at higher risk)  OR  FOBT/FIT: every 1 year  OR  Cologuard: every 3 years  OR  Sigmoidoscopy: every 5 years  Screening may be recommended earlier than age 39 if at higher risk for colorectal cancer. Also, an individualized decision between you and your healthcare provider will decide whether screening between the ages of 77-80 would be appropriate. Colonoscopy: 05/11/2022  FOBT/FIT: Not on file  Cologuard: Not on file  Sigmoidoscopy: Not on file    Screening Current     Breast Cancer Screening Age: 36 years old  Frequency: every 1-2 years  Not required if history of left and right mastectomy Mammogram: 04/12/2023    Screening Current   Cervical Cancer Screening Between the ages of 21-29, pap smear recommended once every 3 years. Between the ages of 32-69, can perform pap smear with HPV co-testing every 5 years. Recommendations may differ for women with a history of total hysterectomy, cervical cancer, or abnormal pap smears in past. Pap Smear: Not on file    Screening Not Indicated   Hepatitis C Screening Once for adults born between 1945 and 1965  More frequently in patients at high risk for Hepatitis C Hep C Antibody: 02/19/2018    Screening Current   Diabetes Screening 1-2 times per year if you're at risk for diabetes or have pre-diabetes Fasting glucose: 108 mg/dL (8/23/2022)  A1C: 5.7 % (2/9/2023)  Screening Current   Cholesterol Screening Once every 5 years if you don't have a lipid disorder. May order more often based on risk factors. Lipid panel: 02/09/2023    Screening Current     Other Preventive Screenings Covered by Medicare:  Abdominal Aortic Aneurysm (AAA) Screening: covered once if your at risk.  You're considered to be at risk if you have a family history of AAA.  Lung Cancer Screening: covers low dose CT scan once per year if you meet all of the following conditions: (1) Age 48-67; (2) No signs or symptoms of lung cancer; (3) Current smoker or have quit smoking within the last 15 years; (4) You have a tobacco smoking history of at least 20 pack years (packs per day multiplied by number of years you smoked); (5) You get a written order from a healthcare provider. Glaucoma Screening: covered annually if you're considered high risk: (1) You have diabetes OR (2) Family history of glaucoma OR (3)  aged 48 and older OR (3)  American aged 72 and older  Osteoporosis Screening: covered every 2 years if you meet one of the following conditions: (1) You're estrogen deficient and at risk for osteoporosis based off medical history and other findings; (2) Have a vertebral abnormality; (3) On glucocorticoid therapy for more than 3 months; (4) Have primary hyperparathyroidism; (5) On osteoporosis medications and need to assess response to drug therapy. Last bone density test (DXA Scan): 04/12/2023. HIV Screening: covered annually if you're between the age of 14-79. Also covered annually if you are younger than 13 and older than 72 with risk factors for HIV infection. For pregnant patients, it is covered up to 3 times per pregnancy.     Immunizations:  Immunization Recommendations   Influenza Vaccine Annual influenza vaccination during flu season is recommended for all persons aged >= 6 months who do not have contraindications   Pneumococcal Vaccine   * Pneumococcal conjugate vaccine = PCV13 (Prevnar 13), PCV15 (Vaxneuvance), PCV20 (Prevnar 20)  * Pneumococcal polysaccharide vaccine = PPSV23 (Pneumovax) Adults 63-35 yo with certain risk factors or if 69+ yo  If never received any pneumonia vaccine: recommend Prevnar 20 (PCV20)  Give PCV20 if previously received 1 dose of PCV13 or PPSV23   Hepatitis B Vaccine 3 dose series if at intermediate or high risk (ex: diabetes, end stage renal disease, liver disease)   Respiratory syncytial virus (RSV) Vaccine - COVERED BY MEDICARE PART D  * RSVPreF3 (Arexvy) CDC recommends that adults 61years of age and older may receive a single dose of RSV vaccine using shared clinical decision-making (SCDM)   Tetanus (Td) Vaccine - COST NOT COVERED BY MEDICARE PART B Following completion of primary series, a booster dose should be given every 10 years to maintain immunity against tetanus. Td may also be given as tetanus wound prophylaxis. Tdap Vaccine - COST NOT COVERED BY MEDICARE PART B Recommended at least once for all adults. For pregnant patients, recommended with each pregnancy. Shingles Vaccine (Shingrix) - COST NOT COVERED BY MEDICARE PART B  2 shot series recommended in those 19 years and older who have or will have weakened immune systems or those 50 years and older     Health Maintenance Due:      Topic Date Due   • Breast Cancer Screening: Mammogram  04/12/2024   • Colorectal Cancer Screening  05/10/2025   • Hepatitis C Screening  Completed     Immunizations Due:      Topic Date Due   • COVID-19 Vaccine (3 - Pfizer series) 06/07/2021   • Influenza Vaccine (1) 09/01/2023     Advance Directives   What are advance directives? Advance directives are legal documents that state your wishes and plans for medical care. These plans are made ahead of time in case you lose your ability to make decisions for yourself. Advance directives can apply to any medical decision, such as the treatments you want, and if you want to donate organs. What are the types of advance directives? There are many types of advance directives, and each state has rules about how to use them. You may choose a combination of any of the following:  Living will: This is a written record of the treatment you want. You can also choose which treatments you do not want, which to limit, and which to stop at a certain time.  This includes surgery, medicine, IV fluid, and tube feedings. Durable power of  for [facility]): This is a written record that states who you want to make healthcare choices for you when you are unable to make them for yourself. This person, called a proxy, is usually a family member or a friend. You may choose more than 1 proxy. Do not resuscitate (DNR) order:  A DNR order is used in case your heart stops beating or you stop breathing. It is a request not to have certain forms of treatment, such as CPR. A DNR order may be included in other types of advance directives. Medical directive: This covers the care that you want if you are in a coma, near death, or unable to make decisions for yourself. You can list the treatments you want for each condition. Treatment may include pain medicine, surgery, blood transfusions, dialysis, IV or tube feedings, and a ventilator (breathing machine). Values history: This document has questions about your views, beliefs, and how you feel and think about life. This information can help others choose the care that you would choose. Why are advance directives important? An advance directive helps you control your care. Although spoken wishes may be used, it is better to have your wishes written down. Spoken wishes can be misunderstood, or not followed. Treatments may be given even if you do not want them. An advance directive may make it easier for your family to make difficult choices about your care. Urinary Incontinence   Urinary incontinence (UI)  is when you lose control of your bladder. UI develops because your bladder cannot store or empty urine properly. The 3 most common types of UI are stress incontinence, urge incontinence, or both. Medicines:   May be given to help strengthen your bladder control. Report any side effects of medication to your healthcare provider. Do pelvic muscle exercises often:  Your pelvic muscles help you stop urinating.  Squeeze these muscles tight for 5 seconds, then relax for 5 seconds. Gradually work up to squeezing for 10 seconds. Do 3 sets of 15 repetitions a day, or as directed. This will help strengthen your pelvic muscles and improve bladder control. Train your bladder:  Go to the bathroom at set times, such as every 2 hours, even if you do not feel the urge to go. You can also try to hold your urine when you feel the urge to go. For example, hold your urine for 5 minutes when you feel the urge to go. As that becomes easier, hold your urine for 10 minutes. Self-care:   Keep a UI record. Write down how often you leak urine and how much you leak. Make a note of what you were doing when you leaked urine. Drink liquids as directed. You may need to limit the amount of liquid you drink to help control your urine leakage. Do not drink any liquid right before you go to bed. Limit or do not have drinks that contain caffeine or alcohol. Prevent constipation. Eat a variety of high-fiber foods. Good examples are high-fiber cereals, beans, vegetables, and whole-grain breads. Walking is the best way to trigger your intestines to have a bowel movement. Exercise regularly and maintain a healthy weight. Weight loss and exercise will decrease pressure on your bladder and help you control your leakage. Use a catheter as directed  to help empty your bladder. A catheter is a tiny, plastic tube that is put into your bladder to drain your urine. Go to behavior therapy as directed. Behavior therapy may be used to help you learn to control your urge to urinate. Weight Management   Why it is important to manage your weight:  Being overweight increases your risk of health conditions such as heart disease, high blood pressure, type 2 diabetes, and certain types of cancer. It can also increase your risk for osteoarthritis, sleep apnea, and other respiratory problems. Aim for a slow, steady weight loss.  Even a small amount of weight loss can lower your risk of health problems. How to lose weight safely:  A safe and healthy way to lose weight is to eat fewer calories and get regular exercise. You can lose up about 1 pound a week by decreasing the number of calories you eat by 500 calories each day. Healthy meal plan for weight management:  A healthy meal plan includes a variety of foods, contains fewer calories, and helps you stay healthy. A healthy meal plan includes the following:  Eat whole-grain foods more often. A healthy meal plan should contain fiber. Fiber is the part of grains, fruits, and vegetables that is not broken down by your body. Whole-grain foods are healthy and provide extra fiber in your diet. Some examples of whole-grain foods are whole-wheat breads and pastas, oatmeal, brown rice, and bulgur. Eat a variety of vegetables every day. Include dark, leafy greens such as spinach, kale, jef greens, and mustard greens. Eat yellow and orange vegetables such as carrots, sweet potatoes, and winter squash. Eat a variety of fruits every day. Choose fresh or canned fruit (canned in its own juice or light syrup) instead of juice. Fruit juice has very little or no fiber. Eat low-fat dairy foods. Drink fat-free (skim) milk or 1% milk. Eat fat-free yogurt and low-fat cottage cheese. Try low-fat cheeses such as mozzarella and other reduced-fat cheeses. Choose meat and other protein foods that are low in fat. Choose beans or other legumes such as split peas or lentils. Choose fish, skinless poultry (chicken or turkey), or lean cuts of red meat (beef or pork). Before you cook meat or poultry, cut off any visible fat. Use less fat and oil. Try baking foods instead of frying them. Add less fat, such as margarine, sour cream, regular salad dressing and mayonnaise to foods. Eat fewer high-fat foods. Some examples of high-fat foods include french fries, doughnuts, ice cream, and cakes. Eat fewer sweets. Limit foods and drinks that are high in sugar.  This includes candy, cookies, regular soda, and sweetened drinks. Exercise:  Exercise at least 30 minutes per day on most days of the week. Some examples of exercise include walking, biking, dancing, and swimming. You can also fit in more physical activity by taking the stairs instead of the elevator or parking farther away from stores. Ask your healthcare provider about the best exercise plan for you. © Copyright [employer] 2018 Information is for End User's use only and may not be sold, redistributed or otherwise used for commercial purposes.  All illustrations and images included in CareNotes® are the copyrighted property of [employer].A[employer]., Inc. or  [address]

## 2023-11-21 NOTE — PROGRESS NOTES
Assessment and Plan:     Problem List Items Addressed This Visit        Cardiovascular and Mediastinum    Essential hypertension     stable         Relevant Medications    lisinopril-hydrochlorothiazide (PRINZIDE,ZESTORETIC) 20-12.5 MG per tablet   Other Visit Diagnoses     Encounter for subsequent annual wellness visit (AWV) in Medicare patient    -  Primary    Screening mammogram for breast cancer        Relevant Orders    Mammo screening bilateral w 3d & cad             Preventive health issues were discussed with patient, and age appropriate screening tests were ordered as noted in patient's After Visit Summary. Personalized health advice and appropriate referrals for health education or preventive services given if needed, as noted in patient's After Visit Summary.      History of Present Illness:     Patient presents for a Medicare Wellness Visit    HPI   Patient Care Team:  Raul Perez as PCP - General (Family Medicine)  Stefany Nickerson MD (Gastroenterology)  Stefany Nickerson MD as Lata Bell MD as Referring Physician (Orthopedic Surgery)     Review of Systems:     Review of Systems     Problem List:     Patient Active Problem List   Diagnosis   • Gastroesophageal reflux disease   • Chronic pain syndrome   • Chronic bilateral low back pain without sciatica   • Lumbar spondylosis   • Coccydynia   • Spinal stenosis of lumbar region without neurogenic claudication   • Myofascial pain syndrome   • Thoracic spine pain   • Essential hypertension   • Status post lumbar spinal fusion   • Irregular bowel habits   • History of colon polyps   • Small intestinal bacterial overgrowth   • Arthralgia of lumbar spine   • DDD (degenerative disc disease), lumbar   • Postmenopausal state   • Morbid obesity ([address])   • Chronic right sacroiliac joint pain   • History of Michael-en-Y gastric bypass   • Irritable bowel syndrome with diarrhea   • Vertigo   • Vitamin D deficiency   • Bilateral tinnitus   • Cervical radiculopathy   • Cervical spinal stenosis   • Hepatic steatosis   • Mild intermittent asthma   • Depression, recurrent (HCC)      Past Medical and Surgical History:     Past Medical History:   Diagnosis Date   • Arthritis    • Asthma    • Back pain    • Chronic pain disorder     hips into the legs   • Colon polyp    • DVT (deep venous thrombosis) ([address])    • Gastric bypass status for obesity     yarelis en y   • GERD (gastroesophageal reflux disease)    • Hiatal hernia    • History of transfusion 2006    after gastric bypass surgery, no reactions   • Hypertension    • Irritable bowel syndrome    • Kidney stone    • Muscle weakness     bilateral legs   • Numbness and tingling     bilateral feet   • Pneumonia    • Sacroiliitis ([address]) 5/15/2018   • Spinal stenosis    • Tinnitus     occassionally   • Vertigo    • Wears glasses      Past Surgical History:   Procedure Laterality Date   • APPENDECTOMY     • BACK SURGERY  2018    L4-5 fusion   • BARIATRIC SURGERY  2005    yarelis en y- pt states the procedure was done incorrectly which lead to additional surgeries from -   • [address] [facility] [address] Right    •  SECTION      x1   • CHOLECYSTECTOMY     • COLON SURGERY      partial removal of the small bowel-necrosis-between -   • COLONOSCOPY     • COLONOSCOPY W/ BIOPSIES AND POLYPECTOMY     • EPIDURAL BLOCK INJECTION N/A 2021    Procedure: C6 C7 cervical epidural steroid injection ( 23082); Surgeon: Johanna George MD;  Location: [facility] MAIN OR;  Service: Pain Management    • EPIDURAL BLOCK INJECTION N/A 2021    Procedure: C6 C7 cervical epidural steroid injection (22135);   Surgeon: Johanna George MD;  Location: [facility] MAIN OR;  Service: Pain Management    • FLEXIBLE BRONCHOSCOPY W/ UPPER ENDOSCOPY     • GASTRECTOMY      after gastric bypass-(failed surgery per pt) 2006   • HERNIA REPAIR      incisional hernias-diaphragm area   • GA ARTHROCENTESIS ASPIR&/INJ SMALL JT/BURSA W/O US N/A 5/2/2018    Procedure: Coccygeal Injection & Ganglion Impar Block;  Surgeon: Lennox Carney MD;  Location: [facility] MAIN OR;  Service: Pain Management    • IL ARTHRODESIS POSTERIOR INTERBODY 1 NTRSPC LUMBAR N/A 6/27/2018    Procedure: L4-5 DECOMPRESSION INTERBODY INSTRUMENTED FUSION;  Surgeon: Sue Jones MD;  Location: [facility] Main OR;  Service: Orthopedics   • IL COLONOSCOPY FLX DX W/COLLJ SPEC WHEN PFRMD N/A 8/24/2018    Procedure: COLONOSCOPY;  Surgeon: Kymberly Hurd MD;  Location: [address] [place] [address] GI LAB; Service: Gastroenterology   • IL ESOPHAGOGASTRODUODENOSCOPY TRANSORAL DIAGNOSTIC N/A 2/19/2018    Procedure: ESOPHAGOGASTRODUODENOSCOPY (EGD); Surgeon: Kymberly Hurd MD;  Location: [facility] GI LAB; Service: Gastroenterology   • IL INJECT SI JOINT ARTHRGRPHY&/ANES/STEROID W/EMMANUEL Right 5/25/2018    Procedure: Rt Sacroiliac Joint Injection;  Surgeon: Lennox Carney MD;  Location: [facility] MAIN OR;  Service: Pain Management    • IL INJECT SI JOINT ARTHRGRPHY&/ANES/STEROID W/EMMANUEL Right 5/1/2019    Procedure: Sacroiliac Joint Injection (75608);   Surgeon: Lennxo Carney MD;  Location: [facility] MAIN OR;  Service: Pain Management    • TONSILECTOMY, ADENOIDECTOMY, BILATERAL MYRINGOTOMY AND TUBES     • TONSILLECTOMY        Family History:     Family History   Problem Relation Age of Onset   • Diabetes Mother    • Aortic aneurysm Father    • Cancer Father         prostate   • Heart disease Sister         open heart   • Cancer Sister         breast   • Breast cancer Sister 47   • Diabetes Brother    • Cancer Brother         spinal cancer   • Other Daughter         concussion syndrome from MVA   • Asperger's syndrome Son         high functioning   • Diabetes Maternal Grandfather    • Stroke Brother    • Hypertension Brother    • No Known Problems Maternal Aunt    • No Known Problems Maternal Aunt    • No Known Problems Paternal Aunt    • No Known Problems Paternal Aunt    • Colon cancer Maternal Uncle 61      Social History:     Social History     Socioeconomic History   • Marital status:      Spouse name: None   • Number of children: None   • Years of education: None   • Highest education level: None   Occupational History   • None   Tobacco Use   • Smoking status: Never     Passive exposure: Never   • Smokeless tobacco: Never   Vaping Use   • Vaping Use: Never used   Substance and Sexual Activity   • Alcohol use: Yes     Alcohol/week: 7.0 standard drinks of alcohol     Types: 5 Cans of beer, 2 Shots of liquor per week     Comment: socially   • Drug use: Not Currently     Types: Marijuana     Comment: has medical marijuana card. lozenges   • Sexual activity: Yes     Birth control/protection: Post-menopausal   Other Topics Concern   • None   Social History Narrative   • None     Social Determinants of Health     Financial Resource Strain: High Risk (11/22/2022)    Overall Financial Resource Strain (CARDIA)    • Difficulty of Paying Living Expenses: Hard   Food Insecurity: Not on file   Transportation Needs: No Transportation Needs (11/22/2022)    PRAPARE - Transportation    • Lack of Transportation (Medical): No    • Lack of Transportation (Non-Medical):  No   Physical Activity: Not on file   Stress: Not on file   Social Connections: Not on file   Intimate Partner Violence: Not on file   Housing Stability: Not on file      Medications and Allergies:     Current Outpatient Medications   Medication Sig Dispense Refill   • calcium carbonate (OS-BEAU) 1250 (500 Ca) MG chewable tablet Chew 1 tablet daily     • cholecalciferol (VITAMIN D3) 400 units tablet Take 400 Units by mouth daily     • dicyclomine (BENTYL) 10 mg capsule Take 1 capsule (10 mg total) by mouth as needed (take once daily as needed) 90 capsule 1   • famotidine (PEPCID) 40 MG tablet Take 1 tablet (40 mg total) by mouth in the morning 90 tablet 2   • fluticasone (FLONASE) 50 mcg/act nasal spray 2 sprays into each nostril daily 16 g 3   • lisinopril-hydrochlorothiazide (PRINZIDE,ZESTORETIC) 20-12.5 MG per tablet Take 1 tablet by mouth daily 90 tablet 1   • multivitamin (THERAGRAN) TABS Take 1 tablet by mouth daily     • Omega-3 Fatty Acids (fish oil) 1,000 mg Take 1 capsule (1,000 mg total) by mouth 2 (two) times a day 60 capsule 6   • vitamin B-12 (CYANOCOBALAMIN) 50 MCG tablet Take 50 mcg by mouth daily       No current facility-administered medications for this visit. No Known Allergies   Immunizations:     Immunization History   Administered Date(s) Administered   • COVID-19 PFIZER VACCINE 0.3 ML IM 03/22/2021, 04/12/2021   • INFLUENZA 10/26/2020, 10/31/2022   • Pneumococcal Conjugate Vaccine 20-valent (Pcv20), Polysace 11/22/2022   • Tdap 03/17/2021   • Zoster Vaccine Recombinant 04/25/2018, 07/27/2018      Health Maintenance:         Topic Date Due   • Breast Cancer Screening: Mammogram  04/12/2024   • Colorectal Cancer Screening  05/10/2025   • Hepatitis C Screening  Completed         Topic Date Due   • COVID-19 Vaccine (3 - Pfizer series) 06/07/2021   • Influenza Vaccine (1) 09/01/2023      Medicare Screening Tests and Risk Assessments:     Byron Felty is here for her Subsequent Wellness visit. Depression Screening:   PHQ-9 Score: 8      PREVENTIVE SCREENINGS      Cardiovascular Screening:    General: Screening Current      Diabetes Screening:     General: Screening Current      Colorectal Cancer Screening:     General: Screening Current      Breast Cancer Screening:     General: Screening Current      Cervical Cancer Screening:    General: Screening Not Indicated      Lung Cancer Screening:     General: Screening Not Indicated      Hepatitis C Screening:    General: Screening Current    No results found.      Physical Exam:     /70   Pulse 102   Temp 97.8 °F (36.6 °C)   Resp 18   Ht 5' 3" (1.6 m)   Wt 108 kg (238 lb)   SpO2 98%   BMI 42.16 kg/m²     Physical Exam     JACINTO Cummings

## 2023-11-21 NOTE — PROGRESS NOTES
Assessment and Plan:     Problem List Items Addressed This Visit        Cardiovascular and Mediastinum    Essential hypertension     stable         Relevant Medications    lisinopril-hydrochlorothiazide (PRINZIDE,ZESTORETIC) 20-12.5 MG per tablet   Other Visit Diagnoses     Encounter for subsequent annual wellness visit (AWV) in Medicare patient    -  Primary    Screening mammogram for breast cancer        Relevant Orders    Mammo screening bilateral w 3d & cad        BMI Counseling: Body mass index is 42.16 kg/m². The BMI is above normal. Nutrition recommendations include consuming healthier snacks. Exercise recommendations include moderate physical activity 150 minutes/week. Rationale for BMI follow-up plan is due to patient being overweight or obese. Preventive health issues were discussed with patient, and age appropriate screening tests were ordered as noted in patient's After Visit Summary. Personalized health advice and appropriate referrals for health education or preventive services given if needed, as noted in patient's After Visit Summary. History of Present Illness:     Patient presents for a Medicare Wellness Visit    Here today for annual wellness visit. Doing well, no concerns today. Due for labs        Patient Care Team:  Humberto Knight as PCP - General (Family Medicine)  Dotty Goodpasture, MD (Gastroenterology)  Dotty Goodpasture, MD as Jany Coker MD as Referring Physician (Orthopedic Surgery)     Review of Systems:     Review of Systems   Constitutional: Negative. Respiratory: Negative. Cardiovascular: Negative. Gastrointestinal: Negative. Neurological: Negative.          Problem List:     Patient Active Problem List   Diagnosis   • Gastroesophageal reflux disease   • Chronic pain syndrome   • Chronic bilateral low back pain without sciatica   • Lumbar spondylosis   • Coccydynia   • Spinal stenosis of lumbar region without neurogenic claudication   • Myofascial pain syndrome   • Thoracic spine pain   • Essential hypertension   • Status post lumbar spinal fusion   • Irregular bowel habits   • History of colon polyps   • Small intestinal bacterial overgrowth   • Arthralgia of lumbar spine   • DDD (degenerative disc disease), lumbar   • Postmenopausal state   • Morbid obesity (HCC)   • Chronic right sacroiliac joint pain   • History of Michael-en-Y gastric bypass   • Irritable bowel syndrome with diarrhea   • Vertigo   • Vitamin D deficiency   • Bilateral tinnitus   • Cervical radiculopathy   • Cervical spinal stenosis   • Hepatic steatosis   • Mild intermittent asthma   • Depression, recurrent (HCC)      Past Medical and Surgical History:     Past Medical History:   Diagnosis Date   • Arthritis    • Asthma    • Back pain    • Chronic pain disorder     hips into the legs   • Colon polyp    • DVT (deep venous thrombosis) ([address])    • Gastric bypass status for obesity     michael en y   • GERD (gastroesophageal reflux disease)    • Hiatal hernia    • History of transfusion     after gastric bypass surgery, no reactions   • Hypertension    • Irritable bowel syndrome    • Kidney stone    • Muscle weakness     bilateral legs   • Numbness and tingling     bilateral feet   • Pneumonia    • Sacroiliitis ([address]) 5/15/2018   • Spinal stenosis    • Tinnitus     occassionally   • Vertigo    • Wears glasses      Past Surgical History:   Procedure Laterality Date   • APPENDECTOMY     • BACK SURGERY  2018    L4-5 fusion   • BARIATRIC SURGERY      michael en y- pt states the procedure was done incorrectly which lead to additional surgeries from -   • [facility] Right    •  SECTION      x1   • CHOLECYSTECTOMY     • COLON SURGERY      partial removal of the small bowel-necrosis-between -   • COLONOSCOPY     • COLONOSCOPY W/ BIOPSIES AND POLYPECTOMY     • EPIDURAL BLOCK INJECTION N/A 2021    Procedure: C6 C7 cervical epidural steroid injection ( 41779); Surgeon: Johanna George MD;  Location: [facility] MAIN OR;  Service: Pain Management    • EPIDURAL BLOCK INJECTION N/A 12/28/2021    Procedure: C6 C7 cervical epidural steroid injection (88883); Surgeon: Johanna George MD;  Location: [facility] MAIN OR;  Service: Pain Management    • FLEXIBLE BRONCHOSCOPY W/ UPPER ENDOSCOPY     • GASTRECTOMY      after gastric bypass-(failed surgery per pt) 2006-09   • HERNIA REPAIR      incisional hernias-diaphragm area   • CA ARTHROCENTESIS ASPIR&/INJ SMALL JT/BURSA W/O US N/A 5/2/2018    Procedure: Coccygeal Injection & Ganglion Impar Block;  Surgeon: Johanna George MD;  Location: [facility] MAIN OR;  Service: Pain Management    • CA ARTHRODESIS POSTERIOR INTERBODY 1 NTRSPC LUMBAR N/A 6/27/2018    Procedure: L4-5 DECOMPRESSION INTERBODY INSTRUMENTED FUSION;  Surgeon: Varsha Pena MD;  Location: [facility] Main OR;  Service: Orthopedics   • CA COLONOSCOPY FLX DX W/COLLJ SPEC WHEN PFRMD N/A 8/24/2018    Procedure: COLONOSCOPY;  Surgeon: Radhika Robertson MD;  Location: [address] [place] [address] GI LAB; Service: Gastroenterology   • CA ESOPHAGOGASTRODUODENOSCOPY TRANSORAL DIAGNOSTIC N/A 2/19/2018    Procedure: ESOPHAGOGASTRODUODENOSCOPY (EGD); Surgeon: Radhika Robertson MD;  Location: [facility] GI LAB; Service: Gastroenterology   • CA INJECT SI JOINT ARTHRGRPHY&/ANES/STEROID W/EMMANUEL Right 5/25/2018    Procedure: Rt Sacroiliac Joint Injection;  Surgeon: Johanna George MD;  Location: [facility] MAIN OR;  Service: Pain Management    • CA INJECT SI JOINT ARTHRGRPHY&/ANES/STEROID W/EMMANUEL Right 5/1/2019    Procedure: Sacroiliac Joint Injection (26925);   Surgeon: Johanna George MD;  Location: [facility] MAIN OR;  Service: Pain Management    • TONSILECTOMY, ADENOIDECTOMY, BILATERAL MYRINGOTOMY AND TUBES     • TONSILLECTOMY        Family History:     Family History   Problem Relation Age of Onset   • Diabetes Mother    • Aortic aneurysm Father    • Cancer Father         prostate   • Heart disease Sister         open heart • Cancer Sister         breast   • Breast cancer Sister 47   • Diabetes Brother    • Cancer Brother         spinal cancer   • Other Daughter         concussion syndrome from MVA   • Asperger's syndrome Son         high functioning   • Diabetes Maternal Grandfather    • Stroke Brother    • Hypertension Brother    • No Known Problems Maternal Aunt    • No Known Problems Maternal Aunt    • No Known Problems Paternal Aunt    • No Known Problems Paternal Aunt    • Colon cancer Maternal Uncle 61      Social History:     Social History     Socioeconomic History   • Marital status:      Spouse name: None   • Number of children: None   • Years of education: None   • Highest education level: None   Occupational History   • None   Tobacco Use   • Smoking status: Never     Passive exposure: Never   • Smokeless tobacco: Never   Vaping Use   • Vaping Use: Never used   Substance and Sexual Activity   • Alcohol use: Yes     Alcohol/week: 7.0 standard drinks of alcohol     Types: 5 Cans of beer, 2 Shots of liquor per week     Comment: socially   • Drug use: Not Currently     Types: Marijuana     Comment: has medical marijuana card. lozenges   • Sexual activity: Yes     Birth control/protection: Post-menopausal   Other Topics Concern   • None   Social History Narrative   • None     Social Determinants of Health     Financial Resource Strain: High Risk (11/22/2022)    Overall Financial Resource Strain (CARDIA)    • Difficulty of Paying Living Expenses: Hard   Food Insecurity: Not on file   Transportation Needs: No Transportation Needs (11/22/2022)    PRAPARE - Transportation    • Lack of Transportation (Medical): No    • Lack of Transportation (Non-Medical):  No   Physical Activity: Not on file   Stress: Not on file   Social Connections: Not on file   Intimate Partner Violence: Not on file   Housing Stability: Not on file      Medications and Allergies:     Current Outpatient Medications   Medication Sig Dispense Refill   • calcium carbonate (OS-BEAU) 1250 (500 Ca) MG chewable tablet Chew 1 tablet daily     • cholecalciferol (VITAMIN D3) 400 units tablet Take 400 Units by mouth daily     • dicyclomine (BENTYL) 10 mg capsule Take 1 capsule (10 mg total) by mouth as needed (take once daily as needed) 90 capsule 1   • famotidine (PEPCID) 40 MG tablet Take 1 tablet (40 mg total) by mouth in the morning 90 tablet 2   • fluticasone (FLONASE) 50 mcg/act nasal spray 2 sprays into each nostril daily 16 g 3   • lisinopril-hydrochlorothiazide (PRINZIDE,ZESTORETIC) 20-12.5 MG per tablet Take 1 tablet by mouth daily 90 tablet 1   • multivitamin (THERAGRAN) TABS Take 1 tablet by mouth daily     • Omega-3 Fatty Acids (fish oil) 1,000 mg Take 1 capsule (1,000 mg total) by mouth 2 (two) times a day 60 capsule 6   • vitamin B-12 (CYANOCOBALAMIN) 50 MCG tablet Take 50 mcg by mouth daily       No current facility-administered medications for this visit. No Known Allergies   Immunizations:     Immunization History   Administered Date(s) Administered   • COVID-19 PFIZER VACCINE 0.3 ML IM 03/22/2021, 04/12/2021   • INFLUENZA 10/26/2020, 10/31/2022   • Pneumococcal Conjugate Vaccine 20-valent (Pcv20), Polysace 11/22/2022   • Tdap 03/17/2021   • Zoster Vaccine Recombinant 04/25/2018, 07/27/2018      Health Maintenance:         Topic Date Due   • Breast Cancer Screening: Mammogram  04/12/2024   • Colorectal Cancer Screening  05/10/2025   • Hepatitis C Screening  Completed         Topic Date Due   • COVID-19 Vaccine (3 - Pfizer series) 06/07/2021   • Influenza Vaccine (1) 09/01/2023      Medicare Screening Tests and Risk Assessments:     Kavon Butcher is here for her Subsequent Wellness visit. Health Risk Assessment:   Patient rates overall health as good. Patient feels that their physical health rating is slightly better. Patient is satisfied with their life. Eyesight was rated as same. Hearing was rated as same.  Patient feels that their emotional and mental health rating is slightly better. Patients states they are never, rarely angry. Patient states they are often unusually tired/fatigued. Pain experienced in the last 7 days has been some. Patient's pain rating has been 9/10. Patient states that she has experienced no weight loss or gain in last 6 months. Depression Screening:   PHQ-9 Score: 8      Fall Risk Screening: In the past year, patient has experienced: no history of falling in past year      Urinary Incontinence Screening:   Patient has leaked urine accidently in the last six months. Home Safety:  Patient has trouble with stairs inside or outside of their home. Patient has working smoke alarms and has working carbon monoxide detector. Home safety hazards include: none. Nutrition:   Current diet is Regular. Medications:   Patient is not currently taking any over-the-counter supplements. Patient is able to manage medications. Activities of Daily Living (ADLs)/Instrumental Activities of Daily Living (IADLs):   Walk and transfer into and out of bed and chair?: Yes  Dress and groom yourself?: Yes    Bathe or shower yourself?: Yes    Feed yourself?  Yes  Do your laundry/housekeeping?: Yes  Manage your money, pay your bills and track your expenses?: Yes  Make your own meals?: Yes    Do your own shopping?: Yes    Previous Hospitalizations:   Any hospitalizations or ED visits within the last 12 months?: No      Advance Care Planning:   Living will: No    Durable POA for healthcare: No    Advanced directive: No    ACP document given: Yes      Cognitive Screening:   Provider or family/friend/caregiver concerned regarding cognition?: No    PREVENTIVE SCREENINGS      Cardiovascular Screening:    General: Screening Current      Diabetes Screening:     General: Screening Current      Colorectal Cancer Screening:     General: Screening Current      Breast Cancer Screening:     General: Screening Current      Cervical Cancer Screening:    General: Screening Not Indicated      Osteoporosis Screening:    General: Screening Current      Abdominal Aortic Aneurysm (AAA) Screening:        General: Screening Not Indicated      Lung Cancer Screening:     General: Screening Not Indicated      Hepatitis C Screening:    General: Screening Current    Screening, Brief Intervention, and Referral to Treatment (SBIRT)    Screening  Typical number of drinks in a day: 1  Typical number of drinks in a week: 7  Interpretation: Low risk drinking behavior. Single Item Drug Screening:  How often have you used an illegal drug (including marijuana) or a prescription medication for non-medical reasons in the past year? never    Single Item Drug Screen Score: 0  Interpretation: Negative screen for possible drug use disorder    Brief Intervention  Alcohol & drug use screenings were reviewed. No concerns regarding substance use disorder identified. Other Counseling Topics:   Calcium and vitamin D intake and regular weightbearing exercise. No results found. Physical Exam:     /70   Pulse 102   Temp 97.8 °F (36.6 °C)   Resp 18   Ht 5' 3" (1.6 m)   Wt 108 kg (238 lb)   SpO2 98%   BMI 42.16 kg/m²     Physical Exam  Vitals and nursing note reviewed. Constitutional:       Appearance: Normal appearance. She is well-developed. HENT:      Head: Normocephalic and atraumatic. Right Ear: External ear normal.      Left Ear: External ear normal.      Nose: Nose normal.   Eyes:      Conjunctiva/sclera: Conjunctivae normal.      Pupils: Pupils are equal, round, and reactive to light. Neck:      Thyroid: No thyromegaly. Vascular: No carotid bruit. Cardiovascular:      Rate and Rhythm: Normal rate and regular rhythm. Pulses: Normal pulses. Heart sounds: Normal heart sounds. No murmur heard. Pulmonary:      Effort: Pulmonary effort is normal.      Breath sounds: Normal breath sounds. Musculoskeletal:         General: Normal range of motion. Cervical back: Normal range of motion. Skin:     General: Skin is warm and dry. Capillary Refill: Capillary refill takes less than 2 seconds. Neurological:      Mental Status: She is alert.    Psychiatric:         Mood and Affect: Mood normal.         Behavior: Behavior normal.          JACINTO Mcfarland

## 2023-11-30 ENCOUNTER — OFFICE VISIT (OUTPATIENT)
Dept: FAMILY MEDICINE CLINIC | Facility: CLINIC | Age: 68
End: 2023-11-30
Payer: MEDICARE

## 2023-11-30 VITALS
HEIGHT: 63 IN | WEIGHT: 243 LBS | HEART RATE: 116 BPM | SYSTOLIC BLOOD PRESSURE: 124 MMHG | DIASTOLIC BLOOD PRESSURE: 76 MMHG | TEMPERATURE: 99.5 F | RESPIRATION RATE: 18 BRPM | BODY MASS INDEX: 43.05 KG/M2

## 2023-11-30 DIAGNOSIS — K21.9 GASTROESOPHAGEAL REFLUX DISEASE, UNSPECIFIED WHETHER ESOPHAGITIS PRESENT: ICD-10-CM

## 2023-11-30 DIAGNOSIS — U07.1 COVID-19: Primary | ICD-10-CM

## 2023-11-30 DIAGNOSIS — R19.7 DIARRHEA, UNSPECIFIED TYPE: ICD-10-CM

## 2023-11-30 DIAGNOSIS — R13.10 DYSPHAGIA, UNSPECIFIED TYPE: ICD-10-CM

## 2023-11-30 LAB
SARS-COV-2 AG UPPER RESP QL IA: POSITIVE
VALID CONTROL: ABNORMAL

## 2023-11-30 PROCEDURE — 87811 SARS-COV-2 COVID19 W/OPTIC: CPT | Performed by: NURSE PRACTITIONER

## 2023-11-30 PROCEDURE — 99213 OFFICE O/P EST LOW 20 MIN: CPT | Performed by: NURSE PRACTITIONER

## 2023-11-30 RX ORDER — DICYCLOMINE HYDROCHLORIDE 10 MG/1
10 CAPSULE ORAL AS NEEDED
Qty: 90 CAPSULE | Refills: 1 | Status: SHIPPED | OUTPATIENT
Start: 2023-11-30

## 2023-11-30 NOTE — PROGRESS NOTES
COVID-19 Outpatient Progress Note    Assessment/Plan:    Problem List Items Addressed This Visit    None  Visit Diagnoses     COVID-19    -  Primary    Relevant Orders    Poct Covid 19 Rapid Antigen Test (Completed)         Disposition:     Rapid antigen testing was performed and the result is POSITIVE for COVID-19. Discussed symptom directed medication options with patient. Discussed vitamin D, vitamin C, and/or zinc supplementation with patient. I have spent a total time of 14 minutes on the day of the encounter for this patient including Will have her continue with symptomatic treatment, f/u as needed       Encounter provider: JACINTO Burnett     Provider located at: [address] [place] [address] 1  [address] [place] [id number]     Recent Visits  No visits were found meeting these conditions. Showing recent visits within past 7 days and meeting all other requirements  Today's Visits  Date Type Provider Dept   11/30/23 Office Visit Fifi Gibbons, [facility] today's visits and meeting all other requirements  Future Appointments  No visits were found meeting these conditions. Showing future appointments within next 150 days and meeting all other requirements     Subjective:   Jhonny Orantes is a 79 y.o. female who is concerned about COVID-19. Patient's symptoms include fever (low grade), nasal congestion, rhinorrhea, sore throat, cough, diarrhea and headache. Patient denies chills, fatigue, malaise, anosmia, loss of taste, shortness of breath, chest tightness, abdominal pain, nausea, vomiting and myalgias.      - Date of symptom onset: 11/26/2023      COVID-19 vaccination status: Fully vaccinated (primary series)    Exposure:   Contact with a person who is under investigation (PUI) for or who is positive for COVID-19 within the last 14 days?: No    Hospitalized recently for fever and/or lower respiratory symptoms?: No      Currently a healthcare worker that is involved in direct patient care?: No      Works in a special setting where the risk of COVID-19 transmission may be high? (this may include long-term care, correctional and nursing home facilities; homeless shelters; assisted-living facilities and group homes.): No      Resident in a special setting where the risk of COVID-19 transmission may be high? (this may include long-term care, correctional and nursing home facilities; homeless shelters; assisted-living facilities and group homes.): No      Has had Covid in the past     Lab Results   Component Value Date    SARSCOV2 Detected (A) 01/02/2022    SARSCOVAG Positive (A) 11/30/2023       Review of Systems   Constitutional:  Positive for fever (low grade). Negative for chills and fatigue. HENT:  Positive for congestion, rhinorrhea and sore throat. Respiratory:  Positive for cough. Negative for chest tightness and shortness of breath. Gastrointestinal:  Positive for diarrhea. Negative for abdominal pain, nausea and vomiting. Musculoskeletal:  Negative for myalgias. Neurological:  Positive for headaches.      Current Outpatient Medications on File Prior to Visit   Medication Sig   • calcium carbonate (OS-BEAU) 1250 (500 Ca) MG chewable tablet Chew 1 tablet daily   • cholecalciferol (VITAMIN D3) 400 units tablet Take 400 Units by mouth daily   • dicyclomine (BENTYL) 10 mg capsule Take 1 capsule (10 mg total) by mouth as needed (take once daily as needed)   • famotidine (PEPCID) 40 MG tablet Take 1 tablet (40 mg total) by mouth in the morning   • fluticasone (FLONASE) 50 mcg/act nasal spray 2 sprays into each nostril daily   • lisinopril-hydrochlorothiazide (PRINZIDE,ZESTORETIC) 20-12.5 MG per tablet Take 1 tablet by mouth daily   • multivitamin (THERAGRAN) TABS Take 1 tablet by mouth daily   • Omega-3 Fatty Acids (fish oil) 1,000 mg Take 1 capsule (1,000 mg total) by mouth 2 (two) times a day   • vitamin B-12 (CYANOCOBALAMIN) 50 MCG tablet Take 50 mcg by mouth daily       Objective:    /76   Pulse (!) 116   Temp 99.5 °F (37.5 °C)   Resp 18   Ht 5' 3" (1.6 m)   Wt 110 kg (243 lb)   BMI 43.05 kg/m²        Physical Exam  Vitals and nursing note reviewed. Constitutional:       General: She is not in acute distress. Appearance: Normal appearance. She is well-developed. HENT:      Head: Normocephalic and atraumatic. Eyes:      Conjunctiva/sclera: Conjunctivae normal.   Neck:      Vascular: No carotid bruit. Cardiovascular:      Rate and Rhythm: Normal rate and regular rhythm. Pulses: Normal pulses. Heart sounds: Normal heart sounds. No murmur heard. Pulmonary:      Effort: Pulmonary effort is normal. No respiratory distress. Breath sounds: Normal breath sounds. Abdominal:      General: Bowel sounds are normal.      Palpations: Abdomen is soft. Tenderness: There is no abdominal tenderness. Musculoskeletal:         General: No swelling. Cervical back: Neck supple. Skin:     General: Skin is warm and dry. Capillary Refill: Capillary refill takes less than 2 seconds. Neurological:      Mental Status: She is alert.    Psychiatric:         Mood and Affect: Mood normal.         Behavior: Behavior normal.       JACINTO Melissa

## 2023-12-05 ENCOUNTER — TELEPHONE (OUTPATIENT)
Age: 68
End: 2023-12-05

## 2023-12-05 NOTE — TELEPHONE ENCOUNTER
Would not recommend a Covid booster until next year since she just had Covid. Her pnuemonia vaccine is already up to date. She could consider the RSV vaccine for this season. She can have that as long as she is recovered from her Covid illness.  Erin Shaver

## 2023-12-05 NOTE — TELEPHONE ENCOUNTER
Patient calling to ask how soon she can get a covid booster after testing positive on 11/30/23. She is also asking for recommendations on pneumonia vaccine and if [place] [facility] thinks she would benefit from this vaccine or any other vaccine at this time.  Please f/u with patient

## 2023-12-13 ENCOUNTER — OFFICE VISIT (OUTPATIENT)
Dept: GASTROENTEROLOGY | Facility: CLINIC | Age: 68
End: 2023-12-13
Payer: MEDICARE

## 2023-12-13 VITALS
SYSTOLIC BLOOD PRESSURE: 132 MMHG | DIASTOLIC BLOOD PRESSURE: 76 MMHG | TEMPERATURE: 98.2 F | WEIGHT: 242.8 LBS | HEART RATE: 98 BPM | HEIGHT: 63 IN | BODY MASS INDEX: 43.02 KG/M2

## 2023-12-13 DIAGNOSIS — Z98.84 S/P BARIATRIC SURGERY: ICD-10-CM

## 2023-12-13 DIAGNOSIS — K76.0 NAFLD (NONALCOHOLIC FATTY LIVER DISEASE): ICD-10-CM

## 2023-12-13 DIAGNOSIS — Z12.11 SCREENING FOR COLON CANCER: ICD-10-CM

## 2023-12-13 DIAGNOSIS — R10.11 RUQ ABDOMINAL PAIN: Primary | ICD-10-CM

## 2023-12-13 DIAGNOSIS — K21.9 GASTROESOPHAGEAL REFLUX DISEASE, UNSPECIFIED WHETHER ESOPHAGITIS PRESENT: ICD-10-CM

## 2023-12-13 DIAGNOSIS — R14.0 ABDOMINAL BLOATING: ICD-10-CM

## 2023-12-13 PROCEDURE — 99214 OFFICE O/P EST MOD 30 MIN: CPT | Performed by: FAMILY MEDICINE

## 2023-12-13 RX ORDER — OMEPRAZOLE 40 MG/1
40 CAPSULE, DELAYED RELEASE ORAL DAILY
Qty: 30 CAPSULE | Refills: 5 | Status: SHIPPED | OUTPATIENT
Start: 2023-12-13

## 2023-12-13 RX ORDER — SACCHAROMYCES BOULARDII 250 MG
250 CAPSULE ORAL 2 TIMES DAILY
COMMUNITY

## 2023-12-13 NOTE — PROGRESS NOTES
[facility] - Outpatient Follow-up Note  Lou Wahl 79 y.o. female MRN: [id number]  Encounter: [id number]          ASSESSMENT AND PLAN:      1. RUQ abdominal pain  2. Gastroesophageal reflux disease, unspecified whether esophagitis present  3. Abdominal bloating  4. S/P bariatric surgery  Patient with chronic RUQ abdominal pain "for many years" with worsening postprandial RUQ abdominal pain for the last few months. She has had an extensive evaluation including an UGI series (3/2021), EGD (4/2021), CT A/P (4/2021) and colonoscopy (5/2022) which has been grossly unrevealing in regards to an etiology for her abdominal pain. She is currently taking famotidine 40 mg once daily and Bentyl 10 mg qHS with limited relief. The etiology of her abdominal pain is currently unclear although differential includes a peptic process, functional abdominal pain, small intestinal bacterial overgrowth and/or adhesive disease. Given that she has had both endoscopic evaluation and cross-sectional imaging prior, would recommend optimization of her medication regimen prior to pursuing a repeat EGD vs additional abdominal imaging. Recommend she stop her famotidine and begin taking omeprazole 40 mg once daily. Advised her to break open the omeprazole capsules and take this with 1 tablespoon of applesauce approximately 30 minutes before her first meal of the day to help promote absorption s/p RYGB. Also recommended taking Bentyl up to 4 times daily as needed for abdominal pain. Lastly, she will also complete testing for SIBO to determine if she would benefit from an Rx of Xifaxan. Will plan for a short interval follow-up (4 weeks). If she has not had improvement in her symptoms with the Forementioned adjustments to her medication regimen, would then consider repeating an EGD for further evaluation of her symptoms. - omeprazole (PriLOSEC) 40 MG capsule;  Take 1 capsule (40 mg total) by mouth daily  Dispense: 30 capsule; Refill: 5  - Small intestinal bacterial overgrowth  - Ambulatory Referral to Weight Management; Future    5. NAFLD (nonalcoholic fatty liver disease)  Patient was noted to have hepatic steatosis on imaging dating back to 2018 but with normal liver chemistries, Sivolap function and platelets. Suspected to have ANAHY/SANCHEZ in the setting of multiple metabolic risk factors and history of EtOH use. US elastography with minimal fibrosis. She was initially recommended for GLP-1 a therapy but alternatively prescribed metformin given her diagnosis of prediabetes. She is no longer taking this medication and prefers to pursue weight loss through dietary/lifestyle modifications. She has also been referred back to weight management to help her pursue conservative methods of weight loss s/p bariatric surgery. 6. Screening for colon cancer  Patient is up-to-date with CRC screening. Colonoscopy (5/2022) notable for multiple polyps, left-sided diverticulosis and mild internal hemorrhoids. Recommended repeat x3 years. Follow-up in 4 weeks (virtual visit okay). ______________________________________________________________________    SUBJECTIVE: Patient is a 79 y.o. female with PMH significant for HTN, spinal stenosis, pre-diabetes, class III obesity, history of multiple abdominal surgeries, including prior RYGB with revision and total gastrectomy with esophagojejunostomy with an anastomotic stricture (2005) and CCY who presents today for follow-up regarding RUQ abdominal pain. Patient is familiar to [facility] last seen by Dr. Donato Esparza for SANCHEZ/ANAHY with minimal fibrosis. She has also been followed by several of her GI providers for multitude of complaints. Today, she is concerned regarding worsening right upper quadrant abdominal pain. States she has had this pain for years but it has worsened over the last few months.  States that the pain is constantly there, described as a dull ache, but she will have sharp abdominal pains particularly after eating. States these last a few hours and will eventually subside on their own. Also reports abdominal bloating. She does have a history of reflux but states that this is well-controlled with the use of famotidine 40 mg once daily. Denies additional alarm features such as nausea/vomiting, difficulty swallowing, early satiety, other abdominal pain, overt GI bleeding or unintentional weight loss. She has had a prior workup as follows  - Upper GI series (3/2021) notable for prior gastrectomy with esophagojejunalostomy, patent esophageal jejunal anastomosis with adjacent small diverticulum, multiple episodes of reflux along the level of the proximal third of the esophagus, mild dysmotility in the distal third of the esophagus.  - EGD (4/2021) notable for a healthy-appearing esophageal-jejunal anastomosis, small diverticulum/pouch present above the anastomosis and normal small bowel evaluation. - CT A/P (4/2021) notable for postsurgical changes related to RYGB and unchanged fluid in the excluded stomach remnant but otherwise without acute findings. - Colonoscopy (5/2022) notable for multiple polyps, left-sided diverticulosis and mild internal hemorrhoids. Recommended repeat x 3 years. Currently taking famotidine 40 mg once daily and Bentyl 10 mg qHS. She has also had rifaximin for suspected SIBO although does not recall if thsi helped her symptoms. REVIEW OF SYSTEMS IS OTHERWISE NEGATIVE.       Historical Information   Past Medical History:   Diagnosis Date   • Arthritis    • Asthma    • Back pain    • Chronic pain disorder     hips into the legs   • Colon polyp    • DVT (deep venous thrombosis) ([address]) 2006   • Gastric bypass status for obesity     yarelis en y   • GERD (gastroesophageal reflux disease)    • Hiatal hernia    • History of transfusion 2006    after gastric bypass surgery, no reactions   • Hypertension    • Irritable bowel syndrome    • Kidney stone    • Muscle weakness     bilateral legs   • Numbness and tingling     bilateral feet   • Pneumonia    • Sacroiliitis ([address]) 5/15/2018   • Spinal stenosis    • Tinnitus     occassionally   • Vertigo    • Wears glasses      Past Surgical History:   Procedure Laterality Date   • APPENDECTOMY     • BACK SURGERY  2018    L4-5 fusion   • BARIATRIC SURGERY      yarelis en y- pt states the procedure was done incorrectly which lead to additional surgeries from -   • [address] Right    •  SECTION      x1   • CHOLECYSTECTOMY     • COLON SURGERY      partial removal of the small bowel-necrosis-between -   • COLONOSCOPY     • COLONOSCOPY W/ BIOPSIES AND POLYPECTOMY     • EPIDURAL BLOCK INJECTION N/A 2021    Procedure: C6 C7 cervical epidural steroid injection ( 11307); Surgeon: Stephanie Ann MD;  Location: [facility] MAIN OR;  Service: Pain Management    • EPIDURAL BLOCK INJECTION N/A 2021    Procedure: C6 C7 cervical epidural steroid injection (81398); Surgeon: Stephanie Ann MD;  Location: [facility] MAIN OR;  Service: Pain Management    • FLEXIBLE BRONCHOSCOPY W/ UPPER ENDOSCOPY     • GASTRECTOMY      after gastric bypass-(failed surgery per pt) 2006   • HERNIA REPAIR      incisional hernias-diaphragm area   • IL ARTHROCENTESIS ASPIR&/INJ SMALL JT/BURSA W/O US N/A 2018    Procedure: Coccygeal Injection & Ganglion Impar Block;  Surgeon: Stephanie Ann MD;  Location: [facility] MAIN OR;  Service: Pain Management    • IL ARTHRODESIS POSTERIOR INTERBODY 1 NTRSPC LUMBAR N/A 2018    Procedure: L4-5 DECOMPRESSION INTERBODY INSTRUMENTED FUSION;  Surgeon: Kasi Novoa MD;  Location: [facility] Main OR;  Service: Orthopedics   • IL COLONOSCOPY FLX DX W/COLLJ SPEC WHEN PFRMD N/A 2018    Procedure: COLONOSCOPY;  Surgeon: Tez Pearlta MD;  Location: [address] [place] [address] GI LAB;   Service: Gastroenterology   • IL ESOPHAGOGASTRODUODENOSCOPY TRANSORAL DIAGNOSTIC N/A 2/19/2018    Procedure: ESOPHAGOGASTRODUODENOSCOPY (EGD); Surgeon: Ema Hunt MD;  Location: [facility] GI LAB; Service: Gastroenterology   • MD INJECT SI JOINT ARTHRGRPHY&/ANES/STEROID W/EMMANUEL Right 5/25/2018    Procedure: Rt Sacroiliac Joint Injection;  Surgeon: Nicolasa Kerns MD;  Location: [facility] MAIN OR;  Service: Pain Management    • MD INJECT SI JOINT ARTHRGRPHY&/ANES/STEROID W/EMMANUEL Right 5/1/2019    Procedure: Sacroiliac Joint Injection (14307); Surgeon: Nicolasa Kerns MD;  Location: [facility] MAIN OR;  Service: Pain Management    • TONSILECTOMY, ADENOIDECTOMY, BILATERAL MYRINGOTOMY AND TUBES     • TONSILLECTOMY       Social History   Social History     Substance and Sexual Activity   Alcohol Use Yes   • Alcohol/week: 7.0 standard drinks of alcohol   • Types: 5 Cans of beer, 2 Shots of liquor per week    Comment: socially     Social History     Substance and Sexual Activity   Drug Use Not Currently   • Types: Marijuana    Comment: has medical marijuana card.  lozenges     Social History     Tobacco Use   Smoking Status Never   • Passive exposure: Never   Smokeless Tobacco Never     Family History   Problem Relation Age of Onset   • Diabetes Mother    • Aortic aneurysm Father    • Cancer Father         prostate   • Heart disease Sister         open heart   • Cancer Sister         breast   • Breast cancer Sister 47   • Diabetes Brother    • Cancer Brother         spinal cancer   • Other Daughter         concussion syndrome from MVA   • Asperger's syndrome Son         high functioning   • Diabetes Maternal Grandfather    • Stroke Brother    • Hypertension Brother    • No Known Problems Maternal Aunt    • No Known Problems Maternal Aunt    • No Known Problems Paternal Aunt    • No Known Problems Paternal Aunt    • Colon cancer Maternal Uncle 60       Meds/Allergies       Current Outpatient Medications:   •  calcium carbonate (OS-BEAU) 1250 (500 Ca) MG chewable tablet  •  cholecalciferol (VITAMIN D3) 400 units tablet  • dicyclomine (BENTYL) 10 mg capsule  •  famotidine (PEPCID) 40 MG tablet  •  fluticasone (FLONASE) 50 mcg/act nasal spray  •  lisinopril-hydrochlorothiazide (PRINZIDE,ZESTORETIC) 20-12.5 MG per tablet  •  multivitamin (THERAGRAN) TABS  •  Omega-3 Fatty Acids (fish oil) 1,000 mg  •  omeprazole (PriLOSEC) 40 MG capsule  •  saccharomyces boulardii (FLORASTOR) 250 mg capsule  •  vitamin B-12 (CYANOCOBALAMIN) 50 MCG tablet    No Known Allergies        Objective     Blood pressure 132/76, pulse 98, temperature 98.2 °F (36.8 °C), temperature source Tympanic, height 5' 3" (1.6 m), weight 110 kg (242 lb 12.8 oz). Body mass index is 43.01 kg/m². PHYSICAL EXAM:      General Appearance:   Alert, cooperative, no distress   HEENT:   Normocephalic, atraumatic, anicteric. Neck:  Supple, symmetrical, trachea midline   Lungs:   Clear to auscultation bilaterally; no rales, rhonchi or wheezing; respirations unlabored    Heart[de-identified]   Regular rate and rhythm; no murmur, rub, or gallop. Abdomen:   Soft, non-tender, non-distended; normal bowel sounds; no masses, no organomegaly    Genitalia:   Deferred    Rectal:   Deferred    Extremities:  No cyanosis, clubbing or edema    Pulses:  2+ and symmetric    Skin:  No jaundice, rashes, or lesions    Lymph nodes:  No palpable cervical lymphadenopathy        Lab Results:   No visits with results within 1 Day(s) from this visit. Latest known visit with results is:   Office Visit on 11/30/2023   Component Date Value   • POCT SARS-CoV-2 Ag 11/30/2023 Positive (A)    • VALID CONTROL 11/30/2023 Valid          Radiology Results:   No results found. Eron Grossman PA-C     **Please note: Dictation voice to text software may have been used in the creation of this record. Occasional wrong word or “sound alike” substitutions may have occurred due to the inherent limitations of voice recognition software.  Read the chart carefully and recognize, using context, where substitutions have occurred. **

## 2024-01-22 NOTE — PROGRESS NOTES
PT Re-Evaluation     Today's date: 2021  Patient name: Ruma Reyes  :   MRN: [id number]  Referring provider: Chapito Uriarte MD  Dx:   Encounter Diagnosis     ICD-10-CM    1  History of fusion of lumbar spine  Z98 1    2  Sacroiliac pain  M53 3    3  Lumbar spine pain  M54 5        Start Time: 1100  Stop Time: 1145  Total time in clinic (min): 45 minutes    Assessment  Assessment details: Ruma Reyes is a 63y o  year old female reports to physical therapy with symptoms consistent with referring diagnosis of: History of fusion of lumbar spine  (primary encounter diagnosis), Sacroiliac pain, Lumbar spine pain  Patient has complex history of lumbar spine pain, resulting in a lumbar spine fusion in 2018  Patient currently ambulating with trendelenburg gait, with lack of heel off bilaterally  Patient demonstrates difficulties with performing exercises in standing, supine, and seated positions, as her pain is unchanged  Response to modalities, passive treatment, and mechanical approach is limited at this time  At this time, patient would benefit from aquatic physical therapy treatment due to lack of progress in formal PT treatment sessions  Patient demonstrates limitations in lumbar spine ROM, strength, and functional mobility  Patient is limited in the following functional activities: riding her horse and motorcycle, ascending/descending stairs, walking at a brisk pace, and 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  Patient requires skilled physical therapy to restore prior level of function, address functional limitations, and progress towards independence with home exercise program  Patient was educated on HEP as noted on flow sheet, nature of lumbar spine pain, and importance of performing heat on lumbar spine at the end of the day to maximize benefits of PT  Patient verbalized and demonstrated understanding of HEP and plan of care   Primary focus of PT is to maximize core and hip musculature strength for independence in community  Symptom irritability: moderateBarriers to therapy: Due to chronic nature of lumbar spine pain, and diffuse nature of lumbar spine pain, patient's tolerance and progress towards goals may be limited at this time  Goals  STGs to be achieved in 4 weeks  -STG 1: Patient will be independent with HEP  -MET  -STG 2: Patient will have 0/10 lumbar spine pain at rest  -NOT MET   -STG 3: Patient will increase lumbar spine ROM to within normal limits  - NOT MET  -STG 4: Patient will report 40% functional improvement  -NOT MET  -STG 5: Patient will demonstrate 1/2 grade MMT improvement in R LE  -NOT MET   -STG 6: Patient will be able to stand for greater than 30 minutes with no pain  -NOT MET    LTGs to be achieved in 8 weeks  -LTG 1: Patient will demonstrate independence with maintenance program  -NOT MET  -LTG 2: Patient will perform all functional activities with less than 2/10 lumbar spine pain  -NOT MET   -LTG 3: Patient will improve FOTO score by 10 points  -NOT MET  -LTG 4: Patient will report 80% functional improvement  -NOT MET  -LTG 5: Patient will be able to stand for greater than 60 minutes with no pain  -NOT MET  -LTG 6: Patient will demonstrate 1 grade MMT improvement in R LE  -NOT MET  -LTG 7: Patient will be able to mount and dismount horse and motorcycle with no difficulties   -NOT MET    Plan  Patient would benefit from: PT eval and skilled physical therapy  Planned modality interventions: TENS, thermotherapy: hydrocollator packs, cryotherapy, electrical stimulation/Russian stimulation, traction and ultrasound  Planned therapy interventions: manual therapy, joint mobilization, massage, Garcia taping, ADL retraining, activity modification, ADL training, abdominal trunk stabilization, balance, neuromuscular re-education, patient education, postural training, strengthening, stretching, therapeutic activities, therapeutic exercise, therapeutic training, flexibility, functional ROM exercises, gait training, graded activity, graded exercise, graded motor and home exercise program  Frequency: 2x week  Duration in weeks: 8        Subjective Evaluation    History of Present Illness  Date of surgery: 2018  Mechanism of injury: Patient reports history of lumbar spine fusion on 18  She reports that her lumbar spine pain was at [facility] when she returned to work as a   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  She rides horses and her [employer], but notes difficulties mounting her horse and mounting the motorcycle  She reports lumbar spine pain that radiates across her low back 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  Functionally, she notes difficulties with ascending/descending stairs without AD  She has inability to perform a heel raise  She has difficulties driving due to lumbar spine pain  She reports numbness and tingling down the posterior aspect of B legs to her heels  She reports per tingling is when she is standing  When she sits down, her symptoms are relieved  She was denied recent MRI  Patient recently received medical marijuana card to help with pain levels as she would not like to take pain medication at this time  2021: Patient reports that she is unable to walk greater than a block without having to take a break  She is not ambulating with a SPC due to her radicular symptoms and weakness  She is also unable to weed her garden due to pain  Today, she reports 7/10 lumbar spine pain  Functionally, she reports improvements with rising from a seated position and performing her exercise routine     Pain  Current pain ratin  At best pain ratin  At worst pain ratin  Quality: needle-like and pressure  Relieving factors: rest and ice  Aggravating factors: standing and stair climbing  Progression: no change    Social Support  Steps to enter house: yes  Stairs in house: yes Lives in: multiple-level home  Lives with: significant other    Employment status: working  Treatments  Previous treatment: physical therapy  Current treatment: medication  Patient Goals  Patient goals for therapy: increased strength, return to sport/leisure activities and decreased pain  Patient goal: Walk at a normal pace, ride horse and motorcycle with no difficulties  Objective     Concurrent Complaints  Positive for disturbed sleep  Negative for night pain, bladder dysfunction and bowel dysfunction    Postural Observations  Seated posture: good  Standing posture: fair  Correction of posture: makes symptoms better        Palpation   Left   Tenderness of the erector spinae and lumbar paraspinals  Right   Tenderness of the erector spinae and lumbar paraspinals  Tenderness     Lumbar Spine  Tenderness in the spinous process (T12-L5)  Left Hip   Tenderness in the PSIS  Right Hip   Tenderness in the PSIS  Neurological Testing     Sensation     Lumbar   Left   Intact: light touch    Right   Intact: light touch    Reflexes   Left   Patellar (L4): normal (2+)    Right   Patellar (L4): normal (2+)    Strength/Myotome Testing     Lumbar   Left   Normal strength    Right Hip   Planes of Motion   Flexion: 4-  Abduction: 4-    Right Knee   Flexion: 3+    Right Ankle/Foot   Dorsiflexion: 3+  Plantar flexion: 3+    Additional Strength Details  Patient unable to heel walk or toe walk due to functional weakness  Unable to perform heel raises due to pain  Muscle Activation     Additional Muscle Activation Details  Poor activation of TrA    Tests     Lumbar   Positive SIJ compression and sacroiliac distraction        Left   Positive crossed SLR, passive SLR, quadrant and slump test      Right   Positive crossed SLR, passive SLR, quadrant and slump test      Left Pelvic Girdle/Sacrum   Positive: active SLR test      Right Pelvic Girdle/Sacrum   Positive: active SLR test      Left Hip   Positive KAROLINA and FADIR  Right Hip   Positive KAROLINA and FADIR  Ambulation     Ambulation: Stairs   Ascend stairs: independent  Pattern: non-reciprocal  Railings: two rails  Descend stairs: independent  Pattern: non-reciprocal  Railings: two rails    Observational Gait   Gait: antalgic   Decreased walking speed  Quality of Movement During Gait     Pelvis    Pelvis (Left): Positive Trendelenburg  Pelvis (Right): Positive Trendelenburg  Additional Quality of Movement During Gait Details  Lacks proper toe off gait pattern bilaterally       General Comments:      Lumbar Comments  Lumbar AROM    Flexion: 100%, radicular to toes, (+) He's sign    Extension: 100%, decreased    R SB: 50%, sharp pain    L SB: 50%, sharp pain    R rotation: 25%, sharp pain on R   L rotation: 25%, sharp pain on R    Repeated motion testing    Repeated flexion in standing: increased, worse    Repeated flexion in sitting: decreased, better    Repeated extension in standing: decreased, no better    Prone lying: increased, worse    Prone on elbows: increased, worse    Repeated R side gliding: decreased, no better    Repeated L side gliding: NT      Flowsheet Rows      Most Recent Value   PT/OT G-Codes   Current Score  38   Projected Score  50             Precautions: Standard, Hx of lumbar spine fusion 2018, failed gastric bypass surgery, GERD, Elevated surface please        Manuals 4/23 4/30 5/6 5/14 5/19   B LAD   5' with mob belt 5' with mob belt                            Neuro Re-Ed        TrA progression Rev 3sx10 with cuff 3sx10 with cuff 3sx10 with cuff 3sx10 with cuff   Glute sets  Rev 10 3sx20 3sx20 3sx20   Hip add squeeze   3sx20 3sx20 3sx20 3sx20   Supine clamshells  20 GTB 20 GTB 20 GTB 20 GTB   Supine marches    10 B 10B                   Ther Ex        NuStep warmup        LTR Rev 20 20 20 20   LAQ  3sx10 B  3sx10 B with DF    SLR        SAQ        Seated lumbar flexion   3sx10 3 way  3sx10 3 way  3sx10 3 way  3sx10 3 way SKTC   3x10s B  3x10s B     Piriformis stretch    3x10s B 3x10s B                                                    Modalities        Heat pre  10' 10' post home

## 2024-01-23 ENCOUNTER — TELEPHONE (OUTPATIENT)
Age: 69
End: 2024-01-23

## 2024-01-23 NOTE — TELEPHONE ENCOUNTER
Received call from patient post referral to Weight Management and WM is requesting records of her by-pass surgery in 2005 (Michael-en Y) presented to office when transferring care to [facility]. RN could not locate records scanned into Media tab.     Patient had records from:   By pass surgery in 2005 bariatric clinic location in [facility] with improper procedure;  Follow up by Dr. Lisa Killian of [facility] ([facility] in [facility],   And later surgery with Dr. Leonard of [facility];   GI-Dr. Regino Oro out of [facility].  RN did locate Flouroscopy upper GI by  provider on 2/19/2018 with some preliminary reference to procedure and current status.  Patient has Consult OV scheduled with WM on scheduled on 2/15/24 at 2:30 PM and requires any records located for this OV. Please follow up with patient.

## 2024-02-27 ENCOUNTER — TELEPHONE (OUTPATIENT)
Dept: BARIATRICS | Facility: CLINIC | Age: 69
End: 2024-02-27

## 2024-02-27 NOTE — TELEPHONE ENCOUNTER
Pt scheduled via [facility] surgical consult with LUCAS Galloway on Thursday 2/29/24 for 30min. Appt was scheduled incorrectly-left vm for pt to call office back and reschedule accordingly. Verify with patient if they have had bariatric surgery in the past-if so they need to see [facility] as a transfer pt for an hour if surgery has been done elsewhere. If pt has not had bariatric surgery elsewhere and is interested in Starting Surgery please reschedule pt to see Dr. Sahil Connolly at [facility] for Friday 3/1/24 at 10am (If still available).

## 2024-02-28 ENCOUNTER — OFFICE VISIT (OUTPATIENT)
Dept: PODIATRY | Facility: CLINIC | Age: 69
End: 2024-02-28
Payer: MEDICARE

## 2024-02-28 VITALS
WEIGHT: 248.2 LBS | HEIGHT: 63 IN | HEART RATE: 109 BPM | SYSTOLIC BLOOD PRESSURE: 127 MMHG | BODY MASS INDEX: 43.98 KG/M2 | DIASTOLIC BLOOD PRESSURE: 85 MMHG

## 2024-02-28 DIAGNOSIS — L60.0 INGROWN NAIL OF GREAT TOE OF RIGHT FOOT: Primary | ICD-10-CM

## 2024-02-28 PROCEDURE — 99202 OFFICE O/P NEW SF 15 MIN: CPT | Performed by: PODIATRIST

## 2024-02-28 PROCEDURE — 11730 AVULSION NAIL PLATE SIMPLE 1: CPT | Performed by: PODIATRIST

## 2024-02-28 NOTE — PROGRESS NOTES
Name: Fariba Garcia      : 1955      MRN: [id number]  Encounter Provider: Sidney Real DPM  Encounter Date: 2024   Encounter department: [facility] [place]    Assessment & Plan     1. Ingrown nail of great toe of right foot  -     Post Op Shoe  -     Nail removal        Ingrown nail right great toe lateral border.    Partial nail avulsion was completed today right great toe lateral border.    Nail removal    Date/Time: 2024 10:45 AM    Performed by: Sidney Real DPM  Authorized by: Sidney Real DPM    Patient location:  Clinic  Indications / Diagnosis:  Ingrown nail  Universal Protocol:  Consent: Verbal consent obtained.  Risks and benefits: risks, benefits and alternatives were discussed  Consent given by: patient  Patient understanding: patient states understanding of the procedure being performed    Location:     Foot:  R big toe  Pre-procedure details:     Skin preparation:  ChloraPrep    Preparation: Patient was prepped and draped in the usual sterile fashion    Anesthesia (see MAR for exact dosages):     Anesthesia method:  Nerve block    Block needle gauge:  25 G    Block anesthetic:  Lidocaine 1% w/o epi    Block technique:  Digital Block    Block outcome:  Anesthesia achieved  Nail Removal:     Nail removed:  Partial    Nail bed sutured: no    Post-procedure details:     Dressing:  4x4 sterile gauze, antibiotic ointment, gauze roll and petrolatum-impregnated gauze    Patient tolerance of procedure:  Tolerated well, no immediate complications  Comments:      Discussion with patient was completed today regarding diagnosis and potential etiologies as well as treatment options for this ingrown nail diagnosis.  Discussed how to avoid recurrence and possible treatment options if recurrence does occur.    Antibiotic ointment applied to border with bandage dressing  Pt instructed to keep dressing intact for 24 hours.  After this time use triple  antibiotic ointment to the area and a dry dressing changed daily  Return to clinic in about 3 weeks for reevaluation.  If ingrown nail recurs can consider use of phenol at nail matrix.  If notice any redness, swelling, drainage, or excessive pain or signs of infection to notify the office sooner.  Procedure completed without incident.  Do not soak foot.    Procedure in detail: Once the toe was anesthetized, the area was scrubbed and prepped with sterile technique.  A hemostat was used to lift up the involved border of the great toe.  Care was taken to protect the underlying nail bed.  An English anvil was used to cut back corner to the base of the nail.  A hemostat was then used to remove the nail that was ingrown.  This was then checked that the entire ingrown portion was removed.  This was removed from the operative area.  Hemostasis was achieved and dressings were applied.         Subjective      Patient presents for evaluation of right great toe ingrown nail noted to the lateral border.  Patient has previously had pedicures completed but she has pain and discomfort noted to this area.  She denies any systemic signs or symptoms of infection.  She has tried to cut this out herself however she continues to have pain and discomfort specifically to this border.  She has noticed some additional ingrowing changes to the left great toe as well as the second toe of the left foot as well.          Review of Systems   Constitutional:  Negative for chills and fever.   Respiratory:  Negative for chest tightness, shortness of breath and wheezing.    Cardiovascular:  Negative for chest pain and leg swelling.   Gastrointestinal:  Negative for diarrhea, nausea and vomiting.   Musculoskeletal:  Negative for joint swelling.   Skin:  Negative for color change and wound.   Neurological:  Negative for dizziness, weakness and numbness.   Hematological:  Does not bruise/bleed easily.   Psychiatric/Behavioral:  Negative for agitation.   "      Current Outpatient Medications on File Prior to Visit   Medication Sig   • calcium carbonate (OS-BEAU) 1250 (500 Ca) MG chewable tablet Chew 1 tablet daily   • cholecalciferol (VITAMIN D3) 400 units tablet Take 400 Units by mouth daily   • dicyclomine (BENTYL) 10 mg capsule Take 1 capsule (10 mg total) by mouth as needed (take once daily as needed)   • famotidine (PEPCID) 40 MG tablet Take 1 tablet (40 mg total) by mouth in the morning   • fluticasone (FLONASE) 50 mcg/act nasal spray 2 sprays into each nostril daily   • lisinopril-hydrochlorothiazide (PRINZIDE,ZESTORETIC) 20-12.5 MG per tablet Take 1 tablet by mouth daily   • multivitamin (THERAGRAN) TABS Take 1 tablet by mouth daily   • Omega-3 Fatty Acids (fish oil) 1,000 mg Take 1 capsule (1,000 mg total) by mouth 2 (two) times a day   • omeprazole (PriLOSEC) 40 MG capsule Take 1 capsule (40 mg total) by mouth daily   • saccharomyces boulardii (FLORASTOR) 250 mg capsule Take 250 mg by mouth 2 (two) times a day   • vitamin B-12 (CYANOCOBALAMIN) 50 MCG tablet Take 50 mcg by mouth daily           Objective     /85   Pulse (!) 109   Ht 5' 3\" (1.6 m)   Wt 113 kg (248 lb 3.2 oz)   BMI 43.97 kg/m²     Physical Exam  Constitutional:       Appearance: Normal appearance.   Musculoskeletal:        Feet:    Feet:      Comments: Vascular: Intact pedal pulses bilateral DP and PT.  Neurological: Gross protective sensation intact bilateral  Musculoskeletal: Muscle strength bilateral intact with dorsiflexion, inversion, eversion and plantarflexion.  Dermatological: No open lesions or ulcerations noted bilateral.  Ingrowing nail noted to the lateral border of the right great toe tenderness on palpation noted to the area no signs of infection noted no ascending erythema no active drainage noted to the area.    Neurological:      Mental Status: She is alert.               "

## 2024-04-01 PROBLEM — Z48.815 ENCOUNTER FOR SURGICAL AFTERCARE FOLLOWING SURGERY OF DIGESTIVE SYSTEM: Status: ACTIVE | Noted: 2024-04-01

## 2024-04-01 PROBLEM — K91.2 POSTSURGICAL MALABSORPTION: Status: ACTIVE | Noted: 2024-04-01

## 2024-04-08 ENCOUNTER — OFFICE VISIT (OUTPATIENT)
Age: 69
End: 2024-04-08
Payer: MEDICARE

## 2024-04-08 VITALS
DIASTOLIC BLOOD PRESSURE: 79 MMHG | WEIGHT: 248 LBS | HEIGHT: 63 IN | HEART RATE: 112 BPM | BODY MASS INDEX: 43.94 KG/M2 | SYSTOLIC BLOOD PRESSURE: 121 MMHG

## 2024-04-08 DIAGNOSIS — L60.0 INGROWN NAIL OF GREAT TOE OF LEFT FOOT: Primary | ICD-10-CM

## 2024-04-08 PROCEDURE — 11730 AVULSION NAIL PLATE SIMPLE 1: CPT | Performed by: STUDENT IN AN ORGANIZED HEALTH CARE EDUCATION/TRAINING PROGRAM

## 2024-04-08 PROCEDURE — 99213 OFFICE O/P EST LOW 20 MIN: CPT | Performed by: STUDENT IN AN ORGANIZED HEALTH CARE EDUCATION/TRAINING PROGRAM

## 2024-04-08 NOTE — PROGRESS NOTES
"This patient was seen on  4/8/24 .    My role is Foot , Ankle, and Wound Specialist    ASSESSMENT     Diagnoses and all orders for this visit:    Ingrown nail of great toe of left foot  -     Nail removal         Problem List Items Addressed This Visit          Musculoskeletal and Integument    Ingrown nail of great toe of right foot - Primary     PLAN  -Nail avulsion performed as below:  -Nail bed was dressed with bacitracin, DSD, 1 inch Coban  -Patient instructed to take bandage off in 24 hours, wash area lightly with warm soap and water, dry area thoroughly, apply bacitracin and Band-Aid daily x10 days.  -Patient instructed to call our office for an earlier appointment should any extreme pain, redness, swelling, purulent drainage present.    Nail removal    Date/Time: 4/8/2024 2:30 PM    Performed by: Kaiden Carbone DPM  Authorized by: Kaiden Carbone DPM    Patient location:  Clinic  Indications / Diagnosis:  Ingrown toenail  Universal Protocol:  Consent: Verbal consent obtained.  Risks and benefits: risks, benefits and alternatives were discussed  Consent given by: patient  Time out: Immediately prior to procedure a \"time out\" was called to verify the correct patient, procedure, equipment, support staff and site/side marked as required.  Patient understanding: patient states understanding of the procedure being performed  Site marked: the operative site was marked  Patient identity confirmed: verbally with patient    Location:     Foot:  L big toe  Pre-procedure details:     Skin preparation:  Alcohol and Betadine  Anesthesia (see MAR for exact dosages):     Anesthesia method:  Local infiltration    Local anesthetic:  Lidocaine 1% w/o epi  Nail Removal:     Nail removed:  Partial    Nail side:  Lateral    Nail bed sutured: no    Ingrown nail:     Wedge excision of skin: no      Nail matrix removed or ablated:  None  Post-procedure details:     Dressing:  4x4 sterile gauze, antibiotic ointment and gauze roll    " "Patient tolerance of procedure:  Tolerated well, no immediate complications     SUBJECTIVE    Chief Complaint:  Left big toe pain     Patient ID: Fariba Garcia     4/8/24: Fariba is a pleasant 68-year-old female who presents today with left big toe pain.  She states that she had her right big toenail avulsed in February, she states that the left is now hurting and has been since the beginning of March.  She states that she tried to trim it a few weeks ago and has felt slightly better since then.  She has noticed redness, swelling, and drainage from the area.  She has been applying bacitracin to it.        The following portions of the patient's history were reviewed and updated as appropriate: allergies, current medications, past family history, past medical history, past social history, past surgical history and problem list.    Review of Systems   Constitutional: Negative.    HENT: Negative.     Respiratory: Negative.     Cardiovascular: Negative.    Gastrointestinal: Negative.    Musculoskeletal: Negative.    Skin:  Positive for color change.   Neurological: Negative.          OBJECTIVE      /79   Pulse (!) 112   Ht 5' 3\" (1.6 m)   Wt 112 kg (248 lb)   BMI 43.93 kg/m²        Physical Exam  Constitutional:       Appearance: Normal appearance.   HENT:      Head: Normocephalic and atraumatic.   Eyes:      General:         Right eye: No discharge.         Left eye: No discharge.   Cardiovascular:      Rate and Rhythm: Normal rate and regular rhythm.      Pulses:           Dorsalis pedis pulses are 2+ on the right side and 2+ on the left side.        Posterior tibial pulses are 2+ on the right side and 2+ on the left side.   Pulmonary:      Effort: Pulmonary effort is normal.      Breath sounds: Normal breath sounds.   Skin:     General: Skin is warm.      Capillary Refill: Capillary refill takes less than 2 seconds.   Neurological:      Sensory: Sensation is intact. No sensory deficit.       "   MSK:  -Pain on palpation of lateral aspect of left 1st digit  -No gross deformities noted   -Active range of motion lesser digits intact  -Manual muscle testing is 5/5 to all muscle compartments of the lower extremity  -Ankle dorsiflexion >10 degrees with knee extended, and knee flexed    Derm:  -lateral aspect of left 1st digit periungual tissue is erythematous, edematous, with mild serous drainage noted.  The lateral portion of the digital nail can be seen under lapping the periungual tissue.  This is consistent with onychocryptosis and surrounding paronychia  -No calluses noted on exam

## 2024-04-10 ENCOUNTER — CONSULT (OUTPATIENT)
Dept: BARIATRICS | Facility: CLINIC | Age: 69
End: 2024-04-10
Payer: MEDICARE

## 2024-04-10 VITALS
DIASTOLIC BLOOD PRESSURE: 80 MMHG | SYSTOLIC BLOOD PRESSURE: 120 MMHG | BODY MASS INDEX: 43.52 KG/M2 | HEIGHT: 63 IN | HEART RATE: 86 BPM | WEIGHT: 245.6 LBS

## 2024-04-10 DIAGNOSIS — Z86.39 HISTORY OF OBESITY: ICD-10-CM

## 2024-04-10 DIAGNOSIS — Z98.84 STATUS POST GASTRIC BYPASS FOR OBESITY: ICD-10-CM

## 2024-04-10 DIAGNOSIS — E66.01 MORBID OBESITY (HCC): Primary | ICD-10-CM

## 2024-04-10 DIAGNOSIS — K91.2 POSTSURGICAL MALABSORPTION: ICD-10-CM

## 2024-04-10 PROCEDURE — 99203 OFFICE O/P NEW LOW 30 MIN: CPT | Performed by: SURGERY

## 2024-04-10 RX ORDER — DICYCLOMINE HYDROCHLORIDE 10 MG/1
10 CAPSULE ORAL
COMMUNITY

## 2024-04-10 RX ORDER — CHOLECALCIFEROL (VITAMIN D3) 25 MCG
2 CAPSULE ORAL
COMMUNITY

## 2024-04-10 NOTE — PROGRESS NOTES
"    BARIATRIC TRANSFER CONSULT - BARIATRIC SURGERY    Fariba Garcia 68 y.o. female MRN: [id number]  Unit/Bed#:  Encounter: [id number]      HPI:  Fariba Garcia is a very pleasant 68 y.o. female who presents with a history of  RYGB 16 ya in [place], [place]. Highest weight 310# with a clarissa of 160# and currently 245#. She originally had 1 year postop of continuously suffering with bilious vomiting. She presented to [facility] and it was determined that her bypass was \"backwards.\" She underwent multiple reconstructive surgeries and eventual incisional hernia repairs with mesh. All surgeries performed open. She is currently looking to get back on track with her diet and her postop care.  Body mass index is 43.23 kg/m².      Visit type: consultation       Review of Systems   Constitutional: Negative.    Respiratory: Negative.     Cardiovascular: Negative.    Gastrointestinal: Negative.    Musculoskeletal: Negative.    Neurological: Negative.    All other systems reviewed and are negative.      Historical Information   Past Medical History:   Diagnosis Date    Arthritis     Asthma     Back pain     Chronic pain disorder     hips into the legs    Colon polyp     DVT (deep venous thrombosis) ([facility]) 2006    Gastric bypass status for obesity     yarelis en y    GERD (gastroesophageal reflux disease)     Hiatal hernia     History of transfusion 2006    after gastric bypass surgery, no reactions    Hypertension     Irritable bowel syndrome     Kidney stone     Muscle weakness     bilateral legs    Numbness and tingling     bilateral feet    Pneumonia     Sacroiliitis ([facility]) 5/15/2018    Spinal stenosis     Tinnitus     occassionally    Vertigo     Wears glasses      Past Surgical History:   Procedure Laterality Date    APPENDECTOMY      BACK SURGERY  06/18/2018    L4-5 fusion    BARIATRIC SURGERY  2005    yarelis en y- pt states the procedure was done incorrectly which lead to additional surgeries from 2006-09    " CARPAL TUNNEL RELEASE Right      SECTION      x1    CHOLECYSTECTOMY      COLON SURGERY      partial removal of the small bowel-necrosis-between 2006    COLONOSCOPY      COLONOSCOPY W/ BIOPSIES AND POLYPECTOMY      EPIDURAL BLOCK INJECTION N/A 2021    Procedure: C6 C7 cervical epidural steroid injection ( 15952);  Surgeon: Jeremi Gamino MD;  Location: [facility] MAIN OR;  Service: Pain Management     EPIDURAL BLOCK INJECTION N/A 2021    Procedure: C6 C7 cervical epidural steroid injection (27249);  Surgeon: Jeremi Gamino MD;  Location: [facility] MAIN OR;  Service: Pain Management     FLEXIBLE BRONCHOSCOPY W/ UPPER ENDOSCOPY      GASTRECTOMY      after gastric bypass-(failed surgery per pt) 2006    HERNIA REPAIR      incisional hernias-diaphragm area    CT ARTHROCENTESIS ASPIR&/INJ SMALL JT/BURSA W/O US N/A 2018    Procedure: Coccygeal Injection & Ganglion Impar Block;  Surgeon: Jeremi Gamino MD;  Location: [facility] MAIN OR;  Service: Pain Management     CT ARTHRODESIS POSTERIOR INTERBODY 1 NTRSPC LUMBAR N/A 2018    Procedure: L4-5 DECOMPRESSION INTERBODY INSTRUMENTED FUSION;  Surgeon: Martinez Garcia MD;  Location: [facility];  Service: Orthopedics    CT COLONOSCOPY FLX DX W/COLLJ SPEC WHEN PFRMD N/A 2018    Procedure: COLONOSCOPY;  Surgeon: Terry Booth MD;  Location: [facility] GI LAB;  Service: Gastroenterology    CT ESOPHAGOGASTRODUODENOSCOPY TRANSORAL DIAGNOSTIC N/A 2018    Procedure: ESOPHAGOGASTRODUODENOSCOPY (EGD);  Surgeon: Terry Booth MD;  Location: [facility] GI LAB;  Service: Gastroenterology    CT INJECT SI JOINT ARTHRGRPHY&/ANES/STEROID W/EMMANUEL Right 2018    Procedure: Rt Sacroiliac Joint Injection;  Surgeon: Jeremi Gamino MD;  Location: [facility] MAIN OR;  Service: Pain Management     CT INJECT SI JOINT ARTHRGRPHY&/ANES/STEROID W/EMMANUEL Right 2019    Procedure: Sacroiliac Joint Injection (85165);  Surgeon: Jeremi Gamino MD;  Location: [facility] MAIN OR;  Service: Pain  "Management     TONSILECTOMY, ADENOIDECTOMY, BILATERAL MYRINGOTOMY AND TUBES      TONSILLECTOMY       Social History   Social History     Substance and Sexual Activity   Alcohol Use Yes    Alcohol/week: 7.0 standard drinks of alcohol    Types: 5 Cans of beer, 2 Shots of liquor per week    Comment: socially     Social History     Substance and Sexual Activity   Drug Use Not Currently    Types: Marijuana    Comment: has medical marijuana card. lozenges     Social History     Tobacco Use   Smoking Status Never    Passive exposure: Never   Smokeless Tobacco Never     Family History: non-contributory    Meds/Allergies   all medications and allergies reviewed  No Known Allergies    Objective       Current Vitals:   /80 (BP Location: Right arm, Patient Position: Sitting, Cuff Size: Large)   Pulse 86   Ht 5' 3.2\" (1.605 m)   Wt 111 kg (245 lb 9.6 oz)   BMI 43.23 kg/m²       Physical Exam  Vitals reviewed.   Constitutional:       Appearance: She is well-developed.   HENT:      Head: Normocephalic.   Eyes:      Extraocular Movements: Extraocular movements intact.   Cardiovascular:      Rate and Rhythm: Normal rate.   Pulmonary:      Effort: Pulmonary effort is normal.   Abdominal:      General: There is no distension.   Musculoskeletal:         General: Normal range of motion.      Cervical back: Normal range of motion.   Neurological:      Mental Status: She is alert and oriented to person, place, and time.   Psychiatric:         Mood and Affect: Mood normal.         Behavior: Behavior normal.         Thought Content: Thought content normal.         Judgment: Judgment normal.         Lab Results: I have personally reviewed pertinent lab results.    Imaging: I have personally reviewed pertinent reports.    EKG, Pathology, and Other Studies: I have personally reviewed pertinent reports.        Assessment/PLAN:    68 y.o. yo female who presents with a history of  RYGB 16 ya in [place], [place]. Highest weight 310# with a " "clarissa of 160# and currently 245#. She originally had 1 year postop of continuously suffering with bilious vomiting. She presented to [facility] and it was determined that her bypass was \"backwards.\" She underwent multiple reconstructive surgeries and eventual incisional hernia repairs with mesh. All surgeries performed open. She is currently looking to get back on track with her diet and her postop care.    - surgical RD appointment   - consult with [facility]  - obtain postop labs    I have spent over 45 minutes with her face to face in the office today discussing her options and details of the surgery.  Over 50% of this was coordinating care.    She was given the opportunity to ask questions and I have answered all of them.      KIRBY Connolly MD, FACS, [facility]  4/10/2024  3:16 PM    "

## 2024-05-03 ENCOUNTER — TELEPHONE (OUTPATIENT)
Age: 69
End: 2024-05-03

## 2024-05-03 ENCOUNTER — NURSE TRIAGE (OUTPATIENT)
Age: 69
End: 2024-05-03

## 2024-05-03 ENCOUNTER — TELEPHONE (OUTPATIENT)
Dept: GASTROENTEROLOGY | Facility: CLINIC | Age: 69
End: 2024-05-03

## 2024-05-03 NOTE — TELEPHONE ENCOUNTER
Spoke to pt stated GYN and or PCP can place orders for hormone levels or allergy testing. Pt expressed understanding.

## 2024-05-03 NOTE — TELEPHONE ENCOUNTER
Patient called in because she went for her labs order by Dr Connolly. She was wondering if it would be helpful for her to have labs done to check to make sure her hormones are normal and aren't causing some of the weight gain. She is also inquiring to see if getting a food allergy test done would be recommended before upcoming appointment. Patient can be reached at [phone]

## 2024-05-03 NOTE — TELEPHONE ENCOUNTER
Pt stopped in and expressed that her SIBO test was ordered a while ago and she was unsure by who. Pt has just completed test and dropped it off.     She always expressed that she just started care with [facility] and expressed her provider may want the results  from her SIBO test.

## 2024-05-03 NOTE — TELEPHONE ENCOUNTER
Patient dropped off Sibo Test sending to the [facility] location to be reviewed

## 2024-05-03 NOTE — TELEPHONE ENCOUNTER
Patient calling in to let us know that she dropped off her SIBO test to the office.  Her insurance covers [facility]core or [facility].  Aware that the test are done by our provider.         Reason for Disposition   Information only question and nurse able to answer    Answer Assessment - Initial Assessment Questions  1. REASON FOR CALL or QUESTION: Patient calling in to let us know that she dropped off her SIBO test to the office.  Her insurance covers [facility]core or [facility].  Aware that the test are done by our provider.    Protocols used: Information Only Call - No Triage-ADULT-[place]

## 2024-05-06 ENCOUNTER — OFFICE VISIT (OUTPATIENT)
Dept: GASTROENTEROLOGY | Facility: CLINIC | Age: 69
End: 2024-05-06
Payer: MEDICARE

## 2024-05-06 DIAGNOSIS — R14.0 BLOATING: Primary | ICD-10-CM

## 2024-05-06 NOTE — Clinical Note
Josesito David is breath test results are attached, please let me know if you have any questions thanksNiraj

## 2024-05-07 ENCOUNTER — OFFICE VISIT (OUTPATIENT)
Dept: BARIATRICS | Facility: CLINIC | Age: 69
End: 2024-05-07
Payer: MEDICARE

## 2024-05-07 VITALS
SYSTOLIC BLOOD PRESSURE: 136 MMHG | HEIGHT: 63 IN | HEART RATE: 102 BPM | DIASTOLIC BLOOD PRESSURE: 88 MMHG | WEIGHT: 241.4 LBS | BODY MASS INDEX: 42.77 KG/M2

## 2024-05-07 DIAGNOSIS — Z91.89 AT RISK FOR SLEEP APNEA: ICD-10-CM

## 2024-05-07 DIAGNOSIS — K21.9 GASTROESOPHAGEAL REFLUX DISEASE WITHOUT ESOPHAGITIS: ICD-10-CM

## 2024-05-07 DIAGNOSIS — K91.2 POSTSURGICAL MALABSORPTION: ICD-10-CM

## 2024-05-07 DIAGNOSIS — E66.01 CLASS 3 SEVERE OBESITY DUE TO EXCESS CALORIES WITH SERIOUS COMORBIDITY AND BODY MASS INDEX (BMI) OF 40.0 TO 44.9 IN ADULT (HCC): Primary | ICD-10-CM

## 2024-05-07 DIAGNOSIS — K76.0 HEPATIC STEATOSIS: ICD-10-CM

## 2024-05-07 DIAGNOSIS — I10 ESSENTIAL HYPERTENSION: ICD-10-CM

## 2024-05-07 PROBLEM — E66.813 CLASS 3 SEVERE OBESITY DUE TO EXCESS CALORIES WITH SERIOUS COMORBIDITY AND BODY MASS INDEX (BMI) OF 40.0 TO 44.9 IN ADULT (HCC): Status: ACTIVE | Noted: 2019-02-06

## 2024-05-07 PROCEDURE — 91065 BREATH HYDROGEN/METHANE TEST: CPT | Performed by: FAMILY MEDICINE

## 2024-05-07 PROCEDURE — G2211 COMPLEX E/M VISIT ADD ON: HCPCS | Performed by: NURSE PRACTITIONER

## 2024-05-07 PROCEDURE — 99215 OFFICE O/P EST HI 40 MIN: CPT | Performed by: NURSE PRACTITIONER

## 2024-05-07 RX ORDER — NALTREXONE HYDROCHLORIDE 50 MG/1
TABLET, FILM COATED ORAL
Qty: 30 TABLET | Refills: 1 | Status: SHIPPED | OUTPATIENT
Start: 2024-05-07

## 2024-05-07 RX ORDER — BUPROPION HYDROCHLORIDE 75 MG/1
75 TABLET ORAL 2 TIMES DAILY
Qty: 60 TABLET | Refills: 1 | Status: SHIPPED | OUTPATIENT
Start: 2024-05-07 | End: 2024-07-06

## 2024-05-07 NOTE — ASSESSMENT & PLAN NOTE
- Discussed options of HealthyCORE-Intensive Lifestyle Intervention Program, Very Low Calorie Diet-VLCD, and Conservative Program and the role of weight loss medications.  - Explained the importance of making lifestyle changes in addition to starting any anti-obesity medications.   - Patient is interested in pursuing HealthyCORE-Intensive Lifestyle Intervention Program and follow up visits with medical weight management provider.  - Initial weight loss goal of 5-10% weight loss for improved health  - Weight loss can improve patient's co-morbid conditions and/or prevent weight-related complications.  - Weight is not at goal and patient has been unable to achieve a meaningful weight loss above 5% using various programs and tools for more than 6 months  - Lab work completed yesterday- results not in yet but will be reviewed  -Patient lifestyle habits were reviewed and barriers to weight loss were addressed today.  Patient will be participating in the healthy core program to get a solid foundation back for her lifestyle habits.  Patient will meet with the dietitian to restructure her eating patterns and make sure she is meeting all of her nutrition requirements.  Patient will also be meeting with the  to help overcome emotional and mental barriers regarding her weight loss journey.    Medications were discussed:  GLP-1 medications are not covered by Medicare and patient states she cannot afford out-of-pocket  Phentermine to be avoided due to age  Topiramate discussed and patient denies any history of kidney stones  Bupropion/naltrexone was discussed and patient was interested in this medication to help with her cravings and constant food thinking    Bupropion/naltrexone instructions:  Begin with bupropion 75mg twice daily in the morning evening. After 2 weeks start naltrexone 25mg (1/2 tablet) once daily in the morning. After 1 week increase the naltrexone 25mg (1/2 tablet) twice daily in the morning and  evening    The potential side effects of bupropion/naltrexone may include: abdominal upset, headache, dizziness, trouble sleeping, increased blood pressure, depression/anxiety, and fatigue. Please call/return if you develops symptoms of depression or anxiety. You should go to the  ER for evaluation if you develop any thoughts of harming yourself or others occur.     Currently patient does not use CPAP  -Sleep referral ordered placed today and patient instructed to schedule appointment  -Discussed risks of untreated sleep apnea such as sudden cardiac death by arrhythmia, uncontrolled hypertension, difficulty with weight loss, decreased quality sleep, increased insulin resistance, and stroke.  -Sleep apnea often improves with dietary and lifestyle changes  -20-30% weight loss will also help with symptoms and complications of sleep apnea     Goals:  Do not skip meals.  Food log via gagandeep or provided paper log (gagandeep options include www.[place]et[place]pal.com, sparkpeople.com, loseit.com, calorieking.com, [employer])  No sugary beverages.   At least 64oz of water daily.  Increase physical activity by 10 minutes daily. Gradually increase physical activity to a goal of 5 days per week for 30 minutes of MODERATE intensity PLUS 2 days per week of FULL BODY resistance training

## 2024-05-07 NOTE — ASSESSMENT & PLAN NOTE
Patient currently takes Pepcid daily and will be starting on omeprazole per her GI specialist.    Patient will be following up with the GI specialist regarding her recent heartburn

## 2024-05-07 NOTE — PROGRESS NOTES
[facility]       Bacterial Overgrowth Analytical Record    Fariba Garcia 68 y.o. female MRN: [id number]      Date of Test: 05/03/2024    Substrate Given: Lactulose    Ordering Provider: Ana Lerner PA-C    Medical Assistant: BLANCO    Symptoms: bloating    The patient presents for bacterial overgrowth testing.    Patient fasted overnight. Baseline readings obtained.   Breath test performed every 20 min for a total of 3 hr    Sample Clock Time ppmH2 ppmCH4 Co2% Ash   Baseline 905   3 4 4.3 1.27   #1  20 minutes 925 6 3 3.5 1.57   #2  40 minutes 945 13 4 3.8 1.44   #3  60 minutes 1005 1 1 0.7 Too High   #4  80 minutes 1025 59 9 3.0 1.83   #5  100 minutes 1045 1 1 0.2 Too High   #6  120 minutes 1105 1 1 0.6 Too High   #7  140 minutes 1125 47 8 2.9 1.89   #8  160 minutes 1145 51 8 2.6 2.11   #9  180 minutes 1205 84 12 2.8 1.96       Physician interpretation:   60, 100, and 120 minutes samples invalid due to low CO2/room air contamination    Meets criteria for small intestinal bacterial overgrowth with H2 increase >20ppm by the 80-minute humberto.    Results forwarded to ordering provider

## 2024-05-07 NOTE — PROGRESS NOTES
Assessment/Plan:    Class 3 severe obesity due to excess calories with serious comorbidity and body mass index (BMI) of 40.0 to 44.9 in adult (HCC)  - Discussed options of HealthyCORE-Intensive Lifestyle Intervention Program, Very Low Calorie Diet-VLCD, and Conservative Program and the role of weight loss medications.  - Explained the importance of making lifestyle changes in addition to starting any anti-obesity medications.   - Patient is interested in pursuing HealthyCORE-Intensive Lifestyle Intervention Program and follow up visits with medical weight management provider.  - Initial weight loss goal of 5-10% weight loss for improved health  - Weight loss can improve patient's co-morbid conditions and/or prevent weight-related complications.  - Weight is not at goal and patient has been unable to achieve a meaningful weight loss above 5% using various programs and tools for more than 6 months  - Lab work completed yesterday- results not in yet but will be reviewed  -Patient lifestyle habits were reviewed and barriers to weight loss were addressed today.  Patient will be participating in the healthy core program to get a solid foundation back for her lifestyle habits.  Patient will meet with the dietitian to restructure her eating patterns and make sure she is meeting all of her nutrition requirements.  Patient will also be meeting with the  to help overcome emotional and mental barriers regarding her weight loss journey.    Medications were discussed:  GLP-1 medications are not covered by Medicare and patient states she cannot afford out-of-pocket  Phentermine to be avoided due to age  Topiramate discussed and patient denies any history of kidney stones  Bupropion/naltrexone was discussed and patient was interested in this medication to help with her cravings and constant food thinking    Bupropion/naltrexone instructions:  Begin with bupropion 75mg twice daily in the morning evening. After 2 weeks  start naltrexone 25mg (1/2 tablet) once daily in the morning. After 1 week increase the naltrexone 25mg (1/2 tablet) twice daily in the morning and evening    The potential side effects of bupropion/naltrexone may include: abdominal upset, headache, dizziness, trouble sleeping, increased blood pressure, depression/anxiety, and fatigue. Please call/return if you develops symptoms of depression or anxiety. You should go to the  ER for evaluation if you develop any thoughts of harming yourself or others occur.     Currently patient does not use CPAP  -Sleep referral ordered placed today and patient instructed to schedule appointment  -Discussed risks of untreated sleep apnea such as sudden cardiac death by arrhythmia, uncontrolled hypertension, difficulty with weight loss, decreased quality sleep, increased insulin resistance, and stroke.  -Sleep apnea often improves with dietary and lifestyle changes  -20-30% weight loss will also help with symptoms and complications of sleep apnea     Goals:  Do not skip meals.  Food log via gagandeep or provided paper log (gagandeep options include www.myfitnesspal.com, sparkpeople.com, loseit.com, calorieking.com, [employer])  No sugary beverages.   At least 64oz of water daily.  Increase physical activity by 10 minutes daily. Gradually increase physical activity to a goal of 5 days per week for 30 minutes of MODERATE intensity PLUS 2 days per week of FULL BODY resistance training     Essential hypertension  Treatment currently includes lisinopril/hydrochlorothiazide  Blood pressures borderline today  Will monitor closely while starting on bupropion  Weight loss and diet changes will likely help with blood pressure control    Gastroesophageal reflux disease  Patient currently takes Pepcid daily and will be starting on omeprazole per her GI specialist.    Patient will be following up with the GI specialist regarding her recent heartburn    Hepatic steatosis  Weight loss and diet changes likely  help with fatty liver disease    Postsurgical malabsorption  Patient will continue to follow with bariatric surgery team annually         Fariba was seen today for consult.    Diagnoses and all orders for this visit:    Class 3 severe obesity due to excess calories with serious comorbidity and body mass index (BMI) of 40.0 to 44.9 in adult (HCC)  -     buPROPion (WELLBUTRIN) 75 mg tablet; Take 1 tablet (75 mg total) by mouth 2 (two) times a day  -     naltrexone (REVIA) 50 mg tablet; Take 1/2 tablet (25mg) once daily in the morning then after 1 week increase to 1/2 tablet (25mg) twice daily in the morning and evening    At risk for sleep apnea  -     Ambulatory referral to Sleep Medicine; Future    Essential hypertension    Gastroesophageal reflux disease without esophagitis    Hepatic steatosis    Postsurgical malabsorption       Patient denies personal history of pancreatitis. Patient also denies personal and family history of medullary thyroid cancer and multiple endocrine neoplasia type 2 (MEN 2 tumor). Patient denies any history of kidney stones, seizures, or glaucoma.     Total time spent reviewing chart, interviewing patient, examining patient, discussing plan, answering all questions, and documentin min, with >50% face-to-face time spent counseling patient on nonsurgical interventions for the treatment of excess weight. Discussed in detail nonsurgical options including intensive lifestyle intervention program, very low-calorie diet program and conservative program.  Discussed the role of weight loss medications.  Counseled patient on diet behavior and exercise modification for weight loss.    Follow up in approximately 2 months with Non-Surgical Physician/Advanced Practitioner.    Subjective:   Chief Complaint   Patient presents with    Consult     Pt is here today for MWM consult.        Patient ID: Fariba Garcia  is a 68 y.o. female with excess weight/obesity here to pursue weight  management.  Previous notes and records have been reviewed.    Past Medical History:   Diagnosis Date    Arthritis     Asthma     Back pain     Chronic pain disorder     hips into the legs    Colon polyp     DVT (deep venous thrombosis) ([facility])     Gastric bypass status for obesity     yarelis en y    GERD (gastroesophageal reflux disease)     Hiatal hernia     History of transfusion 2006    after gastric bypass surgery, no reactions    Hypertension     Irritable bowel syndrome     Kidney stone     Muscle weakness     bilateral legs    Numbness and tingling     bilateral feet    Pneumonia     Sacroiliitis (HCC) 5/15/2018    Spinal stenosis     Tinnitus     occassionally    Vertigo     Wears glasses      Past Surgical History:   Procedure Laterality Date    APPENDECTOMY      BACK SURGERY  2018    L4-5 fusion    BARIATRIC SURGERY      yarelis en y- pt states the procedure was done incorrectly which lead to additional surgeries from -    CARPAL TUNNEL RELEASE Right      SECTION      x1    CHOLECYSTECTOMY      COLON SURGERY      partial removal of the small bowel-necrosis-between 2006    COLONOSCOPY      COLONOSCOPY W/ BIOPSIES AND POLYPECTOMY      EPIDURAL BLOCK INJECTION N/A 2021    Procedure: C6 C7 cervical epidural steroid injection ( 32869);  Surgeon: Jeremi Gamino MD;  Location: [facility] MAIN OR;  Service: Pain Management     EPIDURAL BLOCK INJECTION N/A 2021    Procedure: C6 C7 cervical epidural steroid injection (45423);  Surgeon: Jeremi Gamino MD;  Location: [facility] MAIN OR;  Service: Pain Management     FLEXIBLE BRONCHOSCOPY W/ UPPER ENDOSCOPY      GASTRECTOMY      after gastric bypass-(failed surgery per pt) 2006    HERNIA REPAIR      incisional hernias-diaphragm area    PA ARTHROCENTESIS ASPIR&/INJ SMALL JT/BURSA W/O US N/A 2018    Procedure: Coccygeal Injection & Ganglion Impar Block;  Surgeon: Jeremi Gamino MD;  Location: [facility] MAIN OR;  Service: Pain Management      MT ARTHRODESIS POSTERIOR INTERBODY 1 NTRSPC LUMBAR N/A 6/27/2018    Procedure: L4-5 DECOMPRESSION INTERBODY INSTRUMENTED FUSION;  Surgeon: Martinez Garcia MD;  Location: [facility] Main OR;  Service: Orthopedics    MT COLONOSCOPY FLX DX W/COLLJ SPEC WHEN PFRMD N/A 8/24/2018    Procedure: COLONOSCOPY;  Surgeon: Terry Booth MD;  Location: [facility] GI LAB;  Service: Gastroenterology    MT ESOPHAGOGASTRODUODENOSCOPY TRANSORAL DIAGNOSTIC N/A 2/19/2018    Procedure: ESOPHAGOGASTRODUODENOSCOPY (EGD);  Surgeon: Terry Booth MD;  Location: [facility] GI LAB;  Service: Gastroenterology    MT INJECT SI JOINT ARTHRGRPHY&/ANES/STEROID W/EMMANUEL Right 5/25/2018    Procedure: Rt Sacroiliac Joint Injection;  Surgeon: Jeremi Gamino MD;  Location: [facility] MAIN OR;  Service: Pain Management     MT INJECT SI JOINT ARTHRGRPHY&/ANES/STEROID W/EMMANUEL Right 5/1/2019    Procedure: Sacroiliac Joint Injection (58958);  Surgeon: Jeremi Gamino MD;  Location: [facility] MAIN OR;  Service: Pain Management     TONSILECTOMY, ADENOIDECTOMY, BILATERAL MYRINGOTOMY AND TUBES      TONSILLECTOMY         HPI:  Wt Readings from Last 20 Encounters:   05/07/24 109 kg (241 lb 6.4 oz)   04/10/24 111 kg (245 lb 9.6 oz)   04/08/24 112 kg (248 lb)   02/28/24 113 kg (248 lb 3.2 oz)   12/13/23 110 kg (242 lb 12.8 oz)   11/30/23 110 kg (243 lb)   11/21/23 108 kg (238 lb)   08/24/23 108 kg (239 lb)   05/10/23 108 kg (239 lb)   04/01/23 108 kg (237 lb)   03/31/23 106 kg (234 lb 3.2 oz)   03/21/23 107 kg (235 lb)   02/22/23 106 kg (233 lb 6.4 oz)   11/22/22 107 kg (235 lb)   09/12/22 107 kg (235 lb 12.8 oz)   08/23/22 108 kg (238 lb)   05/11/22 105 kg (231 lb)   01/16/22 103 kg (226 lb)   12/30/21 103 kg (226 lb)   12/07/21 105 kg (232 lb 3.2 oz)       Patient presents today to medical weight management office for consult.  history of  RYGB 16 ya in [place] with significant recovery complications requiring multiple abdominal reconstructive surgeries    Patient has been struggling  with weight regain years after her bariatric surgery.  Patient has consulted with the bariatric surgeon and was referred to medical weight management to help with lifestyle modifications and to consider weight loss medications.  Patient has recently joined the [employer] and is swimming 3 times a week.  Patient is limited with her activity level due to her chronic back pain.  Patient will be following up with orthopedics to further evaluate her pain and treatment options.  Patient previously worked as a  and now works as a  for teenagers.  Patient states this is a high stress job but she is often tense throughout the day.  Patient also suffers from IBS symptoms and has to be careful with her diet while she is working as she cannot stop to go to the bathroom.  Patient has been struggling with some emotional barriers the past few years between losing her  to COVID and other family members to health related disorders.  Patient states she is motivated to get her lifestyle back on track and is looking for guidance and support regarding her journey again.  Patient does state that she struggles with her emotional relationship with food as she relied on meal replacements and protein shakes during her recovery period after her surgery for many years she was not able to tolerate protein shakes but has recently started to utilize them for increased protein content.  Patient also suspect she may have been over eating at times as she was concerned she was becoming malnourished due to her absorption.      Initial: 310 lbs  Amandeep: 160 lbs      Obesity/Excess Weight:  Severity: Severe  Onset:  lifelong    Modifiers: Diet and Exercise and bariatric surgery  Contributing factors: Poor Food Choices, Insufficient Physical Activity, Lack of knowledge of appropriate lifestyle changes, and Insufficient time to make appropriate lifestyle changes  Associated symptoms: comorbid conditions, fatigue,  increased joint pain, decreased exercise capacity, body image issues, decreased self esteem, increased shortness of breath, decreased mobility, depression, inability to do certain activities, and clothes do not fit    Diet recall:  Tries to stay limited lactose  B: protein shake/pudding OR eggs with 1/2 english muffin  L: skips OR protein shake/pudding (bone broth with mashed potatoes)  S: protein bar OR nuts  D: rice with protein and vegetables OR pasta with sauce  S: english muffin with cee    Hydration: water sometimes with sour cherry juice, mushroom coffee  Alcohol: no  Smoking: no  Exercise: swimming 3 days per week  Occupation: retired teacher,   Sleep: not well  STOP ban/8    Current weight: 241.4 lbs  Current BMI: 42.76        The following portions of the patient's history were reviewed and updated as appropriate: allergies, current medications, past family history, past medical history, past social history, past surgical history, and problem list.    Family History   Problem Relation Age of Onset    Diabetes Mother     Aortic aneurysm Father     Cancer Father         prostate    Heart disease Sister         open heart    Cancer Sister         breast    Breast cancer Sister 54    Diabetes Brother     Cancer Brother         spinal cancer    Stroke Brother     Hypertension Brother     Other Daughter         concussion syndrome from MVA    Asperger's syndrome Son         high functioning    No Known Problems Maternal Aunt     No Known Problems Maternal Aunt     Colon cancer Maternal Uncle 60    No Known Problems Paternal Aunt     No Known Problems Paternal Aunt     Diabetes Maternal Grandfather         Review of Systems   Constitutional:  Negative for fatigue.   HENT:  Negative for sore throat.    Respiratory:  Negative for cough and shortness of breath.    Cardiovascular:  Negative for chest pain, palpitations and leg swelling.   Gastrointestinal:  Negative for abdominal pain, constipation, diarrhea  "and nausea.        + heartburn   Genitourinary:  Negative for dysuria.   Musculoskeletal:  Positive for back pain. Negative for arthralgias.   Skin:  Negative for rash.   Neurological:  Negative for headaches.   Psychiatric/Behavioral:  Negative for dysphoric mood. The patient is not nervous/anxious.        Objective:  /88 (BP Location: Left arm, Patient Position: Sitting, Cuff Size: Large)   Pulse 102   Ht 5' 3\" (1.6 m)   Wt 109 kg (241 lb 6.4 oz)   BMI 42.76 kg/m²     Physical Exam  Vitals and nursing note reviewed.   Constitutional:       Appearance: Normal appearance. She is obese.   HENT:      Head: Normocephalic.   Pulmonary:      Effort: Pulmonary effort is normal.   Neurological:      General: No focal deficit present.      Mental Status: She is alert and oriented to person, place, and time.   Psychiatric:         Mood and Affect: Mood normal.         Behavior: Behavior normal.         Thought Content: Thought content normal.         Judgment: Judgment normal.              Labs and Imaging  Recent labs and imaging have been personally reviewed.  Lab Results   Component Value Date    WBC 9.14 08/23/2022    HGB 11.8 08/23/2022    HCT 37.7 08/23/2022    MCV 95 08/23/2022     08/23/2022     Lab Results   Component Value Date    SODIUM 143 02/09/2023    K 4.9 02/09/2023     02/09/2023    CO2 27 02/09/2023    AGAP 5 08/23/2022    BUN 12 02/09/2023    CREATININE 1.02 (H) 02/09/2023    GLUC 113 (H) 02/09/2023    GLUF 108 (H) 08/23/2022    CALCIUM 9.0 08/23/2022    AST 24 02/09/2023    ALT 21 02/09/2023    ALKPHOS 77 08/23/2022    TP 7.0 02/09/2023    TBILI 0.5 02/09/2023    EGFR 61 02/09/2023     Lab Results   Component Value Date    HGBA1C 5.7 (H) 02/09/2023     Lab Results   Component Value Date    WTU3JTODRBPO 0.981 08/23/2022    TSH 1.050 06/22/2021     Lab Results   Component Value Date    CHOLESTEROL 185 02/09/2023     Lab Results   Component Value Date    HDL 67 02/09/2023     Lab " Results   Component Value Date    TRIG 117 02/09/2023     Lab Results   Component Value Date    LDLCALC 97 02/09/2023

## 2024-05-07 NOTE — ASSESSMENT & PLAN NOTE
Treatment currently includes lisinopril/hydrochlorothiazide  Blood pressures borderline today  Will monitor closely while starting on bupropion  Weight loss and diet changes will likely help with blood pressure control

## 2024-05-08 LAB
25(OH)D3+25(OH)D2 SERPL-MCNC: 37.7 NG/ML (ref 30–100)
ALBUMIN SERPL-MCNC: 4.2 G/DL (ref 3.9–4.9)
ALBUMIN/GLOB SERPL: 1.5 {RATIO} (ref 1.2–2.2)
ALP SERPL-CCNC: 69 IU/L (ref 44–121)
ALT SERPL-CCNC: 18 IU/L (ref 0–32)
AST SERPL-CCNC: 25 IU/L (ref 0–40)
BASOPHILS # BLD AUTO: 0.1 X10E3/UL (ref 0–0.2)
BASOPHILS NFR BLD AUTO: 1 %
BILIRUB SERPL-MCNC: 0.5 MG/DL (ref 0–1.2)
BUN SERPL-MCNC: 15 MG/DL (ref 8–27)
BUN/CREAT SERPL: 16 (ref 12–28)
CALCIUM SERPL-MCNC: 9.7 MG/DL (ref 8.7–10.3)
CHLORIDE SERPL-SCNC: 100 MMOL/L (ref 96–106)
CHOLEST SERPL-MCNC: 197 MG/DL (ref 100–199)
CO2 SERPL-SCNC: 24 MMOL/L (ref 20–29)
CREAT SERPL-MCNC: 0.94 MG/DL (ref 0.57–1)
EGFR: 67 ML/MIN/1.73
EOSINOPHIL # BLD AUTO: 0.2 X10E3/UL (ref 0–0.4)
EOSINOPHIL NFR BLD AUTO: 3 %
ERYTHROCYTE [DISTWIDTH] IN BLOOD BY AUTOMATED COUNT: 13.2 % (ref 11.7–15.4)
FERRITIN SERPL-MCNC: 13 NG/ML (ref 15–150)
FOLATE SERPL-MCNC: >20 NG/ML
GLOBULIN SER-MCNC: 2.8 G/DL (ref 1.5–4.5)
GLUCOSE SERPL-MCNC: 113 MG/DL (ref 70–99)
HBA1C MFR BLD: 5.4 % (ref 4.8–5.6)
HCT VFR BLD AUTO: 36 % (ref 34–46.6)
HDLC SERPL-MCNC: 63 MG/DL
HGB BLD-MCNC: 11.9 G/DL (ref 11.1–15.9)
IMM GRANULOCYTES # BLD: 0 X10E3/UL (ref 0–0.1)
IMM GRANULOCYTES NFR BLD: 0 %
IRON SATN MFR SERPL: 12 % (ref 15–55)
IRON SERPL-MCNC: 52 UG/DL (ref 27–139)
LDLC SERPL CALC-MCNC: 107 MG/DL (ref 0–99)
LYMPHOCYTES # BLD AUTO: 1.8 X10E3/UL (ref 0.7–3.1)
LYMPHOCYTES NFR BLD AUTO: 28 %
MCH RBC QN AUTO: 30.3 PG (ref 26.6–33)
MCHC RBC AUTO-ENTMCNC: 33.1 G/DL (ref 31.5–35.7)
MCV RBC AUTO: 92 FL (ref 79–97)
MONOCYTES # BLD AUTO: 0.7 X10E3/UL (ref 0.1–0.9)
MONOCYTES NFR BLD AUTO: 10 %
NEUTROPHILS # BLD AUTO: 3.7 X10E3/UL (ref 1.4–7)
NEUTROPHILS NFR BLD AUTO: 58 %
PLATELET # BLD AUTO: 301 X10E3/UL (ref 150–450)
POTASSIUM SERPL-SCNC: 4.8 MMOL/L (ref 3.5–5.2)
PROT SERPL-MCNC: 7 G/DL (ref 6–8.5)
PTH-INTACT SERPL-MCNC: 27 PG/ML (ref 15–65)
RBC # BLD AUTO: 3.93 X10E6/UL (ref 3.77–5.28)
SL AMB VLDL CHOLESTEROL CALC: 27 MG/DL (ref 5–40)
SODIUM SERPL-SCNC: 139 MMOL/L (ref 134–144)
TIBC SERPL-MCNC: 432 UG/DL (ref 250–450)
TRIGL SERPL-MCNC: 158 MG/DL (ref 0–149)
TSH SERPL DL<=0.005 MIU/L-ACNC: 1.74 UIU/ML (ref 0.45–4.5)
UIBC SERPL-MCNC: 380 UG/DL (ref 118–369)
VIT A SERPL-MCNC: 48.1 UG/DL (ref 22–69.5)
VIT B1 BLD-SCNC: 126.7 NMOL/L (ref 66.5–200)
VIT B12 SERPL-MCNC: 582 PG/ML (ref 232–1245)
WBC # BLD AUTO: 6.4 X10E3/UL (ref 3.4–10.8)
ZINC SERPL-MCNC: 57 UG/DL (ref 44–115)

## 2024-05-10 DIAGNOSIS — G47.30 SLEEP APNEA, UNSPECIFIED TYPE: Primary | ICD-10-CM

## 2024-05-13 DIAGNOSIS — K91.2 POSTSURGICAL MALABSORPTION: ICD-10-CM

## 2024-05-13 DIAGNOSIS — E61.1 IRON DEFICIENCY: Primary | ICD-10-CM

## 2024-05-13 RX ORDER — SODIUM CHLORIDE 9 MG/ML
20 INJECTION, SOLUTION INTRAVENOUS ONCE
OUTPATIENT
Start: 2024-05-24

## 2024-05-13 NOTE — PROGRESS NOTES
Please notify the patient that their iron stores are low and it will be very difficult to improve iron stores or iron levels orally given their postsurgical malabsorption. I am placing orders for the infusion center to administer IV venofer iron weekly x 5 treatments. Please advise the patient of the potential side effects of IV Venofer, including, but not limited to nausea, headache, hypotension, tattooing of the skin, and anaphylactic reaction.  Please call the infusion center to notify them of the orders so they can obtain insurance prior-authorization and help schedule the patient asap. I have entered repeat CBC and iron panel and if needed B12 labs to be repeated in 3 months. Thank you!    Infusion Center numbers:  Jose: [phone]

## 2024-05-13 NOTE — RESULT ENCOUNTER NOTE
Please let Fariba know about her labs thank you:    -Please notify the patient that their iron stores are low and it will be very difficult to improve iron stores or iron levels orally given their postsurgical malabsorption. I am placing orders for the infusion center to administer IV venofer iron weekly x 5 treatments. Please advise the patient of the potential side effects of IV Venofer, including, but not limited to nausea, headache, hypotension, tattooing of the skin, and anaphylactic reaction.  Please call the infusion center to notify them of the orders so they can obtain insurance prior-authorization and help schedule the patient asap. I have entered repeat CBC and iron panel and if needed B12 labs to be repeated in 3 months. Thank you!    Infusion Center numbers:  Jose: [phone]

## 2024-05-14 ENCOUNTER — TELEPHONE (OUTPATIENT)
Age: 69
End: 2024-05-14

## 2024-05-14 NOTE — TELEPHONE ENCOUNTER
Patient called in to schedule HC classes, office note says she was told to return to surgical RD.  I don't think she understood what that meant.  Can someone from the office please give her a call?  She said to leave a message and she can call you back if she doesn't answer.  Thanks.

## 2024-05-16 ENCOUNTER — TELEPHONE (OUTPATIENT)
Age: 69
End: 2024-05-16

## 2024-05-16 ENCOUNTER — NURSE TRIAGE (OUTPATIENT)
Age: 69
End: 2024-05-16

## 2024-05-16 DIAGNOSIS — R10.11 RIGHT UPPER QUADRANT ABDOMINAL PAIN: Primary | ICD-10-CM

## 2024-05-16 RX ORDER — DIPHENHYDRAMINE HCL 25 MG
25 TABLET ORAL ONCE
Start: 2024-05-24 | End: 2024-05-24

## 2024-05-16 NOTE — TELEPHONE ENCOUNTER
----- Message from Lashae BRUNER sent at 5/16/2024  8:38 AM EDT -----  Dr. Booth patient calling with severe abdominal pain  She is a complicated gastric by pass pt  PCP ordered Omeprazole but only helps slightly  Bloating, diarrhea, R Abd pain  Burning in Gut, worse when she doesn't eat.

## 2024-05-16 NOTE — TELEPHONE ENCOUNTER
"LOV 12/13/23 LISE Lerner abd pain, GERD, bloating, NAFLD, completed breath test 5/3/24 results routed to ordering provider, no test result note/meds noted to be ordered for positive result.    I spoke with patient she has been experiencing abdominal pain right side past few weeks noting severe pain when attacks occur around meals. She is taking medications as ordered. She was in to see weight management and they would like to start her on two medications but she is requesting to see GI prior due to ongoing symptoms. She wanted to return to [facility] office at this time (Dr. Booth) Scheduled with LISE Umaña since there was an opening on 5/29/24. Due to previous history patient does not eat much but keeps hydrated.     She will be going out of town this weekend for family function. Please review and advise.     Reason for Disposition   SEVERE abdominal pain (e.g., excruciating)    Answer Assessment - Initial Assessment Questions  1. LOCATION: \"Where does it hurt?\"       Upper right and lower right  2. RADIATION: \"Does the pain shoot anywhere else?\" (e.g., chest, back)      Once in a while radiates to upper right  3. ONSET: \"When did the pain begin?\" (e.g., minutes, hours or days ago)       Started two weeks ago, signed up with weight management found anemic and diet changes  4. SUDDEN: \"Gradual or sudden onset?\"      Pain gradually increasing, attacks tend to occur with eating  5. PATTERN \"Does the pain come and go, or is it constant?\"     - If constant: \"Is it getting better, staying the same, or worsening?\"       (Note: Constant means the pain never goes away completely; most serious pain is constant and it progresses)      - If intermittent: \"How long does it last?\" \"Do you have pain now?\"      (Note: Intermittent means the pain goes away completely between bouts)      Constant but worsens either before or after meals  6. SEVERITY: \"How bad is the pain?\"  (e.g., Scale 1-10; mild, moderate, or severe)    - MILD (1-3): " "doesn't interfere with normal activities, abdomen soft and not tender to touch     - MODERATE (4-7): interferes with normal activities or awakens from sleep, tender to touch     - SEVERE (8-10): excruciating pain, doubled over, unable to do any normal activities       Moderate but when attacks occur severe episodes  7. RECURRENT SYMPTOM: \"Have you ever had this type of stomach pain before?\" If Yes, ask: \"When was the last time?\" and \"What happened that time?\"       yes  8. CAUSE: \"What do you think is causing the stomach pain?\"      unknown  9. RELIEVING/AGGRAVATING FACTORS: \"What makes it better or worse?\" (e.g., movement, antacids, bowel movement)      If not eating seems to calm things but then gets burning sensation  10. OTHER SYMPTOMS: \"Has there been any vomiting, diarrhea, constipation, or urine problems?\"        When started protein powders noticed constipation, increased hydration and stools softer  11. PREGNANCY: \"Is there any chance you are pregnant?\" \"When was your last menstrual period?\"        N/A    Protocols used: Abdominal Pain - Female-ADULT-[place]    "

## 2024-05-16 NOTE — TELEPHONE ENCOUNTER
Patient of JACINTO Omalley.  Patient will need benadryl order for iron infusion.  1st infusion is scheduled for 6/5/24.  Questions or concerns: [phone].

## 2024-05-16 NOTE — TELEPHONE ENCOUNTER
I called patient and she is aware ordered placed for CT Scan. Central Scheduling number given. No further action needed.

## 2024-05-22 ENCOUNTER — HOSPITAL ENCOUNTER (OUTPATIENT)
Dept: RADIOLOGY | Facility: HOSPITAL | Age: 69
Discharge: HOME | End: 2024-05-22
Payer: MEDICARE

## 2024-05-22 DIAGNOSIS — R10.11 RIGHT UPPER QUADRANT ABDOMINAL PAIN: ICD-10-CM

## 2024-05-22 PROCEDURE — 74177 CT ABD & PELVIS W/CONTRAST: CPT

## 2024-05-22 RX ADMIN — IOHEXOL 100 ML: 350 INJECTION, SOLUTION INTRAVENOUS at 12:49

## 2024-05-29 ENCOUNTER — TELEPHONE (OUTPATIENT)
Dept: GASTROENTEROLOGY | Facility: CLINIC | Age: 69
End: 2024-05-29

## 2024-05-29 ENCOUNTER — OFFICE VISIT (OUTPATIENT)
Dept: GASTROENTEROLOGY | Facility: CLINIC | Age: 69
End: 2024-05-29
Payer: MEDICARE

## 2024-05-29 ENCOUNTER — TELEPHONE (OUTPATIENT)
Age: 69
End: 2024-05-29

## 2024-05-29 VITALS
HEIGHT: 63 IN | OXYGEN SATURATION: 98 % | SYSTOLIC BLOOD PRESSURE: 128 MMHG | HEART RATE: 88 BPM | DIASTOLIC BLOOD PRESSURE: 81 MMHG | WEIGHT: 249 LBS | BODY MASS INDEX: 44.12 KG/M2

## 2024-05-29 DIAGNOSIS — K58.0 IRRITABLE BOWEL SYNDROME WITH DIARRHEA: Primary | ICD-10-CM

## 2024-05-29 DIAGNOSIS — K58.0 IRRITABLE BOWEL SYNDROME WITH DIARRHEA: ICD-10-CM

## 2024-05-29 DIAGNOSIS — Z86.010 HISTORY OF COLON POLYPS: ICD-10-CM

## 2024-05-29 DIAGNOSIS — K21.9 GASTROESOPHAGEAL REFLUX DISEASE WITHOUT ESOPHAGITIS: ICD-10-CM

## 2024-05-29 DIAGNOSIS — R10.9 CHRONIC ABDOMINAL PAIN: Primary | ICD-10-CM

## 2024-05-29 DIAGNOSIS — E61.1 IRON DEFICIENCY: ICD-10-CM

## 2024-05-29 DIAGNOSIS — G89.29 CHRONIC ABDOMINAL PAIN: Primary | ICD-10-CM

## 2024-05-29 DIAGNOSIS — K63.8219 SMALL INTESTINAL BACTERIAL OVERGROWTH: ICD-10-CM

## 2024-05-29 PROCEDURE — 99214 OFFICE O/P EST MOD 30 MIN: CPT | Performed by: PHYSICIAN ASSISTANT

## 2024-05-29 NOTE — TELEPHONE ENCOUNTER
PA for Xifaxan Approved     Date(s) approved until 6/12/2024    Case #    Patient advised by          [x] [employer] Message  [] Phone call   []LMOM  []L/M to call office as no active Communication consent on file  []Unable to leave detailed message as VM not approved on Communication consent       Pharmacy advised by    [x]Fax  []Phone call    Approval letter scanned into Media Yes

## 2024-05-29 NOTE — TELEPHONE ENCOUNTER
Pt had an OV with Eric who ordered an EGD. Pt needs to go home and check her schedule and call us back. He also wanted pt to have an OV after her scoping, which we didn't schedule. Pt will be giving us a call back to schedule both. She has her prep instructions.     She wondered if Dr Cadena could do her f/u appt, however, I advised she'd be transferring care and won't see Dr Booth anymore. Pt denied and is aware Dr Booth no longer has an OV, and will schedule OV with a PA

## 2024-05-29 NOTE — PROGRESS NOTES
[facility] - Outpatient Progress Note  Fariba Garcia 68 y.o. female MRN: [id number]  Encounter: [id number]    Assessment and Plan    1. Chronic abdominal pain  -Chronic for patient  -Will await results of CAT scan done on May 22, 2024  -Continue dicyclomine as needed  -Plan repeat EGD  - I have reviewed the risks of endoscopy which include but are not limited to; anesthesia complications, injury/perforation of the bowel, infection and bleeding.  Patient given the opportunity to review the full consent form.      2. Gastroesophageal reflux disease without esophagitis  -Continue omeprazole 40 mg daily  Also recommend:  - avoid NSAIDS  - avoid eating within 3 hours of sleeping  - elevated head of bed 4-6 inches while sleeping  - avoid trigger foods  - recommend daily supply of calcium and vitamin D    3. Small intestinal bacterial overgrowth  -Positive test on May 6, 2024    4. Irritable bowel syndrome with diarrhea  -Trial of Xifaxan 550 mg 3 times daily for 14 days with 2 refills    5. History of colon polyps  -Last colonoscopy May 2022 with multiple polyps removed  -Next colonoscopy due May 2025    6. Iron Deficiency  -On May 3, 2024 TIBC 432, iron 52, percent saturation low at 12 and ferritin low at 15.  CBC with normal hemoglobin at 11.9 and normal indices.  -This is likely poor absorption second to history of Michael-en-Y gastric bypass  -She is scheduled for iron infusion  -Hematology or primary care to follow lab work and provide any supplemental iron  -She will be due for colonoscopy next year      --------------------------------------------------------------------------------------------------------------------    Chief Complaint: Acute on chronic abdominal pain    HPI: Fariba Garcia is a 68 y.o. female new to me with past medical history of chronic abdominal pain, GERD, myofascial pain, hypertension, irritable bowel with diarrhea, history of colon polyps, small  "intestinal bacterial overgrowth, obesity, history of Michael-en-Y gastric bypass, vitamin D deficiency, mild intermittent asthma, fatty liver disease and iron deficiency from postsurgical malabsorption who presents today for follow up for her abdominal pain.  Chronic RUQ abdominal pain \"for many years\" with worsening postprandial RUQ abdominal pain for the last few months. She has had an extensive evaluation including an UGI series (3/2021), EGD (4/2021), CT A/P (4/2021) and colonoscopy (5/2022) which has been grossly unrevealing in regards to an etiology for her abdominal pain.  She takes Bentyl with not much relief.    Answers submitted by the patient for this visit:  Abdominal Pain Questionnaire (Submitted on 5/22/2024)  Chief Complaint: Abdominal pain  Chronicity: chronic  Onset: more than 1 year ago  Onset quality: gradual  Frequency: 2 to 4 times per day  Episode duration: 5 Weeks  Progression since onset: waxing and waning  Pain location: RUQ, epigastric region, suprapubic region, right flank  Pain - numeric: 7/10  Pain quality: burning, cramping, a sensation of fullness, sharp  Radiates to: RLQ, RUQ, epigastric region, right shoulder, back  anorexia: Yes  arthralgias: Yes  diarrhea: Yes  flatus: Yes  frequency: Yes  myalgias: Yes  Aggravated by: bowel movement, drinking alcohol, eating  Relieved by: bowel movements, eating, liquids, movement, passing flatus, recumbency, urination  Diagnostic workup: CT scan    Anemia  Years of known anemia - Years  Overt bleeding (eg  vomiting blood, coughing blood, urinating blood, black or bloody stools) - No  History of bariatric surgery - No  History of bowel resection - No  History of blood transfusions - during surgery> 15 years ago  History of Iron supplementation - Yes , years ago and upcoming  Vegetarian - No  Menstrual Cycle - N/A        Patient denies any nausea, vomiting, dysphagia, unexpected weight loss, constipation, blood in stool, or black tarry stools. "     Endoscopy History:  EGD -April 2021  Colonoscopy -May 2022 with multiple polyps removed    Review of Systems:   General: negative for fatigue, fever, night sweats or unexpected weight loss  Psychological: negative for anxiety or depression  Ophthalmic: negative for blurry vision or scleral icterus  ENT: negative for headaches, sore throat or dysphagia  Hematological and Lymphatic: negative for pallor or swollen lymph nodes  Respiratory: negative for cough, shortness of breath or wheezing  Cardiovascular: negative for chest pain, edema or murmur  Gastrointestinal: as mentioned in HPI  Genito-Urinary: negative for dysuria or incontinence  Musculoskeletal: negative for joint pain, joint stiffness or joint swelling  Dermatological: negative for pruritus, rash, or jaundice    Current Medications  Current Outpatient Medications   Medication Sig Dispense Refill   • buPROPion (WELLBUTRIN) 75 mg tablet Take 1 tablet (75 mg total) by mouth 2 (two) times a day (Patient taking differently: Take 75 mg by mouth 2 (two) times a day Waiting to start as of 5/29/24) 60 tablet 1   • calcium carbonate (OS-BEAU) 1250 (500 Ca) MG chewable tablet Chew 1 tablet daily     • Cholecalciferol (Vitamin D-3) 25 MCG (1000 UT) CAPS Take 2 capsules by mouth Daily 400 UNIT TABLET 10 MCG (400 UNIT)     • dicyclomine (BENTYL) 10 mg capsule Take 1 capsule (10 mg total) by mouth as needed (take once daily as needed) 90 capsule 1   • famotidine (PEPCID) 40 MG tablet Take 1 tablet (40 mg total) by mouth in the morning 90 tablet 2   • fluticasone (FLONASE) 50 mcg/act nasal spray 2 sprays into each nostril daily (Patient taking differently: 2 sprays into each nostril if needed for allergies) 16 g 3   • lisinopril-hydrochlorothiazide (PRINZIDE,ZESTORETIC) 20-12.5 MG per tablet Take 1 tablet by mouth daily 90 tablet 1   • magnesium citrate solution Take by mouth once As needed     • naltrexone (REVIA) 50 mg tablet Take 1/2 tablet (25mg) once daily in the  morning then after 1 week increase to 1/2 tablet (25mg) twice daily in the morning and evening (Patient taking differently: Take 1/2 tablet (25mg) once daily in the morning then after 1 week increase to 1/2 tablet (25mg) twice daily in the morning and evening    Waiting to start as of 5/29/24) 30 tablet 1   • Omega-3 Fatty Acids (fish oil) 1,000 mg Take 1 capsule (1,000 mg total) by mouth 2 (two) times a day (Patient taking differently: Take 1,000 mg by mouth daily) 60 capsule 6   • omeprazole (PriLOSEC) 40 MG capsule Take 1 capsule (40 mg total) by mouth daily (Patient taking differently: Take 40 mg by mouth daily Taking as needed) 30 capsule 5   • patient supplied medication Seth focus one tab as needed OTC     • saccharomyces boulardii (FLORASTOR) 250 mg capsule Take 250 mg by mouth in the morning     • vitamin B-12 (CYANOCOBALAMIN) 50 MCG tablet Take 50 mcg by mouth daily     • cholecalciferol (VITAMIN D3) 400 units tablet Take 400 Units by mouth daily (Patient not taking: Reported on 4/10/2024)     • dicyclomine (BENTYL) 10 mg capsule Take 10 mg by mouth 4 (four) times a day (before meals and at bedtime) Pt states 1 time daily (Patient not taking: Reported on 4/10/2024)     • multivitamin (THERAGRAN) TABS Take 1 tablet by mouth daily       No current facility-administered medications for this visit.       Past Medical History  Past Medical History:   Diagnosis Date   • Arthritis    • Asthma    • Back pain    • Chronic pain disorder     hips into the legs   • Colon polyp    • DVT (deep venous thrombosis) ([facility]) 2006   • Gastric bypass status for obesity     yarelis en y   • GERD (gastroesophageal reflux disease)    • Hiatal hernia    • History of transfusion 2006    after gastric bypass surgery, no reactions   • Hypertension    • Irritable bowel syndrome    • Kidney stone    • Muscle weakness     bilateral legs   • Numbness and tingling     bilateral feet   • Pneumonia    • Sacroiliitis ([facility]) 5/15/2018   • Spinal  stenosis    • Tinnitus     occassionally   • Vertigo    • Wears glasses        Past Surgical History  Past Surgical History:   Procedure Laterality Date   • APPENDECTOMY     • BACK SURGERY  2018    L4-5 fusion   • BARIATRIC SURGERY  2005    yarelis en y- pt states the procedure was done incorrectly which lead to additional surgeries from -   • CARPAL TUNNEL RELEASE Right    •  SECTION      x1   • CHOLECYSTECTOMY     • COLON SURGERY      partial removal of the small bowel-necrosis-between -   • COLONOSCOPY     • COLONOSCOPY W/ BIOPSIES AND POLYPECTOMY     • EPIDURAL BLOCK INJECTION N/A 2021    Procedure: C6 C7 cervical epidural steroid injection ( 58330);  Surgeon: Jeremi Gamino MD;  Location: [facility] MAIN OR;  Service: Pain Management    • EPIDURAL BLOCK INJECTION N/A 2021    Procedure: C6 C7 cervical epidural steroid injection (48024);  Surgeon: Jeremi Gamino MD;  Location: [facility] MAIN OR;  Service: Pain Management    • FLEXIBLE BRONCHOSCOPY W/ UPPER ENDOSCOPY     • GASTRECTOMY      after gastric bypass-(failed surgery per pt) -   • HERNIA REPAIR      incisional hernias-diaphragm area   • MA ARTHROCENTESIS ASPIR&/INJ SMALL JT/BURSA W/O US N/A 2018    Procedure: Coccygeal Injection & Ganglion Impar Block;  Surgeon: Jeremi Gamino MD;  Location: [facility] MAIN OR;  Service: Pain Management    • MA ARTHRODESIS POSTERIOR INTERBODY 1 NTRSPC LUMBAR N/A 2018    Procedure: L4-5 DECOMPRESSION INTERBODY INSTRUMENTED FUSION;  Surgeon: Martinez Garcia MD;  Location: [facility] Main OR;  Service: Orthopedics   • MA COLONOSCOPY FLX DX W/COLLJ SPEC WHEN PFRMD N/A 2018    Procedure: COLONOSCOPY;  Surgeon: Terry Booth MD;  Location: [facility] GI LAB;  Service: Gastroenterology   • MA ESOPHAGOGASTRODUODENOSCOPY TRANSORAL DIAGNOSTIC N/A 2018    Procedure: ESOPHAGOGASTRODUODENOSCOPY (EGD);  Surgeon: Terry Booth MD;  Location: [facility] GI LAB;  Service: Gastroenterology   • MA  INJECT SI JOINT ARTHRGRPHY&/ANES/STEROID W/EMMANUEL Right 05/25/2018    Procedure: Rt Sacroiliac Joint Injection;  Surgeon: Jeremi Gamino MD;  Location: [facility] MAIN OR;  Service: Pain Management    • ME INJECT SI JOINT ARTHRGRPHY&/ANES/STEROID W/EMMANUEL Right 05/01/2019    Procedure: Sacroiliac Joint Injection (54502);  Surgeon: Jeremi Gamino MD;  Location: [facility] MAIN OR;  Service: Pain Management    • TOENAIL EXCISION  2024    ingrown toenails fixed   • TONSILECTOMY, ADENOIDECTOMY, BILATERAL MYRINGOTOMY AND TUBES     • TONSILLECTOMY         Past Social History   Social History     Socioeconomic History   • Marital status:      Spouse name: None   • Number of children: None   • Years of education: None   • Highest education level: None   Occupational History   • None   Tobacco Use   • Smoking status: Never     Passive exposure: Never   • Smokeless tobacco: Never   Vaping Use   • Vaping status: Never Used   Substance and Sexual Activity   • Alcohol use: Yes     Alcohol/week: 7.0 standard drinks of alcohol     Types: 5 Cans of beer, 2 Shots of liquor per week     Comment: socially   • Drug use: Not Currently     Types: Marijuana     Comment: has medical marijuana card. lozenges   • Sexual activity: Yes     Birth control/protection: Post-menopausal   Other Topics Concern   • None   Social History Narrative   • None     Social Determinants of Health     Financial Resource Strain: High Risk (11/22/2022)    Overall Financial Resource Strain (CARDIA)    • Difficulty of Paying Living Expenses: Hard   Food Insecurity: Not on file   Transportation Needs: No Transportation Needs (11/22/2022)    PRAPARE - Transportation    • Lack of Transportation (Medical): No    • Lack of Transportation (Non-Medical): No   Physical Activity: Not on file   Stress: Not on file   Social Connections: Not on file   Intimate Partner Violence: Not on file   Housing Stability: Not on file       Vital Signs  Vitals:    05/29/24 1102   BP: 128/81   BP  "Location: Left arm   Patient Position: Sitting   Cuff Size: Standard   Pulse: 88   SpO2: 98%   Weight: 113 kg (249 lb)   Height: 5' 3\" (1.6 m)       Physical Exam:  General appearance: alert, cooperative, no distress  HEENT: normocephalic, anicteric, no eye erythema or discharge, no oropharyngeal thrush  Neck: supple  Lungs: CTA b/l, no rales, rhonchi, or wheezing, unlabored respirations  Heart: RRR, no murmur, rubs, or gallops  Abdomen: soft, non-tender, non-distended, normal bowel sounds, no masses or organomegaly  Rectal: deferred  Extremities: no cyanosis, clubbing, or edema  Musculoskeletal: normal gait  Skin: color and texture normal, no jaundice, no rashes or lesions  Psychiatric: alert and oriented, normal affect and behavior                                                          Eric Umaña PA-C    "

## 2024-05-29 NOTE — TELEPHONE ENCOUNTER
PA for Xifaxan    Submitted via    [x][id number]   []Surescripts-Case ID #   []Faxed to plan   []Other website   []Phone call Case ID #   Turnaround time for your insurance to make a decision on your Prior Authorization can take 7-21 business days.

## 2024-05-30 ENCOUNTER — TELEPHONE (OUTPATIENT)
Dept: BARIATRICS | Facility: CLINIC | Age: 69
End: 2024-05-30

## 2024-06-02 DIAGNOSIS — I10 ESSENTIAL HYPERTENSION: ICD-10-CM

## 2024-06-02 RX ORDER — LISINOPRIL AND HYDROCHLOROTHIAZIDE 20; 12.5 MG/1; MG/1
1 TABLET ORAL DAILY
Qty: 90 TABLET | Refills: 1 | Status: SHIPPED | OUTPATIENT
Start: 2024-06-02

## 2024-06-03 DIAGNOSIS — K63.8219 SMALL INTESTINAL BACTERIAL OVERGROWTH: Primary | ICD-10-CM

## 2024-06-03 RX ORDER — METRONIDAZOLE 500 MG/1
500 TABLET ORAL EVERY 8 HOURS SCHEDULED
Qty: 30 TABLET | Refills: 0 | Status: SHIPPED | OUTPATIENT
Start: 2024-06-03 | End: 2024-06-13

## 2024-06-05 ENCOUNTER — HOSPITAL ENCOUNTER (OUTPATIENT)
Dept: INFUSION CENTER | Facility: HOSPITAL | Age: 69
Discharge: HOME | End: 2024-06-05
Attending: SURGERY
Payer: MEDICARE

## 2024-06-05 VITALS
TEMPERATURE: 97 F | SYSTOLIC BLOOD PRESSURE: 138 MMHG | RESPIRATION RATE: 18 BRPM | DIASTOLIC BLOOD PRESSURE: 67 MMHG | HEART RATE: 91 BPM | OXYGEN SATURATION: 94 %

## 2024-06-05 DIAGNOSIS — E61.1 IRON DEFICIENCY: ICD-10-CM

## 2024-06-05 DIAGNOSIS — K91.2 POSTSURGICAL MALABSORPTION: Primary | ICD-10-CM

## 2024-06-05 PROCEDURE — 96365 THER/PROPH/DIAG IV INF INIT: CPT

## 2024-06-05 RX ORDER — DIPHENHYDRAMINE HCL 25 MG
25 TABLET ORAL ONCE
Start: 2024-06-12 | End: 2024-06-12

## 2024-06-05 RX ORDER — SODIUM CHLORIDE 9 MG/ML
20 INJECTION, SOLUTION INTRAVENOUS ONCE
Status: COMPLETED | OUTPATIENT
Start: 2024-06-05 | End: 2024-06-05

## 2024-06-05 RX ORDER — DIPHENHYDRAMINE HCL 25 MG
25 TABLET ORAL ONCE
Status: COMPLETED | OUTPATIENT
Start: 2024-06-05 | End: 2024-06-05

## 2024-06-05 RX ORDER — SODIUM CHLORIDE 9 MG/ML
20 INJECTION, SOLUTION INTRAVENOUS ONCE
OUTPATIENT
Start: 2024-06-12

## 2024-06-05 RX ADMIN — IRON SUCROSE 300 MG: 20 INJECTION, SOLUTION INTRAVENOUS at 15:36

## 2024-06-05 RX ADMIN — SODIUM CHLORIDE 20 ML/HR: 0.9 INJECTION, SOLUTION INTRAVENOUS at 15:30

## 2024-06-05 RX ADMIN — DIPHENHYDRAMINE HYDROCHLORIDE 25 MG: 25 TABLET ORAL at 15:31

## 2024-06-05 NOTE — PROGRESS NOTES
Fariba GUZMAN [place]  tolerated venofer well with no complications. Aware of future appt on 6/11/24 at 1230. AVS printed. Patient left clinic ambulatory.

## 2024-06-05 NOTE — PLAN OF CARE
Problem: Potential for Falls  Goal: Patient will remain free of falls  Description: INTERVENTIONS:  - Educate patient/family on patient safety including physical limitations  - Instruct patient to call for assistance with activity   - Consult OT/PT to assist with strengthening/mobility   - Keep Call bell within reach  - Keep bed low and locked with side rails adjusted as appropriate  - Keep care items and personal belongings within reach  - Initiate and maintain comfort rounds    - Apply yellow socks and bracelet for high fall risk patients  - Consider moving patient to room near nurses station  Outcome: Progressing     
No

## 2024-06-11 ENCOUNTER — HOSPITAL ENCOUNTER (OUTPATIENT)
Dept: INFUSION CENTER | Facility: HOSPITAL | Age: 69
Discharge: HOME | End: 2024-06-11
Attending: SURGERY
Payer: MEDICARE

## 2024-06-11 VITALS
HEART RATE: 91 BPM | RESPIRATION RATE: 20 BRPM | DIASTOLIC BLOOD PRESSURE: 56 MMHG | OXYGEN SATURATION: 95 % | TEMPERATURE: 97.8 F | SYSTOLIC BLOOD PRESSURE: 123 MMHG

## 2024-06-11 DIAGNOSIS — K91.2 POSTSURGICAL MALABSORPTION: Primary | ICD-10-CM

## 2024-06-11 DIAGNOSIS — E61.1 IRON DEFICIENCY: ICD-10-CM

## 2024-06-11 PROCEDURE — 96365 THER/PROPH/DIAG IV INF INIT: CPT

## 2024-06-11 RX ORDER — SODIUM CHLORIDE 9 MG/ML
20 INJECTION, SOLUTION INTRAVENOUS ONCE
Status: COMPLETED | OUTPATIENT
Start: 2024-06-11 | End: 2024-06-11

## 2024-06-11 RX ORDER — DIPHENHYDRAMINE HCL 25 MG
25 TABLET ORAL ONCE
Status: CANCELLED
Start: 2024-06-20 | End: 2024-06-18

## 2024-06-11 RX ORDER — SODIUM CHLORIDE 9 MG/ML
20 INJECTION, SOLUTION INTRAVENOUS ONCE
Status: CANCELLED | OUTPATIENT
Start: 2024-06-20

## 2024-06-11 RX ORDER — DIPHENHYDRAMINE HCL 25 MG
25 TABLET ORAL ONCE
Status: COMPLETED | OUTPATIENT
Start: 2024-06-11 | End: 2024-06-11

## 2024-06-11 RX ADMIN — IRON SUCROSE 300 MG: 20 INJECTION, SOLUTION INTRAVENOUS at 13:19

## 2024-06-11 RX ADMIN — SODIUM CHLORIDE 20 ML/HR: 9 INJECTION, SOLUTION INTRAVENOUS at 12:50

## 2024-06-11 RX ADMIN — DIPHENHYDRAMINE HYDROCHLORIDE 25 MG: 25 TABLET ORAL at 12:50

## 2024-06-11 NOTE — PROGRESS NOTES
Fariba Garcia  tolerated treatment well with no complications.      Fariba Garcia is aware of future appt on 6/20/24 at 1300.     AVS printed and given to Fariba Garcia:    No (Declined by Fariba Garcia)

## 2024-06-13 DIAGNOSIS — K21.9 GASTROESOPHAGEAL REFLUX DISEASE, UNSPECIFIED WHETHER ESOPHAGITIS PRESENT: ICD-10-CM

## 2024-06-13 DIAGNOSIS — R13.10 DYSPHAGIA, UNSPECIFIED TYPE: ICD-10-CM

## 2024-06-13 DIAGNOSIS — R19.7 DIARRHEA, UNSPECIFIED TYPE: ICD-10-CM

## 2024-06-13 RX ORDER — DICYCLOMINE HYDROCHLORIDE 10 MG/1
10 CAPSULE ORAL AS NEEDED
Qty: 90 CAPSULE | Refills: 1 | Status: SHIPPED | OUTPATIENT
Start: 2024-06-13

## 2024-06-13 NOTE — TELEPHONE ENCOUNTER
Received refill request for Dicyclomine 10 mg 90 day supply be sent to:     [id number] - [place], [place] - [address] [address]  [address] [place] [id number]  Phone: [phone]  Fax: [phone]

## 2024-06-19 ENCOUNTER — HOSPITAL ENCOUNTER (OUTPATIENT)
Dept: SLEEP CENTER | Facility: CLINIC | Age: 69
Discharge: HOME | End: 2024-06-19
Payer: MEDICARE

## 2024-06-19 DIAGNOSIS — G47.30 SLEEP APNEA, UNSPECIFIED TYPE: ICD-10-CM

## 2024-06-19 PROCEDURE — G0399 HOME SLEEP TEST/TYPE 3 PORTA: HCPCS

## 2024-06-19 NOTE — PROGRESS NOTES
Home Sleep Study Documentation    HOME STUDY DEVICE: Noxturnal no                                           Hedy G3 yes device # 27      Pre-Sleep Home Study:    Set-up and instructions performed by: Karen    Technician performed demonstration for Patient: yes    Return demonstration performed by Patient: yes    Written instructions provided to Patient: yes    Patient signed consent form: yes        Post-Sleep Home Study:    Additional comments by Patient: pending    Home Sleep Study Failed:pending    Failure reason: pending    Reported or Detected: pending    Scored by: pending

## 2024-06-20 ENCOUNTER — HOSPITAL ENCOUNTER (OUTPATIENT)
Dept: INFUSION CENTER | Facility: HOSPITAL | Age: 69
Discharge: HOME | End: 2024-06-20
Attending: SURGERY
Payer: MEDICARE

## 2024-06-20 VITALS
RESPIRATION RATE: 18 BRPM | SYSTOLIC BLOOD PRESSURE: 130 MMHG | OXYGEN SATURATION: 90 % | DIASTOLIC BLOOD PRESSURE: 60 MMHG | TEMPERATURE: 98.1 F | HEART RATE: 111 BPM

## 2024-06-20 DIAGNOSIS — K91.2 POSTSURGICAL MALABSORPTION: Primary | ICD-10-CM

## 2024-06-20 DIAGNOSIS — E61.1 IRON DEFICIENCY: ICD-10-CM

## 2024-06-20 PROCEDURE — 96365 THER/PROPH/DIAG IV INF INIT: CPT

## 2024-06-20 RX ORDER — DIPHENHYDRAMINE HCL 25 MG
25 TABLET ORAL ONCE
Status: COMPLETED | OUTPATIENT
Start: 2024-06-20 | End: 2024-06-20

## 2024-06-20 RX ORDER — DIPHENHYDRAMINE HCL 25 MG
25 TABLET ORAL ONCE
Status: CANCELLED
Start: 2024-06-28 | End: 2024-06-25

## 2024-06-20 RX ORDER — SODIUM CHLORIDE 9 MG/ML
20 INJECTION, SOLUTION INTRAVENOUS ONCE
Status: COMPLETED | OUTPATIENT
Start: 2024-06-20 | End: 2024-06-20

## 2024-06-20 RX ORDER — SODIUM CHLORIDE 9 MG/ML
20 INJECTION, SOLUTION INTRAVENOUS ONCE
Status: CANCELLED | OUTPATIENT
Start: 2024-06-28

## 2024-06-20 RX ADMIN — SODIUM CHLORIDE 20 ML/HR: 0.9 INJECTION, SOLUTION INTRAVENOUS at 13:13

## 2024-06-20 RX ADMIN — DIPHENHYDRAMINE HYDROCHLORIDE 25 MG: 25 TABLET ORAL at 13:13

## 2024-06-20 RX ADMIN — IRON SUCROSE 300 MG: 20 INJECTION, SOLUTION INTRAVENOUS at 13:14

## 2024-06-22 ENCOUNTER — APPOINTMENT (EMERGENCY)
Dept: RADIOLOGY | Facility: HOSPITAL | Age: 69
End: 2024-06-22
Payer: MEDICARE

## 2024-06-22 ENCOUNTER — HOSPITAL ENCOUNTER (EMERGENCY)
Facility: HOSPITAL | Age: 69
Discharge: HOME | End: 2024-06-22
Attending: EMERGENCY MEDICINE
Payer: MEDICARE

## 2024-06-22 VITALS
DIASTOLIC BLOOD PRESSURE: 70 MMHG | SYSTOLIC BLOOD PRESSURE: 124 MMHG | OXYGEN SATURATION: 94 % | RESPIRATION RATE: 22 BRPM | HEART RATE: 91 BPM | TEMPERATURE: 99.9 F

## 2024-06-22 DIAGNOSIS — M54.50 ACUTE LOW BACK PAIN: Primary | ICD-10-CM

## 2024-06-22 LAB
ALBUMIN SERPL BCG-MCNC: 4.2 G/DL (ref 3.5–5)
ALP SERPL-CCNC: 57 U/L (ref 34–104)
ALT SERPL W P-5'-P-CCNC: 20 U/L (ref 7–52)
ANION GAP SERPL CALCULATED.3IONS-SCNC: 10 MMOL/L (ref 4–13)
AST SERPL W P-5'-P-CCNC: 23 U/L (ref 13–39)
BASOPHILS # BLD AUTO: 0.06 THOUSANDS/ÂΜL (ref 0–0.1)
BASOPHILS NFR BLD AUTO: 1 % (ref 0–1)
BILIRUB SERPL-MCNC: 0.55 MG/DL (ref 0.2–1)
BUN SERPL-MCNC: 19 MG/DL (ref 5–25)
CALCIUM SERPL-MCNC: 9.5 MG/DL (ref 8.4–10.2)
CHLORIDE SERPL-SCNC: 105 MMOL/L (ref 96–108)
CO2 SERPL-SCNC: 24 MMOL/L (ref 21–32)
CREAT SERPL-MCNC: 1.13 MG/DL (ref 0.6–1.3)
EOSINOPHIL # BLD AUTO: 0.24 THOUSAND/ÂΜL (ref 0–0.61)
EOSINOPHIL NFR BLD AUTO: 3 % (ref 0–6)
ERYTHROCYTE [DISTWIDTH] IN BLOOD BY AUTOMATED COUNT: 15.7 % (ref 11.6–15.1)
GFR SERPL CREATININE-BSD FRML MDRD: 50 ML/MIN/1.73SQ M
GLUCOSE SERPL-MCNC: 98 MG/DL (ref 65–140)
HCT VFR BLD AUTO: 37.3 % (ref 34.8–46.1)
HGB BLD-MCNC: 12 G/DL (ref 11.5–15.4)
IMM GRANULOCYTES # BLD AUTO: 0.03 THOUSAND/UL (ref 0–0.2)
IMM GRANULOCYTES NFR BLD AUTO: 0 % (ref 0–2)
LYMPHOCYTES # BLD AUTO: 2.33 THOUSANDS/ÂΜL (ref 0.6–4.47)
LYMPHOCYTES NFR BLD AUTO: 27 % (ref 14–44)
MCH RBC QN AUTO: 31.1 PG (ref 26.8–34.3)
MCHC RBC AUTO-ENTMCNC: 32.2 G/DL (ref 31.4–37.4)
MCV RBC AUTO: 97 FL (ref 82–98)
MONOCYTES # BLD AUTO: 0.63 THOUSAND/ÂΜL (ref 0.17–1.22)
MONOCYTES NFR BLD AUTO: 7 % (ref 4–12)
NEUTROPHILS # BLD AUTO: 5.49 THOUSANDS/ÂΜL (ref 1.85–7.62)
NEUTS SEG NFR BLD AUTO: 62 % (ref 43–75)
NRBC BLD AUTO-RTO: 0 /100 WBCS
PLATELET # BLD AUTO: 292 THOUSANDS/UL (ref 149–390)
PMV BLD AUTO: 8.6 FL (ref 8.9–12.7)
POTASSIUM SERPL-SCNC: 3.9 MMOL/L (ref 3.5–5.3)
PROT SERPL-MCNC: 7.1 G/DL (ref 6.4–8.4)
RBC # BLD AUTO: 3.86 MILLION/UL (ref 3.81–5.12)
SODIUM SERPL-SCNC: 139 MMOL/L (ref 135–147)
WBC # BLD AUTO: 8.78 THOUSAND/UL (ref 4.31–10.16)

## 2024-06-22 PROCEDURE — 36415 COLL VENOUS BLD VENIPUNCTURE: CPT | Performed by: EMERGENCY MEDICINE

## 2024-06-22 PROCEDURE — 72131 CT LUMBAR SPINE W/O DYE: CPT

## 2024-06-22 PROCEDURE — 85025 COMPLETE CBC W/AUTO DIFF WBC: CPT | Performed by: EMERGENCY MEDICINE

## 2024-06-22 PROCEDURE — 99285 EMERGENCY DEPT VISIT HI MDM: CPT | Performed by: EMERGENCY MEDICINE

## 2024-06-22 PROCEDURE — 93005 ELECTROCARDIOGRAM TRACING: CPT

## 2024-06-22 PROCEDURE — 99284 EMERGENCY DEPT VISIT MOD MDM: CPT

## 2024-06-22 PROCEDURE — 96374 THER/PROPH/DIAG INJ IV PUSH: CPT

## 2024-06-22 PROCEDURE — 80053 COMPREHEN METABOLIC PANEL: CPT | Performed by: EMERGENCY MEDICINE

## 2024-06-22 RX ORDER — TIZANIDINE 2 MG/1
2 TABLET ORAL EVERY 8 HOURS PRN
Qty: 12 TABLET | Refills: 0 | Status: SHIPPED | OUTPATIENT
Start: 2024-06-22

## 2024-06-22 RX ORDER — LIDOCAINE 50 MG/G
1 PATCH TOPICAL ONCE
Status: DISCONTINUED | OUTPATIENT
Start: 2024-06-22 | End: 2024-06-22 | Stop reason: HOSPADM

## 2024-06-22 RX ORDER — ACETAMINOPHEN 325 MG/1
975 TABLET ORAL ONCE
Status: COMPLETED | OUTPATIENT
Start: 2024-06-22 | End: 2024-06-22

## 2024-06-22 RX ORDER — METHOCARBAMOL 500 MG/1
500 TABLET, FILM COATED ORAL ONCE
Status: COMPLETED | OUTPATIENT
Start: 2024-06-22 | End: 2024-06-22

## 2024-06-22 RX ORDER — KETOROLAC TROMETHAMINE 30 MG/ML
15 INJECTION, SOLUTION INTRAMUSCULAR; INTRAVENOUS ONCE
Status: COMPLETED | OUTPATIENT
Start: 2024-06-22 | End: 2024-06-22

## 2024-06-22 RX ADMIN — ACETAMINOPHEN 975 MG: 325 TABLET ORAL at 18:47

## 2024-06-22 RX ADMIN — METHOCARBAMOL TABLETS 500 MG: 500 TABLET, COATED ORAL at 18:47

## 2024-06-22 RX ADMIN — LIDOCAINE 5% 1 PATCH: 700 PATCH TOPICAL at 18:47

## 2024-06-22 RX ADMIN — KETOROLAC TROMETHAMINE 15 MG: 30 INJECTION, SOLUTION INTRAMUSCULAR at 18:48

## 2024-06-22 NOTE — ED PROVIDER NOTES
History  Chief Complaint   Patient presents with    Back Pain     Low back pain and pain into R gluteus. States was up and down today a lot organizing things in garage, hx of lumbar surgery     Pt is a 69yo F who presents for back pain.  Patient reports she has a history of back problems and is status post fusion.  Patient states she has done physical therapy and is currently doing water therapy.  Patient states typically on a day-to-day basis she does not require medication and does well in regards to pain.  Patient reports that today she was helping her children clean out the garage.  She states she was getting up and down from a toddler sized chair multiple times.  Patient notes no specific incident or trauma to her back.  However, states after going inside and sitting on the couch, she was unable to get off the couch secondary to right-sided back pain.  Patient denies any loss of bladder or bowel.  Patient denies any numbness or tingling.  Patient denies any fevers or chills.  Patient did not take anything for pain prior to arrival.  Patient also reports that recently she felt as though she was declining overall.  She states she has a generalized weakness and been feeling not herself.        Prior to Admission Medications   Prescriptions Last Dose Informant Patient Reported? Taking?   Cholecalciferol (Vitamin D-3) 25 MCG (1000 UT) CAPS  Self Yes No   Sig: Take 2 capsules by mouth Daily 400 UNIT TABLET 10 MCG (400 UNIT)   Omega-3 Fatty Acids (fish oil) 1,000 mg  Self No No   Sig: Take 1 capsule (1,000 mg total) by mouth 2 (two) times a day   Patient taking differently: Take 1,000 mg by mouth daily   buPROPion (WELLBUTRIN) 75 mg tablet  Self No No   Sig: Take 1 tablet (75 mg total) by mouth 2 (two) times a day   Patient taking differently: Take 75 mg by mouth 2 (two) times a day Waiting to start as of 5/29/24   calcium carbonate (OS-BEAU) 1250 (500 Ca) MG chewable tablet  Self Yes No   Sig: Chew 1 tablet daily    cholecalciferol (VITAMIN D3) 400 units tablet  Self Yes No   Sig: Take 400 Units by mouth daily   Patient not taking: Reported on 4/10/2024   dicyclomine (BENTYL) 10 mg capsule  Self Yes No   Sig: Take 10 mg by mouth 4 (four) times a day (before meals and at bedtime) Pt states 1 time daily   Patient not taking: Reported on 4/10/2024   dicyclomine (BENTYL) 10 mg capsule   No No   Sig: Take 1 capsule (10 mg total) by mouth as needed (take once daily as needed)   famotidine (PEPCID) 40 MG tablet  Self No No   Sig: Take 1 tablet (40 mg total) by mouth in the morning   fluticasone (FLONASE) 50 mcg/act nasal spray  Self No No   Si sprays into each nostril daily   Patient taking differently: 2 sprays into each nostril if needed for allergies   lisinopril-hydrochlorothiazide (PRINZIDE,ZESTORETIC) 20-12.5 MG per tablet   No No   Sig: TAKE ONE TABLET BY MOUTH EVERY DAY   magnesium citrate solution  Self Yes No   Sig: Take by mouth once As needed   metroNIDAZOLE (FLAGYL PO)   Yes Yes   Sig: Take by mouth every 8 (eight) hours   multivitamin (THERAGRAN) TABS  Self Yes No   Sig: Take 1 tablet by mouth daily   naltrexone (REVIA) 50 mg tablet  Self No No   Sig: Take 1/2 tablet (25mg) once daily in the morning then after 1 week increase to 1/2 tablet (25mg) twice daily in the morning and evening   Patient taking differently: Take 1/2 tablet (25mg) once daily in the morning then after 1 week increase to 1/2 tablet (25mg) twice daily in the morning and evening    Waiting to start as of 24   omeprazole (PriLOSEC) 40 MG capsule  Self No No   Sig: Take 1 capsule (40 mg total) by mouth daily   Patient taking differently: Take 40 mg by mouth daily Taking as needed   patient supplied medication  Self Yes No   Sig: Seth focus one tab as needed OTC   saccharomyces boulardii (FLORASTOR) 250 mg capsule  Self Yes No   Sig: Take 250 mg by mouth in the morning   vitamin B-12 (CYANOCOBALAMIN) 50 MCG tablet  Self Yes No   Sig: Take 50  mcg by mouth daily      Facility-Administered Medications: None       Past Medical History:   Diagnosis Date    Arthritis     Asthma     Back pain     Chronic pain disorder     hips into the legs    Colon polyp     DVT (deep venous thrombosis) (HCC)     Gastric bypass status for obesity     yarelis en y    GERD (gastroesophageal reflux disease)     Hiatal hernia     History of transfusion 2006    after gastric bypass surgery, no reactions    Hypertension     Irritable bowel syndrome     Kidney stone     Muscle weakness     bilateral legs    Numbness and tingling     bilateral feet    Pneumonia     Sacroiliitis (HCC) 5/15/2018    Spinal stenosis     Tinnitus     occassionally    Vertigo     Wears glasses        Past Surgical History:   Procedure Laterality Date    APPENDECTOMY      BACK SURGERY  2018    L4-5 fusion    BARIATRIC SURGERY      yarelis en y- pt states the procedure was done incorrectly which lead to additional surgeries from -    CARPAL TUNNEL RELEASE Right      SECTION      x1    CHOLECYSTECTOMY      COLON SURGERY      partial removal of the small bowel-necrosis-between 2006    COLONOSCOPY      COLONOSCOPY W/ BIOPSIES AND POLYPECTOMY      EPIDURAL BLOCK INJECTION N/A 2021    Procedure: C6 C7 cervical epidural steroid injection ( 75954);  Surgeon: Jeremi Gamino MD;  Location: [facility] MAIN OR;  Service: Pain Management     EPIDURAL BLOCK INJECTION N/A 2021    Procedure: C6 C7 cervical epidural steroid injection (58969);  Surgeon: Jeremi Gamino MD;  Location: [facility] MAIN OR;  Service: Pain Management     FLEXIBLE BRONCHOSCOPY W/ UPPER ENDOSCOPY      GASTRECTOMY      after gastric bypass-(failed surgery per pt) 2006    HERNIA REPAIR      incisional hernias-diaphragm area    KS ARTHROCENTESIS ASPIR&/INJ SMALL JT/BURSA W/O US N/A 2018    Procedure: Coccygeal Injection & Ganglion Impar Block;  Surgeon: Jeremi Gamino MD;  Location: [facility] MAIN OR;  Service: Pain  Management     MA ARTHRODESIS POSTERIOR INTERBODY 1 NTRSPC LUMBAR N/A 06/27/2018    Procedure: L4-5 DECOMPRESSION INTERBODY INSTRUMENTED FUSION;  Surgeon: Martinez Garcia MD;  Location: [facility] OR;  Service: Orthopedics    MA COLONOSCOPY FLX DX W/COLLJ SPEC WHEN PFRMD N/A 08/24/2018    Procedure: COLONOSCOPY;  Surgeon: Terry Booth MD;  Location: [facility] GI LAB;  Service: Gastroenterology    MA ESOPHAGOGASTRODUODENOSCOPY TRANSORAL DIAGNOSTIC N/A 02/19/2018    Procedure: ESOPHAGOGASTRODUODENOSCOPY (EGD);  Surgeon: Terry Booth MD;  Location: [facility] GI LAB;  Service: Gastroenterology    MA INJECT SI JOINT ARTHRGRPHY&/ANES/STEROID W/EMMANUEL Right 05/25/2018    Procedure: Rt Sacroiliac Joint Injection;  Surgeon: Jeremi Gamino MD;  Location: [facility] MAIN OR;  Service: Pain Management     MA INJECT SI JOINT ARTHRGRPHY&/ANES/STEROID W/EMMANUEL Right 05/01/2019    Procedure: Sacroiliac Joint Injection (83390);  Surgeon: Jeremi Gamino MD;  Location: [facility] MAIN OR;  Service: Pain Management     TOENAIL EXCISION  2024    ingrown toenails fixed    TONSILECTOMY, ADENOIDECTOMY, BILATERAL MYRINGOTOMY AND TUBES      TONSILLECTOMY         Family History   Problem Relation Age of Onset    Diabetes Mother     Aortic aneurysm Father     Cancer Father         prostate    Heart disease Sister         open heart    Cancer Sister         breast    Breast cancer Sister 54    Diabetes Brother     Cancer Brother         spinal cancer    Stroke Brother     Hypertension Brother     Other Daughter         concussion syndrome from MVA    Asperger's syndrome Son         high functioning    No Known Problems Maternal Aunt     No Known Problems Maternal Aunt     Colon cancer Maternal Uncle 60    No Known Problems Paternal Aunt     No Known Problems Paternal Aunt     Diabetes Maternal Grandfather      I have reviewed and agree with the history as documented.    E-Cigarette/Vaping    E-Cigarette Use Never User      E-Cigarette/Vaping Substances    Nicotine  No     THC No     CBD No     Flavoring No     Other No     Unknown No      Social History     Tobacco Use    Smoking status: Never     Passive exposure: Never    Smokeless tobacco: Never   Vaping Use    Vaping status: Never Used   Substance Use Topics    Alcohol use: Yes     Alcohol/week: 7.0 standard drinks of alcohol     Types: 5 Cans of beer, 2 Shots of liquor per week     Comment: socially    Drug use: Not Currently     Types: Marijuana     Comment: has medical marijuana card. lozenges       Review of Systems   Musculoskeletal:  Positive for back pain and gait problem (2/2 back pain).   Neurological:  Positive for weakness.   All other systems reviewed and are negative.      Physical Exam  Physical Exam  Vitals reviewed.   Constitutional:       General: She is not in acute distress.     Appearance: She is well-developed. She is not toxic-appearing or diaphoretic.   HENT:      Head: Normocephalic and atraumatic.      Right Ear: External ear normal.      Left Ear: External ear normal.      Nose: Nose normal.      Mouth/Throat:      Pharynx: Oropharynx is clear.   Eyes:      Extraocular Movements: Extraocular movements intact.      Pupils: Pupils are equal, round, and reactive to light.   Cardiovascular:      Rate and Rhythm: Normal rate and regular rhythm.      Heart sounds: Normal heart sounds. No murmur heard.  Pulmonary:      Effort: Pulmonary effort is normal. No respiratory distress.      Breath sounds: Normal breath sounds.   Abdominal:      General: There is no distension.      Palpations: Abdomen is soft.      Tenderness: There is no abdominal tenderness.   Musculoskeletal:      Cervical back: Normal range of motion and neck supple. No tenderness.      Thoracic back: No tenderness.      Lumbar back: No tenderness. Negative right straight leg raise test and negative left straight leg raise test.      Right lower leg: No edema.      Left lower leg: No edema.      Comments: CMS intact distally in bilateral  lower extremities   Skin:     General: Skin is warm and dry.      Capillary Refill: Capillary refill takes less than 2 seconds.      Coloration: Skin is not pale.      Findings: No erythema or rash.   Neurological:      General: No focal deficit present.      Mental Status: She is alert and oriented to person, place, and time.   Psychiatric:         Speech: Speech normal.         Behavior: Behavior is cooperative.         Vital Signs  ED Triage Vitals   Temperature Pulse Respirations Blood Pressure SpO2   06/22/24 1803 06/22/24 1803 06/22/24 1803 06/22/24 1803 06/22/24 1803   99.9 °F (37.7 °C) 91 22 124/70 94 %      Temp Source Heart Rate Source Patient Position - Orthostatic VS BP Location FiO2 (%)   06/22/24 1803 06/22/24 1803 06/22/24 1803 06/22/24 1803 --   Tympanic Monitor Sitting Left arm       Pain Score       06/22/24 1848       9           Vitals:    06/22/24 1803   BP: 124/70   Pulse: 91   Patient Position - Orthostatic VS: Sitting         Visual Acuity      ED Medications  Medications   lidocaine (LIDODERM) 5 % patch 1 patch (1 patch Topical Medication Applied 6/22/24 1847)   acetaminophen (TYLENOL) tablet 975 mg (975 mg Oral Given 6/22/24 1847)   ketorolac (TORADOL) injection 15 mg (15 mg Intravenous Given 6/22/24 1848)   methocarbamol (ROBAXIN) tablet 500 mg (500 mg Oral Given 6/22/24 1847)       Diagnostic Studies  Results Reviewed       Procedure Component Value Units Date/Time    Comprehensive metabolic panel [[id number]] Collected: 06/22/24 1848    Lab Status: Final result Specimen: Blood from Line, Venous Updated: 06/22/24 1926     Sodium 139 mmol/L      Potassium 3.9 mmol/L      Chloride 105 mmol/L      CO2 24 mmol/L      ANION GAP 10 mmol/L      BUN 19 mg/dL      Creatinine 1.13 mg/dL      Glucose 98 mg/dL      Calcium 9.5 mg/dL      AST 23 U/L      ALT 20 U/L      Alkaline Phosphatase 57 U/L      Total Protein 7.1 g/dL      Albumin 4.2 g/dL      Total Bilirubin 0.55 mg/dL      eGFR 50  ml/min/1.73sq m     Narrative:      National Kidney Disease Foundation guidelines for Chronic Kidney Disease (CKD):     Stage 1 with normal or high GFR (GFR > 90 mL/min/1.73 square meters)    Stage 2 Mild CKD (GFR = 60-89 mL/min/1.73 square meters)    Stage 3A Moderate CKD (GFR = 45-59 mL/min/1.73 square meters)    Stage 3B Moderate CKD (GFR = 30-44 mL/min/1.73 square meters)    Stage 4 Severe CKD (GFR = 15-29 mL/min/1.73 square meters)    Stage 5 End Stage CKD (GFR <15 mL/min/1.73 square meters)  Note: GFR calculation is accurate only with a steady state creatinine    CBC and differential [[id number]]  (Abnormal) Collected: 06/22/24 1848    Lab Status: Final result Specimen: Blood from Line, Venous Updated: 06/22/24 1903     WBC 8.78 Thousand/uL      RBC 3.86 Million/uL      Hemoglobin 12.0 g/dL      Hematocrit 37.3 %      MCV 97 fL      MCH 31.1 pg      MCHC 32.2 g/dL      RDW 15.7 %      MPV 8.6 fL      Platelets 292 Thousands/uL      nRBC 0 /100 WBCs      Segmented % 62 %      Immature Grans % 0 %      Lymphocytes % 27 %      Monocytes % 7 %      Eosinophils Relative 3 %      Basophils Relative 1 %      Absolute Neutrophils 5.49 Thousands/µL      Absolute Immature Grans 0.03 Thousand/uL      Absolute Lymphocytes 2.33 Thousands/µL      Absolute Monocytes 0.63 Thousand/µL      Eosinophils Absolute 0.24 Thousand/µL      Basophils Absolute 0.06 Thousands/µL                    CT spine lumbar without contrast   Final Result by Marii Jennings MD (06/22 2048)      1.  No acute osseous abnormality.   2.  Redemonstrated posterior and interbody fusion at level L4-5. No hardware complication.   3.  Degenerative change as detailed (assessment is somewhat limited due to streak artifact from surgical hardware). At least moderate canal stenosis at level L3-4. Moderate to severe bilateral foraminal narrowing at this level.         Workstation performed: [id number]                    Procedures  Procedures         ED Course  ED  Course as of 06/22/24 2117   Sat Jun 22, 2024   1901 Procedure Note: EKG  Date/Time: 06/22/24 7:19 PM   Interpreted by: Renea Patino MD  Indications / Diagnosis: Generalized weakness  ECG reviewed by me, the ED Physician: yes   The EKG demonstrates:  Rhythm: normal sinus  Intervals: normal intervals  Axis: normal axis  QRS/Blocks: RBBB  ST Changes: No acute ST Changes, no STD/GOMEZ.   1907 CBC and differential(!)  Reviewed and without actionable derangement.    1928 Comprehensive metabolic panel  Reviewed and without actionable derangement.    2051 CT spine lumbar without contrast  1.  No acute osseous abnormality.  2.  Redemonstrated posterior and interbody fusion at level L4-5. No hardware complication.  3.  Degenerative change   2055 Pt reassessed and reports that she is overall feeling better. Pt made aware of all results and plan for DC with continued supportive treatment at home. Pt agreeable to plan.                                              Medical Decision Making  Pt is a 67yo F who presents with back pain.     Differential diagnosis to include but not limited to muscular strain, radiculopathy, sciatica, fracture, malpositioned hardware.  Due to patient's history of instrumentation, will obtain imaging.  Will treat symptomatically and reassess.  Will also obtain basic labs and EKG due to patient's complaint of generalized weakness and overall decline.  See ED course for results and details.    Plan to discharge pt with f/u to PCP and comprehensive spine. Discussed returning the ED with new or worsening of symptoms. Discussed use of over the counter medications as stated on the bottle as needed for pain. Discussed taking new medication as prescribed. Pt expressed understanding of discharge instructions, return precautions, and medication instructions and is stable for discharge at this time. All questions were answered and pt was discharged without incident.       Amount and/or Complexity of Data  Reviewed  Labs: ordered. Decision-making details documented in ED Course.  Radiology: ordered. Decision-making details documented in ED Course.    Risk  OTC drugs.  Prescription drug management.             Disposition  Final diagnoses:   Acute low back pain     Time reflects when diagnosis was documented in both MDM as applicable and the Disposition within this note       Time User Action Codes Description Comment    6/22/2024  8:56 PM Renea Patino [M54.50] Acute low back pain           ED Disposition       ED Disposition   Discharge    Condition   Stable    Date/Time   Sat Jun 22, 2024  8:56 PM    Comment   Fariba Garcia discharge to home/self care.                   Follow-up Information       Follow up With Specialties Details Why Contact Info Additional Information    [facility], Nurse Practitioner Call on 6/24/2024  [address] [id number] [phone]       [facility] Physical Therapy Call  As needed [phone]            Discharge Medication List as of 6/22/2024  8:57 PM        START taking these medications    Details   tiZANidine (ZANAFLEX) 2 mg tablet Take 1 tablet (2 mg total) by mouth every 8 (eight) hours as needed for muscle spasms, Starting Sat 6/22/2024, Normal           CONTINUE these medications which have NOT CHANGED    Details   metroNIDAZOLE (FLAGYL PO) Take by mouth every 8 (eight) hours, Historical Med      buPROPion (WELLBUTRIN) 75 mg tablet Take 1 tablet (75 mg total) by mouth 2 (two) times a day, Starting Tue 5/7/2024, Until Sat 7/6/2024, Normal      calcium carbonate (OS-BEAU) 1250 (500 Ca) MG chewable tablet Chew 1 tablet daily, Historical Med      Cholecalciferol (Vitamin D-3) 25 MCG (1000 UT) CAPS Take 2 capsules by mouth Daily 400 UNIT TABLET 10 MCG (400 UNIT), Historical Med      cholecalciferol (VITAMIN D3) 400 units tablet Take 400 Units by mouth daily, Historical Med      !!  dicyclomine (BENTYL) 10 mg capsule Take 10 mg by mouth 4 (four) times a day (before meals and at bedtime) Pt states 1 time daily, Historical Med      !! dicyclomine (BENTYL) 10 mg capsule Take 1 capsule (10 mg total) by mouth as needed (take once daily as needed), Starting Thu 6/13/2024, Normal      famotidine (PEPCID) 40 MG tablet Take 1 tablet (40 mg total) by mouth in the morning, Starting Fri 3/31/2023, Normal      fluticasone (FLONASE) 50 mcg/act nasal spray 2 sprays into each nostril daily, Starting Mon 1/17/2022, Normal      lisinopril-hydrochlorothiazide (PRINZIDE,ZESTORETIC) 20-12.5 MG per tablet TAKE ONE TABLET BY MOUTH EVERY DAY, Starting Sun 6/2/2024, Normal      magnesium citrate solution Take by mouth once As needed, Historical Med      multivitamin (THERAGRAN) TABS Take 1 tablet by mouth daily, Historical Med      naltrexone (REVIA) 50 mg tablet Take 1/2 tablet (25mg) once daily in the morning then after 1 week increase to 1/2 tablet (25mg) twice daily in the morning and evening, Normal      Omega-3 Fatty Acids (fish oil) 1,000 mg Take 1 capsule (1,000 mg total) by mouth 2 (two) times a day, Starting Wed 11/18/2020, Normal      omeprazole (PriLOSEC) 40 MG capsule Take 1 capsule (40 mg total) by mouth daily, Starting Wed 12/13/2023, Normal      patient supplied medication Seth focus one tab as needed OTC, Historical Med      saccharomyces boulardii (FLORASTOR) 250 mg capsule Take 250 mg by mouth in the morning, Historical Med      vitamin B-12 (CYANOCOBALAMIN) 50 MCG tablet Take 50 mcg by mouth daily, Historical Med       !! - Potential duplicate medications found. Please discuss with provider.          No discharge procedures on file.    PDMP Review       None            ED Provider  Electronically Signed by             Renea Patino MD  06/22/24 5928

## 2024-06-23 NOTE — DISCHARGE INSTRUCTIONS
Follow-up with primary care for further care. Contact info provided below if needed.  Use over the counter medications as stated on the bottle as needed for pain control.  Take your new medications as prescribed as needed.   Return to the ED with new or worsening symptoms.

## 2024-06-24 ENCOUNTER — CLINICAL SUPPORT (OUTPATIENT)
Dept: BARIATRICS | Facility: CLINIC | Age: 69
End: 2024-06-24

## 2024-06-24 DIAGNOSIS — R63.5 ABNORMAL WEIGHT GAIN: Primary | ICD-10-CM

## 2024-06-24 LAB
ATRIAL RATE: 79 BPM
P AXIS: 33 DEGREES
PR INTERVAL: 178 MS
QRS AXIS: -18 DEGREES
QRSD INTERVAL: 120 MS
QT INTERVAL: 400 MS
QTC INTERVAL: 458 MS
T WAVE AXIS: 6 DEGREES
VENTRICULAR RATE: 79 BPM

## 2024-06-24 PROCEDURE — 93010 ELECTROCARDIOGRAM REPORT: CPT | Performed by: INTERNAL MEDICINE

## 2024-06-24 PROCEDURE — WMPRO12

## 2024-06-24 PROCEDURE — RECHECK

## 2024-06-24 NOTE — PROGRESS NOTES
"  Weight Management Medical Nutrition Assessment    Pt presents today for Healthy Core start. Per surgeon's note pt has a h/o RYGB 16 ya in [place]. Highest weight 310# with a clarissa of 160# and currently 244.6#. She originally had 1 year postop of continuously suffering with bilious vomiting. She presented to [facility] and it was determined that her bypass was \"backwards.\" She underwent multiple reconstructive surgeries and eventual incisional hernia repairs with mesh. All surgeries performed open. She is currently looking to get back on track with her diet and her postop care. 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 Since the multiple deaths she realized that she does emotional eating/stress eating but now would like to get on track. Reviewed diet recall and she appears to eat 3 meals per day, but appears to be grazing often. She is working on staying away from the crunchy snacks and is focusing more on protein. She is limited with her mobility therefore is limited with her exercise.  Discussed the importance of protein in the post-op diet, suggesting to avoid grazing and adhere to 2 planned snacks per day and starting to keep a food log to better assess intake.     Patient seen by Medical Provider in past 6 months:  yes  Requested to schedule appointment with Medical Provider: No      Anthropometric Measurements  Start Weight (#): 245.6# (4/10/24 when met with surgeon)  241.4# (5/7 when met with MWM provider)  Current Weight (#): 244.6#  TBW % Change from start weight:-  Ideal Body Weight (#):115#  Goal Weight (#):~170  Highest:  310 before RYGB  Lowest: 160# after sx    Weight Loss History  Previous weight loss attempts: Commercial Programs (Weight Watchers, LiveRSVP, etc.)  Counseling with  MD  Exercise  High Protein/Low CHO diets (Atkins, [place], etc.)  Nutrition Counseling with " RD  Self Created Diets (Portion Control, Healthy Food Choices, etc.)  RYGB    Food and Nutrition Related History    Food Recall  Wake: 6:30/7am  Usually bouts of diarrhea in the morning, but since started abx due to CBO has been less frequent and she is feeling better  Breakfast: 7:30am- mushroom coffee with protein powder that she sips on for about 3hrs + 1-2 eggs OR 1 egg + 1/2 english muffin with a garlic honey mixture on her english muffin. Trying greek yogurt with berries OR cottage cheese with fruit or applesauce with protein powder/bone broth powder to get in more protein.   Snack: a few nuts while teaching/driving or beef jerky  Lunch: 1:30-2:30pm: homemade ham or turkey salad (cubed meat, bread and butter pickles, 1-2tbsp of garcia--changing to avocado garcia)--eats 1 cup in 1 portion  Snack: nuts or beef jerky or cottage cheese with protein powder  Dinner: 7:30pm-a lot of one pot meals ie: sausage and kraut OR chili OR just the meat sauce w/o the pasta  Snack: sometimes--may be protein   Trying to stay away from the snacky chips    Portions about 1.25-1.5 cups total meal   Protein supplement: plant protein protein powder (15gm of protein per scoop)  Typical meal pattern: 3 meals per day and grazes on snacks per day  Eating Behaviors: Consumption of high calorie/ high fat foods, Frequent snacking/ grazing, Mindless eating, Emotional eating, Craves sweet foods, and Craves salty foods    Beverages: alcohol 3 per week, but before would be a glass every night, unsweet tea 32oz, water + denis 32oz, mushroom coffee    Weekends: Worse, eats out more  Cravings: sweet and salty  Trouble area of day:grazes while in the car    Frequency of Eating out: not often  Food restrictions:ice cream and too much milk cause dumping/diarrhea  Cooking: self   Food Shopping: self    Physical Activity Intake  Activity:limited due to mobility and sitting all day in a car  Frequency:infrequently  Physical limitations/barriers to exercise:  poor stability, walking with a cane    Estimated Needs  Energy  SECA: BMR:  1666      X 1.3 -1000 =  1165  Kyler Cameron Energy Needs:   BMR : 1620     1-2# loss weekly sedentary:  923-1423               1-2# loss weekly lightly active:1774-4026  Maintenance calories for sedentary activity level: 1923  Protein:63-78gm      (1.2-1.5g/kg IBW)  Fluid: 1560-1820ml     (35mL/kg IBW)    Nutrition Diagnosis  Yes;    Overweight/obesity  related to Excess energy intake as evidenced by  BMI more than normative standard for age and sex (obesity-grade III 40+)       Nutrition Intervention    Nutrition Prescription  Calories:8695-7317 cals  Protein:70-75gm  Fluid:1800ml    Meal Plan (Srinivas/Pro/Carb)  Breakfast:  250-300cals/29g  Snack: 150  Lunch:  300/21g  Snack: 150  Dinner:  350/21g  Snack:--    Nutrition Education:    Healthy Core Manual  Calorie controlled menu  Lean protein food choices  Healthy snack options  Food journaling tips      Nutrition Counseling:  Strategies: meal planning, portion sizes, healthy snack choices, hydration, fiber intake, protein intake, exercise, food journal      Monitoring and Evaluation:  Evaluation criteria:  Energy Intake  Meet protein needs  Maintain adequate hydration  Monitor weekly weight  Meal planning/preparation  Food journal   Decreased portions at mealtimes and snacks  Physical activity     Barriers to learning:none  Readiness to change: Preparation:  (Getting ready to change)   Comprehension: good  Expected Compliance: good

## 2024-06-25 PROBLEM — G47.33 OSA (OBSTRUCTIVE SLEEP APNEA): Status: ACTIVE | Noted: 2024-06-25

## 2024-06-25 PROBLEM — G47.30 SLEEP APNEA: Status: ACTIVE | Noted: 2024-06-25

## 2024-06-25 PROCEDURE — 95806 SLEEP STUDY UNATT&RESP EFFT: CPT | Performed by: INTERNAL MEDICINE

## 2024-06-26 DIAGNOSIS — G47.33 OSA (OBSTRUCTIVE SLEEP APNEA): Primary | ICD-10-CM

## 2024-06-27 ENCOUNTER — ANESTHESIA (OUTPATIENT)
Dept: ANESTHESIOLOGY | Facility: HOSPITAL | Age: 69
End: 2024-06-27

## 2024-06-27 ENCOUNTER — ANESTHESIA EVENT (OUTPATIENT)
Dept: ANESTHESIOLOGY | Facility: HOSPITAL | Age: 69
End: 2024-06-27

## 2024-06-27 DIAGNOSIS — K21.9 GASTROESOPHAGEAL REFLUX DISEASE, UNSPECIFIED WHETHER ESOPHAGITIS PRESENT: ICD-10-CM

## 2024-06-27 RX ORDER — FAMOTIDINE 40 MG/1
40 TABLET, FILM COATED ORAL DAILY
Qty: 90 TABLET | Refills: 2 | Status: SHIPPED | OUTPATIENT
Start: 2024-06-27

## 2024-06-27 NOTE — TELEPHONE ENCOUNTER
Received refill request for Famotidine 40mg 90 day supply please sent to:    [facility] [place] [id number] - [place], [place] - [address] [address]  [address] [address]  Phone: [phone]  Fax: [phone]

## 2024-06-28 ENCOUNTER — HOSPITAL ENCOUNTER (OUTPATIENT)
Dept: INFUSION CENTER | Facility: HOSPITAL | Age: 69
End: 2024-06-28
Attending: SURGERY
Payer: MEDICARE

## 2024-06-28 VITALS
DIASTOLIC BLOOD PRESSURE: 78 MMHG | TEMPERATURE: 98.7 F | OXYGEN SATURATION: 98 % | RESPIRATION RATE: 18 BRPM | HEART RATE: 82 BPM | SYSTOLIC BLOOD PRESSURE: 138 MMHG

## 2024-06-28 DIAGNOSIS — K91.2 POSTSURGICAL MALABSORPTION: Primary | ICD-10-CM

## 2024-06-28 DIAGNOSIS — E61.1 IRON DEFICIENCY: ICD-10-CM

## 2024-06-28 PROCEDURE — 96365 THER/PROPH/DIAG IV INF INIT: CPT

## 2024-06-28 RX ORDER — SODIUM CHLORIDE 9 MG/ML
20 INJECTION, SOLUTION INTRAVENOUS ONCE
Status: COMPLETED | OUTPATIENT
Start: 2024-06-28 | End: 2024-06-28

## 2024-06-28 RX ORDER — SODIUM CHLORIDE 9 MG/ML
20 INJECTION, SOLUTION INTRAVENOUS ONCE
Status: CANCELLED | OUTPATIENT
Start: 2024-07-05

## 2024-06-28 RX ORDER — DIPHENHYDRAMINE HCL 25 MG
25 TABLET ORAL ONCE
Status: COMPLETED | OUTPATIENT
Start: 2024-06-28 | End: 2024-06-28

## 2024-06-28 RX ORDER — DIPHENHYDRAMINE HCL 25 MG
25 TABLET ORAL ONCE
Status: CANCELLED
Start: 2024-07-05 | End: 2024-07-02

## 2024-06-28 RX ADMIN — IRON SUCROSE 300 MG: 20 INJECTION, SOLUTION INTRAVENOUS at 10:00

## 2024-06-28 RX ADMIN — SODIUM CHLORIDE 20 ML/HR: 9 INJECTION, SOLUTION INTRAVENOUS at 09:51

## 2024-06-28 RX ADMIN — DIPHENHYDRAMINE HYDROCHLORIDE 25 MG: 25 TABLET ORAL at 09:45

## 2024-06-28 NOTE — PROGRESS NOTES
Fariba Garcia  tolerated treatment well with no complications.      Fariba Garcia is aware of future appt on 7/5 at 1230.     AVS printed and given to Fariba Garcia:  No (Declined by Fariba Garcia)

## 2024-07-01 ENCOUNTER — CLINICAL SUPPORT (OUTPATIENT)
Dept: BARIATRICS | Facility: CLINIC | Age: 69
End: 2024-07-01

## 2024-07-01 DIAGNOSIS — R63.5 ABNORMAL WEIGHT GAIN: ICD-10-CM

## 2024-07-01 DIAGNOSIS — F33.9 DEPRESSION, RECURRENT (HCC): Primary | ICD-10-CM

## 2024-07-01 PROCEDURE — RECHECK

## 2024-07-01 NOTE — PROGRESS NOTES
Patient's name and  were verified during visit.  Patient informed and aware that virtual visits are HIPPA compliant and to further protect their confidentiality the provider was alone and the office door was closed.  Patient consented to the virtual video visit. This visit is free.    Patient presents for 1 hour Behavioral Health Evaluation as a part of Medical Weight Management Program.    Eating behaviors/food choices: Patient reports a history of absorption and nutrition issues from her previous bariatric surgery. She has a few food intolerances, chronic diarrhea and difficulty getting in sufficient portions. She does acknowledge grazing throughout the day but is trying to modify this, trying to be more mindful of head vs stomach hunger. Discussed importance of getting consistent nutrition and being aware of eating in response to true hunger or justifying mindless snacking as a way to get in more nutrition.     Activity/Exercise:  Patient had been going to the [facility] for aqua aerobics and found it to be very beneficial. She got out of the routine of it over the past month since she picked up additional shifts at work but she plans to get back to her own self care starting with physical activity. She has felt a marked difference in her mobility since not going to the , encouraged patient to look ahead at schedule and plan in times she's able to get to the gym.    Mental Health/Wellness:  Patient has experienced significant stress and setbacks with her initial surgery which has led to struggles with nutrition and absorption. She is worried about her health and mobility and wants to lose the weight but the stress may be contributing to weight gain along with snacking to soothe. Patient lost of her  a year ago, she lives with her son who has high functioning autism. She feels frustration over the limitations the weight has put on  her life, she no longer rides horses or motorcycles as she once enjoyed but  she is trying to make adaptations such as riding in a sidecar and finding mae in the things she can do. Encouraged patient to be mindful of the impact mood and emotions can have on eating habits and to find non-food coping skills to manage. Patient would like to get connected to therapy, referral to [facility] in chart.    Next Appointment:  with SOLITARIO on 7/10

## 2024-07-02 ENCOUNTER — CLINICAL SUPPORT (OUTPATIENT)
Dept: BARIATRICS | Facility: CLINIC | Age: 69
End: 2024-07-02

## 2024-07-02 VITALS — WEIGHT: 242.4 LBS | BODY MASS INDEX: 42.95 KG/M2 | HEIGHT: 63 IN

## 2024-07-02 DIAGNOSIS — R63.5 ABNORMAL WEIGHT GAIN: Primary | ICD-10-CM

## 2024-07-02 DIAGNOSIS — R42 DIZZINESS: Primary | ICD-10-CM

## 2024-07-02 PROCEDURE — RECHECK

## 2024-07-05 ENCOUNTER — HOSPITAL ENCOUNTER (OUTPATIENT)
Dept: INFUSION CENTER | Facility: HOSPITAL | Age: 69
End: 2024-07-05
Attending: SURGERY
Payer: MEDICARE

## 2024-07-05 VITALS
OXYGEN SATURATION: 94 % | TEMPERATURE: 98.4 F | DIASTOLIC BLOOD PRESSURE: 69 MMHG | SYSTOLIC BLOOD PRESSURE: 126 MMHG | RESPIRATION RATE: 20 BRPM | HEART RATE: 108 BPM

## 2024-07-05 DIAGNOSIS — K91.2 POSTSURGICAL MALABSORPTION: Primary | ICD-10-CM

## 2024-07-05 DIAGNOSIS — E61.1 IRON DEFICIENCY: ICD-10-CM

## 2024-07-05 PROCEDURE — 96366 THER/PROPH/DIAG IV INF ADDON: CPT

## 2024-07-05 PROCEDURE — 96365 THER/PROPH/DIAG IV INF INIT: CPT

## 2024-07-05 RX ORDER — DIPHENHYDRAMINE HCL 25 MG
25 TABLET ORAL ONCE
Status: CANCELLED
Start: 2024-07-05 | End: 2024-07-05

## 2024-07-05 RX ORDER — DIPHENHYDRAMINE HCL 25 MG
25 TABLET ORAL ONCE
Status: COMPLETED | OUTPATIENT
Start: 2024-07-05 | End: 2024-07-05

## 2024-07-05 RX ORDER — SODIUM CHLORIDE 9 MG/ML
20 INJECTION, SOLUTION INTRAVENOUS ONCE
Status: COMPLETED | OUTPATIENT
Start: 2024-07-05 | End: 2024-07-05

## 2024-07-05 RX ORDER — SODIUM CHLORIDE 9 MG/ML
20 INJECTION, SOLUTION INTRAVENOUS ONCE
Status: CANCELLED | OUTPATIENT
Start: 2024-07-05

## 2024-07-05 RX ADMIN — DIPHENHYDRAMINE HYDROCHLORIDE 25 MG: 25 TABLET ORAL at 13:03

## 2024-07-05 RX ADMIN — IRON SUCROSE 300 MG: 20 INJECTION, SOLUTION INTRAVENOUS at 13:04

## 2024-07-05 RX ADMIN — SODIUM CHLORIDE 20 ML/HR: 0.9 INJECTION, SOLUTION INTRAVENOUS at 13:04

## 2024-07-09 ENCOUNTER — CLINICAL SUPPORT (OUTPATIENT)
Dept: BARIATRICS | Facility: CLINIC | Age: 69
End: 2024-07-09

## 2024-07-09 VITALS — WEIGHT: 241.6 LBS | BODY MASS INDEX: 42.81 KG/M2 | HEIGHT: 63 IN

## 2024-07-09 DIAGNOSIS — R63.5 ABNORMAL WEIGHT GAIN: Primary | ICD-10-CM

## 2024-07-09 PROCEDURE — RECHECK

## 2024-07-10 ENCOUNTER — CLINICAL SUPPORT (OUTPATIENT)
Dept: BARIATRICS | Facility: CLINIC | Age: 69
End: 2024-07-10

## 2024-07-10 VITALS — WEIGHT: 244.4 LBS | BODY MASS INDEX: 43.3 KG/M2 | HEIGHT: 63 IN

## 2024-07-10 DIAGNOSIS — R63.5 ABNORMAL WEIGHT GAIN: Primary | ICD-10-CM

## 2024-07-10 PROCEDURE — RECHECK

## 2024-07-10 NOTE — PROGRESS NOTES
"Weight Management Medical Nutrition Assessment  Fariba is here today for Healthy Core Month 1 f/u. Current Weight: 244.4#. Patient has maintained her weight x 2 weeks. 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 Patient expressed several mental and emotional barriers to weight loss. She struggles with pain often from reflux caused by surgical complications. She maintains a positive attitude but does struggle with stress and emotional eating. She has utilized a stress ball and gum while at work as she is a  and was previously using food to manage her stress. Patient has increased her protein intake via the use of a protein powder but she is not a fan of her current powder. Discussed ready prepared shakes within her price range and provided samples of ensure max to try before she'd buy. Reviewed high protein and fiber snack options that she can take in the car with her when working. Patient is scheduled with LCSW x 2 weeks and would benefit greatly from support surrounding her traumatic surgical experience. Will review SECA body comp at next follow up.     Patient seen by Medical Provider in past 6 months:  yes  Requested to schedule appointment with Medical Provider: No    Anthropometric Measurements  Provider Start Weight (#): 241.4# (24)  Healthy Core Start Weight (#): 244.6 # (24)  Current Weight (#): 244.4#  TBW % Change from HC start weight: --%  IBW (#): 115# (63\")  Goal Weight (#): ST# LT#  Highest Weight (#): 310#  Lowest Weight (#): 160# (Amandeep pot-op)    Weight Loss History  Previous weight loss attempts: Commercial Programs (Weight Watchers, [employer], etc.)  Counseling with MD, Exercise, High Protein/Low CHO diets (Atkins, [place], etc.)  Nutrition Counseling with RD, Self Created Diets (Portion Control, Healthy Food Choices, etc.)  RYGB (16 years ago)    Food and Nutrition " Related History  Wake up: 6:30-7 am  Bed Time: --  Sleep quality: poor - has to sleep sitting up due to anatomy + GERD    Dietary Recall  Breakfast (7:30 am): mushroom coffee w/ protein powder (15-30 g protein) + 1-2 eggs Or 1 egg w/ 1/2 english muffin and garlic honey Or Greek yogurt or cottage cheese w/ berries    Snack: nuts (mindful portions) Or beef jerky  Lunch (1:30-2:30 pm): ham or turkey salad (pickles, 1-2 Tbsp garcia) Or baked beans with bone broth (20 g protein) Or pudding w/ protein powder + fruit   Snack: --  Dinner (7:30 pm): sausage w/ kraut Or chili Or meat sauce (lots of 1 pot meals)   Snack: sometimes    Beverages: water (w/ denis), mushroom coffee, 32 oz unsweetened tea, alcohol (3x/week)  Volume of beverage intake: 32 oz water     Weekends: Worse, eats out more  Cravings: sweet and salty  Trouble area of day: grazes while in the car     Frequency of Eating out: not often  Food restrictions: ice cream and too much milk cause dumping/diarrhea  Cooking: self   Food Shopping: self    Physical Activity  Activity: limited due to mobility (has been a [employer] member in the past)   Frequency: infrequently  Physical limitations/barriers to exercise: poor stability, walking with a cane, DDD    Estimated Needs  Energy  SECA: BMR: 1,666 X 1.4 -1,000 = 1,332 kcal     [facility] Energy Needs (needs at 241#)  BMR: 1,602 kcal  Maintenance calories (sedentary): 1,923 kcal  1-2#/week loss (sedentary): 923-1,423 kcal  1-2#/week loss (light activity): 1,203-1,703 kcal    Protein: 63-78 grams (1.2-1.5 g/kg IBW)  Fluid: 61 ounces (35 mL/kg IBW)    Nutrition Diagnosis  Yes;    Overweight/obesity related to Excess energy intake as evidenced by BMI more than normative standard for age and sex (obesity-grade III 40+)     Nutrition Intervention    Nutrition Prescription  Calories: 1,200-1,300 kcal  Protein: 63-78 g  Fluid: 61 ounces    Meal Plan (Srinivas/Pro)  Breakfast: 250-300/30  Snack: 150/5  Lunch: 300/21  Snack:  150/5  Dinner: 350/21  Snack: --    Nutrition Education  Healthy Core Manual   Calorie controlled menu  Lean protein food choices  Healthy snack options  Food journaling tips    Nutrition Counseling  Strategies: meal planning, portion sizes, healthy snack choices, hydration, fiber intake, protein intake, exercise, food logging    Monitoring and Evaluation:    Evaluation criteria  Energy Intake  Meet protein needs  Maintain adequate hydration  Monitor weekly weight  Meal planning/preparation  Food journal   Decreased portions at mealtimes and snacks  Physical activity     Barriers to change:emotional  Readiness to change: Preparation:  (Getting ready to change)  and Action:  (Changing behavior)  Comprehension: very good  Expected Compliance: good

## 2024-07-11 ENCOUNTER — ANESTHESIA EVENT (OUTPATIENT)
Dept: GASTROENTEROLOGY | Facility: AMBULARY SURGERY CENTER | Age: 69
End: 2024-07-11

## 2024-07-11 ENCOUNTER — ANESTHESIA (OUTPATIENT)
Dept: GASTROENTEROLOGY | Facility: AMBULARY SURGERY CENTER | Age: 69
End: 2024-07-11

## 2024-07-11 ENCOUNTER — HOSPITAL ENCOUNTER (OUTPATIENT)
Dept: GASTROENTEROLOGY | Facility: AMBULARY SURGERY CENTER | Age: 69
Setting detail: OUTPATIENT SURGERY
End: 2024-07-11
Attending: INTERNAL MEDICINE
Payer: MEDICARE

## 2024-07-11 VITALS
DIASTOLIC BLOOD PRESSURE: 68 MMHG | RESPIRATION RATE: 18 BRPM | HEART RATE: 70 BPM | OXYGEN SATURATION: 100 % | TEMPERATURE: 98 F | SYSTOLIC BLOOD PRESSURE: 119 MMHG

## 2024-07-11 DIAGNOSIS — E61.1 IRON DEFICIENCY: ICD-10-CM

## 2024-07-11 DIAGNOSIS — G89.29 CHRONIC ABDOMINAL PAIN: ICD-10-CM

## 2024-07-11 DIAGNOSIS — K21.9 GASTROESOPHAGEAL REFLUX DISEASE, UNSPECIFIED WHETHER ESOPHAGITIS PRESENT: ICD-10-CM

## 2024-07-11 DIAGNOSIS — R10.9 CHRONIC ABDOMINAL PAIN: ICD-10-CM

## 2024-07-11 DIAGNOSIS — K21.9 GASTROESOPHAGEAL REFLUX DISEASE WITHOUT ESOPHAGITIS: ICD-10-CM

## 2024-07-11 PROCEDURE — 43239 EGD BIOPSY SINGLE/MULTIPLE: CPT | Performed by: INTERNAL MEDICINE

## 2024-07-11 PROCEDURE — 88305 TISSUE EXAM BY PATHOLOGIST: CPT | Performed by: PATHOLOGY

## 2024-07-11 RX ORDER — SODIUM CHLORIDE, SODIUM LACTATE, POTASSIUM CHLORIDE, CALCIUM CHLORIDE 600; 310; 30; 20 MG/100ML; MG/100ML; MG/100ML; MG/100ML
INJECTION, SOLUTION INTRAVENOUS CONTINUOUS PRN
Status: DISCONTINUED | OUTPATIENT
Start: 2024-07-11 | End: 2024-07-11

## 2024-07-11 RX ORDER — PROPOFOL 10 MG/ML
INJECTION, EMULSION INTRAVENOUS AS NEEDED
Status: DISCONTINUED | OUTPATIENT
Start: 2024-07-11 | End: 2024-07-11

## 2024-07-11 RX ORDER — OMEPRAZOLE 40 MG/1
40 CAPSULE, DELAYED RELEASE ORAL DAILY
Qty: 30 CAPSULE | Refills: 3 | Status: SHIPPED | OUTPATIENT
Start: 2024-07-11

## 2024-07-11 RX ORDER — LIDOCAINE HYDROCHLORIDE 10 MG/ML
INJECTION, SOLUTION EPIDURAL; INFILTRATION; INTRACAUDAL; PERINEURAL AS NEEDED
Status: DISCONTINUED | OUTPATIENT
Start: 2024-07-11 | End: 2024-07-11

## 2024-07-11 RX ADMIN — PROPOFOL 150 MG: 10 INJECTION, EMULSION INTRAVENOUS at 10:40

## 2024-07-11 RX ADMIN — PROPOFOL 50 MG: 10 INJECTION, EMULSION INTRAVENOUS at 10:41

## 2024-07-11 RX ADMIN — LIDOCAINE HYDROCHLORIDE 50 MG: 10 INJECTION, SOLUTION EPIDURAL; INFILTRATION; INTRACAUDAL at 10:40

## 2024-07-11 RX ADMIN — PROPOFOL 30 MG: 10 INJECTION, EMULSION INTRAVENOUS at 10:42

## 2024-07-11 RX ADMIN — SODIUM CHLORIDE, SODIUM LACTATE, POTASSIUM CHLORIDE, AND CALCIUM CHLORIDE: .6; .31; .03; .02 INJECTION, SOLUTION INTRAVENOUS at 10:28

## 2024-07-11 NOTE — H&P
History and Physical -  [facility]  Fariba Garcia 68 y.o. female MRN: [id number]    HPI: Fariba Garcia is a 68 y.o. year old female who presents with GERD, chronic RUQ pain.       Review of Systems    Historical Information   Past Medical History:   Diagnosis Date    Arthritis     Asthma     Back pain     Chronic pain disorder     hips into the legs    Colon polyp     DVT (deep venous thrombosis) (formerly [facility])     Gastric bypass status for obesity     yarelis en y    GERD (gastroesophageal reflux disease)     Hiatal hernia     History of transfusion 2006    after gastric bypass surgery, no reactions    Hypertension     Irritable bowel syndrome     Kidney stone     Muscle weakness     bilateral legs    Numbness and tingling     bilateral feet    Pneumonia     Sacroiliitis (HCC) 5/15/2018    Spinal stenosis     Tinnitus     occassionally    Vertigo     Wears glasses      Past Surgical History:   Procedure Laterality Date    APPENDECTOMY      BACK SURGERY  2018    L4-5 fusion    BARIATRIC SURGERY      yarelis en y- pt states the procedure was done incorrectly which lead to additional surgeries from -    CARPAL TUNNEL RELEASE Right      SECTION      x1    CHOLECYSTECTOMY      COLON SURGERY      partial removal of the small bowel-necrosis-between 2006    COLONOSCOPY      COLONOSCOPY W/ BIOPSIES AND POLYPECTOMY      EPIDURAL BLOCK INJECTION N/A 2021    Procedure: C6 C7 cervical epidural steroid injection ( 16809);  Surgeon: Jeremi Gamino MD;  Location: [facility] MAIN OR;  Service: Pain Management     EPIDURAL BLOCK INJECTION N/A 2021    Procedure: C6 C7 cervical epidural steroid injection (48765);  Surgeon: Jeremi Gamino MD;  Location: [facility] MAIN OR;  Service: Pain Management     FLEXIBLE BRONCHOSCOPY W/ UPPER ENDOSCOPY      GASTRECTOMY      after gastric bypass-(failed surgery per pt) 2006    HERNIA REPAIR      incisional hernias-diaphragm area    ID  ARTHROCENTESIS ASPIR&/INJ SMALL JT/BURSA W/O US N/A 05/02/2018    Procedure: Coccygeal Injection & Ganglion Impar Block;  Surgeon: Jeremi Gamino MD;  Location: [facility] MAIN OR;  Service: Pain Management     OR ARTHRODESIS POSTERIOR INTERBODY 1 NTRSPC LUMBAR N/A 06/27/2018    Procedure: L4-5 DECOMPRESSION INTERBODY INSTRUMENTED FUSION;  Surgeon: Martinez Garcia MD;  Location: [facility] Main OR;  Service: Orthopedics    OR COLONOSCOPY FLX DX W/COLLJ SPEC WHEN PFRMD N/A 08/24/2018    Procedure: COLONOSCOPY;  Surgeon: Terry Booth MD;  Location: [facility] GI LAB;  Service: Gastroenterology    OR ESOPHAGOGASTRODUODENOSCOPY TRANSORAL DIAGNOSTIC N/A 02/19/2018    Procedure: ESOPHAGOGASTRODUODENOSCOPY (EGD);  Surgeon: Terry Booth MD;  Location: [facility] GI LAB;  Service: Gastroenterology    OR INJECT SI JOINT ARTHRGRPHY&/ANES/STEROID W/EMMANUEL Right 05/25/2018    Procedure: Rt Sacroiliac Joint Injection;  Surgeon: Jeremi Gamino MD;  Location: [facility] MAIN OR;  Service: Pain Management     OR INJECT SI JOINT ARTHRGRPHY&/ANES/STEROID W/EMMANUEL Right 05/01/2019    Procedure: Sacroiliac Joint Injection (88563);  Surgeon: Jeremi Gamino MD;  Location: [facility] MAIN OR;  Service: Pain Management     TOENAIL EXCISION  2024    ingrown toenails fixed    TONSILECTOMY, ADENOIDECTOMY, BILATERAL MYRINGOTOMY AND TUBES      TONSILLECTOMY       Social History   Social History     Substance and Sexual Activity   Alcohol Use Yes    Alcohol/week: 7.0 standard drinks of alcohol    Types: 5 Cans of beer, 2 Shots of liquor per week    Comment: socially     Social History     Substance and Sexual Activity   Drug Use Not Currently    Types: Marijuana    Comment: has medical marijuana card. lozenges     Social History     Tobacco Use   Smoking Status Never    Passive exposure: Never   Smokeless Tobacco Never     Family History   Problem Relation Age of Onset    Diabetes Mother     Aortic aneurysm Father     Cancer Father         prostate    Heart disease Sister          open heart    Cancer Sister         breast    Breast cancer Sister 54    Diabetes Brother     Cancer Brother         spinal cancer    Stroke Brother     Hypertension Brother     Other Daughter         concussion syndrome from MVA    Asperger's syndrome Son         high functioning    No Known Problems Maternal Aunt     No Known Problems Maternal Aunt     Colon cancer Maternal Uncle 60    No Known Problems Paternal Aunt     No Known Problems Paternal Aunt     Diabetes Maternal Grandfather        Meds/Allergies     Not in a hospital admission.    No Known Allergies    Objective     There were no vitals taken for this visit.      PHYSICAL EXAM    Gen: NAD  CV: RRR  CHEST: Clear  ABD: soft, NT/ND  EXT: no edema  Neuro: AAO      ASSESSMENT/PLAN:  This is a 68 y.o. year old female here for GERD, chronic RUQ pain.       PLAN:   Procedure: egd

## 2024-07-11 NOTE — ANESTHESIA PREPROCEDURE EVALUATION
Procedure:  EGD    Relevant Problems   CARDIO   (+) Essential hypertension      GI/HEPATIC   (+) Gastroesophageal reflux disease   (+) Hepatic steatosis      MUSCULOSKELETAL   (+) Chronic bilateral low back pain without sciatica   (+) Coccydynia   (+) DDD (degenerative disc disease), lumbar   (+) Lumbar spondylosis   (+) Myofascial pain syndrome   (+) Thoracic spine pain      NEURO/PSYCH   (+) Chronic abdominal pain   (+) Chronic bilateral low back pain without sciatica   (+) Chronic pain syndrome   (+) Chronic right sacroiliac joint pain   (+) Depression, recurrent (HCC)   (+) Myofascial pain syndrome      PULMONARY   (+) Mild intermittent asthma   (+) Sleep apnea        Physical Exam    Airway    Mallampati score: II  TM Distance: >3 FB  Neck ROM: full     Dental   No notable dental hx     Cardiovascular  Cardiovascular exam normal    Pulmonary  Pulmonary exam normal     Other Findings  post-pubertal.      Anesthesia Plan  ASA Score- 3     Anesthesia Type- IV sedation with anesthesia with ASA Monitors.         Additional Monitors:     Airway Plan:            Plan Factors-Exercise tolerance (METS): >4 METS.    Chart reviewed.   Existing labs reviewed. Patient summary reviewed.    Patient is not a current smoker.      Obstructive sleep apnea risk education given perioperatively.        Induction-     Postoperative Plan-     Perioperative Resuscitation Plan - Level 1 - Full Code.       Informed Consent- Anesthetic plan and risks discussed with patient.  I personally reviewed this patient with the CRNA. Discussed and agreed on the Anesthesia Plan with the CRNA..

## 2024-07-11 NOTE — ANESTHESIA POSTPROCEDURE EVALUATION
Post-Op Assessment Note    CV Status:  Stable  Pain Score: 0    Pain management: adequate       Mental Status:  Sleepy   Hydration Status:  Stable   PONV Controlled:  None   Airway Patency:  Patent  Two or more mitigation strategies used for obstructive sleep apnea   Post Op Vitals Reviewed: Yes    No anethesia notable event occurred.    Staff: CRNA               BP   112/64   Temp 97   Pulse 91   Resp 16   SpO2 99

## 2024-07-16 ENCOUNTER — CLINICAL SUPPORT (OUTPATIENT)
Dept: BARIATRICS | Facility: CLINIC | Age: 69
End: 2024-07-16

## 2024-07-16 VITALS — WEIGHT: 240.3 LBS | BODY MASS INDEX: 42.58 KG/M2 | HEIGHT: 63 IN

## 2024-07-16 DIAGNOSIS — R63.5 ABNORMAL WEIGHT GAIN: Primary | ICD-10-CM

## 2024-07-16 PROCEDURE — RECHECK

## 2024-07-16 PROCEDURE — 88305 TISSUE EXAM BY PATHOLOGIST: CPT | Performed by: PATHOLOGY

## 2024-07-17 ENCOUNTER — TRANSCRIBE ORDERS (OUTPATIENT)
Dept: GASTROENTEROLOGY | Facility: CLINIC | Age: 69
End: 2024-07-17

## 2024-07-21 ENCOUNTER — TELEPHONE (OUTPATIENT)
Dept: SLEEP CENTER | Facility: CLINIC | Age: 69
End: 2024-07-21

## 2024-07-21 NOTE — TELEPHONE ENCOUNTER
Sleep study resulted and shows mild sleep apnea with mild oxygen desaturations due to sleep apnea.       RECOMMENDATIONS:per Dr. Valdivia  Treatment of mild obstructive sleep apnea is indicated in certain clinical settings including excessive daytime sleepiness.  Treatment options include auto titrating CPAP, oral appliance therapy, positional therapy, or weight loss.  Referral to sleep medicine can be considered    Referall to sleep medicine placed by JACINTO Kaufman (Weight management)    Call to patient who has seen results.  Reviewed recommendation for a referral to sleep medicine.     Scheduled 1/21/2025 @ 2:30 pm in the [place] office with Dr. Valdivia. Added to wait list. Afternoon only.

## 2024-07-29 ENCOUNTER — CLINICAL SUPPORT (OUTPATIENT)
Dept: BARIATRICS | Facility: CLINIC | Age: 69
End: 2024-07-29

## 2024-07-29 VITALS — BODY MASS INDEX: 43.05 KG/M2 | WEIGHT: 243 LBS

## 2024-07-29 DIAGNOSIS — R63.5 ABNORMAL WEIGHT GAIN: Primary | ICD-10-CM

## 2024-07-29 PROCEDURE — RECHECK

## 2024-07-29 NOTE — PROGRESS NOTES
Patient presents for 30 minute Behavioral Health Evaluation as a part of Medical Weight Management Program, current weight 243lbs.    Eating behaviors/food choices: Reports continued efforts to get in nutrition, still doing some grazing, considering a fasting to learn what her hunger and satiation cues feel like. Encouraged to follow up with RD and NP before doing so to make sure she is not depriving herself of nutrients vital for bariatric patient. Suggested she establish interval eating schedule, avoiding eating in between meals and tuning into mindset that could be triggering head hunger.    Mental Health/Wellness:  Patient reports improved mood, she is getting out to activities with her friends and adjusting her expectations with hobbies. She still loves riding motorcycle but it has become an expense so she is looking into other ways to occupy her time. Work continues to be stressful with the extensive hours she is putting in, encouraged to weigh out pros and cons of continued work and taking time for self care, highlighted impact stress can be having on her eating choices. Patient reports having alcohol when socializing, reviewed th impact of alcohol use on the post-bariatric patient, encouraged to abstain.    Next Appointment:  with NP on 9/16

## 2024-07-30 ENCOUNTER — CLINICAL SUPPORT (OUTPATIENT)
Dept: BARIATRICS | Facility: CLINIC | Age: 69
End: 2024-07-30

## 2024-07-30 VITALS — HEIGHT: 63 IN | BODY MASS INDEX: 42.26 KG/M2 | WEIGHT: 238.5 LBS

## 2024-07-30 DIAGNOSIS — R63.5 ABNORMAL WEIGHT GAIN: Primary | ICD-10-CM

## 2024-07-30 PROCEDURE — RECHECK

## 2024-08-06 ENCOUNTER — CLINICAL SUPPORT (OUTPATIENT)
Dept: BARIATRICS | Facility: CLINIC | Age: 69
End: 2024-08-06

## 2024-08-06 VITALS — HEIGHT: 63 IN | BODY MASS INDEX: 42.44 KG/M2 | WEIGHT: 239.5 LBS

## 2024-08-06 DIAGNOSIS — R63.5 ABNORMAL WEIGHT GAIN: Primary | ICD-10-CM

## 2024-08-06 PROCEDURE — RECHECK

## 2024-08-07 ENCOUNTER — TELEPHONE (OUTPATIENT)
Age: 69
End: 2024-08-07

## 2024-08-07 NOTE — TELEPHONE ENCOUNTER
Patient calling in and asking to schedule an appt with Monika Thornton calling office but line is busy    Please call patient back and schedule

## 2024-08-14 ENCOUNTER — TELEPHONE (OUTPATIENT)
Dept: BARIATRICS | Facility: CLINIC | Age: 69
End: 2024-08-14

## 2024-08-14 NOTE — TELEPHONE ENCOUNTER
Patient scheduled for virtual Healthy Core f/u today at 1:30 pm. Text and email invite were sent to patient at 1:31 pm. Call placed and voicemail left for patient at 1:40 pm. RD stayed on appointment until 1:46 pm. Patient no showed for Healthy Core class 8/13. Contact to R/S provided. Will R/S appointment at next class if patient does not contact office.

## 2024-08-16 ENCOUNTER — TELEPHONE (OUTPATIENT)
Age: 69
End: 2024-08-16

## 2024-08-16 NOTE — TELEPHONE ENCOUNTER
Patient called  had very bad episode of acid reflux 8/15/2024, Right lung is very sore, coughing up clear phlegm, took omeprazole and would like to know if there is anything else she can do, worried about aspiration of her lung. Advised to schedule appointment,  is going on vacation and unable to come in. Will contact her GI doctor for further instruction    15-Feb-2020

## 2024-08-18 ENCOUNTER — APPOINTMENT (OUTPATIENT)
Dept: RADIOLOGY | Facility: CLINIC | Age: 69
End: 2024-08-18
Payer: MEDICARE

## 2024-08-18 ENCOUNTER — OFFICE VISIT (OUTPATIENT)
Dept: URGENT CARE | Facility: CLINIC | Age: 69
End: 2024-08-18
Payer: MEDICARE

## 2024-08-18 VITALS
HEART RATE: 86 BPM | OXYGEN SATURATION: 97 % | DIASTOLIC BLOOD PRESSURE: 81 MMHG | TEMPERATURE: 97.7 F | RESPIRATION RATE: 18 BRPM | BODY MASS INDEX: 41.48 KG/M2 | SYSTOLIC BLOOD PRESSURE: 131 MMHG | WEIGHT: 243 LBS | HEIGHT: 64 IN

## 2024-08-18 DIAGNOSIS — R05.1 ACUTE COUGH: ICD-10-CM

## 2024-08-18 DIAGNOSIS — J18.9 PNEUMONIA OF RIGHT LOWER LOBE DUE TO INFECTIOUS ORGANISM: Primary | ICD-10-CM

## 2024-08-18 PROCEDURE — 99213 OFFICE O/P EST LOW 20 MIN: CPT | Performed by: PHYSICIAN ASSISTANT

## 2024-08-18 PROCEDURE — 71046 X-RAY EXAM CHEST 2 VIEWS: CPT

## 2024-08-18 NOTE — PROGRESS NOTES
[facility]        NAME: Fariba Garcia is a 69 y.o. female  : 1955    MRN: [id number]  DATE: 2024  TIME: 2:55 PM    Assessment and Plan   Pneumonia of right lower lobe due to infectious organism [J18.9]  1. Pneumonia of right lower lobe due to infectious organism  amoxicillin-clavulanate (AUGMENTIN) 875-125 mg per tablet      2. Acute cough  XR chest pa & lateral    XR chest pa & lateral        Patient Instructions   Right lower lobe pneumonia, questionable left lower lobe infiltrate  Rx Augmentin twice daily x 7 days, sent via EMR  Follow-up with PCP tomorrow to see if they want to see you after reviewing the note and x-ray  Recommend flonase nasal spray for postnasal drip/cough  Warm salt water gargles  Rest, fluids and supportive care  May benefit from a cool mist humidifier on night stand  Tylenol/ibuprofen as needed for pain/fever    Follow up with PCP in 3-5 days.  Proceed to  ER if symptoms worsen.    If tests have been performed at [facility], our office will contact you with results if changes need to be made to the care plan discussed with you at the visit.  [facility]    Chief Complaint     Chief Complaint   Patient presents with    Cough     Pt states she aspirated from GERD on Wednesday night. Since she has been coughing.           History of Present Illness       Fariba is a 69-year-old female who presents to clinic complaining of cough x 4 days.  She states 4 days ago woke up choking on acid reflux and then since then has had a cough.  She states it is productive with white to yellow sputum.  She denies any fever but notes chills and fatigue.  She also notes rhinorrhea.  She denies any nasal congestion, ear pain, sore throat, nausea, vomiting, diarrhea, loss of taste or smell, or recent travel.  She states her brother who is with her recently also started complaining of cold symptoms 2 days ago.        Review of Systems    Review of Systems   Constitutional:  Positive for chills and fatigue. Negative for fever.   HENT:  Positive for rhinorrhea. Negative for congestion, ear pain and sore throat.    Respiratory:  Positive for cough. Negative for chest tightness and shortness of breath.    Gastrointestinal:  Negative for diarrhea, nausea and vomiting.   Musculoskeletal:  Negative for myalgias.   Neurological:  Negative for headaches.         Current Medications       Current Outpatient Medications:     amoxicillin-clavulanate (AUGMENTIN) 875-125 mg per tablet, Take 1 tablet by mouth every 12 (twelve) hours for 7 days, Disp: 14 tablet, Rfl: 0    calcium carbonate (OS-BEAU) 1250 (500 Ca) MG chewable tablet, Chew 1 tablet daily, Disp: , Rfl:     dicyclomine (BENTYL) 10 mg capsule, Take 1 capsule (10 mg total) by mouth as needed (take once daily as needed), Disp: 90 capsule, Rfl: 1    famotidine (PEPCID) 40 MG tablet, Take 1 tablet (40 mg total) by mouth in the morning, Disp: 90 tablet, Rfl: 2    fluticasone (FLONASE) 50 mcg/act nasal spray, 2 sprays into each nostril daily (Patient taking differently: 2 sprays into each nostril if needed for allergies), Disp: 16 g, Rfl: 3    lisinopril-hydrochlorothiazide (PRINZIDE,ZESTORETIC) 20-12.5 MG per tablet, TAKE ONE TABLET BY MOUTH EVERY DAY, Disp: 90 tablet, Rfl: 1    magnesium 30 MG tablet, Take 30 mg by mouth 2 (two) times a day, Disp: , Rfl:     multivitamin (THERAGRAN) TABS, Take 1 tablet by mouth daily, Disp: , Rfl:     Omega-3 Fatty Acids (fish oil) 1,000 mg, Take 1 capsule (1,000 mg total) by mouth 2 (two) times a day (Patient taking differently: Take 1,000 mg by mouth daily), Disp: 60 capsule, Rfl: 6    omeprazole (PriLOSEC) 40 MG capsule, Take 1 capsule (40 mg total) by mouth daily Taking as needed, Disp: 30 capsule, Rfl: 3    saccharomyces boulardii (FLORASTOR) 250 mg capsule, Take 250 mg by mouth in the morning, Disp: , Rfl:     vitamin B-12 (CYANOCOBALAMIN) 50 MCG tablet, Take 50 mcg  by mouth daily, Disp: , Rfl:     vitamin E 100 UNIT capsule, Take 100 Units by mouth daily, Disp: , Rfl:     buPROPion (WELLBUTRIN) 75 mg tablet, Take 1 tablet (75 mg total) by mouth 2 (two) times a day (Patient taking differently: Take 75 mg by mouth 2 (two) times a day Waiting to start as of 5/29/24), Disp: 60 tablet, Rfl: 1    cholecalciferol (VITAMIN D3) 400 units tablet, Take 400 Units by mouth daily, Disp: , Rfl:     naltrexone (REVIA) 50 mg tablet, Take 1/2 tablet (25mg) once daily in the morning then after 1 week increase to 1/2 tablet (25mg) twice daily in the morning and evening (Patient not taking: Reported on 8/18/2024), Disp: 30 tablet, Rfl: 1    tiZANidine (ZANAFLEX) 2 mg tablet, Take 1 tablet (2 mg total) by mouth every 8 (eight) hours as needed for muscle spasms (Patient not taking: Reported on 8/18/2024), Disp: 12 tablet, Rfl: 0    Current Allergies     Allergies as of 08/18/2024    (No Known Allergies)            The following portions of the patient's history were reviewed and updated as appropriate: allergies, current medications, past family history, past medical history, past social history, past surgical history and problem list.     Past Medical History:   Diagnosis Date    Arthritis     Asthma     Back pain     Chronic pain disorder     hips into the legs    Colon polyp     DVT (deep venous thrombosis) ([facility]) 2006    Gastric bypass status for obesity     yarelis en y    GERD (gastroesophageal reflux disease)     Hiatal hernia     History of transfusion 2006    after gastric bypass surgery, no reactions    Hypertension     Irritable bowel syndrome     Kidney stone     Muscle weakness     bilateral legs    Numbness and tingling     bilateral feet    Pneumonia     Sacroiliitis ([facility]) 5/15/2018    Spinal stenosis     Tinnitus     occassionally    Vertigo     Wears glasses        Past Surgical History:   Procedure Laterality Date    APPENDECTOMY      BACK SURGERY  06/18/2018    L4-5 fusion     BARIATRIC SURGERY  2005    yarelis en y- pt states the procedure was done incorrectly which lead to additional surgeries from 2006    CARPAL TUNNEL RELEASE Right      SECTION      x1    CHOLECYSTECTOMY      COLON SURGERY      partial removal of the small bowel-necrosis-between 2006    COLONOSCOPY      COLONOSCOPY W/ BIOPSIES AND POLYPECTOMY      EPIDURAL BLOCK INJECTION N/A 2021    Procedure: C6 C7 cervical epidural steroid injection ( 67248);  Surgeon: Jeremi Gamino MD;  Location: [facility] MAIN OR;  Service: Pain Management     EPIDURAL BLOCK INJECTION N/A 2021    Procedure: C6 C7 cervical epidural steroid injection (35364);  Surgeon: Jeremi Gamino MD;  Location: [facility] MAIN OR;  Service: Pain Management     FLEXIBLE BRONCHOSCOPY W/ UPPER ENDOSCOPY      GASTRECTOMY      after gastric bypass-(failed surgery per pt) 2006    HERNIA REPAIR      incisional hernias-diaphragm area    CT ARTHROCENTESIS ASPIR&/INJ SMALL JT/BURSA W/O US N/A 2018    Procedure: Coccygeal Injection & Ganglion Impar Block;  Surgeon: Jeremi Gamino MD;  Location: [facility] MAIN OR;  Service: Pain Management     CT ARTHRODESIS POSTERIOR INTERBODY 1 NTRSPC LUMBAR N/A 2018    Procedure: L4-5 DECOMPRESSION INTERBODY INSTRUMENTED FUSION;  Surgeon: Martinez Garcia MD;  Location: [facility] Main OR;  Service: Orthopedics    CT COLONOSCOPY FLX DX W/COLLJ SPEC WHEN PFRMD N/A 2018    Procedure: COLONOSCOPY;  Surgeon: Terry Booth MD;  Location: [facility] GI LAB;  Service: Gastroenterology    CT ESOPHAGOGASTRODUODENOSCOPY TRANSORAL DIAGNOSTIC N/A 2018    Procedure: ESOPHAGOGASTRODUODENOSCOPY (EGD);  Surgeon: Terry Booth MD;  Location: [facility] GI LAB;  Service: Gastroenterology    CT INJECT SI JOINT ARTHRGRPHY&/ANES/STEROID W/EMMANUEL Right 2018    Procedure: Rt Sacroiliac Joint Injection;  Surgeon: Jeremi Gamino MD;  Location: [facility] MAIN OR;  Service: Pain Management     CT INJECT SI JOINT ARTHRGRPHY&/ANES/STEROID  "W/EMMANUEL Right 05/01/2019    Procedure: Sacroiliac Joint Injection (17530);  Surgeon: Jeremi Gamino MD;  Location: [facility] MAIN OR;  Service: Pain Management     TOENAIL EXCISION  2024    ingrown toenails fixed    TONSILECTOMY, ADENOIDECTOMY, BILATERAL MYRINGOTOMY AND TUBES      TONSILLECTOMY         Family History   Problem Relation Age of Onset    Diabetes Mother     Aortic aneurysm Father     Cancer Father         prostate    Heart disease Sister         open heart    Cancer Sister         breast    Breast cancer Sister 54    Diabetes Brother     Cancer Brother         spinal cancer    Stroke Brother     Hypertension Brother     Other Daughter         concussion syndrome from MVA    Asperger's syndrome Son         high functioning    No Known Problems Maternal Aunt     No Known Problems Maternal Aunt     Colon cancer Maternal Uncle 60    No Known Problems Paternal Aunt     No Known Problems Paternal Aunt     Diabetes Maternal Grandfather          Medications have been verified.        Objective   /81   Pulse 86   Temp 97.7 °F (36.5 °C)   Resp 18   Ht 5' 4\" (1.626 m)   Wt 110 kg (243 lb)   SpO2 97%   BMI 41.71 kg/m²   No LMP recorded. Patient is postmenopausal.       Physical Exam     Physical Exam  Vitals and nursing note reviewed.   Constitutional:       General: She is not in acute distress.     Appearance: Normal appearance. She is not ill-appearing.   HENT:      Right Ear: Tympanic membrane, ear canal and external ear normal.      Left Ear: Tympanic membrane, ear canal and external ear normal.      Nose: Congestion present.      Mouth/Throat:      Mouth: Mucous membranes are moist.      Pharynx: No oropharyngeal exudate or posterior oropharyngeal erythema.   Cardiovascular:      Rate and Rhythm: Normal rate and regular rhythm.      Heart sounds: Normal heart sounds.   Pulmonary:      Effort: Pulmonary effort is normal.      Breath sounds: Examination of the right-middle field reveals wheezing. " Examination of the left-middle field reveals wheezing. Examination of the right-lower field reveals decreased breath sounds. Examination of the left-lower field reveals decreased breath sounds. Decreased breath sounds and wheezing present. No rhonchi or rales.   Lymphadenopathy:      Cervical: No cervical adenopathy.   Neurological:      Mental Status: She is alert and oriented to person, place, and time.   Psychiatric:         Mood and Affect: Mood normal.         Behavior: Behavior normal.

## 2024-08-19 ENCOUNTER — TELEPHONE (OUTPATIENT)
Age: 69
End: 2024-08-19

## 2024-08-19 NOTE — TELEPHONE ENCOUNTER
She does not necessarily need to retest, which is asked that she wear a mask into the office if she is not feeling well.

## 2024-08-19 NOTE — TELEPHONE ENCOUNTER
Pt called to say she was seen at [facility] yesterday and was dx with pneumonia. Pt also states she has had left axilary discomfort for the past couple of weeks. She is past due for a mammo and requested an appt with Jenny as well. Appt made for 8/21/24.

## 2024-08-19 NOTE — TELEPHONE ENCOUNTER
Patient called in to report her brother returned home to [place] yesterday and tested positive for Covid; he has been around patient this past week. Patient seen and diagnosed [facility] and prescribed amoxicillin-clavulanate (AUGMENTIN) 875-125 mg per tablet. Currently having SOB, wheezing, productive cough with yellowish sputum with occasional blood streaks. Headache also. Patient took Covid test today and has negative results. RN advised patient of her exposure and symptoms can start 5-10 post exposure; symptoms similar to what she is experiencing with pneumonia. OV scheduled for Wed 8/21 at 2 PM. Please call patient if PCP requests Covid test Wednesday prior to OV.

## 2024-08-21 ENCOUNTER — OFFICE VISIT (OUTPATIENT)
Dept: FAMILY MEDICINE CLINIC | Facility: CLINIC | Age: 69
End: 2024-08-21
Payer: MEDICARE

## 2024-08-21 VITALS
HEART RATE: 100 BPM | DIASTOLIC BLOOD PRESSURE: 66 MMHG | RESPIRATION RATE: 20 BRPM | TEMPERATURE: 99.5 F | SYSTOLIC BLOOD PRESSURE: 124 MMHG | WEIGHT: 242 LBS | BODY MASS INDEX: 41.32 KG/M2 | HEIGHT: 64 IN

## 2024-08-21 DIAGNOSIS — J18.9 PNEUMONIA DUE TO INFECTIOUS ORGANISM, UNSPECIFIED LATERALITY, UNSPECIFIED PART OF LUNG: Primary | ICD-10-CM

## 2024-08-21 DIAGNOSIS — F33.9 DEPRESSION, RECURRENT (HCC): ICD-10-CM

## 2024-08-21 DIAGNOSIS — N64.4 BREAST PAIN, LEFT: ICD-10-CM

## 2024-08-21 PROCEDURE — G2211 COMPLEX E/M VISIT ADD ON: HCPCS | Performed by: NURSE PRACTITIONER

## 2024-08-21 PROCEDURE — 99214 OFFICE O/P EST MOD 30 MIN: CPT | Performed by: NURSE PRACTITIONER

## 2024-08-21 RX ORDER — ALBUTEROL SULFATE 90 UG/1
2 AEROSOL, METERED RESPIRATORY (INHALATION) EVERY 4 HOURS PRN
Qty: 18 G | Refills: 0 | Status: SHIPPED | OUTPATIENT
Start: 2024-08-21

## 2024-08-21 NOTE — PROGRESS NOTES
Ambulatory Visit  Name: Fariba Garcia      : 1955      MRN: [id number]  Encounter Provider: JACINTO Velasquez  Encounter Date: 2024   Encounter department: [facility]    Breast imaging ordered.  Patient to continue with Augmentin and will also start her on albuterol as needed.  She will let me know if this is ineffective or if she has any increasing breathing difficulties.  Also recommended an expectorant OTC.  Chest x-ray ordered, to be repeated in 1 month.  Assessment & Plan   1. Pneumonia due to infectious organism, unspecified laterality, unspecified part of lung  -     XR chest pa & lateral; Future; Expected date: 2024  -     albuterol (ProAir HFA) 90 mcg/act inhaler; Inhale 2 puffs every 4 (four) hours as needed for wheezing or shortness of breath  2. Depression, recurrent (HCC)  3. Breast pain, left  -     Mammo diagnostic bilateral w 3d & cad; Future  -     US breast left limited (diagnostic); Future; Expected date: 2024     History of Present Illness     Here today for follow-up on pneumonia.  She was seen at urgent care, had a chest x-ray done which shows a left lower lobe infiltrate, was started on Augmentin.  Reports that she had bad reflux prior to her symptoms starting and thinks this may be the culprit.  She has not had any fevers, but has pains with breathing, persistent cough, and tightness.  Also with complaints of left breast tingling/vibrating sensation and left axillary fullness.  She is due for her mammogram and her sister had breast cancer        Review of Systems   Constitutional:  Positive for fatigue. Negative for chills and fever.   Respiratory:  Positive for cough, chest tightness, shortness of breath and wheezing.    Skin:         See HPI   Neurological:  Negative for dizziness and light-headedness.     Current Outpatient Medications on File Prior to Visit   Medication Sig Dispense Refill   • amoxicillin-clavulanate (AUGMENTIN)  875-125 mg per tablet Take 1 tablet by mouth every 12 (twelve) hours for 7 days 14 tablet 0   • calcium carbonate (OS-BEAU) 1250 (500 Ca) MG chewable tablet Chew 1 tablet daily     • dicyclomine (BENTYL) 10 mg capsule Take 1 capsule (10 mg total) by mouth as needed (take once daily as needed) 90 capsule 1   • famotidine (PEPCID) 40 MG tablet Take 1 tablet (40 mg total) by mouth in the morning 90 tablet 2   • fluticasone (FLONASE) 50 mcg/act nasal spray 2 sprays into each nostril daily (Patient taking differently: 2 sprays into each nostril if needed for allergies) 16 g 3   • lisinopril-hydrochlorothiazide (PRINZIDE,ZESTORETIC) 20-12.5 MG per tablet TAKE ONE TABLET BY MOUTH EVERY DAY 90 tablet 1   • magnesium 30 MG tablet Take 30 mg by mouth 2 (two) times a day     • multivitamin (THERAGRAN) TABS Take 1 tablet by mouth daily     • Omega-3 Fatty Acids (fish oil) 1,000 mg Take 1 capsule (1,000 mg total) by mouth 2 (two) times a day (Patient taking differently: Take 1,000 mg by mouth daily) 60 capsule 6   • omeprazole (PriLOSEC) 40 MG capsule Take 1 capsule (40 mg total) by mouth daily Taking as needed 30 capsule 3   • saccharomyces boulardii (FLORASTOR) 250 mg capsule Take 250 mg by mouth in the morning     • vitamin B-12 (CYANOCOBALAMIN) 50 MCG tablet Take 50 mcg by mouth daily     • vitamin E 100 UNIT capsule Take 100 Units by mouth daily     • buPROPion (WELLBUTRIN) 75 mg tablet Take 1 tablet (75 mg total) by mouth 2 (two) times a day (Patient taking differently: Take 75 mg by mouth 2 (two) times a day Waiting to start as of 5/29/24) 60 tablet 1   • cholecalciferol (VITAMIN D3) 400 units tablet Take 400 Units by mouth daily     • naltrexone (REVIA) 50 mg tablet Take 1/2 tablet (25mg) once daily in the morning then after 1 week increase to 1/2 tablet (25mg) twice daily in the morning and evening (Patient not taking: Reported on 8/18/2024) 30 tablet 1   • tiZANidine (ZANAFLEX) 2 mg tablet Take 1 tablet (2 mg total) by  "mouth every 8 (eight) hours as needed for muscle spasms (Patient not taking: Reported on 8/18/2024) 12 tablet 0     No current facility-administered medications on file prior to visit.      Social History     Tobacco Use   • Smoking status: Never     Passive exposure: Never   • Smokeless tobacco: Never   Vaping Use   • Vaping status: Never Used   Substance and Sexual Activity   • Alcohol use: Yes     Alcohol/week: 7.0 standard drinks of alcohol     Types: 5 Cans of beer, 2 Shots of liquor per week     Comment: socially   • Drug use: Not Currently     Types: Marijuana     Comment: has medical marijuana card. lozenges   • Sexual activity: Yes     Birth control/protection: Post-menopausal     Objective     /66   Pulse 100   Temp 99.5 °F (37.5 °C)   Resp 20   Ht 5' 4\" (1.626 m)   Wt 110 kg (242 lb)   BMI 41.54 kg/m²     Physical Exam  Vitals and nursing note reviewed.   Constitutional:       General: She is not in acute distress.     Appearance: Normal appearance.   HENT:      Head: Normocephalic and atraumatic.   Eyes:      Conjunctiva/sclera: Conjunctivae normal.   Neck:      Vascular: No carotid bruit.   Cardiovascular:      Rate and Rhythm: Normal rate and regular rhythm.      Pulses: Normal pulses.      Heart sounds: Normal heart sounds. No murmur heard.  Pulmonary:      Effort: Pulmonary effort is normal.      Breath sounds: Wheezing (LLL) present.   Chest:   Breasts:     Left: Normal.   Lymphadenopathy:      Upper Body:      Left upper body: No axillary adenopathy.   Skin:     General: Skin is warm and dry.   Neurological:      Mental Status: She is alert.   Psychiatric:         Mood and Affect: Mood normal.         Behavior: Behavior normal.       Administrative Statements           "

## 2024-08-27 ENCOUNTER — TELEPHONE (OUTPATIENT)
Age: 69
End: 2024-08-27

## 2024-08-27 NOTE — TELEPHONE ENCOUNTER
Would not recommend treating her with additional antibiotics at this time if she is not having any worsening symptoms related to her recent pneumonia.  Would still have her the repeat chest x-ray as we discussed at her visit, and she can, the office if anything progresses before that time. JACINTO Velasquez

## 2024-08-27 NOTE — TELEPHONE ENCOUNTER
Patient was seen in the office on 8/21 for aspiration pneumonia.  Patient states she had another coughing attack last night that again caused her to aspirate.  She would like to know if Jenny can put another prescription in for an antibiotic.  Patient is leaving for a trip on Thursday and does not want to be sick.  Denies symptoms other than cough at this time. She is also going to contact her GI doctor about the aspiration.    Please advise.       Preferred Pharmacy:    [id number] - [place], [place]  [facility] [address]

## 2024-08-28 ENCOUNTER — TELEPHONE (OUTPATIENT)
Age: 69
End: 2024-08-28

## 2024-08-28 NOTE — TELEPHONE ENCOUNTER
Patient called in and RN reviewed PCP comments regarding request for another course of antibiotic are not warranted at this time. Patient is aware of order for repeat chest xray due 9/21.

## 2024-08-29 ENCOUNTER — TELEPHONE (OUTPATIENT)
Age: 69
End: 2024-08-29

## 2024-08-29 NOTE — TELEPHONE ENCOUNTER
Patient is coughing up phlegm it sound wet. Should she keep up with robitussin Dm. Is this the right way to go on continuing this medication or should she be taking something else pulse ox is 89 oxygen low 90s. Patient going away on a trip soon    Lashae Santo

## 2024-09-10 ENCOUNTER — CLINICAL SUPPORT (OUTPATIENT)
Dept: BARIATRICS | Facility: CLINIC | Age: 69
End: 2024-09-10

## 2024-09-10 VITALS — BODY MASS INDEX: 43.41 KG/M2 | HEIGHT: 63 IN | WEIGHT: 245 LBS

## 2024-09-10 DIAGNOSIS — R63.5 ABNORMAL WEIGHT GAIN: Primary | ICD-10-CM

## 2024-09-10 PROCEDURE — RECHECK

## 2024-09-11 NOTE — PROGRESS NOTES
Weight Management Medical Nutrition Assessment  Fariba is here today for [facility] month 2 f/u. Current Weight: 244.2#. Patient has gained 0.2# x 2 months and overall has gained 2.8#. 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 Recently, she had pneumonia and was absent from Healthy Core program for several weeks. Patient expressed many barriers to weight loss. She feels she has been more mindful of her food choices but recognized that she was having large portions of fats (oil, butter, nuts, etc.). Per dietary recall, patient has been skipping lunch meal. She continues to struggle with the emotional and time demands of her job. Encouraged patient against meal skipping and discussed high protein beverages she can utilize in addition to snacks (ex. Protein shake or bone broth beverage) rather than skipping entirely. Patient reports she joined [employer] to fully focus on her weight loss efforts but has found it difficult to maintain food log. Patient has been recognizing the impact stress has on her eating habits and her body movements (jaw clenching). Will utilize trident gum to limit her intake but this does not help her jaw pain. Suggested she return to utilizing stress ball when completing her job activities. Patient has utilized 60 and 30 min  visit provided with healthy core but would benefit from additional support. She reports she has been taking green tea supplements. Advised against due to potential for GI side effects but patient plans to continue usage. Encouraged patient return to [email or web address] for low impact activity, stress management, and time for self care. Patient will continue with Healthy Core program and meet with TONYA CASEY next week.     Patient seen by Medical Provider in past 6 months:  yes  Requested to schedule appointment with Medical Provider: No    Anthropometric Measurements  Provider Start Weight (#): 241.4#  "(24)  Healthy Core Start Weight (#): 244.6 # (24)  Current Weight (#): 244.2#  TBW % Change from HC start weight: --%  IBW (#): 115# (63\")  Goal Weight (#): ST# LT#  Highest Weight (#): 310#  Lowest Weight (#): 160# (Amandeep post-op)    Weight Loss History  Previous weight loss attempts: Commercial Programs (Weight Watchers, Push Health, etc.)  Counseling with MD, Exercise, High Protein/Low CHO diets ([employer], [employer]live, etc.)  Nutrition Counseling with RD, Self Created Diets (Portion Control, Healthy Food Choices, etc.)  RYGB (16 years ago)    Food and Nutrition Related History  Wake up: 6:30-7 am  Bed Time: --  Sleep quality: poor - has to sleep sitting up due to anatomy + GERD    Dietary Recall  mushroom coffee (may use protein shake as her creamer instead of coffee mate)   Breakfast (7:30 am): tomato omelette (1 egg + 2 whites, some steak) Or protein cereal (dry) w/ ensure (30 g)  Snack: grazes on nuts (tries to be mindful)   Lunch (1:30-2:30 pm): -- (more recently)  Snack: may finish bowl of nuts Or may have chips provided by job  Dinner (7:30 pm): 3-4 oz protein (often steak, fish, shrimp) w/ vegetables (sometimes salad w/ skinny girl)   Snack: sometimes 1 chocolate piece (may add bone broth)     Beverages: water (w/ denis), mushroom coffee, 32 oz unsweetened tea (may add stevia), alcohol (3x/week)  Volume of beverage intake: 32 oz water + 32 oz unsweetened tea (has to sip beverages)     Weekends: Worse, eats out more  Cravings: sweet and salty  Trouble area of day: grazes while in the car     Frequency of Eating out: not often  Food restrictions: ice cream and too much milk cause dumping/diarrhea  Cooking: self   Food Shopping: self    Physical Activity  Activity: limited due to mobility (has been a [employer] member in the past)   Frequency: infrequently  Physical limitations/barriers to exercise: poor stability, walking with a cane, DDD    Estimated Needs  Energy  SECA: BMR: 1,666 X 1.4 -1,000 = " 1,332 kcal     Kyler Cameron Energy Needs (needs at 241#)  BMR: 1,602 kcal  Maintenance calories (sedentary): 1,923 kcal  1-2#/week loss (sedentary): 923-1,423 kcal  1-2#/week loss (light activity): 1,203-1,703 kcal    Protein: 63-78 grams (1.2-1.5 g/kg IBW)  Fluid: 61 ounces (35 mL/kg IBW)    Nutrition Diagnosis  Yes;    Overweight/obesity related to Excess energy intake as evidenced by BMI more than normative standard for age and sex (obesity-grade III 40+)     Nutrition Intervention    Nutrition Prescription  Calories: 1,200-1,300 kcal  Protein: 63-78 g  Fluid: 61 ounces    Meal Plan ([employer]Pro)  Breakfast: 250-300/30  Snack: 150/5  Lunch: 300/21  Snack: 150/5  Dinner: 350/21  Snack: --    Nutrition Education  Healthy Core Manual   Calorie controlled menu  Lean protein food choices  Healthy snack options  Food journaling tips    Nutrition Counseling  Strategies: meal planning, portion sizes, healthy snack choices, hydration, fiber intake, protein intake, exercise, food logging    Monitoring and Evaluation:    Evaluation criteria  Energy Intake  Meet protein needs  Maintain adequate hydration  Monitor weekly weight  Meal planning/preparation  Food journal   Decreased portions at mealtimes and snacks  Physical activity     Barriers to change:emotional  Readiness to change: Preparation:  (Getting ready to change)  and Action:  (Changing behavior)  Comprehension: very good  Expected Compliance: fair

## 2024-09-12 ENCOUNTER — CLINICAL SUPPORT (OUTPATIENT)
Dept: BARIATRICS | Facility: CLINIC | Age: 69
End: 2024-09-12

## 2024-09-12 VITALS — WEIGHT: 244.2 LBS | BODY MASS INDEX: 43.27 KG/M2 | HEIGHT: 63 IN

## 2024-09-12 DIAGNOSIS — R63.5 ABNORMAL WEIGHT GAIN: Primary | ICD-10-CM

## 2024-09-12 PROCEDURE — RECHECK

## 2024-09-16 ENCOUNTER — OFFICE VISIT (OUTPATIENT)
Dept: BARIATRICS | Facility: CLINIC | Age: 69
End: 2024-09-16
Payer: MEDICARE

## 2024-09-16 VITALS
HEIGHT: 63 IN | SYSTOLIC BLOOD PRESSURE: 130 MMHG | DIASTOLIC BLOOD PRESSURE: 90 MMHG | WEIGHT: 245.8 LBS | HEART RATE: 94 BPM | BODY MASS INDEX: 43.55 KG/M2

## 2024-09-16 DIAGNOSIS — I10 ESSENTIAL HYPERTENSION: ICD-10-CM

## 2024-09-16 DIAGNOSIS — E66.01 CLASS 3 SEVERE OBESITY DUE TO EXCESS CALORIES WITH SERIOUS COMORBIDITY AND BODY MASS INDEX (BMI) OF 40.0 TO 44.9 IN ADULT (HCC): Primary | ICD-10-CM

## 2024-09-16 PROCEDURE — 99214 OFFICE O/P EST MOD 30 MIN: CPT | Performed by: NURSE PRACTITIONER

## 2024-09-16 PROCEDURE — G2211 COMPLEX E/M VISIT ADD ON: HCPCS | Performed by: NURSE PRACTITIONER

## 2024-09-16 NOTE — ASSESSMENT & PLAN NOTE
- Patient is pursuing Healthy[facility]-Intensive Lifestyle Intervention Program and follow up visits with medical weight management provider  - Initial weight loss goal of 5-10% weight loss for improved health. Weight loss can improve patient's co-morbid conditions and/or prevent weight-related complications.  - Explained the importance of continuing lifestyle changes in addition to any anti-obesity medications.   - Labs reviewed from 6/2024 and 8/2024    General Recommendations:  Nutrition:  Eat breakfast daily.  Do not skip meals.      Food log (ie.) www.[email or web address].[email or web address], sparkpeople.com, loseit.com, calorieking.com, etc.     Practice mindful eating.  Be sure to set aside time to eat, eat slowly, and savor your food.     Hydration:    At least 64oz of water daily.  No sugar sweetened beverages.  No juice (eat the fruit instead).     Exercise:  Studies have shown that the ideal exercise goal is somewhere between 150 to 300 minutes of moderate intensity exercise a week.  Start with exercising 10 minutes every other day and gradually increase physical activity with a goal of at least 150 minutes of moderate intensity exercise a week, divided over at least 3 days a week.  An example of this would be exercising 30 minutes a day, 5 days a week.  Resistance training can increase muscle mass and increase our resting metabolic rate.   FULL BODY resistance training is recommended 2-3 times a week.  Do not do this on consecutive days to allow for muscle recovery.     Aim for a bare minimum 5000 steps, even on days you do not exercise.     Monitoring:   Weigh yourself daily.  If this causes undue stress, then just weigh yourself once a week.  Weigh yourself the same time of the day with the same amount of clothing on.  Preferably this should be done after waking up, before you eat, and with no clothing or minimal clothing on.     Specific Goals:  Calorie goal:  4982-7046 perry/day  Patient lifestyle habits were reviewed and patient  was congratulated on identifying her barriers regarding her weight loss journey.  Nutrition was discussed and she will continue to work with the dietitian to better balance her meals throughout the day.  Encouraged patient to track her food and calorie intake.  Activity level was discussed and patient was given information about [facility] program.  She will consider scheduling more time for herself to get an activity which may improve her hip pain.  She is also going to consult with an orthopedic specialist.  Medications were discussed and patient would like to avoid more medication at this time.  She may consider bupropion and naltrexone again in the future but still feels like she would like to adjust her lifestyle before starting on a new medication.  Patient was given emotional support and encouragement to continue with her nutrition and lifestyle changes

## 2024-09-16 NOTE — PROGRESS NOTES
Assessment/Plan:     Class 3 severe obesity due to excess calories with serious comorbidity and body mass index (BMI) of 40.0 to 44.9 in adult (HCC)  - Patient is pursuing [facility]-Intensive Lifestyle Intervention Program and follow up visits with medical weight management provider  - Initial weight loss goal of 5-10% weight loss for improved health. Weight loss can improve patient's co-morbid conditions and/or prevent weight-related complications.  - Explained the importance of continuing lifestyle changes in addition to any anti-obesity medications.   - Labs reviewed from 6/2024 and 8/2024    General Recommendations:  Nutrition:  Eat breakfast daily.  Do not skip meals.      Food log (ie.) [email or web address], sparkpeople.com, loseit.com, calorieking.com, etc.     Practice mindful eating.  Be sure to set aside time to eat, eat slowly, and savor your food.     Hydration:    At least 64oz of water daily.  No sugar sweetened beverages.  No juice (eat the fruit instead).     Exercise:  Studies have shown that the ideal exercise goal is somewhere between 150 to 300 minutes of moderate intensity exercise a week.  Start with exercising 10 minutes every other day and gradually increase physical activity with a goal of at least 150 minutes of moderate intensity exercise a week, divided over at least 3 days a week.  An example of this would be exercising 30 minutes a day, 5 days a week.  Resistance training can increase muscle mass and increase our resting metabolic rate.   FULL BODY resistance training is recommended 2-3 times a week.  Do not do this on consecutive days to allow for muscle recovery.     Aim for a bare minimum 5000 steps, even on days you do not exercise.     Monitoring:   Weigh yourself daily.  If this causes undue stress, then just weigh yourself once a week.  Weigh yourself the same time of the day with the same amount of clothing on.  Preferably this should be done after waking up, before you eat,  and with no clothing or minimal clothing on.     Specific Goals:  Calorie goal:  5423-3921 perry/day  Patient lifestyle habits were reviewed and patient was congratulated on identifying her barriers regarding her weight loss journey.  Nutrition was discussed and she will continue to work with the dietitian to better balance her meals throughout the day.  Encouraged patient to track her food and calorie intake.  Activity level was discussed and patient was given information about [facility] program.  She will consider scheduling more time for herself to get an activity which may improve her hip pain.  She is also going to consult with an orthopedic specialist.  Medications were discussed and patient would like to avoid more medication at this time.  She may consider bupropion and naltrexone again in the future but still feels like she would like to adjust her lifestyle before starting on a new medication.  Patient was given emotional support and encouragement to continue with her nutrition and lifestyle changes    Essential hypertension  Blood pressure borderline high today  Currently on lisinopril/hydrochlorothiazide         Fariba was seen today for follow-up.    Diagnoses and all orders for this visit:    Class 3 severe obesity due to excess calories with serious comorbidity and body mass index (BMI) of 40.0 to 44.9 in adult (HCC)    Essential hypertension        Total time spent reviewing chart, interviewing patient, examining patient, discussing plan, answering all questions, and documentin minutes with >50% face-to-face time with the patient.    Follow up  as needed  with Non-Surgical Physician/Advanced Practitioner.    Subjective:   Chief Complaint   Patient presents with    Follow-up     Pt is here for MWM f/u. Waist -        Patient ID: Fariba Garcia  is a 69 y.o. female with excess weight/obesity here to pursue weight management.  Patient is pursuing [facility]-Intensive Lifestyle  Intervention Program.   Most recent notes and records were reviewed.    HPI    Wt Readings from Last 20 Encounters:   09/16/24 111 kg (245 lb 12.8 oz)   09/12/24 111 kg (244 lb 3.2 oz)   09/10/24 111 kg (245 lb)   08/21/24 110 kg (242 lb)   08/18/24 110 kg (243 lb)   08/06/24 109 kg (239 lb 8 oz)   07/30/24 108 kg (238 lb 8 oz)   07/29/24 110 kg (243 lb)   07/16/24 109 kg (240 lb 4.8 oz)   07/10/24 111 kg (244 lb 6.4 oz)   07/09/24 110 kg (241 lb 9.6 oz)   07/02/24 110 kg (242 lb 6.4 oz)   05/29/24 113 kg (249 lb)   05/07/24 109 kg (241 lb 6.4 oz)   04/10/24 111 kg (245 lb 9.6 oz)   04/08/24 112 kg (248 lb)   02/28/24 113 kg (248 lb 3.2 oz)   12/13/23 110 kg (242 lb 12.8 oz)   11/30/23 110 kg (243 lb)   11/21/23 108 kg (238 lb)       Patient presents today to medical weight management office for follow up.  s/p  RYGB 16 ya in [place], [place] with significant recovery complications requiring multiple abdominal reconstructive surgeries  Patient is currently participating in the healthy core program.  Patient was prescribed bupropion/naltrexone at last office visit but decided to focus on lifestyle changes and forego medication at this time.  She has been working on better balancing her nutrition.  She continues to find it difficult to stay in control of her cravings.  She finds her self emotional eating especially when she is feeling stressed.  She recently is recovering from pneumonia and was in [place] for vacation.  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  She is looking to scale back on her hours to hopefully incorporate more regular exercise.  Patient would like to continue to avoid medication at this time.    Weight loss medication and dose: none  Started MWM weight and date:  241.4 lbs in 5/2024  Current weight: 245.8 lbs  Difference: +4.4    Starting BMI: 42.76 in 5/2024  Current BMI: 43.54    Waist Measurements:  9/2024: 41.5  in    Initial: 310 lbs  Amandeep: 160 lbs    Nutrition Prescription  Calories: 1,200-1,300 kcal  Protein: 63-78 g  Fluid: 61 ounces    Diet recall:  Tries to stay limited lactose  B: protein shake/pudding OR eggs with 1/2 english muffin  L: skips OR protein shake/pudding (bone broth with mashed potatoes)  S: protein bar OR nuts  D: rice with protein and vegetables OR pasta with sauce  S: english muffin with cee    Hydration: water sometimes with sour cherry juice, mushroom coffee  Alcohol: no  Smoking: no  Exercise: swimming 3 days per week  Occupation: retired teacher,   Sleep: not well  STOP ban/8        The following portions of the patient's history were reviewed and updated as appropriate: allergies, current medications, past family history, past medical history, past social history, past surgical history, and problem list.    Family History   Problem Relation Age of Onset    Diabetes Mother     Aortic aneurysm Father     Cancer Father         prostate    Heart disease Sister         open heart    Cancer Sister         breast    Breast cancer Sister 54    Diabetes Brother     Cancer Brother         spinal cancer    Stroke Brother     Hypertension Brother     Other Daughter         concussion syndrome from MVA    Asperger's syndrome Son         high functioning    No Known Problems Maternal Aunt     No Known Problems Maternal Aunt     Colon cancer Maternal Uncle 60    No Known Problems Paternal Aunt     No Known Problems Paternal Aunt     Diabetes Maternal Grandfather         Review of Systems   Constitutional:  Negative for fatigue.   HENT:  Negative for sore throat.    Respiratory:  Negative for cough and shortness of breath.    Cardiovascular:  Negative for chest pain, palpitations and leg swelling.   Gastrointestinal:  Negative for abdominal pain, constipation, diarrhea and nausea.   Genitourinary:  Negative for dysuria.   Musculoskeletal:  Positive for arthralgias (hip pain). Negative for back pain.  "  Skin:  Negative for rash.   Neurological:  Negative for headaches.   Psychiatric/Behavioral:  Negative for dysphoric mood. The patient is not nervous/anxious.        Objective:  /90   Pulse 94   Ht 5' 3\" (1.6 m)   Wt 111 kg (245 lb 12.8 oz)   BMI 43.54 kg/m²     Physical Exam  Vitals and nursing note reviewed.   Constitutional:       Appearance: Normal appearance. She is obese.   HENT:      Head: Normocephalic.   Pulmonary:      Effort: Pulmonary effort is normal.   Neurological:      General: No focal deficit present.      Mental Status: She is alert and oriented to person, place, and time.   Psychiatric:         Mood and Affect: Mood normal.         Behavior: Behavior normal.         Thought Content: Thought content normal.         Judgment: Judgment normal.            Labs   Most recent labs reviewed   Lab Results   Component Value Date    SODIUM 139 06/22/2024    K 3.9 06/22/2024     06/22/2024    CO2 24 06/22/2024    AGAP 10 06/22/2024    BUN 19 06/22/2024    CREATININE 1.13 06/22/2024    GLUC 98 06/22/2024    GLUF 108 (H) 08/23/2022    CALCIUM 9.5 06/22/2024    AST 23 06/22/2024    ALT 20 06/22/2024    ALKPHOS 57 06/22/2024    TP 7.1 06/22/2024    TBILI 0.55 06/22/2024    EGFR 50 06/22/2024     Lab Results   Component Value Date    HGBA1C 5.4 05/03/2024     Lab Results   Component Value Date    CEK1XMVUYBPU 0.981 08/23/2022    TSH 1.740 05/03/2024     Lab Results   Component Value Date    CHOLESTEROL 197 05/03/2024     Lab Results   Component Value Date    HDL 63 05/03/2024     Lab Results   Component Value Date    TRIG 158 (H) 05/03/2024     Lab Results   Component Value Date    LDLCALC 107 (H) 05/03/2024     "

## 2024-09-17 ENCOUNTER — CLINICAL SUPPORT (OUTPATIENT)
Dept: BARIATRICS | Facility: CLINIC | Age: 69
End: 2024-09-17

## 2024-09-17 VITALS — HEIGHT: 63 IN | BODY MASS INDEX: 42.88 KG/M2 | WEIGHT: 242 LBS

## 2024-09-17 DIAGNOSIS — R63.5 ABNORMAL WEIGHT GAIN: Primary | ICD-10-CM

## 2024-09-17 PROCEDURE — RECHECK

## 2024-10-14 DIAGNOSIS — K21.9 GASTROESOPHAGEAL REFLUX DISEASE, UNSPECIFIED WHETHER ESOPHAGITIS PRESENT: ICD-10-CM

## 2024-10-14 RX ORDER — OMEPRAZOLE 40 MG/1
40 CAPSULE, DELAYED RELEASE ORAL DAILY
Qty: 30 CAPSULE | Refills: 5 | Status: SHIPPED | OUTPATIENT
Start: 2024-10-14

## 2024-10-14 NOTE — LETTER
April 16, 2018     Barbie Vasquez MD  [address]  [place] [address]    Patient: Gwendolyn Seay   YOB: 1955   Date of Visit: 4/16/2018       Dear Dr Yamilet Shipman: Thank you for referring Gwendolyn Seay to me for evaluation  Below are my notes for this consultation  If you have questions, please do not hesitate to call me  I look forward to following your patient along with you  Sincerely,        Maci Sawant MD        CC: MD Maci Savage MD  4/16/2018  3:35 PM  Sign at close encounter  Assessment:  1  Chronic pain syndrome    2  Chronic bilateral low back pain without sciatica    3  Lumbar spondylosis    4  Spondylolisthesis of lumbar region    5  Coccydynia        Plan:  Orders Placed This Encounter   Procedures    XR sacrum and coccyx     Standing Status:   Future     Standing Expiration Date:   4/16/2022     Scheduling Instructions:      Bring along any outside films relating to this procedure  Order Specific Question:   Reason for Exam:     Answer:   coccydynia     Order Specific Question:   Is the patient pregnant? Answer:   Unknown    XR spine lumbar complete w bending minimum 6 views     Standing Status:   Future     Standing Expiration Date:   4/16/2022     Scheduling Instructions:      Bring along any outside films relating to this procedure  Order Specific Question:   Reason for Exam:     Answer:   low back pain     Order Specific Question:   Is the patient pregnant?      Answer:   Unknown    Ambulatory referral to Physical Therapy     Standing Status:   Future     Standing Expiration Date:   10/16/2018     Referral Priority:   Routine     Referral Type:   Physical Therapy     Referral Reason:   Specialty Services Required     Requested Specialty:   Physical Therapy     Number of Visits Requested:   1     Expiration Date:   4/16/2019   [facility] Ambulatory referral to Neurosurgery     Standing Status:   Future Blu Hart is a 41 y.o. male with a PMHx chronic back pain, nicotine dependence, and polysubstance abuse who presents to the ED with complaints of abdominal pain, rectal bleeding, and hematemesis x2 days. The patient reports he initially had diarrhea and lower abdominal pain 3 days ago which he thought was due to having ice cream because he is lactose intolerant. He states his symptoms worsened the next day and he began having multiple episodes of bloody diarrhea, noting both bright red blood and darker maroon stools. He states later that night he had an episode of emesis that he describes as dark, coffee-ground and reports an additional episode of coffee-ground emesis a few hours prior to coming to the ED. He vomited in the ED as well but did not note any blood. He reports the abdominal pain is mostly lower and is cramping with intermittent stabbing pain. He also endorses associated nausea, diaphoresis, chills, and lightheadedness. He denies fever, CP, SOB, cough, syncope, or dysuria. He denies EtOH use but reports taking 800mg of ibuprofen 4-5x/daily for the past 2 weeks. He also endorses IVDU and typically uses heroin and fentanyl multiple times per day and last used this morning.    In the ED, patient initially hypertensive and tachycardic which improved with pain control otherwise vitals stable, afebrile. CBC with stable anemia. Lipase 7. CMP unremarkable. Type & screen obtained. CTA abd/pelvis with findings suggestive of a nonspecific colitis involving the descending and rectosigmoid colon. No brisk active GI bleed. Hepatosplenomegaly. Age advanced degenerative changes in both hips. The patient received 4mg IV morphine, zofran 8mg x1, and protonix 80mg.   Standing Expiration Date:   10/16/2018     Referral Priority:   Routine     Referral Type:   Consult - AMB     Referral Reason:   Specialty Services Required     Requested Specialty:   Neurosurgery     Number of Visits Requested:   1     Expiration Date:   4/16/2019       New Medications Ordered This Visit   Medications    diclofenac (VOLTAREN) 75 mg EC tablet     Sig: Take 1 tablet (75 mg total) by mouth 2 (two) times a day     Dispense:  60 tablet     Refill:  0     My impressions and treatment recommendations were discussed in detail with the patient, who verbalized understanding and had no further questions  The patient reports that her most exquisite pain at today's visit involves her coccyx  She states that she fell down and hurt her tailbone few weeks ago while shoveling snow  As such, I feel reasonable to obtain an x-ray of the sacrum and coccyx as well as an x-ray of the lumbar spine to evaluate for any pathology that may be contributing to her pain complaints  She may be a candidate for the coccygeal injection, but I will discuss this further with her once I obtain the results of the x-ray of the sacrum and coccyx as well as the x-ray of the lumbar spine  The patient also reports that meloxicam is giving her some clinical benefit, but not completely alleviating her pain  As such, I suggested discontinuing meloxicam and trialing diclofenac 75 milligrams every 12 hours as needed for pain  I did ask the patient to take this with food due to her gastric bypass surgery  Side effects were reviewed with the patient  The patient also reports that she has met with a spinal surgeon who suggested that she undergo a lumbar fusion surgery  The patient is interested in meeting with another surgeon to determine her candidacy for lumbar spine surgery  She would like a second opinion  As such, I have referred her to see Dr Pablo Artis for a second opinion regarding lumbar spine surgery      I also feel reasonable to have the patient undergo a course of physical therapy 2-3 times per week for 4-6 weeks  Follow-up is planned in 4 weeks time or sooner as warranted  Discharge instructions were provided  I personally saw and examined the patient and I agree with the above discussed plan of care  History of Present Illness:    Constance Rossi is a 63 y o  female who presents to [facility] for initial evaluation of the above stated pain complaints  The patient has a past medical and chronic pain history as outlined in the assessment section  She was referred by Dr Jaron Jiang  The patient reports a several year history of low back pain  She also admits to 3 weeks worth of tailbone pain that started after she slipped and fell while shoveling snow  She states that her pain is severe and 8 to 9/10 on the verbal numerical pain rating scale  Describes her pain is nearly constant in nature without any typical pattern  The patient does report that her pain is burning, shooting, numbness, sharp, dull/aching, pins and needles, and pressure like    She reports weakness in her upper and lower extremities  The patient states that lying down decreases pain while standing, bending, sitting, walking, exercise, coughing/sneezing, and bowel movements increases pain  The patient reports that she has not yet trialed physical therapy for her pain  She also reports that chiropractic manipulation did not provide her any pain relief  Heat/ice treatment provides her moderate pain relief  The patient does report that she has been seeing a spinal surgeon at [facility]  She was recommended to undergo a lumbar fusion surgery, but would like a second opinion regarding this  She also can't is using gabapentin 300 milligrams 3 times daily  She is currently employed full-time  Review of Systems:    Review of Systems   Constitutional: Positive for unexpected weight change (weight gain)   Negative for fever  HENT: Negative for trouble swallowing  Eyes: Positive for visual disturbance  Respiratory: Positive for shortness of breath  Negative for wheezing  Cardiovascular: Negative for chest pain and palpitations  Gastrointestinal: Positive for diarrhea and nausea  Negative for constipation and vomiting  Endocrine: Negative for cold intolerance, heat intolerance and polydipsia  Genitourinary: Positive for difficulty urinating  Negative for frequency  Musculoskeletal: Positive for gait problem (difficulty walking/ decreased range of motion) and joint swelling (joint stiffness )  Negative for arthralgias and myalgias  Skin: Negative for rash  Neurological: Positive for dizziness and weakness (muscle)  Negative for seizures, syncope and headaches  Hematological: Does not bruise/bleed easily  Psychiatric/Behavioral: Negative for dysphoric mood  All other systems reviewed and are negative  Patient Active Problem List   Diagnosis    GERD without esophagitis    Right sided abdominal pain    Diarrhea    Chronic pain syndrome    Chronic bilateral low back pain without sciatica    Lumbar spondylosis    Spondylolisthesis of lumbar region    Coccydynia       Past Medical History:   Diagnosis Date    Back problem     Gastric bypass status for obesity     GERD (gastroesophageal reflux disease)     Hypertension        Past Surgical History:   Procedure Laterality Date    APPENDECTOMY      BARIATRIC SURGERY      CARPAL TUNNEL RELEASE      CHOLECYSTECTOMY      COLONOSCOPY W/ BIOPSIES AND POLYPECTOMY      FLEXIBLE BRONCHOSCOPY W/ UPPER ENDOSCOPY      HERNIA REPAIR      MA ESOPHAGOGASTRODUODENOSCOPY TRANSORAL DIAGNOSTIC N/A 2/19/2018    Procedure: ESOPHAGOGASTRODUODENOSCOPY (EGD); Surgeon: Sobeida Mitchell MD;  Location: [facility];   Service: Gastroenterology    TONSILECTOMY, ADENOIDECTOMY, BILATERAL MYRINGOTOMY AND TUBES         Family History   Problem Relation Age of Onset  Diabetes Mother     Aortic aneurysm Father     Cancer Father      prostate    Heart disease Sister      open heart    Cancer Sister      breast    Diabetes Brother     No Known Problems Daughter     Asperger's syndrome Son     Diabetes Maternal Grandfather     No Known Problems Brother        Social History     Occupational History    Not on file       Social History Main Topics    Smoking status: Never Smoker    Smokeless tobacco: Never Used    Alcohol use 3 0 oz/week     5 Cans of beer per week    Drug use: No    Sexual activity: Yes         Current Outpatient Prescriptions:     acetaminophen (TYLENOL) 325 mg tablet, Take 1 tablet by mouth as needed, Disp: , Rfl:     LISINOPRIL PO, Take by mouth every morning  , Disp: , Rfl:     pantoprazole (PROTONIX) 40 mg tablet, Take 1 tablet by mouth 2 (two) times a day  , Disp: , Rfl:     sucralfate (CARAFATE) 1 g/10 mL suspension, Take 10 mL (1 g total) by mouth 4 (four) times a day, Disp: 420 mL, Rfl: 0    traZODone (DESYREL) 50 mg tablet, Take 1 tablet by mouth daily at bedtime, Disp: , Rfl:     diclofenac (VOLTAREN) 75 mg EC tablet, Take 1 tablet (75 mg total) by mouth 2 (two) times a day, Disp: 60 tablet, Rfl: 0    No Known Allergies    Physical Exam:    /94 (BP Location: Left arm, Patient Position: Sitting, Cuff Size: Standard)   Pulse 94   Temp 98 9 °F (37 2 °C) (Oral)   Resp 18   Ht 5' 5" (1 651 m)   Wt 113 kg (248 lb 9 6 oz)   BMI 41 37 kg/m²      Constitutional: obese  Eyes: anicteric  HEENT: grossly intact  Neck: supple, symmetric, trachea midline and no masses   Pulmonary:even and unlabored  Cardiovascular:No edema or pitting edema present  Skin:Normal without rashes or lesions and well hydrated  Psychiatric:Mood and affect appropriate  Neurologic:Cranial Nerves II-XII grossly intact  Musculoskeletal:normal     Lumbar Spine Exam    Appearance:  Normal lordosis  Palpation/Tenderness:  Exquisite tenderness noted over the coccygeal region  Sensory:  no sensory deficits noted  Range of Motion:  Flexion: Moderately limited  with pain  Extension:  Moderately limited  with pain  Lateral Flexion - Left:  Moderately limited  with pain  Lateral Flexion - Right:  Moderately limited  with pain  Rotation - Left:  No limitation  without pain  Rotation - Right:  No limitation  without pain  Motor Strength:  Left hip flexion:  5/5  Left hip extension:  5/5  Right hip flexion:  5/5  Right hip extension:  5/5  Left knee flexion:  5/5  Left knee extension:  5/5  Right knee flexion:  5/5  Right knee extension:  5/5  Left foot dorsiflexion:  5/5  Left foot plantar flexion:  5/5  Right foot dorsiflexion:  5/5  Right foot plantar flexion:  5/5  Reflexes:  Left Patellar:  2+   Right Patellar:  2+   Left Achilles:  2+   Right Achilles:  2+   Special Tests:  Left Straight Leg Test:  negative  Right Straight Leg Test:  negative  Left Pedro's Maneuver:  negative  Right Pedro's Maneuver:  negative    Imaging  XR sacrum and coccyx    (Results Pending)   XR spine lumbar complete w bending minimum 6 views    (Results Pending)   4/5/16  MRI LUMBAR SPINE WITHOUT CONTRAST     INDICATION:  56-year-old female, chronic low back pain, bilateral leg weakness  COMPARISON:  9/14/2013 [place]  MRI     TECHNIQUE:  Sagittal T1, sagittal T2, sagittal inversion recovery, axial T1 and axial T2, coronal T2        IMAGE QUALITY:  Diagnostic     FINDINGS:     ALIGNMENT:    Mild smooth levoscoliosis, apex approximately L2-3, unchanged  Minimal chronic superior endplate compression deformities T11 and L1 which were acute on the prior study   No acute compression fracture  Grade 1 degenerative retrolisthesis L2-3, unchanged  Grade 1 degenerative anterolisthesis L4-5, stable     MARROW SIGNAL:  Persistent multilevel degenerative marrow     DISTAL CORD AND CONUS:  Normal size and signal within the distal cord and conus    The conus ends at the L1 level      PARASPINAL SOFT TISSUES:  Paraspinal soft tissues are unremarkable      SACRUM:  Normal signal within the sacrum   No evidence of insufficiency or stress fracture      LOWER THORACIC DISC SPACES:       Mild degenerative disc and facet disease, diminished small noncompressive central disc protrusion T12-L1     LUMBAR DISC SPACES:          L1-L2:  Mild degenerative disc and facet disease, no stenosis     L2-L3:  Mild degenerative disc disease, moderate degenerative facet hypertrophy, grade 1 retrolisthesis, mild bilateral foraminal stenosis, no overt neural element impingement, stable     L3-L4:  Mild degenerative disc disease, moderate degenerative facet hypertrophy,  mild bilateral foraminal stenosis, no overt neural element impingement      L4-L5:  Moderate degenerative disc disease, severe degenerative facet hypertrophy, grade 1 anterolisthesis, mild central canal and bilateral foraminal stenosis, no overt neural element impingement, stable      L5-S1: Mild degenerative disc disease, moderate degenerative facet hypertrophy, no stenosis     IMPRESSION:  Mild chronic superior endplate compression deformities T11 and L1 which were acute on the prior exam     Persistent multilevel degenerative spondylosis, mild levoscoliosis     Persisting grade 1 retrolisthesis, mild bilateral foraminal stenosis L2-3     Persistent mild bilateral foraminal stenosis L3-4     Persisting grade 1 anterolisthesis, mild central canal and bilateral foraminal stenosis L4-5        Workstation performed: [id number]      Imaging     MRI lumbar spine wo contrast (Order #[id number]) on 4/5/2016 - Imaging Information   Result History     MRI lumbar spine wo contrast (Order #[id number]) on 4/6/2016 - Order Result History Report         Orders Placed This Encounter   Procedures    XR sacrum and coccyx    XR spine lumbar complete w bending minimum 6 views    Ambulatory referral to Physical Therapy    Ambulatory referral to Neurosurgery

## 2024-10-24 ENCOUNTER — OFFICE VISIT (OUTPATIENT)
Dept: URGENT CARE | Facility: CLINIC | Age: 69
End: 2024-10-24
Payer: MEDICARE

## 2024-10-24 ENCOUNTER — APPOINTMENT (OUTPATIENT)
Dept: RADIOLOGY | Facility: CLINIC | Age: 69
End: 2024-10-24
Payer: MEDICARE

## 2024-10-24 VITALS
WEIGHT: 245 LBS | HEART RATE: 105 BPM | DIASTOLIC BLOOD PRESSURE: 68 MMHG | TEMPERATURE: 97.6 F | OXYGEN SATURATION: 99 % | RESPIRATION RATE: 14 BRPM | SYSTOLIC BLOOD PRESSURE: 126 MMHG | BODY MASS INDEX: 43.4 KG/M2

## 2024-10-24 DIAGNOSIS — R09.89 CHEST CONGESTION: ICD-10-CM

## 2024-10-24 DIAGNOSIS — T17.908A ASPIRATION INTO AIRWAY, INITIAL ENCOUNTER: Primary | ICD-10-CM

## 2024-10-24 PROCEDURE — 71046 X-RAY EXAM CHEST 2 VIEWS: CPT

## 2024-10-24 PROCEDURE — 99203 OFFICE O/P NEW LOW 30 MIN: CPT | Performed by: PHYSICIAN ASSISTANT

## 2024-10-24 PROCEDURE — 99213 OFFICE O/P EST LOW 20 MIN: CPT | Performed by: PHYSICIAN ASSISTANT

## 2024-10-24 RX ORDER — AZITHROMYCIN 250 MG/1
TABLET, FILM COATED ORAL
Qty: 6 TABLET | Refills: 0 | Status: SHIPPED | OUTPATIENT
Start: 2024-10-24 | End: 2024-10-28

## 2024-10-24 RX ORDER — ALBUTEROL SULFATE 90 UG/1
2 INHALANT RESPIRATORY (INHALATION) EVERY 6 HOURS PRN
Qty: 8.5 G | Refills: 0 | Status: SHIPPED | OUTPATIENT
Start: 2024-10-24

## 2024-10-24 NOTE — PROGRESS NOTES
[facility] Now        NAME: Fariba Garcia is a 69 y.o. female  : 1955    MRN: [id number]  DATE: 2024  TIME: 11:48 AM    Assessment and Plan   Aspiration into airway, initial encounter [T17.908A]  1. Aspiration into airway, initial encounter  XR chest pa and lateral    amoxicillin-clavulanate (AUGMENTIN) 875-125 mg per tablet    azithromycin (ZITHROMAX) 250 mg tablet    albuterol (ProAir HFA) 90 mcg/act inhaler        PMH recurrent aspiration pneumonia.  Pt has confirmed aspiration earlier in the week.  Feels identical to prior episode.  Pt has deep cough that is producing foul tasting and smelling sputum from lungs.  Very suspicious for aspiration pneumonia again.  Started on albuterol and abx. Instructed pt to f/u with PCP this week.  Educated pt on indications to return or go to ED.    Patient Instructions       Follow up with PCP in 3-5 days.  Proceed to  ER if symptoms worsen.    If tests are performed, our office will contact you with results only if changes need to made to the care plan discussed with you at the visit. [facility]    Chief Complaint     Chief Complaint   Patient presents with    Cold Like Symptoms     Pt presents with dizzy, SOB with activity, cough, started one week ago         History of Present Illness       PMH Gastric bypass that has led to GERD.  Pt states she needs to sleep inclined so she doesn't aspirate.  About a week ago while she was sleeping she felt herself aspirate a significant amount.  Over the course of the week she has had worsening cough, wheezing.  Cough is productive of foul smelling and foul tasting mucous. Pt had asipration pneumonia 2 months ago and states this feels identical.        Review of Systems   Review of Systems   Constitutional:  Positive for fatigue. Negative for chills, diaphoresis and fever.   HENT:  Negative for congestion, ear pain, postnasal drip, rhinorrhea, sinus  Addended by: LESA KLINE on: 4/11/2018 01:31 PM     Modules accepted: Orders     pressure, sinus pain, sneezing, sore throat and voice change.    Eyes: Negative.    Respiratory:  Positive for cough, chest tightness, shortness of breath (With ambulating feels like she is huffing and puffing.  Nothing at rest) and wheezing.    Cardiovascular:  Negative for chest pain and palpitations.   Gastrointestinal:  Negative for abdominal pain, constipation, diarrhea, nausea and vomiting.   Endocrine: Negative.    Genitourinary:  Negative for dysuria.   Musculoskeletal:  Negative for back pain, myalgias and neck pain.   Skin:  Negative for pallor and rash.   Allergic/Immunologic: Negative.    Neurological:  Negative for dizziness, syncope and headaches.   Hematological: Negative.    Psychiatric/Behavioral: Negative.           Current Medications       Current Outpatient Medications:     albuterol (ProAir HFA) 90 mcg/act inhaler, Inhale 2 puffs every 4 (four) hours as needed for wheezing or shortness of breath, Disp: 18 g, Rfl: 0    albuterol (ProAir HFA) 90 mcg/act inhaler, Inhale 2 puffs every 6 (six) hours as needed for wheezing, Disp: 8.5 g, Rfl: 0    amoxicillin-clavulanate (AUGMENTIN) 875-125 mg per tablet, Take 1 tablet by mouth every 12 (twelve) hours for 7 days, Disp: 14 tablet, Rfl: 0    azithromycin (ZITHROMAX) 250 mg tablet, Take 2 tablets today then 1 tablet daily x 4 days, Disp: 6 tablet, Rfl: 0    calcium carbonate (OS-BEAU) 1250 (500 Ca) MG chewable tablet, Chew 1 tablet daily, Disp: , Rfl:     dicyclomine (BENTYL) 10 mg capsule, Take 1 capsule (10 mg total) by mouth as needed (take once daily as needed), Disp: 90 capsule, Rfl: 1    famotidine (PEPCID) 40 MG tablet, Take 1 tablet (40 mg total) by mouth in the morning, Disp: 90 tablet, Rfl: 2    fluticasone (FLONASE) 50 mcg/act nasal spray, 2 sprays into each nostril daily, Disp: 16 g, Rfl: 3    lisinopril-hydrochlorothiazide (PRINZIDE,ZESTORETIC) 20-12.5 MG per tablet, TAKE ONE TABLET BY MOUTH EVERY DAY, Disp: 90 tablet, Rfl: 1    magnesium 30  MG tablet, Take 30 mg by mouth 2 (two) times a day, Disp: , Rfl:     multivitamin (THERAGRAN) TABS, Take 1 tablet by mouth daily, Disp: , Rfl:     Omega-3 Fatty Acids (fish oil) 1,000 mg, Take 1 capsule (1,000 mg total) by mouth 2 (two) times a day (Patient taking differently: Take 1,000 mg by mouth daily), Disp: 60 capsule, Rfl: 6    omeprazole (PriLOSEC) 40 MG capsule, TAKE ONE CAPSULE BY MOUTH EVERY DAY, Disp: 30 capsule, Rfl: 5    saccharomyces boulardii (FLORASTOR) 250 mg capsule, Take 250 mg by mouth in the morning, Disp: , Rfl:     vitamin B-12 (CYANOCOBALAMIN) 50 MCG tablet, Take 50 mcg by mouth daily, Disp: , Rfl:     vitamin E 100 UNIT capsule, Take 100 Units by mouth daily, Disp: , Rfl:     cholecalciferol (VITAMIN D3) 400 units tablet, Take 400 Units by mouth daily, Disp: , Rfl:     tiZANidine (ZANAFLEX) 2 mg tablet, Take 1 tablet (2 mg total) by mouth every 8 (eight) hours as needed for muscle spasms (Patient not taking: Reported on 8/18/2024), Disp: 12 tablet, Rfl: 0    Current Allergies     Allergies as of 10/24/2024    (No Known Allergies)            The following portions of the patient's history were reviewed and updated as appropriate: allergies, current medications, past family history, past medical history, past social history, past surgical history and problem list.     Past Medical History:   Diagnosis Date    Arthritis     Asthma     Back pain     Chronic pain disorder     hips into the legs    Colon polyp     DVT (deep venous thrombosis) ([facility]) 2006    Gastric bypass status for obesity     yarelis en y    GERD (gastroesophageal reflux disease)     Hiatal hernia     History of transfusion 2006    after gastric bypass surgery, no reactions    Hypertension     Irritable bowel syndrome     Kidney stone     Muscle weakness     bilateral legs    Numbness and tingling     bilateral feet    Pneumonia     Sacroiliitis ([facility]) 5/15/2018    Spinal stenosis     Tinnitus     occassionally    Vertigo     Wears  glasses        Past Surgical History:   Procedure Laterality Date    APPENDECTOMY      BACK SURGERY  2018    L4-5 fusion    BARIATRIC SURGERY  2005    yarelis en y- pt states the procedure was done incorrectly which lead to additional surgeries from -    CARPAL TUNNEL RELEASE Right      SECTION      x1    CHOLECYSTECTOMY      COLON SURGERY      partial removal of the small bowel-necrosis-between 2006    COLONOSCOPY      COLONOSCOPY W/ BIOPSIES AND POLYPECTOMY      EPIDURAL BLOCK INJECTION N/A 2021    Procedure: C6 C7 cervical epidural steroid injection ( 77990);  Surgeon: Jeremi Gamino MD;  Location: [facility] MAIN OR;  Service: Pain Management     EPIDURAL BLOCK INJECTION N/A 2021    Procedure: C6 C7 cervical epidural steroid injection (32880);  Surgeon: Jeremi Gamino MD;  Location: [facility] MAIN OR;  Service: Pain Management     FLEXIBLE BRONCHOSCOPY W/ UPPER ENDOSCOPY      GASTRECTOMY      after gastric bypass-(failed surgery per pt) 2006    HERNIA REPAIR      incisional hernias-diaphragm area    NV ARTHROCENTESIS ASPIR&/INJ SMALL JT/BURSA W/O US N/A 2018    Procedure: Coccygeal Injection & Ganglion Impar Block;  Surgeon: Jeremi Gamino MD;  Location: [facility] MAIN OR;  Service: Pain Management     NV ARTHRODESIS POSTERIOR INTERBODY 1 NTRSPC LUMBAR N/A 2018    Procedure: L4-5 DECOMPRESSION INTERBODY INSTRUMENTED FUSION;  Surgeon: Martinez Garcia MD;  Location: [facility] Main OR;  Service: Orthopedics    NV COLONOSCOPY FLX DX W/COLLJ SPEC WHEN PFRMD N/A 2018    Procedure: COLONOSCOPY;  Surgeon: Terry Booth MD;  Location: [facility] GI LAB;  Service: Gastroenterology    NV ESOPHAGOGASTRODUODENOSCOPY TRANSORAL DIAGNOSTIC N/A 2018    Procedure: ESOPHAGOGASTRODUODENOSCOPY (EGD);  Surgeon: Terry Booth MD;  Location: [facility] GI LAB;  Service: Gastroenterology    NV INJECT SI JOINT ARTHRGRPHY&/ANES/STEROID W/EMMANUEL Right 2018    Procedure: Rt Sacroiliac Joint Injection;   Surgeon: Jeremi Gamino MD;  Location: [facility] MAIN OR;  Service: Pain Management     MN INJECT SI JOINT ARTHRGRPHY&/ANES/STEROID W/EMMANUEL Right 05/01/2019    Procedure: Sacroiliac Joint Injection (48245);  Surgeon: Jeremi Gamino MD;  Location: [facility] MAIN OR;  Service: Pain Management     TOENAIL EXCISION  2024    ingrown toenails fixed    TONSILECTOMY, ADENOIDECTOMY, BILATERAL MYRINGOTOMY AND TUBES      TONSILLECTOMY         Family History   Problem Relation Age of Onset    Diabetes Mother     Aortic aneurysm Father     Cancer Father         prostate    Heart disease Sister         open heart    Cancer Sister         breast    Breast cancer Sister 54    Diabetes Brother     Cancer Brother         spinal cancer    Stroke Brother     Hypertension Brother     Other Daughter         concussion syndrome from MVA    Asperger's syndrome Son         high functioning    No Known Problems Maternal Aunt     No Known Problems Maternal Aunt     Colon cancer Maternal Uncle 60    No Known Problems Paternal Aunt     No Known Problems Paternal Aunt     Diabetes Maternal Grandfather          Medications have been verified.        Objective   /68   Pulse 105   Temp 97.6 °F (36.4 °C)   Resp 14   Wt 111 kg (245 lb)   SpO2 99%   BMI 43.40 kg/m²        Physical Exam     Physical Exam  Vitals and nursing note reviewed.   Constitutional:       General: She is not in acute distress.     Appearance: Normal appearance. She is well-developed. She is not ill-appearing or diaphoretic.   HENT:      Head: Normocephalic and atraumatic.   Cardiovascular:      Rate and Rhythm: Normal rate and regular rhythm.      Heart sounds: Normal heart sounds.   Pulmonary:      Effort: Pulmonary effort is normal. No respiratory distress.      Breath sounds: Wheezing (mild diffuse) present. No rhonchi or rales.   Musculoskeletal:      Cervical back: Normal range of motion and neck supple.   Lymphadenopathy:      Cervical: No cervical adenopathy.    Skin:     General: Skin is warm.      Capillary Refill: Capillary refill takes less than 2 seconds.      Coloration: Skin is not pale.      Findings: No rash.   Neurological:      Mental Status: She is alert.

## 2024-10-24 NOTE — PATIENT INSTRUCTIONS
Continue to monitor symptoms.  If new or worsening symptoms develop, go immediately to Er. Drink plenty of fluids.  Follow up with Family Doctor this week.    If tests are performed, our office will contact you with results only if changes need to made to the care plan discussed with you at the visit. You can review your full results on [facility].     Patient Education     Aspiration Pneumonia Discharge Instructions   About this topic   Aspiration pneumonia is an infection in the lungs. Breathing food, vomit, or liquids into your lungs may cause this illness. This causes the lungs to make mucus and leads to infection. An absent or weak gag reflex is the most likely cause of this illness. Problems with swallowing food or drinks may also be a cause. This illness happens most often in older people. It is also common in people who cannot get out of bed or people who have had a stroke.       What care is needed at home?   Ask your doctor what you need to do when you go home. Make sure you ask questions if you do not understand what the doctor says. This way you will know what you need to do.  Learn to eat slowly and chew your food well before swallowing.  If swallowing is hard for you, ask if your food should be pureed. This makes the food easier to swallow. You may use a straw instead of spoon for eating food.  Take your drugs as ordered. Do not skip doses. Do not take any other drugs before talking with your doctor.  Drink at least 8 glasses of water each day unless told not to. This helps loosen your mucus so you can cough it up better.  Use a cool-mist humidifier and be sure to clean it every day.  Get lots of rest. If you have trouble sleeping at night, take naps at day time.  What follow-up care is needed?   Your doctor may ask you to make visits to the office to check on your progress. Be sure to keep these visits.  What drugs may be needed?   The doctor may order drugs to:  Fight an infection  Loosen  mucus  Lower fever  Control coughing  Make the swelling in your airways and lungs go away  You may be given other drugs based on your condition. These may include inhalers and steroids. Your doctor will give specific instructions about the drugs you may need to take.  Will physical activity be limited?   Get enough rest while recovering from your illness. You will need to stay home from work or school at least 24 hours after your fever has gone away and your cough has gotten better. Ask your doctor when you can return to your normal activities.  What problems could happen?   More trouble breathing  Fluid in your lungs  Low blood pressure  Cough with mucus that has blood in it or is green in color  Shock  Spread of infection to your bloodstream and other parts of your body  Respiratory failure  What can be done to prevent this health problem?   Learn to eat slowly and chew your food fully. Avoid actions that may lead to aspiration pneumonia. This includes:  Drinking too much beer, wine, and mixed drinks (alcohol)  Talking or laughing while eating  Smoking  When do I need to call the doctor?   Signs of a very bad reaction. These include wheezing; chest tightness; fever; itching; bad cough; blue skin color; seizures; or swelling of face, lips, tongue, or throat. Go to the ER right away.  Signs of infection. These include a fever of 100.4°F (38°C) or higher, chills, very bad sore throat, ear or sinus pain, cough, more sputum or change in color of sputum.  If you pass out or feel like you are going to pass out  Problems thinking clearly or change in mental status  Breathing problems get worse  Cough does not get better with your drugs  Cough up blood   You are not feeling better in 2 to 3 days or you are feeling worse  Teach Back: Helping You Understand   The Teach Back Method helps you understand the information we are giving you. The idea is simple. After talking with the staff, tell them in your own words what you  were just told. This helps to make sure the staff has covered each thing clearly. It also helps to explain things that may have been a bit confusing. Before going home, make sure you are able to do these:  I can tell you about my condition.  I can tell you if I need to make changes with how I eat or drink.  I can tell you what I will do if I have more trouble breathing or my cough does not get better.  Last Reviewed Date   2020-01-27  Consumer Information Use and Disclaimer   This generalized information is a limited summary of diagnosis, treatment, and/or medication information. It is not meant to be comprehensive and should be used as a tool to help the user understand and/or assess potential diagnostic and treatment options. It does NOT include all information about conditions, treatments, medications, side effects, or risks that may apply to a specific patient. It is not intended to be medical advice or a substitute for the medical advice, diagnosis, or treatment of a health care provider based on the health care provider's examination and assessment of a patient’s specific and unique circumstances. Patients must speak with a health care provider for complete information about their health, medical questions, and treatment options, including any risks or benefits regarding use of medications. This information does not endorse any treatments or medications as safe, effective, or approved for treating a specific patient. UpToDate, Inc[employer] and its affiliates disclaim any warranty or liability relating to this information or the use thereof. The use of this information is governed by the Terms of Use, available at https://www.[employer].com/en/know/clinical-effectiveness-terms   Copyright   Copyright © 2024 Up[employer]Date, [employer]. and its affiliates and/or licensors. All rights reserved.

## 2024-10-25 DIAGNOSIS — E61.1 IRON DEFICIENCY: ICD-10-CM

## 2024-10-25 DIAGNOSIS — K91.2 POSTSURGICAL MALABSORPTION: Primary | ICD-10-CM

## 2024-10-25 LAB
BASOPHILS # BLD AUTO: 0.1 X10E3/UL (ref 0–0.2)
BASOPHILS NFR BLD AUTO: 1 %
EOSINOPHIL # BLD AUTO: 0.3 X10E3/UL (ref 0–0.4)
EOSINOPHIL NFR BLD AUTO: 4 %
ERYTHROCYTE [DISTWIDTH] IN BLOOD BY AUTOMATED COUNT: 12.3 % (ref 11.7–15.4)
FERRITIN SERPL-MCNC: 123 NG/ML (ref 15–150)
HCT VFR BLD AUTO: 38.3 % (ref 34–46.6)
HGB BLD-MCNC: 12.7 G/DL (ref 11.1–15.9)
IMM GRANULOCYTES # BLD: 0 X10E3/UL (ref 0–0.1)
IMM GRANULOCYTES NFR BLD: 0 %
IRON SATN MFR SERPL: 20 % (ref 15–55)
IRON SERPL-MCNC: 70 UG/DL (ref 27–139)
LYMPHOCYTES # BLD AUTO: 1.7 X10E3/UL (ref 0.7–3.1)
LYMPHOCYTES NFR BLD AUTO: 21 %
MCH RBC QN AUTO: 32.6 PG (ref 26.6–33)
MCHC RBC AUTO-ENTMCNC: 33.2 G/DL (ref 31.5–35.7)
MCV RBC AUTO: 98 FL (ref 79–97)
MONOCYTES # BLD AUTO: 0.8 X10E3/UL (ref 0.1–0.9)
MONOCYTES NFR BLD AUTO: 10 %
NEUTROPHILS # BLD AUTO: 5.4 X10E3/UL (ref 1.4–7)
NEUTROPHILS NFR BLD AUTO: 64 %
PLATELET # BLD AUTO: 277 X10E3/UL (ref 150–450)
RBC # BLD AUTO: 3.9 X10E6/UL (ref 3.77–5.28)
TIBC SERPL-MCNC: 351 UG/DL (ref 250–450)
UIBC SERPL-MCNC: 281 UG/DL (ref 118–369)
WBC # BLD AUTO: 8.3 X10E3/UL (ref 3.4–10.8)

## 2024-10-25 NOTE — RESULT ENCOUNTER NOTE
Please let Fariba know that iron levels are now WNL - repeat in 4 months and continue Jair MVI w/ 45mg iron

## 2024-11-05 ENCOUNTER — TELEPHONE (OUTPATIENT)
Age: 69
End: 2024-11-05

## 2024-11-05 NOTE — TELEPHONE ENCOUNTER
Pt is looking to speak with Laverne Pugh or Jenny PRASAD Per chart review, pt has been communicating with [facility]. Pt will call them.

## 2024-11-05 NOTE — TELEPHONE ENCOUNTER
Received call from patient regarding lab orders. She was notified that lab orders were sent to [facility] for CBC and Iron Panel.  Appears that these are repeat labs in 4 months and have expected completion dates of 1/25/25.    Patient verbalized understanding and will return call with additional questions or concerns  #[phone]

## 2024-11-25 ENCOUNTER — TELEPHONE (OUTPATIENT)
Dept: PHYSICAL THERAPY | Facility: OTHER | Age: 69
End: 2024-11-25

## 2024-11-25 NOTE — TELEPHONE ENCOUNTER
Patient filled out an online form on Saturday 11/23 @7:50am (weekend).    Called patient today 11/25 ( Monday).    V/M left for patient to call back. Phone number and hours of business provided.    Will further assist patient when/if she calls back.    NO REF.  Low back pain mostly on the right side.  Hx of back pain. Pt has had PT in 2021.

## 2024-12-05 DIAGNOSIS — I10 ESSENTIAL HYPERTENSION: ICD-10-CM

## 2024-12-05 RX ORDER — LISINOPRIL AND HYDROCHLOROTHIAZIDE 12.5; 2 MG/1; MG/1
1 TABLET ORAL DAILY
Qty: 90 TABLET | Refills: 1 | Status: SHIPPED | OUTPATIENT
Start: 2024-12-05

## 2024-12-12 ENCOUNTER — HOSPITAL ENCOUNTER (OUTPATIENT)
Dept: ULTRASOUND IMAGING | Facility: CLINIC | Age: 69
End: 2024-12-12
Payer: MEDICARE

## 2024-12-12 ENCOUNTER — HOSPITAL ENCOUNTER (OUTPATIENT)
Dept: MAMMOGRAPHY | Facility: CLINIC | Age: 69
End: 2024-12-12
Payer: MEDICARE

## 2024-12-12 VITALS — WEIGHT: 245 LBS | HEIGHT: 63 IN | BODY MASS INDEX: 43.41 KG/M2

## 2024-12-12 DIAGNOSIS — N64.4 BREAST PAIN, LEFT: ICD-10-CM

## 2024-12-12 PROCEDURE — 76642 ULTRASOUND BREAST LIMITED: CPT

## 2024-12-12 PROCEDURE — 77066 DX MAMMO INCL CAD BI: CPT

## 2024-12-12 PROCEDURE — G0279 TOMOSYNTHESIS, MAMMO: HCPCS

## 2024-12-16 ENCOUNTER — RESULTS FOLLOW-UP (OUTPATIENT)
Dept: FAMILY MEDICINE CLINIC | Facility: CLINIC | Age: 69
End: 2024-12-16

## 2025-01-13 ENCOUNTER — APPOINTMENT (OUTPATIENT)
Dept: RADIOLOGY | Facility: CLINIC | Age: 70
End: 2025-01-13
Payer: MEDICARE

## 2025-01-13 ENCOUNTER — OFFICE VISIT (OUTPATIENT)
Dept: URGENT CARE | Facility: CLINIC | Age: 70
End: 2025-01-13
Payer: MEDICARE

## 2025-01-13 VITALS
RESPIRATION RATE: 18 BRPM | HEART RATE: 95 BPM | DIASTOLIC BLOOD PRESSURE: 86 MMHG | WEIGHT: 250 LBS | OXYGEN SATURATION: 95 % | TEMPERATURE: 97.8 F | BODY MASS INDEX: 44.29 KG/M2 | SYSTOLIC BLOOD PRESSURE: 148 MMHG

## 2025-01-13 DIAGNOSIS — R05.1 ACUTE COUGH: ICD-10-CM

## 2025-01-13 DIAGNOSIS — J18.9 PNEUMONIA OF LEFT LUNG DUE TO INFECTIOUS ORGANISM, UNSPECIFIED PART OF LUNG: Primary | ICD-10-CM

## 2025-01-13 PROCEDURE — 99213 OFFICE O/P EST LOW 20 MIN: CPT | Performed by: PHYSICIAN ASSISTANT

## 2025-01-13 PROCEDURE — 71046 X-RAY EXAM CHEST 2 VIEWS: CPT

## 2025-01-13 RX ORDER — AZITHROMYCIN 250 MG/1
TABLET, FILM COATED ORAL
Qty: 6 TABLET | Refills: 0 | Status: SHIPPED | OUTPATIENT
Start: 2025-01-13 | End: 2025-01-17

## 2025-01-13 NOTE — PATIENT INSTRUCTIONS
If your oxygen goes below 88 go to the emergency room.  If you develop any increasing chest pain, shortness of breath or coughing up blood I also recommend you go to the emergency room.      Patient Education     Pneumonia Discharge Instructions, Adult   About this topic   Pneumonia is an infection in your lungs. It is most often caused by bacteria and viruses. You may develop a fever, cough, have trouble breathing, feel weak, or have chest pain. Pneumonia can cause you to need help breathing with a machine called a ventilator. In some cases, pneumonia can even cause death.           What care is needed at home?   Ask your doctor what you need to do when you go home. Make sure you ask questions if you do not understand what the doctor says.  The doctor may have ordered antibiotics to treat an infection. It is important to take all your antibiotics even if you start to feel better.  If you smoke, try to quit. Your doctor or nurse can help you with this.  Stay away from smoke-filled places. Avoid other things that may cause breathing problems like fumes, pollution, dust, and other common allergens.  If you have an inhaler to take when you are feeling short of breath, be sure to carry it with you all the time. Then, you can take it when needed. Take all your other medicines as directed.  Drink lots of water, juice, or broth to replace fluids lost through runny nose and fever.  Take warm, steamy showers to help soothe the cough.  Use a cool mist humidifier. This will make it easier to breathe.  Use hard candy or cough drops to soothe sore throat and cough.  Wash your hands often. This will help keep others healthy.  What follow-up care is needed?   Your doctor may ask you to make visits to the office to check on your progress. Be sure to keep these visits.  What drugs may be needed?   The doctor may order drugs to:  Treat infection  Loosen secretions  Lower fever  Control coughing  Open the airways  Help with swelling  in your airways and lungs  You may be given inhalers to help your breathing. Talk with your doctor about how to take all of your drugs.  Will physical activity be limited?   Get enough rest while recovering from your illness.  Talk with your doctor about when you can return to your normal activities.  What can be done to prevent this health problem?   Always cover your cough with the inside of your arm.  Wash your hands often with soap and water for at least 20 seconds, especially after coughing or sneezing. Alcohol-based hand sanitizers also work.  Do not get too close (kissing, hugging) to people who are sick. Ask visitors who have colds to wear a mask.  If you have a cold, stay home from work or school. Wear a mask to help from spreading the infection.  Do not share towels or hankies with anyone who is sick.  Eat a healthy diet.  Get a flu shot each year. Ask your doctor if you need a pneumonia shot or any other vaccines.  Do not smoke or be around smoke or vape.  Avoid drinking too much alcohol. Too much alcohol can lower your ability to fight off pneumonia.  When do I need to call the doctor?   You are having so much trouble breathing that you can only say one or two words at a time.  You need to sit upright at all times to be able to breathe and/or cannot lie down.  You have trouble breathing when talking or sitting still.  Your shortness of breath has not gotten better after a few days.  You are coughing up blood.  You have a fever of 100.4°F (38°C) or higher or chills, even after taking your medicines.  Your symptoms are not getting better in 3 to 4 days.  You are still coughing in 3 to 4 weeks.  Teach Back: Helping You Understand   The Teach Back Method helps you understand the information we are giving you. After you talk with the staff, tell them in your own words what you learned. This helps to make sure the staff has described each thing clearly. It also helps to explain things that may have been  confusing. Before going home, make sure you can do these:  I can tell you about my condition.  I can tell you what I can do to help avoid passing the infection to others.  I can tell you what I will do if I have more trouble breathing, feel short of breath at rest, or my cough does not get better.  Last Reviewed Date   2021-06-07  Consumer Information Use and Disclaimer   This generalized information is a limited summary of diagnosis, treatment, and/or medication information. It is not meant to be comprehensive and should be used as a tool to help the user understand and/or assess potential diagnostic and treatment options. It does NOT include all information about conditions, treatments, medications, side effects, or risks that may apply to a specific patient. It is not intended to be medical advice or a substitute for the medical advice, diagnosis, or treatment of a health care provider based on the health care provider's examination and assessment of a patient’s specific and unique circumstances. Patients must speak with a health care provider for complete information about their health, medical questions, and treatment options, including any risks or benefits regarding use of medications. This information does not endorse any treatments or medications as safe, effective, or approved for treating a specific patient. UpToDate, Inc. and its affiliates disclaim any warranty or liability relating to this information or the use thereof. The use of this information is governed by the Terms of Use, available at https://www.[employer].com/en/know/clinical-effectiveness-terms   Copyright   Copyright © 2024 [employer]Date, Inc. and its affiliates and/or licensors. All rights reserved.

## 2025-01-13 NOTE — PROGRESS NOTES
[facility]        NAME: Fariba Garcia is a 69 y.o. female  : 1955    MRN: [id number]  DATE: 2025  TIME: 11:29 AM    Assessment and Plan   Pneumonia of left lung due to infectious organism, unspecified part of lung [J18.9]  1. Pneumonia of left lung due to infectious organism, unspecified part of lung  amoxicillin-clavulanate (AUGMENTIN) 875-125 mg per tablet    azithromycin (ZITHROMAX) 250 mg tablet      2. Acute cough  XR chest pa and lateral        PNA of the left lung.  ER precautions given.    Patient Instructions     Patient Instructions   If your oxygen goes below 88 go to the emergency room.  If you develop any increasing chest pain, shortness of breath or coughing up blood I also recommend you go to the emergency room.      Patient Education     Pneumonia Discharge Instructions, Adult   About this topic   Pneumonia is an infection in your lungs. It is most often caused by bacteria and viruses. You may develop a fever, cough, have trouble breathing, feel weak, or have chest pain. Pneumonia can cause you to need help breathing with a machine called a ventilator. In some cases, pneumonia can even cause death.           What care is needed at home?   Ask your doctor what you need to do when you go home. Make sure you ask questions if you do not understand what the doctor says.  The doctor may have ordered antibiotics to treat an infection. It is important to take all your antibiotics even if you start to feel better.  If you smoke, try to quit. Your doctor or nurse can help you with this.  Stay away from smoke-filled places. Avoid other things that may cause breathing problems like fumes, pollution, dust, and other common allergens.  If you have an inhaler to take when you are feeling short of breath, be sure to carry it with you all the time. Then, you can take it when needed. Take all your other medicines as directed.  Drink lots of water, juice, or broth to replace  fluids lost through runny nose and fever.  Take warm, steamy showers to help soothe the cough.  Use a cool mist humidifier. This will make it easier to breathe.  Use hard candy or cough drops to soothe sore throat and cough.  Wash your hands often. This will help keep others healthy.  What follow-up care is needed?   Your doctor may ask you to make visits to the office to check on your progress. Be sure to keep these visits.  What drugs may be needed?   The doctor may order drugs to:  Treat infection  Loosen secretions  Lower fever  Control coughing  Open the airways  Help with swelling in your airways and lungs  You may be given inhalers to help your breathing. Talk with your doctor about how to take all of your drugs.  Will physical activity be limited?   Get enough rest while recovering from your illness.  Talk with your doctor about when you can return to your normal activities.  What can be done to prevent this health problem?   Always cover your cough with the inside of your arm.  Wash your hands often with soap and water for at least 20 seconds, especially after coughing or sneezing. Alcohol-based hand sanitizers also work.  Do not get too close (kissing, hugging) to people who are sick. Ask visitors who have colds to wear a mask.  If you have a cold, stay home from work or school. Wear a mask to help from spreading the infection.  Do not share towels or hankies with anyone who is sick.  Eat a healthy diet.  Get a flu shot each year. Ask your doctor if you need a pneumonia shot or any other vaccines.  Do not smoke or be around smoke or vape.  Avoid drinking too much alcohol. Too much alcohol can lower your ability to fight off pneumonia.  When do I need to call the doctor?   You are having so much trouble breathing that you can only say one or two words at a time.  You need to sit upright at all times to be able to breathe and/or cannot lie down.  You have trouble breathing when talking or sitting  still.  Your shortness of breath has not gotten better after a few days.  You are coughing up blood.  You have a fever of 100.4°F (38°C) or higher or chills, even after taking your medicines.  Your symptoms are not getting better in 3 to 4 days.  You are still coughing in 3 to 4 weeks.  Teach Back: Helping You Understand   The Teach Back Method helps you understand the information we are giving you. After you talk with the staff, tell them in your own words what you learned. This helps to make sure the staff has described each thing clearly. It also helps to explain things that may have been confusing. Before going home, make sure you can do these:  I can tell you about my condition.  I can tell you what I can do to help avoid passing the infection to others.  I can tell you what I will do if I have more trouble breathing, feel short of breath at rest, or my cough does not get better.  Last Reviewed Date   2021-06-07  Consumer Information Use and Disclaimer   This generalized information is a limited summary of diagnosis, treatment, and/or medication information. It is not meant to be comprehensive and should be used as a tool to help the user understand and/or assess potential diagnostic and treatment options. It does NOT include all information about conditions, treatments, medications, side effects, or risks that may apply to a specific patient. It is not intended to be medical advice or a substitute for the medical advice, diagnosis, or treatment of a health care provider based on the health care provider's examination and assessment of a patient’s specific and unique circumstances. Patients must speak with a health care provider for complete information about their health, medical questions, and treatment options, including any risks or benefits regarding use of medications. This information does not endorse any treatments or medications as safe, effective, or approved for treating a specific patient. [employer]  [employer] and its affiliates disclaim any warranty or liability relating to this information or the use thereof. The use of this information is governed by the Terms of Use, available at https://www.[employer].com/en/know/clinical-effectiveness-terms   Copyright   Copyright © 2024 [employer], [employer] and its affiliates and/or licensors. All rights reserved.        Follow up with PCP in 3-5 days.  Proceed to  ER if symptoms worsen.    Chief Complaint     Chief Complaint   Patient presents with    Cough     Started a couple days ago, believes she aspirated, feeling achy, chills         History of Present Illness       Pt is a 69-year-old female with a past medical history significant for gastric surgery (Michael-en-Y), GERD, and aspiration PNA presents for suspected reoccurrence of aspiration PNA.  She reports having URI symptoms earlier in the week and treating with TheraFlu.  Due to her history of gastric Michael-en-Y she typically has to elevate her bed or sleep on her left side to prevent aspiration at night.  Due to the TheraFlu she fell asleep laying on her back yesterday.  She notes waking up yesterday with a burning sensation in her throat and noted coughing up some fluid.  She tried to self treat at home by trying to cough up as much of the fluid as she could and stayed home yesterday.  This morning she woke up with a fever, chills, and coughing up blood tinged fluid.  She also notes that she is short of breath, has tightness in her chest, and some dizziness.  She denies any diarrhea, heart palpitations.  She notes her most recent episode of aspiration pneumonia was in october.  She is normally treated Augmentin or Augmentin and azithromycin.  Nuys any confusion.              Review of Systems   Review of Systems   Constitutional:  Positive for chills and fatigue. Negative for activity change, appetite change and fever.   HENT:  Negative for congestion, ear pain, rhinorrhea, sinus pressure, sinus pain and sore throat.     Eyes:  Negative for pain and visual disturbance.   Respiratory:  Positive for cough (coughing up fluid), chest tightness (L sided) and shortness of breath.    Cardiovascular:  Negative for chest pain and palpitations.   Gastrointestinal:  Negative for abdominal pain, diarrhea, nausea and vomiting.   Genitourinary:  Negative for dysuria and hematuria.   Musculoskeletal:  Negative for arthralgias, back pain and myalgias.   Skin:  Negative for color change, pallor and rash.   Neurological:  Positive for dizziness. Negative for seizures, syncope and headaches.   All other systems reviewed and are negative.        Current Medications       Current Outpatient Medications:     amoxicillin-clavulanate (AUGMENTIN) 875-125 mg per tablet, Take 1 tablet by mouth every 12 (twelve) hours for 7 days, Disp: 14 tablet, Rfl: 0    azithromycin (ZITHROMAX) 250 mg tablet, Take 2 tablets today then 1 tablet daily x 4 days, Disp: 6 tablet, Rfl: 0    albuterol (ProAir HFA) 90 mcg/act inhaler, Inhale 2 puffs every 4 (four) hours as needed for wheezing or shortness of breath, Disp: 18 g, Rfl: 0    albuterol (ProAir HFA) 90 mcg/act inhaler, Inhale 2 puffs every 6 (six) hours as needed for wheezing, Disp: 8.5 g, Rfl: 0    calcium carbonate (OS-BEAU) 1250 (500 Ca) MG chewable tablet, Chew 1 tablet daily, Disp: , Rfl:     cholecalciferol (VITAMIN D3) 400 units tablet, Take 400 Units by mouth daily, Disp: , Rfl:     dicyclomine (BENTYL) 10 mg capsule, Take 1 capsule (10 mg total) by mouth as needed (take once daily as needed), Disp: 90 capsule, Rfl: 1    famotidine (PEPCID) 40 MG tablet, Take 1 tablet (40 mg total) by mouth in the morning, Disp: 90 tablet, Rfl: 2    fluticasone (FLONASE) 50 mcg/act nasal spray, 2 sprays into each nostril daily, Disp: 16 g, Rfl: 3    lisinopril-hydrochlorothiazide (PRINZIDE,ZESTORETIC) 20-12.5 MG per tablet, TAKE ONE TABLET BY MOUTH EVERY DAY, Disp: 90 tablet, Rfl: 1    magnesium 30 MG tablet, Take 30 mg by mouth  2 (two) times a day, Disp: , Rfl:     multivitamin (THERAGRAN) TABS, Take 1 tablet by mouth daily, Disp: , Rfl:     Omega-3 Fatty Acids (fish oil) 1,000 mg, Take 1 capsule (1,000 mg total) by mouth 2 (two) times a day (Patient taking differently: Take 1,000 mg by mouth daily), Disp: 60 capsule, Rfl: 6    omeprazole (PriLOSEC) 40 MG capsule, TAKE ONE CAPSULE BY MOUTH EVERY DAY, Disp: 30 capsule, Rfl: 5    saccharomyces boulardii (FLORASTOR) 250 mg capsule, Take 250 mg by mouth in the morning, Disp: , Rfl:     tiZANidine (ZANAFLEX) 2 mg tablet, Take 1 tablet (2 mg total) by mouth every 8 (eight) hours as needed for muscle spasms (Patient not taking: Reported on 8/18/2024), Disp: 12 tablet, Rfl: 0    vitamin B-12 (CYANOCOBALAMIN) 50 MCG tablet, Take 50 mcg by mouth daily, Disp: , Rfl:     vitamin E 100 UNIT capsule, Take 100 Units by mouth daily, Disp: , Rfl:     Current Allergies     Allergies as of 01/13/2025    (No Known Allergies)            The following portions of the patient's history were reviewed and updated as appropriate: allergies, current medications, past family history, past medical history, past social history, past surgical history and problem list.     Past Medical History:   Diagnosis Date    Arthritis     Asthma     Back pain     Chronic pain disorder     hips into the legs    Colon polyp     DVT (deep venous thrombosis) ([facility]) 2006    Gastric bypass status for obesity     yarelis en y    GERD (gastroesophageal reflux disease)     Hiatal hernia     History of transfusion 2006    after gastric bypass surgery, no reactions    Hypertension     Irritable bowel syndrome     Kidney stone     Muscle weakness     bilateral legs    Numbness and tingling     bilateral feet    Pneumonia     Sacroiliitis ([facility]) 5/15/2018    Spinal stenosis     Tinnitus     occassionally    Vertigo     Wears glasses        Past Surgical History:   Procedure Laterality Date    APPENDECTOMY      BACK SURGERY  06/18/2018    L4-5 fusion     BARIATRIC SURGERY  2005    yarelis en y- pt states the procedure was done incorrectly which lead to additional surgeries from 2006    CARPAL TUNNEL RELEASE Right      SECTION      x1    CHOLECYSTECTOMY      COLON SURGERY      partial removal of the small bowel-necrosis-between 2006    COLONOSCOPY      COLONOSCOPY W/ BIOPSIES AND POLYPECTOMY      EPIDURAL BLOCK INJECTION N/A 2021    Procedure: C6 C7 cervical epidural steroid injection ( 83664);  Surgeon: Jeremi Gamino MD;  Location: [facility] MAIN OR;  Service: Pain Management     EPIDURAL BLOCK INJECTION N/A 2021    Procedure: C6 C7 cervical epidural steroid injection (28123);  Surgeon: Jeremi Gamino MD;  Location: [facility] MAIN OR;  Service: Pain Management     FLEXIBLE BRONCHOSCOPY W/ UPPER ENDOSCOPY      GASTRECTOMY      after gastric bypass-(failed surgery per pt) 2006    HERNIA REPAIR      incisional hernias-diaphragm area    MD ARTHROCENTESIS ASPIR&/INJ SMALL JT/BURSA W/O US N/A 2018    Procedure: Coccygeal Injection & Ganglion Impar Block;  Surgeon: Jeremi Gamino MD;  Location: [facility] MAIN OR;  Service: Pain Management     MD ARTHRODESIS POSTERIOR INTERBODY 1 NTRSPC LUMBAR N/A 2018    Procedure: L4-5 DECOMPRESSION INTERBODY INSTRUMENTED FUSION;  Surgeon: Martinez Garcia MD;  Location: [facility];  Service: Orthopedics    MD COLONOSCOPY FLX DX W/COLLJ SPEC WHEN PFRMD N/A 2018    Procedure: COLONOSCOPY;  Surgeon: Terry Booth MD;  Location: [facility] GI LAB;  Service: Gastroenterology    MD ESOPHAGOGASTRODUODENOSCOPY TRANSORAL DIAGNOSTIC N/A 2018    Procedure: ESOPHAGOGASTRODUODENOSCOPY (EGD);  Surgeon: Terry Booth MD;  Location: [facility] GI LAB;  Service: Gastroenterology    MD INJECT SI JOINT ARTHRGRPHY&/ANES/STEROID W/EMMANUEL Right 2018    Procedure: Rt Sacroiliac Joint Injection;  Surgeon: Jeremi Gamino MD;  Location: [facility] MAIN OR;  Service: Pain Management     MD INJECT SI JOINT  ARTHRGRPHY&/ANES/STEROID W/EMMANUEL Right 05/01/2019    Procedure: Sacroiliac Joint Injection (84810);  Surgeon: Jeremi Gamino MD;  Location: [facility] MAIN OR;  Service: Pain Management     TOENAIL EXCISION  2024    ingrown toenails fixed    TONSILECTOMY, ADENOIDECTOMY, BILATERAL MYRINGOTOMY AND TUBES      TONSILLECTOMY         Family History   Problem Relation Age of Onset    Endometrial cancer Mother     Diabetes Mother     Aortic aneurysm Father     Cancer Father         prostate    Heart disease Sister         open heart    Cancer Sister         breast    Breast cancer Sister 54    Diabetes Maternal Grandfather     Diabetes Brother     Cancer Brother         spinal cancer    Stroke Brother     Hypertension Brother     Asperger's syndrome Son         high functioning    No Known Problems Maternal Aunt     No Known Problems Maternal Aunt     Colon cancer Maternal Uncle 60    No Known Problems Paternal Aunt     No Known Problems Paternal Aunt          Medications have been verified.        Objective   /86   Pulse 95   Temp 97.8 °F (36.6 °C)   Resp 18   Wt 113 kg (250 lb)   SpO2 95%   BMI 44.29 kg/m²        Physical Exam     Physical Exam  Vitals and nursing note reviewed.   Constitutional:       General: She is not in acute distress.     Appearance: Normal appearance. She is well-developed and normal weight. She is not ill-appearing, toxic-appearing or diaphoretic.   HENT:      Head: Normocephalic and atraumatic.      Right Ear: Tympanic membrane, ear canal and external ear normal. No drainage, swelling or tenderness. No middle ear effusion. There is no impacted cerumen. Tympanic membrane is not erythematous.      Left Ear: Tympanic membrane, ear canal and external ear normal. No drainage, swelling or tenderness.  No middle ear effusion. There is no impacted cerumen. Tympanic membrane is not erythematous.      Nose: Nose normal. No congestion or rhinorrhea.      Mouth/Throat:      Mouth: Mucous membranes are  moist. No oral lesions.      Pharynx: Oropharynx is clear. Uvula midline. No pharyngeal swelling, oropharyngeal exudate, posterior oropharyngeal erythema or uvula swelling.      Tonsils: No tonsillar exudate or tonsillar abscesses. 0 on the right. 0 on the left.   Eyes:      Extraocular Movements: Extraocular movements intact.      Right eye: Normal extraocular motion.      Left eye: Normal extraocular motion.      Conjunctiva/sclera: Conjunctivae normal.      Pupils: Pupils are equal, round, and reactive to light.   Cardiovascular:      Rate and Rhythm: Normal rate and regular rhythm.      Heart sounds: No murmur heard.     No friction rub. No gallop.   Pulmonary:      Effort: Pulmonary effort is normal. No respiratory distress.      Breath sounds: No stridor. No wheezing, rhonchi or rales.      Comments: Middle and lower lobe coarse breath sounds on the L  Inspiratory crackles on the L  Chest:      Chest wall: No tenderness.   Abdominal:      General: Abdomen is flat. Bowel sounds are normal. There is no distension.      Palpations: Abdomen is soft. There is no mass.      Tenderness: There is no abdominal tenderness. There is no guarding or rebound.      Hernia: No hernia is present.   Musculoskeletal:         General: Normal range of motion.   Skin:     General: Skin is warm and dry.      Capillary Refill: Capillary refill takes less than 2 seconds.   Neurological:      Mental Status: She is alert.

## 2025-01-14 ENCOUNTER — DOCUMENTATION (OUTPATIENT)
Dept: ADMINISTRATIVE | Facility: OTHER | Age: 70
End: 2025-01-14

## 2025-01-14 ENCOUNTER — TELEPHONE (OUTPATIENT)
Age: 70
End: 2025-01-14

## 2025-01-14 VITALS — SYSTOLIC BLOOD PRESSURE: 143 MMHG | DIASTOLIC BLOOD PRESSURE: 83 MMHG

## 2025-01-14 NOTE — PROGRESS NOTES
Malena Barrera PA-C  P Patient Reported Team         Blood pressure elevated  Appointment department: [facility]  Appointment provider: Malena Barrera PA-C  Blood pressure  01/13/25 1124 148/86  01/13/25 1052 148/86  01/14/25 10:04 AM    Patient was called after the Urgent Care visit ; a message was left for the patient to return the call    Thank you.  Jona Siegel MA  [facility]

## 2025-01-14 NOTE — TELEPHONE ENCOUNTER
Patient called in about BP consult stated does not want a consult and will discus BP at annual appointment on 01/16. Stated check BP today and was normal. Please advise. Thank you.

## 2025-01-14 NOTE — PROGRESS NOTES
01/14/25 10:46 AM    Patient was called after the Urgent Care visit . Home BP reading(s) was/were 143/83. Patient has no symptoms., Patient agreed to schedule appointment.    Thank you.  Jona Siegel MA  [facility]    Patient returned my call.  Routing message to Clinical Staff. Patient agreed to BP follow up appointment and then mentioned she is also due for her AWV.

## 2025-01-16 ENCOUNTER — OFFICE VISIT (OUTPATIENT)
Dept: FAMILY MEDICINE CLINIC | Facility: CLINIC | Age: 70
End: 2025-01-16
Payer: MEDICARE

## 2025-01-16 VITALS
HEIGHT: 63 IN | BODY MASS INDEX: 44.47 KG/M2 | TEMPERATURE: 97.7 F | HEART RATE: 88 BPM | RESPIRATION RATE: 16 BRPM | DIASTOLIC BLOOD PRESSURE: 84 MMHG | SYSTOLIC BLOOD PRESSURE: 132 MMHG | WEIGHT: 251 LBS

## 2025-01-16 DIAGNOSIS — R73.01 IMPAIRED FASTING GLUCOSE: ICD-10-CM

## 2025-01-16 DIAGNOSIS — E66.01 CLASS 3 SEVERE OBESITY DUE TO EXCESS CALORIES WITH SERIOUS COMORBIDITY AND BODY MASS INDEX (BMI) OF 40.0 TO 44.9 IN ADULT (HCC): ICD-10-CM

## 2025-01-16 DIAGNOSIS — E66.813 CLASS 3 SEVERE OBESITY DUE TO EXCESS CALORIES WITH SERIOUS COMORBIDITY AND BODY MASS INDEX (BMI) OF 40.0 TO 44.9 IN ADULT (HCC): ICD-10-CM

## 2025-01-16 DIAGNOSIS — Z00.00 MEDICARE ANNUAL WELLNESS VISIT, SUBSEQUENT: Primary | ICD-10-CM

## 2025-01-16 DIAGNOSIS — Z13.6 SCREENING FOR CARDIOVASCULAR CONDITION: ICD-10-CM

## 2025-01-16 DIAGNOSIS — J69.0 ASPIRATION PNEUMONIA, UNSPECIFIED ASPIRATION PNEUMONIA TYPE, UNSPECIFIED LATERALITY, UNSPECIFIED PART OF LUNG (HCC): ICD-10-CM

## 2025-01-16 DIAGNOSIS — F33.9 DEPRESSION, RECURRENT (HCC): ICD-10-CM

## 2025-01-16 DIAGNOSIS — I10 ESSENTIAL HYPERTENSION: ICD-10-CM

## 2025-01-16 DIAGNOSIS — Z13.29 SCREENING FOR THYROID DISORDER: ICD-10-CM

## 2025-01-16 DIAGNOSIS — Z13.1 SCREENING FOR DIABETES MELLITUS: ICD-10-CM

## 2025-01-16 PROCEDURE — 99214 OFFICE O/P EST MOD 30 MIN: CPT | Performed by: NURSE PRACTITIONER

## 2025-01-16 PROCEDURE — G0439 PPPS, SUBSEQ VISIT: HCPCS | Performed by: NURSE PRACTITIONER

## 2025-01-16 NOTE — PROGRESS NOTES
Name: Fariba Garcia      : 1955      MRN: [id number]  Encounter Provider: JACINTO Velasquez  Encounter Date: 2025   Encounter department: [facility]    Assessment & Plan  Medicare annual wellness visit, subsequent         Class 3 severe obesity due to excess calories with serious comorbidity and body mass index (BMI) of 40.0 to 44.9 in adult (Formerly [facility])   She has not had success with weight loss with medical weight management and exercise.  Given her multiple comorbid conditions, will see if Wegovy is covered.  Reviewed potential side effects   Orders:  •  Semaglutide-Weight Management (WEGOVY) 0.25 MG/0.5ML; Inject 0.5 mL (0.25 mg total) under the skin once a week    Aspiration pneumonia, unspecified aspiration pneumonia type, unspecified laterality, unspecified part of lung (Formerly [facility])  Barium swallow ordered.  EGD done in   Orders:  •  FL barium swallow ROUTINE esophagus; Future  •  CT chest w contrast; Future    Depression, recurrent (Formerly [facility])  Depression Screening Follow-up Plan: Patient's depression screening was positive with a PHQ-9 score of 11.        Screening for cardiovascular condition    Orders:  •  Comprehensive metabolic panel; Future  •  Lipid Panel with LDL/HDL Ratio; Future    Screening for diabetes mellitus         Screening for thyroid disorder    Orders:  •  TSH, 3rd generation with Free T4 reflex; Future    Impaired fasting glucose  Will check A1c  Orders:  •  Hemoglobin A1c (w/out EAG); Future    Essential hypertension  stable  Orders:  •  Comprehensive metabolic panel; Future  •  Lipid Panel with LDL/HDL Ratio; Future      Depression Screening and Follow-up Plan: Patient's depression screening was positive with a PHQ-9 score of 11.   Patient assessed for underlying major depression. Brief counseling provided and recommend additional follow-up/re-evaluation next office visit.   Urinary Incontinence Plan of Care: counseling topics discussed: limiting fluid  intake 3-4 hours before bed.       Preventive health issues were discussed with patient, and age appropriate screening tests were ordered as noted in patient's After Visit Summary. Personalized health advice and appropriate referrals for health education or preventive services given if needed, as noted in patient's After Visit Summary.    History of Present Illness     Here today for follow up.  She is currently on antibiotics for recurrent aspiration pneumonia.  She is requesting a prescription for medical necessity for a bed that the head can be elevated.   She is concerned about her weight and the negative effects that it has on her overall health.       Patient Care Team:  JACINTO Velasquez as PCP - General (Family Medicine)  Terry Booth MD (Gastroenterology)  Terry Booth MD as Endoscopist  Enmanuel Tomlin MD as Referring Physician (Orthopedic Surgery)    Review of Systems   Constitutional:  Positive for unexpected weight change.   Respiratory:  Positive for cough.    Cardiovascular: Negative.    Gastrointestinal: Negative.    Musculoskeletal:  Positive for back pain.   Neurological: Negative.      Medical History Reviewed by provider this encounter:  Tobacco  Allergies  Meds  Problems  Med Hx  Surg Hx  Fam Hx       Annual Wellness Visit Questionnaire   Fariba is here for her Subsequent Wellness visit.     Health Risk Assessment:   Patient rates overall health as fair. Patient feels that their physical health rating is much worse. Patient is satisfied with their life. Eyesight was rated as same. Hearing was rated as slightly worse. Patient feels that their emotional and mental health rating is same. Patients states they are never, rarely angry. Patient states they are always unusually tired/fatigued. Pain experienced in the last 7 days has been a lot. Patient's pain rating has been 7/10. Patient states that she has experienced weight loss or gain in last 6 months. Has gained over 10 pounds  without trying    Depression Screening:   PHQ-9 Score: 11      Fall Risk Screening:   In the past year, patient has experienced: no history of falling in past year      Urinary Incontinence Screening:   Patient has leaked urine accidently in the last six months.     Home Safety:  Patient does not have trouble with stairs inside or outside of their home. Patient has working smoke alarms and has working carbon monoxide detector. Home safety hazards include: none.     Nutrition:   Current diet is Regular.     Medications:   Patient is currently taking over-the-counter supplements. OTC medications include: see medication list. Patient is able to manage medications.     Activities of Daily Living (ADLs)/Instrumental Activities of Daily Living (IADLs):   Walk and transfer into and out of bed and chair?: Yes  Dress and groom yourself?: Yes    Bathe or shower yourself?: Yes    Feed yourself? Yes  Do your laundry/housekeeping?: Yes  Manage your money, pay your bills and track your expenses?: Yes  Make your own meals?: Yes    Do your own shopping?: Yes    Previous Hospitalizations:   Any hospitalizations or ED visits within the last 12 months?: No      Advance Care Planning:   Living will: No    Durable POA for healthcare: No    ACP document given: Yes      Cognitive Screening:   Provider or family/friend/caregiver concerned regarding cognition?: No    PREVENTIVE SCREENINGS      Cardiovascular Screening:    General: Screening Current      Diabetes Screening:     General: Screening Current      Colorectal Cancer Screening:     General: Screening Current      Breast Cancer Screening:     General: Screening Current      Cervical Cancer Screening:    General: Screening Not Indicated      Osteoporosis Screening:    General: Screening Current      Abdominal Aortic Aneurysm (AAA) Screening:        General: Screening Not Indicated      Lung Cancer Screening:     General: Screening Not Indicated      Hepatitis C Screening:    General:  "Screening Current    Screening, Brief Intervention, and Referral to Treatment (SBIRT)    Screening  Typical number of drinks in a day: 0  Typical number of drinks in a week: 0  Interpretation: Low risk drinking behavior.    Single Item Drug Screening:  How often have you used an illegal drug (including marijuana) or a prescription medication for non-medical reasons in the past year? never    Single Item Drug Screen Score: 0  Interpretation: Negative screen for possible drug use disorder    Brief Intervention  Alcohol & drug use screenings were reviewed. No concerns regarding substance use disorder identified.     Other Counseling Topics:   Calcium and vitamin D intake and regular weightbearing exercise.     Social Drivers of Health     Financial Resource Strain: High Risk (11/22/2022)    Overall Financial Resource Strain (CARDIA)    • Difficulty of Paying Living Expenses: Hard   Food Insecurity: No Food Insecurity (1/16/2025)    Hunger Vital Sign    • Worried About Running Out of Food in the Last Year: Never true    • Ran Out of Food in the Last Year: Never true   Transportation Needs: No Transportation Needs (1/16/2025)    PRAPARE - Transportation    • Lack of Transportation (Medical): No    • Lack of Transportation (Non-Medical): No   Housing Stability: Low Risk  (1/16/2025)    Housing Stability Vital Sign    • Unable to Pay for Housing in the Last Year: No    • Number of Times Moved in the Last Year: 0    • Homeless in the Last Year: No   Utilities: Not At Risk (1/16/2025)    [facility] Utilities    • Threatened with loss of utilities: No     No results found.    Objective   /84   Pulse 88   Temp 97.7 °F (36.5 °C)   Resp 16   Ht 5' 3\" (1.6 m)   Wt 114 kg (251 lb)   BMI 44.46 kg/m²     Physical Exam  Vitals and nursing note reviewed.   Constitutional:       General: She is not in acute distress.     Appearance: Normal appearance. She is obese.   HENT:      Head: Normocephalic and atraumatic.   Eyes:      " Conjunctiva/sclera: Conjunctivae normal.   Neck:      Vascular: No carotid bruit.   Cardiovascular:      Rate and Rhythm: Normal rate and regular rhythm.      Pulses: Normal pulses.      Heart sounds: Normal heart sounds. No murmur heard.  Pulmonary:      Effort: Pulmonary effort is normal.      Breath sounds: Normal breath sounds.   Skin:     General: Skin is warm and dry.   Neurological:      Mental Status: She is alert.   Psychiatric:         Mood and Affect: Mood normal.         Behavior: Behavior normal.

## 2025-01-16 NOTE — ASSESSMENT & PLAN NOTE
stable  Orders:  •  Comprehensive metabolic panel; Future  •  Lipid Panel with LDL/HDL Ratio; Future

## 2025-01-16 NOTE — ASSESSMENT & PLAN NOTE
She has not had success with weight loss with medical weight management and exercise.  Given her multiple comorbid conditions, will see if Wegovy is covered.  Reviewed potential side effects   Orders:  •  Semaglutide-Weight Management (WEGOVY) 0.25 MG/0.5ML; Inject 0.5 mL (0.25 mg total) under the skin once a week

## 2025-01-16 NOTE — ASSESSMENT & PLAN NOTE
Depression Screening Follow-up Plan: Patient's depression screening was positive with a PHQ-9 score of 11.

## 2025-01-17 ENCOUNTER — TELEPHONE (OUTPATIENT)
Age: 70
End: 2025-01-17

## 2025-01-17 NOTE — TELEPHONE ENCOUNTER
PT called to let anthony know that the wegovy is denied by her insurance. PT said she will call her insurance to inquire which GLP 1 med they will cover, and get back to the office regarding this.    ThankYou

## 2025-01-20 ENCOUNTER — NURSE TRIAGE (OUTPATIENT)
Age: 70
End: 2025-01-20

## 2025-01-20 NOTE — TELEPHONE ENCOUNTER
"LOV 5/29/24 CHINTAN Umaña (chronic abd pain, GERD, SIBO, IBS D, EGD 7/11/24, Colon 5/11/22, Patient seen by urgent care 1/13/25 dx aspiration pna discharged on Augmentin, pcp visit 1/16/25 (barium swallow and CT chest ordered to complete this week) scheduled urgent visit 1/29/25 with ELVI Hager.    Patient noting increase in GERD symptoms started a few weeks ago and worsening. She takes omeprazole 40 mg as needed has short term relief for a few hours and then pain returns (does not take regularly due to possible side effects with long term use) and famotidine 40 mg daily (does not note any change in symptoms while on this). She is takes Metamucil cedeño daily along with Florastor. She notes constipation recently, has taken Pepto Bismol and has tried smooth move tea. Patient is eating, hydrating. I reviewed that she can start Miralax for constipation 17 grams daily. Go to clear liquid diet today and if notices some improvement can start bland diet. Patient feels she may need another EGD, questioning if ulcer. Patient scheduled for urgent visit next week (radiology tests should be resulted by time of visit).     Patient willing to take PPI on regular basis for short term if provider feels necessary. She was instructed to report to ED for severe pain. Please review/advise.      Reason for Disposition   Patient wants to be seen    Answer Assessment - Initial Assessment Questions  1. LOCATION: \"Where does it hurt?\"       Upper abdominal, below sternum right hand side  2. RADIATION: \"Does the pain shoot anywhere else?\" (e.g., chest, back)      back  3. ONSET: \"When did the pain begin?\" (e.g., minutes, hours or days ago)       Started a couple weeks ago  4. SUDDEN: \"Gradual or sudden onset?\"      Gradually worsening  5. PATTERN \"Does the pain come and go, or is it constant?\"      constant  6. SEVERITY: \"How bad is the pain?\"  (e.g., Scale 1-10; mild, moderate, or severe)      moderate  7. RECURRENT SYMPTOM: \"Have you ever had " "this type of stomach pain before?\" If Yes, ask: \"When was the last time?\" and \"What happened that time?\"       Feels different  8. CAUSE: \"What do you think is causing the stomach pain?\"      unknown  9. RELIEVING/AGGRAVATING FACTORS: \"What makes it better or worse?\" (e.g., antacids, bending or twisting motion, bowel movement)      Omeprazole does help a couple later but comes back after a few hours, PeptoBismol will soothe esophagus but not lower.  10. OTHER SYMPTOMS: \"Do you have any other symptoms?\" (e.g., back pain, diarrhea, fever, urination pain, vomiting)        Currently constipated ? Related to Pepto Bismol, last night strained to evacuate hard stool  11. PREGNANCY: \"Is there any chance you are pregnant?\" \"When was your last menstrual period?\"        N/A    Protocols used: Abdominal Pain - Female-Adult-[place]    "

## 2025-01-20 NOTE — TELEPHONE ENCOUNTER
Regarding: pain, burning, constipation and aspirated due to reflux  ----- Message from Charisse ALFREDO sent at 1/20/2025  9:54 AM EST -----  Pt having pain, burning and mid abdomen pain and constipation pain is also going around her back. Pt said she aspirated from reflux and just got done her antibiotics from the bacterial pneumonia from the aspiration. She states the pain is all the time and would like a sooner appt then her 3/12/25

## 2025-01-22 ENCOUNTER — HOSPITAL ENCOUNTER (OUTPATIENT)
Dept: RADIOLOGY | Facility: HOSPITAL | Age: 70
Discharge: HOME | End: 2025-01-22
Payer: MEDICARE

## 2025-01-22 DIAGNOSIS — J69.0 ASPIRATION PNEUMONIA, UNSPECIFIED ASPIRATION PNEUMONIA TYPE, UNSPECIFIED LATERALITY, UNSPECIFIED PART OF LUNG (HCC): ICD-10-CM

## 2025-01-22 PROCEDURE — 71260 CT THORAX DX C+: CPT

## 2025-01-22 RX ADMIN — IOHEXOL 85 ML: 350 INJECTION, SOLUTION INTRAVENOUS at 13:40

## 2025-01-23 ENCOUNTER — HOSPITAL ENCOUNTER (OUTPATIENT)
Dept: RADIOLOGY | Facility: HOSPITAL | Age: 70
Discharge: HOME | End: 2025-01-23
Payer: MEDICARE

## 2025-01-23 DIAGNOSIS — J69.0 ASPIRATION PNEUMONIA, UNSPECIFIED ASPIRATION PNEUMONIA TYPE, UNSPECIFIED LATERALITY, UNSPECIFIED PART OF LUNG (HCC): ICD-10-CM

## 2025-01-23 PROCEDURE — 74220 X-RAY XM ESOPHAGUS 1CNTRST: CPT

## 2025-01-28 ENCOUNTER — RESULTS FOLLOW-UP (OUTPATIENT)
Dept: FAMILY MEDICINE CLINIC | Facility: CLINIC | Age: 70
End: 2025-01-28

## 2025-01-28 DIAGNOSIS — J69.0 ASPIRATION PNEUMONIA, UNSPECIFIED ASPIRATION PNEUMONIA TYPE, UNSPECIFIED LATERALITY, UNSPECIFIED PART OF LUNG (HCC): Primary | ICD-10-CM

## 2025-01-29 ENCOUNTER — OFFICE VISIT (OUTPATIENT)
Age: 70
End: 2025-01-29
Payer: MEDICARE

## 2025-01-29 VITALS
WEIGHT: 250.8 LBS | SYSTOLIC BLOOD PRESSURE: 132 MMHG | BODY MASS INDEX: 42.82 KG/M2 | HEART RATE: 72 BPM | DIASTOLIC BLOOD PRESSURE: 78 MMHG | HEIGHT: 64 IN

## 2025-01-29 DIAGNOSIS — K21.9 GASTROESOPHAGEAL REFLUX DISEASE, UNSPECIFIED WHETHER ESOPHAGITIS PRESENT: Primary | ICD-10-CM

## 2025-01-29 DIAGNOSIS — Z86.0100 HISTORY OF COLON POLYPS: ICD-10-CM

## 2025-01-29 DIAGNOSIS — R32 URINARY INCONTINENCE, UNSPECIFIED TYPE: ICD-10-CM

## 2025-01-29 DIAGNOSIS — K63.8219 SMALL INTESTINAL BACTERIAL OVERGROWTH: ICD-10-CM

## 2025-01-29 PROCEDURE — 99214 OFFICE O/P EST MOD 30 MIN: CPT | Performed by: NURSE PRACTITIONER

## 2025-01-29 RX ORDER — OMEPRAZOLE 40 MG/1
40 CAPSULE, DELAYED RELEASE ORAL DAILY
Qty: 30 CAPSULE | Refills: 5 | Status: SHIPPED | OUTPATIENT
Start: 2025-01-29

## 2025-01-29 RX ORDER — RIBOFLAVIN (VITAMIN B2) 100 MG
100 TABLET ORAL DAILY
COMMUNITY

## 2025-01-29 NOTE — ASSESSMENT & PLAN NOTE
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  -Reports she was waking up aspirating on bile/acid when she would fall off her wedge pillow. She had barium esophagram done showing GERD reaching upper 3rd of the esophagus when prone, stable distal esophageal diverticulum   -She started taking Omeprazole BID for a week, now is taking daily along with her daily Pepcid and reports symptoms have resolved.   -She is also trying to get a special bed through insurance to help with her acid reflux symptoms.  -Her last EGD was 7/11/24 showing distal esophageal diverticulum, esophagojejunal anastomosis widely patent, small bowel evaluation normal in appearance and random bx taken and was negative for celiac sprue, evidence of prior total gastrectomy and esophagojejunostomy   -Recommend continuing Prilosec daily, however due to concerns about longterm side effects, pt only agreeable to a short course and then will switch back to Pepcid. She well let us know if symptoms worsen again and then may be agreeable to another short term course of Prilosec. Continue to elevate HOB at night.      Orders:    omeprazole (PriLOSEC) 40 MG capsule; Take 1 capsule (40 mg total) by mouth daily

## 2025-01-29 NOTE — PROGRESS NOTES
Name: Fariba Brunson      : 1955      MRN: [id number]  Encounter Provider: JACINTO Cali  Encounter Date: 2025   Encounter department: 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  -Reports she was waking up aspirating on bile/acid when she would fall off her wedge pillow. She had barium esophagram done showing GERD reaching upper 3rd of the esophagus when prone, stable distal esophageal diverticulum   -She started taking Omeprazole BID for a week, now is taking daily along with her daily Pepcid and reports symptoms have resolved.   -She is also trying to get a special bed through insurance to help with her acid reflux symptoms.  -Her last EGD was 24 showing distal esophageal diverticulum, esophagojejunal anastomosis widely patent, small bowel evaluation normal in appearance and random bx taken and was negative for celiac sprue, evidence of prior total gastrectomy and esophagojejunostomy   -Recommend continuing Prilosec daily, however due to concerns about longterm side effects, pt only agreeable to a short course and then will switch back to Pepcid. She well let us know if symptoms worsen again and then may be agreeable to another short term course of Prilosec. Continue to elevate HOB at night.      Orders:    omeprazole (PriLOSEC) 40 MG capsule; Take 1 capsule (40 mg total) by mouth daily    Small intestinal bacterial overgrowth  S/p course of Xifaxan with some improvement in bloating, bowel symptoms, now mostly having issues with  constipation but able to have a daily BM.   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 Denies any fecal incontinence.  Orders:    Ambulatory Referral to Physical Therapy; Future        History of Present Illness   HPI  Fariba Brunson is a 69 y.o. 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    She recently had a URI, reflux symptoms increased and was aspirating on bile when she was waking up in the morning intermittently, she was treated for pneumonia 1/13/25. CT chest 1/22 showed scattered areas of groundglass opacity and ill defined opaciities in the left lung which may be a nonspecific pneumonitis or could represent resolving infection. Mediastinal lymph nodes without pathological enlargement. She typically sleeps on a wedge which is effective but then she falls off and ends up aspirating stomach content, is trying to get a special bed through insurance. Doesn't want to be on PPI longterm due to fear of side effects. She's been taking Omeprazole BID for a week, then decreased to daily dose and reports reflux symptoms are still controlled. She has also been taking Pepcid.      She was last seen by Eric in May 2024 and prescribed Xifaxan for treatment of SIBO due to positive breath test. She was also scheduled for EGD which was performed in July showing distal esophageal diverticulum, esophagojejunal  "anastomosis widely patent, small bowel evaluation normal in appearance and random bx taken and was negative for celiac sprue, evidence of prior total gastrectomy and esophagojejunostomy.  CTAP was also performed 5/22/24 with no acute findings to explain her abdominal pain.      Review of Systems   Constitutional:  Negative for unexpected weight change.   HENT:  Negative for trouble swallowing.    Gastrointestinal:  Positive for constipation and vomiting. Negative for abdominal distention, abdominal pain, anal bleeding, blood in stool, diarrhea, nausea and rectal pain.          Objective   /78 (Patient Position: Sitting, Cuff Size: Standard)   Pulse 72   Ht 5' 4\" (1.626 m)   Wt 114 kg (250 lb 12.8 oz)   BMI 43.05 kg/m²      Physical Exam  Vitals and nursing note reviewed.   Constitutional:       General: She is not in acute distress.     Appearance: She is well-developed.   HENT:      Head: Normocephalic and atraumatic.   Eyes:      Conjunctiva/sclera: Conjunctivae normal.   Cardiovascular:      Rate and Rhythm: Normal rate and regular rhythm.      Heart sounds: No murmur heard.  Pulmonary:      Effort: Pulmonary effort is normal. No respiratory distress.      Breath sounds: Normal breath sounds.   Abdominal:      Palpations: Abdomen is soft.      Tenderness: There is no abdominal tenderness.   Musculoskeletal:         General: No swelling.      Cervical back: Neck supple.   Skin:     General: Skin is warm and dry.      Capillary Refill: Capillary refill takes less than 2 seconds.   Neurological:      Mental Status: She is alert and oriented to person, place, and time.   Psychiatric:         Mood and Affect: Mood normal.           "

## 2025-01-29 NOTE — ASSESSMENT & PLAN NOTE
S/p course of Xifaxan with some improvement in bloating, bowel symptoms, now mostly having issues with constipation but able to have a daily BM.   Continue fiber supplement, probiotic daily  Avoid dietary triggers

## 2025-02-03 ENCOUNTER — TELEPHONE (OUTPATIENT)
Age: 70
End: 2025-02-03

## 2025-02-03 NOTE — TELEPHONE ENCOUNTER
I spoke to Patient to see if she wanted to come in today at 1pm due to a last minute opening. Patient declined but was appreciative that I called.

## 2025-02-05 ENCOUNTER — TELEPHONE (OUTPATIENT)
Age: 70
End: 2025-02-05

## 2025-02-05 NOTE — TELEPHONE ENCOUNTER
The patient will  the blood orders at the office. Please have them ready for the patient.    Thank you

## 2025-02-07 LAB
ALBUMIN SERPL-MCNC: 4.2 G/DL (ref 3.9–4.9)
ALP SERPL-CCNC: 78 IU/L (ref 44–121)
ALT SERPL-CCNC: 18 IU/L (ref 0–32)
AST SERPL-CCNC: 22 IU/L (ref 0–40)
BILIRUB SERPL-MCNC: 0.8 MG/DL (ref 0–1.2)
BUN SERPL-MCNC: 15 MG/DL (ref 8–27)
BUN/CREAT SERPL: 17 (ref 12–28)
CALCIUM SERPL-MCNC: 9.4 MG/DL (ref 8.7–10.3)
CHLORIDE SERPL-SCNC: 102 MMOL/L (ref 96–106)
CHOLEST SERPL-MCNC: 190 MG/DL (ref 100–199)
CO2 SERPL-SCNC: 22 MMOL/L (ref 20–29)
CREAT SERPL-MCNC: 0.89 MG/DL (ref 0.57–1)
EGFR: 71 ML/MIN/1.73
GLOBULIN SER-MCNC: 2.6 G/DL (ref 1.5–4.5)
GLUCOSE SERPL-MCNC: 111 MG/DL (ref 70–99)
HBA1C MFR BLD: 6 % (ref 4.8–5.6)
HDLC SERPL-MCNC: 67 MG/DL
LDLC SERPL CALC-MCNC: 97 MG/DL (ref 0–99)
LDLC/HDLC SERPL: 1.4 RATIO (ref 0–3.2)
MICRODELETION SYND BLD/T FISH: NORMAL
POTASSIUM SERPL-SCNC: 4.2 MMOL/L (ref 3.5–5.2)
PROT SERPL-MCNC: 6.8 G/DL (ref 6–8.5)
SL AMB VLDL CHOLESTEROL CALC: 26 MG/DL (ref 5–40)
SODIUM SERPL-SCNC: 139 MMOL/L (ref 134–144)
TRIGL SERPL-MCNC: 149 MG/DL (ref 0–149)
TSH SERPL DL<=0.005 MIU/L-ACNC: 1.59 UIU/ML (ref 0.45–4.5)

## 2025-02-11 ENCOUNTER — OFFICE VISIT (OUTPATIENT)
Age: 70
End: 2025-02-11
Payer: MEDICARE

## 2025-02-11 VITALS
DIASTOLIC BLOOD PRESSURE: 92 MMHG | HEART RATE: 85 BPM | HEIGHT: 64 IN | WEIGHT: 245 LBS | SYSTOLIC BLOOD PRESSURE: 130 MMHG | BODY MASS INDEX: 41.83 KG/M2

## 2025-02-11 DIAGNOSIS — M47.12 MYELOPATHY DUE TO CERVICAL SPONDYLOSIS: ICD-10-CM

## 2025-02-11 DIAGNOSIS — M48.02 CERVICAL SPINAL STENOSIS: Primary | ICD-10-CM

## 2025-02-11 DIAGNOSIS — G62.9 POLYNEUROPATHY: ICD-10-CM

## 2025-02-11 DIAGNOSIS — R42 DYSEQUILIBRIUM: ICD-10-CM

## 2025-02-11 PROCEDURE — G2211 COMPLEX E/M VISIT ADD ON: HCPCS | Performed by: NURSE PRACTITIONER

## 2025-02-11 PROCEDURE — 99205 OFFICE O/P NEW HI 60 MIN: CPT | Performed by: NURSE PRACTITIONER

## 2025-02-11 NOTE — ASSESSMENT & PLAN NOTE
Orders:    MRI cervical spine wo contrast; Future    Ambulatory Referral to Physical Therapy; Future

## 2025-02-11 NOTE — PATIENT INSTRUCTIONS
Referral to PT for balance and strengthening  MRI of the Cervical Spine re: dysequalibrium, leg heaviness and feeling off.   Follow up with Neurology office in 4 months or sooner if needed.

## 2025-02-11 NOTE — ASSESSMENT & PLAN NOTE
Orders:    Ambulatory Referral to Neurology    MRI cervical spine wo contrast; Future    Ambulatory Referral to Physical Therapy; Future

## 2025-02-11 NOTE — PROGRESS NOTES
Name: Fariba Brunson      : 1955      MRN: [id number]  Encounter Provider: JACINTO Lara  Encounter Date: 2025   Enc[facility]  :  Assessment & Plan  Cervical spinal stenosis    Orders:    MRI cervical spine wo contrast; Future    Ambulatory Referral to Physical Therapy; Future    Dysequilibrium    Orders:    Ambulatory Referral to Neurology    MRI cervical spine wo contrast; Future    Ambulatory Referral to Physical Therapy; Future    Myelopathy due to cervical spondylosis    Orders:    MRI cervical spine wo contrast; Future    Ambulatory Referral to Physical Therapy; Future    Polyneuropathy           There are no Patient Instructions on file for this visit.     History of Present Illness   Fariba Kovacs is a 70yo female with PMH of cervical disc disease, asthma, gastric bypass, HTN, IBS and kidney who comes to the Neurology office for consult of dizziness.   Pt was seen by Dr. Hardy in  but has not been back.   She had reported some memory issues as well as balance issues. Denies imaging.    pt was seen by Dr. Hardy for dizziness/lightheadedness.   MRI of the cervical spine and EMG was recommended.    MRI Brain IAC normal   Cervical MRI with multilevel degenerative disease of multiple levels and severe to moderate foraminal narrowing.    EMG . There is evidence of sensory diffuse polyneuropathy in LE. There is evidence of chronic L5-S1 radiculopathy. Clinical correlation is advised.     Patient reports to neurology office today, indicates that she does not specifically have dizziness however notes that she has more of a heaviness in her legs.  She states the heaviness causes her some difficulties with ambulation and getting her ambulation started.  Onset of the symptoms is unknown, she has been having progressive heaviness over the course of time.  She ambulates with the use of a cane.  She  also has some mild ataxia with tandem gait, inabilities to heel toe walk.  Her casual gait is relatively stable and steady with the use of her cane.  She has some proximal muscle give way at the hips, otherwise equal strength throughout.  Patient stated that she has had PT previous to COVID pandemic, she also joined the [employer] and does some swimming.  She has not been back to PT since that time.  She has seen pain management in the past, noted having HOLLEY approximately 4 to 5 years ago.  Patient stated that the spine/pain management provider indicated that her cervical spine has significant derangement.  Patient denies having any significant pain in her low back and/or her neck, she was unclear as to whether or not this was related to her cervical spine however having muscle weakness with ambulation as well as limitations may be as a result of her cervical disc disease, would recommend an MRI of the cervical spine to look for myelopathies.  She has some decreased Achilles reflexes, her patellar reflex is intact at 2+.  Patient has recently had labs including vitamin levels and may all of these are within normal ranges.  She stays on top of her vitamins due to malabsorptive issues related to the gastric bypass.  She stated she had multiple issues with her gastric bypass surgery and tries to stay on top of these things.  She had CT of the lumbar spine post fusion in 2024.  She had fusion at the level of L4-5 with moderate to severe foraminal narrowing.  She has had no follow-up cervical imaging regarding the possibility of myelopathy's.  She is also not followed back up with pain management regarding ongoing weaknesses.  Patient also endorses having some numbness and tingling to the bottoms of her feet.  She notes some decrease sensation in the left lower extremity from the knee down compared to the right.  She has EMG findings significant for polyneuropathy and some mild L4-5 radiculopathy prior to her  lumbar fusion.   She has waxing and waning A1c and blood sugars.  She continues to work with her primary medicine team regarding better management of this.  Patient also has a history of alcohol, smoking and possibly sleep apnea, these things can also lead to the development of neuropathies.  Advised patient to resume PT for cervical spine, lower extremity weakness and heaviness as well as balance therapies.  Will get an MRI of the cervical spine due to the possibility of myelopathy related to her lower extremity weakness and heaviness.  She has not suffered any falls, last fall was approximately 2 years ago prior to her surgery.  If patient has no improvement with PT and no findings significant on her MRI, will get EMG for further evaluation of other etiologies.  No additional lab works at this time.  Patient will follow-up in the outpatient neurology office in 4 months or sooner if needed.          Review of Systems   Constitutional:  Negative for chills and fever.   HENT:  Negative for ear pain and sore throat.    Eyes:  Negative for pain and visual disturbance.   Respiratory:  Negative for cough and shortness of breath.    Cardiovascular:  Negative for chest pain and palpitations.   Gastrointestinal:  Negative for abdominal pain and vomiting.   Genitourinary:  Negative for dysuria and hematuria.   Musculoskeletal:  Positive for gait problem. Negative for arthralgias and back pain.        +Lower extremity heaviness   Skin:  Negative for color change and rash.   Neurological:  Negative for seizures and syncope.   All other systems reviewed and are negative.   I have personally reviewed the MA's review of systems and made changes as necessary.        Past Medical History:   Diagnosis Date    Arthritis     Asthma     Back pain     Chronic pain disorder     hips into the legs    Colon polyp     DVT (deep venous thrombosis) ([facility]) 2006    Gastric bypass status for obesity     yarelis en y    GERD (gastroesophageal reflux disease)      Hiatal hernia     History of transfusion 2006    after gastric bypass surgery, no reactions    Hypertension     Irritable bowel syndrome     Kidney stone     Muscle weakness     bilateral legs    Numbness and tingling     bilateral feet    Pneumonia     Sacroiliitis (HCC) 5/15/2018    Spinal stenosis     Tinnitus     occassionally    Vertigo     Wears glasses        Past Surgical History:   Procedure Laterality Date    APPENDECTOMY      BACK SURGERY  2018    L4-5 fusion    BARIATRIC SURGERY      yarelis en y- pt states the procedure was done incorrectly which lead to additional surgeries from 2006    CARPAL TUNNEL RELEASE Right      SECTION      x1    CHOLECYSTECTOMY      COLON SURGERY      partial removal of the small bowel-necrosis-between 2006    COLONOSCOPY      COLONOSCOPY W/ BIOPSIES AND POLYPECTOMY      EPIDURAL BLOCK INJECTION N/A 2021    Procedure: C6 C7 cervical epidural steroid injection ( 88192);  Surgeon: Jeremi Gamino MD;  Location: [facility] MAIN OR;  Service: Pain Management     EPIDURAL BLOCK INJECTION N/A 2021    Procedure: C6 C7 cervical epidural steroid injection (69178);  Surgeon: Jeremi Gamino MD;  Location: [facility] MAIN OR;  Service: Pain Management     FLEXIBLE BRONCHOSCOPY W/ UPPER ENDOSCOPY      GASTRECTOMY      after gastric bypass-(failed surgery per pt) 2006    HERNIA REPAIR      incisional hernias-diaphragm area    NC ARTHROCENTESIS ASPIR&/INJ SMALL JT/BURSA W/O US N/A 2018    Procedure: Coccygeal Injection & Ganglion Impar Block;  Surgeon: Jeremi Gamino MD;  Location: [facility] MAIN OR;  Service: Pain Management     NC ARTHRODESIS POSTERIOR INTERBODY 1 NTRSPC LUMBAR N/A 2018    Procedure: L4-5 DECOMPRESSION INTERBODY INSTRUMENTED FUSION;  Surgeon: Martinez Garcia MD;  Location: [facility] Main OR;  Service: Orthopedics    NC COLONOSCOPY FLX DX W/COLLJ SPEC WHEN PFRMD N/A 2018    Procedure: COLONOSCOPY;  Surgeon: Terry Booth MD;  Location:  [facility] GI LAB;  Service: Gastroenterology    DC ESOPHAGOGASTRODUODENOSCOPY TRANSORAL DIAGNOSTIC N/A 02/19/2018    Procedure: ESOPHAGOGASTRODUODENOSCOPY (EGD);  Surgeon: Terry Booth MD;  Location: [facility] GI LAB;  Service: Gastroenterology    DC INJECT SI JOINT ARTHRGRPHY&/ANES/STEROID W/EMMANUEL Right 05/25/2018    Procedure: Rt Sacroiliac Joint Injection;  Surgeon: Jeremi Gamino MD;  Location: [facility] MAIN OR;  Service: Pain Management     DC INJECT SI JOINT ARTHRGRPHY&/ANES/STEROID W/EMMANUEL Right 05/01/2019    Procedure: Sacroiliac Joint Injection ([id number]);  Surgeon: Jeremi Gamino MD;  Location: [facility] MAIN OR;  Service: Pain Management     TOENAIL EXCISION  2024    ingrown toenails fixed    TONSILECTOMY, ADENOIDECTOMY, BILATERAL MYRINGOTOMY AND TUBES      TONSILLECTOMY      UPPER GASTROINTESTINAL ENDOSCOPY         Social History     Socioeconomic History    Marital status:      Spouse name: Not on file    Number of children: Not on file    Years of education: Not on file    Highest education level: Not on file   Occupational History    Not on file   Tobacco Use    Smoking status: Never     Passive exposure: Never    Smokeless tobacco: Never   Vaping Use    Vaping status: Never Used   Substance and Sexual Activity    Alcohol use: Yes     Alcohol/week: 7.0 standard drinks of alcohol     Types: 5 Cans of beer, 2 Shots of liquor per week     Comment: socially    Drug use: Not Currently     Types: Marijuana     Comment: has medical marijuana card. lozenges    Sexual activity: Yes     Birth control/protection: Post-menopausal   Other Topics Concern    Not on file   Social History Narrative    Not on file     Social Drivers of Health     Financial Resource Strain: High Risk (11/22/2022)    Overall Financial Resource Strain (CARDIA)     Difficulty of Paying Living Expenses: Hard   Food Insecurity: No Food Insecurity (1/16/2025)    Hunger Vital Sign     Worried About Running Out of Food in the Last Year: Never true     Ran  Out of Food in the Last Year: Never true   Transportation Needs: No Transportation Needs (1/16/2025)    PRAPARE - Transportation     Lack of Transportation (Medical): No     Lack of Transportation (Non-Medical): No   Physical Activity: Not on file   Stress: Not on file   Social Connections: Not on file   Intimate Partner Violence: Not on file   Housing Stability: Low Risk  (1/16/2025)    Housing Stability Vital Sign     Unable to Pay for Housing in the Last Year: No     Number of Times Moved in the Last Year: 0     Homeless in the Last Year: No       Family History   Problem Relation Age of Onset    Endometrial cancer Mother     Diabetes Mother     Aortic aneurysm Father     Cancer Father         prostate    Heart disease Sister         open heart    Cancer Sister         breast    Breast cancer Sister 54    Diabetes Maternal Grandfather     Diabetes Brother     Cancer Brother         spinal cancer    Stroke Brother     Hypertension Brother     Asperger's syndrome Son         high functioning    No Known Problems Maternal Aunt     No Known Problems Maternal Aunt     Colon cancer Maternal Uncle 60    No Known Problems Paternal Aunt     No Known Problems Paternal Aunt          Current Outpatient Medications:     albuterol (ProAir HFA) 90 mcg/act inhaler, Inhale 2 puffs every 4 (four) hours as needed for wheezing or shortness of breath, Disp: 18 g, Rfl: 0    albuterol (ProAir HFA) 90 mcg/act inhaler, Inhale 2 puffs every 6 (six) hours as needed for wheezing (Patient not taking: Reported on 1/16/2025), Disp: 8.5 g, Rfl: 0    Ascorbic Acid (vitamin C) 100 MG tablet, Take 100 mg by mouth daily, Disp: , Rfl:     calcium carbonate (OS-BEAU) 1250 (500 Ca) MG chewable tablet, Chew 1 tablet daily, Disp: , Rfl:     famotidine (PEPCID) 40 MG tablet, Take 1 tablet (40 mg total) by mouth in the morning, Disp: 90 tablet, Rfl: 2    fluticasone (FLONASE) 50 mcg/act nasal spray, 2 sprays into each nostril daily, Disp: 16 g, Rfl: 3     lisinopril-hydrochlorothiazide (PRINZIDE,ZESTORETIC) 20-12.5 MG per tablet, TAKE ONE TABLET BY MOUTH EVERY DAY, Disp: 90 tablet, Rfl: 1    multivitamin (THERAGRAN) TABS, Take 1 tablet by mouth daily, Disp: , Rfl:     Omega-3 Fatty Acids (fish oil) 1,000 mg, Take 1 capsule (1,000 mg total) by mouth 2 (two) times a day, Disp: 60 capsule, Rfl: 6    omeprazole (PriLOSEC) 40 MG capsule, Take 1 capsule (40 mg total) by mouth daily, Disp: 30 capsule, Rfl: 5    saccharomyces boulardii (FLORASTOR) 250 mg capsule, Take 250 mg by mouth in the morning, Disp: , Rfl:     vitamin B-12 (CYANOCOBALAMIN) 50 MCG tablet, Take 50 mcg by mouth daily (Patient taking differently: Take 50 mcg by mouth daily 3 times weekly), Disp: , Rfl:     vitamin E 100 UNIT capsule, Take 100 Units by mouth daily, Disp: , Rfl:     No Known Allergies     There were no vitals taken for this visit.       Objective   There were no vitals taken for this visit.    Physical Exam  Vitals reviewed.   Constitutional:       Appearance: Normal appearance. She is well-developed.   HENT:      Head: Normocephalic.      Nose: Nose normal.      Mouth/Throat:      Mouth: Mucous membranes are moist.   Eyes:      General: Lids are normal.      Extraocular Movements: Extraocular movements intact.      Pupils: Pupils are equal, round, and reactive to light.   Pulmonary:      Effort: Pulmonary effort is normal.   Abdominal:      Palpations: Abdomen is soft.   Musculoskeletal:      Cervical back: Normal range of motion.   Skin:     General: Skin is warm and dry.   Neurological:      Mental Status: She is alert.      Coordination: Coordination is intact. Romberg sign negative.   Psychiatric:         Attention and Perception: Attention and perception normal.         Mood and Affect: Mood and affect normal.         Speech: Speech normal.         Behavior: Behavior normal. Behavior is cooperative.         Thought Content: Thought content normal.         Cognition and Memory:  Cognition and memory normal.         Judgment: Judgment normal.       Neurological Exam  Mental Status  Alert. Oriented to person, place, time and situation. Memory is normal. Recent and remote memory are intact. Speech is normal. Follows three-step commands. Attention and concentration are normal. Fund of knowledge is appropriate for level of education.    Cranial Nerves  CN II: Visual acuity is normal. Visual fields full to confrontation.  CN III, IV, VI: Extraocular movements intact bilaterally. Normal lids and orbits bilaterally. Pupils equal round and reactive to light bilaterally.  CN V: Facial sensation is normal.  CN VII: Full and symmetric facial movement.  CN VIII: Hearing is normal.  CN IX, X: Palate elevates symmetrically. Normal gag reflex.  CN XI: Shoulder shrug strength is normal.  CN XII: Tongue midline without atrophy or fasciculations.    Motor  Normal muscle bulk throughout. Normal muscle tone. No abnormal involuntary movements. Strength is 5/5 in all four extremities except as noted.                                             Right                     Left   Iliopsoas                               5-                          5-    Sensory  Light touch is normal in upper and lower extremities. Temperature is normal in upper and lower extremities. Vibration abnormality: Proprioception is normal in upper and lower extremities. Sensation: Some decrease sensation and vibration to the left lower extremity from the knee down compared to the right.     Reflexes  Deep tendon reflexes are 2+ and symmetric except as noted.                                            Right                      Left  Achilles                                Tr                         Tr    Right pathological reflexes: Yahaira's absent. Ankle clonus absent.  Left pathological reflexes: Yahaira's absent. Ankle clonus absent.    Coordination    Finger-to-nose, rapid alternating movements and heel-to-shin normal bilaterally  without dysmetria.    Gait  Casual gait is normal including stance, stride, and arm swing. Toe walking abnormality: Heel walking abnormality: Tandem gait abnormality: Romberg is absent. Able to rise from chair without using arms.  Patient unable to heel toe walk, tandem gait ataxic.  Ambulates with a cane for stability.      Radiology Results Review: I have reviewed radiology reports from 2021 and most recent 2024 including: CT C-spine and MRI spine.    Administrative Statements   I have spent a total time of 45 minutes in caring for this patient on the day of the visit/encounter including Diagnostic results, Risks and benefits of tx options, Instructions for management, Patient and family education, Counseling / Coordination of care, Documenting in the medical record, Reviewing / ordering tests, medicine, procedures  , and Obtaining or reviewing history  .

## 2025-02-22 ENCOUNTER — HOSPITAL ENCOUNTER (OUTPATIENT)
Dept: RADIOLOGY | Facility: HOSPITAL | Age: 70
Discharge: HOME | End: 2025-02-22
Payer: MEDICARE

## 2025-02-22 DIAGNOSIS — M47.12 MYELOPATHY DUE TO CERVICAL SPONDYLOSIS: ICD-10-CM

## 2025-02-22 DIAGNOSIS — M48.02 CERVICAL SPINAL STENOSIS: ICD-10-CM

## 2025-02-22 DIAGNOSIS — R42 DYSEQUILIBRIUM: ICD-10-CM

## 2025-02-22 PROCEDURE — 72141 MRI NECK SPINE W/O DYE: CPT

## 2025-02-24 ENCOUNTER — EVALUATION (OUTPATIENT)
Facility: CLINIC | Age: 70
End: 2025-02-24
Payer: MEDICARE

## 2025-02-24 ENCOUNTER — RESULTS FOLLOW-UP (OUTPATIENT)
Age: 70
End: 2025-02-24

## 2025-02-24 DIAGNOSIS — R42 DYSEQUILIBRIUM: ICD-10-CM

## 2025-02-24 DIAGNOSIS — R26.89 IMBALANCE: Primary | ICD-10-CM

## 2025-02-24 DIAGNOSIS — R26.89 OTHER ABNORMALITIES OF GAIT AND MOBILITY: ICD-10-CM

## 2025-02-24 DIAGNOSIS — M48.02 CERVICAL SPINAL STENOSIS: ICD-10-CM

## 2025-02-24 DIAGNOSIS — M47.12 MYELOPATHY DUE TO CERVICAL SPONDYLOSIS: ICD-10-CM

## 2025-02-24 PROCEDURE — 97162 PT EVAL MOD COMPLEX 30 MIN: CPT

## 2025-02-24 NOTE — PROGRESS NOTES
PT Evaluation          POC expires Unit limit Auth Expiration date PT/OT + Visit Limit? Co-Insurance   25 NA NA BOMN Yes and $0 Co-pay                                            Today's date: 2025  Patient name: Fariba Brunson  : 1955  MRN: [id number]  Referring provider: Jose C Serrano*  Dx:   Encounter Diagnosis     ICD-10-CM    1. Imbalance  R26.89       2. Cervical spinal stenosis  M48.02 Ambulatory Referral to Physical Therapy      3. Dysequilibrium  R42 Ambulatory Referral to Physical Therapy      4. Myelopathy due to cervical spondylosis  M47.12 Ambulatory Referral to Physical Therapy      5. Other abnormalities of gait and mobility  R26.89             Assessment  Assessment details: Patient is a 69 y.o. Female who presents to skilled outpatient PT with complaints of decreased tolerance to activity, and imbalance which are impacting her participation with recreational activity. Past medical history significant for T2DM, gastric bypass surgery, and cervical spinal stenosis. Coordination and sensation screening within normal parameters. She displays overall good strength as per MMT however, increased difficulty with proximal hip strength as compared to distal. She falls below age matched values for 5xSTS, and 10 meter walk test suggesting deficits in lower extremity muscular strength and power, and gait speed respectively. She displays the following gait deviations: mild bilateral trendelenburg, dec L stance time, asymmetrical step length, decreased ana. Her gait speed of 0.81 m/s classifies her as community ambulator however, not yet meeting the criteria to safely cross the street. She classifies as low risk for falls as per TUG and completion of mCSTIB however, did display increased A-P postural sway during last condition of mCTSIB suggesting limitations in vestibular system as it pertains to static  balance. Plan to complete FGA/DGI and 6 MWT next session to assess dynamic balance and cardiovascular endurance; unable to complete this session secondary to time constraints. Patient is highly motivated to participate with PT services to maximize her functional ability. She will benefit from skilled OPPT services to improve tolerance to exercise, balance confidence, and facilitate return to recreational activity to promote improved QoL.   Impairments: Abnormal coordination, Abnormal gait, Abnormal muscle tone, Abnormal or restricted ROM, Activity intolerance, Impaired balance, Impaired physical strength, Lacks appropriate HEP, Poor posture, Poor body mechanics, Pain with function, Safety issue, Weight-bearing intolerance, Abnormal movement, Difficulty understanding, Abnormal muscle firing  Understanding of Dx/Px/POC: Good  Prognosis: Good    Patient verbalized understanding of POC.         Please contact me if you have any questions or recommendations. Thank you for the referral and the opportunity to share in Fariba Brunson's care.        Plan  Plan details: balance, endurance training, somatosensory awareness, transition to gym program  Patient would benefit from: PT Eval and Skilled PT  Planned modality interventions: Biofeedback, Cryotherapy, TENS, Thermotherapy  Planned therapy interventions: Abdominal trunk stabilization, ADL training, Balance, Balance/WB training, Breathing training, Body mechanics training, Coordination, Functional ROM exercises, Gait training, HEP, Joint Mobilization, Manual Therapy, Garcia taping, Motor coordination training, Neuromuscular re-education, Patient education, Postural training, Strengthening, Stretching, Therapeutic activities, Therapeutic exercises, Therapeutic training, Transfer training, Activity modification, Work reintegration  Frequency: 2x/wk-3x/wk  Duration in weeks: 12 weeks  Plan of Care beginning date: 2/24/25  Plan of Care expiration date: 12 weeks  -25  Treatment plan discussed with: Patient       Goals  Short Term Goals (4 weeks):    - Patient will improve time on TUG by 2.9 seconds to facilitate improved safety in all ambulation  - Patient will be independent in basic HEP 2-3 weeks  - Patient will improve 5xSTS score by 2.3 seconds to promote improved LE functional strength needed for ADLs  - Patient will complete 6MWT to assess cardiovascular endurance and tolerance to recreational activites    Long Term Goals (12 weeks):  - Patient will be independent in a comprehensive home exercise program  - Patient will improve scoring on DGI by 2.6 points to progress safety  - Patient will improve gait speed by 0.18 m/s to improve safety with community ambulation  - Patient will improve scoring on FGA by 4 points to progress safety with dynamic tasks  - Patient will be able to demonstrate HT in gait without veering  - Patient will improve 6 Minute Walk Test score by 190 feet to promote improved cardiovascular endurance  - Patient will report 50% reduction in near falls in order to improve safety with functional tasks and reduce his risk for falls  - Patient will report going on walks at least 3 days per week to promote independence and improved cardiovascular endurance  - Patient will be able to ascend/descend stairs reciprocally with 1 UE assist to promote independence and safety with ADLs  - Patient will report 50% reduction in near falls when ambulating on uneven terrain      Cut off score    All date taken from APTA Neuro Section or Rehab Measures      Sosa/56  MDC: 6 pts  Age Norms:  60-69: M - 55   F - 55  70-79: M - 54   F - 53  80-89: M - 53   F - 50 5xSTS: Soy et al 2010  MDC: 2.3 sec  Age Norms:  60-69: 11.4 sec  70-79: 12.6 sec  80-89: 14.8 sec   TUG  MDC: 4.14 sec  Cut off score:  >13.5 sec community dwelling adults  >32.2 frail elderly  <20 I for basic transfers  >30 dependent on transfers 10 Meter Walk Test: Fransisco al 2011  MDC:  0.18 m/s  20-29: M - 1.35 m   F - 1.34 m  30-39: M - 1.43 m   F - 1.34 m  40-49: M - 1.43 m   F - 1.39 m  50-59: M - 1.43 m   F - 1.31 m  60-69: M - 1.34 m   F - 1.24 m  70-79: M - 1.26 m   F - 1.13 m  80-89: M - 0.97 m   F - 0.94 m    Household Ambulator < 0.4 m/s  Limited Community Ambulator 0.4 - 0.8 m/s  Community Ambulator 0.8 - 1.2 m/s  Safely cross the street > 1.2 m/s   FGA  MCID: 4 pts  Geriatrics/community < 22/30 fall risk  Geriatrics/community < 20/30 unexplained falls    DGI  MDC: vestibular - 4 pts  MDC: geriatric/community - 3 pts  Falls risk <19/24 mCTSIB  Norm: 20-60 yrs  Eyes open firm: norm sway 0.21-0.48  Eyes closed firm: norm sway 0.48-0.99  Eyes open foam: norm sway 0.38-0.71  Eyes closed foam: norm sway 0.70-2.22   6 Minute Walk Test  MDC: 190.98 ft  MCID: 164 ft    Age Norms  60-69: M - 1876 ft (571.80 m)  F - 1765 ft (537.98 m)  70-79: M - 1729 ft (527.00 m)  F - 1545 ft (470.92 m)  80-89: M - 1368 ft (416.97 m)  F - 1286 ft (391.97 m) ABC: Hilary & Nika, 2003  <67% increased risk for falls   Bromide-Rachelle Monofilaments  Evaluator Size:        Force (grams):          Hand/Dorsal Thresholds:        Plantar Thresholds:  - 1.65                       - 0.008                       - Normal                                 - Normal  - 2.36                       - 0.02                         - Normal                                 - Normal  - 2.44                       - 0.04                         - Normal                                 - Normal  - 2.83                       - 0.07                         - Normal                                 - Normal  - 3.22                       - 0.16                         - Diminished light touch          - Normal  - 3.61                       - 0.40                         - Diminished light touch          - Normal  - 3.84                       - 0.60                         - Diminished protective           - Diminished light touch  - 4.08                        - 1.00                         - Diminished protective           - Diminished light touch  - 4.17                       - 1.40                         - Diminished protective           - Diminished light touch  - 4.31                       - 2.00                         - Diminished protective           - Diminished light touch  - 4.56                       - 4.00                         - Loss of protective sense      - Diminished protective  - 4.74                       - 6.00                         - Loss of protective sense      - Diminished protective  - 4.93                       - 8.00                         - Loss of protective sense      - Diminished protective  - 5.07                       - 10.0                         - Loss of protective sense     - Loss of protective sense  - 5.18                       - 15.0                         - Loss of protective sense     - Loss of protective sense  - 5.46                       - 26.0                         - Loss of protective sense     - Loss of protective sense  - 5.88                       - 60.0                         - Loss of protective sense     - Loss of protective sense  - 6.10                       - 100                          - Loss of protective sense     - Loss of protective sense  - 6.45                       - 180                          - Loss of protective sense     - Loss of protective sense  - 6.65                       - 300                          - Deep pressure sense only  - Deep pressure sense only         Subjective    History of Present Illness  - Mechanism of injury: Patient presents to balance center to address complaints of decreased endurance, balance, and function. She states she enjoys riding her motorcycle and attending [employer] swim classes however, participation with recreational activities have been limited due to fear avoidance behavior. Patient states she has noticed a functional decline and  subsequent increase in body weight which she has seen weight management for. She denies and falls but states she feels unsteady when walking and is worried about falling. She primarily uses a SPC in her community but will ambulate without AD in her house hold and for short distances. Patient reports she has tingling and sensation changes in BL feet however, has not yet received formal neuropathy diagnosis. Patient would like to improve balance confidence, endurance, and tolerance to activity to maximize her function.   - Primary AD: SPC  - Assist level at home: independent    Patient goal: Improve tolerance, maximize independence, function, and mobility    Pain  - NA    Social Support  - Steps to enter house: stairs to landing pad  - Stairs in house: 1 flight   - Lives in: multi-level home stays on 1st level  - Lives with: son    - Employment status:   - Hand dominance: RHD    Treatments  - Previous treatment: Ortho PT, Recreational swim classes at [facility]  - Current treatment: none  - Diagnostic Testing: Cervical MRI 2/22/25: IMPRESSION: Multilevel cervical spondylitic degenerative change with mild canal stenosis. Multilevel moderate foraminal narrowing, unchanged.       Objective     LE MMT  - R Hip Flexion: 4-/5  L Hip Flexion: 3+/5  - R Hip Extension: 4/5  L Hip Extension: 4/5  - R Hip Abduction: 4-/5  L Hip Abduction: 4-/5  - R Hip Adduction: 4/5  L Hip Adduction: 4/5  - R Knee Extension: 4/5  L Knee Extension: 4/5  - R Knee Flexion: 4/5  L Knee Flexion: 4/5  - R Ankle DF: 4/5   L Ankle DF: 4/5  - R Ankle PF: 4/5   L Ankle PF: 4/5    Sensation  - Light touch: intact    Coordination  - Heel to Shin: intact  - Alternate Toe Taps: intact  - Finger to Nose: intact    Gait  - Abnormalities: mild bilateral trendelenburg, dec L stance time, asymmetrical step length, decreased ana           Outcome Measures Initial Eval  2/24/25        5xSTS 13.31 sec, no UE        TUG  - Regular  - Cognitive   11.15  sec, with SPC   11.50 sec, with SPC        10 meter 0.81 m/s, with SPC        FGA defer        DGI defer        mCTSIB  - FTEO (firm)  - FTEC (firm)  - FTEO (foam)  - FTEC (foam)   30 sec  30 sec  30 sec  30 sec        6MWT defer                                      Precautions:   Past Medical History:   Diagnosis Date    Arthritis     Asthma     Back pain     Chronic pain disorder     hips into the legs    Colon polyp     DVT (deep venous thrombosis) ([facility]) 2006    Gastric bypass status for obesity     yarelis en y    GERD (gastroesophageal reflux disease)     Hiatal hernia     History of transfusion 2006    after gastric bypass surgery, no reactions    Hypertension     Irritable bowel syndrome     Kidney stone     Muscle weakness     bilateral legs    Numbness and tingling     bilateral feet    Pneumonia     Sacroiliitis (HCC) 5/15/2018    Spinal stenosis     Tinnitus     occassionally    Vertigo     Wears glasses

## 2025-02-25 DIAGNOSIS — M54.12 CERVICAL RADICULOPATHY: Primary | ICD-10-CM

## 2025-02-25 DIAGNOSIS — M47.12 MYELOPATHY DUE TO CERVICAL SPONDYLOSIS: ICD-10-CM

## 2025-02-25 NOTE — TELEPHONE ENCOUNTER
I did speak to Patient with results. Patient expressed full understanding.  Patient would like a pain management order

## 2025-02-25 NOTE — TELEPHONE ENCOUNTER
----- Message from JACINTO Garcia sent at 2/24/2025  7:55 PM EST -----  MRI of the C Spine showing moderate amount of Cervical disc disease.  Will see if PT helps prior to ordering EMG, her first eval was today.  We can also refer her back to Pain management if she wants to have them take a look at her neck and maybe low back for other poss options.    Thanks  Jose C

## 2025-02-26 NOTE — TELEPHONE ENCOUNTER
----- Message from JACINTO Garcia sent at 2/25/2025 11:26 AM EST -----  Ordered  ----- Message -----  From: Tayolr Joshi MA  Sent: 2/25/2025   9:12 AM EST  To: JACINTO Lara

## 2025-03-13 ENCOUNTER — OFFICE VISIT (OUTPATIENT)
Facility: CLINIC | Age: 70
End: 2025-03-13
Payer: MEDICARE

## 2025-03-13 DIAGNOSIS — R42 DYSEQUILIBRIUM: ICD-10-CM

## 2025-03-13 DIAGNOSIS — R26.89 IMBALANCE: ICD-10-CM

## 2025-03-13 DIAGNOSIS — R26.89 OTHER ABNORMALITIES OF GAIT AND MOBILITY: ICD-10-CM

## 2025-03-13 DIAGNOSIS — M47.12 MYELOPATHY DUE TO CERVICAL SPONDYLOSIS: ICD-10-CM

## 2025-03-13 DIAGNOSIS — M48.02 CERVICAL SPINAL STENOSIS: Primary | ICD-10-CM

## 2025-03-13 PROCEDURE — 97530 THERAPEUTIC ACTIVITIES: CPT

## 2025-03-13 NOTE — PROGRESS NOTES
Daily Note     POC expires Unit limit Auth Expiration date PT/OT + Visit Limit? Co-Insurance   25 NA NA BOMN Yes and $0 Co-pay                                 Today's date: 3/13/2025  Patient name: Fariba Brunson  : 1955  MRN: [id number]  Referring provider: Jose C Serrano*  Dx:   Encounter Diagnosis     ICD-10-CM    1. Cervical spinal stenosis  M48.02       2. Dysequilibrium  R42       3. Myelopathy due to cervical spondylosis  M47.12       4. Imbalance  R26.89       5. Other abnormalities of gait and mobility  R26.89                      Subjective: Patient states she continues to notice limitations in ambulatory ability secondary to LE weakness.       Objective: See treatment diary below    NMR/TA:  FGA/DGI: see results below  6 MWT: see results below  Achilles Reflex: Normal  Supinator Reflex: Normal  Hoffmans Reflex: Normal    No perception to light touch at R distal S1 distribution    Somatosensory re-weightinx    Access Code: [email or web address]  URL: [email or web address]  Date: 2025  Prepared by: Jayna Altamirano    Exercises  - Seated Hip Abduction with Resistance  - 1 x daily - 5 x weekly - 3 sets - 10 reps  - Seated March with Resistance  - 1 x daily - 5 x weekly - 3 sets - 10 reps  - Side Stepping with Counter Support  - 1 x daily - 5 x weekly - 3 sets - 10 reps  - Standing Toe Raises at Chair  - 1 x daily - 5 x weekly - 3 sets - 10 reps  - Standing Hip Extension with Counter Support  - 1 x daily - 5 x weekly - 3 sets - 10 reps  - Seated Heel Raise  - 1 x daily - 5 x weekly - 3 sets - 10 reps      Assessment: Patient tolerated treatment well with emphasis on completing remaining outcome measures and establishing HEP. Patient falls below age normative values for 6 MWT suggesting limitations in cardiovascular endurance and tolerance to exercise. She categorizes as HIGH risk for falls as per APTA and Rehab Measures Lab cutoff scores for FGA and DGI. Assessed patient  sensation and reflexes this session secondary to complaints of sensation changes and absent perception to light touch at R distal S1 distribution. She displayed good understanding of safetly and exercise parameters with relation to HEP. Patient would benefit from continued PT to improve independence and reduce risk for falls.       Plan: Continue per plan of care.        Outcome Measures Initial Eval  2/24/25 DN  3/13/25       5xSTS 13.31 sec, no UE        TUG  - Regular  - Cognitive   11.15 sec, with SPC   11.50 sec, with SPC        10 meter 0.81 m/s, with SPC        FGA defer 17/30       DGI defer 14/24       mCTSIB  - FTEO (firm)  - FTEC (firm)  - FTEO (foam)  - FTEC (foam)   30 sec  30 sec  30 sec  30 sec        6MWT defer  830 ft w/ SPC

## 2025-03-17 ENCOUNTER — OFFICE VISIT (OUTPATIENT)
Facility: CLINIC | Age: 70
End: 2025-03-17
Payer: MEDICARE

## 2025-03-17 DIAGNOSIS — R42 DYSEQUILIBRIUM: ICD-10-CM

## 2025-03-17 DIAGNOSIS — M47.12 MYELOPATHY DUE TO CERVICAL SPONDYLOSIS: ICD-10-CM

## 2025-03-17 DIAGNOSIS — R26.89 IMBALANCE: Primary | ICD-10-CM

## 2025-03-17 DIAGNOSIS — M48.02 CERVICAL SPINAL STENOSIS: ICD-10-CM

## 2025-03-17 DIAGNOSIS — R26.89 OTHER ABNORMALITIES OF GAIT AND MOBILITY: ICD-10-CM

## 2025-03-17 PROCEDURE — 97112 NEUROMUSCULAR REEDUCATION: CPT

## 2025-03-17 NOTE — PROGRESS NOTES
"Daily Note     POC expires Unit limit Auth Expiration date PT/OT + Visit Limit? Co-Insurance   25 NA NA BOMN Yes and $0 Co-pay                                 Today's date: 3/17/2025  Patient name: Fariba Brunson  : 1955  MRN: [id number]  Referring provider: Jose C Serrano*  Dx:   Encounter Diagnosis     ICD-10-CM    1. Imbalance  R26.89       2. Cervical spinal stenosis  M48.02       3. Dysequilibrium  R42       4. Myelopathy due to cervical spondylosis  M47.12       5. Other abnormalities of gait and mobility  R26.89                        Subjective: Patient presents to PT w/ SPC stating she has been doing her HEP.       Objective: See treatment diary below    NMR/TA (gait belt):  BP: 160/92 mmHg (regular cuff)      - Biodex Trainer LoS    Medium: 4:15 min, 8%   Hard: 4:36 min, 9% min A  - STS w/ 5.5# TT to fatigue: 16x, 2 sets  - Lat over hurdles 6\" w/ 5.5#TT: 15ft, 4 laps   - Fwd hurdles 6\": 15ft, 7 laps  - Step taps to 8\" 1UE: L 20x, R 17x  - Step ups to 4\" step: 10x, 1 UE    Assessment: Patient tolerated treatment well with emphasis on initiating exercise program. Introduced biodex  today to facilitate improvements in somatosensory awareness and weight shifting ability; She was most challenged with anterior weight shifting often requiring assistance to complete. Patient required intermittent seated rest periods due to R hip discomfort which can be attributed to muscular weakness and limitations in muscular endurance. Maintained CS/CGA throughout session to assure patient safety however, no overt LoB. Patient would benefit from continued PT to improve independence and reduce risk for falls.       Plan: Continue per plan of care.        Outcome Measures Initial Eval  25 DN  3/13/25       5xSTS 13.31 sec, no UE        TUG  - Regular  - Cognitive   11.15 sec, with SPC   11.50 sec, with SPC        10 meter 0.81 m/s, with SPC        FGA defer        DGI defer      "   mCTSIB  - FTEO (firm)  - FTEC (firm)  - FTEO (foam)  - FTEC (foam)   30 sec  30 sec  30 sec  30 sec        6MWT defer  830 ft w/ SPC       HEP access code:  [email or web address]

## 2025-04-01 ENCOUNTER — EVALUATION (OUTPATIENT)
Facility: CLINIC | Age: 70
End: 2025-04-01
Payer: MEDICARE

## 2025-04-01 DIAGNOSIS — R26.89 IMBALANCE: ICD-10-CM

## 2025-04-01 DIAGNOSIS — M48.02 CERVICAL SPINAL STENOSIS: Primary | ICD-10-CM

## 2025-04-01 DIAGNOSIS — R26.89 OTHER ABNORMALITIES OF GAIT AND MOBILITY: ICD-10-CM

## 2025-04-01 DIAGNOSIS — R42 DYSEQUILIBRIUM: ICD-10-CM

## 2025-04-01 DIAGNOSIS — M47.12 MYELOPATHY DUE TO CERVICAL SPONDYLOSIS: ICD-10-CM

## 2025-04-01 PROCEDURE — 97530 THERAPEUTIC ACTIVITIES: CPT

## 2025-04-01 NOTE — PROGRESS NOTES
PT Re-Evaluation          POC expires Unit limit Auth Expiration date PT/OT + Visit Limit? Co-Insurance   25 NA NA BOMN Yes and $0 Co-pay                                            Today's date: 2025  Patient name: Fariba Brunson  : 1955  MRN: [id number]  Referring provider: Jose C Serrano*  Dx:   Encounter Diagnosis     ICD-10-CM    1. Cervical spinal stenosis  M48.02       2. Dysequilibrium  R42       3. Myelopathy due to cervical spondylosis  M47.12       4. Imbalance  R26.89       5. Other abnormalities of gait and mobility  R26.89               Assessment  Assessment details: Patient is a 69 y.o. Female who has been presenting to skilled outpatient PT to address complaints of decreased tolerance to activity, and imbalance which are impacting her participation with recreational activity. Past medical history significant for T2DM, gastric bypass surgery, and cervical spinal stenosis. Since commencing PT patient has displayed improvements in functional mobility, dual tasking ability, dynamic balance, and cardiovascular endurance as per TUG, TUG cog, FGA, DGI, and 6 MWT respectively. Minor regression in gait speed as per 10 meter walk test, for which she categorizes as a limited community ambulator with SPC. Patient is very compliant with HEP and highly motivated to improve tolerance to activity and balance as her goal is to be able to more easily ambulate [place]. Today she meets the criteria for low risk for falls per APTA and Rehab Measures Lab cutoff scores for FGA and DGI. However, patient does display decreased stability and increased reliance on AD as she fatigues indicating limitations in endurance which can perpetuate risk for falls. Her ABC score also places her at HIGH risk for falls as she demonstrates decreased balance confidence with functional tasks. She has met 3 short term goals thus far.  Patient will benefit from continued skilled OPPT services to improve tolerance to exercise, balance confidence, and facilitate return to recreational activity to promote improved QoL.     Impairments: Abnormal coordination, Abnormal gait, Abnormal muscle tone, Abnormal or restricted ROM, Activity intolerance, Impaired balance, Impaired physical strength, Lacks appropriate HEP, Poor posture, Poor body mechanics, Pain with function, Safety issue, Weight-bearing intolerance, Abnormal movement, Difficulty understanding, Abnormal muscle firing  Understanding of Dx/Px/POC: Good  Prognosis: Good    Patient verbalized understanding of POC.         Please contact me if you have any questions or recommendations. Thank you for the referral and the opportunity to share in Fariba MEGAN Boy's care.        Plan  Plan details: balance, endurance training, somatosensory awareness, transition to gym program  Patient would benefit from: PT Eval and Skilled PT  Planned modality interventions: Biofeedback, Cryotherapy, TENS, Thermotherapy  Planned therapy interventions: Abdominal trunk stabilization, ADL training, Balance, Balance/WB training, Breathing training, Body mechanics training, Coordination, Functional ROM exercises, Gait training, HEP, Joint Mobilization, Manual Therapy, Garcia taping, Motor coordination training, Neuromuscular re-education, Patient education, Postural training, Strengthening, Stretching, Therapeutic activities, Therapeutic exercises, Therapeutic training, Transfer training, Activity modification, Work reintegration  Frequency: 2x/wk-3x/wk  Duration in weeks: 12 weeks  Plan of Care beginning date: 2/24/25  Plan of Care expiration date: 12 weeks -5/19/25  Treatment plan discussed with: Patient       Goals  Short Term Goals (4 weeks):    - Patient will improve time on TUG by 2.9 seconds to facilitate improved safety in all ambulation-MET  - Patient will be independent in basic HEP 2-3 weeks-MET  -  Patient will improve 5xSTS score by 2.3 seconds to promote improved LE functional strength needed for ADLs-ongoing  - Patient will complete 6MWT to assess cardiovascular endurance and tolerance to recreational activities-MET    Long Term Goals (12 weeks):  - Patient will be independent in a comprehensive home exercise program  - Patient will improve scoring on DGI by 2.6 points to progress safety  - Patient will improve gait speed by 0.18 m/s to improve safety with community ambulation  - Patient will improve scoring on FGA by 4 points to progress safety with dynamic tasks  - Patient will be able to demonstrate HT in gait without veering  - Patient will improve 6 Minute Walk Test score by 190 feet to promote improved cardiovascular endurance  - Patient will report 50% reduction in near falls in order to improve safety with functional tasks and reduce his risk for falls  - Patient will report going on walks at least 3 days per week to promote independence and improved cardiovascular endurance  - Patient will be able to ascend/descend stairs reciprocally with 1 UE assist to promote independence and safety with ADLs  - Patient will report 50% reduction in near falls when ambulating on uneven terrain      Cut off score    All date taken from APTA Neuro Section or Rehab Measures      Sosa/56  MDC: 6 pts  Age Norms:  60-69: M - 55   F - 55  70-79: M - 54   F - 53  80-89: M - 53   F - 50 5xSTS: Soy et al 2010  MDC: 2.3 sec  Age Norms:  60-69: 11.4 sec  70-79: 12.6 sec  80-89: 14.8 sec   TUG  MDC: 4.14 sec  Cut off score:  >13.5 sec community dwelling adults  >32.2 frail elderly  <20 I for basic transfers  >30 dependent on transfers 10 Meter Walk Test: Vita et al 2011  MDC: 0.18 m/s  20-29: M - 1.35 m   F - 1.34 m  30-39: M - 1.43 m   F - 1.34 m  40-49: M - 1.43 m   F - 1.39 m  50-59: M - 1.43 m   F - 1.31 m  60-69: M - 1.34 m   F - 1.24 m  70-79: M - 1.26 m   F - 1.13 m  80-89: M - 0.97 m   F - 0.94  m    Household Ambulator < 0.4 m/s  Limited Community Ambulator 0.4 - 0.8 m/s  Community Ambulator 0.8 - 1.2 m/s  Safely cross the street > 1.2 m/s   FGA  MCID: 4 pts  Geriatrics/community < 22/30 fall risk  Geriatrics/community < 20/30 unexplained falls    DGI  MDC: vestibular - 4 pts  MDC: geriatric/community - 3 pts  Falls risk <19/24 mCTSIB  Norm: 20-60 yrs  Eyes open firm: norm sway 0.21-0.48  Eyes closed firm: norm sway 0.48-0.99  Eyes open foam: norm sway 0.38-0.71  Eyes closed foam: norm sway 0.70-2.22   6 Minute Walk Test  MDC: 190.98 ft  MCID: 164 ft    Age Norms  60-69: M - 1876 ft (571.80 m)  F - 1765 ft (537.98 m)  70-79: M - 1729 ft (527.00 m)  F - 1545 ft (470.92 m)  80-89: M - 1368 ft (416.97 m)  F - 1286 ft (391.97 m) ABC: Hilary & Nika, 2003  <67% increased risk for falls   [place] Mon[employer]  Evaluator Size:        Force (grams):          Hand/Dorsal Thresholds:        Plantar Thresholds:  - 1.65                       - 0.008                       - Normal                                 - Normal  - 2.36                       - 0.02                         - Normal                                 - Normal  - 2.44                       - 0.04                         - Normal                                 - Normal  - 2.83                       - 0.07                         - Normal                                 - Normal  - 3.22                       - 0.16                         - Diminished light touch          - Normal  - 3.61                       - 0.40                         - Diminished light touch          - Normal  - 3.84                       - 0.60                         - Diminished protective           - Diminished light touch  - 4.08                       - 1.00                         - Diminished protective           - Diminished light touch  - 4.17                       - 1.40                         - Diminished protective           - Diminished light  touch  - 4.31                       - 2.00                         - Diminished protective           - Diminished light touch  - 4.56                       - 4.00                         - Loss of protective sense      - Diminished protective  - 4.74                       - 6.00                         - Loss of protective sense      - Diminished protective  - 4.93                       - 8.00                         - Loss of protective sense      - Diminished protective  - 5.07                       - 10.0                         - Loss of protective sense     - Loss of protective sense  - 5.18                       - 15.0                         - Loss of protective sense     - Loss of protective sense  - 5.46                       - 26.0                         - Loss of protective sense     - Loss of protective sense  - 5.88                       - 60.0                         - Loss of protective sense     - Loss of protective sense  - 6.10                       - 100                          - Loss of protective sense     - Loss of protective sense  - 6.45                       - 180                          - Loss of protective sense     - Loss of protective sense  - 6.65                       - 300                          - Deep pressure sense only  - Deep pressure sense only         Subjective    History of Present Illness  - Mechanism of injury: Patient presents to balance center to address complaints of decreased endurance, balance, and function. She states she enjoys riding her motorcycle and attending [employer] swim classes however, participation with recreational activities have been limited due to fear avoidance behavior. Patient states she has noticed a functional decline and subsequent increase in body weight which she has seen weight management for. She denies and falls but states she feels unsteady when walking and is worried about falling. She primarily uses a SPC in her community but will ambulate  without AD in her house hold and for short distances. Patient reports she has tingling and sensation changes in BL feet however, has not yet received formal neuropathy diagnosis. Patient would like to improve balance confidence, endurance, and tolerance to activity to maximize her function.     Updated 4/1/25: Patient states she is very compliant with home exercise program and visits the pool 2-3x/ week where she'll perform exercises as well. Patient states she also got a vibration plate for home which has been helping. She states she hasn't noticed much changes since commencing therapy although participation has been limited. Patient states she would like to improve her aerobic tolerance but is hesitant because of her mobility.     - Primary AD: SPC  - Assist level at home: independent    Patient goal: Improve tolerance, maximize independence, function, and mobility    Pain  - NA    Social Support  - Steps to enter house: stairs to landing pad  - Stairs in house: 1 flight   - Lives in: multi-level home stays on 1st level  - Lives with: son    - Employment status:   - Hand dominance: RHD    Treatments  - Previous treatment: Ortho PT, Recreational swim classes at [facility]  - Current treatment: none  - Diagnostic Testing: Cervical MRI 2/22/25: IMPRESSION: Multilevel cervical spondylitic degenerative change with mild canal stenosis. Multilevel moderate foraminal narrowing, unchanged.       Objective     LE MMT  - R Hip Flexion: 4-/5  L Hip Flexion: 3+/5  - R Hip Extension: 4/5  L Hip Extension: 4/5  - R Hip Abduction: 4-/5  L Hip Abduction: 4-/5  - R Hip Adduction: 4/5  L Hip Adduction: 4/5  - R Knee Extension: 4/5  L Knee Extension: 4/5  - R Knee Flexion: 4/5  L Knee Flexion: 4/5  - R Ankle DF: 4/5   L Ankle DF: 4/5  - R Ankle PF: 4/5   L Ankle PF: 4/5    Sensation  - Light touch: intact    Coordination  - Heel to Shin: intact  - Alternate Toe Taps: intact  - Finger to Nose: intact    Gait  -  Abnormalities: mild bilateral trendelenburg, dec L stance time, asymmetrical step length, decreased ana           Outcome Measures Initial Eval  2/24/25 & 3/13/25 PN   4/1/25       5xSTS 13.31 sec, no UE 13.03 sec, 0 UE       TUG  - Regular  - Cognitive   11.15 sec, with SPC   11.50 sec, with SPC   9.85 sec, no AD  8.66 sec, no AD       10 meter 0.81 m/s, with SPC 0.78 m/s w. SPC       FGA 17/30 24/30       DGI 14/24 19/24       mCTSIB  - FTEO (firm)  - FTEC (firm)  - FTEO (foam)  - FTEC (foam)   30 sec  30 sec  30 sec  30 sec        6MWT 830 ft w/ SPC 975ft, w/ SPC       ABC                             Precautions:   Past Medical History:   Diagnosis Date    Arthritis     Asthma     Back pain     Chronic pain disorder     hips into the legs    Colon polyp     DVT (deep venous thrombosis) ([facility]) 2006    Gastric bypass status for obesity     yarelis en y    GERD (gastroesophageal reflux disease)     Hiatal hernia     History of transfusion 2006    after gastric bypass surgery, no reactions    Hypertension     Irritable bowel syndrome     Kidney stone     Muscle weakness     bilateral legs    Numbness and tingling     bilateral feet    Pneumonia     Sacroiliitis ([facility]) 5/15/2018    Spinal stenosis     Tinnitus     occassionally    Vertigo     Wears glasses

## 2025-04-03 ENCOUNTER — OFFICE VISIT (OUTPATIENT)
Facility: CLINIC | Age: 70
End: 2025-04-03
Payer: MEDICARE

## 2025-04-03 DIAGNOSIS — M48.02 CERVICAL SPINAL STENOSIS: Primary | ICD-10-CM

## 2025-04-03 DIAGNOSIS — R26.89 OTHER ABNORMALITIES OF GAIT AND MOBILITY: ICD-10-CM

## 2025-04-03 DIAGNOSIS — R42 DYSEQUILIBRIUM: ICD-10-CM

## 2025-04-03 DIAGNOSIS — R26.89 IMBALANCE: ICD-10-CM

## 2025-04-03 DIAGNOSIS — M47.12 MYELOPATHY DUE TO CERVICAL SPONDYLOSIS: ICD-10-CM

## 2025-04-03 PROCEDURE — 97110 THERAPEUTIC EXERCISES: CPT | Performed by: PHYSICAL THERAPIST

## 2025-04-03 PROCEDURE — 97112 NEUROMUSCULAR REEDUCATION: CPT | Performed by: PHYSICAL THERAPIST

## 2025-04-03 NOTE — PROGRESS NOTES
"Daily Note   POC expires Unit limit Auth Expiration date PT/OT + Visit Limit? Co-Insurance   25 NA NA BOMN Yes and $0 Co-pay                                 Today's date: 4/3/2025  Patient name: Fariba Brunson  : 1955  MRN: [id number]  Referring provider: Jose C Serrano*  Dx:   Encounter Diagnosis     ICD-10-CM    1. Cervical spinal stenosis  M48.02       2. Dysequilibrium  R42       3. Myelopathy due to cervical spondylosis  M47.12       4. Imbalance  R26.89       5. Other abnormalities of gait and mobility  R26.89                      Subjective: Patient reports she wants to be able to do calf raises      Objective: See treatment diary below    TE:   - Eccentric calf raises: 20x ea  - Standing trunk twist: 20x 5# TT    NMR:  - Standing marchinx ea  - FWD step ups: 10x 8\" step (LLE), 10x 4\" step (RLE)  - LAT step up: 10x ea, 4\" step  - Sidestepping: 10 laps x 10 ft  - FWD/BWD marching: 10 laps x 10 ft          Assessment: Patient tolerated treatment well. Began patient with eccentric calf raises, focusing on improving gastroc strength so that she can complete concentric calf raises. Standing balance exercises, focusing on maintaining upright posture and recruiting proximal hip musculature to maintain stability. Occasional UE support utilized to prevent LOB. Patient will benefit from continued skilled OPPT services to improve tolerance to exercise, balance confidence, and facilitate return to recreational activity to promote improved QoL.      Plan: Continue per plan of care.          Outcome Measures Initial Eval  25 & 3/13/25 PN   25       5xSTS 13.31 sec, no UE 13.03 sec, 0 UE       TUG  - Regular  - Cognitive   11.15 sec, with SPC   11.50 sec, with SPC   9.85 sec, no AD  8.66 sec, no AD       10 meter 0.81 m/s, with SPC 0.78 m/s w. SPC       FGA        DGI        mCTSIB  - FTEO (firm)  - FTEC (firm)  - FTEO (foam)  - FTEC (foam)   30 sec  30 " sec  30 sec  30 sec        6MWT 830 ft w/ SPC 975ft, w/ SPC       ABC

## 2025-04-09 ENCOUNTER — RA CDI HCC (OUTPATIENT)
Dept: OTHER | Facility: HOSPITAL | Age: 70
End: 2025-04-09

## 2025-04-09 NOTE — PROGRESS NOTES
HCC coding opportunities       Chart reviewed, no opportunity found: CHART REVIEWED, NO OPPORTUNITY FOUND        Patients Insurance        Commercial Insurance: [employer]

## 2025-04-16 ENCOUNTER — OFFICE VISIT (OUTPATIENT)
Dept: FAMILY MEDICINE CLINIC | Facility: CLINIC | Age: 70
End: 2025-04-16
Payer: MEDICARE

## 2025-04-16 VITALS
SYSTOLIC BLOOD PRESSURE: 128 MMHG | WEIGHT: 249 LBS | TEMPERATURE: 99 F | RESPIRATION RATE: 20 BRPM | HEART RATE: 100 BPM | BODY MASS INDEX: 42.51 KG/M2 | DIASTOLIC BLOOD PRESSURE: 78 MMHG | HEIGHT: 64 IN

## 2025-04-16 DIAGNOSIS — R73.01 IMPAIRED FASTING GLUCOSE: ICD-10-CM

## 2025-04-16 DIAGNOSIS — K21.9 GASTROESOPHAGEAL REFLUX DISEASE WITHOUT ESOPHAGITIS: ICD-10-CM

## 2025-04-16 DIAGNOSIS — M54.12 CERVICAL RADICULOPATHY: ICD-10-CM

## 2025-04-16 DIAGNOSIS — R32 URINARY INCONTINENCE, UNSPECIFIED TYPE: Primary | ICD-10-CM

## 2025-04-16 DIAGNOSIS — Z13.6 SCREENING FOR CARDIOVASCULAR CONDITION: ICD-10-CM

## 2025-04-16 DIAGNOSIS — I10 ESSENTIAL HYPERTENSION: ICD-10-CM

## 2025-04-16 DIAGNOSIS — M54.16 LUMBAR RADICULOPATHY: ICD-10-CM

## 2025-04-16 PROCEDURE — 99214 OFFICE O/P EST MOD 30 MIN: CPT | Performed by: NURSE PRACTITIONER

## 2025-04-16 PROCEDURE — G2211 COMPLEX E/M VISIT ADD ON: HCPCS | Performed by: NURSE PRACTITIONER

## 2025-04-16 NOTE — PROGRESS NOTES
"Name: Fariba Brunson      : 1955      MRN: [id number]  Encounter Provider: JACINTO Velasquez  Encounter Date: 2025   Encounter department: [facility]  :  Assessment & Plan  Urinary incontinence, unspecified type  Referral provided per pt request.   Orders:  •  Ambulatory Referral to Physical Therapy; Future    Lumbar radiculopathy  Reviewed previous lumbar CT with multilevel DDD.  If symptoms worsen, will need dedicated PT and MRI.       Essential hypertension  Stable on current medication       Gastroesophageal reflux disease without esophagitis  She has a history of aspiration pneumonia.  Discussed importance ot taking PPI routinely        Cervical radiculopathy  Currently in PT       Impaired fasting glucose  A1c ordered   Orders:  •  Comprehensive metabolic panel; Future  •  Lipid Panel with Direct LDL reflex; Future  •  Hemoglobin A1C; Future    Screening for cardiovascular condition    Orders:  •  Comprehensive metabolic panel; Future  •  Lipid Panel with Direct LDL reflex; Future  •  Hemoglobin A1C; Future           History of Present Illness   Here today for follow up  Has been having left leg numbness, tingling.  Feels like there is a golf ball stuck in between her toes.  Seeing a chiropractor for red light therapy.  Also doing an at home nerve stimulator and swelling has improved.  She is in PT for her neck and imbalance.  She is swimming at the [employer] 2-3 days per week.    Having issues with urinary incontinence- would like a referral for pelvic floor therapy       Review of Systems   Constitutional: Negative.    Genitourinary:         See HPI   Musculoskeletal:  Positive for back pain.       Objective   /78   Pulse 100   Temp 99 °F (37.2 °C)   Resp 20   Ht 5' 4\" (1.626 m)   Wt 113 kg (249 lb)   BMI 42.74 kg/m²      Physical Exam  Vitals and nursing note reviewed.   Constitutional:       General: She is not in acute distress.     Appearance: Normal " appearance.   HENT:      Head: Normocephalic and atraumatic.   Eyes:      Conjunctiva/sclera: Conjunctivae normal.   Neck:      Vascular: No carotid bruit.   Cardiovascular:      Rate and Rhythm: Normal rate and regular rhythm.      Pulses: Normal pulses.      Heart sounds: Normal heart sounds. No murmur heard.  Pulmonary:      Effort: Pulmonary effort is normal.      Breath sounds: Normal breath sounds.   Musculoskeletal:      Right lower leg: Edema present.      Left lower leg: Edema present.   Skin:     General: Skin is warm and dry.   Neurological:      Mental Status: She is alert.   Psychiatric:         Mood and Affect: Mood normal.         Behavior: Behavior normal.

## 2025-05-02 ENCOUNTER — OFFICE VISIT (OUTPATIENT)
Facility: CLINIC | Age: 70
End: 2025-05-02
Payer: MEDICARE

## 2025-05-02 DIAGNOSIS — M48.02 CERVICAL SPINAL STENOSIS: Primary | ICD-10-CM

## 2025-05-02 DIAGNOSIS — R26.89 OTHER ABNORMALITIES OF GAIT AND MOBILITY: ICD-10-CM

## 2025-05-02 DIAGNOSIS — R42 DYSEQUILIBRIUM: ICD-10-CM

## 2025-05-02 DIAGNOSIS — R26.89 IMBALANCE: ICD-10-CM

## 2025-05-02 DIAGNOSIS — M47.12 MYELOPATHY DUE TO CERVICAL SPONDYLOSIS: ICD-10-CM

## 2025-05-02 PROCEDURE — 97112 NEUROMUSCULAR REEDUCATION: CPT | Performed by: PHYSICAL THERAPIST

## 2025-05-02 NOTE — PROGRESS NOTES
"Daily Note   POC expires Unit limit Auth Expiration date PT/OT + Visit Limit? Co-Insurance   25 NA NA BOMN Yes and $0 Co-pay                                 Today's date: 2025  Patient name: Fariba Brunson  : 1955  MRN: [id number]  Referring provider: Jose C Serrano  Dx:   Encounter Diagnosis     ICD-10-CM    1. Cervical spinal stenosis  M48.02       2. Dysequilibrium  R42       3. Myelopathy due to cervical spondylosis  M47.12       4. Imbalance  R26.89       5. Other abnormalities of gait and mobility  R26.89                        Subjective: Patient reports that she recently started red light therapy      Objective: See treatment diary below    TE:   - Seated hip add iso: 20x 5 sec holds    NMR: (2# AW)  - STS: 20x  - Standing marchinx  - Sidestepping: 15 laps  - FWD hurdles: 6 laps x 6\" hurldes  - LAT nicanor: 20x 6\" nicanor  - FWD/BWD over 6\" nicanor to foam pad: 20x          Assessment: Patient tolerated treatment well. Continued to challenge patient dynamic balance, utilizing ankle weights to increase resistance and provide patient with additional feedback. During standing exercises, patient reports increased hip pain which is her main limiting factor today. Occasional 1 UE support used to maintain balance during standing exercises on unstable foam. Patient will benefit from continued skilled OPPT services to improve tolerance to exercise, balance confidence, and facilitate return to recreational activity to promote improved QoL.      Plan: Continue per plan of care.          Outcome Measures Initial Eval  25 & 3/13/25 PN   25       5xSTS 13.31 sec, no UE 13.03 sec, 0 UE       TUG  - Regular  - Cognitive   11.15 sec, with SPC   11.50 sec, with SPC   9.85 sec, no AD  8.66 sec, no AD       10 meter 0.81 m/s, with SPC 0.78 m/s w. SPC       FGA        DGI        mCTSIB  - FTEO (firm)  - FTEC (firm)  - FTEO (foam)  - FTEC (foam)   30 sec  30 " sec  30 sec  30 sec        6MWT 830 ft w/ SPC 975ft, w/ SPC       ABC

## 2025-05-09 ENCOUNTER — OFFICE VISIT (OUTPATIENT)
Facility: CLINIC | Age: 70
End: 2025-05-09
Payer: MEDICARE

## 2025-05-09 DIAGNOSIS — R42 DYSEQUILIBRIUM: ICD-10-CM

## 2025-05-09 DIAGNOSIS — R26.89 OTHER ABNORMALITIES OF GAIT AND MOBILITY: ICD-10-CM

## 2025-05-09 DIAGNOSIS — M48.02 CERVICAL SPINAL STENOSIS: Primary | ICD-10-CM

## 2025-05-09 DIAGNOSIS — M47.12 MYELOPATHY DUE TO CERVICAL SPONDYLOSIS: ICD-10-CM

## 2025-05-09 DIAGNOSIS — R26.89 IMBALANCE: ICD-10-CM

## 2025-05-09 PROCEDURE — 97110 THERAPEUTIC EXERCISES: CPT | Performed by: PHYSICAL THERAPIST

## 2025-05-09 PROCEDURE — 97112 NEUROMUSCULAR REEDUCATION: CPT | Performed by: PHYSICAL THERAPIST

## 2025-05-09 NOTE — PROGRESS NOTES
"Daily Note   POC expires Unit limit Auth Expiration date PT/OT + Visit Limit? Co-Insurance   25 NA NA BOMN Yes and $0 Co-pay                                 Today's date: 2025  Patient name: Fariba Brunson  : 1955  MRN: [id number]  Referring provider: Jose C Serrano*  Dx:   Encounter Diagnosis     ICD-10-CM    1. Cervical spinal stenosis  M48.02       2. Dysequilibrium  R42       3. Myelopathy due to cervical spondylosis  M47.12       4. Imbalance  R26.89       5. Other abnormalities of gait and mobility  R26.89                        Subjective: Patient reports that she was having bad reflux last night      Objective: See treatment diary below    TE:   - Seated hip add iso: 20x 5 sec holds  - Seated HS stretch: 10x 6 sec holds, 2 sets ea  - Seated gastroc stretch: 10x 10 sec holds, 1 set ea    NMR:   - STS: 20x  - FWD hurdles: 6 laps x 6\" hurdles  - LAT nicanor: 4 laps x 6\" hurdles  - BWD hurdles: 3 laps x 6\" hurdles      Access Code: [email or web address]  URL: [email or web address]  Date: 2025  Prepared by: Shoaib Aguila Jr.    Exercises  - Seated Hamstring Stretch with Chair  - 2 x daily - 7 x weekly - 1 sets - 3 reps - 30 hold  - Seated Gastroc Stretch with Strap  - 2 x daily - 7 x weekly - 1 sets - 3 reps - 30 hold      Assessment: Patient tolerated treatment well. Continued to challenge patient dynamic balance, utilizing hurdles in forward, lateral, and backwards directions, encouraging patient to complete without UE support. Seated LE stretching completed today per patient request and provided patient with HEP to complete at home. Patient will benefit from continued skilled OPPT services to improve tolerance to exercise, balance confidence, and facilitate return to recreational activity to promote improved QoL.      Plan: Continue per plan of care.          Outcome Measures Initial Eval  25 & 3/13/25 PN   25       5xSTS 13.31 sec, no UE 13.03 sec, 0 UE     "   TUG  - Regular  - Cognitive   11.15 sec, with SPC   11.50 sec, with SPC   9.85 sec, no AD  8.66 sec, no AD       10 meter 0.81 m/s, with SPC 0.78 m/s w. SPC       FGA 17/30 24/30       DGI 14/24 19/24       mCTSIB  - FTEO (firm)  - FTEC (firm)  - FTEO (foam)  - FTEC (foam)   30 sec  30 sec  30 sec  30 sec        6MWT 830 ft w/ SPC 975ft, w/ SPC       ABC

## 2025-05-12 ENCOUNTER — EVALUATION (OUTPATIENT)
Facility: CLINIC | Age: 70
End: 2025-05-12
Attending: NURSE PRACTITIONER
Payer: MEDICARE

## 2025-05-12 DIAGNOSIS — R42 DYSEQUILIBRIUM: ICD-10-CM

## 2025-05-12 DIAGNOSIS — R26.89 IMBALANCE: ICD-10-CM

## 2025-05-12 DIAGNOSIS — R26.89 OTHER ABNORMALITIES OF GAIT AND MOBILITY: ICD-10-CM

## 2025-05-12 DIAGNOSIS — M47.12 MYELOPATHY DUE TO CERVICAL SPONDYLOSIS: ICD-10-CM

## 2025-05-12 DIAGNOSIS — M48.02 CERVICAL SPINAL STENOSIS: Primary | ICD-10-CM

## 2025-05-12 PROCEDURE — 97112 NEUROMUSCULAR REEDUCATION: CPT | Performed by: PHYSICAL THERAPIST

## 2025-05-12 PROCEDURE — 97530 THERAPEUTIC ACTIVITIES: CPT | Performed by: PHYSICAL THERAPIST

## 2025-05-12 NOTE — PROGRESS NOTES
PT Re-Evaluation          POC expires Unit limit Auth Expiration date PT/OT + Visit Limit? Co-Insurance   25 NA NA BOMN Yes and $0 Co-pay                                            Today's date: 2025  Patient name: Fariba Brunson  : 1955  MRN: [id number]  Referring provider: Jose C Serrano*  Dx:   Encounter Diagnosis     ICD-10-CM    1. Cervical spinal stenosis  M48.02       2. Dysequilibrium  R42       3. Myelopathy due to cervical spondylosis  M47.12       4. Imbalance  R26.89       5. Other abnormalities of gait and mobility  R26.89               Assessment  Assessment details: Patient is a 69 y.o. Female who has been attending skilled outpatient PT to address complaints of decreased tolerance to activity, and imbalance which are impacting her participation with recreational activity. Past medical history significant for T2DM, gastric bypass surgery, and cervical spinal stenosis. Since her most recent progress note patient displays improvements with her 5xSTS, TUG, and 10 MWT. Improvements with these objective measures suggest improvements in functional LE strength, gait speed, and working toward lowering her risk of falls. Patient scores below normative values for her FGA and 6 MWT suggesting her largest deficits in functional endurance and dynamic balance, suggesting she is at HIGH risk of falls. Plan to continue to focus on dynamic balance exercises, right hip strengthening, and may trial interval training to work on functional endurance. Patient will continue to benefit from skilled PT services to continue to improve her functional strength and endurance, improve her balance, and reduce her risk of falls to maximize her level of safety at home and in the community.       Impairments: Abnormal coordination, Abnormal gait, Abnormal muscle tone, Abnormal or restricted ROM, Activity intolerance, Impaired  balance, Impaired physical strength, Lacks appropriate HEP, Poor posture, Poor body mechanics, Pain with function, Safety issue, Weight-bearing intolerance, Abnormal movement, Difficulty understanding, Abnormal muscle firing  Understanding of Dx/Px/POC: Good  Prognosis: Good    Patient verbalized understanding of POC.         Please contact me if you have any questions or recommendations. Thank you for the referral and the opportunity to share in Fariba Brunson's care.        Plan  Plan details: balance, endurance training, somatosensory awareness, transition to gym program  Patient would benefit from: PT Eval and Skilled PT  Planned modality interventions: Biofeedback, Cryotherapy, TENS, Thermotherapy  Planned therapy interventions: Abdominal trunk stabilization, ADL training, Balance, Balance/WB training, Breathing training, Body mechanics training, Coordination, Functional ROM exercises, Gait training, HEP, Joint Mobilization, Manual Therapy, Garcia taping, Motor coordination training, Neuromuscular re-education, Patient education, Postural training, Strengthening, Stretching, Therapeutic activities, Therapeutic exercises, Therapeutic training, Transfer training, Activity modification, Work reintegration  Frequency: 2x/wk-3x/wk  Duration in weeks: 12 weeks  Plan of Care beginning date: 5/12/25  Plan of Care expiration date: 12 weeks -7/28/25  Treatment plan discussed with: Patient       Goals  Short Term Goals (4 weeks):    - Patient will improve time on TUG by 2.9 seconds to facilitate improved safety in all ambulation-MET  - Patient will be independent in basic HEP 2-3 weeks-MET  - Patient will improve 5xSTS score by 2.3 seconds to promote improved LE functional strength needed for ADLs-MET  - Patient will complete 6MWT to assess cardiovascular endurance and tolerance to recreational activities-MET    Long Term Goals (12 weeks):   - Patient will be independent in a comprehensive home exercise  program  - Patient will improve scoring on DGI by 2.6 points to progress safety  - Patient will improve gait speed by 0.18 m/s to improve safety with community ambulation  - Patient will improve scoring on FGA by 4 points to progress safety with dynamic tasks  - Patient will be able to demonstrate HT in gait without veering  - Patient will improve 6 Minute Walk Test score by 190 feet to promote improved cardiovascular endurance  - Patient will report 50% reduction in near falls in order to improve safety with functional tasks and reduce his risk for falls  - Patient will report going on walks at least 3 days per week to promote independence and improved cardiovascular endurance  - Patient will be able to ascend/descend stairs reciprocally with 1 UE assist to promote independence and safety with ADLs- MET  - Patient will report 50% reduction in near falls when ambulating on uneven terrain      Cut off score    All date taken from [facility] Neuro Section or Rehab Measures      Sosa/56  MDC: 6 pts  Age Norms:  60-69: M - 55   F - 55  70-79: M - 54   F - 53  80-89: M - 53   F - 50 5xSTS: Soy et al 2010  MDC: 2.3 sec  Age Norms:  60-69: 11.4 sec  70-79: 12.6 sec  80-89: 14.8 sec   TUG  MDC: 4.14 sec  Cut off score:  >13.5 sec community dwelling adults  >32.2 frail elderly  <20 I for basic transfers  >30 dependent on transfers 10 Meter Walk Test: Fransisco al 2011  MDC: 0.18 m/s  20-29: M - 1.35 m   F - 1.34 m  30-39: M - 1.43 m   F - 1.34 m  40-49: M - 1.43 m   F - 1.39 m  50-59: M - 1.43 m   F - 1.31 m  60-69: M - 1.34 m   F - 1.24 m  70-79: M - 1.26 m   F - 1.13 m  80-89: M - 0.97 m   F - 0.94 m    Household Ambulator < 0.4 m/s  Limited Community Ambulator 0.4 - 0.8 m/s  Community Ambulator 0.8 - 1.2 m/s  Safely cross the street > 1.2 m/s   FGA  MCID: 4 pts  Geriatrics/community < 22/30 fall risk  Geriatrics/community < 20/30 unexplained falls    DGI  MDC: vestibular - 4 pts  MDC: geriatric/community - 3  pts  Falls risk <19/24 mCTSIB  Norm: 20-60 yrs  Eyes open firm: norm sway 0.21-0.48  Eyes closed firm: norm sway 0.48-0.99  Eyes open foam: norm sway 0.38-0.71  Eyes closed foam: norm sway 0.70-2.22   6 Minute Walk Test  MDC: 190.98 ft  MCID: 164 ft    Age Norms  60-69: M - 1876 ft (571.80 m)  F - 1765 ft (537.98 m)  70-79: M - 1729 ft (527.00 m)  F - 1545 ft (470.92 m)  80-89: M - 1368 ft (416.97 m)  F - 1286 ft (391.97 m) ABC: Hilary & Nika, 2003  <67% increased risk for falls   Upton-Rachelle Monofilaments  Evaluator Size:        Force (grams):          Hand/Dorsal Thresholds:        Plantar Thresholds:  - 1.65                       - 0.008                       - Normal                                 - Normal  - 2.36                       - 0.02                         - Normal                                 - Normal  - 2.44                       - 0.04                         - Normal                                 - Normal  - 2.83                       - 0.07                         - Normal                                 - Normal  - 3.22                       - 0.16                         - Diminished light touch          - Normal  - 3.61                       - 0.40                         - Diminished light touch          - Normal  - 3.84                       - 0.60                         - Diminished protective           - Diminished light touch  - 4.08                       - 1.00                         - Diminished protective           - Diminished light touch  - 4.17                       - 1.40                         - Diminished protective           - Diminished light touch  - 4.31                       - 2.00                         - Diminished protective           - Diminished light touch  - 4.56                       - 4.00                         - Loss of protective sense      - Diminished protective  - 4.74                       - 6.00                         - Loss of  protective sense      - Diminished protective  - 4.93                       - 8.00                         - Loss of protective sense      - Diminished protective  - 5.07                       - 10.0                         - Loss of protective sense     - Loss of protective sense  - 5.18                       - 15.0                         - Loss of protective sense     - Loss of protective sense  - 5.46                       - 26.0                         - Loss of protective sense     - Loss of protective sense  - 5.88                       - 60.0                         - Loss of protective sense     - Loss of protective sense  - 6.10                       - 100                          - Loss of protective sense     - Loss of protective sense  - 6.45                       - 180                          - Loss of protective sense     - Loss of protective sense  - 6.65                       - 300                          - Deep pressure sense only  - Deep pressure sense only         Subjective    History of Present Illness  - Mechanism of injury: Patient presents to balance center to address complaints of decreased endurance, balance, and function. She states she enjoys riding her motorcycle and attending [employer] swim classes however, participation with recreational activities have been limited due to fear avoidance behavior. Patient states she has noticed a functional decline and subsequent increase in body weight which she has seen weight management for. She denies and falls but states she feels unsteady when walking and is worried about falling. She primarily uses a SPC in her community but will ambulate without AD in her house hold and for short distances. Patient reports she has tingling and sensation changes in BL feet however, has not yet received formal neuropathy diagnosis. Patient would like to improve balance confidence, endurance, and tolerance to activity to maximize her function.     Updated 4/1/25:  Patient states she is very compliant with home exercise program and visits the pool 2-3x/ week where she'll perform exercises as well. Patient states she also got a vibration plate for home which has been helping. She states she hasn't noticed much changes since commencing therapy although participation has been limited. Patient states she would like to improve her aerobic tolerance but is hesitant because of her mobility.     - Primary AD: SPC  - Assist level at home: independent    Patient goal: Improve tolerance, maximize independence, function, and mobility    Pain  - NA    Social Support  - Steps to enter house: stairs to landing pad  - Stairs in house: 1 flight   - Lives in: multi-level home stays on 1st level  - Lives with: son    - Employment status:   - Hand dominance: RHD    Treatments  - Previous treatment: Ortho PT, Recreational swim classes at [facility]  - Current treatment: none  - Diagnostic Testing: Cervical MRI 2/22/25: IMPRESSION: Multilevel cervical spondylitic degenerative change with mild canal stenosis. Multilevel moderate foraminal narrowing, unchanged.       Objective     LE MMT  - R Hip Flexion: 4-/5  L Hip Flexion: 3+/5  - R Hip Extension: 4/5  L Hip Extension: 4/5  - R Hip Abduction: 4-/5  L Hip Abduction: 4-/5  - R Hip Adduction: 4/5  L Hip Adduction: 4/5  - R Knee Extension: 4/5  L Knee Extension: 4/5  - R Knee Flexion: 4/5  L Knee Flexion: 4/5  - R Ankle DF: 4/5   L Ankle DF: 4/5  - R Ankle PF: 4/5   L Ankle PF: 4/5    Sensation  - Light touch: intact    Coordination  - Heel to Shin: intact  - Alternate Toe Taps: intact  - Finger to Nose: intact    Gait  - Abnormalities: mild bilateral trendelenburg, dec L stance time, asymmetrical step length, decreased ana           Outcome Measures Initial Eval  2/24/25 & 3/13/25 PN   4/1/25 PN  5/12/25      5xSTS 13.31 sec, no UE 13.03 sec, 0 UE 10.88 sec      TUG  - Regular  - Cognitive   11.15 sec, with SPC   11.50 sec, with SPC    9.85 sec, no AD  8.66 sec, no AD   8.44 sec      10 meter 0.81 m/s, with SPC 0.78 m/s w. SPC 0.82 m/s w/ SPC      FGA 17/30 24/30 20/30      DGI 14/24 19/24       mCTSIB  - FTEO (firm)  - FTEC (firm)  - FTEO (foam)  - FTEC (foam)   30 sec  30 sec  30 sec  30 sec        6MWT 830 ft w/ SPC 975ft, w/  ft w/ SPC      ABC   58.75%                      NMR:  - Sidestepping w/o UE: 10 laps x 10 ft  - Standing marching: 20x 5 sec hold, 1 UE    Precautions:   Past Medical History:   Diagnosis Date    Arthritis     Asthma     Back pain     Chronic pain disorder     hips into the legs    Colon polyp     DVT (deep venous thrombosis) ([facility]) 2006    Gastric bypass status for obesity     yarelis en y    GERD (gastroesophageal reflux disease)     Hiatal hernia     History of transfusion 2006    after gastric bypass surgery, no reactions    Hypertension     Irritable bowel syndrome     Kidney stone     Muscle weakness     bilateral legs    Numbness and tingling     bilateral feet    Pneumonia     Sacroiliitis ([facility]) 5/15/2018    Spinal stenosis     Tinnitus     occassionally    Vertigo     Wears glasses

## 2025-05-16 ENCOUNTER — TELEPHONE (OUTPATIENT)
Age: 70
End: 2025-05-16

## 2025-05-16 NOTE — TELEPHONE ENCOUNTER
Contacted patient off of [facility]  to verify needs of services in attempts to offer patient an appointment. LVM for patient to contact intake dept  in regards to updating wait list with preferences at [phone]. If still interested update wait list or offer additional resource packet. 1st attempt.

## 2025-05-19 ENCOUNTER — OFFICE VISIT (OUTPATIENT)
Facility: CLINIC | Age: 70
End: 2025-05-19
Attending: NURSE PRACTITIONER
Payer: MEDICARE

## 2025-05-19 DIAGNOSIS — M48.02 CERVICAL SPINAL STENOSIS: ICD-10-CM

## 2025-05-19 DIAGNOSIS — M47.12 MYELOPATHY DUE TO CERVICAL SPONDYLOSIS: ICD-10-CM

## 2025-05-19 DIAGNOSIS — R26.89 IMBALANCE: ICD-10-CM

## 2025-05-19 DIAGNOSIS — R42 DYSEQUILIBRIUM: Primary | ICD-10-CM

## 2025-05-19 PROCEDURE — 97110 THERAPEUTIC EXERCISES: CPT

## 2025-05-19 PROCEDURE — 97112 NEUROMUSCULAR REEDUCATION: CPT

## 2025-05-29 ENCOUNTER — OFFICE VISIT (OUTPATIENT)
Facility: CLINIC | Age: 70
End: 2025-05-29
Attending: NURSE PRACTITIONER
Payer: MEDICARE

## 2025-05-29 ENCOUNTER — EVALUATION (OUTPATIENT)
Dept: PHYSICAL THERAPY | Facility: CLINIC | Age: 70
End: 2025-05-29
Payer: MEDICARE

## 2025-05-29 DIAGNOSIS — G60.9 IDIOPATHIC PERIPHERAL NEUROPATHY: ICD-10-CM

## 2025-05-29 DIAGNOSIS — M47.12 MYELOPATHY DUE TO CERVICAL SPONDYLOSIS: ICD-10-CM

## 2025-05-29 DIAGNOSIS — M54.16 LUMBAR RADICULOPATHY: ICD-10-CM

## 2025-05-29 DIAGNOSIS — K59.00 CONSTIPATION, UNSPECIFIED CONSTIPATION TYPE: ICD-10-CM

## 2025-05-29 DIAGNOSIS — R26.89 IMBALANCE: ICD-10-CM

## 2025-05-29 DIAGNOSIS — R26.89 OTHER ABNORMALITIES OF GAIT AND MOBILITY: ICD-10-CM

## 2025-05-29 DIAGNOSIS — R42 DYSEQUILIBRIUM: Primary | ICD-10-CM

## 2025-05-29 DIAGNOSIS — N39.3 STRESS INCONTINENCE OF URINE: Primary | ICD-10-CM

## 2025-05-29 DIAGNOSIS — M48.02 CERVICAL SPINAL STENOSIS: ICD-10-CM

## 2025-05-29 PROCEDURE — 97112 NEUROMUSCULAR REEDUCATION: CPT | Performed by: PHYSICAL THERAPIST

## 2025-05-29 PROCEDURE — 97530 THERAPEUTIC ACTIVITIES: CPT

## 2025-05-29 PROCEDURE — 97162 PT EVAL MOD COMPLEX 30 MIN: CPT

## 2025-05-29 PROCEDURE — 97110 THERAPEUTIC EXERCISES: CPT | Performed by: PHYSICAL THERAPIST

## 2025-05-29 NOTE — PROGRESS NOTES
PT Evaluation     Today's date: 2025  Patient name: Fariba Brunson  : 1955  MRN: [id number]  Referring provider: Jose C Serrano  Dx:   Encounter Diagnosis     ICD-10-CM    1. Stress incontinence of urine  N39.3       2. Constipation, unspecified constipation type  K59.00       3. Lumbar radiculopathy  M54.16       4. Idiopathic peripheral neuropathy  G60.9                      Assessment  Impairments: abnormal muscle firing, abnormal muscle tone, activity intolerance, impaired physical strength, lacks appropriate home exercise program, poor posture  and poor body mechanics    Assessment details:   CASE SUMMARY:   Fariba Brunson is a 69 y.o. year old female who reports onset of symptoms ~  urinary incontinence, constipation, lumbar radiculopathy and peripheral neuropathy.   Patient describes symptoms as: embarrassing, expensive, annoying, causing scheduling issues .  Symptoms are : constant.  Fariba is limited in the following activities: working without wearing pads, need to be near rest room, walking without cane.      PMHx includes: See chart for full details with medications.     Patient's clinical presentation is consistent with their referring diagnosis of: Stress incontinence of urine  (primary encounter diagnosis)    Constipation, unspecified constipation type    Lumbar radiculopathy    Idiopathic peripheral neuropathy  .     POC was discussed and agreed upon with patient.  Patient was educated on: pelvic floor anatomy, Importance of body mechanics and ergonomics in regards to protecting against activities which increase IAP and pressure,  and PT exam and course of treatment.  Patient vocalized a good understanding of  POC and HEP issued. Patient would benefit from skilled physical therapy services to address their aforementioned functional limitations and progress towards prior level of function and independence with home exercise program.      Pelvic floor verbal  consent and written consent signed and in chart- LSR 5/29/25  Patient deferred second person in room: YES         Understanding of Dx/Px/POC: good     Prognosis: good    Goals  STG (3 weeks)  Patient will be independent with HEP  Patient will demonstrate ability to properly fill out bowel/ bladder log  Patient will self report sxs decrease by 25%  Patient will demonstrate the ability to perform kegel and downtraining    LTG (8 weeks)  Patient will be independent in comprehensive HEP  Patient will improve pelvic health metric by MDIC  Patient will self report sxs decrease by 75%  Patient will demonstrate the ability to perform sustained kegels in functional positioning     Plan  Patient would benefit from: skilled physical therapy  Planned modality interventions: biofeedback, cryotherapy, TENS, ultrasound and hydrotherapy    Planned therapy interventions: joint mobilization, manual therapy, neuromuscular re-education, patient education, postural training, strengthening, stretching, therapeutic activities, therapeutic exercise, functional ROM exercises, flexibility, graded activity, home exercise program, abdominal trunk stabilization, Garcia taping, kinesiology taping, breathing training and dry needling    Frequency: 1x week  Duration in weeks: 12  Plan of Care beginning date: 5/29/2025  Plan of Care expiration date: 8/21/2025  Treatment plan discussed with: patient    PT Pelvic Floor Subjective:   History of Present Illness:   Hx of spinal fusion. 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 Hx of messed up gastric bypass and has difficulty balancing nutrition. Wearing pad for 4.5 years. Sometimes can't control bowels. Doesn't want to start spending money on depends. It is really embarrassing. Has to concentrate on emptying bladder. Has to do spinal wave. Has adhesions through out abdomen. Shake plate helped with inflammation. Numbness into bilateral feet. Sleeps elevated. Doesn't have  "stomach per say due to gastric bypass. L4-L5 fused due to breaking on horse ride- surgery 2019.         Recurrent probem    Quality of life: good    Social Support:     Lives in:  Multiple-level home    Lives with:  Adult children    Relationship status: never     Work status: employed part time    Life stress level: 6    History of Depression: yes    has lost a lot of family membersPronouns: she/her  Hand dominance:  Right  Diet and Exercise:      A little bit of resistance training, likes being in pool- goes 2-3x per week  OB/ gyn History    Gestational History:     Prior Pregnancy: Yes      Number of prior pregnancies: 4    Number of term pregnancies: 2    Delivery Type:  section      Menstrual History:      Menopausal: menopause    Birth control: no contraception  Bladder Function:      Voiding Difficulties comments:     Urinary leakage: urine leakage    Urinary leakage aggravated by: coughing, bending and standing up    Fluid Intake Type:  Alcohol, coffee and water  Incontinence Management:     Pads/Diaper Use:  24 hours  Bowel Function:     Voiding DIfficulties: unfinished feeling after defecating and constipation      Bowel frequency: daily    [place] Stool Scale: type 4    Stool softener use: no stool softeners    Enema use: no enema    Uses \"squatty potty\": no Squatty Potty      Objective       Abdominal Assessment:      Abdominal Assessment: Not assessed at this time due to time constraints- next session               Insurance:  AMA/CMS Eval/ Re-eval POC expires PFDI Auth #/ Referral # Total    Start date  Expiration date Extension  Visit limitation?  PT only or  PT+OT? Co-Insurance   CMS 25 [id number]                                                                       AUTH #:  Date               Authed: Used                Remaining                   Precautions: spinal fusion          Date: 25         Session: IE         Manuals                                              "     Neuro Re-Ed          360 breathing          Diaphragmatic breathing          TA engagement          TA+ kegel          Kegel + SLR                                                                      Ther Ex          Kayla pose          Cat-cow          Lord of the half fish          Happy baby                                                  Ther Activity           Bowel and bladder log, DB, bladder                   Gait Training                              Modalities

## 2025-05-29 NOTE — PROGRESS NOTES
"Daily Note   POC expires Unit limit Auth Expiration date PT/OT + Visit Limit? Co-Insurance   25 NA NA BOMN Yes and $0 Co-pay                                 Today's date: 2025  Patient name: Fariba Brunson  : 1955  MRN: [id number]  Referring provider: Jose C Serrano  Dx:   Encounter Diagnosis     ICD-10-CM    1. Dysequilibrium  R42       2. Cervical spinal stenosis  M48.02       3. Myelopathy due to cervical spondylosis  M47.12       4. Imbalance  R26.89       5. Other abnormalities of gait and mobility  R26.89                      Subjective: Patient reports frustrations about diet, increased cortisol, and chronic pain.      Objective: See treatment diary below    TE:   - seated R HS stretch 20 sec x 3  - seated L and R piriformis/pigeon stretch 30 sec x 3  - seated R SKTC 20 sec x 3  - seated R hip adductor stretch (fold) 20 sec x 3  Pain reduced to 4/10      NMR:   - Patient education: PNE  - STS: 15 reps  - Sled pull (20#): 2 laps, 50 ft down/back  - Step ups (12\") w/ fwd lunge: 10 reps B/L, 0 UE      Access Code: [email or web address]  URL: [email or web address]  Date: 2025  Prepared by: Shoaib Aguila Jr.    Exercises  - Seated Hamstring Stretch with Chair  - 2 x daily - 7 x weekly - 1 sets - 3 reps - 30 hold  - Seated Gastroc Stretch with Strap  - 2 x daily - 7 x weekly - 1 sets - 3 reps - 30 hold    Access Code: [email or web address]  URL: [email or web address]  Date: 2025  Prepared by: Lavonne Ram    Exercises  - Seated Lumbar Extension  - 1 x daily - 7 x weekly - 1 sets - 3 reps - 2sec hold      Assessment: Patient tolerated treatment well with continued focus on LE strengthening and balance. Heavy education provided today regarding Pain Neuroscience Education and it's benefits on chronic symptoms and she was in good verbal understanding and agreement. Patient plans to start PNE group on  next week. She required occasional standing rest breaks " during endurance based task. Slowed movements during SLS activity depicted increased focus on promotion of neuromuscular control. No significant LoB today. Patient will benefit from continued skilled OPPT services to improve tolerance to exercise, balance confidence, and facilitate return to recreational activity to promote improved QoL.      Plan: Continue per plan of care.      Access Code: [email or web address]  URL: [email or web address]  Date: 05/19/2025  Prepared by: Lavonne Ram    Exercises  - Seated Hamstring Stretch  - 2 x daily - 7 x weekly - 1 sets - 3 reps - 20sec hold  - Seated Piriformis Stretch  - 2 x daily - 7 x weekly - 1 sets - 3 reps - 20sec hold  - Seated Hip Adductor Stretch  - 2 x daily - 7 x weekly - 1 sets - 3 reps - 20sec hold  - Seated Single Knee to Chest  - 2 x daily - 7 x weekly - 1 sets - 3 reps - 20sec hold  - Seated Hamstring Stretch (Mirrored)  - 2 x daily - 7 x weekly - 1 sets - 3 reps - 20sec hold  - Seated Piriformis Stretch (Mirrored)  - 2 x daily - 7 x weekly - 1 sets - 3 reps - 20sec hold  - Seated Hip Adductor Stretch (Mirrored)  - 2 x daily - 7 x weekly - 1 sets - 3 reps - 20sec hold  - Seated Hamstring Stretch (Mirrored)  - 2 x daily - 7 x weekly - 1 sets - 3 reps - 20sec hold    Outcome Measures Initial Eval  2/24/25 & 3/13/25 PN   4/1/25       5xSTS 13.31 sec, no UE 13.03 sec, 0 UE       TUG  - Regular  - Cognitive   11.15 sec, with SPC   11.50 sec, with SPC   9.85 sec, no AD  8.66 sec, no AD       10 meter 0.81 m/s, with SPC 0.78 m/s w. SPC       FGA 17/30 24/30       DGI 14/24 19/24       mCTSIB  - FTEO (firm)  - FTEC (firm)  - FTEO (foam)  - FTEC (foam)   30 sec  30 sec  30 sec  30 sec        6MWT 830 ft w/ SPC 975ft, w/ SPC       ABC

## 2025-06-03 ENCOUNTER — OFFICE VISIT (OUTPATIENT)
Dept: PHYSICAL THERAPY | Facility: CLINIC | Age: 70
End: 2025-06-03
Attending: NURSE PRACTITIONER
Payer: MEDICARE

## 2025-06-03 DIAGNOSIS — M54.16 LUMBAR RADICULOPATHY: ICD-10-CM

## 2025-06-03 DIAGNOSIS — N39.3 STRESS INCONTINENCE OF URINE: Primary | ICD-10-CM

## 2025-06-03 DIAGNOSIS — G60.9 IDIOPATHIC PERIPHERAL NEUROPATHY: ICD-10-CM

## 2025-06-03 PROCEDURE — 97112 NEUROMUSCULAR REEDUCATION: CPT

## 2025-06-03 NOTE — PROGRESS NOTES
Daily Note     Today's date: 6/3/2025  Patient name: Fariba Brunson  : 1955  MRN: [id number]  Referring provider: Jose C Serrano  Dx:   Encounter Diagnosis     ICD-10-CM    1. Stress incontinence of urine  N39.3       2. Lumbar radiculopathy  M54.16       3. Idiopathic peripheral neuropathy  G60.9           Start Time: 0800  Stop Time: 0855  Total time in clinic (min): 55 minutes    Subjective: Reports sxs are getting worse. Failed gastric bypass. Would like to lose weight.      Objective: See treatment diary below  P: 3/5  E: 6 sec  R: 6x    Assessment: Tolerated treatment well. Patient would benefit from continued PT in order to strengthen pelvic floor musculature in conjunction with accessory musculature. Demonstrated ability to perform PFC with fair ability. Able to coordinate quick flicks.Pain in to right glute with standing abd- hx of lumbar fusion.      Plan: Continue per plan of care.            Insurance:  AMA/CMS Eval/ Re-eval POC expires PFDI Auth #/ Referral # Total     Start date  Expiration date Extension  Visit limitation?  PT only or  PT+OT? Co-Insurance   CMS 25 167.67                                                                                                                                 AUTH #:  Date                           Authed: Used                             Remaining                               Precautions: spinal fusion              Date: 5/29/25  6/3/25             Session: IE  2             Manuals                                                                                         Neuro Re-Ed                 360 breathing                 Diaphragmatic breathing                 TA engagement                 TA+ kegel    15x             Kegel + ADD    20x              standing abd/ add + PFC    2*10 GTB              standing ball press into table                  SHILPI    2* 10              side glide R    10x peripheralizing              Side glide L  15x centralization        Sit to stand with PFC + TA  30x                                                      Ther Ex                 Kayla pose                 Cat-cow                 Lord of the half fish                 Happy baby                                                                                         Ther Activity                   Bowel and bladder log, DB, bladder                                 Gait Training                                                     Modalities                                                        Access Code: [email or web address]  URL: [email or web address]  Date: 05/19/2025  Prepared by: Lavonne Ram    Exercises  - Seated Hamstring Stretch  - 2 x daily - 7 x weekly - 1 sets - 3 reps - 20sec hold  - Seated Piriformis Stretch  - 2 x daily - 7 x weekly - 1 sets - 3 reps - 20sec hold  - Seated Hip Adductor Stretch  - 2 x daily - 7 x weekly - 1 sets - 3 reps - 20sec hold  - Seated Single Knee to Chest  - 2 x daily - 7 x weekly - 1 sets - 3 reps - 20sec hold  - Seated Hamstring Stretch (Mirrored)  - 2 x daily - 7 x weekly - 1 sets - 3 reps - 20sec hold  - Seated Piriformis Stretch (Mirrored)  - 2 x daily - 7 x weekly - 1 sets - 3 reps - 20sec hold  - Seated Hip Adductor Stretch (Mirrored)  - 2 x daily - 7 x weekly - 1 sets - 3 reps - 20sec hold  - Seated Hamstring Stretch (Mirrored)  - 2 x daily - 7 x weekly - 1 sets - 3 reps - 20sec hold    Outcome Measures Initial Eval  2/24/25 & 3/13/25 PN   4/1/25       5xSTS 13.31 sec, no UE 13.03 sec, 0 UE       TUG  - Regular  - Cognitive   11.15 sec, with SPC   11.50 sec, with SPC   9.85 sec, no AD  8.66 sec, no AD       10 meter 0.81 m/s, with SPC 0.78 m/s w. SPC       FGA 17/30 24/30       DGI 14/24 19/24       mCTSIB  - FTEO (firm)  - FTEC (firm)  - FTEO (foam)  - FTEC (foam)   30 sec  30 sec  30 sec  30 sec        6MWT 830 ft w/ SPC 975ft, w/ SPC       [facility]

## 2025-06-10 ENCOUNTER — OFFICE VISIT (OUTPATIENT)
Dept: PHYSICAL THERAPY | Facility: CLINIC | Age: 70
End: 2025-06-10
Attending: NURSE PRACTITIONER
Payer: MEDICARE

## 2025-06-10 DIAGNOSIS — N39.3 STRESS INCONTINENCE OF URINE: Primary | ICD-10-CM

## 2025-06-10 DIAGNOSIS — M54.16 LUMBAR RADICULOPATHY: ICD-10-CM

## 2025-06-10 DIAGNOSIS — K59.00 CONSTIPATION, UNSPECIFIED CONSTIPATION TYPE: ICD-10-CM

## 2025-06-10 PROCEDURE — 97112 NEUROMUSCULAR REEDUCATION: CPT

## 2025-06-10 PROCEDURE — 97140 MANUAL THERAPY 1/> REGIONS: CPT

## 2025-06-10 NOTE — PROGRESS NOTES
Daily Note     Today's date: 6/10/2025  Patient name: Fariba Brunson  : 1955  MRN: [id number]  Referring provider: Jose C Serrano*  Dx:   Encounter Diagnosis     ICD-10-CM    1. Stress incontinence of urine  N39.3       2. Lumbar radiculopathy  M54.16       3. Constipation, unspecified constipation type  K59.00                      Subjective: Reports little change, pain in lower right side occasionally- it felt like a really bad ovulation. Has been stepping up [employer] game. Is having some shooting pains down into both hips. Feels right glute more in the pool.       Objective: See treatment diary below      Assessment: Tolerated treatment well. Patient would benefit from continued PT in order to build pelvic floor and accessory muscles. Fariba is a good candidate for vaginal estrogen. Notes that she experiences fatigue during the day. Trialed dry needling today.  Dry needling consent for reviewed and signed by patient. Pt educated on dry needling to include but not limited to: risks/benefits/aftercare instructions. Provided with educational handout on DN. All questions and concerns answered and addressed.   Pt verbally consented to dry needling treatment session. Risks/benefits/aftercare instructions reviewed. All questions/concerns addressed.  Pt in semi avalos position. Hand hygiene performed pre and post DN treatment session. Placement of needles in pathological tissue.   Between digits 1-2 on foot and tibial nerve distribution (needle location).  Total treatment duration to include set up/break down/in situ: 20 minutes. Patient was supervised by clinician throughout entirety of treatment session to include when DN in situ; clinician next to patient. All needles counted upon insertion and removal-- 6 needles. All accounted for and disposed in appropriate sharp container.     No adverse reaction to treatment. Pt advised may experience muscular fatigue and soreness. Pt verbalized  understanding.          Plan: Continue per plan of care.            Insurance:  AMA/CMS Eval/ Re-eval POC expires PFDI Auth #/ Referral # Total     Start date  Expiration date Extension  Visit limitation?  PT only or  PT+OT? Co-Insurance   CMS 5/29/25 8/21/25 167.67                                                                                                                                 AUTH #:  Date                           Authed: Used                             Remaining                               Precautions: spinal fusion L4-5              Date: 5/29/25  6/3/25  6/9/25           Session: IE  2  3           Manuals                                                                                         Neuro Re-Ed                 360 breathing      DN- see above           Diaphragmatic breathing                 TA engagement      sit to stand 6# 2*10           TA+ kegel    15x  clam shell 2*10 GTB           Kegel + ADD    20x  PFC + SLR 20x            standing abd/ add + PFC    2*10 GTB              standing ball press into table                  SHILPI    2* 10              side glide R    10x peripheralizing             Side glide L  15x centralization        Sit to stand with PFC + TA  30x                                                      Ther Ex                 Kayla pose                 Cat-cow                 Lord of the half fish                 Happy baby                                                                                         Ther Activity                   Bowel and bladder log, DB, bladder                                 Gait Training                                                     Modalities                                                        Access Code: [email or web address]  URL: [email or web address]  Date: 05/19/2025  Prepared by: Lavonne Ram    Exercises  - Seated Hamstring Stretch  - 2 x daily - 7 x weekly - 1 sets - 3 reps - 20sec hold  - Seated Piriformis  Stretch  - 2 x daily - 7 x weekly - 1 sets - 3 reps - 20sec hold  - Seated Hip Adductor Stretch  - 2 x daily - 7 x weekly - 1 sets - 3 reps - 20sec hold  - Seated Single Knee to Chest  - 2 x daily - 7 x weekly - 1 sets - 3 reps - 20sec hold  - Seated Hamstring Stretch (Mirrored)  - 2 x daily - 7 x weekly - 1 sets - 3 reps - 20sec hold  - Seated Piriformis Stretch (Mirrored)  - 2 x daily - 7 x weekly - 1 sets - 3 reps - 20sec hold  - Seated Hip Adductor Stretch (Mirrored)  - 2 x daily - 7 x weekly - 1 sets - 3 reps - 20sec hold  - Seated Hamstring Stretch (Mirrored)  - 2 x daily - 7 x weekly - 1 sets - 3 reps - 20sec hold    Outcome Measures Initial Eval  2/24/25 & 3/13/25 PN   4/1/25       5xSTS 13.31 sec, no UE 13.03 sec, 0 UE       TUG  - Regular  - Cognitive   11.15 sec, with SPC   11.50 sec, with SPC   9.85 sec, no AD  8.66 sec, no AD       10 meter 0.81 m/s, with SPC 0.78 m/s w. SPC       FGA 17/30 24/30       DGI 14/24 19/24       mCTSIB  - FTEO (firm)  - FTEC (firm)  - FTEO (foam)  - FTEC (foam)   30 sec  30 sec  30 sec  30 sec        6MWT 830 ft w/ SPC 975ft, w/ SPC       [facility]

## 2025-06-12 ENCOUNTER — OFFICE VISIT (OUTPATIENT)
Facility: CLINIC | Age: 70
End: 2025-06-12
Attending: NURSE PRACTITIONER
Payer: MEDICARE

## 2025-06-12 DIAGNOSIS — R26.89 IMBALANCE: ICD-10-CM

## 2025-06-12 DIAGNOSIS — R42 DYSEQUILIBRIUM: Primary | ICD-10-CM

## 2025-06-12 PROCEDURE — 97112 NEUROMUSCULAR REEDUCATION: CPT

## 2025-06-12 PROCEDURE — 97110 THERAPEUTIC EXERCISES: CPT

## 2025-06-12 NOTE — PROGRESS NOTES
"Daily Note   POC expires Unit limit Auth Expiration date PT/OT + Visit Limit? Co-Insurance   25 NA NA BOMN Yes and $0 Co-pay                                 Today's date: 2025  Patient name: Fariba Brunson  : 1955  MRN: [id number]  Referring provider: Jose C Serrano*  Dx:   Encounter Diagnosis     ICD-10-CM    1. Dysequilibrium  R42       2. Imbalance  R26.89             Start Time: 1330  Stop Time: 1414  Total time in clinic (min): 44 minutes    Subjective: Patient reports frustrations about diet, increased cortisol, and chronic pain.      Objective: See treatment diary below. Arrived with 7/10 low lumbar/buttock pain across both side.    TA:  - techniques for opening a large door by hip abd/add rather than trunk rotation  -technique for getting out of the car without a spinal twist  -pt advise to try lying supine with a pillow under her buttocks in the am's for passive neutral trunk/slight extension, before trying to get up/stand up from bed.     TE:   - seated R HS stretch 20 sec x 3  - seated L and R piriformis/pigeon stretch 30 sec x 3  - dome foot exercise R  - toe towel curl exercise  - not today: seated R SKTC 20 sec x 3  - seated R hip adductor stretch (fold) 20 sec x 3        NMR:   - STS: 15 reps  - Sled pull (20#): 2 laps, 100 ft downback  - (12\") w/ fwd lunge with erect trunk for glute activation: 10 reps B/L, 0 UE  - hip abd isometric at wall SLS for opposite glute med activation       Access Code: [email or web address]  URL: [email or web address]  Date: 2025  Prepared by: Shoaib Aguila Jr.    Exercises  - Seated Hamstring Stretch with Chair  - 2 x daily - 7 x weekly - 1 sets - 3 reps - 30 hold  - Seated Gastroc Stretch with Strap  - 2 x daily - 7 x weekly - 1 sets - 3 reps - 30 hold    Access Code: [email or web address]  URL: [email or web address]  Date: 2025  Prepared by: Lavonne Ram    Exercises  - Seated Lumbar Extension  - 1 x daily - 7 x weekly - " 1 sets - 3 reps - 2sec hold      Assessment: Patient tolerated treatment well with continued focus on LE strengthening and balance. . She required occasional standing rest breaks during endurance based task. Slowed movements during SLS activity depicted increased focus on promotion of neuromuscular control. No significant LoB today. Patient will benefit from continued skilled OPPT services to improve tolerance to exercise, balance confidence, and facilitate return to recreational activity to promote improved QoL.      Plan: Continue per plan of care.      Access Code: [email or web address]  URL: [email or web address]  Date: 05/19/2025  Prepared by: Lavonne Ram    Exercises  - Seated Hamstring Stretch  - 2 x daily - 7 x weekly - 1 sets - 3 reps - 20sec hold  - Seated Piriformis Stretch  - 2 x daily - 7 x weekly - 1 sets - 3 reps - 20sec hold  - Seated Hip Adductor Stretch  - 2 x daily - 7 x weekly - 1 sets - 3 reps - 20sec hold  - Seated Single Knee to Chest  - 2 x daily - 7 x weekly - 1 sets - 3 reps - 20sec hold  - Seated Hamstring Stretch (Mirrored)  - 2 x daily - 7 x weekly - 1 sets - 3 reps - 20sec hold  - Seated Piriformis Stretch (Mirrored)  - 2 x daily - 7 x weekly - 1 sets - 3 reps - 20sec hold  - Seated Hip Adductor Stretch (Mirrored)  - 2 x daily - 7 x weekly - 1 sets - 3 reps - 20sec hold  - Seated Hamstring Stretch (Mirrored)  - 2 x daily - 7 x weekly - 1 sets - 3 reps - 20sec hold    Outcome Measures Initial Eval  2/24/25 & 3/13/25 PN   4/1/25       5xSTS 13.31 sec, no UE 13.03 sec, 0 UE       TUG  - Regular  - Cognitive   11.15 sec, with SPC   11.50 sec, with SPC   9.85 sec, no AD  8.66 sec, no AD       10 meter 0.81 m/s, with SPC 0.78 m/s w. SPC       FGA 17/30 24/30       DGI 14/24 19/24       mCTSIB  - FTEO (firm)  - FTEC (firm)  - FTEO (foam)  - FTEC (foam)   30 sec  30 sec  30 sec  30 sec        6MWT 830 ft w/ SPC 975ft, w/ SPC       [facility]

## 2025-06-12 NOTE — PROGRESS NOTES
Daily Note     Today's date: 2025  Patient name: Fariba Brunson  : 1955  MRN: [id number]  Referring provider: Jose C Serrano*  Dx:   No diagnosis found.                 Subjective: Reports little change, pain in lower right side occasionally- it felt like a really bad ovulation. Has been stepping up [employer] game. Is having some shooting pains down into both hips. Feels right glute more in the pool.       Objective: See treatment diary below      Assessment: Tolerated treatment well. Patient would benefit from continued PT in order to build pelvic floor and accessory muscles. Fariba is a good candidate for vaginal estrogen. Notes that she experiences fatigue during the day. Trialed dry needling today.  Dry needling consent for reviewed and signed by patient. Pt educated on dry needling to include but not limited to: risks/benefits/aftercare instructions. Provided with educational handout on DN. All questions and concerns answered and addressed.   Pt verbally consented to dry needling treatment session. Risks/benefits/aftercare instructions reviewed. All questions/concerns addressed.  Pt in semi avalos position. Hand hygiene performed pre and post DN treatment session. Placement of needles in pathological tissue.   Between digits 1-2 on foot and tibial nerve distribution (needle location).  Total treatment duration to include set up/break down/in situ: 20 minutes. Patient was supervised by clinician throughout entirety of treatment session to include when DN in situ; clinician next to patient. All needles counted upon insertion and removal-- 6 needles. All accounted for and disposed in appropriate sharp container.     No adverse reaction to treatment. Pt advised may experience muscular fatigue and soreness. Pt verbalized understanding.          Plan: Continue per plan of care.            Insurance:  AMA/CMS Eval/ Re-eval POC expires PFDI Auth #/ Referral # Total     Start date  Expiration  date Extension  Visit limitation?  PT only or  PT+OT? Co-Insurance   CMS 5/29/25 8/21/25 167.67                                                                                                                                 AUTH #:  Date                           Authed: Used                             Remaining                               Precautions: spinal fusion L4-5              Date: 5/29/25  6/3/25  6/9/25           Session: IE  2  3           Manuals                                                                                         Neuro Re-Ed                 360 breathing      DN- see above           Diaphragmatic breathing                 TA engagement      sit to stand 6# 2*10           TA+ kegel    15x  clam shell 2*10 GTB           Kegel + ADD    20x  PFC + SLR 20x            standing abd/ add + PFC    2*10 GTB              standing ball press into table                  SHILPI    2* 10              side glide R    10x peripheralizing             Side glide L  15x centralization        Sit to stand with PFC + TA  30x                                                      Ther Ex                 Kayla pose                 Cat-cow                 Lord of the half fish                 Happy baby                                                                                         Ther Activity                   Bowel and bladder log, DB, bladder                                 Gait Training                                                     Modalities                                                        Access Code: [email or web address]  URL: [email or web address]  Date: 05/19/2025  Prepared by: Lavonne Ram    Exercises  - Seated Hamstring Stretch  - 2 x daily - 7 x weekly - 1 sets - 3 reps - 20sec hold  - Seated Piriformis Stretch  - 2 x daily - 7 x weekly - 1 sets - 3 reps - 20sec hold  - Seated Hip Adductor Stretch  - 2 x daily - 7 x weekly - 1 sets - 3 reps - 20sec hold  - Seated Single  Knee to Chest  - 2 x daily - 7 x weekly - 1 sets - 3 reps - 20sec hold  - Seated Hamstring Stretch (Mirrored)  - 2 x daily - 7 x weekly - 1 sets - 3 reps - 20sec hold  - Seated Piriformis Stretch (Mirrored)  - 2 x daily - 7 x weekly - 1 sets - 3 reps - 20sec hold  - Seated Hip Adductor Stretch (Mirrored)  - 2 x daily - 7 x weekly - 1 sets - 3 reps - 20sec hold  - Seated Hamstring Stretch (Mirrored)  - 2 x daily - 7 x weekly - 1 sets - 3 reps - 20sec hold    Outcome Measures Initial Eval  2/24/25 & 3/13/25 PN   4/1/25       5xSTS 13.31 sec, no UE 13.03 sec, 0 UE       TUG  - Regular  - Cognitive   11.15 sec, with SPC   11.50 sec, with SPC   9.85 sec, no AD  8.66 sec, no AD       10 meter 0.81 m/s, with SPC 0.78 m/s w. SPC       FGA 17/30 24/30       DGI 14/24 19/24       mCTSIB  - FTEO (firm)  - FTEC (firm)  - FTEO (foam)  - FTEC (foam)   30 sec  30 sec  30 sec  30 sec        6MWT 830 ft w/ SPC 975ft, w/ SPC       [facility]

## 2025-06-17 ENCOUNTER — OFFICE VISIT (OUTPATIENT)
Dept: PHYSICAL THERAPY | Facility: CLINIC | Age: 70
End: 2025-06-17
Attending: NURSE PRACTITIONER
Payer: MEDICARE

## 2025-06-17 DIAGNOSIS — M54.16 LUMBAR RADICULOPATHY: ICD-10-CM

## 2025-06-17 DIAGNOSIS — K59.00 CONSTIPATION, UNSPECIFIED CONSTIPATION TYPE: Primary | ICD-10-CM

## 2025-06-17 DIAGNOSIS — N39.3 STRESS INCONTINENCE OF URINE: ICD-10-CM

## 2025-06-17 DIAGNOSIS — R26.89 OTHER ABNORMALITIES OF GAIT AND MOBILITY: ICD-10-CM

## 2025-06-17 PROCEDURE — 97140 MANUAL THERAPY 1/> REGIONS: CPT

## 2025-06-17 PROCEDURE — 97112 NEUROMUSCULAR REEDUCATION: CPT

## 2025-06-17 NOTE — PROGRESS NOTES
Daily Note     Today's date: 2025  Patient name: Fariba Brunson  : 1955  MRN: [id number]  Referring provider: Jose C Serraon  Dx:   Encounter Diagnosis     ICD-10-CM    1. Constipation, unspecified constipation type  K59.00       2. Other abnormalities of gait and mobility  R26.89       3. Stress incontinence of urine  N39.3       4. Lumbar radiculopathy  M54.16                      Subjective: Reports that she is really sore today. Some pinkish discharge. Decrease in urinary leakage.       Objective: See treatment diary below      Assessment: Tolerated treatment well. Patient would benefit from continued PT in order to continue down training central nervous system and strengthening pelvic floor and accessory musculature. Has really been working on diet.   Dry needling consent for reviewed and signed by patient. Pt educated on dry needling to include but not limited to: risks/benefits/aftercare instructions. Provided with educational handout on DN. All questions and concerns answered and addressed.   Pt verbally consented to dry needling treatment session. Risks/benefits/aftercare instructions reviewed. All questions/concerns addressed.  Pt in semi avalos position. Hand hygiene performed pre and post DN treatment session. Placement of needles in pathological tissue.   Between digits 1-2 on foot and tibial nerve distribution (needle location).  Total treatment duration to include set up/break down/in situ: 20 minutes. Patient was supervised by clinician throughout entirety of treatment session to include when DN in situ; clinician next to patient. All needles counted upon insertion and removal-- 6 needles. All accounted for and disposed in appropriate sharp container.      No adverse reaction to treatment. Pt advised may experience muscular fatigue and soreness. Pt verbalized understanding.     Plan: Continue per plan of care.           Insurance:  AMA/CMS Eval/ Re-eval POC expires PFDI  Auth #/ Referral # Total     Start date  Expiration date Extension  Visit limitation?  PT only or  PT+OT? Co-Insurance   CMS 5/29/25 8/21/25 167.67                                                                                                                                 AUTH #:  Date                           Authed: Used                             Remaining                               Precautions: spinal fusion L4-5              Date: 5/29/25  6/3/25  6/9/25  6/17/25         Session: IE  2  3  4         Manuals                                                                                         Neuro Re-Ed                 360 breathing      DN- see above  DN- see above         Diaphragmatic breathing                 TA engagement      sit to stand 6# 2*10  sit to stand 6# 2*10         TA+ kegel    15x  clam shell 2*10 GTB  clam shell 2*10 GTB         Kegel + ADD    20x  PFC + SLR 20x  PFC + SLR 20x          standing abd/ add + PFC    2*10 GTB    standing hip abd/ ext 10x GTB          standing ball press into table                  SHILPI    2* 10              side glide R    10x peripheralizing             Side glide L   15x centralization             Sit to stand with PFC + TA   30x                                                                   Ther Ex                 Kayla pose                 Cat-cow                 Lord of the half fish                 Happy baby                                                                                         Ther Activity                   Bowel and bladder log, DB, bladder                                 Gait Training                                                     Modalities                                                        Access Code: [email or web address]  URL: [email or web address]  Date: 05/19/2025  Prepared by: Lavonne Ram     Exercises  - Seated Hamstring Stretch  - 2 x daily - 7 x weekly - 1 sets - 3 reps - 20sec hold  - Seated  Piriformis Stretch  - 2 x daily - 7 x weekly - 1 sets - 3 reps - 20sec hold  - Seated Hip Adductor Stretch  - 2 x daily - 7 x weekly - 1 sets - 3 reps - 20sec hold  - Seated Single Knee to Chest  - 2 x daily - 7 x weekly - 1 sets - 3 reps - 20sec hold  - Seated Hamstring Stretch (Mirrored)  - 2 x daily - 7 x weekly - 1 sets - 3 reps - 20sec hold  - Seated Piriformis Stretch (Mirrored)  - 2 x daily - 7 x weekly - 1 sets - 3 reps - 20sec hold  - Seated Hip Adductor Stretch (Mirrored)  - 2 x daily - 7 x weekly - 1 sets - 3 reps - 20sec hold  - Seated Hamstring Stretch (Mirrored)  - 2 x daily - 7 x weekly - 1 sets - 3 reps - 20sec hold     Access Code: [email or web address]  URL: [email or web address]  Date: 05/19/2025  Prepared by: Lavonne Ram    Exercises  - Seated Hamstring Stretch  - 2 x daily - 7 x weekly - 1 sets - 3 reps - 20sec hold  - Seated Piriformis Stretch  - 2 x daily - 7 x weekly - 1 sets - 3 reps - 20sec hold  - Seated Hip Adductor Stretch  - 2 x daily - 7 x weekly - 1 sets - 3 reps - 20sec hold  - Seated Single Knee to Chest  - 2 x daily - 7 x weekly - 1 sets - 3 reps - 20sec hold  - Seated Hamstring Stretch (Mirrored)  - 2 x daily - 7 x weekly - 1 sets - 3 reps - 20sec hold  - Seated Piriformis Stretch (Mirrored)  - 2 x daily - 7 x weekly - 1 sets - 3 reps - 20sec hold  - Seated Hip Adductor Stretch (Mirrored)  - 2 x daily - 7 x weekly - 1 sets - 3 reps - 20sec hold  - Seated Hamstring Stretch (Mirrored)  - 2 x daily - 7 x weekly - 1 sets - 3 reps - 20sec hold    Outcome Measures Initial Eval  2/24/25 & 3/13/25 PN   4/1/25       5xSTS 13.31 sec, no UE 13.03 sec, 0 UE       TUG  - Regular  - Cognitive   11.15 sec, with SPC   11.50 sec, with SPC   9.85 sec, no AD  8.66 sec, no AD       10 meter 0.81 m/s, with SPC 0.78 m/s w. SPC       FGA 17/30 24/30       DGI 14/24 19/24       mCTSIB  - FTEO (firm)  - FTEC (firm)  - FTEO (foam)  - FTEC (foam)   30 sec  30 sec  30 sec  30 sec        6MWT 830 ft w/ SPC  975ft, w/ SPC       [facility]

## 2025-07-05 ENCOUNTER — OFFICE VISIT (OUTPATIENT)
Dept: URGENT CARE | Facility: CLINIC | Age: 70
End: 2025-07-05
Payer: MEDICARE

## 2025-07-05 ENCOUNTER — APPOINTMENT (OUTPATIENT)
Dept: RADIOLOGY | Facility: CLINIC | Age: 70
End: 2025-07-05
Attending: PREVENTIVE MEDICINE
Payer: MEDICARE

## 2025-07-05 VITALS
HEART RATE: 70 BPM | TEMPERATURE: 97.3 F | DIASTOLIC BLOOD PRESSURE: 104 MMHG | OXYGEN SATURATION: 99 % | RESPIRATION RATE: 20 BRPM | SYSTOLIC BLOOD PRESSURE: 178 MMHG

## 2025-07-05 DIAGNOSIS — S99.921A INJURY OF TOE ON RIGHT FOOT, INITIAL ENCOUNTER: Primary | ICD-10-CM

## 2025-07-05 DIAGNOSIS — S99.921A INJURY OF TOE ON RIGHT FOOT, INITIAL ENCOUNTER: ICD-10-CM

## 2025-07-05 PROCEDURE — 99213 OFFICE O/P EST LOW 20 MIN: CPT | Performed by: PREVENTIVE MEDICINE

## 2025-07-05 PROCEDURE — 73630 X-RAY EXAM OF FOOT: CPT

## 2025-07-05 NOTE — PROGRESS NOTES
[facility]  Name: Fariba Brunson      : 1955      MRN: [id number]  Encounter Provider: Martinez Guaman MD  Encounter Date: 2025   Encounter department: [facility] [place]  :  Assessment & Plan  Injury of toe on right foot, initial encounter    Orders:    XR foot 3+ vw right; Future        Patient Instructions  Follow up with PCP in 3-5 days.  Proceed to  ER if symptoms worsen.    If tests are performed, our office will contact you with results only if changes need to made to the care plan discussed with you at the visit. You can review your full results on [facility].    Chief Complaint:   Chief Complaint   Patient presents with    Toe Injury     Reports on  hit  right toe , reports some brusing at the time was able to weight bear. Started with swelling and pain yesterday. Last dose of tylenol at 7 am this morning     History of Present Illness   Stubbed her big toe.          Review of Systems   Musculoskeletal:  Positive for arthralgias and gait problem.     Past Medical History   Past Medical History[1]  Past Surgical History[2]  Family History[3]  she reports that she has never smoked. She has never been exposed to tobacco smoke. She has never used smokeless tobacco. She reports current alcohol use of about 7.0 standard drinks of alcohol per week. She reports that she does not currently use drugs after having used the following drugs: Marijuana.  Current Outpatient Medications   Medication Instructions    albuterol (ProAir HFA) 90 mcg/act inhaler 2 puffs, Inhalation, Every 6 hours PRN    calcium carbonate (OS-BEAU) 1250 (500 Ca) MG chewable tablet 1 tablet, Daily    famotidine (PEPCID) 40 mg, Oral, Daily    fish oil 1,000 mg, Oral, 2 times daily    fluticasone (FLONASE) 50 mcg/act nasal spray 2 sprays, Nasal, Daily    lisinopril-hydrochlorothiazide (PRINZIDE,ZESTORETIC) 20-12.5 MG per tablet 1 tablet, Oral, Daily    multivitamin (THERAGRAN) TABS 1  "tablet, Daily    omeprazole (PRILOSEC) 40 mg, Oral, Daily    saccharomyces boulardii (FLORASTOR) 250 mg, Daily    vitamin B-12 (CYANOCOBALAMIN) 50 mcg, Daily    vitamin C 100 mg, Daily    vitamin E 100 Units, Daily   Allergies[4]     Objective   BP (!) 178/104   Pulse 70   Temp (!) 97.3 °F (36.3 °C) (Temporal)   Resp 20   SpO2 99%      Physical Exam    Musculoskeletal:      Comments: The big toe is not warm red or swollen.     X-ray right great toe shows no acute changes    Portions of the record may have been created with voice recognition software.  Occasional wrong word or \"sound a like\" substitutions may have occurred due to the inherent limitations of voice recognition software.  Read the chart carefully and recognize, using context, where substitutions have occurred.       [1]   Past Medical History:  Diagnosis Date    Arthritis     Asthma     Back pain     Chronic pain disorder     hips into the legs    Colon polyp     DVT (deep venous thrombosis) ([facility])     Gastric bypass status for obesity     yarelis en y    GERD (gastroesophageal reflux disease)     Hiatal hernia     History of transfusion     after gastric bypass surgery, no reactions    Hypertension     Irritable bowel syndrome     Kidney stone     Muscle weakness     bilateral legs    Numbness and tingling     bilateral feet    Pneumonia     Sacroiliitis ([facility]) 5/15/2018    Spinal stenosis     Tinnitus     occassionally    Vertigo     Wears glasses    [2]   Past Surgical History:  Procedure Laterality Date    APPENDECTOMY      BACK SURGERY  2018    L4-5 fusion    BARIATRIC SURGERY  2005    yarelis en y- pt states the procedure was done incorrectly which lead to additional surgeries from -    CARPAL TUNNEL RELEASE Right      SECTION      x1    CHOLECYSTECTOMY      COLON SURGERY      partial removal of the small bowel-necrosis-between -    COLONOSCOPY      COLONOSCOPY W/ BIOPSIES AND POLYPECTOMY      EPIDURAL BLOCK INJECTION " N/A 11/24/2021    Procedure: C6 C7 cervical epidural steroid injection ( 79346);  Surgeon: Jeremi Gamino MD;  Location: [facility] MAIN OR;  Service: Pain Management     EPIDURAL BLOCK INJECTION N/A 12/28/2021    Procedure: C6 C7 cervical epidural steroid injection ([id number]);  Surgeon: Jeremi Gamino MD;  Location: [facility] MAIN OR;  Service: Pain Management     FLEXIBLE BRONCHOSCOPY W/ UPPER ENDOSCOPY      GASTRECTOMY      after gastric bypass-(failed surgery per pt) 2006-09    HERNIA REPAIR      incisional hernias-diaphragm area    MN ARTHROCENTESIS ASPIR&/INJ SMALL JT/BURSA W/O US N/A 05/02/2018    Procedure: Coccygeal Injection & Ganglion Impar Block;  Surgeon: Jeremi Gamino MD;  Location: [facility] MAIN OR;  Service: Pain Management     MN ARTHRODESIS POSTERIOR INTERBODY 1 NTRSPC LUMBAR N/A 06/27/2018    Procedure: L4-5 DECOMPRESSION INTERBODY INSTRUMENTED FUSION;  Surgeon: Martinez Garcia MD;  Location: [facility] Main OR;  Service: Orthopedics    MN COLONOSCOPY FLX DX W/COLLJ SPEC WHEN PFRMD N/A 08/24/2018    Procedure: COLONOSCOPY;  Surgeon: Terry Booth MD;  Location: [facility] GI LAB;  Service: Gastroenterology    MN ESOPHAGOGASTRODUODENOSCOPY TRANSORAL DIAGNOSTIC N/A 02/19/2018    Procedure: ESOPHAGOGASTRODUODENOSCOPY (EGD);  Surgeon: Terry Booth MD;  Location: [facility] GI LAB;  Service: Gastroenterology    MN INJECT SI JOINT ARTHRGRPHY&/ANES/STEROID W/EMMANUEL Right 05/25/2018    Procedure: Rt Sacroiliac Joint Injection;  Surgeon: Jeremi Gamino MD;  Location: [facility] MAIN OR;  Service: Pain Management     MN INJECT SI JOINT ARTHRGRPHY&/ANES/STEROID W/EMMANUEL Right 05/01/2019    Procedure: Sacroiliac Joint Injection ([id number]);  Surgeon: Jeremi Gamino MD;  Location: [facility] MAIN OR;  Service: Pain Management     TOENAIL EXCISION  2024    ingrown toenails fixed    TONSILECTOMY, ADENOIDECTOMY, BILATERAL MYRINGOTOMY AND TUBES      TONSILLECTOMY      UPPER GASTROINTESTINAL ENDOSCOPY     [3]   Family History  Problem Relation Name Age of  Onset    Endometrial cancer Mother      Diabetes Mother      Aortic aneurysm Father      Cancer Father          prostate    Heart disease Sister          open heart    Cancer Sister          breast    Breast cancer Sister  54    Diabetes Maternal Grandfather      Diabetes Brother Suresh     Cancer Brother Suresh         spinal cancer    Stroke Brother Leandro     Hypertension Brother Leandro     Asperger's syndrome Son          high functioning    No Known Problems Maternal Aunt      No Known Problems Maternal Aunt      Colon cancer Maternal Uncle  60    No Known Problems Paternal Aunt      No Known Problems Paternal Aunt     [4] No Known Allergies

## 2025-07-08 ENCOUNTER — OFFICE VISIT (OUTPATIENT)
Dept: PHYSICAL THERAPY | Facility: CLINIC | Age: 70
End: 2025-07-08
Attending: NURSE PRACTITIONER
Payer: MEDICARE

## 2025-07-08 ENCOUNTER — EVALUATION (OUTPATIENT)
Facility: CLINIC | Age: 70
End: 2025-07-08
Attending: NURSE PRACTITIONER
Payer: MEDICARE

## 2025-07-08 DIAGNOSIS — K59.00 CONSTIPATION, UNSPECIFIED CONSTIPATION TYPE: Primary | ICD-10-CM

## 2025-07-08 DIAGNOSIS — R26.89 OTHER ABNORMALITIES OF GAIT AND MOBILITY: ICD-10-CM

## 2025-07-08 DIAGNOSIS — R42 DYSEQUILIBRIUM: ICD-10-CM

## 2025-07-08 DIAGNOSIS — M54.16 LUMBAR RADICULOPATHY: ICD-10-CM

## 2025-07-08 DIAGNOSIS — R26.89 IMBALANCE: Primary | ICD-10-CM

## 2025-07-08 DIAGNOSIS — N39.3 STRESS INCONTINENCE OF URINE: ICD-10-CM

## 2025-07-08 PROCEDURE — 97530 THERAPEUTIC ACTIVITIES: CPT

## 2025-07-08 PROCEDURE — 97140 MANUAL THERAPY 1/> REGIONS: CPT

## 2025-07-08 PROCEDURE — 97112 NEUROMUSCULAR REEDUCATION: CPT

## 2025-07-08 NOTE — PROGRESS NOTES
Daily Note     Today's date: 2025  Patient name: Fariba Brunson  : 1955  MRN: [id number]  Referring provider: Jose C Serrano  Dx:   Encounter Diagnosis     ICD-10-CM    1. Constipation, unspecified constipation type  K59.00       2. Stress incontinence of urine  N39.3       3. Lumbar radiculopathy  M54.16                    Assessment details:   CASE SUMMARY:   Fariba Brunson is a 69 y.o. year old female who reports onset of symptoms ~  urinary incontinence, constipation, lumbar radiculopathy and peripheral neuropathy.   Patient describes symptoms as: embarrassing, expensive, annoying, causing scheduling issues .  Symptoms are : constant.  Fariba is limited in the following activities: working without wearing pads, need to be near rest room, walking without cane.  Fariba self-reports a 60% improvement. She finds that she is able to incorporate pelvic floor contractions into ADLs much more easily. Fariba continues to remain a good candidate for skilled physical therapy.      PMHx includes: See chart for full details with medications.      Patient's clinical presentation is consistent with their referring diagnosis of: Stress incontinence of urine  (primary encounter diagnosis)     Constipation, unspecified constipation type     Lumbar radiculopathy     Idiopathic peripheral neuropathy  .      POC was discussed and agreed upon with patient.  Patient was educated on: pelvic floor anatomy, Importance of body mechanics and ergonomics in regards to protecting against activities which increase IAP and pressure,  and PT exam and course of treatment.  Patient vocalized a good understanding of  POC and HEP issued. Patient would benefit from skilled physical therapy services to address their aforementioned functional limitations and progress towards prior level of function and independence with home exercise program.       Pelvic floor verbal consent and written consent signed and in  chart- LSR 5/29/25  Patient deferred second person in room: YES           Understanding of Dx/Px/POC: good     Prognosis: good     Goals  STG (3 weeks) all met as of 7/8/25  Patient will be independent with HEP  Patient will demonstrate ability to properly fill out bowel/ bladder log  Patient will self report sxs decrease by 25%  Patient will demonstrate the ability to perform kegel and downtraining     LTG (8 weeks)  Patient will be independent in comprehensive HEP  Patient will improve pelvic health metric by MDIC  Patient will self report sxs decrease by 75%  Patient will demonstrate the ability to perform sustained kegels in functional positioning      Plan  Patient would benefit from: skilled physical therapy  Planned modality interventions: biofeedback, cryotherapy, TENS, ultrasound and hydrotherapy     Planned therapy interventions: joint mobilization, manual therapy, neuromuscular re-education, patient education, postural training, strengthening, stretching, therapeutic activities, therapeutic exercise, functional ROM exercises, flexibility, graded activity, home exercise program, abdominal trunk stabilization, Garcia taping, kinesiology taping, breathing training and dry needling     Frequency: 1x week  Duration in weeks: 12  Plan of Care beginning date: 5/29/2025  Plan of Care expiration date: 8/21/2025  Treatment plan discussed with: patient     PT Pelvic Floor Subjective:   History of Present Illness:   Hx of spinal fusion. 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 Hx of messed up gastric bypass and has difficulty balancing nutrition. Wearing pad for 4.5 years. Sometimes can't control bowels. Doesn't want to start spending money on depends. It is really embarrassing. Has to concentrate on emptying bladder. Has to do spinal wave. Has adhesions through out abdomen. Shake plate helped with inflammation. Numbness into bilateral feet. Sleeps elevated. Doesn't have stomach per say  "due to gastric bypass. L4-L5 fused due to breaking on horse ride- surgery 2019.         25: Reports that pelvic floor contractions have helped. No issues with bowels unless she eats too many veggies.  Is still doing metamucil and psyllium husk. Backing off carbs. She has been really watching what she eats. Was on vacation so things have been a little rough. Self- reports 60% improvement. Dry needling has been very helpful and is also helping with neuropathy in feet.      Quality of life: good     Social Support:     Lives in:  Multiple-level home    Lives with:  Adult children    Relationship status: never     Work status: employed part time    Life stress level: 6    History of Depression: yes    has lost a lot of family membersPronouns: she/her  Hand dominance:  Right  Diet and Exercise:       A little bit of resistance training, likes being in pool- goes 2-3x per week  OB/ gyn History    Gestational History:     Prior Pregnancy: Yes      Number of prior pregnancies: 4    Number of term pregnancies: 2    Delivery Type:  section      Menstrual History:       Menopausal: menopause    Birth control: no contraception  Bladder Function:      Voiding Difficulties comments:     Urinary leakage: urine leakage    Urinary leakage aggravated by: coughing, bending and standing up    Fluid Intake Type:  Alcohol, coffee and water  Incontinence Management:     Pads/Diaper Use:  24 hours  Bowel Function:     Voiding DIfficulties: unfinished feeling after defecating and constipation      Bowel frequency: daily    Jackson Stool Scale: type 4    Stool softener use: no stool softeners    Enema use: no enema    Uses \"squatty potty\": no Squatty Potty        Objective         Abdominal Assessment:       Abdominal Assessment: Not assessed at this time due to time constraints- next session                  Insurance:  AMA/CMS Eval/ Re-eval POC expires PFDI Auth #/ Referral # Total     Start date  Expiration date " Extension  Visit limitation?  PT only or  PT+OT? Co-Insurance   CMS 5/29/25 8/21/25 [id number]                      7/8/25 9/21/25                                                                                                         AUTH #:  Date                           Authed: Used                             Remaining                               Precautions: spinal fusion L4-5            /  Date: 5/29/25  6/3/25  6/9/25  6/17/25  7/8/25       Session: IE  2  3  4  5       Manuals                                                                                         Neuro Re-Ed                 360 breathing      DN- see above  DN- see above  DN- see above       Diaphragmatic breathing                 TA engagement      sit to stand 6# 2*10  sit to stand 6# 2*10  sit to stand 6# 2*10       TA+ kegel    15x  clam shell 2*10 GTB  clam shell 2*10 GTB  clam shell 2*10 GTB       Kegel + ADD    20x  PFC + SLR 20x  PFC + SLR 20x  PFC + SLR 20x        standing abd/ add + PFC    2*10 GTB    standing hip abd/ ext 10x GTB  standing hip abd/ ext 10x GTB        standing ball press into table                  SHILPI    2* 10              side glide R    10x peripheralizing             Side glide L   15x centralization             Sit to stand with PFC + TA   30x                                                                   Ther Ex                 Kayla pose                 Cat-cow                 Lord of the half fish                 Happy baby                                                                                         Ther Activity                   Bowel and bladder log, DB, bladder                                 Gait Training                                                     Modalities                                                        Access Code: [email or web address]  URL: [email or web address]  Date: 05/19/2025  Prepared by: Lavonne Ram     Exercises  - Seated Hamstring Stretch  - 2 x daily - 7  x weekly - 1 sets - 3 reps - 20sec hold  - Seated Piriformis Stretch  - 2 x daily - 7 x weekly - 1 sets - 3 reps - 20sec hold  - Seated Hip Adductor Stretch  - 2 x daily - 7 x weekly - 1 sets - 3 reps - 20sec hold  - Seated Single Knee to Chest  - 2 x daily - 7 x weekly - 1 sets - 3 reps - 20sec hold  - Seated Hamstring Stretch (Mirrored)  - 2 x daily - 7 x weekly - 1 sets - 3 reps - 20sec hold  - Seated Piriformis Stretch (Mirrored)  - 2 x daily - 7 x weekly - 1 sets - 3 reps - 20sec hold  - Seated Hip Adductor Stretch (Mirrored)  - 2 x daily - 7 x weekly - 1 sets - 3 reps - 20sec hold  - Seated Hamstring Stretch (Mirrored)  - 2 x daily - 7 x weekly - 1 sets - 3 reps - 20sec hold     Access Code: [email or web address]  URL: [email or web address]  Date: 05/19/2025  Prepared by: Lavonne Ram    Exercises  - Seated Hamstring Stretch  - 2 x daily - 7 x weekly - 1 sets - 3 reps - 20sec hold  - Seated Piriformis Stretch  - 2 x daily - 7 x weekly - 1 sets - 3 reps - 20sec hold  - Seated Hip Adductor Stretch  - 2 x daily - 7 x weekly - 1 sets - 3 reps - 20sec hold  - Seated Single Knee to Chest  - 2 x daily - 7 x weekly - 1 sets - 3 reps - 20sec hold  - Seated Hamstring Stretch (Mirrored)  - 2 x daily - 7 x weekly - 1 sets - 3 reps - 20sec hold  - Seated Piriformis Stretch (Mirrored)  - 2 x daily - 7 x weekly - 1 sets - 3 reps - 20sec hold  - Seated Hip Adductor Stretch (Mirrored)  - 2 x daily - 7 x weekly - 1 sets - 3 reps - 20sec hold  - Seated Hamstring Stretch (Mirrored)  - 2 x daily - 7 x weekly - 1 sets - 3 reps - 20sec hold    Outcome Measures Initial Eval  2/24/25 & 3/13/25 PN   4/1/25       5xSTS 13.31 sec, no UE 13.03 sec, 0 UE       TUG  - Regular  - Cognitive   11.15 sec, with SPC   11.50 sec, with SPC   9.85 sec, no AD  8.66 sec, no AD       10 meter 0.81 m/s, with SPC 0.78 m/s w. SPC       A 17/30 24/30       DGI 14/24 19/24       mCTSIB  - FTEO (firm)  - FTEC (firm)  - FTEO (foam)  - FTEC (foam)   30  sec  30 sec  30 sec  30 sec        6MWT 830 ft w/ SPC 975ft, w/ SPC       [facility]

## 2025-07-08 NOTE — PROGRESS NOTES
PT Re-Evaluation          POC expires Unit limit Auth Expiration date PT/OT + Visit Limit? Co-Insurance   25 NA NA BOMN Yes and $0 Co-pay                                            Today's date: 2025  Patient name: Fariba Brunson  : 1955  MRN: [id number]  Referring provider: Jose C Serrano*  Dx:   Encounter Diagnosis     ICD-10-CM    1. Imbalance  R26.89       2. Other abnormalities of gait and mobility  R26.89       3. Dysequilibrium  R42                 Assessment  Assessment details: Patient is a 69 y.o. Female who has been attending skilled outpatient PT to address complaints of decreased tolerance to activity, and imbalance which are impacting her participation with recreational activity. Past medical history significant for T2DM, gastric bypass surgery, and cervical spinal stenosis. Most significant improvements in balance confidence as per ABC score as compared to previous evaluations. Patient has displayed improvements in dual tasking ability and balance as per TUG cog cost, and FGA respectively. Minimal changes in DGI with greatest difficulty in stair negotiation and negotiating obstacles. She does classify as low risk for falls per APTA and Rehab Measures Lab cutoff scores for FGA and DGI however, her gait speed does categorize her at increased risk for falls. Minor changes displayed in remaining outcome measures suggesting no significant changes in lower extremity strength, mobility, and gait speed. Performance this date can likely be attributed to hiatus from PT due to vacation and scheduling difficulties. She has met one additional goal this date with great progression towards remaining. Extended PoC this date by 8 weeks with plans to emphasize higher level balance and cardiovascular endurance to maximize patient's function. Patient will continue to benefit from skilled PT services to continue to improve  her functional strength and endurance, improve her balance, and reduce her risk of falls to maximize her level of safety at home and in the community.       Impairments: Abnormal coordination, Abnormal gait, Abnormal muscle tone, Abnormal or restricted ROM, Activity intolerance, Impaired balance, Impaired physical strength, Lacks appropriate HEP, Poor posture, Poor body mechanics, Pain with function, Safety issue, Weight-bearing intolerance, Abnormal movement, Difficulty understanding, Abnormal muscle firing  Understanding of Dx/Px/POC: Good  Prognosis: Good    Patient verbalized understanding of POC.         Please contact me if you have any questions or recommendations. Thank you for the referral and the opportunity to share in Fariba Brunson's care.        Plan  Plan details: balance, endurance training, somatosensory awareness, transition to gym program  Patient would benefit from: PT Eval and Skilled PT  Planned modality interventions: Biofeedback, Cryotherapy, TENS, Thermotherapy  Planned therapy interventions: Abdominal trunk stabilization, ADL training, Balance, Balance/WB training, Breathing training, Body mechanics training, Coordination, Functional ROM exercises, Gait training, HEP, Joint Mobilization, Manual Therapy, Garcia taping, Motor coordination training, Neuromuscular re-education, Patient education, Postural training, Strengthening, Stretching, Therapeutic activities, Therapeutic exercises, Therapeutic training, Transfer training, Activity modification, Work reintegration  Frequency: 2x/wk-3x/wk  Duration in weeks: 8 weeks  Plan of Care beginning date: 7/8/25  Plan of Care expiration date: 8 weeks -9/2/25  Treatment plan discussed with: Patient       Goals  Short Term Goals (4 weeks):    - Patient will improve time on TUG by 2.9 seconds to facilitate improved safety in all ambulation-MET  - Patient will be independent in basic HEP 2-3 weeks-MET  - Patient will improve 5xSTS score by  2.3 seconds to promote improved LE functional strength needed for ADLs-MET  - Patient will complete 6MWT to assess cardiovascular endurance and tolerance to recreational activities-MET    Long Term Goals (12 weeks):   - Patient will be independent in a comprehensive home exercise program  - Patient will improve scoring on DGI by 2.6 points to progress safety  - Patient will improve gait speed by 0.18 m/s to improve safety with community ambulation  - Patient will improve scoring on FGA by 4 points to progress safety with dynamic tasks-MET  - Patient will be able to demonstrate HT in gait without veering  - Patient will improve 6 Minute Walk Test score by 190 feet to promote improved cardiovascular endurance  - Patient will report 50% reduction in near falls in order to improve safety with functional tasks and reduce his risk for falls  - Patient will report going on walks at least 3 days per week to promote independence and improved cardiovascular endurance  - Patient will be able to ascend/descend stairs reciprocally with 1 UE assist to promote independence and safety with ADLs- MET  - Patient will report 50% reduction in near falls when ambulating on uneven terrain      Cut off score    All date taken from APTA Neuro Section or Rehab Measures      Sosa/56  MDC: 6 pts  Age Norms:  60-69: M - 55   F - 55  70-79: M - 54   F - 53  80-89: M - 53   F - 50 5xSTS: Soy et al 2010  MDC: 2.3 sec  Age Norms:  60-69: 11.4 sec  70-79: 12.6 sec  80-89: 14.8 sec   TUG  MDC: 4.14 sec  Cut off score:  >13.5 sec community dwelling adults  >32.2 frail elderly  <20 I for basic transfers  >30 dependent on transfers 10 Meter Walk Test: Vita et al 2011  MDC: 0.18 m/s  20-29: M - 1.35 m   F - 1.34 m  30-39: M - 1.43 m   F - 1.34 m  40-49: M - 1.43 m   F - 1.39 m  50-59: M - 1.43 m   F - 1.31 m  60-69: M - 1.34 m   F - 1.24 m  70-79: M - 1.26 m   F - 1.13 m  80-89: M - 0.97 m   F - 0.94 m    Household Ambulator < 0.4  m/s  Limited Community Ambulator 0.4 - 0.8 m/s  Community Ambulator 0.8 - 1.2 m/s  Safely cross the street > 1.2 m/s   FGA  MCID: 4 pts  Geriatrics/community < 22/30 fall risk  Geriatrics/community < 20/30 unexplained falls    DGI  MDC: vestibular - 4 pts  MDC: geriatric/community - 3 pts  Falls risk <19/24 mCTSIB  Norm: 20-60 yrs  Eyes open firm: norm sway 0.21-0.48  Eyes closed firm: norm sway 0.48-0.99  Eyes open foam: norm sway 0.38-0.71  Eyes closed foam: norm sway 0.70-2.22   6 Minute Walk Test  MDC: 190.98 ft  MCID: 164 ft    Age Norms  60-69: M - 1876 ft (571.80 m)  F - 1765 ft (537.98 m)  70-79: M - 1729 ft (527.00 m)  F - 1545 ft (470.92 m)  80-89: M - 1368 ft (416.97 m)  F - 1286 ft (391.97 m) ABC: Hilary & Nika, 2003  <67% increased risk for falls   [place] [employer]  Evaluator Size:        Force (grams):          Hand/Dorsal Thresholds:        Plantar Thresholds:  - 1.65                       - 0.008                       - Normal                                 - Normal  - 2.36                       - 0.02                         - Normal                                 - Normal  - 2.44                       - 0.04                         - Normal                                 - Normal  - 2.83                       - 0.07                         - Normal                                 - Normal  - 3.22                       - 0.16                         - Diminished light touch          - Normal  - 3.61                       - 0.40                         - Diminished light touch          - Normal  - 3.84                       - 0.60                         - Diminished protective           - Diminished light touch  - 4.08                       - 1.00                         - Diminished protective           - Diminished light touch  - 4.17                       - 1.40                         - Diminished protective           - Diminished light touch  - 4.31                       -  2.00                         - Diminished protective           - Diminished light touch  - 4.56                       - 4.00                         - Loss of protective sense      - Diminished protective  - 4.74                       - 6.00                         - Loss of protective sense      - Diminished protective  - 4.93                       - 8.00                         - Loss of protective sense      - Diminished protective  - 5.07                       - 10.0                         - Loss of protective sense     - Loss of protective sense  - 5.18                       - 15.0                         - Loss of protective sense     - Loss of protective sense  - 5.46                       - 26.0                         - Loss of protective sense     - Loss of protective sense  - 5.88                       - 60.0                         - Loss of protective sense     - Loss of protective sense  - 6.10                       - 100                          - Loss of protective sense     - Loss of protective sense  - 6.45                       - 180                          - Loss of protective sense     - Loss of protective sense  - 6.65                       - 300                          - Deep pressure sense only  - Deep pressure sense only         Subjective    History of Present Illness  - Mechanism of injury: Patient presents to balance center to address complaints of decreased endurance, balance, and function. She states she enjoys riding her motorcycle and attending [employer] classes however, participation with recreational activities have been limited due to fear avoidance behavior. Patient states she has noticed a functional decline and subsequent increase in body weight which she has seen weight management for. She denies and falls but states she feels unsteady when walking and is worried about falling. She primarily uses a SPC in her community but will ambulate without AD in her house hold and for  short distances. Patient reports she has tingling and sensation changes in BL feet however, has not yet received formal neuropathy diagnosis. Patient would like to improve balance confidence, endurance, and tolerance to activity to maximize her function.     Updated 4/1/25: Patient states she is very compliant with home exercise program and visits the pool 2-3x/ week where she'll perform exercises as well. Patient states she also got a vibration plate for home which has been helping. She states she hasn't noticed much changes since commencing therapy although participation has been limited. Patient states she would like to improve her aerobic tolerance but is hesitant because of her mobility.     Updated 7/8/25: Patient presents to PT following hiatus secondary to vacation and difficulty scheduling. She states she has not noticed much changes and continues to make modifications to her activity. She had one fall a week prior to her vacation denies any major injuries. Patient did have recent visit to urgent care secondary to toe discoloration from stubbing toe, dx with a sprain.     - Primary AD: SPC  - Assist level at home: independent    Patient goal: Improve tolerance, maximize independence, function, and mobility    Pain  - NA    Social Support  - Steps to enter house: stairs to landing pad  - Stairs in house: 1 flight   - Lives in: multi-level home stays on 1st level  - Lives with: son    - Employment status:   - Hand dominance: RHD    Treatments  - Previous treatment: Ortho PT, Recreational swim classes at [facility]  - Current treatment: none  - Diagnostic Testing: Cervical MRI 2/22/25: IMPRESSION: Multilevel cervical spondylitic degenerative change with mild canal stenosis. Multilevel moderate foraminal narrowing, unchanged.       Objective     LE MMT  - R Hip Flexion: 4-/5  L Hip Flexion: 3+/5  - R Hip Extension: 4/5  L Hip Extension: 4/5  - R Hip Abduction: 4-/5  L Hip Abduction: 4-/5  - R Hip  Adduction: 4/5  L Hip Adduction: 4/5  - R Knee Extension: 4/5  L Knee Extension: 4/5  - R Knee Flexion: 4/5  L Knee Flexion: 4/5  - R Ankle DF: 4/5   L Ankle DF: 4/5  - R Ankle PF: 4/5   L Ankle PF: 4/5    Sensation  - Light touch: intact    Coordination  - Heel to Shin: intact  - Alternate Toe Taps: intact  - Finger to Nose: intact    Gait  - Abnormalities: mild bilateral trendelenburg, dec L stance time, asymmetrical step length, decreased ana           Outcome Measures Initial Eval  2/24/25 & 3/13/25 PN   4/1/25 PN  5/12/25 PN/RE  7/8/25     5xSTS 13.31 sec, no UE 13.03 sec, 0 UE 10.88 sec 10.43 sec     TUG  - Regular  - Cognitive   11.15 sec, with SPC   11.50 sec, with SPC   9.85 sec, no AD  8.66 sec, no AD   8.44 sec NO AD  9.10 sec  8.62 sec     10 meter 0.81 m/s, with SPC 0.78 m/s w. SPC 0.82 m/s w/ SPC 0.87 m/s no AD     FGA 17/30 24/30 20/30 24/30     DGI 14/24 19/24 19/24     mCTSIB  - FTEO (firm)  - FTEC (firm)  - FTEO (foam)  - FTEC (foam)   30 sec  30 sec  30 sec  30 sec        6MWT 830 ft w/ SPC 975ft, w/  ft w/  ft w/ SPC     ABC   58.75% 75%                         Precautions:   Past Medical History:   Diagnosis Date    Arthritis     Asthma     Back pain     Chronic pain disorder     hips into the legs    Colon polyp     DVT (deep venous thrombosis) ([facility]) 2006    Gastric bypass status for obesity     yarelis en y    GERD (gastroesophageal reflux disease)     Hiatal hernia     History of transfusion 2006    after gastric bypass surgery, no reactions    Hypertension     Irritable bowel syndrome     Kidney stone     Muscle weakness     bilateral legs    Numbness and tingling     bilateral feet    Pneumonia     Sacroiliitis ([facility]) 5/15/2018    Spinal stenosis     Tinnitus     occassionally    Vertigo     Wears glasses

## 2025-07-09 ENCOUNTER — OFFICE VISIT (OUTPATIENT)
Facility: CLINIC | Age: 70
End: 2025-07-09
Attending: NURSE PRACTITIONER
Payer: MEDICARE

## 2025-07-09 DIAGNOSIS — N39.3 STRESS INCONTINENCE OF URINE: Primary | ICD-10-CM

## 2025-07-09 DIAGNOSIS — M54.16 LUMBAR RADICULOPATHY: ICD-10-CM

## 2025-07-09 PROCEDURE — 97112 NEUROMUSCULAR REEDUCATION: CPT

## 2025-07-09 PROCEDURE — 97110 THERAPEUTIC EXERCISES: CPT

## 2025-07-09 NOTE — PROGRESS NOTES
"Daily Note   POC expires Unit limit Auth Expiration date PT/OT + Visit Limit? Co-Insurance   25 NA NA BOMN Yes and $0 Co-pay                                 Today's date: 2025  Patient name: Fariba Brunson  : 1955  MRN: [id number]  Referring provider: Jose C Serrano*  Dx:   Encounter Diagnosis     ICD-10-CM    1. Stress incontinence of urine  N39.3       2. Lumbar radiculopathy  M54.16               Start Time: 1154  Stop Time: 1235  Total time in clinic (min): 41 minutes    Subjective: Patient reports  suffering a R great toe sprain while on vacation. XR was negative for fracture. She reports that opening the large door as instructed, and getting in/out of car without spinal twist, has prevented pain exacerbations with this activity.  She has found that standing trunk extension was more helpful than lying with a pillow under her back in the mornings.        Objective: See treatment diary below. Arrived with 7/10 low lumbar/buttock pain across both side.     TE:   - standing   HS stretch heel on 6\" platfmorm, UE support  20 sec x 3  - chair pose (isometric squat) 20 sec x 5, overall posterior hip pain reduced   -  seated L and R piriformis/pigeon stretch 30 sec x 3  - hold for now dome foot exercise R  -  hold for now toe towel curl exercise  - not today: seated R SKTC 20 sec x 3  - seated R/L hip adductor stretch (fold) 20 sec x 3  -end of session NuStep L1 x 4 min    NMR:   - STS: 15 reps, 2nd set with 5.5# TT 13 reps   - HOLD Sled pull (20#): 2 laps, 100 ft downback  - HOLD (12\") w/ fwd lunge with erect trunk for glute activation: 10 reps B/L, 0 UE  - hip abd isometric at wall SLS for opposite glute med activation  L pushing, for R glute activation      Access Code: [email or web address]  Date: 2025  Exercises  - Seated Hamstring Stretch with Chair  - 2 x daily - 7 x weekly - 1 sets - 3 reps - 30 hold  - Seated Gastroc Stretch with Strap  - 2 x daily - 7 x weekly - 1 sets - 3 reps - 30 " hold    Access Code: [email or web address]  Date: 05/19/2025  - Seated Lumbar Extension  - 1 x daily - 7 x weekly - 1 sets - 3 reps - 2sec hold      Assessment: Patient tolerated treatment well with continued focus on LE strengthening and balance.She required occasional standing rest breaks during endurance based task. No significant LoB today. NuStep initiated for cardiovascular conditioning/LE strengthening. Patient will benefit from continued skilled OPPT services to improve tolerance to exercise, balance confidence, and facilitate return to recreational activity to promote improved QoL.    Re-assessment details 7/8/25: Patient is a 69 y.o. Female who has been attending skilled outpatient PT to address complaints of decreased tolerance to activity, and imbalance which are impacting her participation with recreational activity. Past medical history significant for T2DM, gastric bypass surgery, and cervical spinal stenosis. Most significant improvements in balance confidence as per ABC score as compared to previous evaluations. Patient has displayed improvements in dual tasking ability and balance as per TUG cog cost, and FGA respectively. Minimal changes in DGI with greatest difficulty in stair negotiation and negotiating obstacles. She does classify as low risk for falls per APTA and Rehab Measures Lab cutoff scores for FGA and DGI however, her gait speed does categorize her at increased risk for falls. Minor changes displayed in remaining outcome measures suggesting no significant changes in lower extremity strength, mobility, and gait speed. Performance this date can likely be attributed to hiatus from PT due to vacation and scheduling difficulties. She has met one additional goal this date with great progression towards remaining. Extended PoC this date by 8 weeks with plans to emphasize higher level balance and cardiovascular endurance to maximize patient's function. Patient will continue to benefit from skilled PT  services to continue to improve her functional strength and endurance, improve her balance, and reduce her risk of falls to maximize her level of safety at home and in the community.       Plan: Continue per plan of care.      Access Code: [email or web address]  URL: [email or web address]  Date: 05/19/2025  Prepared by: Lavonne Ram    Exercises  - Seated Hamstring Stretch  - 2 x daily - 7 x weekly - 1 sets - 3 reps - 20sec hold  - Seated Piriformis Stretch  - 2 x daily - 7 x weekly - 1 sets - 3 reps - 20sec hold  - Seated Hip Adductor Stretch  - 2 x daily - 7 x weekly - 1 sets - 3 reps - 20sec hold  - Seated Single Knee to Chest  - 2 x daily - 7 x weekly - 1 sets - 3 reps - 20sec hold  - Seated Hamstring Stretch (Mirrored)  - 2 x daily - 7 x weekly - 1 sets - 3 reps - 20sec hold  - Seated Piriformis Stretch (Mirrored)  - 2 x daily - 7 x weekly - 1 sets - 3 reps - 20sec hold  - Seated Hip Adductor Stretch (Mirrored)  - 2 x daily - 7 x weekly - 1 sets - 3 reps - 20sec hold  - Seated Hamstring Stretch (Mirrored)  - 2 x daily - 7 x weekly - 1 sets - 3 reps - 20sec hold    Outcome Measures Initial Eval  2/24/25 & 3/13/25 PN   4/1/25       5xSTS 13.31 sec, no UE 13.03 sec, 0 UE       TUG  - Regular  - Cognitive   11.15 sec, with SPC   11.50 sec, with SPC   9.85 sec, no AD  8.66 sec, no AD       10 meter 0.81 m/s, with SPC 0.78 m/s w. SPC       FGA 17/30 24/30       DGI 14/24 19/24       mCTSIB  - FTEO (firm)  - FTEC (firm)  - FTEO (foam)  - FTEC (foam)   30 sec  30 sec  30 sec  30 sec        6MWT 830 ft w/ SPC 975ft, w/ SPC       ABC

## 2025-07-10 ENCOUNTER — OFFICE VISIT (OUTPATIENT)
Facility: CLINIC | Age: 70
End: 2025-07-10
Attending: NURSE PRACTITIONER
Payer: MEDICARE

## 2025-07-10 DIAGNOSIS — R26.89 IMBALANCE: ICD-10-CM

## 2025-07-10 DIAGNOSIS — R42 DYSEQUILIBRIUM: ICD-10-CM

## 2025-07-10 DIAGNOSIS — I10 ESSENTIAL HYPERTENSION: ICD-10-CM

## 2025-07-10 DIAGNOSIS — M54.16 LUMBAR RADICULOPATHY: ICD-10-CM

## 2025-07-10 DIAGNOSIS — K59.00 CONSTIPATION, UNSPECIFIED CONSTIPATION TYPE: ICD-10-CM

## 2025-07-10 DIAGNOSIS — N39.3 STRESS INCONTINENCE OF URINE: Primary | ICD-10-CM

## 2025-07-10 DIAGNOSIS — R26.89 OTHER ABNORMALITIES OF GAIT AND MOBILITY: ICD-10-CM

## 2025-07-10 PROCEDURE — 97112 NEUROMUSCULAR REEDUCATION: CPT | Performed by: PHYSICAL THERAPIST

## 2025-07-10 RX ORDER — LISINOPRIL AND HYDROCHLOROTHIAZIDE 12.5; 2 MG/1; MG/1
1 TABLET ORAL DAILY
Qty: 90 TABLET | Refills: 1 | Status: SHIPPED | OUTPATIENT
Start: 2025-07-10

## 2025-07-10 NOTE — PROGRESS NOTES
"Daily Note   POC expires Unit limit Auth Expiration date PT/OT + Visit Limit? Co-Insurance   25 NA NA BOMN Yes and $0 Co-pay                                 Today's date: 7/10/2025  Patient name: Fariba Brunson  : 1955  MRN: [id number]  Referring provider: Jose C Serrano*  Dx:   Encounter Diagnosis     ICD-10-CM    1. Stress incontinence of urine  N39.3       2. Lumbar radiculopathy  M54.16       3. Other abnormalities of gait and mobility  R26.89       4. Dysequilibrium  R42       5. Imbalance  R26.89       6. Constipation, unspecified constipation type  K59.00                      Subjective: Patient presents to PT with SPC, no new changes, complaints, or falls.        Objective: See treatment diary below.    NMR:   - STS w/ 15# TT: 15 reps, 2 sets  - Deadlifts w/ 15# TT to 12\" box: 2 sets, 10 reps  - Standing hip ext kicking Pbal (blue) w/ wall ant: 1 set, 20 reps B/L  - Heel sit > tall kneel (on foam) w/ ant Pball (sm yellow) and 15# TT: 30 reps  - Ant Pball (sm yellow) hit w/ staggered stance (1 foot on floor, 1 foot on 6\" box): 1 set, 20 reps B/L  - Sled pull (50#): 2 laps, 50 ft down/back      Access Code: [email or web address]  Date: 2025  Exercises  - Seated Hamstring Stretch with Chair  - 2 x daily - 7 x weekly - 1 sets - 3 reps - 30 hold  - Seated Gastroc Stretch with Strap  - 2 x daily - 7 x weekly - 1 sets - 3 reps - 30 hold    Access Code: [email or web address]  Date: 2025  - Seated Lumbar Extension  - 1 x daily - 7 x weekly - 1 sets - 3 reps - 2sec hold      Assessment: Patient able to tolerate treatment session well with continued focus on LE strengthening and balance. Patient with significant fatigue in LE posterior musculature during strengthening exercises today and was able to recover during standing rest breaks. 1 LoB posteriorly in modified SLS in which she was able to self correct with stepping strategy. (+) Trendelenburg in SLS activities indicating remaining glute weakness. " Patient will benefit from continued skilled outpatient PT in order to improve tolerance to exercise, balance confidence, and facilitate return to recreational activity to promote improved QoL.      Re-assessment details 7/8/25: Patient is a 69 y.o. Female who has been attending skilled outpatient PT to address complaints of decreased tolerance to activity, and imbalance which are impacting her participation with recreational activity. Past medical history significant for T2DM, gastric bypass surgery, and cervical spinal stenosis. Most significant improvements in balance confidence as per ABC score as compared to previous evaluations. Patient has displayed improvements in dual tasking ability and balance as per TUG cog cost, and FGA respectively. Minimal changes in DGI with greatest difficulty in stair negotiation and negotiating obstacles. She does classify as low risk for falls per APTA and Rehab Measures Lab cutoff scores for FGA and DGI however, her gait speed does categorize her at increased risk for falls. Minor changes displayed in remaining outcome measures suggesting no significant changes in lower extremity strength, mobility, and gait speed. Performance this date can likely be attributed to hiatus from PT due to vacation and scheduling difficulties. She has met one additional goal this date with great progression towards remaining. Extended PoC this date by 8 weeks with plans to emphasize higher level balance and cardiovascular endurance to maximize patient's function. Patient will continue to benefit from skilled PT services to continue to improve her functional strength and endurance, improve her balance, and reduce her risk of falls to maximize her level of safety at home and in the community.       Plan: Continue per plan of care.      Access Code: [email or web address]  URL: [email or web address]  Date: 05/19/2025  Prepared by: Lavonne Ram    Exercises  - Seated Hamstring Stretch  - 2 x daily - 7 x  weekly - 1 sets - 3 reps - 20sec hold  - Seated Piriformis Stretch  - 2 x daily - 7 x weekly - 1 sets - 3 reps - 20sec hold  - Seated Hip Adductor Stretch  - 2 x daily - 7 x weekly - 1 sets - 3 reps - 20sec hold  - Seated Single Knee to Chest  - 2 x daily - 7 x weekly - 1 sets - 3 reps - 20sec hold  - Seated Hamstring Stretch (Mirrored)  - 2 x daily - 7 x weekly - 1 sets - 3 reps - 20sec hold  - Seated Piriformis Stretch (Mirrored)  - 2 x daily - 7 x weekly - 1 sets - 3 reps - 20sec hold  - Seated Hip Adductor Stretch (Mirrored)  - 2 x daily - 7 x weekly - 1 sets - 3 reps - 20sec hold  - Seated Hamstring Stretch (Mirrored)  - 2 x daily - 7 x weekly - 1 sets - 3 reps - 20sec hold    Outcome Measures Initial Eval  2/24/25 & 3/13/25 PN   4/1/25 PN  5/12/25 PN/RE  7/8/25     5xSTS 13.31 sec, no UE 13.03 sec, 0 UE 10.88 sec 10.43 sec     TUG  - Regular  - Cognitive   11.15 sec, with SPC   11.50 sec, with SPC   9.85 sec, no AD  8.66 sec, no AD   8.44 sec NO AD  9.10 sec  8.62 sec     10 meter 0.81 m/s, with SPC 0.78 m/s w. SPC 0.82 m/s w/ SPC 0.87 m/s no AD     FGA 17/30 24/30 20/30 24/30     DGI 14/24 19/24 19/24     mCTSIB  - FTEO (firm)  - FTEC (firm)  - FTEO (foam)  - FTEC (foam)   30 sec  30 sec  30 sec  30 sec        6MWT 830 ft w/ SPC 975ft, w/  ft w/  ft w/ SPC     ABC   58.75% 75%

## 2025-07-14 ENCOUNTER — TELEPHONE (OUTPATIENT)
Age: 70
End: 2025-07-14

## 2025-07-14 NOTE — TELEPHONE ENCOUNTER
Left message for patient reminding them of upcoming appointment with Jose C, new office address and phone number.

## 2025-07-15 ENCOUNTER — OFFICE VISIT (OUTPATIENT)
Dept: PHYSICAL THERAPY | Facility: CLINIC | Age: 70
End: 2025-07-15
Attending: NURSE PRACTITIONER
Payer: MEDICARE

## 2025-07-15 DIAGNOSIS — M54.16 LUMBAR RADICULOPATHY: ICD-10-CM

## 2025-07-15 DIAGNOSIS — N39.3 STRESS INCONTINENCE OF URINE: Primary | ICD-10-CM

## 2025-07-15 DIAGNOSIS — K59.00 CONSTIPATION, UNSPECIFIED CONSTIPATION TYPE: ICD-10-CM

## 2025-07-15 PROCEDURE — 97140 MANUAL THERAPY 1/> REGIONS: CPT

## 2025-07-15 PROCEDURE — 97112 NEUROMUSCULAR REEDUCATION: CPT

## 2025-07-16 NOTE — PROGRESS NOTES
Name: Fariba Brunson      : 1955      MRN: [id number]  Encounter Provider: JACINTO Lara  Encounter Date: 2025   Encounter department: [facility]  :  Assessment & Plan  Lumbar spondylosis    Orders:    EMG 2 limb lower extremity; Future    DDD (degenerative disc disease), lumbar    Orders:    EMG 2 limb lower extremity; Future    Cervical spinal stenosis         Polyneuropathy    Orders:    EMG 2 limb lower extremity; Future      Patient Instructions   Continue with PT for LE weakness, radicular pain and neck stenosis  Referral for Pain Management for Low Back radiculopathy, cervical stenosis and weakness  EMG of the LE  Follow up with neurology office in 6 months or sooner if needed     History of Present Illness   Fariba Kovacs is a 68yo female with PMH of cervical disc disease, asthma, gastric bypass, HTN, IBS and kidney who comes to the Neurology office for consult of dizziness.   Pt was seen by Dr. Hardy in  but has not been back.   She had reported some memory issues as well as balance issues. Denies imaging.    pt was seen by Dr. Hardy for dizziness/lightheadedness.   MRI of the cervical spine and EMG was recommended.    MRI Brain IAC normal   Cervical MRI with multilevel degenerative disease of multiple levels and severe to moderate foraminal narrowing.    EMG . There is evidence of sensory diffuse polyneuropathy in LE. There is evidence of chronic L5-S1 radiculopathy. Clinical correlation is advised.     Patient reported to the neurology office that she had a heaviness in her leg, denied any specific dizziness.  She stated the heaviness causes her difficulties with ambulation and getting her gait started.  She has progressive heaviness over the course of time, ambulates with use of a cane.  She has mild ataxia with tandem gait and inability to heel-toe walk.  Her casual gait is relatively steady with use  of the cane.  She has mild proximal muscle giveway at the hips, otherwise equal strength throughout.  She had done physical therapy prior to the COVID pandemic, joined the [employer] and was doing some swimming.  She did not have PT back that time.  She had seen pain management in the past and had [facility] approximately 5 years ago.  She stated the pain and spine management provider indicated her cervical spine was significant for derangement.  She has significant pain in the low back and in her neck was unclear as to whether or not this was related to her cervical spine.  Patient had a decrease in Achilles reflexes, 2+ in the patellar's.  Labs appeared to be in normal ranges  CT lumbar spine post fusion in 2024 showed fusion at L4-5 with moderate to severe foraminal narrowing.  Patient reported numbness and tingling to the bottoms of her feet, decreased sensation in the left lower extremity from the knee down compared to the right.  EMG findings significant for polyneuropathy, mild L4-5 radicular pain from prior lumbar fusion.  She had waxing and waning A1c and blood sugars.  Encouraged her follow-up with her PCP regarding better blood glucose control.  She has a history of alcohol use, smoking and sleep apnea all can develop neuropathy.  Advised to resume PT for cervical spine, lower extremity weakness and heaviness with balance therapy, recommended MRI of the cervical spine due to the possibility of myelopathy.  The patient had no improvement with her PT, recommendations for EMG.      MRI of the spine was completed, cervical spine with multilevel cervical spondylitic degenerative change with mild canal stenosis. Multilevel moderate foraminal narrowing, unchanged.  Encourage patient to continue with PT, likely eval with EMG.  Pain management referral was provided.      Patient reports back to the neurology office today, continues to have a heaviness sensation in her legs but denies any specific issues with dizziness.  She  had started physical therapy and has been doing exercises for her low back as well as her pelvic floor she has had incontinence with urine.  States that she may not make it to the bathroom in time she does not give herself enough notice.  She is noticing some improvement and having less back pain.  She continues to have the weakness in the legs.  Patient states that she would like to find some aqua therapy however due to funding most gyms with pools and/or other areas of pools have been shut down or have hours that do not work with her work schedule.  She states that when she was away on vacation, she was staying active and seeing sites.  She had noticed having increased strength when she had gone back to physical therapy.  However since then she does sit in her car at work for 7 to 12 hours and notices a progression of weakness likely related to her lifestyle overall.  Patient reports paresthesia to the lower extremities in the feet bilaterally.  She is having weakness and feels off balance.  She has fallen x 1 in her garden recently.  She has not had any physical therapy regarding her neck, she has not followed up with pain management.  She did not recall having a pain management referral provided but will contact them for further evaluation of her neck as well as her low back.  Given her ongoing issues with lower extremity weakness and pains, possible involvement of the lumbar spine.  We did order an EMG of the lower extremities.  Patient is not on medication, she does not feel as though she needs medication at this point in time and is preferring to stay off of pharmaceuticals if she can.  Patient will have additional testing, follow-up with pain management and continue PT.  She will follow-up in the outpatient neurology office in 6 months or sooner if needed.             Review of Systems   Constitutional:  Negative for chills and fever.   HENT:  Negative for ear pain and sore throat.    Eyes:  Negative for  "pain and visual disturbance.   Respiratory:  Negative for cough and shortness of breath.    Cardiovascular:  Negative for chest pain and palpitations.   Gastrointestinal:  Negative for abdominal pain and vomiting.   Genitourinary:  Negative for dysuria and hematuria.   Musculoskeletal:  Positive for back pain and gait problem. Negative for arthralgias.   Skin:  Negative for color change and rash.   Neurological:  Negative for seizures and syncope.   All other systems reviewed and are negative.   I have personally reviewed the MA's review of systems and made changes as necessary.        Past Medical History[1]    Past Surgical History[2]    Social History[3]    Family History[4]    Current Medications[5]    No Known Allergies     Blood pressure 150/94, pulse 90, height 5' 4\" (1.626 m), weight 114 kg (251 lb).       Objective   /94 (BP Location: Left arm, Patient Position: Sitting, Cuff Size: Large)   Pulse 90   Ht 5' 4\" (1.626 m)   Wt 114 kg (251 lb)   BMI 43.08 kg/m²     Physical Exam  Vitals reviewed.   Constitutional:       Appearance: Normal appearance. She is well-developed.   HENT:      Head: Normocephalic.      Nose: Nose normal.      Mouth/Throat:      Mouth: Mucous membranes are moist.     Eyes:      General: Lids are normal.      Extraocular Movements: Extraocular movements intact.      Pupils: Pupils are equal, round, and reactive to light.     Pulmonary:      Effort: Pulmonary effort is normal.   Abdominal:      Palpations: Abdomen is soft.     Musculoskeletal:      Cervical back: Normal range of motion.     Skin:     General: Skin is warm and dry.     Neurological:      Mental Status: She is alert.      Coordination: Romberg sign negative.     Psychiatric:         Attention and Perception: Attention and perception normal.         Mood and Affect: Mood and affect normal.         Speech: Speech normal.         Behavior: Behavior normal. Behavior is cooperative.         Thought Content: Thought " content normal.         Cognition and Memory: Cognition and memory normal.         Judgment: Judgment normal.       Neurological Exam  Mental Status  Alert. Oriented to person, place, time and situation. Memory is normal. Recent and remote memory are intact. Speech is normal. Language is fluent with no aphasia. Attention and concentration are normal. Fund of knowledge is appropriate for level of education.    Cranial Nerves  CN II: Visual acuity is normal. Visual fields full to confrontation.  CN III, IV, VI: Extraocular movements intact bilaterally. Normal lids and orbits bilaterally. Pupils equal round and reactive to light bilaterally.  CN V: Facial sensation is normal.  CN VII: Full and symmetric facial movement.  CN VIII: Hearing is normal.  CN IX, X: Palate elevates symmetrically. Normal gag reflex.  CN XI: Shoulder shrug strength is normal.  CN XII: Tongue midline without atrophy or fasciculations.    Motor  Normal muscle bulk throughout. Normal muscle tone. No abnormal involuntary movements. Strength is 5/5 in all four extremities except as noted. No pronator drift.                                             Right                     Left   Iliopsoas                               4+                          4+   Quadriceps                           4+                          4+   Hamstring                             4+                          4+    Sensory  Light touch is normal in upper and lower extremities. Temperature is normal in upper and lower extremities. Vibration is normal in upper and lower extremities. Proprioception is normal in upper and lower extremities.     Reflexes  Deep tendon reflexes are 2+ and symmetric except as noted.                                            Right                      Left  Patellar                                Tr                         Tr  Achilles                                Tr                         Tr    Right pathological reflexes: Yahaira's  absent.  Left pathological reflexes: Yahaira's absent.    Coordination  Right: Finger-to-nose normal.Left: Finger-to-nose normal.    Gait  Casual gait: Wide stance. Reduced stride length. Reduced right arm swing. Reduced left arm swing. Romberg is absent. Unable to rise from chair without using arms.  Patient ambulation with the assistance of a cane for stability..      Radiology Results Review : No pertinent imaging studies reviewed.    Administrative Statements   I have spent a total time of 60 minutes in caring for this patient on the day of the visit/encounter including Diagnostic results, Risks and benefits of tx options, Instructions for management, Patient and family education, Impressions, Counseling / Coordination of care, Documenting in the medical record, Reviewing/placing orders in the medical record (including tests, medications, and/or procedures), and Obtaining or reviewing history  .         [1]   Past Medical History:  Diagnosis Date    Arthritis     Asthma     Back pain     Chronic pain disorder     hips into the legs    Colon polyp     DVT (deep venous thrombosis) ([facility])     Gastric bypass status for obesity     yarelis en y    GERD (gastroesophageal reflux disease)     Hiatal hernia     History of transfusion     after gastric bypass surgery, no reactions    Hypertension     Irritable bowel syndrome     Kidney stone     Muscle weakness     bilateral legs    Numbness and tingling     bilateral feet    Pneumonia     Sacroiliitis (H[facility]) 5/15/2018    Spinal stenosis     Tinnitus     occassionally    Vertigo     Wears glasses    [2]   Past Surgical History:  Procedure Laterality Date    APPENDECTOMY      BACK SURGERY  2018    L4-5 fusion    BARIATRIC SURGERY  2005    yarelis en y- pt states the procedure was done incorrectly which lead to additional surgeries from -    CARPAL TUNNEL RELEASE Right      SECTION      x1    CHOLECYSTECTOMY      COLON SURGERY      partial removal of  the small bowel-necrosis-between 2006-09    COLONOSCOPY      COLONOSCOPY W/ BIOPSIES AND POLYPECTOMY      EPIDURAL BLOCK INJECTION N/A 11/24/2021    Procedure: C6 C7 cervical epidural steroid injection ( [id number]);  Surgeon: Jeremi Gamino MD;  Location: [facility] MAIN OR;  Service: Pain Management     EPIDURAL BLOCK INJECTION N/A 12/28/2021    Procedure: C6 C7 cervical epidural steroid injection ([id number]);  Surgeon: Jeremi Gamino MD;  Location: [facility] MAIN OR;  Service: Pain Management     FLEXIBLE BRONCHOSCOPY W/ UPPER ENDOSCOPY      GASTRECTOMY      after gastric bypass-(failed surgery per pt) 2006-09    HERNIA REPAIR      incisional hernias-diaphragm area    IL ARTHROCENTESIS ASPIR&/INJ SMALL JT/BURSA W/O US N/A 05/02/2018    Procedure: Coccygeal Injection & Ganglion Impar Block;  Surgeon: Jeremi Gamino MD;  Location: [facility] MAIN OR;  Service: Pain Management     IL ARTHRODESIS POSTERIOR INTERBODY 1 NTRSPC LUMBAR N/A 06/27/2018    Procedure: L4-5 DECOMPRESSION INTERBODY INSTRUMENTED FUSION;  Surgeon: Martinez Garcia MD;  Location: [facility] Main OR;  Service: Orthopedics    IL COLONOSCOPY FLX DX W/COLLJ SPEC WHEN PFRMD N/A 08/24/2018    Procedure: COLONOSCOPY;  Surgeon: Terry Booth MD;  Location: [facility] GI LAB;  Service: Gastroenterology    IL ESOPHAGOGASTRODUODENOSCOPY TRANSORAL DIAGNOSTIC N/A 02/19/2018    Procedure: ESOPHAGOGASTRODUODENOSCOPY (EGD);  Surgeon: Terry Booth MD;  Location: [facility] GI LAB;  Service: Gastroenterology    IL INJECT SI JOINT ARTHRGRPHY&/ANES/STEROID W/EMMANUEL Right 05/25/2018    Procedure: Rt Sacroiliac Joint Injection;  Surgeon: Jeremi Gamino MD;  Location: [facility] MAIN OR;  Service: Pain Management     IL INJECT SI JOINT ARTHRGRPHY&/ANES/STEROID W/EMMANUEL Right 05/01/2019    Procedure: Sacroiliac Joint Injection ([id number]);  Surgeon: Jeremi Gamino MD;  Location: [facility] MAIN OR;  Service: Pain Management     TOENAIL EXCISION  2024    ingrown toenails fixed    TONSILECTOMY, ADENOIDECTOMY, BILATERAL  MYRINGOTOMY AND TUBES      TONSILLECTOMY      UPPER GASTROINTESTINAL ENDOSCOPY     [3]   Social History  Socioeconomic History    Marital status:    Tobacco Use    Smoking status: Never     Passive exposure: Never    Smokeless tobacco: Never   Vaping Use    Vaping status: Never Used   Substance and Sexual Activity    Alcohol use: Yes     Alcohol/week: 7.0 standard drinks of alcohol     Types: 5 Cans of beer, 2 Shots of liquor per week     Comment: socially    Drug use: Not Currently     Types: Marijuana     Comment: has medical marijuana card. lozenges    Sexual activity: Yes     Birth control/protection: Post-menopausal     Social Drivers of Health     Financial Resource Strain: High Risk (11/22/2022)    Overall Financial Resource Strain (CARDIA)     Difficulty of Paying Living Expenses: Hard   Food Insecurity: No Food Insecurity (1/16/2025)    Nursing - Inadequate Food Risk Classification     Worried About Running Out of Food in the Last Year: Never true     Ran Out of Food in the Last Year: Never true   Transportation Needs: No Transportation Needs (1/16/2025)    PRAPARE - Transportation     Lack of Transportation (Medical): No     Lack of Transportation (Non-Medical): No   Housing Stability: Low Risk  (1/16/2025)    Housing Stability Vital Sign     Unable to Pay for Housing in the Last Year: No     Number of Times Moved in the Last Year: 0     Homeless in the Last Year: No   [4]   Family History  Problem Relation Name Age of Onset    Endometrial cancer Mother      Diabetes Mother      Aortic aneurysm Father      Cancer Father          prostate    Heart disease Sister          open heart    Cancer Sister          breast    Breast cancer Sister  54    Diabetes Maternal Grandfather      Diabetes Brother Suresh     Cancer Brother Suresh         spinal cancer    Stroke Brother Leandro     Hypertension Brother Leandro     Asperger's syndrome Son          high functioning    No Known Problems Maternal Aunt      No Known  Problems Maternal Aunt      Colon cancer Maternal Uncle  60    No Known Problems Paternal Aunt      No Known Problems Paternal Aunt     [5]   Current Outpatient Medications:     albuterol (ProAir HFA) 90 mcg/act inhaler, Inhale 2 puffs every 6 (six) hours as needed for wheezing, Disp: 8.5 g, Rfl: 0    Ascorbic Acid (vitamin C) 100 MG tablet, Take 100 mg by mouth in the morning., Disp: , Rfl:     calcium carbonate (OS-BEAU) 1250 (500 Ca) MG chewable tablet, Chew 1 tablet in the morning., Disp: , Rfl:     famotidine (PEPCID) 40 MG tablet, Take 1 tablet (40 mg total) by mouth in the morning, Disp: 90 tablet, Rfl: 2    fluticasone (FLONASE) 50 mcg/act nasal spray, 2 sprays into each nostril daily, Disp: 16 g, Rfl: 3    lisinopril-hydrochlorothiazide (PRINZIDE,ZESTORETIC) 20-12.5 MG per tablet, TAKE ONE TABLET BY MOUTH EVERY DAY, Disp: 90 tablet, Rfl: 1    multivitamin (THERAGRAN) TABS, Take 1 tablet by mouth in the morning., Disp: , Rfl:     Omega-3 Fatty Acids (fish oil) 1,000 mg, Take 1 capsule (1,000 mg total) by mouth 2 (two) times a day, Disp: 60 capsule, Rfl: 6    omeprazole (PriLOSEC) 40 MG capsule, Take 1 capsule (40 mg total) by mouth daily, Disp: 30 capsule, Rfl: 5    saccharomyces boulardii (FLORASTOR) 250 mg capsule, Take 250 mg by mouth in the morning, Disp: , Rfl:     vitamin B-12 (CYANOCOBALAMIN) 50 MCG tablet, Take 50 mcg by mouth in the morning., Disp: , Rfl:     vitamin E 100 UNIT capsule, Take 100 Units by mouth in the morning., Disp: , Rfl:

## 2025-07-17 ENCOUNTER — OFFICE VISIT (OUTPATIENT)
Facility: CLINIC | Age: 70
End: 2025-07-17
Attending: NURSE PRACTITIONER
Payer: MEDICARE

## 2025-07-17 DIAGNOSIS — R26.89 OTHER ABNORMALITIES OF GAIT AND MOBILITY: ICD-10-CM

## 2025-07-17 DIAGNOSIS — R42 DYSEQUILIBRIUM: ICD-10-CM

## 2025-07-17 DIAGNOSIS — R26.89 IMBALANCE: Primary | ICD-10-CM

## 2025-07-17 PROCEDURE — 97112 NEUROMUSCULAR REEDUCATION: CPT

## 2025-07-17 PROCEDURE — 97530 THERAPEUTIC ACTIVITIES: CPT

## 2025-07-17 NOTE — PROGRESS NOTES
"Daily Note   POC expires Unit limit Auth Expiration date PT/OT + Visit Limit? Co-Insurance   25 NA NA BOMN Yes and $0 Co-pay                                 Today's date: 2025  Patient name: Fariba Brunson  : 1955  MRN: [id number]  Referring provider: Jose C Serrano*  Dx:   Encounter Diagnosis     ICD-10-CM    1. Imbalance  R26.89       2. Other abnormalities of gait and mobility  R26.89       3. Dysequilibrium  R42                      Subjective: Patient presents to PT with SPC, stating she is having difficulty gardening and walking on uneven ground.       Objective: See treatment diary below.    NMR/TA:   - Quadruped<>tall kneel: 15x  - Tall kneel<>half kneel: 12x ea  - Tall kneel on BOSU to block buildin min  - Ambulation over mat with objects hidden underneath, no AD: 3 min  - Ambulation over mat with objects underneath, + 10# TT: 2 min  - STS from 18\" box w/ foam beam at feet: 12 reps  - STS from 18\" box foam beam at feet w/ 10# TT: 10x  - Fwd through hurdles (9\") w/ intermittent RR: 15ft, 7 laps    Access Code: [email or web address]  Date: 2025  Exercises  - Seated Hamstring Stretch with Chair  - 2 x daily - 7 x weekly - 1 sets - 3 reps - 30 hold  - Seated Gastroc Stretch with Strap  - 2 x daily - 7 x weekly - 1 sets - 3 reps - 30 hold    Access Code: [email or web address]  Date: 2025  - Seated Lumbar Extension  - 1 x daily - 7 x weekly - 1 sets - 3 reps - 2sec hold      Assessment: Patient able to tolerate treatment session well with continued focus on LE strengthening and balance. Emphasized floor mobility and tasks on uneven surfaces to promote saliency and balance confidence. Patient with hip pain during transitional movement from quadruped to tall kneeling however, with improvements when cued to slow movement pattern and part-task performance. Patient will benefit from continued skilled outpatient PT in order to improve tolerance to exercise, balance confidence, and facilitate " return to recreational activity to promote improved QoL.          Plan: Continue per plan of care.      Access Code: [email or web address]  URL: [email or web address]  Date: 05/19/2025  Prepared by: Lavonne Ram    Exercises  - Seated Hamstring Stretch  - 2 x daily - 7 x weekly - 1 sets - 3 reps - 20sec hold  - Seated Piriformis Stretch  - 2 x daily - 7 x weekly - 1 sets - 3 reps - 20sec hold  - Seated Hip Adductor Stretch  - 2 x daily - 7 x weekly - 1 sets - 3 reps - 20sec hold  - Seated Single Knee to Chest  - 2 x daily - 7 x weekly - 1 sets - 3 reps - 20sec hold  - Seated Hamstring Stretch (Mirrored)  - 2 x daily - 7 x weekly - 1 sets - 3 reps - 20sec hold  - Seated Piriformis Stretch (Mirrored)  - 2 x daily - 7 x weekly - 1 sets - 3 reps - 20sec hold  - Seated Hip Adductor Stretch (Mirrored)  - 2 x daily - 7 x weekly - 1 sets - 3 reps - 20sec hold  - Seated Hamstring Stretch (Mirrored)  - 2 x daily - 7 x weekly - 1 sets - 3 reps - 20sec hold    Outcome Measures Initial Eval  2/24/25 & 3/13/25 PN   4/1/25 PN  5/12/25 PN/RE  7/8/25     5xSTS 13.31 sec, no UE 13.03 sec, 0 UE 10.88 sec 10.43 sec     TUG  - Regular  - Cognitive   11.15 sec, with SPC   11.50 sec, with SPC   9.85 sec, no AD  8.66 sec, no AD   8.44 sec NO AD  9.10 sec  8.62 sec     10 meter 0.81 m/s, with SPC 0.78 m/s w. SPC 0.82 m/s w/ SPC 0.87 m/s no AD     FGA 17/30 24/30 20/30 24/30     DGI 14/24 19/24 19/24     mCTSIB  - FTEO (firm)  - FTEC (firm)  - FTEO (foam)  - FTEC (foam)   30 sec  30 sec  30 sec  30 sec        6MWT 830 ft w/ SPC 975ft, w/  ft w/  ft w/ SPC     [facility]   58.75% 75%

## 2025-07-18 ENCOUNTER — OFFICE VISIT (OUTPATIENT)
Age: 70
End: 2025-07-18
Payer: MEDICARE

## 2025-07-18 VITALS
WEIGHT: 251 LBS | HEART RATE: 90 BPM | SYSTOLIC BLOOD PRESSURE: 150 MMHG | HEIGHT: 64 IN | BODY MASS INDEX: 42.85 KG/M2 | DIASTOLIC BLOOD PRESSURE: 94 MMHG

## 2025-07-18 DIAGNOSIS — M48.02 CERVICAL SPINAL STENOSIS: ICD-10-CM

## 2025-07-18 DIAGNOSIS — M51.369 DDD (DEGENERATIVE DISC DISEASE), LUMBAR: ICD-10-CM

## 2025-07-18 DIAGNOSIS — G62.9 POLYNEUROPATHY: Primary | ICD-10-CM

## 2025-07-18 DIAGNOSIS — M47.816 LUMBAR SPONDYLOSIS: ICD-10-CM

## 2025-07-18 PROCEDURE — G2211 COMPLEX E/M VISIT ADD ON: HCPCS | Performed by: NURSE PRACTITIONER

## 2025-07-18 PROCEDURE — 99215 OFFICE O/P EST HI 40 MIN: CPT | Performed by: NURSE PRACTITIONER

## 2025-07-18 NOTE — PATIENT INSTRUCTIONS
Continue with PT for LE weakness, radicular pain and neck stenosis  Referral for Pain Management for Low Back radiculopathy, cervical stenosis and weakness  EMG of the LE  Follow up with neurology office in 6 months or sooner if needed

## 2025-07-21 ENCOUNTER — OFFICE VISIT (OUTPATIENT)
Facility: CLINIC | Age: 70
End: 2025-07-21
Attending: NURSE PRACTITIONER
Payer: MEDICARE

## 2025-07-21 DIAGNOSIS — R26.89 OTHER ABNORMALITIES OF GAIT AND MOBILITY: ICD-10-CM

## 2025-07-21 DIAGNOSIS — R26.89 IMBALANCE: Primary | ICD-10-CM

## 2025-07-21 DIAGNOSIS — R42 DYSEQUILIBRIUM: ICD-10-CM

## 2025-07-21 PROCEDURE — 97112 NEUROMUSCULAR REEDUCATION: CPT | Performed by: PHYSICAL THERAPIST

## 2025-07-21 PROCEDURE — 97110 THERAPEUTIC EXERCISES: CPT | Performed by: PHYSICAL THERAPIST

## 2025-07-21 NOTE — PROGRESS NOTES
Daily Note   POC expires Unit limit Auth Expiration date PT/OT + Visit Limit? Co-Insurance   25 NA NA BOMN Yes and $0 Co-pay                                 Today's date: 2025  Patient name: Fariba Brunson  : 1955  MRN: [id number]  Referring provider: Jose C Serrano*  Dx:   Encounter Diagnosis     ICD-10-CM    1. Imbalance  R26.89       2. Other abnormalities of gait and mobility  R26.89       3. Dysequilibrium  R42                      Subjective: Patient presents to PT with SPC, reports she wants to work on her stiff neck      Objective: See treatment diary below.    TE:  - UT stretch: 30 sec, 3x ea  - LS stretch: 30 sec, 3x ea  - Chin tuck: 10x 5 sec holds    NMR/TA:   - Sidestepping on foam: 10 laps  - FWD/BWD step up on foam beam: 20x  - Mini squats on foam beam: 20x  - Incline standing on foam: 4 minutes  - Trunk twist on foam: 20x 5# TT        Access Code: [email or web address]  Date: 2025  Exercises  - Seated Hamstring Stretch with Chair  - 2 x daily - 7 x weekly - 1 sets - 3 reps - 30 hold  - Seated Gastroc Stretch with Strap  - 2 x daily - 7 x weekly - 1 sets - 3 reps - 30 hold    Access Code: [email or web address]  Date: 2025  - Seated Lumbar Extension  - 1 x daily - 7 x weekly - 1 sets - 3 reps - 2sec hold      Assessment: Patient tolerated treatment well. Began patient with cervical stretches focusing on reducing patient pain level and improving her cervical mobility. Continued with dynamic balance exercises using foam beam to provide unstable surface. When stepping forward and backwards on foam beam patient displays occasional toes catching as fatigue increases causing anterior LOB that she corrects with UE support. Patient will benefit from continued skilled outpatient PT in order to improve tolerance to exercise, balance confidence, and facilitate return to recreational activity to promote improved QoL.          Plan: Continue per plan of care.      Access Code: [email or web address]  URL:  [email or web address]  Date: 05/19/2025  Prepared by: Lavonne Ram    Exercises  - Seated Hamstring Stretch  - 2 x daily - 7 x weekly - 1 sets - 3 reps - 20sec hold  - Seated Piriformis Stretch  - 2 x daily - 7 x weekly - 1 sets - 3 reps - 20sec hold  - Seated Hip Adductor Stretch  - 2 x daily - 7 x weekly - 1 sets - 3 reps - 20sec hold  - Seated Single Knee to Chest  - 2 x daily - 7 x weekly - 1 sets - 3 reps - 20sec hold  - Seated Hamstring Stretch (Mirrored)  - 2 x daily - 7 x weekly - 1 sets - 3 reps - 20sec hold  - Seated Piriformis Stretch (Mirrored)  - 2 x daily - 7 x weekly - 1 sets - 3 reps - 20sec hold  - Seated Hip Adductor Stretch (Mirrored)  - 2 x daily - 7 x weekly - 1 sets - 3 reps - 20sec hold  - Seated Hamstring Stretch (Mirrored)  - 2 x daily - 7 x weekly - 1 sets - 3 reps - 20sec hold    Outcome Measures Initial Eval  2/24/25 & 3/13/25 PN   4/1/25 PN  5/12/25 PN/RE  7/8/25     5xSTS 13.31 sec, no UE 13.03 sec, 0 UE 10.88 sec 10.43 sec     TUG  - Regular  - Cognitive   11.15 sec, with SPC   11.50 sec, with SPC   9.85 sec, no AD  8.66 sec, no AD   8.44 sec NO AD  9.10 sec  8.62 sec     10 meter 0.81 m/s, with SPC 0.78 m/s w. SPC 0.82 m/s w/ SPC 0.87 m/s no AD     FGA 17/30 24/30 20/30 24/30     DGI 14/24 19/24 19/24     mCTSIB  - FTEO (firm)  - FTEC (firm)  - FTEO (foam)  - FTEC (foam)   30 sec  30 sec  30 sec  30 sec        6MWT 830 ft w/ SPC 975ft, w/  ft w/  ft w/ SPC     [facility]   58.75% 75%

## 2025-07-22 ENCOUNTER — APPOINTMENT (OUTPATIENT)
Dept: PHYSICAL THERAPY | Facility: CLINIC | Age: 70
End: 2025-07-22
Attending: NURSE PRACTITIONER
Payer: MEDICARE

## 2025-07-24 ENCOUNTER — OFFICE VISIT (OUTPATIENT)
Facility: CLINIC | Age: 70
End: 2025-07-24
Attending: NURSE PRACTITIONER
Payer: MEDICARE

## 2025-07-24 DIAGNOSIS — R26.89 IMBALANCE: Primary | ICD-10-CM

## 2025-07-24 DIAGNOSIS — R26.89 OTHER ABNORMALITIES OF GAIT AND MOBILITY: ICD-10-CM

## 2025-07-24 DIAGNOSIS — R42 DYSEQUILIBRIUM: ICD-10-CM

## 2025-07-24 PROCEDURE — 97112 NEUROMUSCULAR REEDUCATION: CPT

## 2025-07-24 PROCEDURE — 97110 THERAPEUTIC EXERCISES: CPT

## 2025-07-24 NOTE — PROGRESS NOTES
"Daily Note   POC expires Unit limit Auth Expiration date PT/OT + Visit Limit? Co-Insurance   25 NA NA BOMN Yes and $0 Co-pay                                 Today's date: 2025  Patient name: Fariba Brunson  : 1955  MRN: [id number]  Referring provider: Jose C Srerano*  Dx:   Encounter Diagnosis     ICD-10-CM    1. Imbalance  R26.89       2. Other abnormalities of gait and mobility  R26.89       3. Dysequilibrium  R42             Start Time: 1506  Stop Time: 1546  Total time in clinic (min): 40 minutes    Subjective: Patient presents to PT with SPC, reports she wants to work on her stiff neck.       Objective: See treatment diary below.    TE:  -hip adductor stretch   - UT stretch: 30 sec, 3x ea, weight of arm added for slight overpressure   - LS stretch: 30 sec, 3x ea  - Chin tuck: 10x 5 sec holds    NMR/TA:   - Sidestepping on foam holding : 10 laps (challenging)  - FWD/BWD step up on foam beam: 20x  - Mini squats on foam beam: 20x  - Incline standing on foam: 4 minutes  - kneeling on 18\" box at counter top x 2 min, no UE support, with UE movement shoulder flexion/horiz abd.   -not today:  Trunk twist on foam: 20x 5# TT        Access Code: [email or web address]  Date: 2025  Exercises  - Seated Hamstring Stretch with Chair  - 2 x daily - 7 x weekly - 1 sets - 3 reps - 30 hold  - Seated Gastroc Stretch with Strap  - 2 x daily - 7 x weekly - 1 sets - 3 reps - 30 hold    Access Code: [email or web address]  Date: 2025  - Seated Lumbar Extension  - 1 x daily - 7 x weekly - 1 sets - 3 reps - 2sec hold      Assessment: Patient tolerated treatment well. Began patient with cervical stretches in good posture,  focusing on reducing patient pain level and improving her cervical mobility. Continued with dynamic balance exercises using foam beam to provide unstable surface. When stepping forward and backwards on foam beam patient displays occasional toes catching as fatigue increases causing anterior LOB that " she corrects with UE support. Patient will benefit from continued skilled outpatient PT in order to improve tolerance to exercise, balance confidence, and facilitate return to recreational activity to promote improved QoL.      Plan: Continue per plan of care.  She works out in a pool also.       Access Code: [email or web address]  URL: [email or web address]  Date: 05/19/2025  Prepared by: Lavonne Ram    Exercises  - Seated Hamstring Stretch  - 2 x daily - 7 x weekly - 1 sets - 3 reps - 20sec hold  - Seated Piriformis Stretch  - 2 x daily - 7 x weekly - 1 sets - 3 reps - 20sec hold  - Seated Hip Adductor Stretch  - 2 x daily - 7 x weekly - 1 sets - 3 reps - 20sec hold  - Seated Single Knee to Chest  - 2 x daily - 7 x weekly - 1 sets - 3 reps - 20sec hold  - Seated Hamstring Stretch (Mirrored)  - 2 x daily - 7 x weekly - 1 sets - 3 reps - 20sec hold  - Seated Piriformis Stretch (Mirrored)  - 2 x daily - 7 x weekly - 1 sets - 3 reps - 20sec hold  - Seated Hip Adductor Stretch (Mirrored)  - 2 x daily - 7 x weekly - 1 sets - 3 reps - 20sec hold  - Seated Hamstring Stretch (Mirrored)  - 2 x daily - 7 x weekly - 1 sets - 3 reps - 20sec hold    Outcome Measures Initial Eval  2/24/25 & 3/13/25 PN   4/1/25 PN  5/12/25 PN/RE  7/8/25     5xSTS 13.31 sec, no UE 13.03 sec, 0 UE 10.88 sec 10.43 sec     TUG  - Regular  - Cognitive   11.15 sec, with SPC   11.50 sec, with SPC   9.85 sec, no AD  8.66 sec, no AD   8.44 sec NO AD  9.10 sec  8.62 sec     10 meter 0.81 m/s, with SPC 0.78 m/s w. SPC 0.82 m/s w/ SPC 0.87 m/s no AD     FGA 17/30 24/30 20/30 24/30     DGI 14/24 19/24 19/24     mCTSIB  - FTEO (firm)  - FTEC (firm)  - FTEO (foam)  - FTEC (foam)   30 sec  30 sec  30 sec  30 sec        6MWT 830 ft w/ SPC 975ft, w/  ft w/  ft w/ SPC     ABC   58.75% 75%

## 2025-07-25 ENCOUNTER — APPOINTMENT (OUTPATIENT)
Facility: CLINIC | Age: 70
End: 2025-07-25
Attending: NURSE PRACTITIONER
Payer: MEDICARE

## 2025-07-28 ENCOUNTER — OFFICE VISIT (OUTPATIENT)
Facility: CLINIC | Age: 70
End: 2025-07-28
Attending: NURSE PRACTITIONER
Payer: MEDICARE

## 2025-07-28 DIAGNOSIS — R26.89 IMBALANCE: Primary | ICD-10-CM

## 2025-07-28 DIAGNOSIS — R26.89 OTHER ABNORMALITIES OF GAIT AND MOBILITY: ICD-10-CM

## 2025-07-28 DIAGNOSIS — R42 DYSEQUILIBRIUM: ICD-10-CM

## 2025-07-28 PROCEDURE — 97112 NEUROMUSCULAR REEDUCATION: CPT | Performed by: PHYSICAL THERAPIST

## 2025-07-28 PROCEDURE — 97110 THERAPEUTIC EXERCISES: CPT | Performed by: PHYSICAL THERAPIST

## 2025-07-29 ENCOUNTER — OFFICE VISIT (OUTPATIENT)
Dept: PHYSICAL THERAPY | Facility: CLINIC | Age: 70
End: 2025-07-29
Attending: NURSE PRACTITIONER
Payer: MEDICARE

## 2025-07-29 DIAGNOSIS — N39.3 STRESS INCONTINENCE OF URINE: ICD-10-CM

## 2025-07-29 DIAGNOSIS — K59.00 CONSTIPATION, UNSPECIFIED CONSTIPATION TYPE: Primary | ICD-10-CM

## 2025-07-29 PROCEDURE — 97140 MANUAL THERAPY 1/> REGIONS: CPT

## 2025-07-29 PROCEDURE — 97112 NEUROMUSCULAR REEDUCATION: CPT

## 2025-07-30 ENCOUNTER — OFFICE VISIT (OUTPATIENT)
Facility: CLINIC | Age: 70
End: 2025-07-30
Attending: NURSE PRACTITIONER
Payer: MEDICARE

## 2025-07-30 DIAGNOSIS — R42 DYSEQUILIBRIUM: ICD-10-CM

## 2025-07-30 DIAGNOSIS — R26.89 IMBALANCE: Primary | ICD-10-CM

## 2025-07-30 DIAGNOSIS — R26.89 OTHER ABNORMALITIES OF GAIT AND MOBILITY: ICD-10-CM

## 2025-07-30 PROCEDURE — 97110 THERAPEUTIC EXERCISES: CPT

## 2025-07-30 PROCEDURE — 97112 NEUROMUSCULAR REEDUCATION: CPT

## 2025-07-31 ENCOUNTER — APPOINTMENT (OUTPATIENT)
Facility: CLINIC | Age: 70
End: 2025-07-31
Attending: NURSE PRACTITIONER
Payer: MEDICARE

## 2025-08-03 ENCOUNTER — APPOINTMENT (OUTPATIENT)
Dept: RADIOLOGY | Facility: CLINIC | Age: 70
End: 2025-08-03
Payer: MEDICARE

## 2025-08-03 ENCOUNTER — OFFICE VISIT (OUTPATIENT)
Dept: URGENT CARE | Facility: CLINIC | Age: 70
End: 2025-08-03
Payer: MEDICARE

## 2025-08-03 VITALS
SYSTOLIC BLOOD PRESSURE: 126 MMHG | RESPIRATION RATE: 16 BRPM | DIASTOLIC BLOOD PRESSURE: 66 MMHG | TEMPERATURE: 98.5 F | OXYGEN SATURATION: 97 % | BODY MASS INDEX: 42.53 KG/M2 | HEART RATE: 65 BPM | WEIGHT: 247.8 LBS

## 2025-08-03 DIAGNOSIS — J18.9 PNEUMONIA OF RIGHT LOWER LOBE DUE TO INFECTIOUS ORGANISM: Primary | ICD-10-CM

## 2025-08-03 DIAGNOSIS — R05.1 ACUTE COUGH: ICD-10-CM

## 2025-08-03 PROCEDURE — 71046 X-RAY EXAM CHEST 2 VIEWS: CPT

## 2025-08-03 PROCEDURE — 99214 OFFICE O/P EST MOD 30 MIN: CPT

## 2025-08-03 RX ORDER — AZITHROMYCIN 250 MG/1
TABLET, FILM COATED ORAL
Qty: 6 TABLET | Refills: 0 | Status: SHIPPED | OUTPATIENT
Start: 2025-08-03 | End: 2025-08-07

## 2025-08-04 ENCOUNTER — TELEPHONE (OUTPATIENT)
Age: 70
End: 2025-08-04

## 2025-08-05 ENCOUNTER — OFFICE VISIT (OUTPATIENT)
Dept: PHYSICAL THERAPY | Facility: CLINIC | Age: 70
End: 2025-08-05
Attending: NURSE PRACTITIONER
Payer: MEDICARE

## 2025-08-05 DIAGNOSIS — N39.3 STRESS INCONTINENCE OF URINE: ICD-10-CM

## 2025-08-05 DIAGNOSIS — K59.00 CONSTIPATION, UNSPECIFIED CONSTIPATION TYPE: Primary | ICD-10-CM

## 2025-08-05 PROCEDURE — 97140 MANUAL THERAPY 1/> REGIONS: CPT

## 2025-08-05 PROCEDURE — 97112 NEUROMUSCULAR REEDUCATION: CPT

## 2025-08-08 ENCOUNTER — TELEPHONE (OUTPATIENT)
Age: 70
End: 2025-08-08

## 2025-08-12 ENCOUNTER — OFFICE VISIT (OUTPATIENT)
Dept: PHYSICAL THERAPY | Facility: CLINIC | Age: 70
End: 2025-08-12
Attending: NURSE PRACTITIONER
Payer: MEDICARE

## 2025-08-13 ENCOUNTER — TELEPHONE (OUTPATIENT)
Age: 70
End: 2025-08-13

## 2025-08-13 ENCOUNTER — HOSPITAL ENCOUNTER (OUTPATIENT)
Age: 70
Discharge: HOME | End: 2025-08-13
Attending: NURSE PRACTITIONER
Payer: MEDICARE

## 2025-08-13 ENCOUNTER — RESULTS FOLLOW-UP (OUTPATIENT)
Age: 70
End: 2025-08-13

## 2025-08-13 DIAGNOSIS — M51.369 DDD (DEGENERATIVE DISC DISEASE), LUMBAR: ICD-10-CM

## 2025-08-13 DIAGNOSIS — G62.9 POLYNEUROPATHY: ICD-10-CM

## 2025-08-13 DIAGNOSIS — M47.816 LUMBAR SPONDYLOSIS: ICD-10-CM

## 2025-08-13 PROBLEM — R20.2 PARESTHESIA: Status: ACTIVE | Noted: 2025-08-13

## 2025-08-13 PROCEDURE — 95886 MUSC TEST DONE W/N TEST COMP: CPT | Performed by: PHYSICAL MEDICINE & REHABILITATION

## 2025-08-13 PROCEDURE — 95911 NRV CNDJ TEST 9-10 STUDIES: CPT | Performed by: PHYSICAL MEDICINE & REHABILITATION

## 2025-08-18 ENCOUNTER — PREP FOR PROCEDURE (OUTPATIENT)
Age: 70
End: 2025-08-18

## 2025-08-18 ENCOUNTER — TELEPHONE (OUTPATIENT)
Age: 70
End: 2025-08-18

## 2025-08-18 ENCOUNTER — OFFICE VISIT (OUTPATIENT)
Dept: FAMILY MEDICINE CLINIC | Facility: CLINIC | Age: 70
End: 2025-08-18
Payer: MEDICARE

## 2025-08-18 VITALS
RESPIRATION RATE: 21 BRPM | TEMPERATURE: 96.8 F | HEART RATE: 85 BPM | WEIGHT: 248 LBS | OXYGEN SATURATION: 99 % | DIASTOLIC BLOOD PRESSURE: 84 MMHG | SYSTOLIC BLOOD PRESSURE: 128 MMHG | BODY MASS INDEX: 42.57 KG/M2

## 2025-08-18 DIAGNOSIS — K21.9 GASTROESOPHAGEAL REFLUX DISEASE WITHOUT ESOPHAGITIS: ICD-10-CM

## 2025-08-18 DIAGNOSIS — R32 URINARY INCONTINENCE, UNSPECIFIED TYPE: ICD-10-CM

## 2025-08-18 DIAGNOSIS — M47.816 LUMBAR SPONDYLOSIS: Primary | ICD-10-CM

## 2025-08-18 DIAGNOSIS — Z86.0100 HISTORY OF COLON POLYPS: ICD-10-CM

## 2025-08-18 DIAGNOSIS — Z86.0100 HISTORY OF COLON POLYPS: Primary | ICD-10-CM

## 2025-08-18 DIAGNOSIS — I10 ESSENTIAL HYPERTENSION: ICD-10-CM

## 2025-08-18 PROCEDURE — 99214 OFFICE O/P EST MOD 30 MIN: CPT | Performed by: NURSE PRACTITIONER

## 2025-08-18 PROCEDURE — G2211 COMPLEX E/M VISIT ADD ON: HCPCS | Performed by: NURSE PRACTITIONER

## 2025-08-19 ENCOUNTER — TELEPHONE (OUTPATIENT)
Age: 70
End: 2025-08-19

## 2025-08-19 ENCOUNTER — OFFICE VISIT (OUTPATIENT)
Dept: PHYSICAL THERAPY | Facility: CLINIC | Age: 70
End: 2025-08-19
Attending: NURSE PRACTITIONER
Payer: MEDICARE

## 2025-08-19 DIAGNOSIS — R26.89 IMBALANCE: ICD-10-CM

## 2025-08-19 DIAGNOSIS — R26.89 OTHER ABNORMALITIES OF GAIT AND MOBILITY: ICD-10-CM

## 2025-08-19 DIAGNOSIS — K59.00 CONSTIPATION, UNSPECIFIED CONSTIPATION TYPE: ICD-10-CM

## 2025-08-19 DIAGNOSIS — N39.3 STRESS INCONTINENCE OF URINE: Primary | ICD-10-CM

## 2025-08-19 DIAGNOSIS — M54.16 LUMBAR RADICULOPATHY: ICD-10-CM

## 2025-08-19 DIAGNOSIS — R42 DYSEQUILIBRIUM: ICD-10-CM

## 2025-08-19 PROCEDURE — 97112 NEUROMUSCULAR REEDUCATION: CPT

## 2025-08-19 PROCEDURE — 97110 THERAPEUTIC EXERCISES: CPT

## (undated) DEVICE — NEEDLE SPINAL 22G X 5IN QUINCKE

## (undated) DEVICE — DRAPE C-ARMOUR

## (undated) DEVICE — SOLIDIFIER FLUID WASTE CONTROL 1500ML

## (undated) DEVICE — NEEDLE SPINAL 22G X 3.5IN  QUINCKE

## (undated) DEVICE — RADIOLOGY STERILE LABELS: Brand: CENTURION

## (undated) DEVICE — 2000CC GUARDIAN II: Brand: GUARDIAN

## (undated) DEVICE — NEEDLE BLUNT 18 G X 1 1/2 W FILTER

## (undated) DEVICE — SNAP KOVER: Brand: UNBRANDED

## (undated) DEVICE — GLOVE INDICATOR PI UNDERGLOVE SZ 6.5 BLUE

## (undated) DEVICE — PLASTIC ADHESIVE BANDAGE: Brand: CURITY

## (undated) DEVICE — CHLORAPREP APPLICATOR TINTED 10.5ML ONE-STEP

## (undated) DEVICE — GAUZE SPONGES,16 PLY: Brand: CURITY

## (undated) DEVICE — MEDI-VAC YANKAUER SUCTION HANDLE: Brand: CARDINAL HEALTH

## (undated) DEVICE — GLOVE SRG BIOGEL 7

## (undated) DEVICE — STERI STRIP 1/2 INCH

## (undated) DEVICE — IMPLANTABLE DEVICE
Type: IMPLANTABLE DEVICE | Site: SPINE LUMBAR | Status: NON-FUNCTIONAL
Removed: 2018-06-27

## (undated) DEVICE — INTENDED FOR TISSUE SEPARATION, AND OTHER PROCEDURES THAT REQUIRE A SHARP SURGICAL BLADE TO PUNCTURE OR CUT.: Brand: BARD-PARKER SAFETY BLADES SIZE 10, STERILE

## (undated) DEVICE — TUBING BUBBLE CLEAR 5MM X 100 FT NS

## (undated) DEVICE — UNIVERSAL BLOCK TRAY: Brand: INTEGRA®

## (undated) DEVICE — SCD SEQUENTIAL COMPRESSION COMFORT SLEEVE MEDIUM KNEE LENGTH: Brand: KENDALL SCD

## (undated) DEVICE — TOOL 14MH30 LEGEND 14CM 3MM: Brand: MIDAS REX ™

## (undated) DEVICE — PREP SURGICAL PURPREP 26ML

## (undated) DEVICE — SMALL NEEDLE COUNTER NEST

## (undated) DEVICE — JAMSHIDI BONE MARROW BIOPSY/ASPIRATION NEEDLE, WITH LUER-LOCK ADAPTER 13GX3.5 ASP: Brand: JAMSHIDI

## (undated) DEVICE — GLOVE EXAM NON-STRL NTRL PLUS LRG PF

## (undated) DEVICE — GLOVE SRG BIOGEL 7.5

## (undated) DEVICE — JACKSON TABLE FOAM POSITIONING KIT: Brand: CARDINAL HEALTH

## (undated) DEVICE — 3M™ MICROFOAM™ TAPE 1528-4: Brand: 3M™ MICROFOAM™

## (undated) DEVICE — SYRINGE 10ML LL

## (undated) DEVICE — BRACHIAL PLEXUS SET

## (undated) DEVICE — "MH-438 A/W VLVE F/140 EVIS-140": Brand: AIR/WATER VALVE

## (undated) DEVICE — NEEDLE SPINAL18G X 3.5 IN QUINCKE

## (undated) DEVICE — WIPES BABY PAMPERS SENSITIVE 36/PK

## (undated) DEVICE — "MB-142 MOUTHPIECE": Brand: MOUTHPIECE

## (undated) DEVICE — GLOVE INDICATOR PI UNDERGLOVE SZ 7.5 BLUE

## (undated) DEVICE — BRUSH ENDO CLEANING DBL-HEADER

## (undated) DEVICE — SUT MONOCRYL 4-0 PS-2 27 IN Y426H

## (undated) DEVICE — GLOVE SRG BIOGEL 6

## (undated) DEVICE — TOWEL SET X-RAY

## (undated) DEVICE — TRAY FOLEY 16FR URIMETER SURESTEP

## (undated) DEVICE — CHEST/BREAST DRAPE: Brand: CONVERTORS

## (undated) DEVICE — NEEDLE 18 G X 1 1/2

## (undated) DEVICE — DISPOSABLE BIOPSY VALVE MAJ-1555: Brand: SINGLE USE BIOPSY VALVE (STERILE)

## (undated) DEVICE — JP CHAN DRN SIL RND 10FR FULL W/TROCAR: Brand: JACKSON-PRATT

## (undated) DEVICE — SUT VICRYL 0 UR-6 27 IN J603H

## (undated) DEVICE — SYRINGE 5ML LL

## (undated) DEVICE — IV FLUSH NSS 10ML POSIFLUSH

## (undated) DEVICE — INTENDED FOR TISSUE SEPARATION, AND OTHER PROCEDURES THAT REQUIRE A SHARP SURGICAL BLADE TO PUNCTURE OR CUT.: Brand: BARD-PARKER ® CARBON RIB-BACK BLADES

## (undated) DEVICE — NEURO PATTIES 3/4 X 3/4

## (undated) DEVICE — LUBRICANT SURGILUBE TUBE 4 OZ  FLIP TOP

## (undated) DEVICE — TUBING AUX CHANNEL

## (undated) DEVICE — INTENDED FOR TISSUE SEPARATION, AND OTHER PROCEDURES THAT REQUIRE A SHARP SURGICAL BLADE TO PUNCTURE OR CUT.: Brand: BARD-PARKER SAFETY BLADES SIZE 11, STERILE

## (undated) DEVICE — BITE BLOCK MAXI 60FR LF STRAP

## (undated) DEVICE — SUT VICRYL 0 CT-1 18 IN J740D

## (undated) DEVICE — "MH-443 SUCTION VALVE F/EVIS140 EVIS160": Brand: SUCTION VALVE

## (undated) DEVICE — TRAY EPIDURAL PERIFIX 20GA X 3.5IN TUOHY 8ML

## (undated) DEVICE — ENDOCUFF VISION SMALL PURPLE ID 10.4

## (undated) DEVICE — "MAJ-901 WATER CONTAINER SET CV-160/140": Brand: WATER CONTAINER

## (undated) DEVICE — SUT VICRYL 2-0 CT-1 36 IN J945H

## (undated) DEVICE — PENCIL ELECTROSURG E-Z CLEAN -0035H

## (undated) DEVICE — TRAVELKIT CONTAINS FIRST STEP KIT (200ML EP-4 KIT) AND SOILED SCOPE BAG - 1 KIT: Brand: TRAVELKIT CONTAINS FIRST STEP KIT AND SOILED SCOPE BAG

## (undated) DEVICE — BOVIE LONG TIP

## (undated) DEVICE — 1200CC GUARDIAN II: Brand: GUARDIAN

## (undated) DEVICE — GLOVE INDICATOR PI UNDERGLOVE SZ 8 BLUE

## (undated) DEVICE — BAG DECANTER

## (undated) DEVICE — FLEXIBLE ADHESIVE BANDAGE,X-LARGE: Brand: CURITY

## (undated) DEVICE — AIRLIFE™  ADULT CUSHION NASAL CANNULA WITH 7 FOOT (2.1 M) CRUSH-RESISTANT OXYGEN TUBING, AND U/CONNECT-IT ADAPTER: Brand: AIRLIFE™

## (undated) DEVICE — BETHLEHEM UNIVERSAL SPINE, KIT: Brand: CARDINAL HEALTH

## (undated) DEVICE — SURGIFOAM 7 X 12 SPONGE ABS

## (undated) DEVICE — ASTOUND STANDARD SURGICAL GOWN, XL: Brand: CONVERTORS

## (undated) DEVICE — PETRI DISH STERILE

## (undated) DEVICE — BIPOLAR SEALER 23-113-1 AQM 2.3: Brand: AQUAMANTYS™

## (undated) DEVICE — 60 ML SYRINGE,REGULAR TIP: Brand: MONOJECT

## (undated) DEVICE — SUT PDS II 2-0 CT-2 27 IN Z333H

## (undated) DEVICE — MASTISOL LIQ ADHESIVE 2/3ML

## (undated) DEVICE — ACCY PA100-A LEGEND LUB/DIFFUSER 4 PACK: Brand: MIDAS REX®

## (undated) DEVICE — SUT STRATAFIX SPIRAL PDS PLUS 1 CTX 18 IN SXPP1A400

## (undated) DEVICE — JACKSON-PRATT 100CC BULB RESERVOIR: Brand: CARDINAL HEALTH